# Patient Record
Sex: MALE | Race: WHITE | NOT HISPANIC OR LATINO
[De-identification: names, ages, dates, MRNs, and addresses within clinical notes are randomized per-mention and may not be internally consistent; named-entity substitution may affect disease eponyms.]

---

## 2017-01-18 ENCOUNTER — RX RENEWAL (OUTPATIENT)
Age: 72
End: 2017-01-18

## 2017-03-01 ENCOUNTER — FORM ENCOUNTER (OUTPATIENT)
Age: 72
End: 2017-03-01

## 2017-03-02 ENCOUNTER — NON-APPOINTMENT (OUTPATIENT)
Age: 72
End: 2017-03-02

## 2017-03-02 ENCOUNTER — OUTPATIENT (OUTPATIENT)
Dept: OUTPATIENT SERVICES | Facility: HOSPITAL | Age: 72
LOS: 1 days | End: 2017-03-02
Payer: MEDICARE

## 2017-03-02 ENCOUNTER — APPOINTMENT (OUTPATIENT)
Dept: CARDIOLOGY | Facility: CLINIC | Age: 72
End: 2017-03-02

## 2017-03-02 ENCOUNTER — APPOINTMENT (OUTPATIENT)
Dept: RADIOLOGY | Facility: HOSPITAL | Age: 72
End: 2017-03-02

## 2017-03-02 VITALS
WEIGHT: 147 LBS | HEIGHT: 64 IN | BODY MASS INDEX: 25.1 KG/M2 | OXYGEN SATURATION: 97 % | DIASTOLIC BLOOD PRESSURE: 73 MMHG | SYSTOLIC BLOOD PRESSURE: 157 MMHG | RESPIRATION RATE: 17 BRPM | HEART RATE: 63 BPM

## 2017-03-02 DIAGNOSIS — R94.31 ABNORMAL ELECTROCARDIOGRAM [ECG] [EKG]: ICD-10-CM

## 2017-03-02 DIAGNOSIS — R06.89 OTHER ABNORMALITIES OF BREATHING: ICD-10-CM

## 2017-03-02 DIAGNOSIS — R05 COUGH: ICD-10-CM

## 2017-03-02 DIAGNOSIS — Z95.1 PRESENCE OF AORTOCORONARY BYPASS GRAFT: ICD-10-CM

## 2017-03-02 PROCEDURE — 71046 X-RAY EXAM CHEST 2 VIEWS: CPT

## 2017-06-01 ENCOUNTER — RX RENEWAL (OUTPATIENT)
Age: 72
End: 2017-06-01

## 2017-07-10 ENCOUNTER — RX RENEWAL (OUTPATIENT)
Age: 72
End: 2017-07-10

## 2017-10-04 ENCOUNTER — RX RENEWAL (OUTPATIENT)
Age: 72
End: 2017-10-04

## 2017-10-18 ENCOUNTER — RX RENEWAL (OUTPATIENT)
Age: 72
End: 2017-10-18

## 2018-01-17 ENCOUNTER — RX RENEWAL (OUTPATIENT)
Age: 73
End: 2018-01-17

## 2018-03-05 ENCOUNTER — RX RENEWAL (OUTPATIENT)
Age: 73
End: 2018-03-05

## 2018-04-03 ENCOUNTER — RX RENEWAL (OUTPATIENT)
Age: 73
End: 2018-04-03

## 2018-04-15 ENCOUNTER — RX RENEWAL (OUTPATIENT)
Age: 73
End: 2018-04-15

## 2018-04-30 ENCOUNTER — RX RENEWAL (OUTPATIENT)
Age: 73
End: 2018-04-30

## 2018-05-30 ENCOUNTER — RX RENEWAL (OUTPATIENT)
Age: 73
End: 2018-05-30

## 2018-06-02 ENCOUNTER — RESULT REVIEW (OUTPATIENT)
Age: 73
End: 2018-06-02

## 2018-06-08 ENCOUNTER — OUTPATIENT (OUTPATIENT)
Dept: OUTPATIENT SERVICES | Facility: HOSPITAL | Age: 73
LOS: 1 days | End: 2018-06-08
Payer: MEDICARE

## 2018-06-08 ENCOUNTER — APPOINTMENT (OUTPATIENT)
Dept: ULTRASOUND IMAGING | Facility: HOSPITAL | Age: 73
End: 2018-06-08
Payer: MEDICARE

## 2018-06-08 DIAGNOSIS — R31.9 HEMATURIA, UNSPECIFIED: ICD-10-CM

## 2018-06-08 PROCEDURE — 76775 US EXAM ABDO BACK WALL LIM: CPT

## 2018-06-08 PROCEDURE — 76775 US EXAM ABDO BACK WALL LIM: CPT | Mod: 26

## 2018-06-09 ENCOUNTER — RX RENEWAL (OUTPATIENT)
Age: 73
End: 2018-06-09

## 2018-06-18 DIAGNOSIS — N28.1 CYST OF KIDNEY, ACQUIRED: ICD-10-CM

## 2018-06-18 DIAGNOSIS — R31.9 HEMATURIA, UNSPECIFIED: ICD-10-CM

## 2018-07-16 ENCOUNTER — RX RENEWAL (OUTPATIENT)
Age: 73
End: 2018-07-16

## 2018-07-19 ENCOUNTER — RX RENEWAL (OUTPATIENT)
Age: 73
End: 2018-07-19

## 2018-08-08 ENCOUNTER — RX RENEWAL (OUTPATIENT)
Age: 73
End: 2018-08-08

## 2018-08-09 ENCOUNTER — RX RENEWAL (OUTPATIENT)
Age: 73
End: 2018-08-09

## 2018-08-29 ENCOUNTER — NON-APPOINTMENT (OUTPATIENT)
Age: 73
End: 2018-08-29

## 2018-08-29 ENCOUNTER — APPOINTMENT (OUTPATIENT)
Dept: CARDIOLOGY | Facility: CLINIC | Age: 73
End: 2018-08-29
Payer: MEDICARE

## 2018-08-29 VITALS
HEART RATE: 68 BPM | RESPIRATION RATE: 17 BRPM | SYSTOLIC BLOOD PRESSURE: 154 MMHG | BODY MASS INDEX: 24.92 KG/M2 | HEIGHT: 64 IN | WEIGHT: 146 LBS | DIASTOLIC BLOOD PRESSURE: 71 MMHG | OXYGEN SATURATION: 98 %

## 2018-08-29 VITALS — SYSTOLIC BLOOD PRESSURE: 140 MMHG | DIASTOLIC BLOOD PRESSURE: 70 MMHG

## 2018-08-29 DIAGNOSIS — I49.3 VENTRICULAR PREMATURE DEPOLARIZATION: ICD-10-CM

## 2018-08-29 PROCEDURE — 93000 ELECTROCARDIOGRAM COMPLETE: CPT

## 2018-08-29 PROCEDURE — 99215 OFFICE O/P EST HI 40 MIN: CPT

## 2018-09-04 ENCOUNTER — NON-APPOINTMENT (OUTPATIENT)
Age: 73
End: 2018-09-04

## 2018-09-04 PROCEDURE — 93224 XTRNL ECG REC UP TO 48 HRS: CPT

## 2018-09-07 ENCOUNTER — RX RENEWAL (OUTPATIENT)
Age: 73
End: 2018-09-07

## 2018-10-04 ENCOUNTER — RX RENEWAL (OUTPATIENT)
Age: 73
End: 2018-10-04

## 2018-11-08 ENCOUNTER — RX RENEWAL (OUTPATIENT)
Age: 73
End: 2018-11-08

## 2018-12-29 ENCOUNTER — RX RENEWAL (OUTPATIENT)
Age: 73
End: 2018-12-29

## 2019-01-23 ENCOUNTER — NON-APPOINTMENT (OUTPATIENT)
Age: 74
End: 2019-01-23

## 2019-01-23 ENCOUNTER — APPOINTMENT (OUTPATIENT)
Dept: CARDIOLOGY | Facility: CLINIC | Age: 74
End: 2019-01-23
Payer: MEDICARE

## 2019-01-23 VITALS
OXYGEN SATURATION: 96 % | HEIGHT: 64 IN | SYSTOLIC BLOOD PRESSURE: 147 MMHG | BODY MASS INDEX: 25.27 KG/M2 | WEIGHT: 148 LBS | HEART RATE: 65 BPM | DIASTOLIC BLOOD PRESSURE: 71 MMHG | RESPIRATION RATE: 17 BRPM

## 2019-01-23 PROCEDURE — 93000 ELECTROCARDIOGRAM COMPLETE: CPT

## 2019-01-23 PROCEDURE — 99214 OFFICE O/P EST MOD 30 MIN: CPT

## 2019-01-23 PROCEDURE — 93306 TTE W/DOPPLER COMPLETE: CPT

## 2019-01-23 NOTE — REASON FOR VISIT
[Follow-Up - Clinic] : a clinic follow-up of [Coronary Artery Disease] : coronary artery disease [Hypertension] : hypertension [FreeTextEntry2] : feels well s/p h/o anemia, gi bleed [FreeTextEntry1] : no cp or sob

## 2019-01-23 NOTE — DISCUSSION/SUMMARY
[PVCs] : ectopic ventricular beats [Coronary Artery Disease] : coronary artery disease [Echocardiogram] : echocardiogram [Patient] : the patient [Family] : the patient's family [___ Minutes, >1/2 Counseling] : The visit was [unfilled] minute(s) long, greater than half of the time was spent on counseling [Hypertension] : hypertension [Responding to Treatment] : responding to treatment [None] : none [Stable] : stable

## 2019-01-23 NOTE — REVIEW OF SYSTEMS
[Negative] : Heme/Lymph [Feeling Fatigued] : not feeling fatigued [Shortness Of Breath] : no shortness of breath [Chest Pain] : no chest pain [Palpitations] : no palpitations

## 2019-04-28 ENCOUNTER — RX RENEWAL (OUTPATIENT)
Age: 74
End: 2019-04-28

## 2019-06-24 ENCOUNTER — RX RENEWAL (OUTPATIENT)
Age: 74
End: 2019-06-24

## 2019-07-01 ENCOUNTER — RX RENEWAL (OUTPATIENT)
Age: 74
End: 2019-07-01

## 2019-07-28 ENCOUNTER — RX RENEWAL (OUTPATIENT)
Age: 74
End: 2019-07-28

## 2019-09-23 ENCOUNTER — RX RENEWAL (OUTPATIENT)
Age: 74
End: 2019-09-23

## 2019-10-03 ENCOUNTER — RX RENEWAL (OUTPATIENT)
Age: 74
End: 2019-10-03

## 2019-10-29 ENCOUNTER — RX RENEWAL (OUTPATIENT)
Age: 74
End: 2019-10-29

## 2019-11-04 ENCOUNTER — RX RENEWAL (OUTPATIENT)
Age: 74
End: 2019-11-04

## 2019-12-18 ENCOUNTER — RX RENEWAL (OUTPATIENT)
Age: 74
End: 2019-12-18

## 2020-01-06 ENCOUNTER — RX RENEWAL (OUTPATIENT)
Age: 75
End: 2020-01-06

## 2020-01-30 ENCOUNTER — RX RENEWAL (OUTPATIENT)
Age: 75
End: 2020-01-30

## 2020-02-14 ENCOUNTER — RX RENEWAL (OUTPATIENT)
Age: 75
End: 2020-02-14

## 2020-03-30 ENCOUNTER — RX RENEWAL (OUTPATIENT)
Age: 75
End: 2020-03-30

## 2020-04-22 ENCOUNTER — RX RENEWAL (OUTPATIENT)
Age: 75
End: 2020-04-22

## 2020-05-18 ENCOUNTER — RX RENEWAL (OUTPATIENT)
Age: 75
End: 2020-05-18

## 2020-06-22 ENCOUNTER — RX RENEWAL (OUTPATIENT)
Age: 75
End: 2020-06-22

## 2020-07-17 ENCOUNTER — RX RENEWAL (OUTPATIENT)
Age: 75
End: 2020-07-17

## 2020-07-23 ENCOUNTER — RX RENEWAL (OUTPATIENT)
Age: 75
End: 2020-07-23

## 2020-07-27 ENCOUNTER — RX RENEWAL (OUTPATIENT)
Age: 75
End: 2020-07-27

## 2020-08-12 ENCOUNTER — RX RENEWAL (OUTPATIENT)
Age: 75
End: 2020-08-12

## 2020-09-14 ENCOUNTER — RX RENEWAL (OUTPATIENT)
Age: 75
End: 2020-09-14

## 2020-10-18 ENCOUNTER — RX RENEWAL (OUTPATIENT)
Age: 75
End: 2020-10-18

## 2020-11-16 ENCOUNTER — RX RENEWAL (OUTPATIENT)
Age: 75
End: 2020-11-16

## 2020-12-17 ENCOUNTER — RX RENEWAL (OUTPATIENT)
Age: 75
End: 2020-12-17

## 2021-01-03 ENCOUNTER — RX RENEWAL (OUTPATIENT)
Age: 76
End: 2021-01-03

## 2021-02-04 ENCOUNTER — RX RENEWAL (OUTPATIENT)
Age: 76
End: 2021-02-04

## 2021-03-23 ENCOUNTER — RX RENEWAL (OUTPATIENT)
Age: 76
End: 2021-03-23

## 2021-03-24 ENCOUNTER — RX RENEWAL (OUTPATIENT)
Age: 76
End: 2021-03-24

## 2021-04-28 ENCOUNTER — RX RENEWAL (OUTPATIENT)
Age: 76
End: 2021-04-28

## 2021-06-07 ENCOUNTER — RX RENEWAL (OUTPATIENT)
Age: 76
End: 2021-06-07

## 2021-06-14 ENCOUNTER — RX RENEWAL (OUTPATIENT)
Age: 76
End: 2021-06-14

## 2021-06-16 ENCOUNTER — APPOINTMENT (OUTPATIENT)
Dept: CT IMAGING | Facility: HOSPITAL | Age: 76
End: 2021-06-16
Payer: MEDICARE

## 2021-06-16 ENCOUNTER — OUTPATIENT (OUTPATIENT)
Dept: OUTPATIENT SERVICES | Facility: HOSPITAL | Age: 76
LOS: 1 days | End: 2021-06-16
Payer: MEDICARE

## 2021-06-16 DIAGNOSIS — R63.4 ABNORMAL WEIGHT LOSS: ICD-10-CM

## 2021-06-16 PROCEDURE — 74177 CT ABD & PELVIS W/CONTRAST: CPT | Mod: 26

## 2021-06-16 PROCEDURE — 74177 CT ABD & PELVIS W/CONTRAST: CPT

## 2021-06-16 PROCEDURE — 71260 CT THORAX DX C+: CPT

## 2021-06-16 PROCEDURE — 82565 ASSAY OF CREATININE: CPT

## 2021-06-16 PROCEDURE — 71260 CT THORAX DX C+: CPT | Mod: 26

## 2021-07-27 ENCOUNTER — OUTPATIENT (OUTPATIENT)
Dept: OUTPATIENT SERVICES | Facility: HOSPITAL | Age: 76
LOS: 1 days | End: 2021-07-27
Payer: MEDICARE

## 2021-07-27 ENCOUNTER — APPOINTMENT (OUTPATIENT)
Dept: NUCLEAR MEDICINE | Facility: CLINIC | Age: 76
End: 2021-07-27
Payer: MEDICARE

## 2021-07-27 DIAGNOSIS — R63.4 ABNORMAL WEIGHT LOSS: ICD-10-CM

## 2021-07-27 PROCEDURE — 78815 PET IMAGE W/CT SKULL-THIGH: CPT | Mod: 26,PI

## 2021-07-27 PROCEDURE — A9552: CPT

## 2021-07-27 PROCEDURE — 78815 PET IMAGE W/CT SKULL-THIGH: CPT

## 2021-09-15 ENCOUNTER — RX RENEWAL (OUTPATIENT)
Age: 76
End: 2021-09-15

## 2021-10-04 ENCOUNTER — APPOINTMENT (OUTPATIENT)
Dept: NEUROLOGY | Facility: CLINIC | Age: 76
End: 2021-10-04
Payer: MEDICARE

## 2021-10-19 ENCOUNTER — RX RENEWAL (OUTPATIENT)
Age: 76
End: 2021-10-19

## 2021-11-05 ENCOUNTER — APPOINTMENT (OUTPATIENT)
Dept: NEUROLOGY | Facility: CLINIC | Age: 76
End: 2021-11-05
Payer: MEDICARE

## 2021-11-05 ENCOUNTER — NON-APPOINTMENT (OUTPATIENT)
Age: 76
End: 2021-11-05

## 2021-11-05 VITALS
BODY MASS INDEX: 27.23 KG/M2 | DIASTOLIC BLOOD PRESSURE: 86 MMHG | HEART RATE: 70 BPM | HEIGHT: 62 IN | WEIGHT: 148 LBS | SYSTOLIC BLOOD PRESSURE: 138 MMHG

## 2021-11-05 PROCEDURE — 99205 OFFICE O/P NEW HI 60 MIN: CPT

## 2021-11-05 NOTE — CONSULT LETTER
[Dear  ___] : Dear  [unfilled], [Consult Letter:] : I had the pleasure of evaluating your patient, [unfilled]. [Please see my note below.] : Please see my note below. [Consult Closing:] : Thank you very much for allowing me to participate in the care of this patient.  If you have any questions, please do not hesitate to contact me. [Sincerely,] : Sincerely, [FreeTextEntry3] : Evgeny Shaffer MD\par

## 2021-11-05 NOTE — PHYSICAL EXAM
[FreeTextEntry1] : Head:  Normocephalic Neck: Supple nontender no carotid bruits.  \par \par Mental Status:  Alert disoriented and confused.  This patient follows simple commands.  He is inattentive and easily distracted.  He does demonstrate problems with receptive language but there is a language barrier.  He speaks a mixture of English and Lithuanian.  He recalled 0 out of 3 words at 5 minutes.  He could not copy intersecting pentagons or draw a clock face as directed.  He appears anxious but not necessarily depressed.\par \par Cranial Nerves:  PERRL, Fundi normal Visual Fields full  EOMI no diplopia no ptosis no nystagmus, V through XII intact.\par \par Motor: Nonfocal no drift fairly good strength throughout paratonia no dysmetria or abnormal movement.\par \par DTRs: Symmetric .  Plantars flexor.  No Clonus.\par \par Sensory:  Normal testing with pin light touch and not cooperative for detailed sensory testing.\par \par Gait: Basically unremarkable.\par

## 2021-11-05 NOTE — HISTORY OF PRESENT ILLNESS
[FreeTextEntry1] : Giovani Avila is seen for an office consultation with his daughter.  This patient is a 76-year-old right-handed male who has been confused for approximately the last 1 year.  Symptoms seem to begin sometime after the separation of his son and daughter-in-law.  He now lives with his son.  He has become increasingly confused with problems with memory.  There are behavioral changes with anxiety and depression.  2-1/2 weeks ago his wife passed away from complications of Covid.  Approximately 2 years ago when his wife was hospitalized with a hip fracture he had some transient confusion.  He takes care of his own ADL but he needs close monitoring with respect to behavior.  He has had significant weight loss during the last several months with a negative GI work-up.  He was treated for colon cancer 6 to 7 years ago with surgery and chemotherapy including platinum.  There is no evidence of recurrent cancer.  He follows with a gastroenterologist on a regular basis.  There is no history of stroke head trauma seizure or focal neurological complaints.\par \par Past history significant for colon cancer as noted above diabetes hypertension remote coronary artery bypass surgery.\par \par Medications include glipizide aspirin amlodipine and Toprol Imdur rammer Sherlyn.\par \par He is a former smoker and does not abuse alcohol.\par \par Family history negative for dementia.\par \par MRI of the brain with and without contrast was performed elsewhere in August 2021.  The study reveals microvascular change to a moderate degree and moderate involutional change and a tiny chronic right cerebellar infarct.  The dens was not well visualized possibly due to prior fracture.  CAT scan of the cervical spine recommended for better assessment.\par \par

## 2021-11-05 NOTE — ASSESSMENT
[FreeTextEntry1] : Impression: This 76-year-old male who presents with his daughter with a history of CAD remote coronary bypass hypertension diabetes history of colon cancer presents with an approximate 1 year history of progressive cognitive decline with behavioral change and anxiety and depression.  There are psychosocial stressors including the passing of his wife within the last 2-1/2 weeks.  Suspect dementia probably Alzheimer's disease with behavioral symptomatology.\par \par Recommendations: Begin Lexapro 5 mg daily to increase to 10 mg daily depending on the patient's initial response.  Consider addition of donepezil 5 mg to 10 mg daily.  Consider memantine.  Obtain records from his prior neurologist and recent blood test reportedly to be unremarkable.  Consider brain PET scan.  Discussed with the patient's daughter.  Guarded prognosis.\par

## 2021-12-13 ENCOUNTER — APPOINTMENT (OUTPATIENT)
Dept: NEUROLOGY | Facility: CLINIC | Age: 76
End: 2021-12-13
Payer: MEDICARE

## 2021-12-13 VITALS
BODY MASS INDEX: 20.8 KG/M2 | WEIGHT: 113 LBS | HEART RATE: 68 BPM | DIASTOLIC BLOOD PRESSURE: 82 MMHG | SYSTOLIC BLOOD PRESSURE: 134 MMHG | HEIGHT: 62 IN

## 2021-12-13 DIAGNOSIS — F41.9 ANXIETY DISORDER, UNSPECIFIED: ICD-10-CM

## 2021-12-13 DIAGNOSIS — F32.A ANXIETY DISORDER, UNSPECIFIED: ICD-10-CM

## 2021-12-13 PROCEDURE — 99214 OFFICE O/P EST MOD 30 MIN: CPT

## 2021-12-13 NOTE — PHYSICAL EXAM
[FreeTextEntry1] : Head:  Normocephalic Neck: Supple nontender no carotid bruits. \par \par Mental Status:  Alert disoriented and confused.  Basically unchanged.\par \par Cranial Nerves:  PERRL, Visual Fields full  EOMI no diplopia no ptosis no nystagmus, V through XII intact.\par \par Motor: Nonfocal no drift fairly good strength throughout paratonia.\par \par DTRs: Symmetric. Plantars flexor.  No Clonus.\par \par Sensory:  Normal testing with pin light touch.\par \par Gait: Basically not remarkable for age.\par \par

## 2021-12-13 NOTE — HISTORY OF PRESENT ILLNESS
[FreeTextEntry1] : This patient is seen for an office visit with his daughter.  There has been some benefit with Escitalopram 5 mg daily regarding his confusion and depression and anxiety.  However at 10 mg daily he seems to have deteriorated.

## 2021-12-13 NOTE — ASSESSMENT
[FreeTextEntry1] : Impression: This 76-year-old patient presents with his daughter with a history of CAD remote coronary bypass hypertension diabetes history of colon cancer and a presentation consistent with dementia with behavioral symptomatology and also depression.\par \par Recommendations: Reduce escitalopram back to 5 mg once daily and observe.  Consider switching to sertraline 25 mg once daily depending on his status with respect to the lower dose of Escitalopram.  Xanax 0.25 mg 1/2 tablet twice daily as needed.  Consider donepezil 5 mg once daily.  Office follow-up.\par

## 2022-01-11 ENCOUNTER — RX RENEWAL (OUTPATIENT)
Age: 77
End: 2022-01-11

## 2022-02-13 ENCOUNTER — RX RENEWAL (OUTPATIENT)
Age: 77
End: 2022-02-13

## 2022-03-24 ENCOUNTER — RX RENEWAL (OUTPATIENT)
Age: 77
End: 2022-03-24

## 2022-04-01 ENCOUNTER — APPOINTMENT (OUTPATIENT)
Dept: NEUROLOGY | Facility: CLINIC | Age: 77
End: 2022-04-01

## 2022-05-12 ENCOUNTER — RX RENEWAL (OUTPATIENT)
Age: 77
End: 2022-05-12

## 2022-05-16 ENCOUNTER — RX RENEWAL (OUTPATIENT)
Age: 77
End: 2022-05-16

## 2022-05-17 ENCOUNTER — NON-APPOINTMENT (OUTPATIENT)
Age: 77
End: 2022-05-17

## 2022-06-06 RX ORDER — ESCITALOPRAM OXALATE 20 MG/1
20 TABLET ORAL DAILY
Qty: 30 | Refills: 5 | Status: ACTIVE | COMMUNITY
Start: 2021-11-05 | End: 1900-01-01

## 2022-07-06 ENCOUNTER — RX RENEWAL (OUTPATIENT)
Age: 77
End: 2022-07-06

## 2022-07-13 ENCOUNTER — INPATIENT (INPATIENT)
Facility: HOSPITAL | Age: 77
LOS: 3 days | Discharge: HOME | DRG: 415 | End: 2022-07-17
Attending: HOSPITALIST | Admitting: STUDENT IN AN ORGANIZED HEALTH CARE EDUCATION/TRAINING PROGRAM
Payer: MEDICARE

## 2022-07-13 ENCOUNTER — APPOINTMENT (OUTPATIENT)
Dept: NUCLEAR MEDICINE | Facility: IMAGING CENTER | Age: 77
End: 2022-07-13

## 2022-07-13 ENCOUNTER — TRANSCRIPTION ENCOUNTER (OUTPATIENT)
Age: 77
End: 2022-07-13

## 2022-07-13 VITALS
HEIGHT: 63 IN | HEART RATE: 97 BPM | WEIGHT: 115.96 LBS | DIASTOLIC BLOOD PRESSURE: 60 MMHG | SYSTOLIC BLOOD PRESSURE: 119 MMHG | RESPIRATION RATE: 16 BRPM | OXYGEN SATURATION: 96 % | TEMPERATURE: 98 F

## 2022-07-13 DIAGNOSIS — K81.9 CHOLECYSTITIS, UNSPECIFIED: ICD-10-CM

## 2022-07-13 LAB
ALBUMIN SERPL ELPH-MCNC: 2.8 G/DL — LOW (ref 3.3–5)
ALP SERPL-CCNC: 112 U/L — SIGNIFICANT CHANGE UP (ref 40–120)
ALT FLD-CCNC: 22 U/L — SIGNIFICANT CHANGE UP (ref 10–45)
ANION GAP SERPL CALC-SCNC: 10 MMOL/L — SIGNIFICANT CHANGE UP (ref 5–17)
APPEARANCE UR: CLEAR — SIGNIFICANT CHANGE UP
AST SERPL-CCNC: 14 U/L — SIGNIFICANT CHANGE UP (ref 10–40)
BACTERIA # UR AUTO: ABNORMAL /HPF
BASOPHILS # BLD AUTO: 0.04 K/UL — SIGNIFICANT CHANGE UP (ref 0–0.2)
BASOPHILS NFR BLD AUTO: 0.4 % — SIGNIFICANT CHANGE UP (ref 0–2)
BILIRUB SERPL-MCNC: 1 MG/DL — SIGNIFICANT CHANGE UP (ref 0.2–1.2)
BILIRUB UR-MCNC: ABNORMAL
BUN SERPL-MCNC: 76 MG/DL — HIGH (ref 7–23)
CALCIUM SERPL-MCNC: 7.9 MG/DL — LOW (ref 8.4–10.5)
CHLORIDE SERPL-SCNC: 95 MMOL/L — LOW (ref 96–108)
CO2 SERPL-SCNC: 28 MMOL/L — SIGNIFICANT CHANGE UP (ref 22–31)
COLOR SPEC: ABNORMAL
CREAT SERPL-MCNC: 3.4 MG/DL — HIGH (ref 0.5–1.3)
D DIMER BLD IA.RAPID-MCNC: 498 NG/ML DDU — HIGH
DIFF PNL FLD: NEGATIVE — SIGNIFICANT CHANGE UP
EGFR: 18 ML/MIN/1.73M2 — LOW
EOSINOPHIL # BLD AUTO: 0.06 K/UL — SIGNIFICANT CHANGE UP (ref 0–0.5)
EOSINOPHIL NFR BLD AUTO: 0.6 % — SIGNIFICANT CHANGE UP (ref 0–6)
EPI CELLS # UR: SIGNIFICANT CHANGE UP
GLUCOSE SERPL-MCNC: 203 MG/DL — HIGH (ref 70–99)
GLUCOSE UR QL: NEGATIVE — SIGNIFICANT CHANGE UP
HCT VFR BLD CALC: 44 % — SIGNIFICANT CHANGE UP (ref 39–50)
HGB BLD-MCNC: 15.1 G/DL — SIGNIFICANT CHANGE UP (ref 13–17)
HYALINE CASTS # UR AUTO: ABNORMAL
IMM GRANULOCYTES NFR BLD AUTO: 0.5 % — SIGNIFICANT CHANGE UP (ref 0–1.5)
KETONES UR-MCNC: NEGATIVE — SIGNIFICANT CHANGE UP
LEUKOCYTE ESTERASE UR-ACNC: NEGATIVE — SIGNIFICANT CHANGE UP
LIDOCAIN IGE QN: 151 U/L — SIGNIFICANT CHANGE UP (ref 73–393)
LYMPHOCYTES # BLD AUTO: 1.05 K/UL — SIGNIFICANT CHANGE UP (ref 1–3.3)
LYMPHOCYTES # BLD AUTO: 10.9 % — LOW (ref 13–44)
MCHC RBC-ENTMCNC: 31.2 PG — SIGNIFICANT CHANGE UP (ref 27–34)
MCHC RBC-ENTMCNC: 34.3 GM/DL — SIGNIFICANT CHANGE UP (ref 32–36)
MCV RBC AUTO: 90.9 FL — SIGNIFICANT CHANGE UP (ref 80–100)
MONOCYTES # BLD AUTO: 1.04 K/UL — HIGH (ref 0–0.9)
MONOCYTES NFR BLD AUTO: 10.8 % — SIGNIFICANT CHANGE UP (ref 2–14)
NEUTROPHILS # BLD AUTO: 7.41 K/UL — HIGH (ref 1.8–7.4)
NEUTROPHILS NFR BLD AUTO: 76.8 % — SIGNIFICANT CHANGE UP (ref 43–77)
NITRITE UR-MCNC: NEGATIVE — SIGNIFICANT CHANGE UP
NRBC # BLD: 0 /100 WBCS — SIGNIFICANT CHANGE UP (ref 0–0)
PH UR: 5 — SIGNIFICANT CHANGE UP (ref 5–8)
PLATELET # BLD AUTO: 311 K/UL — SIGNIFICANT CHANGE UP (ref 150–400)
POTASSIUM SERPL-MCNC: 3.2 MMOL/L — LOW (ref 3.5–5.3)
POTASSIUM SERPL-SCNC: 3.2 MMOL/L — LOW (ref 3.5–5.3)
PROT SERPL-MCNC: 7.7 G/DL — SIGNIFICANT CHANGE UP (ref 6–8.3)
PROT UR-MCNC: 30 MG/DL
RBC # BLD: 4.84 M/UL — SIGNIFICANT CHANGE UP (ref 4.2–5.8)
RBC # FLD: 12.6 % — SIGNIFICANT CHANGE UP (ref 10.3–14.5)
RBC CASTS # UR COMP ASSIST: SIGNIFICANT CHANGE UP /HPF (ref 0–4)
SARS-COV-2 RNA SPEC QL NAA+PROBE: SIGNIFICANT CHANGE UP
SODIUM SERPL-SCNC: 133 MMOL/L — LOW (ref 135–145)
SP GR SPEC: 1.02 — SIGNIFICANT CHANGE UP (ref 1.01–1.02)
UROBILINOGEN FLD QL: NEGATIVE — SIGNIFICANT CHANGE UP
WBC # BLD: 9.65 K/UL — SIGNIFICANT CHANGE UP (ref 3.8–10.5)
WBC # FLD AUTO: 9.65 K/UL — SIGNIFICANT CHANGE UP (ref 3.8–10.5)
WBC UR QL: SIGNIFICANT CHANGE UP /HPF (ref 0–5)

## 2022-07-13 PROCEDURE — 99285 EMERGENCY DEPT VISIT HI MDM: CPT

## 2022-07-13 PROCEDURE — 74176 CT ABD & PELVIS W/O CONTRAST: CPT | Mod: 26,MA

## 2022-07-13 PROCEDURE — 93010 ELECTROCARDIOGRAM REPORT: CPT

## 2022-07-13 PROCEDURE — 99223 1ST HOSP IP/OBS HIGH 75: CPT

## 2022-07-13 PROCEDURE — 76705 ECHO EXAM OF ABDOMEN: CPT | Mod: 26

## 2022-07-13 RX ORDER — PIPERACILLIN AND TAZOBACTAM 4; .5 G/20ML; G/20ML
3.38 INJECTION, POWDER, LYOPHILIZED, FOR SOLUTION INTRAVENOUS EVERY 12 HOURS
Refills: 0 | Status: DISCONTINUED | OUTPATIENT
Start: 2022-07-13 | End: 2022-07-14

## 2022-07-13 RX ORDER — PIPERACILLIN AND TAZOBACTAM 4; .5 G/20ML; G/20ML
3.38 INJECTION, POWDER, LYOPHILIZED, FOR SOLUTION INTRAVENOUS ONCE
Refills: 0 | Status: COMPLETED | OUTPATIENT
Start: 2022-07-13 | End: 2022-07-13

## 2022-07-13 RX ORDER — SODIUM CHLORIDE 9 MG/ML
1000 INJECTION INTRAMUSCULAR; INTRAVENOUS; SUBCUTANEOUS ONCE
Refills: 0 | Status: COMPLETED | OUTPATIENT
Start: 2022-07-13 | End: 2022-07-13

## 2022-07-13 RX ORDER — ONDANSETRON 8 MG/1
4 TABLET, FILM COATED ORAL EVERY 8 HOURS
Refills: 0 | Status: DISCONTINUED | OUTPATIENT
Start: 2022-07-13 | End: 2022-07-14

## 2022-07-13 RX ORDER — SODIUM CHLORIDE 9 MG/ML
500 INJECTION INTRAMUSCULAR; INTRAVENOUS; SUBCUTANEOUS ONCE
Refills: 0 | Status: COMPLETED | OUTPATIENT
Start: 2022-07-13 | End: 2022-07-13

## 2022-07-13 RX ORDER — METOPROLOL TARTRATE 50 MG
12.5 TABLET ORAL DAILY
Refills: 0 | Status: DISCONTINUED | OUTPATIENT
Start: 2022-07-13 | End: 2022-07-14

## 2022-07-13 RX ORDER — POTASSIUM CHLORIDE 20 MEQ
40 PACKET (EA) ORAL ONCE
Refills: 0 | Status: DISCONTINUED | OUTPATIENT
Start: 2022-07-13 | End: 2022-07-13

## 2022-07-13 RX ORDER — ASPIRIN/CALCIUM CARB/MAGNESIUM 324 MG
1 TABLET ORAL
Qty: 0 | Refills: 0 | DISCHARGE

## 2022-07-13 RX ORDER — SODIUM CHLORIDE 9 MG/ML
1000 INJECTION INTRAMUSCULAR; INTRAVENOUS; SUBCUTANEOUS
Refills: 0 | Status: DISCONTINUED | OUTPATIENT
Start: 2022-07-13 | End: 2022-07-14

## 2022-07-13 RX ORDER — ACETAMINOPHEN 500 MG
650 TABLET ORAL EVERY 6 HOURS
Refills: 0 | Status: DISCONTINUED | OUTPATIENT
Start: 2022-07-13 | End: 2022-07-14

## 2022-07-13 RX ORDER — LANOLIN ALCOHOL/MO/W.PET/CERES
3 CREAM (GRAM) TOPICAL AT BEDTIME
Refills: 0 | Status: DISCONTINUED | OUTPATIENT
Start: 2022-07-13 | End: 2022-07-14

## 2022-07-13 RX ORDER — AMLODIPINE BESYLATE 2.5 MG/1
2.5 TABLET ORAL DAILY
Refills: 0 | Status: DISCONTINUED | OUTPATIENT
Start: 2022-07-13 | End: 2022-07-14

## 2022-07-13 RX ORDER — ACETAMINOPHEN 500 MG
1000 TABLET ORAL ONCE
Refills: 0 | Status: COMPLETED | OUTPATIENT
Start: 2022-07-13 | End: 2022-07-13

## 2022-07-13 RX ORDER — ISOSORBIDE MONONITRATE 60 MG/1
60 TABLET, EXTENDED RELEASE ORAL DAILY
Refills: 0 | Status: DISCONTINUED | OUTPATIENT
Start: 2022-07-13 | End: 2022-07-14

## 2022-07-13 RX ORDER — ONDANSETRON 8 MG/1
4 TABLET, FILM COATED ORAL ONCE
Refills: 0 | Status: COMPLETED | OUTPATIENT
Start: 2022-07-13 | End: 2022-07-13

## 2022-07-13 RX ADMIN — Medication 400 MILLIGRAM(S): at 17:31

## 2022-07-13 RX ADMIN — PIPERACILLIN AND TAZOBACTAM 200 GRAM(S): 4; .5 INJECTION, POWDER, LYOPHILIZED, FOR SOLUTION INTRAVENOUS at 18:41

## 2022-07-13 RX ADMIN — SODIUM CHLORIDE 1000 MILLILITER(S): 9 INJECTION INTRAMUSCULAR; INTRAVENOUS; SUBCUTANEOUS at 21:50

## 2022-07-13 RX ADMIN — SODIUM CHLORIDE 1000 MILLILITER(S): 9 INJECTION INTRAMUSCULAR; INTRAVENOUS; SUBCUTANEOUS at 17:31

## 2022-07-13 RX ADMIN — SODIUM CHLORIDE 500 MILLILITER(S): 9 INJECTION INTRAMUSCULAR; INTRAVENOUS; SUBCUTANEOUS at 16:10

## 2022-07-13 RX ADMIN — ONDANSETRON 4 MILLIGRAM(S): 8 TABLET, FILM COATED ORAL at 16:07

## 2022-07-13 RX ADMIN — SODIUM CHLORIDE 60 MILLILITER(S): 9 INJECTION INTRAMUSCULAR; INTRAVENOUS; SUBCUTANEOUS at 21:53

## 2022-07-13 NOTE — CONSULT NOTE ADULT - ASSESSMENT
a/p    acute calculous cholecystitis/emphysematous-clinically does not look that sick and is afebrile(rectally)    needs fluid resuscitation, zosyn, npo    needs potasium replacement    I put on OR schedule for open cholecystectomy for tomorrow    If he gets sicker tonight I will come back and do the surgery.    I explained this to patient and his son Franko([phone]) and they understand.

## 2022-07-13 NOTE — ED PROVIDER NOTE - NS ED ATTENDING STATEMENT MOD
This was a shared visit with the CASH. I reviewed and verified the documentation and independently performed the documented:

## 2022-07-13 NOTE — H&P ADULT - HISTORY OF PRESENT ILLNESS
77 year old M PMH DM (not on insulin), colon CA s/p resection, HTN, HLD, dementia coming to the ED for abdominal pain associated with nausea and vomiting and decreased PO intake since Monday. History was obtained from daughter, Kludeep Ordonez at bedside as patient with dementia. Patient states he woke up Monday without appetite, had episode of R sided abdominal pain and bilious emesis, spoke to PCP Dr. Parra and had blood drawn as well as US. He has been able to tolerate small amounts of fluid and nausea relief with zofran however continues to have abdominal pain. Patient also had episode of diarrhea today and took imodium with resolution of diarrhea. Denies fevers, chills, chest pain, SOB. Patient had episode of diarrhea last night.    In the ED, T 98.1F, HR 97, , RR 16, SpO2 96% on RA. Labs showed normal CBC, dimer 498, Na 133, K 3.2, BUN/Cr 76/3.40, lipase negative. Patient received ofirmev x1, zofran x1, NS bolus x1.5L in the ED.    EKG: afib however possible artifact, will repeat  US abdomen: Cholelithiasis with gallbladder wall thickening is suspicious for acute cholecystitis in the appropriate clinical setting. There is a negative sonographic Palomino sign.

## 2022-07-13 NOTE — CONSULT NOTE ADULT - SUBJECTIVE AND OBJECTIVE BOX
Hospitalist note:    77 year old M PMH DM (not on insulin), colon CA s/p resection, HTN, HLD, dementia coming to the ED for abdominal pain associated with nausea and vomiting and decreased PO intake since Monday. History was obtained from daughter, Kuldeep Ordonez at bedside as patient with dementia. Patient states he woke up Monday without appetite, had episode of R sided abdominal pain and bilious emesis, spoke to PCP Dr. Parra and had blood drawn as well as US. He has been able to tolerate small amounts of fluid and nausea relief with zofran however continues to have abdominal pain. Patient also had episode of diarrhea today and took imodium with resolution of diarrhea. Denies fevers, chills, chest pain, SOB. Patient had episode of diarrhea last night.    In the ED, T 98.1F, HR 97, , RR 16, SpO2 96% on RA. Labs showed normal CBC, dimer 498, Na 133, K 3.2, BUN/Cr 76/3.40, lipase negative. Patient received ofirmev x1, zofran x1, NS bolus x1.5L in the ED.    EKG: afib however possible artifact, will repeat  US abdomen: Cholelithiasis with gallbladder wall thickening is suspicious for acute cholecystitis in the appropriate clinical setting. There is a negative sonographic Palomino sign      patient examined in ED [place] with son Franko present    says he feels better now    looks comfortable      temp 98(rectal)    Heent-sclera anicteric    abdomen-scaphoid, soft, non distended, non tender    extrem-ok    labs:    COVID-19 PCR: NotDetec: You can help in the fight against COVID-19. [facility] may contact     `wbc 9.6  h/h  15/45  k+ 3.2  bun 76  creat 3.40    imaging(I personally reviewed):  < from: CT Abdomen and Pelvis No Cont (07.13.22 @ 17:22) >  GALLBLADDER: Cholelithiasis. Wall thickening of the gallbladder and air   along the gallbladder wall, consistent with acute emphysematous   cholecystitis.    < end of copied text >  < from: US Abdomen Upper Quadrant Right (07.13.22 @ 16:58) >  IMPRESSION:  Cholelithiasis with gallbladder wall thickening is suspicious for acute   cholecystitis in the appropriate clinical setting. There is a negative   sonographic Palomino sign.    < end of copied text >            .

## 2022-07-13 NOTE — ED PROVIDER NOTE - CONSTITUTIONAL APPEARANCE HYGIENE, MLM
Clearance faxed  
Received request for Cardiac clearance for Dr. Rose with [facility]  Fax [phone]    Dr. Ornelas -please advise    
well appearing

## 2022-07-13 NOTE — H&P ADULT - NSHPREVIEWOFSYSTEMS_GEN_ALL_CORE
limited due to mental status    CONSTITUTIONAL: denies fever, chills, admits to fatigue, weakness  HEENT: denies blurred vision, sore throat  CARDIOVASCULAR: denies chest pain, chest pressure, palpitations  RESPIRATORY: denies shortness of breath, sputum production  GASTROINTESTINAL: admits to nausea, vomiting, diarrhea, abdominal pain  HEMATOLOGIC: denies gross bleeding, bruising  LYMPHATICS: denies enlarged lymph nodes, extremity swelling  PSYCHIATRIC: denies recent changes in anxiety, depression

## 2022-07-13 NOTE — ED ADULT NURSE NOTE - OBJECTIVE STATEMENT
Pt a/ox3, disoriented to situation, reports nausea.  No other signs of acute distress noted.  VSS on CM, family at bedside.

## 2022-07-13 NOTE — ED PROVIDER NOTE - OBJECTIVE STATEMENT
77 year old male, PMHx of DM, colon ca s/p resection, HTN, HLD, dementia; presents to the ED complaining of abdominal pain, nausea, vomiting and decreased PO since Monday. Patient states on Monday he woke up without an appetite and felt nauseous. He had an episode of bilious vomiting and was complaining of right sided abdominal pain. He was seen at Dr. Costello where they jose martin blood, had an ultrasound (WBC 17, US +Cholecystitis). He has since been taking Zofran with relief of nausea, been tolerating small amounts of fluid. He endorses ongoing mild abdominal pain, worse with palpation, in the RLQ, and nausea (last zofran this morning). Of note, pt also had one episode of diarrhea this afternoon and took Imodium with relief. He denies fevers, chills, cough, chest pain, shortness of breath, dysuria, or other complaints.

## 2022-07-13 NOTE — H&P ADULT - ASSESSMENT
77 year old M PMH DM (not on insulin), colon CA s/p resection, HTN, HLD, dementia coming to the ED for abdominal pain associated with nausea and vomiting and decreased PO intake since Monday admitted for acute cholecysitis    #acute cholecystitis  - admit to medicine  - patient does not meet sepsis criteria at this time  - US consistent with acute choley- discussed with Dr. Zarate and concerned with CT and US results, patient might go for emergent surgery tonight  - will order for additional NS bolus and maintenance IVF  - zosyn for acute choley  - surgery, Dr. Zarate, consulted    Patient medically optimized as best possible for emergent procedure. RCRI score 1    #ANISH  - likely prerenal azotemia in setting of decreased PO intake and dehydration  - continue with IVF  - Cr noted to be around 0.8-0.9 in 6/2022    #DM  - HbA1C was 7.9 on outpatient lab work from 7/11/2022  - home med glipizide  - moderate dose ISS    #HTN  #HLD  - continue home med amlodipine 2.5mg daily, isosorbide mononitrate 60mg daily  - hold home ramipril 10mg daily in setting of ANISH    #DVT ppx  - hold in setting of OR    Daughter Kuldeep José Luis [phone] and son Franko [phone] at bedside, all questions answered. Kuldeep would like daily updates, if not possible to reach then speak to Franko 77 year old M PMH DM (not on insulin), colon CA s/p resection, HTN, HLD, dementia coming to the ED for abdominal pain associated with nausea and vomiting and decreased PO intake since Monday admitted for acute cholecysitis    #acute cholecystitis  - admit to medicine  - patient does not meet sepsis criteria at this time  - US consistent with acute choley- discussed with Dr. Zarate and concerned with CT and US results, patient for OR tomorrow, will keep NPO for now  - will order for additional NS bolus and maintenance IVF  - zosyn for acute choley  - follow up repeat EKG as first with afib, likely inaccurate  - surgery, Dr. Zarate, consulted    #ANISH  - likely prerenal azotemia in setting of decreased PO intake and dehydration  - continue with IVF  - Cr noted to be around 0.8-0.9 in 6/2022    #DM  - HbA1C was 7.9 on outpatient lab work from 7/11/2022  - home med glipizide  - moderate dose ISS    #HTN  #HLD  - continue home med amlodipine 2.5mg daily, isosorbide mononitrate 60mg daily  - hold home ramipril 10mg daily in setting of ANISH    #DVT ppx  - hold in setting of OR    Daughter Kuldeep José Luis [phone] and son Franko [phone] at bedside, all questions answered. Kuldeep would like daily updates, if not possible to reach then speak to Franko 77 year old M PMH DM (not on insulin), colon CA s/p resection, HTN, HLD, dementia coming to the ED for abdominal pain associated with nausea and vomiting and decreased PO intake since Monday admitted for acute cholecysitis    #acute cholecystitis  - admit to medicine  - patient does not meet sepsis criteria at this time  - US consistent with acute choley- discussed with Dr. Zarate and concerned with CT and US results, patient for OR tomorrow, will keep NPO for now  - will order for additional NS bolus and maintenance IVF  - zosyn for acute choley  - follow up repeat EKG as first with afib, likely inaccurate  - surgery, Dr. Zarate, consulted    #ANISH  - likely prerenal azotemia in setting of decreased PO intake and dehydration  - continue with IVF  - Cr noted to be around 0.8-0.9 in 6/2022    #DM  - HbA1C was 7.9 on outpatient lab work from 7/11/2022  - home med glipizide  - moderate dose ISS    #HTN  #HLD  - continue home med amlodipine 2.5mg daily, isosorbide mononitrate 60mg daily  - hold home ramipril 10mg daily in setting of ANISH    #history of colon CA  - stable  - discussed with Dr. Parra, PCP- patient with elevated CEA level outpatient. Will obtain another level in the morning and PCP would like to have GI see patient after surgery    #DVT ppx  - hold in setting of OR    Daughter Kuldeep Ordonez [phone] and son Franko [phone] at bedside, all questions answered. Kuldeep would like daily updates, if not possible to reach then speak to Franko

## 2022-07-13 NOTE — ED PROVIDER NOTE - CLINICAL SUMMARY MEDICAL DECISION MAKING FREE TEXT BOX
78 yo m pw abd pain n/v/d x 3 days- WBC 17, +sangeetha on us, +dimer on labs. Will rpt labs, us, ctap 2/2 rlq abd pain

## 2022-07-13 NOTE — ED PROVIDER NOTE - ATTENDING APP SHARED VISIT CONTRIBUTION OF CARE
78 yo m pw abd pain n/v/d x 3 days- WBC 17, +sangeetha on us, +dimer on labs. Will rpt labs, us, ctap 2/2 rlq abd pain  Dr. Almaguer:  I have reviewed and discussed with the PA/ resident the case specifics, including the history, physical assessment, evaluation, conclusion, laboratory results, and medical plan. I agree with the contents, and conclusions. I have personally examined, and interviewed the patient.

## 2022-07-13 NOTE — H&P ADULT - NSHPSOCIALHISTORY_GEN_ALL_CORE
Lives at home with son, Franko  Former smoker, denies EtOH, illicit drug use  Performs most ADLs on his own  Ambulates independently

## 2022-07-13 NOTE — H&P ADULT - NSHPPHYSICALEXAM_GEN_ALL_CORE
T(C): 36.8 (07-13-22 @ 16:21), Max: 36.8 (07-13-22 @ 16:21)  HR: 91 (07-13-22 @ 16:21) (89 - 97)  BP: 120/66 (07-13-22 @ 16:21) (108/74 - 120/66)  RR: 16 (07-13-22 @ 16:21) (16 - 20)  SpO2: 97% (07-13-22 @ 16:21) (96% - 97%)    GENERAL: NAD, elderly  EYES: sclera clear, no exudates  ENMT: oropharynx clear without erythema, no exudates, dry mucous membranes  NECK: supple, soft, no thyromegaly noted  LUNGS: good air entry bilaterally, clear to auscultation, symmetric breath sounds, no wheezing or rhonchi appreciated  HEART: soft S1/S2, regular rate and rhythm, no murmurs noted, no lower extremity edema  GASTROINTESTINAL: abdomen is soft, nontender, nondistended, normoactive bowel sounds, no palpable masses, negative bagley sign  INTEGUMENT: warm and perfused  MUSCULOSKELETAL: no clubbing or cyanosis, no obvious deformity  NEUROLOGIC: awake, alert, oriented x2, good muscle tone in 4 extremities, no obvious sensory deficits  PSYCHIATRIC: mood is good, affect is congruent, linear and logical thought process

## 2022-07-13 NOTE — ED ADULT TRIAGE NOTE - LOCATION:
OUTPATIENT PROGRESS NOTE    REASON FOR VISIT:  Medication management   TOTAL TIME SPENT:  6:11-6:30      SUBJECTIVE: She comes in for follow up on:   1)  Generalized anxiety disorder-has been more stressed with situation.  Not irritable. Using clonazepam three times per week and helping.    No side effects.       2)  Persistent depressive disorder-feels more confident.  Depression is better. Feels physically drained and tired. Sleeping 9pm until 4:15am. Sometimes sleeping 10-11 hours.  Denies anhedonia. Denies suicidal ideations.     3) Eating disorder-has not been eating much when working.  Eats better on weekends. Knows that is causing her to be tired and need to eat more.       ROS:  endorses chest pain once last week from stress and denies shortness of breath  Med list reviewed.  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   It is stressful but knows that it will eventually get better. Needs to take better care of herself    OBJECTIVE: Appearance: thin, average height       Grooming: intact       Psychomotor: normal gait       Speech: normal rate, rhythm, quantity       Mood:  \"stressed\"       Affect:  euthymic       Thought process: goal-directed       Associations: Intact         Thought content: NO suicidal nor homicidal ideations       Perception:  NO Auditory nor visual hallucinations       Insight:  Fair       Judgment:  Fair       Attention: Fair       Concentration: Fair       Orientation: intact       Consciousness: alert  ASSESSMENT/DIAGNOSIS:   1. Generalized anxiety disorder-chronic disorder, improved, no med change   2.  Persistent depressive disorder-chronic, improved, no med changes   3.  Eating disorder-chronic disorder, improved, no med changes      PLAN:     1.RTC (return to clinic) in 3 months   2. Continue Clonazepam  Right arm; 0.5-1mg daily prn anxiety   3. Referred to DR. Zuniga if she decides to pursue counseling            Argentina Morales MD

## 2022-07-13 NOTE — ED ADULT NURSE NOTE - NSIMPLEMENTINTERV_GEN_ALL_ED
Implemented All Universal Safety Interventions:  [place] to call system. Call bell, personal items and telephone within reach. Instruct patient to call for assistance. Room bathroom lighting operational. Non-slip footwear when patient is off stretcher. Physically safe environment: no spills, clutter or unnecessary equipment. Stretcher in lowest position, wheels locked, appropriate side rails in place.

## 2022-07-13 NOTE — ED ADULT TRIAGE NOTE - ARRIVAL INFO ADDITIONAL COMMENTS
Patient reports to ED with daughter from OSH, suffered all day nausea and vomiting on Monday. Patient was seen in Aida's office Monday afternoon, with labs drawn, zofran administered and prescribed. Improved symptoms yesterday, with light PO intake. Patient had imaging done this AM showing gallstones and CXR. Labs resulting from PCP [facility] 3000, WBC 17.5. Patient initially reported to [place], though left after 2 hour wait in ED. No fevers

## 2022-07-14 ENCOUNTER — APPOINTMENT (OUTPATIENT)
Dept: ULTRASOUND IMAGING | Facility: CLINIC | Age: 77
End: 2022-07-14

## 2022-07-14 ENCOUNTER — APPOINTMENT (OUTPATIENT)
Dept: RADIOLOGY | Facility: CLINIC | Age: 77
End: 2022-07-14

## 2022-07-14 ENCOUNTER — RESULT REVIEW (OUTPATIENT)
Age: 77
End: 2022-07-14

## 2022-07-14 LAB
ALBUMIN SERPL ELPH-MCNC: 2.2 G/DL — LOW (ref 3.3–5)
ALP SERPL-CCNC: 95 U/L — SIGNIFICANT CHANGE UP (ref 40–120)
ALT FLD-CCNC: 11 U/L — SIGNIFICANT CHANGE UP (ref 10–45)
ANION GAP SERPL CALC-SCNC: 9 MMOL/L — SIGNIFICANT CHANGE UP (ref 5–17)
AST SERPL-CCNC: 13 U/L — SIGNIFICANT CHANGE UP (ref 10–40)
BASOPHILS # BLD AUTO: 0.04 K/UL — SIGNIFICANT CHANGE UP (ref 0–0.2)
BASOPHILS NFR BLD AUTO: 0.4 % — SIGNIFICANT CHANGE UP (ref 0–2)
BILIRUB SERPL-MCNC: 0.7 MG/DL — SIGNIFICANT CHANGE UP (ref 0.2–1.2)
BUN SERPL-MCNC: 58 MG/DL — HIGH (ref 7–23)
CALCIUM SERPL-MCNC: 7.7 MG/DL — LOW (ref 8.4–10.5)
CEA SERPL-MCNC: 2.5 NG/ML — SIGNIFICANT CHANGE UP (ref 0–3.8)
CHLORIDE SERPL-SCNC: 105 MMOL/L — SIGNIFICANT CHANGE UP (ref 96–108)
CO2 SERPL-SCNC: 29 MMOL/L — SIGNIFICANT CHANGE UP (ref 22–31)
CREAT SERPL-MCNC: 2.14 MG/DL — HIGH (ref 0.5–1.3)
CULTURE RESULTS: SIGNIFICANT CHANGE UP
EGFR: 31 ML/MIN/1.73M2 — LOW
EOSINOPHIL # BLD AUTO: 0.11 K/UL — SIGNIFICANT CHANGE UP (ref 0–0.5)
EOSINOPHIL NFR BLD AUTO: 1 % — SIGNIFICANT CHANGE UP (ref 0–6)
GLUCOSE BLDC GLUCOMTR-MCNC: 110 MG/DL — HIGH (ref 70–99)
GLUCOSE SERPL-MCNC: 120 MG/DL — HIGH (ref 70–99)
HCT VFR BLD CALC: 40.8 % — SIGNIFICANT CHANGE UP (ref 39–50)
HGB BLD-MCNC: 13.8 G/DL — SIGNIFICANT CHANGE UP (ref 13–17)
IMM GRANULOCYTES NFR BLD AUTO: 0.5 % — SIGNIFICANT CHANGE UP (ref 0–1.5)
LYMPHOCYTES # BLD AUTO: 0.79 K/UL — LOW (ref 1–3.3)
LYMPHOCYTES # BLD AUTO: 7.5 % — LOW (ref 13–44)
MCHC RBC-ENTMCNC: 30.7 PG — SIGNIFICANT CHANGE UP (ref 27–34)
MCHC RBC-ENTMCNC: 33.8 GM/DL — SIGNIFICANT CHANGE UP (ref 32–36)
MCV RBC AUTO: 90.9 FL — SIGNIFICANT CHANGE UP (ref 80–100)
MONOCYTES # BLD AUTO: 1 K/UL — HIGH (ref 0–0.9)
MONOCYTES NFR BLD AUTO: 9.4 % — SIGNIFICANT CHANGE UP (ref 2–14)
NEUTROPHILS # BLD AUTO: 8.61 K/UL — HIGH (ref 1.8–7.4)
NEUTROPHILS NFR BLD AUTO: 81.2 % — HIGH (ref 43–77)
NRBC # BLD: 0 /100 WBCS — SIGNIFICANT CHANGE UP (ref 0–0)
PLATELET # BLD AUTO: 232 K/UL — SIGNIFICANT CHANGE UP (ref 150–400)
POTASSIUM SERPL-MCNC: 3.3 MMOL/L — LOW (ref 3.5–5.3)
POTASSIUM SERPL-SCNC: 3.3 MMOL/L — LOW (ref 3.5–5.3)
PROT SERPL-MCNC: 6.3 G/DL — SIGNIFICANT CHANGE UP (ref 6–8.3)
RBC # BLD: 4.49 M/UL — SIGNIFICANT CHANGE UP (ref 4.2–5.8)
RBC # FLD: 12.7 % — SIGNIFICANT CHANGE UP (ref 10.3–14.5)
SODIUM SERPL-SCNC: 143 MMOL/L — SIGNIFICANT CHANGE UP (ref 135–145)
SPECIMEN SOURCE: SIGNIFICANT CHANGE UP
WBC # BLD: 10.6 K/UL — HIGH (ref 3.8–10.5)
WBC # FLD AUTO: 10.6 K/UL — HIGH (ref 3.8–10.5)

## 2022-07-14 PROCEDURE — 47600 CHOLECYSTECTOMY: CPT | Mod: AS

## 2022-07-14 PROCEDURE — 93306 TTE W/DOPPLER COMPLETE: CPT | Mod: 26

## 2022-07-14 PROCEDURE — 93010 ELECTROCARDIOGRAM REPORT: CPT

## 2022-07-14 PROCEDURE — 88304 TISSUE EXAM BY PATHOLOGIST: CPT | Mod: 26

## 2022-07-14 PROCEDURE — 47600 CHOLECYSTECTOMY: CPT | Mod: 22

## 2022-07-14 DEVICE — CLIP APPLIER COVIDIEN ENDOCLIP 5MM: Type: IMPLANTABLE DEVICE | Status: FUNCTIONAL

## 2022-07-14 DEVICE — TUBE T SILICONE 12FR: Type: IMPLANTABLE DEVICE | Status: FUNCTIONAL

## 2022-07-14 DEVICE — CLIP APPLIER ETHICON LIGACLIP 11.5" MEDIUM: Type: IMPLANTABLE DEVICE | Status: FUNCTIONAL

## 2022-07-14 DEVICE — CLIP APPLIER COVIDIEN ENDOCLIP II 10MM MED/LG: Type: IMPLANTABLE DEVICE | Status: FUNCTIONAL

## 2022-07-14 DEVICE — CLIP APPLIER ETHICON LIGACLIP 9 3/8" SMALL: Type: IMPLANTABLE DEVICE | Status: FUNCTIONAL

## 2022-07-14 DEVICE — IMPLANTABLE DEVICE: Type: IMPLANTABLE DEVICE | Status: FUNCTIONAL

## 2022-07-14 DEVICE — SPONGE HSTAT SURGICEL 2X14": Type: IMPLANTABLE DEVICE | Status: FUNCTIONAL

## 2022-07-14 RX ORDER — DEXTROSE 50 % IN WATER 50 %
12.5 SYRINGE (ML) INTRAVENOUS ONCE
Refills: 0 | Status: DISCONTINUED | OUTPATIENT
Start: 2022-07-14 | End: 2022-07-17

## 2022-07-14 RX ORDER — DEXTROSE 50 % IN WATER 50 %
25 SYRINGE (ML) INTRAVENOUS ONCE
Refills: 0 | Status: DISCONTINUED | OUTPATIENT
Start: 2022-07-14 | End: 2022-07-17

## 2022-07-14 RX ORDER — METOPROLOL TARTRATE 50 MG
12.5 TABLET ORAL DAILY
Refills: 0 | Status: DISCONTINUED | OUTPATIENT
Start: 2022-07-15 | End: 2022-07-17

## 2022-07-14 RX ORDER — SODIUM CHLORIDE 9 MG/ML
1000 INJECTION, SOLUTION INTRAVENOUS
Refills: 0 | Status: DISCONTINUED | OUTPATIENT
Start: 2022-07-14 | End: 2022-07-14

## 2022-07-14 RX ORDER — DEXTROSE 50 % IN WATER 50 %
15 SYRINGE (ML) INTRAVENOUS ONCE
Refills: 0 | Status: DISCONTINUED | OUTPATIENT
Start: 2022-07-14 | End: 2022-07-17

## 2022-07-14 RX ORDER — ISOSORBIDE MONONITRATE 60 MG/1
60 TABLET, EXTENDED RELEASE ORAL DAILY
Refills: 0 | Status: DISCONTINUED | OUTPATIENT
Start: 2022-07-14 | End: 2022-07-17

## 2022-07-14 RX ORDER — INSULIN LISPRO 100/ML
VIAL (ML) SUBCUTANEOUS
Refills: 0 | Status: DISCONTINUED | OUTPATIENT
Start: 2022-07-14 | End: 2022-07-17

## 2022-07-14 RX ORDER — ACETAMINOPHEN 500 MG
1000 TABLET ORAL ONCE
Refills: 0 | Status: COMPLETED | OUTPATIENT
Start: 2022-07-15 | End: 2022-07-15

## 2022-07-14 RX ORDER — HYDROMORPHONE HYDROCHLORIDE 2 MG/ML
1 INJECTION INTRAMUSCULAR; INTRAVENOUS; SUBCUTANEOUS EVERY 4 HOURS
Refills: 0 | Status: DISCONTINUED | OUTPATIENT
Start: 2022-07-14 | End: 2022-07-15

## 2022-07-14 RX ORDER — ONDANSETRON 8 MG/1
4 TABLET, FILM COATED ORAL EVERY 6 HOURS
Refills: 0 | Status: DISCONTINUED | OUTPATIENT
Start: 2022-07-14 | End: 2022-07-17

## 2022-07-14 RX ORDER — ONDANSETRON 8 MG/1
4 TABLET, FILM COATED ORAL ONCE
Refills: 0 | Status: DISCONTINUED | OUTPATIENT
Start: 2022-07-14 | End: 2022-07-14

## 2022-07-14 RX ORDER — POTASSIUM CHLORIDE 20 MEQ
10 PACKET (EA) ORAL
Refills: 0 | Status: COMPLETED | OUTPATIENT
Start: 2022-07-14 | End: 2022-07-14

## 2022-07-14 RX ORDER — HYDROMORPHONE HYDROCHLORIDE 2 MG/ML
0.25 INJECTION INTRAMUSCULAR; INTRAVENOUS; SUBCUTANEOUS
Refills: 0 | Status: DISCONTINUED | OUTPATIENT
Start: 2022-07-14 | End: 2022-07-14

## 2022-07-14 RX ORDER — LANOLIN ALCOHOL/MO/W.PET/CERES
3 CREAM (GRAM) TOPICAL AT BEDTIME
Refills: 0 | Status: DISCONTINUED | OUTPATIENT
Start: 2022-07-14 | End: 2022-07-17

## 2022-07-14 RX ORDER — GLUCAGON INJECTION, SOLUTION 0.5 MG/.1ML
1 INJECTION, SOLUTION SUBCUTANEOUS ONCE
Refills: 0 | Status: DISCONTINUED | OUTPATIENT
Start: 2022-07-14 | End: 2022-07-17

## 2022-07-14 RX ORDER — SODIUM CHLORIDE 9 MG/ML
1000 INJECTION, SOLUTION INTRAVENOUS
Refills: 0 | Status: DISCONTINUED | OUTPATIENT
Start: 2022-07-14 | End: 2022-07-17

## 2022-07-14 RX ORDER — HEPARIN SODIUM 5000 [USP'U]/ML
5000 INJECTION INTRAVENOUS; SUBCUTANEOUS EVERY 8 HOURS
Refills: 0 | Status: DISCONTINUED | OUTPATIENT
Start: 2022-07-15 | End: 2022-07-17

## 2022-07-14 RX ORDER — SODIUM CHLORIDE 9 MG/ML
1000 INJECTION INTRAMUSCULAR; INTRAVENOUS; SUBCUTANEOUS
Refills: 0 | Status: DISCONTINUED | OUTPATIENT
Start: 2022-07-14 | End: 2022-07-16

## 2022-07-14 RX ORDER — ACETAMINOPHEN 500 MG
1000 TABLET ORAL ONCE
Refills: 0 | Status: COMPLETED | OUTPATIENT
Start: 2022-07-14 | End: 2022-07-14

## 2022-07-14 RX ORDER — HEPARIN SODIUM 5000 [USP'U]/ML
5000 INJECTION INTRAVENOUS; SUBCUTANEOUS EVERY 8 HOURS
Refills: 0 | Status: DISCONTINUED | OUTPATIENT
Start: 2022-07-14 | End: 2022-07-14

## 2022-07-14 RX ORDER — PIPERACILLIN AND TAZOBACTAM 4; .5 G/20ML; G/20ML
3.38 INJECTION, POWDER, LYOPHILIZED, FOR SOLUTION INTRAVENOUS ONCE
Refills: 0 | Status: COMPLETED | OUTPATIENT
Start: 2022-07-14 | End: 2022-07-14

## 2022-07-14 RX ORDER — PIPERACILLIN AND TAZOBACTAM 4; .5 G/20ML; G/20ML
3.38 INJECTION, POWDER, LYOPHILIZED, FOR SOLUTION INTRAVENOUS EVERY 8 HOURS
Refills: 0 | Status: DISCONTINUED | OUTPATIENT
Start: 2022-07-14 | End: 2022-07-17

## 2022-07-14 RX ORDER — AMLODIPINE BESYLATE 2.5 MG/1
2.5 TABLET ORAL DAILY
Refills: 0 | Status: DISCONTINUED | OUTPATIENT
Start: 2022-07-15 | End: 2022-07-17

## 2022-07-14 RX ORDER — HYDROMORPHONE HYDROCHLORIDE 2 MG/ML
0.5 INJECTION INTRAMUSCULAR; INTRAVENOUS; SUBCUTANEOUS
Refills: 0 | Status: DISCONTINUED | OUTPATIENT
Start: 2022-07-14 | End: 2022-07-14

## 2022-07-14 RX ORDER — PIPERACILLIN AND TAZOBACTAM 4; .5 G/20ML; G/20ML
3.38 INJECTION, POWDER, LYOPHILIZED, FOR SOLUTION INTRAVENOUS ONCE
Refills: 0 | Status: DISCONTINUED | OUTPATIENT
Start: 2022-07-14 | End: 2022-07-14

## 2022-07-14 RX ORDER — OXYCODONE HYDROCHLORIDE 5 MG/1
5 TABLET ORAL EVERY 6 HOURS
Refills: 0 | Status: DISCONTINUED | OUTPATIENT
Start: 2022-07-14 | End: 2022-07-17

## 2022-07-14 RX ADMIN — PIPERACILLIN AND TAZOBACTAM 200 GRAM(S): 4; .5 INJECTION, POWDER, LYOPHILIZED, FOR SOLUTION INTRAVENOUS at 18:07

## 2022-07-14 RX ADMIN — Medication 400 MILLIGRAM(S): at 15:59

## 2022-07-14 RX ADMIN — Medication 1000 MILLIGRAM(S): at 16:40

## 2022-07-14 RX ADMIN — Medication 100 MILLIEQUIVALENT(S): at 11:41

## 2022-07-14 RX ADMIN — PIPERACILLIN AND TAZOBACTAM 25 GRAM(S): 4; .5 INJECTION, POWDER, LYOPHILIZED, FOR SOLUTION INTRAVENOUS at 05:11

## 2022-07-14 RX ADMIN — HYDROMORPHONE HYDROCHLORIDE 0.5 MILLIGRAM(S): 2 INJECTION INTRAMUSCULAR; INTRAVENOUS; SUBCUTANEOUS at 16:03

## 2022-07-14 RX ADMIN — Medication 100 MILLIEQUIVALENT(S): at 09:52

## 2022-07-14 RX ADMIN — Medication 100 MILLIEQUIVALENT(S): at 08:52

## 2022-07-14 RX ADMIN — SODIUM CHLORIDE 75 MILLILITER(S): 9 INJECTION INTRAMUSCULAR; INTRAVENOUS; SUBCUTANEOUS at 18:07

## 2022-07-14 RX ADMIN — AMLODIPINE BESYLATE 2.5 MILLIGRAM(S): 2.5 TABLET ORAL at 05:10

## 2022-07-14 RX ADMIN — HYDROMORPHONE HYDROCHLORIDE 0.5 MILLIGRAM(S): 2 INJECTION INTRAMUSCULAR; INTRAVENOUS; SUBCUTANEOUS at 15:48

## 2022-07-14 RX ADMIN — ISOSORBIDE MONONITRATE 60 MILLIGRAM(S): 60 TABLET, EXTENDED RELEASE ORAL at 22:36

## 2022-07-14 RX ADMIN — Medication 12.5 MILLIGRAM(S): at 05:10

## 2022-07-14 NOTE — BRIEF OPERATIVE NOTE - OPERATION/FINDINGS
gangrenous calculous cholecystitis  multiple gallstones including one inpacted in cystic duct-all removed  significant rind  walled off bile collection

## 2022-07-14 NOTE — PROGRESS NOTE ADULT - ASSESSMENT
77 year old M PMH DM (not on insulin), colon CA s/p resection, HTN, HLD, dementia coming to the ED for abdominal pain associated with nausea and vomiting and decreased PO intake since Monday admitted for acute cholecysitis    #acute cholecystitis  - admit to medicine  - patient does not meet sepsis criteria at this time  - US consistent with acute choley- discussed with Dr. Zarate and concerned with CT and US results, patient for OR today, NPO since midnight   - received additional NS bolus and maintenance IVF  - zosyn for acute choley  - TTE- LVEF 40-45%  - surgery, Dr. Zarate, consulted    #ANISH  - likely prerenal azotemia in setting of decreased PO intake and dehydration  -Downtrending this am  - continue with IVF  - Cr noted to be around 0.8-0.9 in 6/2022    #Leukocytosis  -10.6 this am, likely due to cholecystitis, pt afebrile  -Monitor    #Hypokalemia  -3.3 this am  -Repleted   -Monitor BMP    #DM  - HbA1C was 7.9 on outpatient lab work from 7/11/2022  - home med glipizide  - moderate dose ISS    #HTN  #HLD  - continue home med amlodipine 2.5mg daily, isosorbide mononitrate 60mg daily  - hold home ramipril 10mg daily in setting of ANISH    #history of colon CA  - stable  - discussed with Dr. Parra, PCP- patient with elevated CEA level outpatient. Will obtain another level in the morning and PCP would like to have GI see patient after surgery (NP Margarita aware)    #DVT ppx  - hold in setting of OR    Nathan Light updated at bedside (7/14)  Daughter Kuldeep Ordonez [phone] and son Franko [phone]. Kuldeep would like daily updates, if not possible to reach then speak to Franko 77 year old M PMH DM (not on insulin), colon CA s/p resection, HTN, HLD, dementia coming to the ED for abdominal pain associated with nausea and vomiting and decreased PO intake since Monday admitted for acute cholecysitis    #acute cholecystitis  - patient does not meet sepsis criteria at this time  - US consistent with acute choley- discussed with Dr. Zarate and concerned with CT and US results, patient for OR today, NPO since midnight   - received additional NS bolus and maintenance IVF  - zosyn for acute choley  - TTE- LVEF 40-45%  - surgery, Dr. Zarate, consulted    #ANISH  - likely prerenal azotemia in setting of decreased PO intake and dehydration  -Cr downtrending this am  - continue with IVF  - Cr noted to be around 0.8-0.9 in 6/2022    #Leukocytosis  -10.6 this am, likely due to cholecystitis, pt afebrile  -Monitor    #Hypokalemia  -3.3 this am  -Repleted   -Monitor BMP    #DM  - HbA1C was 7.9 on outpatient lab work from 7/11/2022  - home med glipizide  - moderate dose ISS    #HTN  #HLD  - continue home med amlodipine 2.5mg daily, isosorbide mononitrate 60mg daily  - hold home ramipril 10mg daily in setting of ANISH    #history of colon CA  - stable  - discussed with Dr. Parra, PCP- patient with elevated CEA level outpatient. Will obtain another level in the morning and PCP would like to have GI see patient after surgery (NP Margarita aware)    #DVT ppx  - hold in setting of OR    Nathan Light updated at bedside (7/14)  Daughter Kuldeep Ordonez [phone] and son Franko [phone]. Kuldeep would like daily updates, if not possible to reach then speak to Franko

## 2022-07-15 LAB
A1C WITH ESTIMATED AVERAGE GLUCOSE RESULT: 7.9 % — HIGH (ref 4–5.6)
ALBUMIN SERPL ELPH-MCNC: 2 G/DL — LOW (ref 3.3–5)
ALP SERPL-CCNC: 83 U/L — SIGNIFICANT CHANGE UP (ref 40–120)
ALT FLD-CCNC: 11 U/L — SIGNIFICANT CHANGE UP (ref 10–45)
ANION GAP SERPL CALC-SCNC: 11 MMOL/L — SIGNIFICANT CHANGE UP (ref 5–17)
AST SERPL-CCNC: 24 U/L — SIGNIFICANT CHANGE UP (ref 10–40)
BASOPHILS # BLD AUTO: 0.02 K/UL — SIGNIFICANT CHANGE UP (ref 0–0.2)
BASOPHILS NFR BLD AUTO: 0.1 % — SIGNIFICANT CHANGE UP (ref 0–2)
BILIRUB DIRECT SERPL-MCNC: 0.2 MG/DL — SIGNIFICANT CHANGE UP (ref 0–0.3)
BILIRUB INDIRECT FLD-MCNC: 0.6 MG/DL — SIGNIFICANT CHANGE UP (ref 0.2–1)
BILIRUB SERPL-MCNC: 0.8 MG/DL — SIGNIFICANT CHANGE UP (ref 0.2–1.2)
BUN SERPL-MCNC: 44 MG/DL — HIGH (ref 7–23)
CALCIUM SERPL-MCNC: 7.8 MG/DL — LOW (ref 8.4–10.5)
CHLORIDE SERPL-SCNC: 109 MMOL/L — HIGH (ref 96–108)
CO2 SERPL-SCNC: 22 MMOL/L — SIGNIFICANT CHANGE UP (ref 22–31)
CREAT SERPL-MCNC: 1.13 MG/DL — SIGNIFICANT CHANGE UP (ref 0.5–1.3)
EGFR: 66 ML/MIN/1.73M2 — SIGNIFICANT CHANGE UP
EOSINOPHIL # BLD AUTO: 0 K/UL — SIGNIFICANT CHANGE UP (ref 0–0.5)
EOSINOPHIL NFR BLD AUTO: 0 % — SIGNIFICANT CHANGE UP (ref 0–6)
ESTIMATED AVERAGE GLUCOSE: 180 MG/DL — HIGH (ref 68–114)
GLUCOSE BLDC GLUCOMTR-MCNC: 185 MG/DL — HIGH (ref 70–99)
GLUCOSE BLDC GLUCOMTR-MCNC: 234 MG/DL — HIGH (ref 70–99)
GLUCOSE BLDC GLUCOMTR-MCNC: 235 MG/DL — HIGH (ref 70–99)
GLUCOSE SERPL-MCNC: 208 MG/DL — HIGH (ref 70–99)
HCT VFR BLD CALC: 39 % — SIGNIFICANT CHANGE UP (ref 39–50)
HGB BLD-MCNC: 13 G/DL — SIGNIFICANT CHANGE UP (ref 13–17)
IMM GRANULOCYTES NFR BLD AUTO: 0.5 % — SIGNIFICANT CHANGE UP (ref 0–1.5)
LYMPHOCYTES # BLD AUTO: 0.79 K/UL — LOW (ref 1–3.3)
LYMPHOCYTES # BLD AUTO: 5.4 % — LOW (ref 13–44)
MCHC RBC-ENTMCNC: 30.8 PG — SIGNIFICANT CHANGE UP (ref 27–34)
MCHC RBC-ENTMCNC: 33.3 GM/DL — SIGNIFICANT CHANGE UP (ref 32–36)
MCV RBC AUTO: 92.4 FL — SIGNIFICANT CHANGE UP (ref 80–100)
MONOCYTES # BLD AUTO: 1.37 K/UL — HIGH (ref 0–0.9)
MONOCYTES NFR BLD AUTO: 9.4 % — SIGNIFICANT CHANGE UP (ref 2–14)
NEUTROPHILS # BLD AUTO: 12.27 K/UL — HIGH (ref 1.8–7.4)
NEUTROPHILS NFR BLD AUTO: 84.6 % — HIGH (ref 43–77)
NRBC # BLD: 0 /100 WBCS — SIGNIFICANT CHANGE UP (ref 0–0)
PLATELET # BLD AUTO: 273 K/UL — SIGNIFICANT CHANGE UP (ref 150–400)
POTASSIUM SERPL-MCNC: 4.3 MMOL/L — SIGNIFICANT CHANGE UP (ref 3.5–5.3)
POTASSIUM SERPL-SCNC: 4.3 MMOL/L — SIGNIFICANT CHANGE UP (ref 3.5–5.3)
PROT SERPL-MCNC: 6 G/DL — SIGNIFICANT CHANGE UP (ref 6–8.3)
RBC # BLD: 4.22 M/UL — SIGNIFICANT CHANGE UP (ref 4.2–5.8)
RBC # FLD: 12.4 % — SIGNIFICANT CHANGE UP (ref 10.3–14.5)
SODIUM SERPL-SCNC: 142 MMOL/L — SIGNIFICANT CHANGE UP (ref 135–145)
WBC # BLD: 14.52 K/UL — HIGH (ref 3.8–10.5)
WBC # FLD AUTO: 14.52 K/UL — HIGH (ref 3.8–10.5)

## 2022-07-15 PROCEDURE — 99233 SBSQ HOSP IP/OBS HIGH 50: CPT

## 2022-07-15 RX ORDER — HALOPERIDOL DECANOATE 100 MG/ML
2 INJECTION INTRAMUSCULAR ONCE
Refills: 0 | Status: COMPLETED | OUTPATIENT
Start: 2022-07-15 | End: 2022-07-15

## 2022-07-15 RX ORDER — ACETAMINOPHEN 500 MG
650 TABLET ORAL EVERY 6 HOURS
Refills: 0 | Status: DISCONTINUED | OUTPATIENT
Start: 2022-07-15 | End: 2022-07-15

## 2022-07-15 RX ORDER — HALOPERIDOL DECANOATE 100 MG/ML
0.5 INJECTION INTRAMUSCULAR ONCE
Refills: 0 | Status: COMPLETED | OUTPATIENT
Start: 2022-07-15 | End: 2022-07-15

## 2022-07-15 RX ADMIN — PIPERACILLIN AND TAZOBACTAM 25 GRAM(S): 4; .5 INJECTION, POWDER, LYOPHILIZED, FOR SOLUTION INTRAVENOUS at 13:59

## 2022-07-15 RX ADMIN — Medication 2: at 12:14

## 2022-07-15 RX ADMIN — HEPARIN SODIUM 5000 UNIT(S): 5000 INJECTION INTRAVENOUS; SUBCUTANEOUS at 05:37

## 2022-07-15 RX ADMIN — Medication 1000 MILLIGRAM(S): at 21:50

## 2022-07-15 RX ADMIN — PIPERACILLIN AND TAZOBACTAM 25 GRAM(S): 4; .5 INJECTION, POWDER, LYOPHILIZED, FOR SOLUTION INTRAVENOUS at 21:22

## 2022-07-15 RX ADMIN — PIPERACILLIN AND TAZOBACTAM 25 GRAM(S): 4; .5 INJECTION, POWDER, LYOPHILIZED, FOR SOLUTION INTRAVENOUS at 02:25

## 2022-07-15 RX ADMIN — Medication 1: at 08:20

## 2022-07-15 RX ADMIN — HALOPERIDOL DECANOATE 0.5 MILLIGRAM(S): 100 INJECTION INTRAMUSCULAR at 22:05

## 2022-07-15 RX ADMIN — SODIUM CHLORIDE 75 MILLILITER(S): 9 INJECTION INTRAMUSCULAR; INTRAVENOUS; SUBCUTANEOUS at 21:22

## 2022-07-15 RX ADMIN — HYDROMORPHONE HYDROCHLORIDE 1 MILLIGRAM(S): 2 INJECTION INTRAMUSCULAR; INTRAVENOUS; SUBCUTANEOUS at 15:53

## 2022-07-15 RX ADMIN — Medication 400 MILLIGRAM(S): at 21:20

## 2022-07-15 RX ADMIN — Medication 12.5 MILLIGRAM(S): at 05:37

## 2022-07-15 RX ADMIN — OXYCODONE HYDROCHLORIDE 5 MILLIGRAM(S): 5 TABLET ORAL at 13:04

## 2022-07-15 RX ADMIN — PIPERACILLIN AND TAZOBACTAM 25 GRAM(S): 4; .5 INJECTION, POWDER, LYOPHILIZED, FOR SOLUTION INTRAVENOUS at 05:37

## 2022-07-15 RX ADMIN — HEPARIN SODIUM 5000 UNIT(S): 5000 INJECTION INTRAVENOUS; SUBCUTANEOUS at 13:59

## 2022-07-15 RX ADMIN — HALOPERIDOL DECANOATE 2 MILLIGRAM(S): 100 INJECTION INTRAMUSCULAR at 23:05

## 2022-07-15 RX ADMIN — AMLODIPINE BESYLATE 2.5 MILLIGRAM(S): 2.5 TABLET ORAL at 05:37

## 2022-07-15 RX ADMIN — HYDROMORPHONE HYDROCHLORIDE 1 MILLIGRAM(S): 2 INJECTION INTRAMUSCULAR; INTRAVENOUS; SUBCUTANEOUS at 16:05

## 2022-07-15 RX ADMIN — ISOSORBIDE MONONITRATE 60 MILLIGRAM(S): 60 TABLET, EXTENDED RELEASE ORAL at 12:12

## 2022-07-15 RX ADMIN — OXYCODONE HYDROCHLORIDE 5 MILLIGRAM(S): 5 TABLET ORAL at 06:00

## 2022-07-15 RX ADMIN — Medication 2: at 17:06

## 2022-07-15 RX ADMIN — OXYCODONE HYDROCHLORIDE 5 MILLIGRAM(S): 5 TABLET ORAL at 12:12

## 2022-07-15 NOTE — PROGRESS NOTE ADULT - NS ATTEND AMEND GEN_ALL_CORE FT
"Please fax patient's short-term disability paperwork for her.  It is in my \"outbox.\"  "
Short Term Disability form faxed.   
Pt seen and examined at bedside.  No acute events overnight  Pt denies cp, palpitations, sob, abd pain, N/V, fever, chills  Pt s/p open cholecystectomy with drains in place  Discussed case with Surgery, Dr. Zarate; ADAT   Discontinue Guillen and perform TOV  Continue to monitor
Acute cholecystitis   - for OR today with Dr. Zarate  - patient with TTE and EF 40-45% - medically optimized for surgery and discussed with Surgical LADAN Gaxiola.

## 2022-07-15 NOTE — PROGRESS NOTE ADULT - ASSESSMENT
A/P  76 y/o male POD # 1 s/p Open Cholecystectomy   Reassuring patient status       OOB today, ambulate   continue pain management   continue dvt prophylaxis   continue zosyn  d/c indwelling santos catheter   advance diet as tolerated  fulls, regular later   plan and patient status d/w Dr Zarate as well as Medicine

## 2022-07-15 NOTE — PROGRESS NOTE ADULT - ASSESSMENT
77 year old M PMH DM (not on insulin), colon CA s/p resection, HTN, HLD, dementia coming to the ED for abdominal pain associated with nausea and vomiting and decreased PO intake since Monday admitted for acute cholecysitis    #acute cholecystitis  - patient does not meet sepsis criteria at this time  - US consistent with acute choley- POD #1 of open sangeetha, advanced diet to full liquids  -continue fluids and pain management  - zosyn for acute choley  - TTE- LVEF 40-45%  - surgery, Dr. Zarate, following    #ANISH  - likely prerenal azotemia in setting of decreased PO intake and dehydration  -Cr now improved 1.13  - continue with IVF  - Cr noted to be around 0.8-0.9 in 6/2022    #Leukocytosis  -14 this am, likely reactive, pt afebrile  -Monitor    #Hypokalemia (resolved)  -Normal this am  -Monitor BMP    #DM  - HbA1C was 7.9 on outpatient lab work from 7/11/2022  - home med glipizide  - moderate dose ISS    #HTN  #HLD  - continue home med amlodipine 2.5mg daily, isosorbide mononitrate 60mg daily  - hold home ramipril 10mg daily in setting of ANISH    #history of colon CA  - stable  - discussed with Dr. Parra, PCP- patient with elevated CEA level outpatient. Will obtain another level in the morning and PCP would like to have GI see patient after surgery (NP Margarita brantley)    #DVT ppx  - heparin    Nathan Light updated at bedside (7/15)  Daughter Kuldeep Ordonez [phone] and son Franko [phone]. Kuldeep would like daily updates, if not possible to reach then speak to Franko 77 year old M PMH DM (not on insulin), colon CA s/p resection, HTN, HLD, dementia coming to the ED for abdominal pain associated with nausea and vomiting and decreased PO intake since Monday admitted for acute cholecysitis    #acute cholecystitis  - patient does not meet sepsis criteria at this time  - US consistent with acute choley- POD #1 of open sangeetha, advanced diet to full liquids  -continue fluids and pain management  - zosyn for acute choley  - TTE- LVEF 40-45%  - surgery, Dr. Zarate, following  -PT eval    #ANISH  - likely prerenal azotemia in setting of decreased PO intake and dehydration  -Cr now improved 1.13  - continue with IVF  - Cr noted to be around 0.8-0.9 in 6/2022    #Leukocytosis  -14 this am, likely reactive, pt afebrile  -Monitor    #Hypokalemia (resolved)  -Normal this am  -Monitor BMP    #DM  - HbA1C was 7.9 on outpatient lab work from 7/11/2022  - home med glipizide  - moderate dose ISS    #HTN  #HLD  - continue home med amlodipine 2.5mg daily, isosorbide mononitrate 60mg daily  - hold home ramipril 10mg daily in setting of ANISH    #history of colon CA  - stable  - discussed with Dr. Parra, PCP- patient with elevated CEA level outpatient. Will obtain another level in the morning and PCP would like to have GI see patient after surgery (NP Margarita brantley)    #DVT ppx  - heparin    Daughter Kuldeep updated at bedside (7/15)  Daughter Kuldeep Ordonez [phone] and son Franko [phone]. Kuldeep would like daily updates, if not possible to reach then speak to Franko 77 year old M PMH DM (not on insulin), colon CA s/p resection, HTN, HLD, dementia coming to the ED for abdominal pain associated with nausea and vomiting and decreased PO intake since Monday admitted for acute cholecysitis    #acute cholecystitis  - patient does not meet sepsis criteria at this time  - US consistent with acute choley- POD #1 of open sangeetha, advanced diet to full liquids  -DC santos, trial of void  -continue fluids and pain management  - zosyn for acute choley  - TTE- LVEF 40-45%  - surgery, Dr. Zarate, following  -PT eval    #ANISH  - likely prerenal azotemia in setting of decreased PO intake and dehydration  -Cr now improved 1.13  - continue with IVF  - Cr noted to be around 0.8-0.9 in 6/2022    #Leukocytosis  -14 this am, likely reactive, pt afebrile  -Monitor    #Hypokalemia (resolved)  -Normal this am  -Monitor BMP    #DM  - HbA1C was 7.9 on outpatient lab work from 7/11/2022  - home med glipizide  - moderate dose ISS    #HTN  #HLD  - continue home med amlodipine 2.5mg daily, isosorbide mononitrate 60mg daily  - hold home ramipril 10mg daily in setting of ANISH    #history of colon CA  - stable  - discussed with Dr. Parra, PCP- patient with elevated CEA level outpatient. Will obtain another level in the morning and PCP would like to have GI see patient after surgery (NP Margarita brantley)    #DVT ppx  - heparin    Daughter Kuldeep updated at bedside (7/15)  Daughter Kuldeep Ordonez [phone] and son Franko [phone]. Kuldeep would like daily updates, if not possible to reach then speak to Franko 77 year old M PMH DM (not on insulin), colon CA s/p resection, HTN, HLD, dementia coming to the ED for abdominal pain associated with nausea and vomiting and decreased PO intake since Monday admitted for acute cholecysitis    #acute cholecystitis  - patient does not meet sepsis criteria at this time  - US consistent with acute choley- POD #1 of open sangeetha, advanced diet to full liquids  -DC santos, trial of void  -continue fluids and pain management- patient may not indicate he is in pain but may act agitated, active order for IV tylenol for tonight, may consider standing tylenol for next 24hrs pending pts reaction/behavior  - zosyn for acute choley  - TTE- LVEF 40-45%  - surgery, Dr. Zarate, following  -PT eval    #ANISH  - likely prerenal azotemia in setting of decreased PO intake and dehydration  -Cr now improved 1.13  - continue with IVF  - Cr noted to be around 0.8-0.9 in 6/2022    #Leukocytosis  -14 this am, likely reactive, pt afebrile  -Monitor    #Hypokalemia (resolved)  -Normal this am  -Monitor BMP    #DM  - HbA1C was 7.9 on outpatient lab work from 7/11/2022  - home med glipizide  - moderate dose ISS    #HTN  #HLD  - continue home med amlodipine 2.5mg daily, isosorbide mononitrate 60mg daily  - hold home ramipril 10mg daily in setting of ANISH    #history of colon CA  - stable  - discussed with Dr. Parra, PCP- patient with elevated CEA level outpatient. Will obtain another level in the morning and PCP would like to have GI see patient after surgery (NP Margarita brantley)    #DVT ppx  - heparin    Daughter Kuldeep updated at bedside (7/15)  Daughter Kuldeep Ordonez [phone] and son Franko [phone]. Kuldeep would like daily updates, if not possible to reach then speak to Franko 77 year old M PMH DM (not on insulin), colon CA s/p resection, HTN, HLD, dementia coming to the ED for abdominal pain associated with nausea and vomiting and decreased PO intake since Monday admitted for acute cholecysitis    #acute cholecystitis  - patient does not meet sepsis criteria at this time  - US consistent with acute choley- POD #1 of open sangeetha, advanced diet to full liquids  -DC santos, trial of void  -continue fluids and pain management- patient may not indicate he is in pain but may act agitated, active order for IV tylenol for tonight, may consider standing tylenol for next 24hrs pending pts reaction/behavior  - zosyn for acute choley  - TTE- LVEF 40-45%  - surgery, Dr. Zarate, following  -PT eval    #ANISH  - likely prerenal azotemia in setting of decreased PO intake and dehydration  -Cr now improved 1.13  - continue with IVF  - Cr noted to be around 0.8-0.9 in 6/2022    #Leukocytosis  -14 this am, likely reactive, pt afebrile  -Monitor    #Hypokalemia (resolved)  -Normal this am  -Monitor BMP    #DM  - HbA1C was 7.9 on outpatient lab work from 7/11/2022  - home med glipizide  - moderate dose ISS    #HTN  #HLD  - continue home med amlodipine 2.5mg daily, isosorbide mononitrate 60mg daily  - hold home ramipril 10mg daily in setting of ANISH    #history of colon CA  - stable  - discussed with Dr. Parra, PCP- patient with elevated CEA level outpatient. Will obtain another level in the morning and PCP would like to have GI see patient after surgery (NP Margarita aware)    #DVT ppx  - heparin    Daughter Kuldeep updated at bedside (7/15), if patient becomes agitated family does not want patient to get zyprexa, but believes he is acting out of pain and says the Dilaudid does help him  Daughter Kuldeep Ordonez [phone] and son Franko [phone]. Kuldeep would like daily updates, if not possible to reach then speak to Franko

## 2022-07-16 LAB
ANION GAP SERPL CALC-SCNC: 9 MMOL/L — SIGNIFICANT CHANGE UP (ref 5–17)
BUN SERPL-MCNC: 25 MG/DL — HIGH (ref 7–23)
CALCIUM SERPL-MCNC: 8.2 MG/DL — LOW (ref 8.4–10.5)
CHLORIDE SERPL-SCNC: 107 MMOL/L — SIGNIFICANT CHANGE UP (ref 96–108)
CO2 SERPL-SCNC: 26 MMOL/L — SIGNIFICANT CHANGE UP (ref 22–31)
CREAT SERPL-MCNC: 1.05 MG/DL — SIGNIFICANT CHANGE UP (ref 0.5–1.3)
EGFR: 73 ML/MIN/1.73M2 — SIGNIFICANT CHANGE UP
GLUCOSE BLDC GLUCOMTR-MCNC: 152 MG/DL — HIGH (ref 70–99)
GLUCOSE BLDC GLUCOMTR-MCNC: 172 MG/DL — HIGH (ref 70–99)
GLUCOSE BLDC GLUCOMTR-MCNC: 231 MG/DL — HIGH (ref 70–99)
GLUCOSE SERPL-MCNC: 185 MG/DL — HIGH (ref 70–99)
HCT VFR BLD CALC: 39.2 % — SIGNIFICANT CHANGE UP (ref 39–50)
HGB BLD-MCNC: 12.9 G/DL — LOW (ref 13–17)
MCHC RBC-ENTMCNC: 30.5 PG — SIGNIFICANT CHANGE UP (ref 27–34)
MCHC RBC-ENTMCNC: 32.9 GM/DL — SIGNIFICANT CHANGE UP (ref 32–36)
MCV RBC AUTO: 92.7 FL — SIGNIFICANT CHANGE UP (ref 80–100)
NRBC # BLD: 0 /100 WBCS — SIGNIFICANT CHANGE UP (ref 0–0)
PLATELET # BLD AUTO: 228 K/UL — SIGNIFICANT CHANGE UP (ref 150–400)
POTASSIUM SERPL-MCNC: 3.4 MMOL/L — LOW (ref 3.5–5.3)
POTASSIUM SERPL-SCNC: 3.4 MMOL/L — LOW (ref 3.5–5.3)
RBC # BLD: 4.23 M/UL — SIGNIFICANT CHANGE UP (ref 4.2–5.8)
RBC # FLD: 12.5 % — SIGNIFICANT CHANGE UP (ref 10.3–14.5)
SODIUM SERPL-SCNC: 142 MMOL/L — SIGNIFICANT CHANGE UP (ref 135–145)
WBC # BLD: 11.32 K/UL — HIGH (ref 3.8–10.5)
WBC # FLD AUTO: 11.32 K/UL — HIGH (ref 3.8–10.5)

## 2022-07-16 PROCEDURE — 99024 POSTOP FOLLOW-UP VISIT: CPT

## 2022-07-16 PROCEDURE — 99233 SBSQ HOSP IP/OBS HIGH 50: CPT

## 2022-07-16 RX ORDER — HALOPERIDOL DECANOATE 100 MG/ML
2.5 INJECTION INTRAMUSCULAR EVERY 6 HOURS
Refills: 0 | Status: DISCONTINUED | OUTPATIENT
Start: 2022-07-16 | End: 2022-07-17

## 2022-07-16 RX ORDER — POTASSIUM CHLORIDE 20 MEQ
40 PACKET (EA) ORAL ONCE
Refills: 0 | Status: COMPLETED | OUTPATIENT
Start: 2022-07-16 | End: 2022-07-16

## 2022-07-16 RX ORDER — HALOPERIDOL DECANOATE 100 MG/ML
0.5 INJECTION INTRAMUSCULAR ONCE
Refills: 0 | Status: COMPLETED | OUTPATIENT
Start: 2022-07-16 | End: 2022-07-16

## 2022-07-16 RX ADMIN — OXYCODONE HYDROCHLORIDE 5 MILLIGRAM(S): 5 TABLET ORAL at 12:40

## 2022-07-16 RX ADMIN — OXYCODONE HYDROCHLORIDE 5 MILLIGRAM(S): 5 TABLET ORAL at 11:49

## 2022-07-16 RX ADMIN — PIPERACILLIN AND TAZOBACTAM 25 GRAM(S): 4; .5 INJECTION, POWDER, LYOPHILIZED, FOR SOLUTION INTRAVENOUS at 05:44

## 2022-07-16 RX ADMIN — HEPARIN SODIUM 5000 UNIT(S): 5000 INJECTION INTRAVENOUS; SUBCUTANEOUS at 21:54

## 2022-07-16 RX ADMIN — HALOPERIDOL DECANOATE 0.5 MILLIGRAM(S): 100 INJECTION INTRAMUSCULAR at 12:48

## 2022-07-16 RX ADMIN — HEPARIN SODIUM 5000 UNIT(S): 5000 INJECTION INTRAVENOUS; SUBCUTANEOUS at 05:43

## 2022-07-16 RX ADMIN — AMLODIPINE BESYLATE 2.5 MILLIGRAM(S): 2.5 TABLET ORAL at 05:44

## 2022-07-16 RX ADMIN — Medication 1: at 08:30

## 2022-07-16 RX ADMIN — Medication 40 MILLIEQUIVALENT(S): at 19:56

## 2022-07-16 RX ADMIN — PIPERACILLIN AND TAZOBACTAM 25 GRAM(S): 4; .5 INJECTION, POWDER, LYOPHILIZED, FOR SOLUTION INTRAVENOUS at 14:50

## 2022-07-16 RX ADMIN — PIPERACILLIN AND TAZOBACTAM 25 GRAM(S): 4; .5 INJECTION, POWDER, LYOPHILIZED, FOR SOLUTION INTRAVENOUS at 21:53

## 2022-07-16 RX ADMIN — Medication 2: at 18:23

## 2022-07-16 RX ADMIN — ISOSORBIDE MONONITRATE 60 MILLIGRAM(S): 60 TABLET, EXTENDED RELEASE ORAL at 11:49

## 2022-07-16 RX ADMIN — HEPARIN SODIUM 5000 UNIT(S): 5000 INJECTION INTRAVENOUS; SUBCUTANEOUS at 14:50

## 2022-07-16 RX ADMIN — Medication 12.5 MILLIGRAM(S): at 05:44

## 2022-07-16 NOTE — PHYSICAL THERAPY INITIAL EVALUATION ADULT - PERTINENT HX OF CURRENT PROBLEM, REHAB EVAL
77 year old M PMH DM (not on insulin), colon CA s/p resection, HTN, HLD, dementia coming to the ED for abdominal pain associated with nausea and vomiting and decreased PO intake since Monday admitted for acute cholecysitis

## 2022-07-16 NOTE — PROGRESS NOTE ADULT - ASSESSMENT
Impression/Plan:  MARIO RAMOS is a 78yo man POD 2 open cholecystectomy for gangrenous cholecystitis.    #POSTOP  7/14/22: Open cholecystectomy  - Advance to solid diet. DC IV fluids.   - Maintain drains.   - Out of bed.   - Continue antibiotics for now.      #DM  - Goal glucose < 180. Appreciate management per hospitalist team.     --------------------------------------------------------    Subjective:  Required Haldol overnight for agitation. Otherwise, tolerating liquid diet without nausea or vomiting.     Objective:  T(C): 36.6, Max: 36.9 (07-15-22 @ 12:09)  HR: 88 (76 - 88)  BP: 150/82 (128/72 - 150/82)  RR: 17 (17 - 20)  SpO2: 95% (95% - 100%)    Physical Exam  No acute distress.   Sitting comfortably in chair.   Able to redirect and answer questions appropriately.   RUQ stapled incision CDI.   Drains in place. Serosanguinous output from both drains.   No EXT swelling.     Laboratory Data            12.9                142   | 107  | 25     11.32 )------( 228       --------------------<185                39.2                3.4   | 26   | 1.05     TP    x    | ALB x         CA 8.2           PT x        ----------------------  AST x    | ALT x         MAG x          PTT x                ----------------------  TBIL x    | DBIL x         PHOS x         INR x       ----------------------  ALP x        Imaging Data  NA

## 2022-07-16 NOTE — PROGRESS NOTE ADULT - ASSESSMENT
77 year old M PMH DM (not on insulin), colon CA s/p resection, HTN, HLD, dementia coming to the ED for abdominal pain associated with nausea and vomiting and decreased PO intake since Monday admitted for acute cholecysitis    #acute cholecystitis  - S/p Open cholecystectomy with drain placement POD 2  - Tolerating regular diet  - Surgery on board. Follow up with recommendations   - Continue Zosyn   - PT consult for dispo   - Pain control    #Dementia with behavioral issues  -Intermittent episodes of agitations as overnight  -Family prefers not to have pt on antipsychotics. Bad/opposite reaction to Seroquel and Risperidone in the past  -Hold off on standing or prn meds. Encourage redirection. If not successful, then can give one time Haldol     #ANISH  - Resolved    #Hypokalemia (resolved)  -Monitor BMP    #DM II  - HbA1C was 7.9 on outpatient lab work from 7/11/2022  - home med glipizide  - moderate dose ISS    #HTN  #HLD  - continue home med amlodipine 2.5mg daily, isosorbide mononitrate 60mg daily  - hold home ramipril 10mg daily in setting of ANISH    #history of colon CA  - stable  -CEA levels noted  -Outpatient follow up    #DVT ppx  - heparin    Updated Daughter Kuldeep Ordonez [phone]    Dispo: Family prefers home with home services as opposed to [facility]

## 2022-07-17 ENCOUNTER — TRANSCRIPTION ENCOUNTER (OUTPATIENT)
Age: 77
End: 2022-07-17

## 2022-07-17 VITALS
TEMPERATURE: 98 F | DIASTOLIC BLOOD PRESSURE: 80 MMHG | OXYGEN SATURATION: 98 % | SYSTOLIC BLOOD PRESSURE: 140 MMHG | HEART RATE: 90 BPM | RESPIRATION RATE: 17 BRPM

## 2022-07-17 LAB
ANION GAP SERPL CALC-SCNC: 6 MMOL/L — SIGNIFICANT CHANGE UP (ref 5–17)
BASOPHILS # BLD AUTO: 0.04 K/UL — SIGNIFICANT CHANGE UP (ref 0–0.2)
BASOPHILS NFR BLD AUTO: 0.4 % — SIGNIFICANT CHANGE UP (ref 0–2)
BUN SERPL-MCNC: 14 MG/DL — SIGNIFICANT CHANGE UP (ref 7–23)
CALCIUM SERPL-MCNC: 7.6 MG/DL — LOW (ref 8.4–10.5)
CHLORIDE SERPL-SCNC: 111 MMOL/L — HIGH (ref 96–108)
CO2 SERPL-SCNC: 28 MMOL/L — SIGNIFICANT CHANGE UP (ref 22–31)
CREAT SERPL-MCNC: 0.87 MG/DL — SIGNIFICANT CHANGE UP (ref 0.5–1.3)
EGFR: 88 ML/MIN/1.73M2 — SIGNIFICANT CHANGE UP
EOSINOPHIL # BLD AUTO: 0.05 K/UL — SIGNIFICANT CHANGE UP (ref 0–0.5)
EOSINOPHIL NFR BLD AUTO: 0.5 % — SIGNIFICANT CHANGE UP (ref 0–6)
GLUCOSE BLDC GLUCOMTR-MCNC: 132 MG/DL — HIGH (ref 70–99)
GLUCOSE BLDC GLUCOMTR-MCNC: 169 MG/DL — HIGH (ref 70–99)
GLUCOSE SERPL-MCNC: 148 MG/DL — HIGH (ref 70–99)
HCT VFR BLD CALC: 34.3 % — LOW (ref 39–50)
HGB BLD-MCNC: 11.4 G/DL — LOW (ref 13–17)
IMM GRANULOCYTES NFR BLD AUTO: 0.5 % — SIGNIFICANT CHANGE UP (ref 0–1.5)
LYMPHOCYTES # BLD AUTO: 1.02 K/UL — SIGNIFICANT CHANGE UP (ref 1–3.3)
LYMPHOCYTES # BLD AUTO: 10.6 % — LOW (ref 13–44)
MCHC RBC-ENTMCNC: 30.5 PG — SIGNIFICANT CHANGE UP (ref 27–34)
MCHC RBC-ENTMCNC: 33.2 GM/DL — SIGNIFICANT CHANGE UP (ref 32–36)
MCV RBC AUTO: 91.7 FL — SIGNIFICANT CHANGE UP (ref 80–100)
MONOCYTES # BLD AUTO: 0.75 K/UL — SIGNIFICANT CHANGE UP (ref 0–0.9)
MONOCYTES NFR BLD AUTO: 7.8 % — SIGNIFICANT CHANGE UP (ref 2–14)
NEUTROPHILS # BLD AUTO: 7.73 K/UL — HIGH (ref 1.8–7.4)
NEUTROPHILS NFR BLD AUTO: 80.2 % — HIGH (ref 43–77)
NRBC # BLD: 0 /100 WBCS — SIGNIFICANT CHANGE UP (ref 0–0)
PLATELET # BLD AUTO: 171 K/UL — SIGNIFICANT CHANGE UP (ref 150–400)
POTASSIUM SERPL-MCNC: 3.7 MMOL/L — SIGNIFICANT CHANGE UP (ref 3.5–5.3)
POTASSIUM SERPL-SCNC: 3.7 MMOL/L — SIGNIFICANT CHANGE UP (ref 3.5–5.3)
RBC # BLD: 3.74 M/UL — LOW (ref 4.2–5.8)
RBC # FLD: 12.2 % — SIGNIFICANT CHANGE UP (ref 10.3–14.5)
SODIUM SERPL-SCNC: 145 MMOL/L — SIGNIFICANT CHANGE UP (ref 135–145)
WBC # BLD: 9.64 K/UL — SIGNIFICANT CHANGE UP (ref 3.8–10.5)
WBC # FLD AUTO: 9.64 K/UL — SIGNIFICANT CHANGE UP (ref 3.8–10.5)

## 2022-07-17 PROCEDURE — 88304 TISSUE EXAM BY PATHOLOGIST: CPT

## 2022-07-17 PROCEDURE — 97162 PT EVAL MOD COMPLEX 30 MIN: CPT

## 2022-07-17 PROCEDURE — 80076 HEPATIC FUNCTION PANEL: CPT

## 2022-07-17 PROCEDURE — C1729: CPT

## 2022-07-17 PROCEDURE — 87086 URINE CULTURE/COLONY COUNT: CPT

## 2022-07-17 PROCEDURE — 74176 CT ABD & PELVIS W/O CONTRAST: CPT | Mod: MA

## 2022-07-17 PROCEDURE — 85025 COMPLETE CBC W/AUTO DIFF WBC: CPT

## 2022-07-17 PROCEDURE — 99239 HOSP IP/OBS DSCHRG MGMT >30: CPT

## 2022-07-17 PROCEDURE — 85027 COMPLETE CBC AUTOMATED: CPT

## 2022-07-17 PROCEDURE — C9399: CPT

## 2022-07-17 PROCEDURE — 36415 COLL VENOUS BLD VENIPUNCTURE: CPT

## 2022-07-17 PROCEDURE — 82962 GLUCOSE BLOOD TEST: CPT

## 2022-07-17 PROCEDURE — 93005 ELECTROCARDIOGRAM TRACING: CPT

## 2022-07-17 PROCEDURE — 76705 ECHO EXAM OF ABDOMEN: CPT

## 2022-07-17 PROCEDURE — 99285 EMERGENCY DEPT VISIT HI MDM: CPT

## 2022-07-17 PROCEDURE — 93306 TTE W/DOPPLER COMPLETE: CPT

## 2022-07-17 PROCEDURE — 83690 ASSAY OF LIPASE: CPT

## 2022-07-17 PROCEDURE — 81001 URINALYSIS AUTO W/SCOPE: CPT

## 2022-07-17 PROCEDURE — 83036 HEMOGLOBIN GLYCOSYLATED A1C: CPT

## 2022-07-17 PROCEDURE — 80053 COMPREHEN METABOLIC PANEL: CPT

## 2022-07-17 PROCEDURE — C1889: CPT

## 2022-07-17 PROCEDURE — 82378 CARCINOEMBRYONIC ANTIGEN: CPT

## 2022-07-17 PROCEDURE — 96375 TX/PRO/DX INJ NEW DRUG ADDON: CPT

## 2022-07-17 PROCEDURE — 80048 BASIC METABOLIC PNL TOTAL CA: CPT

## 2022-07-17 PROCEDURE — 87635 SARS-COV-2 COVID-19 AMP PRB: CPT

## 2022-07-17 PROCEDURE — 96374 THER/PROPH/DIAG INJ IV PUSH: CPT

## 2022-07-17 PROCEDURE — 85379 FIBRIN DEGRADATION QUANT: CPT

## 2022-07-17 RX ORDER — HALOPERIDOL DECANOATE 100 MG/ML
1 INJECTION INTRAMUSCULAR
Qty: 20 | Refills: 0
Start: 2022-07-17 | End: 2022-07-26

## 2022-07-17 RX ORDER — ASPIRIN/CALCIUM CARB/MAGNESIUM 324 MG
1 TABLET ORAL
Qty: 30 | Refills: 0
Start: 2022-07-17 | End: 2022-08-15

## 2022-07-17 RX ORDER — RAMIPRIL 5 MG
1 CAPSULE ORAL
Qty: 30 | Refills: 0
Start: 2022-07-17 | End: 2022-08-15

## 2022-07-17 RX ADMIN — AMLODIPINE BESYLATE 2.5 MILLIGRAM(S): 2.5 TABLET ORAL at 05:50

## 2022-07-17 RX ADMIN — Medication 12.5 MILLIGRAM(S): at 05:49

## 2022-07-17 RX ADMIN — OXYCODONE HYDROCHLORIDE 5 MILLIGRAM(S): 5 TABLET ORAL at 04:30

## 2022-07-17 RX ADMIN — PIPERACILLIN AND TAZOBACTAM 25 GRAM(S): 4; .5 INJECTION, POWDER, LYOPHILIZED, FOR SOLUTION INTRAVENOUS at 05:49

## 2022-07-17 RX ADMIN — OXYCODONE HYDROCHLORIDE 5 MILLIGRAM(S): 5 TABLET ORAL at 03:35

## 2022-07-17 RX ADMIN — Medication 1: at 12:20

## 2022-07-17 RX ADMIN — ISOSORBIDE MONONITRATE 60 MILLIGRAM(S): 60 TABLET, EXTENDED RELEASE ORAL at 12:21

## 2022-07-17 RX ADMIN — HEPARIN SODIUM 5000 UNIT(S): 5000 INJECTION INTRAVENOUS; SUBCUTANEOUS at 05:50

## 2022-07-17 NOTE — DISCHARGE NOTE PROVIDER - NSDCFUSCHEDAPPT_GEN_ALL_CORE_FT
Evgeny Shaffer  [facility]  NEUROLOGY [address]  Scheduled Appointment: 07/22/2022

## 2022-07-17 NOTE — DISCHARGE NOTE PROVIDER - PROVIDER TOKENS
PROVIDER:[TOKEN:[[id number]],FOLLOWUP:[1-3 days],ESTABLISHEDPATIENT:[T]],PROVIDER:[TOKEN:[[id number]],FOLLOWUP:[1-3 days],ESTABLISHEDPATIENT:[T]]

## 2022-07-17 NOTE — PROGRESS NOTE ADULT - REASON FOR ADMISSION
acute cholecystitis
Cholecystitis
acute cholecystitis
acute cholecystitis

## 2022-07-17 NOTE — DISCHARGE NOTE PROVIDER - NSDCMRMEDTOKEN_GEN_ALL_CORE_FT
amLODIPine 2.5 mg oral tablet: 1 tab(s) orally once a day  aspirin 325 mg oral delayed release tablet: 1 tab(s) orally once a day  glipiZIDE 5 mg oral tablet: 1 tab(s) orally once a day  Imdur 60 mg oral tablet, extended release: 1 tab(s) orally once a day (in the morning)  ramipril 10 mg oral capsule: 1 cap(s) orally once a day  Toprol-XL 25 mg oral tablet, extended release: 0.5 tab(s) orally once a day   amLODIPine 2.5 mg oral tablet: 1 tab(s) orally once a day  amoxicillin-clavulanate 875 mg-125 mg oral tablet: 1 tab(s) orally every 12 hours   Aspirin Low Strength 81 mg oral delayed release tablet: 1 tab(s) orally once a day   glipiZIDE 5 mg oral tablet: 1 tab(s) orally once a day  haloperidol 0.5 mg oral tablet: 1 tab(s) orally 2 times a day, As Needed -for agitation   Imdur 60 mg oral tablet, extended release: 1 tab(s) orally once a day (in the morning)  ramipril 5 mg oral capsule: 1 cap(s) orally once a day   Toprol-XL 25 mg oral tablet, extended release: 0.5 tab(s) orally once a day

## 2022-07-17 NOTE — PROGRESS NOTE ADULT - ASSESSMENT
doing well      can be discharged home today with drains in place    does not need antibioitcs or pain meds      patient to make appt to see me Thursday 7/21 in my [address] [place] [address] Office([phone]).      I will remove drains in my office.        thank you  Dr. Marc Zarate  cell#[phone]

## 2022-07-17 NOTE — PROGRESS NOTE ADULT - SUBJECTIVE AND OBJECTIVE BOX
Patient is a 77y old  Male who presents with a chief complaint of acute cholecystitis (2022 19:32)      Patient seen and examined at bedside. No overnight events reported.     ALLERGIES:  sulfa drugs (Unknown)    MEDICATIONS  (STANDING):    MEDICATIONS  (PRN):    Vital Signs Last 24 Hrs  T(F): 97.9 (2022 06:27), Max: 98.6 (2022 18:37)  HR: 67 (2022 06:27) (67 - 97)  BP: 133/80 (2022 06:27) (113/70 - 133/80)  RR: 14 (2022 06:27) (14 - 17)  SpO2: 99% (2022 06:27) (96% - 99%)  I&O's Summary    2022 07:01  -  2022 07:00  --------------------------------------------------------  IN: 700 mL / OUT: 1 mL / NET: 699 mL      PHYSICAL EXAM:  General: NAD, Alert but forgetful  ENT: No gross hearing impairment, Moist mucous membranes, no thrush  Neck: Supple, No JVD  Lungs: Clear to auscultation bilaterally, good air entry, non-labored breathing  Cardio: RRR, S1/S2, No murmur  Abdomen: Soft, nontender, Nondistended, negative palomino's sign; Bowel sounds present  Extremities: No calf tenderness, No cyanosis, No pitting edema  Psych: Appropriate mood and affect    LABS:                        13.8   10.60 )-----------( 232      ( 2022 06:30 )             40.8     07-14    143  |  105  |  58  ----------------------------<  120  3.3   |  29  |  2.14    Ca    7.7      2022 06:30    TPro  6.3  /  Alb  2.2  /  TBili  0.7  /  DBili  x   /  AST  13  /  ALT  11  /  AlkPhos  95        Lipase, Serum: 151 U/L (22 @ 16:10)                                  Urinalysis Basic - ( 2022 18:33 )    Color: Tiara / Appearance: Clear / S.025 / pH: x  Gluc: x / Ketone: Negative  / Bili: Moderate / Urobili: Negative   Blood: x / Protein: 30 mg/dL / Nitrite: Negative   Leuk Esterase: Negative / RBC: 0-4 /HPF / WBC 0-2 /HPF   Sq Epi: x / Non Sq Epi: Neg.-Few / Bacteria: Few /HPF        COVID-19 PCR: NotDetec (22 @ 18:33)    RADIOLOGY & ADDITIONAL TESTS:  < from: CT Abdomen and Pelvis No Cont (22 @ 17:22) >  IMPRESSION:    Findings consistent with acute emphysematous cholecystitis.    < end of copied text >    < from: US Abdomen Upper Quadrant Right (22 @ 16:58) >    IMPRESSION:  Cholelithiasis with gallbladder wall thickening is suspicious for acute   cholecystitis in the appropriate clinical setting. There is a negative   sonographic Palomino sign.    < end of copied text >  < from: TTE Echo Complete w/o Contrast w/ Doppler (22 @ 11:15) >  Summary:   1. Left ventricular ejection fraction, by visual estimation, is 40 to   45%.   2. Mildly to moderately decreased segmental left ventricular systolic   function.   3. Increased LV wall thickness.   4. Normal left ventricular internal cavity size.   5. There is mild septal left ventricular hypertrophy.   6. Moderately reduced RV systolic function.   7. Moderately enlarged left atrium.   8. Right atrial enlargement.   9. Moderate mitral valve regurgitation.  10. Thickening and calcification of the anterior and posterior mitral   valve leaflets.  11. Mild tricuspid regurgitation.  12. Sclerotic aortic valve with normal opening.  13. Estimated pulmonary artery systolic pressure is 36.9 mmHg assuming a   right atrial pressure of 8 mmHg, which is consistent with borderline   pulmonary hypertension.    < end of copied text >    Care Discussed with Consultants/Other Providers:   
Patient is a 77y old  Male who presents with a chief complaint of acute cholecystitis (15 Jul 2022 09:11)      Patient seen and examined at bedside. No overnight events reported.     ALLERGIES:  sulfa drugs (Unknown)    MEDICATIONS  (STANDING):  acetaminophen   IVPB .. 1000 milliGRAM(s) IV Intermittent once  amLODIPine   Tablet 2.5 milliGRAM(s) Oral daily  dextrose 5%. 1000 milliLiter(s) (100 mL/Hr) IV Continuous <Continuous>  dextrose 5%. 1000 milliLiter(s) (50 mL/Hr) IV Continuous <Continuous>  dextrose 50% Injectable 25 Gram(s) IV Push once  dextrose 50% Injectable 12.5 Gram(s) IV Push once  dextrose 50% Injectable 25 Gram(s) IV Push once  glucagon  Injectable 1 milliGRAM(s) IntraMuscular once  heparin   Injectable 5000 Unit(s) SubCutaneous every 8 hours  insulin lispro (ADMELOG) corrective regimen sliding scale   SubCutaneous three times a day before meals  isosorbide   mononitrate ER Tablet (IMDUR) 60 milliGRAM(s) Oral daily  metoprolol succinate ER 12.5 milliGRAM(s) Oral daily  piperacillin/tazobactam IVPB.. 3.375 Gram(s) IV Intermittent every 8 hours  sodium chloride 0.9%. 1000 milliLiter(s) (75 mL/Hr) IV Continuous <Continuous>    MEDICATIONS  (PRN):  dextrose Oral Gel 15 Gram(s) Oral once PRN Blood Glucose LESS THAN 70 milliGRAM(s)/deciliter  HYDROmorphone  Injectable 1 milliGRAM(s) IV Push every 4 hours PRN Severe Pain (7 - 10)  melatonin 3 milliGRAM(s) Oral at bedtime PRN Insomnia  ondansetron Injectable 4 milliGRAM(s) IV Push every 6 hours PRN Nausea  oxyCODONE    IR 5 milliGRAM(s) Oral every 6 hours PRN Moderate Pain (4 - 6)    Vital Signs Last 24 Hrs  T(F): 98.1 (15 Jul 2022 05:28), Max: 98.4 (2022 16:47)  HR: 85 (15 Jul 2022 05:28) (70 - 90)  BP: 123/75 (15 Jul 2022 05:28) (123/75 - 159/68)  RR: 22 (15 Jul 2022 05:28) (14 - 22)  SpO2: 100% (15 Jul 2022 05:28) (96% - 100%)  I&O's Summary    2022 07:01  -  15 Jul 2022 07:00  --------------------------------------------------------  IN: 600 mL / OUT: 760 mL / NET: -160 mL    15 Jul 2022 07:01  -  15 Jul 2022 10:47  --------------------------------------------------------  IN: 100 mL / OUT: 0 mL / NET: 100 mL      PHYSICAL EXAM:  General: NAD, A/O x 2/3 forgetful and confused at times  ENT: No gross hearing impairment, Moist mucous membranes, no thrush  Neck: Supple, No JVD  Lungs: Clear to auscultation bilaterally, good air entry, non-labored breathing  Cardio: RRR, S1/S2, No murmur  Abdomen: Dressing in place C/D/I, Soft, minimally tender, Nondistended; Bowel sounds present, drain in place with minimal serosanguinous fluid  Extremities: No calf tenderness, No cyanosis, No pitting edema  Psych: Appropriate mood and affect    LABS:                        13.0   14.52 )-----------( 273      ( 15 Jul 2022 06:44 )             39.0     07-15    142  |  109  |  44  ----------------------------<  208  4.3   |  22  |  1.13    Ca    7.8      15 Jul 2022 06:15    TPro  6.0  /  Alb  2.0  /  TBili  0.8  /  DBili  0.2  /  AST  24  /  ALT  11  /  AlkPhos  83  07-15      Lipase, Serum: 151 U/L (22 @ 16:10)                              POCT Blood Glucose.: 185 mg/dL (15 Jul 2022 08:07)  POCT Blood Glucose.: 110 mg/dL (2022 15:27)      Urinalysis Basic - ( 2022 18:33 )    Color: Tiara / Appearance: Clear / S.025 / pH: x  Gluc: x / Ketone: Negative  / Bili: Moderate / Urobili: Negative   Blood: x / Protein: 30 mg/dL / Nitrite: Negative   Leuk Esterase: Negative / RBC: 0-4 /HPF / WBC 0-2 /HPF   Sq Epi: x / Non Sq Epi: Neg.-Few / Bacteria: Few /HPF        Culture - Urine (collected 2022 18:33)  Source: Clean Catch Clean Catch (Midstream)  Final Report (2022 20:14):    <10,000 CFU/mL Normal Urogenital Chelsea      COVID-19 PCR: NotDetec (22 @ 18:33)    RADIOLOGY & ADDITIONAL TESTS:    Care Discussed with Consultants/Other Providers:   
Patient is a 77y old  Male who presents with a chief complaint of acute cholecystitis (15 Jul 2022 10:46)      SUBJECTIVE / OVERNIGHT EVENTS:  Pt seen and examined at bedside. Overnight with episode of agitation, received Haldol IM as he wasn't able to be redirected   Pt denies cp, palpitations, sob, abd pain, N/V, fever, chills.    ROS:  All other review of systems negative    Allergies    sulfa drugs (Unknown)    Intolerances        MEDICATIONS  (STANDING):  amLODIPine   Tablet 2.5 milliGRAM(s) Oral daily  dextrose 5%. 1000 milliLiter(s) (100 mL/Hr) IV Continuous <Continuous>  dextrose 5%. 1000 milliLiter(s) (50 mL/Hr) IV Continuous <Continuous>  dextrose 50% Injectable 25 Gram(s) IV Push once  dextrose 50% Injectable 12.5 Gram(s) IV Push once  dextrose 50% Injectable 25 Gram(s) IV Push once  glucagon  Injectable 1 milliGRAM(s) IntraMuscular once  heparin   Injectable 5000 Unit(s) SubCutaneous every 8 hours  insulin lispro (ADMELOG) corrective regimen sliding scale   SubCutaneous three times a day before meals  isosorbide   mononitrate ER Tablet (IMDUR) 60 milliGRAM(s) Oral daily  metoprolol succinate ER 12.5 milliGRAM(s) Oral daily  piperacillin/tazobactam IVPB.. 3.375 Gram(s) IV Intermittent every 8 hours  sodium chloride 0.9%. 1000 milliLiter(s) (75 mL/Hr) IV Continuous <Continuous>    MEDICATIONS  (PRN):  dextrose Oral Gel 15 Gram(s) Oral once PRN Blood Glucose LESS THAN 70 milliGRAM(s)/deciliter  melatonin 3 milliGRAM(s) Oral at bedtime PRN Insomnia  ondansetron Injectable 4 milliGRAM(s) IV Push every 6 hours PRN Nausea  oxyCODONE    IR 5 milliGRAM(s) Oral every 6 hours PRN Moderate Pain (4 - 6)      Vital Signs Last 24 Hrs  T(C): 36.6 (16 Jul 2022 05:51), Max: 36.9 (15 Jul 2022 12:09)  T(F): 97.8 (16 Jul 2022 05:51), Max: 98.5 (15 Jul 2022 12:09)  HR: 88 (16 Jul 2022 05:51) (76 - 88)  BP: 150/82 (16 Jul 2022 05:51) (128/72 - 150/82)  BP(mean): --  RR: 17 (16 Jul 2022 05:51) (17 - 20)  SpO2: 95% (16 Jul 2022 05:51) (95% - 100%)    Parameters below as of 16 Jul 2022 05:51  Patient On (Oxygen Delivery Method): room air      CAPILLARY BLOOD GLUCOSE      POCT Blood Glucose.: 172 mg/dL (16 Jul 2022 08:01)  POCT Blood Glucose.: 234 mg/dL (15 Jul 2022 16:50)  POCT Blood Glucose.: 235 mg/dL (15 Jul 2022 11:39)    I&O's Summary    15 Jul 2022 07:01  -  16 Jul 2022 07:00  --------------------------------------------------------  IN: 1375 mL / OUT: 100 mL / NET: 1275 mL        PHYSICAL EXAM:  GENERAL: NAD, thin elderly male  HEAD:  Atraumatic, Normocephalic  EYES: EOMI, PERRLA, conjunctiva and sclera clear  NECK: Supple, No JVD  CHEST/LUNG: Clear to auscultation bilaterally; No wheeze, nonlabored breathing  HEART: Regular rate and rhythm; No murmurs, rubs, or gallops  ABDOMEN: Soft, minimally tender, Nondistended; Bowel sounds present, Right sided drain covered with dressing  EXTREMITIES:  2+ Peripheral Pulses, No clubbing, cyanosis, or edema  NEUROLOGY: Alert and awake, oriented to person and place, non-focal  PSYCH: calm, appropriate mood    LABS:                        12.9   11.32 )-----------( 228      ( 16 Jul 2022 06:25 )             39.2     07-16    142  |  107  |  25<H>  ----------------------------<  185<H>  3.4<L>   |  26  |  1.05    Ca    8.2<L>      16 Jul 2022 06:25    TPro  6.0  /  Alb  2.0<L>  /  TBili  0.8  /  DBili  0.2  /  AST  24  /  ALT  11  /  AlkPhos  83  07-15              RADIOLOGY & ADDITIONAL TESTS:  Results Reviewed:   Imaging Personally Reviewed:  Electrocardiogram Personally Reviewed:    COORDINATION OF CARE:  Care Discussed with Consultants/Other Providers [Y/N]:  Prior or Outpatient Records Reviewed [Y/N]:
Patient is a 77y old  Male who presents with a chief complaint of acute cholecystitis (2022 13:59)      Patient seen and examined at bedside.  Patient is found sitting up in bed and states that he is feeling a bit better today.  He states he will not pull at his drains today , so the restraints have been removed .  No other complaints .    ALLERGIES:  sulfa drugs (Unknown)    MEDICATIONS  (STANDING):  acetaminophen   IVPB .. 1000 milliGRAM(s) IV Intermittent once  amLODIPine   Tablet 2.5 milliGRAM(s) Oral daily  dextrose 5%. 1000 milliLiter(s) (100 mL/Hr) IV Continuous <Continuous>  dextrose 5%. 1000 milliLiter(s) (50 mL/Hr) IV Continuous <Continuous>  dextrose 50% Injectable 25 Gram(s) IV Push once  dextrose 50% Injectable 12.5 Gram(s) IV Push once  dextrose 50% Injectable 25 Gram(s) IV Push once  glucagon  Injectable 1 milliGRAM(s) IntraMuscular once  heparin   Injectable 5000 Unit(s) SubCutaneous every 8 hours  insulin lispro (ADMELOG) corrective regimen sliding scale   SubCutaneous three times a day before meals  isosorbide   mononitrate ER Tablet (IMDUR) 60 milliGRAM(s) Oral daily  metoprolol succinate ER 12.5 milliGRAM(s) Oral daily  piperacillin/tazobactam IVPB.. 3.375 Gram(s) IV Intermittent every 8 hours  sodium chloride 0.9%. 1000 milliLiter(s) (75 mL/Hr) IV Continuous <Continuous>    MEDICATIONS  (PRN):  dextrose Oral Gel 15 Gram(s) Oral once PRN Blood Glucose LESS THAN 70 milliGRAM(s)/deciliter  HYDROmorphone  Injectable 1 milliGRAM(s) IV Push every 4 hours PRN Severe Pain (7 - 10)  melatonin 3 milliGRAM(s) Oral at bedtime PRN Insomnia  ondansetron Injectable 4 milliGRAM(s) IV Push every 6 hours PRN Nausea  oxyCODONE    IR 5 milliGRAM(s) Oral every 6 hours PRN Moderate Pain (4 - 6)    Vital Signs Last 24 Hrs  T(F): 98.1 (15 Jul 2022 05:28), Max: 98.4 (2022 16:47)  HR: 85 (15 Jul 2022 05:28) (70 - 90)  BP: 123/75 (15 Jul 2022 05:28) (123/75 - 159/68)  RR: 22 (15 Jul 2022 05:28) (14 - 22)  SpO2: 100% (15 Jul 2022 05:28) (96% - 100%)  I&O's Summary    2022 07:01  -  15 Jul 2022 07:00  --------------------------------------------------------  IN: 600 mL / OUT: 760 mL / NET: -160 mL      PHYSICAL EXAM:  General: NAD, A/O x 2 a bit confused a times  ENT: MMM  Neck: Supple, No JVD  Lungs: Clear to auscultation bilaterally  Cardio: RRR, S1/S2, No murmurs  Abdomen: dressing in place, CDI, + bs, soft nontender throughout, no R/R/G    YUE with minimal drainage as well as Malicot drain to santos bag   Extremities: No calf tenderness, No pitting edema bilaterally , venodynes on and functioning     LABS:                        13.0   14.52 )-----------( 273      ( 15 Jul 2022 06:44 )             39.0     07-15    142  |  109  |  44  ----------------------------<  208  4.3   |  22  |  1.13    Ca    7.8      15 Jul 2022 06:15    TPro  6.0  /  Alb  2.0  /  TBili  0.8  /  DBili  0.2  /  AST  24  /  ALT  11  /  AlkPhos  83  07-15                            POCT Blood Glucose.: 185 mg/dL (15 Jul 2022 08:07)  POCT Blood Glucose.: 110 mg/dL (2022 15:27)      Urinalysis Basic - ( 2022 18:33 )    Color: Tiara / Appearance: Clear / S.025 / pH: x  Gluc: x / Ketone: Negative  / Bili: Moderate / Urobili: Negative   Blood: x / Protein: 30 mg/dL / Nitrite: Negative   Leuk Esterase: Negative / RBC: 0-4 /HPF / WBC 0-2 /HPF   Sq Epi: x / Non Sq Epi: Neg.-Few / Bacteria: Few /HPF        Culture - Urine (collected 2022 18:33)  Source: Clean Catch Clean Catch (Midstream)  Final Report (2022 20:14):    <10,000 CFU/mL Normal Urogenital Chelsea      COVID-19 PCR: NotDetec (22 @ 18:33)      RADIOLOGY & ADDITIONAL TESTS:    Care Discussed with Consultants/Other Providers:   
pod#4      feels great      starting to eat breakfast      afebrile    Heent-sclera anicteric      abdomne-benign  drains intact without drainage    extrem-ok      labs:    k+3.7    creat 0.87    wbc 9.64    h/h  11/34

## 2022-07-17 NOTE — DISCHARGE NOTE NURSING/CASE MANAGEMENT/SOCIAL WORK - PATIENT PORTAL LINK FT
You can access the FollowMyHealth Patient Portal offered by [facility] by registering at the following website: [email or web address]. By joining [facility]’s FollowMyHealth portal, you will also be able to view your health information using other applications (apps) compatible with our system.

## 2022-07-17 NOTE — DISCHARGE NOTE PROVIDER - HOSPITAL COURSE
· Assessment    77 year old M PMH DM (not on insulin), colon CA s/p resection, HTN, HLD, dementia coming to the ED for abdominal pain associated with nausea and vomiting and decreased PO intake since Monday admitted for acute cholecysitis and is now POD#3 s/p open cholecystectomy.  Physical therapy recommended home with home PT.  He is tolerating diet, felling well and eager for discharge home.        · Assessment    77 year old M PMH DM (not on insulin), colon CA s/p resection, HTN, HLD, dementia coming to the ED for abdominal pain associated with nausea and vomiting and decreased PO intake, admitted for acute cholecystitis and is now POD#3 s/p open cholecystectomy w/ drain placement. Pt also with acute kidney injury in the setting of acute infection, held home Ramipril, and hydrated s/p resolved. Hospital course complicated by acute intermittent episodes of agitation, requiring medication. Discussed with family who states he has had a bad reaction to Seroquel and Risperidone in the past. Haldol 0.5mg given which improved his symptoms during the day as well as hospital course. Completed course of Zosyn, discharge with Augmentin x 2 more days. Evaluated by surgery with recommendations to maintain drains and follow up as outpatient to remove drains

## 2022-07-17 NOTE — DISCHARGE NOTE PROVIDER - NSDCCPCAREPLAN_GEN_ALL_CORE_FT
PRINCIPAL DISCHARGE DIAGNOSIS  Diagnosis: Cholecystitis  Assessment and Plan of Treatment: Treted with open cholecystectomy (gallbladder removal).  Follow up with Dr. Zarate on Thursday, call his office for appointment      SECONDARY DISCHARGE DIAGNOSES  Diagnosis: ANISH (acute kidney injury)  Assessment and Plan of Treatment: drink plenty of water to stay hydrated.     PRINCIPAL DISCHARGE DIAGNOSIS  Diagnosis: Cholecystitis  Assessment and Plan of Treatment: Treted with open cholecystectomy (gallbladder removal).  Follow up with Dr. Zarate on Thursday, call his office for appointment. Complete course of antbiotic with 2 more days of Augmentin. Maintain drains until follow up with surgery      SECONDARY DISCHARGE DIAGNOSES  Diagnosis: Alzheimer's dementia with behavioral disturbance  Assessment and Plan of Treatment: Episodes of agitation, controlled with as needed Haldol. Please follow up with your primary    Diagnosis: HTN (hypertension)  Assessment and Plan of Treatment: Blood pressure medication was held initially but will recommend restarting Ramipril at lower dose

## 2022-07-17 NOTE — PROGRESS NOTE ADULT - NSPROGADDITIONALINFOA_GEN_ALL_CORE
patient doing well  santos 160 cc-clear/yellow    benign abdomen    creat down to 1.1    wbc 15 h/h 13/39    k+4.3              I removed santos catheter    will get oob and ambulate      I will be out of town Saturday July 15 and Sunday July 16.    Dr. Walter Linares(cell#[phone]) will cover.
I discussed above with my PA Selena Douglas who will facilitate discharge.

## 2022-07-17 NOTE — DISCHARGE NOTE PROVIDER - CARE PROVIDER_API CALL
Jacky Parra ()  Internal Medicine  [address] [place] [address]  Phone: [phone]  Fax: [phone]  Established Patient  Follow Up Time: 1-3 days    Marc Zarate)  Surgery  [address] [facility], [address]  [place] [address]  Phone: [phone]  Fax: [phone]  Established Patient  Follow Up Time: 1-3 days

## 2022-07-17 NOTE — DISCHARGE NOTE PROVIDER - NSDCPNSUBOBJ_GEN_ALL_CORE
Patient is a 77y old  Male who presents with a chief complaint of acute cholecystitis (17 Jul 2022 10:57)      SUBJECTIVE / OVERNIGHT EVENTS:  Pt seen and examined at bedside. No acute events overnight.  Pt denies cp, palpitations, sob, abd pain, N/V, fever, chills.    ROS:  All other review of systems negative    Allergies    sulfa drugs (Unknown)    Intolerances        MEDICATIONS  (STANDING):  amLODIPine   Tablet 2.5 milliGRAM(s) Oral daily  dextrose 5%. 1000 milliLiter(s) (100 mL/Hr) IV Continuous <Continuous>  dextrose 5%. 1000 milliLiter(s) (50 mL/Hr) IV Continuous <Continuous>  dextrose 50% Injectable 25 Gram(s) IV Push once  dextrose 50% Injectable 12.5 Gram(s) IV Push once  dextrose 50% Injectable 25 Gram(s) IV Push once  glucagon  Injectable 1 milliGRAM(s) IntraMuscular once  heparin   Injectable 5000 Unit(s) SubCutaneous every 8 hours  insulin lispro (ADMELOG) corrective regimen sliding scale   SubCutaneous three times a day before meals  isosorbide   mononitrate ER Tablet (IMDUR) 60 milliGRAM(s) Oral daily  metoprolol succinate ER 12.5 milliGRAM(s) Oral daily  piperacillin/tazobactam IVPB.. 3.375 Gram(s) IV Intermittent every 8 hours    MEDICATIONS  (PRN):  dextrose Oral Gel 15 Gram(s) Oral once PRN Blood Glucose LESS THAN 70 milliGRAM(s)/deciliter  melatonin 3 milliGRAM(s) Oral at bedtime PRN Insomnia  ondansetron Injectable 4 milliGRAM(s) IV Push every 6 hours PRN Nausea  oxyCODONE    IR 5 milliGRAM(s) Oral every 6 hours PRN Moderate Pain (4 - 6)      Vital Signs Last 24 Hrs  T(C): 36.5 (17 Jul 2022 05:38), Max: 36.5 (17 Jul 2022 05:38)  T(F): 97.7 (17 Jul 2022 05:38), Max: 97.7 (17 Jul 2022 05:38)  HR: 89 (17 Jul 2022 05:38) (89 - 89)  BP: 149/85 (17 Jul 2022 05:38) (149/85 - 162/79)  BP(mean): --  RR: 16 (17 Jul 2022 05:38) (16 - 16)  SpO2: 97% (17 Jul 2022 05:38) (97% - 97%)    Parameters below as of 17 Jul 2022 05:38  Patient On (Oxygen Delivery Method): room air      CAPILLARY BLOOD GLUCOSE      POCT Blood Glucose.: 132 mg/dL (17 Jul 2022 07:54)  POCT Blood Glucose.: 231 mg/dL (16 Jul 2022 18:01)  POCT Blood Glucose.: 152 mg/dL (16 Jul 2022 11:46)    I&O's Summary    16 Jul 2022 07:01  -  17 Jul 2022 07:00  --------------------------------------------------------  IN: 100 mL / OUT: 30 mL / NET: 70 mL        PHYSICAL EXAM:  GENERAL: NAD, thin elderly male  HEAD:  Atraumatic, Normocephalic  EYES: EOMI, PERRLA, conjunctiva and sclera clear  NECK: Supple, No JVD  CHEST/LUNG: Clear to auscultation bilaterally; No wheeze, nonlabored breathing  HEART: Regular rate and rhythm; No murmurs, rubs, or gallops  ABDOMEN: Soft, minimally tender, Nondistended; Bowel sounds present, Right sided drain covered with dressing  EXTREMITIES:  2+ Peripheral Pulses, No clubbing, cyanosis, or edema  NEUROLOGY: Alert and awake, oriented to person and place, non-focal  PSYCH: calm, appropriate mood    LABS:                        11.4   9.64  )-----------( 171      ( 17 Jul 2022 05:40 )             34.3     07-17    145  |  111<H>  |  14  ----------------------------<  148<H>  3.7   |  28  |  0.87    Ca    7.6<L>      17 Jul 2022 05:40                RADIOLOGY & ADDITIONAL TESTS:  Results Reviewed:   Imaging Personally Reviewed:  Electrocardiogram Personally Reviewed:    COORDINATION OF CARE:  Care Discussed with Consultants/Other Providers [Y/N]:  Prior or Outpatient Records Reviewed [Y/N]:    77 year old M PMH DM (not on insulin), colon CA s/p resection, HTN, HLD, dementia coming to the ED for abdominal pain associated with nausea and vomiting and decreased PO intake since Monday admitted for acute cholecysitis    #acute cholecystitis  - S/p Open cholecystectomy with drain placement POD 3  - Tolerating regular diet  - Discussed case with Surgery; discharge home today and outpatient follow up   - Continue Zosyn. Discharge home with Augmentin x 2 days  - PT recommendations for home w/ PT  - Pain control    #Dementia with behavioral issues  -Intermittent episodes of agitations as overnight  -Family prefers not to have pt on antipsychotics. Bad/opposite reaction to Seroquel and Risperidone in the past  -Discharge home w/ Haldol 0.5mg prn     #ANISH  - Resolved    #Hypokalemia (resolved)  -Monitor BMP    #DM II  - HbA1C was 7.9 on outpatient lab work from 7/11/2022  - home med glipizide, restart upon discharge    #HTN  #HLD  - continue home med amlodipine 2.5mg daily, isosorbide mononitrate 60mg daily  - Restart home Ramipril but at lower dose, 5mg daily    #history of colon CA  - stable  -CEA levels noted  -Outpatient follow up    #DVT ppx  - heparin    38mins spent discussing discharge instructions with the pt and Daughter Kuldeep Ordonez [phone]

## 2022-07-21 ENCOUNTER — APPOINTMENT (OUTPATIENT)
Dept: SURGERY | Facility: CLINIC | Age: 77
End: 2022-07-21

## 2022-07-21 VITALS
WEIGHT: 110 LBS | SYSTOLIC BLOOD PRESSURE: 130 MMHG | TEMPERATURE: 98 F | HEART RATE: 83 BPM | HEIGHT: 62 IN | BODY MASS INDEX: 20.24 KG/M2 | OXYGEN SATURATION: 98 % | DIASTOLIC BLOOD PRESSURE: 81 MMHG

## 2022-07-21 LAB — SURGICAL PATHOLOGY STUDY: SIGNIFICANT CHANGE UP

## 2022-07-21 PROCEDURE — 99024 POSTOP FOLLOW-UP VISIT: CPT

## 2022-07-28 ENCOUNTER — APPOINTMENT (OUTPATIENT)
Dept: SURGERY | Facility: CLINIC | Age: 77
End: 2022-07-28

## 2022-07-28 VITALS
WEIGHT: 115 LBS | HEIGHT: 64 IN | DIASTOLIC BLOOD PRESSURE: 87 MMHG | OXYGEN SATURATION: 98 % | HEART RATE: 90 BPM | SYSTOLIC BLOOD PRESSURE: 149 MMHG | TEMPERATURE: 98.5 F | BODY MASS INDEX: 19.63 KG/M2 | RESPIRATION RATE: 17 BRPM

## 2022-07-28 PROCEDURE — 99024 POSTOP FOLLOW-UP VISIT: CPT

## 2022-08-14 ENCOUNTER — RX RENEWAL (OUTPATIENT)
Age: 77
End: 2022-08-14

## 2022-08-18 ENCOUNTER — APPOINTMENT (OUTPATIENT)
Dept: SURGERY | Facility: CLINIC | Age: 77
End: 2022-08-18

## 2022-08-18 VITALS
HEIGHT: 64 IN | RESPIRATION RATE: 17 BRPM | WEIGHT: 115 LBS | SYSTOLIC BLOOD PRESSURE: 160 MMHG | TEMPERATURE: 98 F | HEART RATE: 72 BPM | OXYGEN SATURATION: 98 % | BODY MASS INDEX: 19.63 KG/M2 | DIASTOLIC BLOOD PRESSURE: 90 MMHG

## 2022-08-18 DIAGNOSIS — K81.0 ACUTE CHOLECYSTITIS: ICD-10-CM

## 2022-08-18 PROCEDURE — 99024 POSTOP FOLLOW-UP VISIT: CPT

## 2022-09-09 ENCOUNTER — APPOINTMENT (OUTPATIENT)
Dept: NEUROLOGY | Facility: CLINIC | Age: 77
End: 2022-09-09

## 2022-09-09 PROCEDURE — 99213 OFFICE O/P EST LOW 20 MIN: CPT | Mod: 95

## 2022-09-09 NOTE — HISTORY OF PRESENT ILLNESS
[Home] : at home, [unfilled] , at the time of the visit. [Medical Office: ([facility])___] : at the medical office located in  [Family Member] : family member [FreeTextEntry3] : daughter [FreeTextEntry1] : This patient is seen for an office telehealth visit with his daughter present.  She provides the history.  He has a history of confusion with depression and anxiety and dementia.  He developed agitation as a side effect from increasing doses of escitalopram and this was stopped.  Seroquel was of no benefit and stopped.  He gets some benefit from clonazepam 0.5 mg 1/2 tablet in the morning regarding his behavior.  When given a whole tablet of clonazepam on 1 occasion there was some unsteadiness of gait.  There may be some underlying depression.  His daughter is asking about a trial of another antidepressant such as sertraline.\par \par He is status post a cholecystectomy for cholecystitis and sepsis.

## 2022-09-09 NOTE — PHYSICAL EXAM
[FreeTextEntry1] : This was a telehealth visit and physical exam is therefore limited.  Patient was seen with his daughter present.  He is alert disoriented and does appear confused.  He does attend to the examiner and follows simple commands.  He is basically unchanged in this regard.  His pupils appear equal there is no gaze paresis and his face is symmetric.  He moves all 4 extremities.  His gait was not tested.

## 2022-09-09 NOTE — ASSESSMENT
[FreeTextEntry1] : Impression: This 76-year-old male patient with history of CAD coronary bypass hypertension diabetes colon cancer status post recent cholecystectomy presents with dementia and behavioral symptomatology and also he may have underlying depression.  He is presently taking Klonopin 0.5 mg 1/2 tablet daily with some improvement in his behavior.  Taking both escitalopram  Seroquel were of no benefit or had  side effect.\par \par Recommendations: Continue Klonopin 0.5 mg 1/2 tablet once daily and a second dose daily as needed.  Begin sertraline 25 mg once daily in the morning and consider increase to 50 mg once daily in the.  Patient's daughter will call me in 2 weeks regarding her father's condition.  Consider trial of donepezil.\par

## 2022-09-25 NOTE — PHYSICAL EXAM
[FreeTextEntry1] : This was a telehealth visit and physical exam is therefore limited.  The patient is alert confused follows some simple commands disoriented basically mental status is unchanged.  Pupils appear equal there is no gaze paresis or facial weakness.  There is no motor focality moving all 4 extremities equally.  Gait was not tested.  There were no abnormal movements.

## 2022-09-25 NOTE — ASSESSMENT
[FreeTextEntry1] : Impression: This 76-year-old patient presents with his daughter with history of CAD remote cardiac bypass hypertension diabetes colon cancer status post recent cholecystectomy with a dementia and behavior disorder but also he may have depression.\par \par Recommendations: Continue Klonopin 0.5 mg 1/2 tablet daily and an extra half tablet daily if needed.  Begin sertraline 25 mg once daily and consider increase to 50 mg once daily.  Consider trial of donepezil.  Patient's daughter will call me in 1 to 2 weeks regarding her father's condition.  Office follow-up as directed.\par

## 2022-09-25 NOTE — REASON FOR VISIT
[Home] : at home, [unfilled] , at the time of the visit. [Medical Office: ([facility])___] : at the medical office located in  [Family Member] : family member [FreeTextEntry3] : daughter [FreeTextEntry1] : Dementia and behavior disorder

## 2022-09-25 NOTE — HISTORY OF PRESENT ILLNESS
[FreeTextEntry1] : This patient is seen for a telehealth visit with her daughter.  He is followed for dementia and behavioral disorder.  He is now taking Klonopin 0.5 mg 1/2 tablet daily with which provides some benefit.  He was either intolerant or had no benefit with trials of escitalopram and Seroquel.  His daughter is asking about a trial of sertraline or possibly donepezil has been discussed.  He has ongoing confusion with good and bad days.  There is no other clinical change.\par \par  He did have cholecystectomy performed with some enhanced confusion at that time though he is now back to his baseline.

## 2022-10-06 ENCOUNTER — RX RENEWAL (OUTPATIENT)
Age: 77
End: 2022-10-06

## 2022-10-17 ENCOUNTER — NON-APPOINTMENT (OUTPATIENT)
Age: 77
End: 2022-10-17

## 2022-10-20 ENCOUNTER — APPOINTMENT (OUTPATIENT)
Dept: SURGERY | Facility: CLINIC | Age: 77
End: 2022-10-20

## 2022-10-20 VITALS
HEIGHT: 64 IN | BODY MASS INDEX: 19.63 KG/M2 | RESPIRATION RATE: 17 BRPM | OXYGEN SATURATION: 97 % | SYSTOLIC BLOOD PRESSURE: 136 MMHG | WEIGHT: 115 LBS | HEART RATE: 79 BPM | TEMPERATURE: 98.4 F | DIASTOLIC BLOOD PRESSURE: 72 MMHG

## 2022-10-20 DIAGNOSIS — T81.89XA OTHER COMPLICATIONS OF PROCEDURES, NOT ELSEWHERE CLASSIFIED, INITIAL ENCOUNTER: ICD-10-CM

## 2022-10-20 PROCEDURE — 99212 OFFICE O/P EST SF 10 MIN: CPT

## 2022-11-04 RX ORDER — SERTRALINE 25 MG/1
25 TABLET, FILM COATED ORAL
Qty: 60 | Refills: 5 | Status: ACTIVE | COMMUNITY
Start: 2022-09-09 | End: 1900-01-01

## 2023-01-25 ENCOUNTER — NON-APPOINTMENT (OUTPATIENT)
Age: 78
End: 2023-01-25

## 2023-01-25 ENCOUNTER — APPOINTMENT (OUTPATIENT)
Dept: CARDIOLOGY | Facility: CLINIC | Age: 78
End: 2023-01-25
Payer: MEDICARE

## 2023-01-25 VITALS
WEIGHT: 126 LBS | SYSTOLIC BLOOD PRESSURE: 168 MMHG | BODY MASS INDEX: 21.51 KG/M2 | HEART RATE: 76 BPM | OXYGEN SATURATION: 98 % | TEMPERATURE: 97.6 F | HEIGHT: 64 IN | DIASTOLIC BLOOD PRESSURE: 91 MMHG | RESPIRATION RATE: 17 BRPM

## 2023-01-25 DIAGNOSIS — I50.42 CHRONIC COMBINED SYSTOLIC (CONGESTIVE) AND DIASTOLIC (CONGESTIVE) HEART FAILURE: ICD-10-CM

## 2023-01-25 DIAGNOSIS — Z00.00 ENCOUNTER FOR GENERAL ADULT MEDICAL EXAMINATION W/OUT ABNORMAL FINDINGS: ICD-10-CM

## 2023-01-25 DIAGNOSIS — I25.10 ATHEROSCLEROTIC HEART DISEASE OF NATIVE CORONARY ARTERY W/OUT ANGINA PECTORIS: ICD-10-CM

## 2023-01-25 DIAGNOSIS — I34.0 NONRHEUMATIC MITRAL (VALVE) INSUFFICIENCY: ICD-10-CM

## 2023-01-25 DIAGNOSIS — I77.9 DISORDER OF ARTERIES AND ARTERIOLES, UNSPECIFIED: ICD-10-CM

## 2023-01-25 PROCEDURE — 93000 ELECTROCARDIOGRAM COMPLETE: CPT

## 2023-01-25 PROCEDURE — 99214 OFFICE O/P EST MOD 30 MIN: CPT

## 2023-01-25 PROCEDURE — 93306 TTE W/DOPPLER COMPLETE: CPT

## 2023-01-30 ENCOUNTER — NON-APPOINTMENT (OUTPATIENT)
Age: 78
End: 2023-01-30

## 2023-01-30 PROBLEM — I34.0 MITRAL REGURGITATION: Status: ACTIVE | Noted: 2023-01-30

## 2023-01-30 NOTE — REASON FOR VISIT
[CV Risk Factors and Non-Cardiac Disease] : CV risk factors and non-cardiac disease [Structural Heart and Valve Disease] : structural heart and valve disease [FreeTextEntry3] : Dr. Parra [FreeTextEntry1] :  MARIO RAMOS comes today for evalution. He  was advised to undergo a complete cardiac evaluation. He is a 77 year old M PMH DM (not on insulin), colon CA s/p resection, HTN, HLD, \par dementia coming to the office  ED for evaluation of heart failure along with edema and weight loss. A nondedicated CTA demonstrates cardiomegaly and atherosclerotic changes of the coronaries\par He is status post open cholecystectomy 7/14/22. He denies chest pains shortness of breath or loss of consciousnes. Recent blood work 1/20/23 remarkable for hemoglobin of 11.7 BUN 15 creatinine .8 D dimer 263 which is borderline elevated BNP 20228 which is severely elevated and consistent with heart failure A1c 6.1 average sugar 128. He is suffering from depression and drinking carbonated beverages and currently off his diet. He denies current chest pain shortness of breath or loss of consciousness

## 2023-01-30 NOTE — PHYSICAL EXAM
[No Acute Distress] : no acute distress [Frail] : frail [Normal Conjunctiva] : normal conjunctiva [Normal Venous Pressure] : normal venous pressure [No Carotid Bruit] : no carotid bruit [Normal S1, S2] : normal S1, S2 [No Murmur] : no murmur [No Rub] : no rub [No Gallop] : no gallop [Clear Lung Fields] : clear lung fields [Good Air Entry] : good air entry [No Respiratory Distress] : no respiratory distress  [Soft] : abdomen soft [Non Tender] : non-tender [No Masses/organomegaly] : no masses/organomegaly [Normal Bowel Sounds] : normal bowel sounds [Normal Gait] : normal gait [No Edema] : no edema [No Cyanosis] : no cyanosis [No Clubbing] : no clubbing [No Varicosities] : no varicosities [No Rash] : no rash [No Skin Lesions] : no skin lesions [Moves all extremities] : moves all extremities [No Focal Deficits] : no focal deficits [Normal Speech] : normal speech [Alert and Oriented] : alert and oriented [Normal memory] : normal memory [de-identified] : thin

## 2023-01-30 NOTE — CARDIOLOGY SUMMARY
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

## 2023-01-30 NOTE — ASSESSMENT
[FreeTextEntry1] : Most likely mitral regurgitation is driving heart failure in the situation on a diastolic basis would maximize diuretic therapy in the situation with furosemide and supplement potassium as indicated first figure is also an option in order to decrease risk of hospitalization and to increase quality of life a repeat and update of his ischemic risk is requested as well as evaluation for advanced peripheral arterial disease discuss with daughter at the bedside was supportive. We would hope to avoid invasive options in this frail elderly gentleman with probable dementia although the mitral clip remains an option. A pharmacologic nuclear stress test echo and carotid dopplers are requested\par \par medical necessity\par this is a high-risk encounter based upon drug evaluation and management and review of multiple outpatient labs and reports\par \par EKG indicated for coronary artery disease

## 2023-02-07 ENCOUNTER — APPOINTMENT (OUTPATIENT)
Dept: CARDIOLOGY | Facility: CLINIC | Age: 78
End: 2023-02-07
Payer: MEDICARE

## 2023-02-07 PROCEDURE — 78452 HT MUSCLE IMAGE SPECT MULT: CPT

## 2023-02-07 PROCEDURE — A9500: CPT

## 2023-02-07 PROCEDURE — 93015 CV STRESS TEST SUPVJ I&R: CPT

## 2023-03-06 ENCOUNTER — APPOINTMENT (OUTPATIENT)
Dept: NEUROLOGY | Facility: CLINIC | Age: 78
End: 2023-03-06

## 2023-03-07 RX ORDER — CLONAZEPAM 0.5 MG/1
0.5 TABLET ORAL
Qty: 30 | Refills: 0 | Status: ACTIVE | COMMUNITY
Start: 2022-05-17 | End: 1900-01-01

## 2023-03-08 ENCOUNTER — APPOINTMENT (OUTPATIENT)
Dept: NEUROLOGY | Facility: CLINIC | Age: 78
End: 2023-03-08
Payer: MEDICARE

## 2023-03-08 DIAGNOSIS — R46.89 OTHER SYMPTOMS AND SIGNS INVOLVING APPEARANCE AND BEHAVIOR: ICD-10-CM

## 2023-03-08 DIAGNOSIS — F03.90 UNSPECIFIED DEMENTIA W/OUT BEHAVIORAL DISTURBANCE: ICD-10-CM

## 2023-03-08 PROCEDURE — 99214 OFFICE O/P EST MOD 30 MIN: CPT | Mod: 95

## 2023-03-08 NOTE — ASSESSMENT
[FreeTextEntry1] : Impression: This 78-year-old male patient has a history of CAD remote coronary bypass hypertension diabetes history of colon cancer and a presentation consistent with dementia and behavioral symptomatology.  His condition and specifically behavior is improved with a combination of sertraline 25 mg at bedtime and Klonopin 0.5 mg 1/2 tablet in the a.m.\par \par Recommendations: Continue sertraline 25 mg at bedtime and Klonopin 0.5 mg 1/2 tablet in the a.m.  Defer addition of donepezil as discussed with the patient's daughter.  Defer brain imaging at this juncture.  In any case he might not cooperate.  Office follow-up as directed.\par

## 2023-03-08 NOTE — HISTORY OF PRESENT ILLNESS
[FreeTextEntry1] : This patient is seen for telehealth visit with her daughter.  He is now taking clonazepam 0.5 mg 1/2 tablet in the morning at 11 AM and sertraline 25 mg at bedtime.  With this combination of medication his behavioral problems have significantly moderated.  He remains confused but he is not agitated.  He needs assistance with ADL though he can get dressed and feed himself and he does sleep through the night.  He has only rare episodes of urinary incontinence.  His cognitive status has not significantly improved in this setting and his short-term memory is markedly impaired.\par \par He had received escitalopram at increasing doses and Xanax and Seroquel which were all either ineffective or produce side effects.

## 2023-03-08 NOTE — PHYSICAL EXAM
[FreeTextEntry1] : This was a telehealth visit and physical exam is therefore limited.  He is seen with his daughter.  He appears alert but he is disoriented and confused.  He did not know the day of the week though he did not know the month he did not know the year or the date.  He follows simple commands.  His pupils appear equal there is no facial weakness.  He moves all 4 extremities and does not appear to be any focal neurological deficit.

## 2023-03-08 NOTE — REASON FOR VISIT
[Home] : at home, [unfilled] , at the time of the visit. [Family Member] : family member [Follow-Up: _____] : a [unfilled] follow-up visit [FreeTextEntry2] : daughter [FreeTextEntry1] : Dementia with behavioral change

## 2023-04-03 ENCOUNTER — APPOINTMENT (OUTPATIENT)
Dept: CARDIOLOGY | Facility: CLINIC | Age: 78
End: 2023-04-03
Payer: MEDICARE

## 2023-04-03 ENCOUNTER — NON-APPOINTMENT (OUTPATIENT)
Age: 78
End: 2023-04-03

## 2023-04-03 VITALS
TEMPERATURE: 64.2 F | BODY MASS INDEX: 19.81 KG/M2 | RESPIRATION RATE: 19 BRPM | HEART RATE: 68 BPM | HEIGHT: 64 IN | WEIGHT: 116 LBS | SYSTOLIC BLOOD PRESSURE: 193 MMHG | DIASTOLIC BLOOD PRESSURE: 87 MMHG | OXYGEN SATURATION: 100 %

## 2023-04-03 VITALS — DIASTOLIC BLOOD PRESSURE: 80 MMHG | SYSTOLIC BLOOD PRESSURE: 160 MMHG

## 2023-04-03 PROCEDURE — 93000 ELECTROCARDIOGRAM COMPLETE: CPT

## 2023-04-03 PROCEDURE — 99215 OFFICE O/P EST HI 40 MIN: CPT

## 2023-04-03 RX ORDER — SERTRALINE HYDROCHLORIDE 50 MG/1
50 TABLET, FILM COATED ORAL
Qty: 30 | Refills: 5 | Status: DISCONTINUED | COMMUNITY
Start: 2021-12-13 | End: 2023-04-03

## 2023-04-03 RX ORDER — DAPAGLIFLOZIN 10 MG/1
10 TABLET, FILM COATED ORAL
Qty: 30 | Refills: 2 | Status: DISCONTINUED | COMMUNITY
Start: 2023-01-25 | End: 2023-04-03

## 2023-04-03 NOTE — OBJECTIVE
REASON FOR VISIT    Blunt injury to the right long finger    REFERRING PROVIDER    Urgent care    HISTORY OF PRESENT ILLNESS    The patient is a 45 year old right handed male  reporting an injury to the right long finger that occurred at work on November 10, 2018.  The patient was using a tight and a wrench and inadvertently slammed the right long finger.  He sustained a bruise and swelling along the dorsal aspect the right long finger.  He sustained a small skin tear along the distal palmar aspect of the right long finger.  The patient was evaluated in urgent care shortly after the injury.  He demonstrated good flexion.  He demonstrated pain with extension at the proximal interphalangeal joint.  He has been in a splint holding all of the joints of the right long finger and neutral.  Swelling and bruising has improved but not completely subsided.  He denies numbness or tingling.    The small wound along the palmar aspect of the right long finger was repaired in urgent care with sutures.  He was given a one-week supply of Keflex which she started taking yesterday.    One prior hand injury which consisted of a right ring finger medical fracture many years ago.    MEDICAL HISTORY    I have reviewed the patient's medical history in the electronic medical record.  The history pertinent to the reported problem is as follows.     No past medical history on file.    SURGICAL HISTORY    I have reviewed the patient's chart.  The patient confirms the surgical history listed below.  The surgical history pertinent to the reported problem is as follows.    No past surgical history on file.    MEDICATIONS    Current Outpatient Medications   Medication   • cephALEXin (KEFLEX) 500 MG capsule     No current facility-administered medications for this visit.        MEDICATION ALLERGIES    ALLERGIES:   Allergen Reactions   • Nsaids SWELLING       LIFESTYLE and HABITS    Tobacco:  No  Exercise:  Yes  Occupation:       PHYSICAL EXAM    General:  The patient is pleasant, cooperative, and comfortable in no apparent distress.  Neurologic:  Alert, oriented to person, place, and time.  Mood is pleasant and appropriate.  Musculoskeletal:  Ambulates without the use of an assistive device.  No obvious weakness or discoordination of the upper or lower extremities    Upper Extremity Exam    The area or concern demonstrates:  The patient has moderate swelling of the right long finger.  He has a short 1 cm long oblique skin tear/laceration of the distal aspect of the palmar right long finger.  He has a small bruise along the dorsal mid aspect of the right long finger.  The palmar wound is nicely proximal middle with nylon sutures.  He has good light touch sensation at the distal volar tip of the long finger when compared to the adjacent fingers.  He prefers to hold the proximal interphalangeal joint joint slightly flexed although passive and active motion at the proximal interphalangeal joint and the distal interphalangeal joint only produces mild amount of discomfort.  His no clicking or popping.  There is no crepitus.  He has very strong extension against resistance at both the PIP and the distal interphalangeal joint.  He has very strong flexion against resistance at the distal interphalangeal joint.  He also has strong flexion at the proximal interphalangeal joint although causes a little bit of pain along the dorsal aspect of the digit.  FDP and FDS function seems to be independent.      No injuries to the adjacent digits.    IMAGING and ANCILLARY STUDIES    I reviewed the x-ray does obtained in urgent care.  It demonstrates a small minimally displaced chip fracture at the dorsal base of the middle phalanx of the right long finger.    ASSESSMENT  · Contusion of right long finger  · Laceration of volar right long finger without evidence of tendon or neurovascular injury  · Closed, minimally displaced avulsion type  fracture at the dorsal base middle phalanx of the right long finger    PATIENT COUNSELING    I counseled the patient about fracture healing, conservative and surgical treatments to promote healing, short and long term outcomes, and potential adverse events associated with treatment of fractures.  The differences between closed and open reduction, casting, percutaneous fixation and internal fixation were discussed.  Given the clinical and radiographic findings, I believe the patient is best served by early gentle range of motion..      The patient will have some swelling, pain, stiffness, and loss of range of motion associated with the fracture and this type of treatment.  The majority of that disability is expected to improve over time with the assistance of hand therapy.  However adverse events can occur which include, but are not confined to non-union, mal-union, complex regional pain syndrome, numbness, hypersensitivity, cold intolerance, loss of mobility or strength, deformity, scarring, infection, dysfunction and need for additional treatments including future surgery.        The patient verbalized understanding and desires to proceed with the proposed treatment.        PLAN    · Activity: Digital splint that immobilizes the proximal interphalangeal joint at neutral extension.   · Return to work: With the splint in place, no lifting, pushing, pulling or grasping more than 5 pounds with the affected hand.  · Wound care will include: Patient should remove the splint to wash the injured areas of soap and water  · Hand therapy:  Not required at this time but might be required in the future  · Medications for today's visit include: Patient should continue his prescription of Keflex  · X-rays:  No new xrays should be necessary at the next visit.  · Return to clinic: in 2 weeks      [History reviewed] : History reviewed. [Medications and Allergies reviewed] : Medications and allergies reviewed.

## 2023-04-03 NOTE — REASON FOR VISIT
[Coronary Artery Disease] : coronary artery disease [Hypertension] : hypertension [Follow-Up - From Hospitalization] : follow-up of a recent hospitalization for [FreeTextEntry2] : feels well s/p h/o anemia, gi bleed, s/p hosp for cholecystectomy 7/2022, elevated bnp c/w chf, saw Dr Quiles as out pt, on lasix, no sob or sscp [FreeTextEntry1] : no cp or sob

## 2023-04-03 NOTE — PHYSICAL EXAM
[General Appearance - Well Developed] : well developed [Normal Appearance] : normal appearance [Well Groomed] : well groomed [General Appearance - Well Nourished] : well nourished [No Deformities] : no deformities [General Appearance - In No Acute Distress] : no acute distress [Normal Conjunctiva] : the conjunctiva exhibited no abnormalities [Eyelids - No Xanthelasma] : the eyelids demonstrated no xanthelasmas [Normal Oral Mucosa] : normal oral mucosa [No Oral Pallor] : no oral pallor [No Oral Cyanosis] : no oral cyanosis [Normal Jugular Venous A Waves Present] : normal jugular venous A waves present [Normal Jugular Venous V Waves Present] : normal jugular venous V waves present [No Jugular Venous Andrea A Waves] : no jugular venous andrea A waves [Respiration, Rhythm And Depth] : normal respiratory rhythm and effort [Exaggerated Use Of Accessory Muscles For Inspiration] : no accessory muscle use [Abdomen Soft] : soft [Abdomen Tenderness] : non-tender [Abdomen Mass (___ Cm)] : no abdominal mass palpated [Abnormal Walk] : normal gait [Gait - Sufficient For Exercise Testing] : the gait was sufficient for exercise testing [Nail Clubbing] : no clubbing of the fingernails [Cyanosis, Localized] : no localized cyanosis [Petechial Hemorrhages (___cm)] : no petechial hemorrhages [Skin Color & Pigmentation] : normal skin color and pigmentation [] : no rash [No Venous Stasis] : no venous stasis [Skin Lesions] : no skin lesions [No Skin Ulcers] : no skin ulcer [No Xanthoma] : no  xanthoma was observed [Oriented To Time, Place, And Person] : oriented to person, place, and time [Affect] : the affect was normal [Mood] : the mood was normal [No Anxiety] : not feeling anxious [Normal Rate] : normal [Normal S1] : normal S1 [Normal S2] : normal S2 [No Murmur] : no murmurs heard [2+] : left 2+ [No Abnormalities] : the abdominal aorta was not enlarged and no bruit was heard [No Pitting Edema] : no pitting edema present [FreeTextEntry1] : bilat dec BS at bases [S3] : no S3 [S4] : no S4 [Right Carotid Bruit] : no bruit heard over the right carotid [Left Carotid Bruit] : no bruit heard over the left carotid [Right Femoral Bruit] : no bruit heard over the right femoral artery [Left Femoral Bruit] : no bruit heard over the left femoral artery

## 2023-04-03 NOTE — DISCUSSION/SUMMARY
[PVCs] : ectopic ventricular beats [Coronary Artery Disease] : coronary artery disease [Stable] : stable [Hypertension] : hypertension [None] : There are no changes in medication management [Congestive Heart Failure] : congestive heart failure [Responding to Treatment] : responding to treatment [Basic Metabolic Panel] : basic metabolic panel [Not Responding to Treatment] : not responding to treatment [Outpatient Evaluation] : outpatient evaluation [Ambulatory BP Monitoring] : ambulatory blood pressure monitoring [Medication Changes Per Orders] : Medication changes are as documented in orders [Low Sodium Diet] : low sodium diet [Minutes Spent: ___] : for [unfilled] ~Uminutes [de-identified] : recent NST with residual inferior ischemia c/w known occluded RCA [de-identified] : take ramipril 5 mg in am [FreeTextEntry2] : daughter present, pharmacisit, reviewed all meds, hosp tests, course, discused cad and chg rx options

## 2023-04-22 ENCOUNTER — RX RENEWAL (OUTPATIENT)
Age: 78
End: 2023-04-22

## 2023-04-22 RX ORDER — FUROSEMIDE 20 MG/1
20 TABLET ORAL DAILY
Qty: 90 | Refills: 1 | Status: ACTIVE | COMMUNITY
Start: 2023-01-25 | End: 1900-01-01

## 2023-04-22 RX ORDER — POTASSIUM CHLORIDE 750 MG/1
10 CAPSULE, EXTENDED RELEASE ORAL
Qty: 90 | Refills: 1 | Status: ACTIVE | COMMUNITY
Start: 2023-01-25 | End: 1900-01-01

## 2023-05-04 ENCOUNTER — INPATIENT (INPATIENT)
Facility: HOSPITAL | Age: 78
LOS: 63 days | Discharge: NURSING, REHAB OR LONG-TERM CARE | DRG: 264 | End: 2023-07-07
Attending: STUDENT IN AN ORGANIZED HEALTH CARE EDUCATION/TRAINING PROGRAM | Admitting: INTERNAL MEDICINE
Payer: MEDICARE

## 2023-05-04 VITALS
HEART RATE: 91 BPM | HEIGHT: 61 IN | RESPIRATION RATE: 18 BRPM | WEIGHT: 134.92 LBS | DIASTOLIC BLOOD PRESSURE: 117 MMHG | OXYGEN SATURATION: 93 % | SYSTOLIC BLOOD PRESSURE: 184 MMHG

## 2023-05-04 DIAGNOSIS — I48.91 UNSPECIFIED ATRIAL FIBRILLATION: ICD-10-CM

## 2023-05-04 DIAGNOSIS — Z90.49 ACQUIRED ABSENCE OF OTHER SPECIFIED PARTS OF DIGESTIVE TRACT: Chronic | ICD-10-CM

## 2023-05-04 LAB
ALBUMIN SERPL ELPH-MCNC: 2.5 G/DL — LOW (ref 3.3–5)
ALP SERPL-CCNC: 123 U/L — HIGH (ref 40–120)
ALT FLD-CCNC: 21 U/L — SIGNIFICANT CHANGE UP (ref 10–45)
ANION GAP SERPL CALC-SCNC: 9 MMOL/L — SIGNIFICANT CHANGE UP (ref 5–17)
APTT BLD: 32 SEC — SIGNIFICANT CHANGE UP (ref 27.5–35.5)
AST SERPL-CCNC: 22 U/L — SIGNIFICANT CHANGE UP (ref 10–40)
BASOPHILS # BLD AUTO: 0.08 K/UL — SIGNIFICANT CHANGE UP (ref 0–0.2)
BASOPHILS NFR BLD AUTO: 0.8 % — SIGNIFICANT CHANGE UP (ref 0–2)
BILIRUB SERPL-MCNC: 0.9 MG/DL — SIGNIFICANT CHANGE UP (ref 0.2–1.2)
BUN SERPL-MCNC: 42 MG/DL — HIGH (ref 7–23)
CALCIUM SERPL-MCNC: 8.9 MG/DL — SIGNIFICANT CHANGE UP (ref 8.4–10.5)
CHLORIDE SERPL-SCNC: 104 MMOL/L — SIGNIFICANT CHANGE UP (ref 96–108)
CO2 SERPL-SCNC: 27 MMOL/L — SIGNIFICANT CHANGE UP (ref 22–31)
CREAT SERPL-MCNC: 1.78 MG/DL — HIGH (ref 0.5–1.3)
D DIMER BLD IA.RAPID-MCNC: 351 NG/ML DDU — HIGH
EGFR: 38 ML/MIN/1.73M2 — LOW
EOSINOPHIL # BLD AUTO: 0.15 K/UL — SIGNIFICANT CHANGE UP (ref 0–0.5)
EOSINOPHIL NFR BLD AUTO: 1.4 % — SIGNIFICANT CHANGE UP (ref 0–6)
GLUCOSE SERPL-MCNC: 186 MG/DL — HIGH (ref 70–99)
HCT VFR BLD CALC: 42.7 % — SIGNIFICANT CHANGE UP (ref 39–50)
HGB BLD-MCNC: 13.9 G/DL — SIGNIFICANT CHANGE UP (ref 13–17)
IMM GRANULOCYTES NFR BLD AUTO: 0.5 % — SIGNIFICANT CHANGE UP (ref 0–0.9)
INR BLD: 1.32 RATIO — HIGH (ref 0.88–1.16)
LYMPHOCYTES # BLD AUTO: 1.47 K/UL — SIGNIFICANT CHANGE UP (ref 1–3.3)
LYMPHOCYTES # BLD AUTO: 14 % — SIGNIFICANT CHANGE UP (ref 13–44)
MAGNESIUM SERPL-MCNC: 1.9 MG/DL — SIGNIFICANT CHANGE UP (ref 1.6–2.6)
MCHC RBC-ENTMCNC: 30.9 PG — SIGNIFICANT CHANGE UP (ref 27–34)
MCHC RBC-ENTMCNC: 32.6 GM/DL — SIGNIFICANT CHANGE UP (ref 32–36)
MCV RBC AUTO: 94.9 FL — SIGNIFICANT CHANGE UP (ref 80–100)
MONOCYTES # BLD AUTO: 1.05 K/UL — HIGH (ref 0–0.9)
MONOCYTES NFR BLD AUTO: 10 % — SIGNIFICANT CHANGE UP (ref 2–14)
NEUTROPHILS # BLD AUTO: 7.67 K/UL — HIGH (ref 1.8–7.4)
NEUTROPHILS NFR BLD AUTO: 73.3 % — SIGNIFICANT CHANGE UP (ref 43–77)
NRBC # BLD: 0 /100 WBCS — SIGNIFICANT CHANGE UP (ref 0–0)
NT-PROBNP SERPL-SCNC: HIGH PG/ML (ref 0–300)
PLATELET # BLD AUTO: 160 K/UL — SIGNIFICANT CHANGE UP (ref 150–400)
POTASSIUM SERPL-MCNC: 3.8 MMOL/L — SIGNIFICANT CHANGE UP (ref 3.5–5.3)
POTASSIUM SERPL-SCNC: 3.8 MMOL/L — SIGNIFICANT CHANGE UP (ref 3.5–5.3)
PROT SERPL-MCNC: 6.7 G/DL — SIGNIFICANT CHANGE UP (ref 6–8.3)
PROTHROM AB SERPL-ACNC: 15.3 SEC — HIGH (ref 10.5–13.4)
RAPID RVP RESULT: SIGNIFICANT CHANGE UP
RBC # BLD: 4.5 M/UL — SIGNIFICANT CHANGE UP (ref 4.2–5.8)
RBC # FLD: 14.1 % — SIGNIFICANT CHANGE UP (ref 10.3–14.5)
SARS-COV-2 RNA SPEC QL NAA+PROBE: SIGNIFICANT CHANGE UP
SODIUM SERPL-SCNC: 140 MMOL/L — SIGNIFICANT CHANGE UP (ref 135–145)
TROPONIN I, HIGH SENSITIVITY RESULT: 131.9 NG/L — HIGH
WBC # BLD: 10.47 K/UL — SIGNIFICANT CHANGE UP (ref 3.8–10.5)
WBC # FLD AUTO: 10.47 K/UL — SIGNIFICANT CHANGE UP (ref 3.8–10.5)

## 2023-05-04 PROCEDURE — 99285 EMERGENCY DEPT VISIT HI MDM: CPT

## 2023-05-04 PROCEDURE — 71045 X-RAY EXAM CHEST 1 VIEW: CPT | Mod: 26

## 2023-05-04 PROCEDURE — 93010 ELECTROCARDIOGRAM REPORT: CPT

## 2023-05-04 PROCEDURE — 99223 1ST HOSP IP/OBS HIGH 75: CPT

## 2023-05-04 RX ORDER — METOPROLOL TARTRATE 50 MG
25 TABLET ORAL DAILY
Refills: 0 | Status: DISCONTINUED | OUTPATIENT
Start: 2023-05-04 | End: 2023-05-07

## 2023-05-04 RX ORDER — DEXTROSE 50 % IN WATER 50 %
12.5 SYRINGE (ML) INTRAVENOUS ONCE
Refills: 0 | Status: DISCONTINUED | OUTPATIENT
Start: 2023-05-04 | End: 2023-05-17

## 2023-05-04 RX ORDER — INSULIN LISPRO 100/ML
VIAL (ML) SUBCUTANEOUS AT BEDTIME
Refills: 0 | Status: DISCONTINUED | OUTPATIENT
Start: 2023-05-05 | End: 2023-05-09

## 2023-05-04 RX ORDER — SODIUM CHLORIDE 9 MG/ML
1000 INJECTION, SOLUTION INTRAVENOUS
Refills: 0 | Status: DISCONTINUED | OUTPATIENT
Start: 2023-05-04 | End: 2023-05-17

## 2023-05-04 RX ORDER — CLONAZEPAM 1 MG
0.5 TABLET ORAL AT BEDTIME
Refills: 0 | Status: DISCONTINUED | OUTPATIENT
Start: 2023-05-05 | End: 2023-05-07

## 2023-05-04 RX ORDER — SERTRALINE 25 MG/1
25 TABLET, FILM COATED ORAL DAILY
Refills: 0 | Status: DISCONTINUED | OUTPATIENT
Start: 2023-05-04 | End: 2023-05-09

## 2023-05-04 RX ORDER — POTASSIUM CHLORIDE 20 MEQ
10 PACKET (EA) ORAL DAILY
Refills: 0 | Status: DISCONTINUED | OUTPATIENT
Start: 2023-05-04 | End: 2023-05-10

## 2023-05-04 RX ORDER — GLUCAGON INJECTION, SOLUTION 0.5 MG/.1ML
1 INJECTION, SOLUTION SUBCUTANEOUS ONCE
Refills: 0 | Status: DISCONTINUED | OUTPATIENT
Start: 2023-05-04 | End: 2023-05-11

## 2023-05-04 RX ORDER — NITROGLYCERIN 6.5 MG
0.4 CAPSULE, EXTENDED RELEASE ORAL ONCE
Refills: 0 | Status: DISCONTINUED | OUTPATIENT
Start: 2023-05-04 | End: 2023-05-04

## 2023-05-04 RX ORDER — DEXTROSE 50 % IN WATER 50 %
15 SYRINGE (ML) INTRAVENOUS ONCE
Refills: 0 | Status: DISCONTINUED | OUTPATIENT
Start: 2023-05-04 | End: 2023-05-11

## 2023-05-04 RX ORDER — DEXTROSE 50 % IN WATER 50 %
25 SYRINGE (ML) INTRAVENOUS ONCE
Refills: 0 | Status: DISCONTINUED | OUTPATIENT
Start: 2023-05-04 | End: 2023-05-17

## 2023-05-04 RX ORDER — FUROSEMIDE 40 MG
40 TABLET ORAL ONCE
Refills: 0 | Status: COMPLETED | OUTPATIENT
Start: 2023-05-05 | End: 2023-05-05

## 2023-05-04 RX ORDER — ACETAMINOPHEN 500 MG
650 TABLET ORAL EVERY 6 HOURS
Refills: 0 | Status: DISCONTINUED | OUTPATIENT
Start: 2023-05-04 | End: 2023-05-09

## 2023-05-04 RX ORDER — FUROSEMIDE 40 MG
40 TABLET ORAL ONCE
Refills: 0 | Status: COMPLETED | OUTPATIENT
Start: 2023-05-04 | End: 2023-05-04

## 2023-05-04 RX ORDER — INSULIN LISPRO 100/ML
VIAL (ML) SUBCUTANEOUS
Refills: 0 | Status: DISCONTINUED | OUTPATIENT
Start: 2023-05-04 | End: 2023-05-09

## 2023-05-04 RX ORDER — CLONAZEPAM 1 MG
0.25 TABLET ORAL
Refills: 0 | Status: DISCONTINUED | OUTPATIENT
Start: 2023-05-04 | End: 2023-05-09

## 2023-05-04 RX ORDER — ISOSORBIDE MONONITRATE 60 MG/1
60 TABLET, EXTENDED RELEASE ORAL DAILY
Refills: 0 | Status: DISCONTINUED | OUTPATIENT
Start: 2023-05-04 | End: 2023-05-09

## 2023-05-04 RX ORDER — DAPAGLIFLOZIN 10 MG/1
10 TABLET, FILM COATED ORAL EVERY 24 HOURS
Refills: 0 | Status: DISCONTINUED | OUTPATIENT
Start: 2023-05-04 | End: 2023-05-06

## 2023-05-04 RX ORDER — CLONAZEPAM 1 MG
0.5 TABLET ORAL ONCE
Refills: 0 | Status: DISCONTINUED | OUTPATIENT
Start: 2023-05-04 | End: 2023-05-04

## 2023-05-04 RX ORDER — ASPIRIN/CALCIUM CARB/MAGNESIUM 324 MG
81 TABLET ORAL DAILY
Refills: 0 | Status: DISCONTINUED | OUTPATIENT
Start: 2023-05-04 | End: 2023-05-11

## 2023-05-04 RX ADMIN — Medication 0.5 MILLIGRAM(S): at 22:49

## 2023-05-04 RX ADMIN — Medication 40 MILLIGRAM(S): at 21:49

## 2023-05-04 RX ADMIN — Medication 0.5 MILLIGRAM(S): at 21:48

## 2023-05-04 NOTE — ED PROVIDER NOTE - PHYSICAL EXAMINATION
VITAL SIGNS: I have reviewed nursing notes and confirm.   GEN: Well-developed; well-nourished; in no acute distress. Speaking full sentences.  SKIN: Warm, pink, no rash, no diaphoresis, no cyanosis, well perfused.   HEAD: Normocephalic; atraumatic. No scalp lacerations, no abrasions.  NECK: Supple; non tender.   EYES: Pupils 3mm equal, round, reactive to light and accomodation, conjunctiva and sclera clear. Extra-ocular movements intact bilaterally.  ENT: No nasal discharge; airway clear. Trachea is midline. ORAL: No oropharyngeal exudates or erythema. Normal dentition.  CV: Regular rate and rhythm. S1, S2 normal; no murmurs, gallops, or rubs. No lower extremity pitting edema bilaterally. Capillary refill < 2 seconds throughout. Distal pulses intact 2+ throughout.  RESP: (+) bibasilar crackles, mild.  ABD: Normal bowel sounds, soft, non-distended, non-tender, no rebound, no guarding, no rigidity, no hepatosplenomegaly. No CVA tenderness bilaterally.  MSK: Normal range of motion and movement of all 4 extremities. No joint or muscular pain throughout. No clubbing.   BACK: No thoracolumbar midline or paravertebral tenderness. No step-offs or obvious deformities.  NEURO: Alert & oriented x 3, Grossly unremarkable. Sensory and motor intact throughout. No focal deficits. Normal speech and coordination.   PSYCH: Cooperative, appropriate.

## 2023-05-04 NOTE — ED ADULT NURSE REASSESSMENT NOTE - NS ED NURSE REASSESS COMMENT FT1
Tutu placed in hospital bed. Patient attempting to remove oxygen and cardiac leads. MD made aware. Order for Ativan 0.5mg. Family at bedside. Patient placed in hospital bed. Patient attempting to remove oxygen and cardiac leads. MD made aware. Order for Ativan 0.5mg. Family at bedside.

## 2023-05-04 NOTE — H&P ADULT - ASSESSMENT
78 year old M w/HTN, Cardiomyopathy, chronic systolic CHF, DM2, Dementia, sent to the ED by PMD because of increased dyspnea, leg edema x past 2 days.  Per ED, daughter states that PMD noted pt to have new afib on EKG.  Pt is confused, demented (baseline) and unable to provide a reliable history.  There was no report of  chest pain, cough, fever chills, vomiting, diarrhea.   1st trop is 131.  EKG here showed HR of 86, SR w/ APCs (similar to previous 2022). Cxray c/w CHF (venous congestion, bilat pl effusions)  Pt's last echo was from 07/2022 w/c reported LVEF of  40 to 45%.. Increased LV wall thickness.. Moderately reduced RV systolic function. Moderately enlarged left atrium.. Moderate mitral valve regurgitation.Mild tricuspid regurgitation.    Acute on chronic systolic CHF, valvular heart dse (MR)  Elev trop -  ?NSTEMI vs demand ischemia  ?PAF -  chadsvasc score of 5, EKG here shows SR w/ APCs  Pt w/ HTN, cardiomyopathy, DM2, CKD    Admit- telemetry  Cont diuresis - Lasix 40 mg IV now and tomorrow AM  then would cont w/ Lasix 40 mg/day  Cont other meds: ASA, Farxiga, Toprol, Imdur, KCl  Cont Clonazepma, Sertraline  Monitor FS, SSinsulin  FFup labs,, trend trops  Echo ordered  Cardio consult- Dr Dennis 78 year old M w/HTN, Cardiomyopathy, chronic systolic CHF, DM2, Dementia, sent to the ED by PMD because of increased dyspnea, leg edema x past 2 days.  Per ED, daughter states that PMD noted pt to have new afib on EKG.  Pt is confused, demented (baseline) and unable to provide a reliable history.  There was no report of  chest pain, cough, fever chills, vomiting, diarrhea.   1st trop is 131.  EKG here showed HR of 86, SR w/ APCs (similar to previous 2022). Cxray c/w CHF (venous congestion, bilat pl effusions)  Pt's last echo was from 07/2022 w/c reported LVEF of  40 to 45%.. Increased LV wall thickness.. Moderately reduced RV systolic function. Moderately enlarged left atrium.. Moderate mitral valve regurgitation.Mild tricuspid regurgitation.    Acute on chronic systolic CHF, valvular heart dse (MR)  Elev trop -  ?NSTEMI vs demand ischemia  ?PAF -  chadsvasc score of 5, EKG here shows SR w/ APCs  ANISH   Pt w/ HTN, cardiomyopathy, DM2    Admit- telemetry  Cont diuresis - Lasix 40 mg IV now and tomorrow AM  then would cont w/ Lasix 40 mg/day  Cont other meds: ASA, Farxiga, Toprol, Imdur, KCl  Cont Clonazepma, Sertraline  Monitor FS, SSinsulin  FFup labs,, trend trops  Echo, leg doplers ordered  Renal sono  Cardio consult- Dr Dennis -notified

## 2023-05-04 NOTE — ED PROVIDER NOTE - CLINICAL SUMMARY MEDICAL DECISION MAKING FREE TEXT BOX
Patient presents with signs and symptoms consistent with an acute exacerbation of chronic CHF, likely due to new afib. Differential diagnosis: alternate cardiopulmonary causes (i.e. ischemia, PE, pneumothorax, pneumonia), other causes of dyspnea (i.e. asthma/reactive airway disease, COPD, flash pulmonary edema, cardiac dysrhythmia); but these are less likely given the history, presentation, and physical exam.  PLAN:  - CBC, CMP, Mg/Phos/Ca, Lactate, Troponin, Pro-BNP, COVID-19 rule out, EKG, CXR  -  IV diuresis with furosemide, Electrolyte repletion PRN, Supplemental oxygen PRN, BIPAP if worsening respiratory status.  - paged dr kidd cardiology consult, Requires admission for IV diuretics and medical optimization.  - consulted dr pate start on renal dose lovenox and AM consult.

## 2023-05-04 NOTE — ED PROVIDER NOTE - OBJECTIVE STATEMENT
78M PMHx of DM, colon Ca s/p resection, dementia, CHF, CAD CABG, htn, hld, presenting with shortness of breath, and new onset atrial fibrillation from PMD today. Otherwise patient is poor historian. Daughter at bedside. Reporting shortness of breath, x 2 days. Today visited PMD and found to be in new onset AFIB. Denies any chest pain, abdominal pain, shortness of breath, nausea/vomiting, headaches, fevers, chills, diarrhea ,constipation, weakness, syncope, hematuria, dysuria, urinary symptoms, subjective neurological deficits.

## 2023-05-04 NOTE — ED PROVIDER NOTE - CARE PLAN
1 Principal Discharge DX:	New onset atrial fibrillation  Secondary Diagnosis:	Acute on chronic systolic congestive heart failure

## 2023-05-04 NOTE — ED ADULT NURSE REASSESSMENT NOTE - NS ED NURSE REASSESS COMMENT FT1
Patient noted to be agitated, removing IV and removing oxygen. Red sock placed. Posey placed on patient. Family at bedside.

## 2023-05-04 NOTE — ED ADULT NURSE NOTE - OBJECTIVE STATEMENT
Patient came from [facility] with complaint irregular heartbeat. Patient is Croatian speaking. Patient has SOB for the past two days. Noted on RA 88%, Placed O2 2L via nasal cannula. Patient denies any nausea, vomit, fever, chills or headache. PMH of DM and HTN.

## 2023-05-04 NOTE — H&P ADULT - NSHPPHYSICALEXAM_GEN_ALL_CORE
Vital Signs (24 Hrs):  HR: 91 (05-04-23 @ 19:21) (91 - 91)  BP: 184/117 (05-04-23 @ 19:21) (184/117 - 184/117)  RR: 18 (05-04-23 @ 19:21) (18 - 18)  SpO2: 93% (05-04-23 @ 19:21) (93% - 93%)  Daily Height in cm: 154.94 (04 May 2023 19:21)

## 2023-05-04 NOTE — H&P ADULT - HISTORY OF PRESENT ILLNESS
Pt's daughter states Pt was sent by Dr. Parra for new onset afib and dementia.  irregular heartbeat   Pt's daughter states Pt was sent by Dr. Parra for new onset afib and dementia.  irregular heartbeat  Echo 07/2022. Left ventricular ejection fraction, by visual estimation, is 40 to 45%.   2. Mildly to moderately decreased segmental left ventricular systolic function.   3. Increased LV wall thickness.   4. Normal left ventricular internal cavity size.   5. There is mild septal left ventricular hypertrophy.   6. Moderately reduced RV systolic function.   7. Moderately enlarged left atrium.   8. Right atrial enlargement.   9. Moderate mitral valve regurgitation.  10. Thickening and calcification of the anterior and posterior mitral valve leaflets.  11. Mild tricuspid regurgitation.  12. Sclerotic aortic valve with normal opening.  13. Estimated pulmonary artery systolic pressure is 36.9 mmHg assuming a right atrial pressure of 8 mmHg, which is consistent with borderline pulmonary hypertension.   Pt's daughter states Pt was sent by Dr. Parra for new onset afib and dementia.  irregular heartbeat  78 year old M PMH DM (not on insulin), colon CA s/p resection, HTN, HLD,   dementia coming to the office ED for evaluation of heart failure along with edema and weight loss. A nondedicated CTA demonstrates cardiomegaly and atherosclerotic changes of the coronaries  He is status post open cholecystectomy 7/14/22.  Echo 07/2022. Left ventricular ejection fraction, by visual estimation, is 40 to 45%.   2. Mildly to moderately decreased segmental left ventricular systolic function.   3. Increased LV wall thickness.   4. Normal left ventricular internal cavity size.   5. There is mild septal left ventricular hypertrophy.   6. Moderately reduced RV systolic function.   7. Moderately enlarged left atrium.   8. Right atrial enlargement.   9. Moderate mitral valve regurgitation.  10. Thickening and calcification of the anterior and posterior mitral valve leaflets.  11. Mild tricuspid regurgitation.  12. Sclerotic aortic valve with normal opening.  13. Estimated pulmonary artery systolic pressure is 36.9 mmHg assuming a right atrial pressure of 8 mmHg, which is consistent with borderline pulmonary hypertension.   Pt's daughter states Pt was sent by Dr. Parra for new onset afib and dementia.  irregular heartbeat  78 year old M PMH DM (not on insulin), colon CA s/p resection, HTN, HLD,   dementia coming to the office ED for evaluation of heart failure along with edema and weight loss. A nondedicated CTA demonstrates cardiomegaly and atherosclerotic changes of the coronaries  He is status post open cholecystectomy 7/14/22.  Echo 07/2022. Left ventricular ejection fraction, by visual estimation, is 40 to 45%.   2. Mildly to moderately decreased segmental left ventricular systolic function.   3. Increased LV wall thickness.   4. Normal left ventricular internal cavity size.   5. There is mild septal left ventricular hypertrophy.   6. Moderately reduced RV systolic function.   7. Moderately enlarged left atrium.   8. Right atrial enlargement.   9. Moderate mitral valve regurgitation.  10. Thickening and calcification of the anterior and posterior mitral valve leaflets.  11. Mild tricuspid regurgitation.  12. Sclerotic aortic valve with normal opening.  13. Estimated pulmonary artery systolic pressure is 36.9 mmHg assuming a right atrial pressure of 8 mmHg, which is consistent with borderline pulmonary hypertension.    amLODIPine Besylate 5 MG Oral Tablet; TAKE 1 TABLET BY MOUTH EVERY DAY   · Aspirin Low Dose 81 MG TABS; TAKE 1 TABLET DAILY   · clonazePAM 0.5 MG Oral Tablet; 1 TABLET  DAILY MDD:1.0 MG   · Escitalopram Oxalate 20 MG Oral Tablet; TAKE 1 TABLET DAILY   · Farxiga 10 MG Oral Tablet; a tab daily forchf   · Furosemide 20 MG Oral Tablet; TAKE 1 TABLET DAILY   · glipiZIDE 5 MG Oral Tablet   · Isosorbide Mononitrate ER 60 MG Oral Tablet Extended Release 24 Hour; TAKE 1  TABLET BY MOUTH EVERY DAY   · Metoprolol Succinate ER 25 MG Oral Tablet Extended Release 24 Hour; TAKE 1 TABLET  BY MOUTH EVERY 12 HOURS   · Potassium Chloride ER 10 MEQ Oral Capsule Extended Release; TAKE 1 CAPSULE BY  MOUTH DAILY   · Ramipril 10 MG Oral Capsule; TAKE 1 CAPSULE BY MOUTH EVERY DAY   · Ramipril 10 MG Oral Capsule; TAKE 1 CAPSULE ONCE DAILY   · Sertraline HCl - 25 MG Oral Tablet; TAKE 1 to 2 TABLETS DAILY as directed   · Sertraline HCl - 50 MG Oral Tablet; 1/2 to 1 tablet daily as directed   78 year old M w/HTN, Cardiomyopathy, chronic systolic CHF, DM2, Dementia, sent to the ED by PMD because of increased dyspnea, leg edema x past 2 days.  Per ED, daughter states that PMD noted pt to have new afib on EKG.  Pt is confused, demented (baseline) and unable to provide a reliable history.  There was no report of  chest pain, cough, fever chills, vomiting, diarrhea.   1st trop is 131.  EKG showed HR of 86, SR w/ APCs. Cxray c/w CHF (venous congestion, bilat pl effusions)  Pt's last echo was from 07/2022 w/c reported LVEF of  40 to 45%.. Increased LV wall thickness.. Moderately reduced RV systolic function. Moderately enlarged left atrium.. Moderate mitral valve regurgitation.Mild tricuspid regurgitation.

## 2023-05-04 NOTE — ED ADULT NURSE NOTE - NSINTERVENTIONOPT_GEN_ALL_ED
Patient requesting refill: HCTZ and metformin   Last OV: 9/13/21  Next OV: 5/3/22  Last refill: 1/18/22 and  10/28/21   Last labs: 6/1/21    Can be refilled per protocol.     
Pharmacy faxed request for medication refill.    Preferred pharmacy set up and verified  
Hourly Rounding

## 2023-05-04 NOTE — ED ADULT TRIAGE NOTE - HEART RATE (BEATS/MIN)
Ached out to pt lily at [facility] with questions in regard to scheduling MRI's with sedation. I left a voice with the pt's information and our number.     Her ext is [phone]  
[phone]

## 2023-05-05 LAB
A1C WITH ESTIMATED AVERAGE GLUCOSE RESULT: 7.7 % — HIGH (ref 4–5.6)
ANION GAP SERPL CALC-SCNC: 6 MMOL/L — SIGNIFICANT CHANGE UP (ref 5–17)
BUN SERPL-MCNC: 39 MG/DL — HIGH (ref 7–23)
CALCIUM SERPL-MCNC: 9 MG/DL — SIGNIFICANT CHANGE UP (ref 8.4–10.5)
CHLORIDE SERPL-SCNC: 105 MMOL/L — SIGNIFICANT CHANGE UP (ref 96–108)
CO2 BLDA-SCNC: 30 MMOL/L — HIGH (ref 19–24)
CO2 SERPL-SCNC: 31 MMOL/L — SIGNIFICANT CHANGE UP (ref 22–31)
CREAT SERPL-MCNC: 1.7 MG/DL — HIGH (ref 0.5–1.3)
EGFR: 40 ML/MIN/1.73M2 — LOW
ESTIMATED AVERAGE GLUCOSE: 174 MG/DL — HIGH (ref 68–114)
GAS PNL BLDA: SIGNIFICANT CHANGE UP
GLUCOSE BLDC GLUCOMTR-MCNC: 106 MG/DL — HIGH (ref 70–99)
GLUCOSE BLDC GLUCOMTR-MCNC: 134 MG/DL — HIGH (ref 70–99)
GLUCOSE BLDC GLUCOMTR-MCNC: 164 MG/DL — HIGH (ref 70–99)
GLUCOSE BLDC GLUCOMTR-MCNC: 206 MG/DL — HIGH (ref 70–99)
GLUCOSE SERPL-MCNC: 144 MG/DL — HIGH (ref 70–99)
HCT VFR BLD CALC: 45.4 % — SIGNIFICANT CHANGE UP (ref 39–50)
HGB BLD-MCNC: 14.5 G/DL — SIGNIFICANT CHANGE UP (ref 13–17)
HOROWITZ INDEX BLDA+IHG-RTO: SIGNIFICANT CHANGE UP
MCHC RBC-ENTMCNC: 30.6 PG — SIGNIFICANT CHANGE UP (ref 27–34)
MCHC RBC-ENTMCNC: 31.9 GM/DL — LOW (ref 32–36)
MCV RBC AUTO: 95.8 FL — SIGNIFICANT CHANGE UP (ref 80–100)
NRBC # BLD: 0 /100 WBCS — SIGNIFICANT CHANGE UP (ref 0–0)
PCO2 BLDA: 48 MMHG — SIGNIFICANT CHANGE UP (ref 35–48)
PH BLDA: 7.38 — SIGNIFICANT CHANGE UP (ref 7.35–7.45)
PLATELET # BLD AUTO: 157 K/UL — SIGNIFICANT CHANGE UP (ref 150–400)
PO2 BLDA: 96 MMHG — SIGNIFICANT CHANGE UP (ref 83–108)
POTASSIUM SERPL-MCNC: 3.5 MMOL/L — SIGNIFICANT CHANGE UP (ref 3.5–5.3)
POTASSIUM SERPL-SCNC: 3.5 MMOL/L — SIGNIFICANT CHANGE UP (ref 3.5–5.3)
RBC # BLD: 4.74 M/UL — SIGNIFICANT CHANGE UP (ref 4.2–5.8)
RBC # FLD: 14.2 % — SIGNIFICANT CHANGE UP (ref 10.3–14.5)
SAO2 % BLDA: 98.3 % — HIGH (ref 94–98)
SODIUM SERPL-SCNC: 142 MMOL/L — SIGNIFICANT CHANGE UP (ref 135–145)
TROPONIN I, HIGH SENSITIVITY RESULT: 122.8 NG/L — HIGH
TROPONIN I, HIGH SENSITIVITY RESULT: 132.5 NG/L — HIGH
WBC # BLD: 8.07 K/UL — SIGNIFICANT CHANGE UP (ref 3.8–10.5)
WBC # FLD AUTO: 8.07 K/UL — SIGNIFICANT CHANGE UP (ref 3.8–10.5)

## 2023-05-05 PROCEDURE — 93970 EXTREMITY STUDY: CPT | Mod: 26

## 2023-05-05 PROCEDURE — 93306 TTE W/DOPPLER COMPLETE: CPT | Mod: 26

## 2023-05-05 PROCEDURE — 99223 1ST HOSP IP/OBS HIGH 75: CPT

## 2023-05-05 PROCEDURE — 99233 SBSQ HOSP IP/OBS HIGH 50: CPT

## 2023-05-05 PROCEDURE — 76775 US EXAM ABDO BACK WALL LIM: CPT | Mod: 26

## 2023-05-05 RX ORDER — OLANZAPINE 15 MG/1
2.5 TABLET, FILM COATED ORAL ONCE
Refills: 0 | Status: COMPLETED | OUTPATIENT
Start: 2023-05-05 | End: 2023-05-05

## 2023-05-05 RX ORDER — AMLODIPINE BESYLATE 2.5 MG/1
1 TABLET ORAL
Qty: 0 | Refills: 0 | DISCHARGE

## 2023-05-05 RX ORDER — ENOXAPARIN SODIUM 100 MG/ML
40 INJECTION SUBCUTANEOUS EVERY 24 HOURS
Refills: 0 | Status: DISCONTINUED | OUTPATIENT
Start: 2023-05-05 | End: 2023-05-09

## 2023-05-05 RX ORDER — DAPAGLIFLOZIN 10 MG/1
1 TABLET, FILM COATED ORAL
Refills: 0 | DISCHARGE

## 2023-05-05 RX ORDER — FUROSEMIDE 40 MG
20 TABLET ORAL
Refills: 0 | Status: DISCONTINUED | OUTPATIENT
Start: 2023-05-05 | End: 2023-05-07

## 2023-05-05 RX ORDER — POTASSIUM CHLORIDE 20 MEQ
20 PACKET (EA) ORAL ONCE
Refills: 0 | Status: DISCONTINUED | OUTPATIENT
Start: 2023-05-05 | End: 2023-05-06

## 2023-05-05 RX ADMIN — SERTRALINE 25 MILLIGRAM(S): 25 TABLET, FILM COATED ORAL at 12:30

## 2023-05-05 RX ADMIN — Medication 20 MILLIGRAM(S): at 17:09

## 2023-05-05 RX ADMIN — ENOXAPARIN SODIUM 40 MILLIGRAM(S): 100 INJECTION SUBCUTANEOUS at 12:30

## 2023-05-05 RX ADMIN — Medication 81 MILLIGRAM(S): at 12:31

## 2023-05-05 RX ADMIN — ISOSORBIDE MONONITRATE 60 MILLIGRAM(S): 60 TABLET, EXTENDED RELEASE ORAL at 12:30

## 2023-05-05 RX ADMIN — Medication 0.5 MILLIGRAM(S): at 20:54

## 2023-05-05 RX ADMIN — Medication 0.25 MILLIGRAM(S): at 17:12

## 2023-05-05 RX ADMIN — Medication 4: at 17:05

## 2023-05-05 RX ADMIN — Medication 25 MILLIGRAM(S): at 05:37

## 2023-05-05 RX ADMIN — DAPAGLIFLOZIN 10 MILLIGRAM(S): 10 TABLET, FILM COATED ORAL at 17:08

## 2023-05-05 RX ADMIN — Medication 10 MILLIEQUIVALENT(S): at 13:11

## 2023-05-05 RX ADMIN — Medication 40 MILLIGRAM(S): at 06:29

## 2023-05-05 NOTE — ED ADULT NURSE REASSESSMENT NOTE - NSFALLRSKASSESSDT_ED_ALL_ED
04-May-2023 23:01
04-May-2023 23:02
04-May-2023 23:03
05-May-2023 15:07
05-May-2023 09:19
05-May-2023 15:37

## 2023-05-05 NOTE — ED ADULT NURSE REASSESSMENT NOTE - NS ED NURSE REASSESS COMMENT FT1
VS reviewed with Dr. Henriquez prior to transport, pt calm in upright position, ABG WNL. Update RN Rojelio upon drop off to unit

## 2023-05-05 NOTE — CONSULT NOTE ADULT - ASSESSMENT
80 year old man referred to ED by primary MD office due to concern for increasing dyspnea, LE edema for few days prior to admission.   Elevated blood sugar at home.   Also concern for possible atrial fibrillation, though since being in hospital .. EKG and telemetry demonstrating sinus rhythm with APCs  Cardiac history includes CAD , s/p remote CABG, cardiomyopathy.   Medical issues of diabetes, moderate memory impairment  Echo with mild moderate LV dysfunction and moderate MR .. similar to echo done July 2022  BNP is elevated consistent with CHF exacerbation.  Elevated troponin likely due to demand ischemia   Recently abnormal pharmacologic nuclear stress   Elevated BUN and Cr   Suspect acute on chronic combined systolic and diastolic dysfunction.  No current evidence of atrial fibrillation     Plan  - continue telemetry and daily EKGs to watch for AF -  if AF found will need to consider anticoagulation   - if possible, please obtain EKG done at primary care office  - careful diuresis   - continue metoprolol succinate   - continue Farxiga   - monitor labs .. if renal function stabilizes, consider ARNI, ACE-I or ARB   - will sign out to Cardiology team to follow tomorrow     discussed with patient's daughter and Medicine team

## 2023-05-05 NOTE — CONSULT NOTE ADULT - SUBJECTIVE AND OBJECTIVE BOX
Time of visit:    CHIEF COMPLAINT: Patient is a 78y old  Male who presents with a chief complaint of acute systolic CHF (05 May 2023 10:20)      HPI:  78 year old M w/HTN, Cardiomyopathy, chronic systolic CHF, DM2, Dementia, sent to the ED by PMD because of increased dyspnea, leg edema x past 2 days.  Per ED, daughter states that PMD noted pt to have new afib on EKG.  Pt is confused, demented (baseline) and unable to provide a reliable history.  There was no report of  chest pain, cough, fever chills, vomiting, diarrhea.   1st trop is 131.  EKG showed HR of 86, SR w/ APCs. Cxray c/w CHF (venous congestion, bilat pl effusions)  Pt's last echo was from 07/2022 w/c reported LVEF of  40 to 45%.. Increased LV wall thickness.. Moderately reduced RV systolic function. Moderately enlarged left atrium.. Moderate mitral valve regurgitation.Mild tricuspid regurgitation.     (04 May 2023 21:17)   Patient seen and examined.     PAST MEDICAL & SURGICAL HISTORY:  HTN (hypertension)      HLD (hyperlipidemia)      Diabetes      CAD (coronary artery disease)      History of cholecystectomy          Allergies    sulfa drugs (Unknown)    Intolerances        MEDICATIONS  (STANDING):  aspirin enteric coated 81 milliGRAM(s) Oral daily  clonazePAM  Tablet 0.5 milliGRAM(s) Oral at bedtime  dapagliflozin 10 milliGRAM(s) Oral every 24 hours  dextrose 5%. 1000 milliLiter(s) (100 mL/Hr) IV Continuous <Continuous>  dextrose 5%. 1000 milliLiter(s) (50 mL/Hr) IV Continuous <Continuous>  dextrose 50% Injectable 25 Gram(s) IV Push once  dextrose 50% Injectable 12.5 Gram(s) IV Push once  dextrose 50% Injectable 25 Gram(s) IV Push once  enoxaparin Injectable 40 milliGRAM(s) SubCutaneous every 24 hours  glucagon  Injectable 1 milliGRAM(s) IntraMuscular once  insulin lispro (ADMELOG) corrective regimen sliding scale   SubCutaneous at bedtime  insulin lispro (ADMELOG) corrective regimen sliding scale   SubCutaneous three times a day before meals  isosorbide   mononitrate ER Tablet (IMDUR) 60 milliGRAM(s) Oral daily  metoprolol succinate ER 25 milliGRAM(s) Oral daily  potassium chloride    Tablet ER 10 milliEquivalent(s) Oral daily  sertraline 25 milliGRAM(s) Oral daily      MEDICATIONS  (PRN):  acetaminophen     Tablet .. 650 milliGRAM(s) Oral every 6 hours PRN Temp greater or equal to 38C (100.4F), Mild Pain (1 - 3)  clonazePAM  Tablet 0.25 milliGRAM(s) Oral two times a day PRN agitation  dextrose Oral Gel 15 Gram(s) Oral once PRN Blood Glucose LESS THAN 70 milliGRAM(s)/deciliter   Medications up to date at time of exam.    Medications up to date at time of exam.    FAMILY HISTORY:  No pertinent family history in first degree relatives        SOCIAL HISTORY pat is confused    Smoking History: [   ] smoking/smoke exposure, [   ] former smoker  Living Condition: [   ] apartment, [   ] private house  Work History:   Travel History: denies recent travel  Illicit Substance Use: denies  Alcohol Use: denies    REVIEW OF SYSTEMS: pat is confused . supp info as per EMR     CONSTITUTIONAL:  denies fevers, chills, sweats, weight loss    HEENT:  denies diplopia or blurred vision, sore throat or runny nose.    CARDIOVASCULAR:  denies pressure, squeezing, tightness, or heaviness about the chest; no palpitations.    RESPIRATORY:  denies SOB, cough, TAY, wheezing.    GASTROINTESTINAL:  denies abdominal pain, nausea, vomiting or diarrhea.    GENITOURINARY: denies dysuria, frequency or urgency.    NEUROLOGIC:  denies numbness, tingling, seizures or weakness.    PSYCHIATRIC:  denies disorder of thought or mood.    MSK: denies swelling, redness      PHYSICAL EXAMINATION:    GENERAL: The patient is a thin elderly man lying in bed comfortable      Vital Signs Last 24 Hrs  T(C): 36.4 (05 May 2023 15:47), Max: 36.6 (05 May 2023 13:51)  T(F): 97.5 (05 May 2023 15:47), Max: 97.9 (05 May 2023 13:51)  HR: 88 (05 May 2023 15:47) (85 - 95)  BP: 147/73 (05 May 2023 15:47) (141/86 - 184/117)  BP(mean): --  RR: 18 (05 May 2023 15:47) (17 - 34)  SpO2: 98% (05 May 2023 15:47) (88% - 99%)    Parameters below as of 05 May 2023 15:47  Patient On (Oxygen Delivery Method): nasal cannula  O2 Flow (L/min): 3     (if applicable)    Chest Tube (if applicable)    HEENT: Head is normocephalic and atraumatic. Extraocular muscles are intact. Mucous membranes are moist.     NECK: Supple, no palpable adenopathy.    LUNGS: Clear to auscultation, no wheezing, rales, or rhonchi.    HEART: Regular rate and rhythm without murmur.    ABDOMEN: Soft, nontender, and nondistended.  No hepatosplenomegaly is noted.    RENAL: No difficulty voiding, no pelvic pain    EXTREMITIES: Without any cyanosis, clubbing, rash, lesions or edema.    NEUROLOGIC: Awake, AAOx 1 to self     SKIN: Warm, dry, good turgor.      LABS:                        14.5   8.07  )-----------( 157      ( 05 May 2023 08:00 )             45.4     05-05    142  |  105  |  39<H>  ----------------------------<  144<H>  3.5   |  31  |  1.70<H>    Ca    9.0      05 May 2023 08:00  Mg     1.9     05-04    TPro  6.7  /  Alb  2.5<L>  /  TBili  0.9  /  DBili  x   /  AST  22  /  ALT  21  /  AlkPhos  123<H>  05-04    PT/INR - ( 04 May 2023 19:50 )   PT: 15.3 sec;   INR: 1.32 ratio         PTT - ( 04 May 2023 19:50 )  PTT:32.0 sec    ABG - ( 05 May 2023 15:00 )  pH, Arterial: 7.38  pH, Blood: x     /  pCO2: 48    /  pO2: 96    / HCO3: x     / Base Excess: x     /  SaO2: 98.3                  D-Dimer Assay, Quantitative: 351 ng/mL DDU (05-04-23 @ 19:50)            MICROBIOLOGY: (if applicable)    RADIOLOGY & ADDITIONAL STUDIES:  EKG:   CXR:  < from: Xray Chest 1 View- PORTABLE-Urgent (Xray Chest 1 View- PORTABLE-Urgent .) (05.04.23 @ 19:50) >    ACC: [id number] EXAM:  XR CHEST PORTABLE URGENT 1V   ORDERED BY: ALEX HILLIARD DATE:  05/04/2023          INTERPRETATION:  Clinical history: 78-year-old male, rule out pneumonia.    Portable/expiratory view of the chest is compared to 3/2/2017.    FINDINGS: Mild cardiomegaly and mild pulmonary venous congestion with no   pneumothorax or acute osseous finding.    Moderate bilateral perihilar interstitial infiltrates.    Small right pleural effusion.    IMPRESSION:    Moderate bilateral perihilar interstitial infiltrates, new.    Small right pleural effusion, new    --- End of Report ---            GALLO OCOPER DO; Attending Radiologist  This document has been electronically signed. May  5 2023 11:17AM    < end of copied text >  ECHO:    IMPRESSION: 78y Male PAST MEDICAL & SURGICAL HISTORY:  HTN (hypertension)      HLD (hyperlipidemia)      Diabetes      CAD (coronary artery disease)      History of cholecystectomy       p/w         IMP: This is an 78 year old man with HTN, Cardiomyopathy, chronic systolic CHF, DM2, Dementia, sent to the ED by PMD because of increased dyspnea, leg edema x past 2 days.  Per ED, daughter states that PMD noted pt to have new afib on EKG.  Pt is confused, demented (baseline) and unable to provide a reliable history.   Pat admitted for acute hypoxic resp failure due to pulmonary edema from acute on chronic heart failure .  I doubt PE at this time .     ASSESSMENT     - Acute hypoxic resp failure   - Acute on chronic heart failure   - Pulmonary edema   - Cardiomyopathy   - DM-2 uncontrol   - CAD / Afib uncontrol   - Dementia       Plan   - O2 supp as needed to maintain sat >90%  - Diuresis   - Optimize cardiac meds   - Monitor blood glucose with coverage   - Check TSH / PSA  - DVT GI prophy   - Echo   - Serial cardiac enzymes   - Serial CXR   - Hold CTPA , doubt PE plus pat has ANISH

## 2023-05-05 NOTE — ED ADULT NURSE REASSESSMENT NOTE - NS ED NURSE REASSESS COMMENT FT1
Pt very agitated, repeatedly getting OOB, very dyspneic, color cyanotic(no O2 intact, pt removed) Contacted STEPHANE Ramos drawn.

## 2023-05-05 NOTE — PATIENT PROFILE ADULT - FALL HARM RISK - HARM RISK INTERVENTIONS

## 2023-05-05 NOTE — PROGRESS NOTE ADULT - SUBJECTIVE AND OBJECTIVE BOX
Patient is a 78y old  Male who presents with a chief complaint of acute systolic CHF (05 May 2023 10:20)    Patient seen and examined at bedside. No acute complaints    ALLERGIES:  sulfa drugs (Unknown)    MEDICATIONS  (STANDING):  aspirin enteric coated 81 milliGRAM(s) Oral daily  clonazePAM  Tablet 0.5 milliGRAM(s) Oral at bedtime  dapagliflozin 10 milliGRAM(s) Oral every 24 hours  dextrose 5%. 1000 milliLiter(s) (50 mL/Hr) IV Continuous <Continuous>  dextrose 5%. 1000 milliLiter(s) (100 mL/Hr) IV Continuous <Continuous>  dextrose 50% Injectable 25 Gram(s) IV Push once  dextrose 50% Injectable 12.5 Gram(s) IV Push once  dextrose 50% Injectable 25 Gram(s) IV Push once  enoxaparin Injectable 40 milliGRAM(s) SubCutaneous every 24 hours  glucagon  Injectable 1 milliGRAM(s) IntraMuscular once  insulin lispro (ADMELOG) corrective regimen sliding scale   SubCutaneous three times a day before meals  insulin lispro (ADMELOG) corrective regimen sliding scale   SubCutaneous at bedtime  isosorbide   mononitrate ER Tablet (IMDUR) 60 milliGRAM(s) Oral daily  metoprolol succinate ER 25 milliGRAM(s) Oral daily  potassium chloride    Tablet ER 10 milliEquivalent(s) Oral daily  sertraline 25 milliGRAM(s) Oral daily    MEDICATIONS  (PRN):  acetaminophen     Tablet .. 650 milliGRAM(s) Oral every 6 hours PRN Temp greater or equal to 38C (100.4F), Mild Pain (1 - 3)  clonazePAM  Tablet 0.25 milliGRAM(s) Oral two times a day PRN agitation  dextrose Oral Gel 15 Gram(s) Oral once PRN Blood Glucose LESS THAN 70 milliGRAM(s)/deciliter    Vital Signs Last 24 Hrs  T(F): 97.5 (05 May 2023 15:47), Max: 97.9 (05 May 2023 13:51)  HR: 88 (05 May 2023 15:47) (85 - 95)  BP: 147/73 (05 May 2023 15:47) (141/86 - 184/117)  RR: 18 (05 May 2023 15:47) (17 - 34)  SpO2: 98% (05 May 2023 15:47) (88% - 99%)  I&O's Summary    PHYSICAL EXAM:  General: NAD, follows simple commands  ENT: No gross hearing impairment, dry mucous membranes, no thrush, poor dentition  Neck: Supple, + JVD  Lungs: Decreased breath sounds at bases, good air entry, non-labored breathing  Cardio: RRR, S1/S2  Abdomen: Soft, Nontender, Nondistended; Bowel sounds present  Extremities: No calf tenderness, No cyanosis, + pitting edema, xerosis  Psych: Appropriate mood and affect    LABS:                        14.5   8.07  )-----------( 157      ( 05 May 2023 08:00 )             45.4     05-05    142  |  105  |  39  ----------------------------<  144  3.5   |  31  |  1.70    Ca    9.0      05 May 2023 08:00  Mg     1.9     05-04    TPro  6.7  /  Alb  2.5  /  TBili  0.9  /  DBili  x   /  AST  22  /  ALT  21  /  AlkPhos  123  05-04          PT/INR - ( 04 May 2023 19:50 )   PT: 15.3 sec;   INR: 1.32 ratio         PTT - ( 04 May 2023 19:50 )  PTT:32.0 sec      CARDIAC MARKERS ( 05 May 2023 08:00 )  x     / 132.5 ng/L / x     / x     / x      CARDIAC MARKERS ( 04 May 2023 23:40 )  x     / 122.8 ng/L / x     / x     / x      CARDIAC MARKERS ( 04 May 2023 19:50 )  x     / 131.9 ng/L / x     / x     / x                ABG - ( 05 May 2023 15:00 )  pH, Arterial: 7.38  pH, Blood: x     /  pCO2: 48    /  pO2: 96    / HCO3: x     / Base Excess: x     /  SaO2: 98.3        POCT Blood Glucose.: 106 mg/dL (05 May 2023 11:20)  POCT Blood Glucose.: 134 mg/dL (05 May 2023 07:24)    < from: TTE Echo Complete w/o Contrast w/ Doppler (05.05.23 @ 08:06) >   1. Left ventricular ejection fraction, by visual estimation, is 40 to   45%.   2. Mildly to moderately decreased global left ventricular systolic   function.   3. Increased LV wall thickness.   4. Spectral Doppler shows restrictive pattern of left ventricular   myocardial filling (Grade III diastolic dysfunction).   5. There is mild concentric left ventricular hypertrophy.   6. Moderately enlarged left atrium.   7. Right atrial enlargement.   8. Mild thickening and calcification of the anterior and posterior   mitral valve leaflets.   9. Moderate mitral valve regurgitation.  10. Mild-moderate tricuspid regurgitation.  11. Estimated pulmonary artery systolic pressure is 45.1 mmHg assuming a   right atrial pressureof 10 mmHg, which is consistent with mild pulmonary   hypertension.    < end of copied text >    Case d/w Dr Dennis re: cardiac plans, and Dr Collins re: eval for thoracentesis

## 2023-05-05 NOTE — CONSULT NOTE ADULT - SUBJECTIVE AND OBJECTIVE BOX
[facility] Consultation    CHIEF COMPLAINT: Patient is a 78y old  Male who presents with a chief complaint of acute systolic CHF (04 May 2023 21:17)  asked to see patient regarding CHF, possible atrial fibrillation and CAD s/p CABG   patient is seen and examined  chart is reviewed, including outpatient chart   history from patient daughter at bedside ... patient sedated and drowsy and not able to give history     HPI:  78 year old M w/HTN, Cardiomyopathy, chronic systolic CHF, DM2, Dementia, sent to the ED by PMD because of increased dyspnea, leg edema x past 2 days.  Per ED, daughter states that PMD noted pt to have new afib on EKG.  Pt is confused, demented (baseline) and unable to provide a reliable history.  There was no report of  chest pain, cough, fever chills, vomiting, diarrhea.   1st trop is 131.  EKG showed HR of 86, SR w/ APCs. Cxray c/w CHF (venous congestion, bilat pl effusions)  Pt's last echo was from 07/2022 w/c reported LVEF of  40 to 45%.. Increased LV wall thickness.. Moderately reduced RV systolic function. Moderately enlarged left atrium.. Moderate mitral valve regurgitation.Mild tricuspid regurgitation.    As above.  Patient's daughter reports he has mostly sedentary lifestyle.  She notes he did not look well for past 2 days and she checked blood sugar and it was >400.  He has chronic complaints of dyspnea with exertion.  No resting dyspnea or complaints of chest pain   She took him to PMD office and was told that EKG showed atrial fibrillation and that he should go to ED  Currently in bed, no distress, sedated   He has been having worsening memory over past few months    PAST MEDICAL & SURGICAL HISTORY:  HTN (hypertension)      HLD (hyperlipidemia)      Diabetes      CAD (coronary artery disease)      History of cholecystectomy          SOCIAL HISTORY: non smoker,      FAMILY HISTORY  No pertinent family history in first degree relatives    Home Medications:  clonazePAM 0.25 mg oral tablet: 1 orally once a day (04 May 2023 23:35)  clonazePAM 0.5 mg oral tablet: 1 orally once a day (at bedtime) (04 May 2023 23:35)  furosemide 20 mg oral tablet: 1 orally once a day (04 May 2023 23:38)  Imdur 60 mg oral tablet, extended release: 1 tab(s) orally once a day (in the morning) (04 May 2023 22:29)  potassium chloride 10 mEq oral tablet, extended release: 1 orally once a day (04 May 2023 23:37)  ramipril 5 mg oral tablet: orally (05 May 2023 08:45)  sertraline 25 mg oral tablet: 1 orally once a day (at bedtime) (04 May 2023 23:36)  Toprol-XL 25 mg oral tablet, extended release: 0.5 tab(s) orally once a day (04 May 2023 22:30)      MEDICATIONS  (STANDING):  aspirin enteric coated 81 milliGRAM(s) Oral daily  clonazePAM  Tablet 0.5 milliGRAM(s) Oral at bedtime  dapagliflozin 10 milliGRAM(s) Oral every 24 hours  dextrose 5%. 1000 milliLiter(s) (50 mL/Hr) IV Continuous <Continuous>  dextrose 5%. 1000 milliLiter(s) (100 mL/Hr) IV Continuous <Continuous>  dextrose 50% Injectable 12.5 Gram(s) IV Push once  dextrose 50% Injectable 25 Gram(s) IV Push once  dextrose 50% Injectable 25 Gram(s) IV Push once  enoxaparin Injectable 40 milliGRAM(s) SubCutaneous every 24 hours  glucagon  Injectable 1 milliGRAM(s) IntraMuscular once  insulin lispro (ADMELOG) corrective regimen sliding scale   SubCutaneous three times a day before meals  insulin lispro (ADMELOG) corrective regimen sliding scale   SubCutaneous at bedtime  isosorbide   mononitrate ER Tablet (IMDUR) 60 milliGRAM(s) Oral daily  metoprolol succinate ER 25 milliGRAM(s) Oral daily  potassium chloride    Tablet ER 10 milliEquivalent(s) Oral daily  sertraline 25 milliGRAM(s) Oral daily    MEDICATIONS  (PRN):  acetaminophen     Tablet .. 650 milliGRAM(s) Oral every 6 hours PRN Temp greater or equal to 38C (100.4F), Mild Pain (1 - 3)  clonazePAM  Tablet 0.25 milliGRAM(s) Oral two times a day PRN agitation  dextrose Oral Gel 15 Gram(s) Oral once PRN Blood Glucose LESS THAN 70 milliGRAM(s)/deciliter      Allergies    sulfa drugs (Unknown)    Intolerances        REVIEW OF SYSTEMS:    CONSTITUTIONAL: fatigue   EYES: No visual changes, No diplopia  ENMT: No throat pain , No exudate  NECK: No pain or stiffness  RESPIRATORY: No cough, wheezing, hemoptysis; No shortness of breath  CARDIOVASCULAR: No chest pain or chest pressure.  No shortness of breath  No palpitations, dizziness, light headedness, syncope or near syncope.  No edema, no orthopnea.   GASTROINTESTINAL: No abdominal pain. No nausea, vomiting, or hematemesis; No diarrhea or constipation. No melena or hematochezia.  GENITOURINARY: No dysuria, frequency or hematuria  NEUROLOGICAL: No numbness or weakness  SKIN: No itching or rash  All other review of systems is negative unless indicated above    VITAL SIGNS:   Vital Signs Last 24 Hrs  T(C): 36.2 (05 May 2023 10:12), Max: 36.4 (05 May 2023 05:47)  T(F): 97.2 (05 May 2023 10:12), Max: 97.6 (05 May 2023 05:47)  HR: 91 (05 May 2023 10:12) (85 - 92)  BP: 141/86 (05 May 2023 10:12) (141/86 - 184/117)  BP(mean): --  RR: 18 (05 May 2023 10:12) (17 - 18)  SpO2: 97% (05 May 2023 10:12) (93% - 97%)    Parameters below as of 05 May 2023 10:12  Patient On (Oxygen Delivery Method): nasal cannula  O2 Flow (L/min): 3      I&O's Summary      PHYSICAL EXAM:    Constitutional: NAD, awake and alert, well-developed  Eyes:  EOMI,  Pupils round, no lesions  ENMT: no exudate or erythema  Pulmonary:  decreased breath sounds at bases   Cardiovascular: PMI not palpable non-displaced Regular S1 and S2 ll/Vl systolic murmur   Gastrointestinal: Bowel Sounds present, soft, nontender.   Lymph: No peripheral edema. No cervical lymphadenopathy.  Skin: No rashes. Changes of chronic venous stasis. No cyanosis.  Psych:  Mood & affect appropriate    LABS: All Labs Reviewed:                        14.5   8.07  )-----------( 157      ( 05 May 2023 08:00 )             45.4                         13.9   10.47 )-----------( 160      ( 04 May 2023 19:50 )             42.7     05 May 2023 08:00    142    |  105    |  39     ----------------------------<  144    3.5     |  31     |  1.70   04 May 2023 19:50    140    |  104    |  42     ----------------------------<  186    3.8     |  27     |  1.78     Ca    9.0        05 May 2023 08:00  Ca    8.9        04 May 2023 19:50  Mg     1.9       04 May 2023 19:50    TPro  6.7    /  Alb  2.5    /  TBili  0.9    /  DBili  x      /  AST  22     /  ALT  21     /  AlkPhos  123    04 May 2023 19:50    PT/INR - ( 04 May 2023 19:50 )   PT: 15.3 sec;   INR: 1.32 ratio         PTT - ( 04 May 2023 19:50 )  PTT:32.0 sec    < from: 12 Lead ECG (05.04.23 @ 19:30) >  Diagnosis Line Sinus rhythm with APCs   Cannot rule out Inferior infarct (cited on or before 13-JUL-2022)  Prolonged QT  Abnormal ECG  When compared with ECG of 14-JUL-2022 11:00,  Nonspecific T wave abnormality now evident in Lateral leads    < end of copied text >    < from: TTE Echo Complete w/o Contrast w/ Doppler (05.05.23 @ 08:06) >   1. Left ventricular ejection fraction, by visual estimation, is 40 to   45%.   2. Mildly to moderately decreased global left ventricular systolic   function.   3. Increased LV wall thickness.   4. Spectral Doppler shows restrictive pattern of left ventricular   myocardial filling (Grade III diastolic dysfunction).   5. There is mild concentric left ventricular hypertrophy.   6. Moderately enlarged left atrium.   7. Right atrial enlargement.   8. Mild thickening and calcification of the anterior and posterior   mitral valve leaflets.   9. Moderate mitral valve regurgitation.  10. Mild-moderate tricuspid regurgitation.  11. Estimated pulmonary artery systolic pressure is 45.1 mmHg assuming a   right atrial pressureof 10 mmHg, which is consistent with mild pulmonary   hypertension.    < end of copied text >    Pharmacologic nuclear stress test as outpatient Feb 2023... inferior wall MI with minimal laura infarct ischemia

## 2023-05-05 NOTE — PROGRESS NOTE ADULT - ASSESSMENT
78M HTN, CM, systolic CHF, DM2, dementia, a/w SOB and leg swelling, dx with CHF exacerbation    #Acute on chronic systolic dysfunction  -Change Lasix to 20 IV bid  -Daily weights  -Is/Os  -Monitor K and Mg  -Give extra 20 meq K today  -Echo: EF 40-45%  -Patient is NOT on Farxiga - defer to cardio if we should start  -c/w Toprol  -consider Entresto when renal fxn back to normal    #ANISH  -likely from fluid overload state  -c/w Lasix  -BMP in AM    #Demand ischemia  -c/w ASA, beta blocker   -check lipids, ? should be on statin    #HTN  -c/w Toprol and Norvasc    #Dementia with behavioral disturbance  -takes benzos for this, will need to discuss with family risks/benefits of benzo use in this population    #DM2  Stable sugars at this time, ISS, A1C 7.7    DVT ppx: Lovenox    Case d/w patient daughter Kuldeep on phone, all questions answered

## 2023-05-06 ENCOUNTER — TRANSCRIPTION ENCOUNTER (OUTPATIENT)
Age: 78
End: 2023-05-06

## 2023-05-06 LAB
ANION GAP SERPL CALC-SCNC: 9 MMOL/L — SIGNIFICANT CHANGE UP (ref 5–17)
BUN SERPL-MCNC: 40 MG/DL — HIGH (ref 7–23)
CALCIUM SERPL-MCNC: 8.8 MG/DL — SIGNIFICANT CHANGE UP (ref 8.4–10.5)
CHLORIDE SERPL-SCNC: 107 MMOL/L — SIGNIFICANT CHANGE UP (ref 96–108)
CO2 SERPL-SCNC: 28 MMOL/L — SIGNIFICANT CHANGE UP (ref 22–31)
CREAT SERPL-MCNC: 1.68 MG/DL — HIGH (ref 0.5–1.3)
EGFR: 41 ML/MIN/1.73M2 — LOW
GLUCOSE BLDC GLUCOMTR-MCNC: 113 MG/DL — HIGH (ref 70–99)
GLUCOSE BLDC GLUCOMTR-MCNC: 149 MG/DL — HIGH (ref 70–99)
GLUCOSE BLDC GLUCOMTR-MCNC: 157 MG/DL — HIGH (ref 70–99)
GLUCOSE BLDC GLUCOMTR-MCNC: 169 MG/DL — HIGH (ref 70–99)
GLUCOSE SERPL-MCNC: 187 MG/DL — HIGH (ref 70–99)
HCT VFR BLD CALC: 43.2 % — SIGNIFICANT CHANGE UP (ref 39–50)
HGB BLD-MCNC: 14 G/DL — SIGNIFICANT CHANGE UP (ref 13–17)
MAGNESIUM SERPL-MCNC: 1.6 MG/DL — SIGNIFICANT CHANGE UP (ref 1.6–2.6)
MCHC RBC-ENTMCNC: 31 PG — SIGNIFICANT CHANGE UP (ref 27–34)
MCHC RBC-ENTMCNC: 32.4 GM/DL — SIGNIFICANT CHANGE UP (ref 32–36)
MCV RBC AUTO: 95.6 FL — SIGNIFICANT CHANGE UP (ref 80–100)
NRBC # BLD: 0 /100 WBCS — SIGNIFICANT CHANGE UP (ref 0–0)
PHOSPHATE SERPL-MCNC: 4.2 MG/DL — SIGNIFICANT CHANGE UP (ref 2.5–4.5)
PLATELET # BLD AUTO: 138 K/UL — LOW (ref 150–400)
POTASSIUM SERPL-MCNC: 3.5 MMOL/L — SIGNIFICANT CHANGE UP (ref 3.5–5.3)
POTASSIUM SERPL-SCNC: 3.5 MMOL/L — SIGNIFICANT CHANGE UP (ref 3.5–5.3)
RBC # BLD: 4.52 M/UL — SIGNIFICANT CHANGE UP (ref 4.2–5.8)
RBC # FLD: 14.3 % — SIGNIFICANT CHANGE UP (ref 10.3–14.5)
SODIUM SERPL-SCNC: 144 MMOL/L — SIGNIFICANT CHANGE UP (ref 135–145)
WBC # BLD: 8.15 K/UL — SIGNIFICANT CHANGE UP (ref 3.8–10.5)
WBC # FLD AUTO: 8.15 K/UL — SIGNIFICANT CHANGE UP (ref 3.8–10.5)

## 2023-05-06 PROCEDURE — 99232 SBSQ HOSP IP/OBS MODERATE 35: CPT

## 2023-05-06 PROCEDURE — 99233 SBSQ HOSP IP/OBS HIGH 50: CPT

## 2023-05-06 RX ORDER — POTASSIUM CHLORIDE 20 MEQ
20 PACKET (EA) ORAL ONCE
Refills: 0 | Status: COMPLETED | OUTPATIENT
Start: 2023-05-06 | End: 2023-05-06

## 2023-05-06 RX ORDER — QUETIAPINE FUMARATE 200 MG/1
12.5 TABLET, FILM COATED ORAL AT BEDTIME
Refills: 0 | Status: DISCONTINUED | OUTPATIENT
Start: 2023-05-06 | End: 2023-05-07

## 2023-05-06 RX ADMIN — SERTRALINE 25 MILLIGRAM(S): 25 TABLET, FILM COATED ORAL at 12:10

## 2023-05-06 RX ADMIN — Medication 2: at 16:11

## 2023-05-06 RX ADMIN — Medication 20 MILLIGRAM(S): at 06:44

## 2023-05-06 RX ADMIN — ENOXAPARIN SODIUM 40 MILLIGRAM(S): 100 INJECTION SUBCUTANEOUS at 12:35

## 2023-05-06 RX ADMIN — Medication 0.5 MILLIGRAM(S): at 21:13

## 2023-05-06 RX ADMIN — Medication 81 MILLIGRAM(S): at 12:35

## 2023-05-06 RX ADMIN — Medication 2: at 08:05

## 2023-05-06 RX ADMIN — QUETIAPINE FUMARATE 12.5 MILLIGRAM(S): 200 TABLET, FILM COATED ORAL at 22:38

## 2023-05-06 RX ADMIN — Medication 20 MILLIGRAM(S): at 16:04

## 2023-05-06 RX ADMIN — Medication 25 MILLIGRAM(S): at 06:44

## 2023-05-06 RX ADMIN — ISOSORBIDE MONONITRATE 60 MILLIGRAM(S): 60 TABLET, EXTENDED RELEASE ORAL at 12:11

## 2023-05-06 RX ADMIN — Medication 2 MILLIGRAM(S): at 00:52

## 2023-05-06 RX ADMIN — Medication 20 MILLIEQUIVALENT(S): at 17:36

## 2023-05-06 RX ADMIN — Medication 10 MILLIEQUIVALENT(S): at 12:12

## 2023-05-06 NOTE — PROGRESS NOTE ADULT - SUBJECTIVE AND OBJECTIVE BOX
Patient is a 78y old  Male who presents with a chief complaint of acute systolic CHF (07 May 2023 15:44)    Patient seen and examined at bedside on 5/6/23. Overnight patient had confusion/delirium with agitation and was given Ativan 2 mg - lethargic this AM.    ALLERGIES:  sulfa drugs (Unknown)    MEDICATIONS  (STANDING):  aspirin enteric coated 81 milliGRAM(s) Oral daily  dextrose 5%. 1000 milliLiter(s) (100 mL/Hr) IV Continuous <Continuous>  dextrose 5%. 1000 milliLiter(s) (50 mL/Hr) IV Continuous <Continuous>  dextrose 50% Injectable 25 Gram(s) IV Push once  dextrose 50% Injectable 25 Gram(s) IV Push once  dextrose 50% Injectable 12.5 Gram(s) IV Push once  enoxaparin Injectable 40 milliGRAM(s) SubCutaneous every 24 hours  glucagon  Injectable 1 milliGRAM(s) IntraMuscular once  insulin lispro (ADMELOG) corrective regimen sliding scale   SubCutaneous at bedtime  insulin lispro (ADMELOG) corrective regimen sliding scale   SubCutaneous three times a day before meals  isosorbide   mononitrate ER Tablet (IMDUR) 60 milliGRAM(s) Oral daily  metoprolol succinate ER 50 milliGRAM(s) Oral daily  potassium chloride    Tablet ER 10 milliEquivalent(s) Oral daily  sertraline 25 milliGRAM(s) Oral daily    MEDICATIONS  (PRN):  acetaminophen     Tablet .. 650 milliGRAM(s) Oral every 6 hours PRN Temp greater or equal to 38C (100.4F), Mild Pain (1 - 3)  clonazePAM  Tablet 0.25 milliGRAM(s) Oral two times a day PRN agitation  dextrose Oral Gel 15 Gram(s) Oral once PRN Blood Glucose LESS THAN 70 milliGRAM(s)/deciliter    Vital Signs Last 24 Hrs  T(F): 98.1 (07 May 2023 12:32), Max: 98.1 (07 May 2023 12:32)  HR: 85 (07 May 2023 12:32) (68 - 85)  BP: 168/104 (07 May 2023 12:32) (154/95 - 168/104)  RR: 17 (07 May 2023 12:32) (16 - 17)  SpO2: 92% (07 May 2023 12:32) (82% - 95%)    I&O's Summary    BMI (kg/m2): 25.5 (05-04-23 @ 19:21)    PHYSICAL EXAM:  General: NAD  ENT: Slightly dry mucous membranes  Neck: Supple, No JVD  Lungs: Decreased BS bilaterally at bases, normal respiratory effort  Cardio: RRR, S1/S2  Abdomen: Soft, Nontender, Nondistended; Bowel sounds present  Extremities: No calf tenderness, + pitting edema  Psych: Lethargic, easily arousable     LABS:                        15.5   7.11  )-----------( 131      ( 07 May 2023 06:47 )             49.9       05-07    145  |  107  |  37  ----------------------------<  168  4.1   |  30  |  1.57    Ca    9.3      07 May 2023 06:47  Phos  4.2     05-06  Mg     1.6     05-06              CARDIAC MARKERS ( 05 May 2023 08:00 )  x     / 132.5 ng/L / x     / x     / x      CARDIAC MARKERS ( 04 May 2023 23:40 )  x     / 122.8 ng/L / x     / x     / x          05-07 Chol 172 mg/dL LDL -- HDL 39 mg/dL Trig 87 mg/dL  TSH 0.920   TSH with FT4 reflex --  Total T3 --      ABG - ( 05 May 2023 15:00 )  pH, Arterial: 7.38  pH, Blood: x     /  pCO2: 48    /  pO2: 96    / HCO3: x     / Base Excess: x     /  SaO2: 98.3                    POCT Blood Glucose.: 215 mg/dL (07 May 2023 18:16)  POCT Blood Glucose.: 144 mg/dL (07 May 2023 12:27)  POCT Blood Glucose.: 140 mg/dL (07 May 2023 08:38)  POCT Blood Glucose.: 149 mg/dL (06 May 2023 21:11)              RADIOLOGY & ADDITIONAL TESTS:   Personally reviewed.     Consultant(s) Notes Reviewed:  [x] YES  [ ] NO    Care Discussed with Consultants/Other Providers:    Patient is a 78y old  Male who presents with a chief complaint of acute systolic CHF (07 May 2023 15:44)    Patient seen and examined at bedside on 5/6/23. Overnight patient had confusion/delirium with agitation and was given Ativan 2 mg - lethargic this AM.    ALLERGIES:  sulfa drugs (Unknown)    MEDICATIONS  (STANDING):  aspirin enteric coated 81 milliGRAM(s) Oral daily  clonazePAM  Tablet 0.5 milliGRAM(s) Oral at bedtime  dapagliflozin 10 milliGRAM(s) Oral every 24 hours  dextrose 5%. 1000 milliLiter(s) (100 mL/Hr) IV Continuous <Continuous>  dextrose 5%. 1000 milliLiter(s) (50 mL/Hr) IV Continuous <Continuous>  dextrose 50% Injectable 25 Gram(s) IV Push once  dextrose 50% Injectable 12.5 Gram(s) IV Push once  dextrose 50% Injectable 25 Gram(s) IV Push once  enoxaparin Injectable 40 milliGRAM(s) SubCutaneous every 24 hours  furosemide   Injectable 20 milliGRAM(s) IV Push two times a day  glucagon  Injectable 1 milliGRAM(s) IntraMuscular once  insulin lispro (ADMELOG) corrective regimen sliding scale   SubCutaneous three times a day before meals  insulin lispro (ADMELOG) corrective regimen sliding scale   SubCutaneous at bedtime  isosorbide   mononitrate ER Tablet (IMDUR) 60 milliGRAM(s) Oral daily  metoprolol succinate ER 25 milliGRAM(s) Oral daily  potassium chloride    Tablet ER 20 milliEquivalent(s) Oral once  potassium chloride    Tablet ER 10 milliEquivalent(s) Oral daily  sertraline 25 milliGRAM(s) Oral daily    MEDICATIONS  (PRN):  acetaminophen     Tablet .. 650 milliGRAM(s) Oral every 6 hours PRN Temp greater or equal to 38C (100.4F), Mild Pain (1 - 3)  clonazePAM  Tablet 0.25 milliGRAM(s) Oral two times a day PRN agitation  dextrose Oral Gel 15 Gram(s) Oral once PRN Blood Glucose LESS THAN 70 milliGRAM(s)/deciliter      Vital Signs Last 24 Hrs  T(C): 36.7 (06 May 2023 07:02), Max: 36.7 (06 May 2023 07:02)  T(F): 98.1 (06 May 2023 07:02), Max: 98.1 (06 May 2023 07:02)  HR: 91 (06 May 2023 07:02) (88 - 95)  BP: 156/89 (06 May 2023 07:02) (142/99 - 166/126)  BP(mean): --  RR: 16 (06 May 2023 07:02) (16 - 34)  SpO2: 95% (06 May 2023 07:02) (88% - 99%)    Parameters below as of 06 May 2023 07:02  Patient On (Oxygen Delivery Method): nasal cannula  O2 Flow (L/min): 3      PHYSICAL EXAM:  General: NAD  ENT: Slightly dry mucous membranes  Neck: Supple, No JVD  Lungs: Decreased BS bilaterally at bases, normal respiratory effort  Cardio: RRR, S1/S2  Abdomen: Soft, Nontender, Nondistended; Bowel sounds present  Extremities: No calf tenderness, + pitting edema  Psych: Lethargic, easily arousable     LABS:                        14.0   8.15  )-----------( 138      ( 06 May 2023 08:00 )             43.2     05-06    144  |  107  |  40<H>  ----------------------------<  187<H>  3.5   |  28  |  1.68<H>    Ca    8.8      06 May 2023 08:00  Phos  4.2     05-06  Mg     1.6     05-06    TPro  6.7  /  Alb  2.5<L>  /  TBili  0.9  /  DBili  x   /  AST  22  /  ALT  21  /  AlkPhos  123<H>  05-04        RADIOLOGY & ADDITIONAL TESTS:   Personally reviewed.     Consultant(s) Notes Reviewed:  [x] YES  [ ] NO    Care Discussed with Consultants/Other Providers:

## 2023-05-06 NOTE — PROGRESS NOTE ADULT - SUBJECTIVE AND OBJECTIVE BOX
Follow up for :    chf arrhythmia    Interval history:   no AF on monitoring sedated      PMH  HTN (hypertension)    HLD (hyperlipidemia)    Diabetes    CAD (coronary artery disease)        MEDICATIONS  (STANDING):  aspirin enteric coated 81 milliGRAM(s) Oral daily  clonazePAM  Tablet 0.5 milliGRAM(s) Oral at bedtime  dapagliflozin 10 milliGRAM(s) Oral every 24 hours  dextrose 5%. 1000 milliLiter(s) (100 mL/Hr) IV Continuous <Continuous>  dextrose 5%. 1000 milliLiter(s) (50 mL/Hr) IV Continuous <Continuous>  dextrose 50% Injectable 25 Gram(s) IV Push once  dextrose 50% Injectable 12.5 Gram(s) IV Push once  dextrose 50% Injectable 25 Gram(s) IV Push once  enoxaparin Injectable 40 milliGRAM(s) SubCutaneous every 24 hours  furosemide   Injectable 20 milliGRAM(s) IV Push two times a day  glucagon  Injectable 1 milliGRAM(s) IntraMuscular once  insulin lispro (ADMELOG) corrective regimen sliding scale   SubCutaneous three times a day before meals  insulin lispro (ADMELOG) corrective regimen sliding scale   SubCutaneous at bedtime  isosorbide   mononitrate ER Tablet (IMDUR) 60 milliGRAM(s) Oral daily  metoprolol succinate ER 25 milliGRAM(s) Oral daily  potassium chloride    Tablet ER 20 milliEquivalent(s) Oral once  potassium chloride    Tablet ER 10 milliEquivalent(s) Oral daily  sertraline 25 milliGRAM(s) Oral daily    MEDICATIONS  (PRN):  acetaminophen     Tablet .. 650 milliGRAM(s) Oral every 6 hours PRN Temp greater or equal to 38C (100.4F), Mild Pain (1 - 3)  clonazePAM  Tablet 0.25 milliGRAM(s) Oral two times a day PRN agitation  dextrose Oral Gel 15 Gram(s) Oral once PRN Blood Glucose LESS THAN 70 milliGRAM(s)/deciliter        PHYSICAL EXAM:  Vital Signs Last 24 Hrs  T(C): 36.7 (06 May 2023 07:02), Max: 36.7 (06 May 2023 07:02)  T(F): 98.1 (06 May 2023 07:02), Max: 98.1 (06 May 2023 07:02)  HR: 91 (06 May 2023 07:02) (88 - 95)  BP: 156/89 (06 May 2023 07:02) (142/99 - 166/126)  BP(mean): --  RR: 16 (06 May 2023 07:02) (16 - 34)  SpO2: 95% (06 May 2023 07:02) (88% - 99%)    Parameters below as of 06 May 2023 07:02  Patient On (Oxygen Delivery Method): nasal cannula  O2 Flow (L/min): 3      GENERAL: NAD, well-groomed, well-developed  HEAD:  Atraumatic, Normocephalic  ENT: Moist mucous membranes,  NECK: +JVD , no bruits  CHEST/LUNG: Clear to auscultation bilaterally; No rales, rhonchi, wheezing, or rubs  HEART: Regular rate and rhythm; No murmurs, rubs, or gallops PMI non displaced.  ABDOMEN: Soft, Nontender, Nondistended; Bowel sounds present  EXTREMITIES: , No clubbing, cyanosis, or edema  SKIN: No rashes or lesions  NERVOUS SYSTEM:  Alert       TELEMETRY:   sinus apc, vpc    ECG:  < from: 12 Lead ECG (23 @ 19:30) >    Ventricular Rate 85 BPM    QRS Duration 86 ms    Q-T Interval 428 ms    QTC Calculation(Bazett) 509 ms    R Axis 10 degrees    T Axis 7 degrees    Diagnosis Line Sinus rhythm with APCs   Cannot rule out Inferior infarct (cited on or before 2022)  Prolonged QT  Abnormal ECG  When compared with ECG of 2022 11:00,  Nonspecific T wave abnormality now evident in Lateral leads  Confirmed by PILO SÁNCHEZ, TARI HOU () on 2023 9:37:42 AM    < end of copied text >      LABS:                        14.0   8.15  )-----------( 138      ( 06 May 2023 08:00 )             43.2     05-06    144  |  107  |  40<H>  ----------------------------<  187<H>  3.5   |  28  |  1.68<H>    Ca    8.8      06 May 2023 08:00  Phos  4.2     05-06  Mg     1.6     05-06    TPro  6.7  /  Alb  2.5<L>  /  TBili  0.9  /  DBili  x   /  AST  22  /  ALT  21  /  AlkPhos  123<H>          Troponin I, High Sensitivity Result: 131.9 ng/L (23 @ 19:50)  Troponin I, High Sensitivity Result: 122.8 ng/L (23 @ 23:40)  Troponin I, High Sensitivity Result: 132.5 ng/L (23 @ 08:00)    PT/INR - ( 04 May 2023 19:50 )   PT: 15.3 sec;   INR: 1.32 ratio         PTT - ( 04 May 2023 19:50 )  PTT:32.0 sec    I&O's Summary        RADIOLOGY & ADDITIONAL STUDIES:  < from: US Renal (23 @ 11:22) >    ACC: [id number] EXAM:  US KIDNEY(S)   ORDERED BY: [email or web address]     PROCEDURE DATE:  2023          INTERPRETATION:  CLINICAL INFORMATION: Renal insufficiency.    COMPARISON: CT abdomen/pelvis 2022.    TECHNIQUE: Sonography of the kidneys and bladder.    FINDINGS:  Right kidney: 11.2 cm. No hydronephrosis. No calculi. 0.9 cm lower pole   cyst.    Left kidney: 9.7 cm. No hydronephrosis. No calculi. 0.6 cm cyst mid pole   region.    Urinary bladder: Bladder volume at the time of examination approximates   210 cc. No bladder wall thickening. No intraluminal mass or bladder   calculi noted.    Visualized segments of the IVC and abdominal aorta unremarkable.    Trace perihepatic fluid.    IMPRESSION:  No evidence for bilateral hydronephrosis.        --- End of Report ---            GISSELL CHAMPION MD; Attending Radiologist  This document has been electronically signed. May  5 2023 11:33AM    < end of copied text >  < from: US Duplex Venous Lower Ext Complete, Bilateral (23 @ 11:20) >    ACC: [id number] EXAM:  US DPLX LWR EXT VEINS COMPL BI   ORDERED BY: MILAGROS   VADIM     PROCEDURE DATE:  2023          INTERPRETATION:  CLINICAL INFORMATION: Lower extremity swelling. Assess   for DVT.    COMPARISON: None available.    TECHNIQUE: Duplex sonography of the BILATERAL LOWER extremity veins with   color and spectral Doppler, with and without compression.    FINDINGS:    RIGHT:  Normal compressibility of the RIGHT common femoral, femoral and popliteal   veins.  Doppler examination shows normal spontaneous and phasic flow.  No RIGHT calf vein thrombosis is detected.    LEFT:  Normal compressibility of the LEFT common femoral, femoral and popliteal   veins.  Doppler examination shows normal spontaneous and phasic flow.  No LEFT calf vein thrombosis is detected.    IMPRESSION:  No evidence of deep venous thrombosis in either lower extremity.    < end of copied text >  < from: Xray Chest 1 View- PORTABLE-Urgent (Xray Chest 1 View- PORTABLE-Urgent .) (23 @ 19:50) >    ACC: [id number] EXAM:  XR CHEST PORTABLE URGENT 1V   ORDERED BY: ALEX SANTANA     PROCEDURE DATE:  2023          INTERPRETATION:  Clinical history: 78-year-old male, rule out pneumonia.    Portable/expiratory view of the chest is compared to 3/2/2017.    FINDINGS: Mild cardiomegaly and mild pulmonary venous congestion with no   pneumothorax or acute osseous finding.    Moderate bilateral perihilar interstitial infiltrates.    Small right pleural effusion.    IMPRESSION:    Moderate bilateral perihilar interstitial infiltrates, new.    Small right pleural effusion, new    --- End of Report ---            GALLO COOPER DO; Attending Radiologist  This document has been electronically signed. May  5 2023 11:17AM    < end of copied text >  < from: TTE Echo Complete w/o Contrast w/ Doppler (23 @ 08:06) >    ACC: [id number] EXAM:  ECHO TTE WO CON COMP W DOPP                          PROCEDURE DATE:  2023          INTERPRETATION:  TRANSTHORACIC ECHOCARDIOGRAM REPORT        Patient Name:   MARIO RAMOS Patient Location: [address] Rec #:  [id number]         Accession #:      [id number]  Account #:      [id number]           Height:           60.6 in 154.0 cm  YOB: 1945        Weight:           134.5 lb 61.00 kg  Patient Age:    78 years         BSA:              1.59 m²  PatientGender: M                BP:               149/99 mmHg      Date of Exam:        2023 8:06:48 AM  Sonographer:         ESPERANZA  Referring Physician: VADIM    Procedure:     2D Echo/Doppler/Color Doppler Complete.  Indications:   I42.9 - Cardiomyopathy, unspecified  Diagnosis:     I42.9 - Cardiomyopathy, unspecified  Study Details: Technically fair study.        2D AND M-MODE MEASUREMENTS (normal ranges within parentheses):  Left Ventricle:                  Normal   Aorta/Left Atrium:  Normal  IVSd (2D):              1.41 cm (0.7-1.1) Aortic Root (2D):    3.18 cm   (2.4-3.7)  LVPWd (2D):             1.30 cm (0.7-1.1) Aortic Root (Mmode): 3.26 cm   (2.4-3.7)  LVIDd (2D):             4.49 cm (3.4-5.7) AoV Cusp Separation: 1.53 cm   (1.5-2.6)  LVIDs (2D):             3.72 cm           Left Atrium (2D):    4.46 cm   (1.9-4.0)  LV FS (2D):             17.0 %   (>25%)   Left Atrium (Mmode): 5.00 cm   (1.9-4.0)  Relative Wall Thickness  0.58    (<0.42)  Right Ventricle:                                RVd (2D):        3.50 cm    LV DIASTOLIC FUNCTION:  MV Peak E: 0.99 m/s Decel Time: 133 msec  MV Peak A: 0.28 m/s  E/A Ratio: 3.54    SPECTRAL DOPPLER ANALYSIS (where applicable):  Mitral Valve:  MV P1/2 Time: 38.69 msec  MV Area, PHT: 5.69 cm²    Aortic Valve: AoV Max Av: 0.71 m/s AoV Peak P.0 mmHg AoV Mean P.2 mmHg    LVOT Vmax: 0.61 m/s LVOT VTI: 0.089 m LVOT Diameter: 1.99 cm    AoV Area, Vmax: 2.66 cm² AoV Area, VTI: 2.12 cm² AoV Area, Vmn: 2.15 cm²  Ao VTI: 0.131  Tricuspid Valve and PA/RV Systolic Pressure: TR Max Velocity: 2.96 m/s RA   Pressure: 10 mmHg RVSP/PASP: 45.1 mmHg      PHYSICIAN INTERPRETATION:  Left Ventricle: The left ventricular internal cavity size is normal. Left   ventricular wall thickness is increased.  Global LV systolic function was mildly to moderately decreased. Left   ventricular ejection fraction, by visual estimation, is 40 to 45%.   Spectral Doppler shows restrictive pattern of left ventricular myocardial   filling (Grade III diastolic dysfunction).  Right Ventricle: Normal right ventricular size and function. The right   ventricle was not well visualized.  Left Atrium: Moderately enlarged left atrium.  Right Atrium: Right atrial enlargement.  Pericardium: There is no evidence of pericardial effusion.  Mitral Valve: Mild thickening and calcification of the anterior and   posterior mitral valve leaflets. Moderate mitral valve regurgitation is   seen.  Tricuspid Valve: Structurally normal tricuspid valve, with normal leaflet   excursion. Mild-moderate tricuspid regurgitation is visualized. Estimated   pulmonary artery systolic pressure is 45.1 mmHg assuming a right atrial   pressure of 10 mmHg, which is consistent with mild pulmonary hypertension.  Aortic Valve: The aortic valve is trileaflet.  Pulmonic Valve: Structurally normal pulmonic valve, with normal leaflet   excursion. The pulmonic valve was not well visualized. Trace pulmonic   valve regurgitation.  Aorta: The aortic root is normal in size and structure.      Summary:   1. Left ventricular ejection fraction, by visual estimation, is 40 to   45%.   2. Mildly to moderately decreased global left ventricular systolic   function.   3. Increased LV wall thickness.   4. Spectral Doppler shows restrictive pattern of left ventricular   myocardial filling (Grade III diastolic dysfunction).   5. There is mild concentric left ventricular hypertrophy.   6. Moderately enlarged left atrium.   7. Right atrial enlargement.   8. Mild thickening and calcification of the anterior and posterior   mitral valve leaflets.   9. Moderate mitral valve regurgitation.  10. Mild-moderate tricuspid regurgitation.  11. Estimated pulmonary artery systolic pressure is 45.1 mmHg assuming a   right atrial pressureof 10 mmHg, which is consistent with mild pulmonary   hypertension.    [id number] Tari Dennis MD, FACC , MD, FACC Electronically   signed on 2023 at 10:39:15 AM    < end of copied text >      ECHO:

## 2023-05-06 NOTE — PROGRESS NOTE ADULT - ASSESSMENT
77 yo M with PMH of HTN, CM, systolic CHF, DM2, dementia, a/w SOB and leg swelling, dx with CHF exacerbation.    #Acute on chronic systolic dysfunction  -Lasix changed to 20 mg IV BID 5/5  -Desaturated to 87% on RA, continue O2 support at 2L NC for now and wean as able  -Daily weights, I&Os  -Monitor K and Mg  -Echo: EF 40-45%  -c/w Toprol  -Patient is NOT on Farxiga - defer to cardio if we should start  -consider Entresto when renal fxn back to normal  -discussed with cardio Dr. Escudero, continue diuresis as above    #ANISH  -Cr was 1 on 4/12/23  -likely from fluid overload state  -No evidence for bilateral hydronephrosis on renal sono 5/5  -No retention on bedside bladder sono  -c/w Lasix cautiously while monitoring renal function closely  -BMP in AM    #Demand ischemia  -c/w ASA, Toprol  -check lipids, ? should be on statin    #HTN  -c/w Toprol and Norvasc    #Dementia with behavioral disturbance  -takes benzos for this, discuss with daughter risks/benefits of benzo use in this population - patient has been stable on low dose of Clonazepem only once a day in the AM. Normally does not have severe agitation/delirium at home. Had tried Seroquel in the past  -discussed plan to trial Seroquel 12.5 mg x1 if needed tonight with daughter, d/w RN and provider to RN order placed with details on plan for tonight    #DM2  -A1C 7.7  -Stable sugars at this time, ISS    #DVT ppx  -Lovenox    5/6: Updated patient’s daughter Kuldeep at bedside.

## 2023-05-06 NOTE — PHYSICAL THERAPY INITIAL EVALUATION ADULT - PERTINENT HX OF CURRENT PROBLEM, REHAB EVAL
78 year old M w/HTN, Cardiomyopathy, chronic systolic CHF, DM2, Dementia, sent to the ED by PMD because of increased dyspnea, leg edema x past 2 days.  Per ED, daughter states that PMD noted pt to have new afib on EKG.  Pt is confused, demented (baseline) and unable to provide a reliable history.  There was no report of  chest pain, cough, fever chills, vomiting, diarrhea.   1st trop is 131.  EKG showed HR of 86, SR w/ APCs. Cxray c/w CHF (venous congestion, bilat pl effusions)  Pt's last echo was from 07/2022 w/c reported LVEF of  40 to 45%.. Increased LV wall thickness.. Moderately reduced RV systolic function. Moderately enlarged left atrium.. Moderate mitral valve regurgitation.Mild tricuspid regurgitation.

## 2023-05-06 NOTE — PROGRESS NOTE ADULT - SUBJECTIVE AND OBJECTIVE BOX
Follow-up Pulmonary Progress Note  Chief Complaint : Atrial fibrillation          No new events overnight.  Denies SOB/CP.       Allergies :sulfa drugs (Unknown)      PAST MEDICAL & SURGICAL HISTORY:  HTN (hypertension)    HLD (hyperlipidemia)    Diabetes    CAD (coronary artery disease)    History of cholecystectomy        Medications:  MEDICATIONS  (STANDING):  aspirin enteric coated 81 milliGRAM(s) Oral daily  clonazePAM  Tablet 0.5 milliGRAM(s) Oral at bedtime  dapagliflozin 10 milliGRAM(s) Oral every 24 hours  dextrose 5%. 1000 milliLiter(s) (100 mL/Hr) IV Continuous <Continuous>  dextrose 5%. 1000 milliLiter(s) (50 mL/Hr) IV Continuous <Continuous>  dextrose 50% Injectable 25 Gram(s) IV Push once  dextrose 50% Injectable 12.5 Gram(s) IV Push once  dextrose 50% Injectable 25 Gram(s) IV Push once  enoxaparin Injectable 40 milliGRAM(s) SubCutaneous every 24 hours  furosemide   Injectable 20 milliGRAM(s) IV Push two times a day  glucagon  Injectable 1 milliGRAM(s) IntraMuscular once  insulin lispro (ADMELOG) corrective regimen sliding scale   SubCutaneous three times a day before meals  insulin lispro (ADMELOG) corrective regimen sliding scale   SubCutaneous at bedtime  isosorbide   mononitrate ER Tablet (IMDUR) 60 milliGRAM(s) Oral daily  metoprolol succinate ER 25 milliGRAM(s) Oral daily  potassium chloride    Tablet ER 20 milliEquivalent(s) Oral once  potassium chloride    Tablet ER 10 milliEquivalent(s) Oral daily  sertraline 25 milliGRAM(s) Oral daily    MEDICATIONS  (PRN):  acetaminophen     Tablet .. 650 milliGRAM(s) Oral every 6 hours PRN Temp greater or equal to 38C (100.4F), Mild Pain (1 - 3)  clonazePAM  Tablet 0.25 milliGRAM(s) Oral two times a day PRN agitation  dextrose Oral Gel 15 Gram(s) Oral once PRN Blood Glucose LESS THAN 70 milliGRAM(s)/deciliter      Antibiotics History      Heme Medications   aspirin enteric coated 81 milliGRAM(s) Oral daily, 05-04-23 @ 23:39  enoxaparin Injectable 40 milliGRAM(s) SubCutaneous every 24 hours, 05-05-23 @ 00:12      GI Medications        LABS:                        14.0   8.15  )-----------( 138      ( 06 May 2023 08:00 )             43.2     05-06    144  |  107  |  40<H>  ----------------------------<  187<H>  3.5   |  28  |  1.68<H>    Ca    8.8      06 May 2023 08:00  Phos  4.2     05-06  Mg     1.6     05-06    TPro  6.7  /  Alb  2.5<L>  /  TBili  0.9  /  DBili  x   /  AST  22  /  ALT  21  /  AlkPhos  123<H>  05-04    HIT ab -- 05-04 @ 19:50  HIT ab EIA --  D Dimer -351          PT/INR - ( 04 May 2023 19:50 )   PT: 15.3 sec;   INR: 1.32 ratio         PTT - ( 04 May 2023 19:50 )  PTT:32.0 sec            CULTURES: (if applicable)    Rapid RVP Result: NotDetec (05-04-23 @ 19:50)      ABG - ( 05 May 2023 15:00 )  pH, Arterial: 7.38  pH, Blood: x     /  pCO2: 48    /  pO2: 96    / HCO3: x     / Base Excess: x     /  SaO2: 98.3              CAPILLARY BLOOD GLUCOSE      POCT Blood Glucose.: 113 mg/dL (06 May 2023 11:46)      RADIOLOGY  CXR:  < from: Xray Chest 1 View- PORTABLE-Urgent (Xray Chest 1 View- PORTABLE-Urgent .) (05.04.23 @ 19:50) >    IMPRESSION:    Moderate bilateral perihilar interstitial infiltrates, new.    Small right pleural effusion, new    --- End of Report ---    < end of copied text >     ECHO:  < from: TTE Echo Complete w/o Contrast w/ Doppler (05.05.23 @ 08:06) >  Summary:   1. Left ventricular ejection fraction, by visual estimation, is 40 to 45%.   2. Mildly to moderately decreased global left ventricular systolic function.   3. Increased LV wall thickness.   4. Spectral Doppler shows restrictive pattern of left ventricular myocardial filling (Grade III diastolic dysfunction).   5. There is mild concentric left ventricular hypertrophy.   6. Moderately enlarged left atrium.   7. Right atrial enlargement.   8. Mild thickening and calcification of the anterior and posterior mitral valve leaflets.   9. Moderate mitral valve regurgitation.  10. Mild-moderate tricuspid regurgitation.  11. Estimated pulmonary artery systolic pressure is 45.1 mmHg assuming a right atrial pressureof 10 mmHg, which is consistent with mild pulmonary hypertension.    < end of copied text >      VITALS:  T(C): 36.2 (05-06-23 @ 12:15), Max: 36.7 (05-06-23 @ 07:02)  T(F): 97.2 (05-06-23 @ 12:15), Max: 98.1 (05-06-23 @ 07:02)  HR: 81 (05-06-23 @ 12:15) (81 - 91)  BP: 170/107 (05-06-23 @ 12:15) (142/99 - 170/107)  BP(mean): --  ABP: --  ABP(mean): --  RR: 15 (05-06-23 @ 12:15) (15 - 34)  SpO2: 95% (05-06-23 @ 12:15) (88% - 98%)  CVP(mm Hg): --  CVP(cm H2O): --    Ins and Outs       Height (cm): 154.9 (05-04-23 @ 19:21)  Weight (kg): 61.2 (05-04-23 @ 19:21)  BMI (kg/m2): 25.5 (05-04-23 @ 19:21)        I&O's Detail      Physical Examination:  GENERAL:               Alert, Confused  , No acute distress.    HEENT:                    No JVD, Moist MM  PULM:                     Bilateral air entry, Clear to auscultation bilaterally, no significant sputum production, +Rales, No Rhonchi, No Wheezing  CVS:                         S1, S2,  +Murmur   NEURO:                  Alert, confused  PSYC:                      Calm, no  Insight.

## 2023-05-06 NOTE — PROGRESS NOTE ADULT - ASSESSMENT
Assessment    78 year old man with HTN, Cardiomyopathy, chronic systolic CHF, DM2, Dementia, sent to the ED by PMD because of increased dyspnea, leg edema x past 2 days.  Per ED, daughter states that PMD noted pt to have new afib on EKG.  Pt is confused, demented (baseline) and unable to provide a reliable history.   Pat admitted for acute hypoxic resp failure due to pulmonary edema from acute on chronic heart failure .  I doubt PE at this time .     problem list  - Acute on chronic Systolic heart failure   - Acute Pulmonary edema   - DM-2 uncontrol   - CAD / Afib uncontrol   - Dementia       Plan   - n/c o2 to maintain sat > 90%  - Diuresis   - Optimize cardiac meds   - Monitor blood glucose with coverage   - DVT GI prophy   - Cardio eval  - repeat CXR in few days, hold off CT chest  - no large pl effusion noted on cxr for thoracentesis at this time

## 2023-05-06 NOTE — DISCHARGE NOTE NURSING/CASE MANAGEMENT/SOCIAL WORK - PATIENT PORTAL LINK FT
You can access the FollowMyHealth Patient Portal offered by [facility] by registering at the following website: [email or web address]. By joining [employer]’s FollowMyHealth portal, you will also be able to view your health information using other applications (apps) compatible with our system.

## 2023-05-06 NOTE — PROGRESS NOTE ADULT - ASSESSMENT
acute and chronic chf, hfref  ashd,   ANISH  apcs  elevated tn due to chf, not ACS    suggest  diuresis, monitor renal parameters  long acting b blocker  for now, no ACE, ARB/ARNi due to acute ANISH  also no farxiga /jardiance for now  could dc telemetry  d/w hospitalist team

## 2023-05-06 NOTE — DISCHARGE NOTE NURSING/CASE MANAGEMENT/SOCIAL WORK - NSDCPEFALRISK_GEN_ALL_CORE
Pt was given the Duo neb with 15 L of 02. Per VO by Dr. Owens.   Nebulizer Treatment:  Pre treatment vitals: BP: 174/74 (03/15/19 1708)  Temp: 97.8 °F (36.6 °C) (03/15/19 1708)  Pulse: 85 (03/15/19 1708)  SpO2: 91 % (03/15/19 1708)   Administered Hand held nebulizer treatment with 15 L of 02  Medication Supply: Stock Medication used      Treatment vitals and times recorded in vitals section  Patient tolerated the procedure well.    For information on Fall & Injury Prevention, visit: [email or web address] OR  [email or web address] OR  https://www.cdc.gov/steadi/patient.html

## 2023-05-07 LAB
ANION GAP SERPL CALC-SCNC: 8 MMOL/L — SIGNIFICANT CHANGE UP (ref 5–17)
BUN SERPL-MCNC: 37 MG/DL — HIGH (ref 7–23)
CALCIUM SERPL-MCNC: 9.3 MG/DL — SIGNIFICANT CHANGE UP (ref 8.4–10.5)
CHLORIDE SERPL-SCNC: 107 MMOL/L — SIGNIFICANT CHANGE UP (ref 96–108)
CHOLEST SERPL-MCNC: 172 MG/DL — SIGNIFICANT CHANGE UP
CO2 SERPL-SCNC: 30 MMOL/L — SIGNIFICANT CHANGE UP (ref 22–31)
CREAT SERPL-MCNC: 1.57 MG/DL — HIGH (ref 0.5–1.3)
EGFR: 44 ML/MIN/1.73M2 — LOW
GLUCOSE BLDC GLUCOMTR-MCNC: 126 MG/DL — HIGH (ref 70–99)
GLUCOSE BLDC GLUCOMTR-MCNC: 140 MG/DL — HIGH (ref 70–99)
GLUCOSE BLDC GLUCOMTR-MCNC: 144 MG/DL — HIGH (ref 70–99)
GLUCOSE BLDC GLUCOMTR-MCNC: 215 MG/DL — HIGH (ref 70–99)
GLUCOSE SERPL-MCNC: 168 MG/DL — HIGH (ref 70–99)
HCT VFR BLD CALC: 49.9 % — SIGNIFICANT CHANGE UP (ref 39–50)
HDLC SERPL-MCNC: 39 MG/DL — LOW
HGB BLD-MCNC: 15.5 G/DL — SIGNIFICANT CHANGE UP (ref 13–17)
LIPID PNL WITH DIRECT LDL SERPL: 116 MG/DL — HIGH
MCHC RBC-ENTMCNC: 30.6 PG — SIGNIFICANT CHANGE UP (ref 27–34)
MCHC RBC-ENTMCNC: 31.1 GM/DL — LOW (ref 32–36)
MCV RBC AUTO: 98.4 FL — SIGNIFICANT CHANGE UP (ref 80–100)
NON HDL CHOLESTEROL: 133 MG/DL — HIGH
NRBC # BLD: 0 /100 WBCS — SIGNIFICANT CHANGE UP (ref 0–0)
PLATELET # BLD AUTO: 131 K/UL — LOW (ref 150–400)
POTASSIUM SERPL-MCNC: 4.1 MMOL/L — SIGNIFICANT CHANGE UP (ref 3.5–5.3)
POTASSIUM SERPL-SCNC: 4.1 MMOL/L — SIGNIFICANT CHANGE UP (ref 3.5–5.3)
PSA FLD-MCNC: 1.02 NG/ML — SIGNIFICANT CHANGE UP (ref 0–4)
RBC # BLD: 5.07 M/UL — SIGNIFICANT CHANGE UP (ref 4.2–5.8)
RBC # FLD: 14.6 % — HIGH (ref 10.3–14.5)
SODIUM SERPL-SCNC: 145 MMOL/L — SIGNIFICANT CHANGE UP (ref 135–145)
TRIGL SERPL-MCNC: 87 MG/DL — SIGNIFICANT CHANGE UP
TSH SERPL-MCNC: 0.92 UIU/ML — SIGNIFICANT CHANGE UP (ref 0.36–3.74)
WBC # BLD: 7.11 K/UL — SIGNIFICANT CHANGE UP (ref 3.8–10.5)
WBC # FLD AUTO: 7.11 K/UL — SIGNIFICANT CHANGE UP (ref 3.8–10.5)

## 2023-05-07 PROCEDURE — 99232 SBSQ HOSP IP/OBS MODERATE 35: CPT

## 2023-05-07 PROCEDURE — 99233 SBSQ HOSP IP/OBS HIGH 50: CPT

## 2023-05-07 RX ORDER — METOPROLOL TARTRATE 50 MG
50 TABLET ORAL DAILY
Refills: 0 | Status: DISCONTINUED | OUTPATIENT
Start: 2023-05-08 | End: 2023-05-09

## 2023-05-07 RX ORDER — METOPROLOL TARTRATE 50 MG
25 TABLET ORAL ONCE
Refills: 0 | Status: COMPLETED | OUTPATIENT
Start: 2023-05-07 | End: 2023-05-07

## 2023-05-07 RX ORDER — QUETIAPINE FUMARATE 200 MG/1
12.5 TABLET, FILM COATED ORAL ONCE
Refills: 0 | Status: COMPLETED | OUTPATIENT
Start: 2023-05-07 | End: 2023-05-07

## 2023-05-07 RX ORDER — MAGNESIUM SULFATE 500 MG/ML
2 VIAL (ML) INJECTION ONCE
Refills: 0 | Status: COMPLETED | OUTPATIENT
Start: 2023-05-07 | End: 2023-05-07

## 2023-05-07 RX ADMIN — Medication 10 MILLIEQUIVALENT(S): at 12:32

## 2023-05-07 RX ADMIN — Medication 0.25 MILLIGRAM(S): at 18:37

## 2023-05-07 RX ADMIN — ISOSORBIDE MONONITRATE 60 MILLIGRAM(S): 60 TABLET, EXTENDED RELEASE ORAL at 12:32

## 2023-05-07 RX ADMIN — Medication 25 MILLIGRAM(S): at 06:30

## 2023-05-07 RX ADMIN — Medication 25 MILLIGRAM(S): at 13:09

## 2023-05-07 RX ADMIN — Medication 81 MILLIGRAM(S): at 12:31

## 2023-05-07 RX ADMIN — SERTRALINE 25 MILLIGRAM(S): 25 TABLET, FILM COATED ORAL at 13:10

## 2023-05-07 RX ADMIN — Medication 4: at 18:19

## 2023-05-07 RX ADMIN — ENOXAPARIN SODIUM 40 MILLIGRAM(S): 100 INJECTION SUBCUTANEOUS at 12:32

## 2023-05-07 RX ADMIN — Medication 20 MILLIGRAM(S): at 10:19

## 2023-05-07 RX ADMIN — QUETIAPINE FUMARATE 12.5 MILLIGRAM(S): 200 TABLET, FILM COATED ORAL at 18:11

## 2023-05-07 RX ADMIN — Medication 25 GRAM(S): at 22:55

## 2023-05-07 NOTE — PROGRESS NOTE ADULT - SUBJECTIVE AND OBJECTIVE BOX
Patient is a 78y old  Male who presents with a chief complaint of acute systolic CHF (07 May 2023 15:44)    Patient seen and examined at bedside. Last night received Seroquel for agitation which helped. Less sleepy this AM compared to prior.    ALLERGIES:  sulfa drugs (Unknown)    MEDICATIONS  (STANDING):  aspirin enteric coated 81 milliGRAM(s) Oral daily  dextrose 5%. 1000 milliLiter(s) (50 mL/Hr) IV Continuous <Continuous>  dextrose 5%. 1000 milliLiter(s) (100 mL/Hr) IV Continuous <Continuous>  dextrose 50% Injectable 25 Gram(s) IV Push once  dextrose 50% Injectable 25 Gram(s) IV Push once  dextrose 50% Injectable 12.5 Gram(s) IV Push once  enoxaparin Injectable 40 milliGRAM(s) SubCutaneous every 24 hours  glucagon  Injectable 1 milliGRAM(s) IntraMuscular once  insulin lispro (ADMELOG) corrective regimen sliding scale   SubCutaneous at bedtime  insulin lispro (ADMELOG) corrective regimen sliding scale   SubCutaneous three times a day before meals  isosorbide   mononitrate ER Tablet (IMDUR) 60 milliGRAM(s) Oral daily  magnesium sulfate  IVPB 2 Gram(s) IV Intermittent once  metoprolol succinate ER 50 milliGRAM(s) Oral daily  potassium chloride    Tablet ER 10 milliEquivalent(s) Oral daily  sertraline 25 milliGRAM(s) Oral daily    MEDICATIONS  (PRN):  acetaminophen     Tablet .. 650 milliGRAM(s) Oral every 6 hours PRN Temp greater or equal to 38C (100.4F), Mild Pain (1 - 3)  clonazePAM  Tablet 0.25 milliGRAM(s) Oral two times a day PRN agitation  dextrose Oral Gel 15 Gram(s) Oral once PRN Blood Glucose LESS THAN 70 milliGRAM(s)/deciliter    Vital Signs Last 24 Hrs  T(F): 98.1 (07 May 2023 12:32), Max: 98.1 (07 May 2023 12:32)  HR: 85 (07 May 2023 12:32) (68 - 85)  BP: 168/104 (07 May 2023 12:32) (154/95 - 168/104)  RR: 17 (07 May 2023 12:32) (16 - 17)  SpO2: 92% (07 May 2023 12:32) (82% - 95%)    I&O's Summary    BMI (kg/m2): 25.5 (05-04-23 @ 19:21)    PHYSICAL EXAM:  General: NAD  ENT: Slightly dry mucous membranes, NC in place  Neck: Supple, No JVD  Lungs: Clear to auscultation bilaterally, normal respiratory effort  Cardio: RRR, S1/S2, No murmurs  Abdomen: Soft, Nontender, Nondistended; Bowel sounds present  Extremities: No calf tenderness, + pitting edema (improved)  Psych: Initially asleep but easily arousable, said hello and accepted food from RN    LABS:                        15.5   7.11  )-----------( 131      ( 07 May 2023 06:47 )             49.9       05-07    145  |  107  |  37  ----------------------------<  168  4.1   |  30  |  1.57    Ca    9.3      07 May 2023 06:47  Phos  4.2     05-06  Mg     1.6     05-06              CARDIAC MARKERS ( 05 May 2023 08:00 )  x     / 132.5 ng/L / x     / x     / x      CARDIAC MARKERS ( 04 May 2023 23:40 )  x     / 122.8 ng/L / x     / x     / x          05-07 Chol 172 mg/dL LDL -- HDL 39 mg/dL Trig 87 mg/dL  TSH 0.920   TSH with FT4 reflex --  Total T3 --      ABG - ( 05 May 2023 15:00 )  pH, Arterial: 7.38  pH, Blood: x     /  pCO2: 48    /  pO2: 96    / HCO3: x     / Base Excess: x     /  SaO2: 98.3                    POCT Blood Glucose.: 215 mg/dL (07 May 2023 18:16)  POCT Blood Glucose.: 144 mg/dL (07 May 2023 12:27)  POCT Blood Glucose.: 140 mg/dL (07 May 2023 08:38)  POCT Blood Glucose.: 149 mg/dL (06 May 2023 21:11)              RADIOLOGY & ADDITIONAL TESTS:   Personally reviewed.     Consultant(s) Notes Reviewed:  [x] YES  [ ] NO    Care Discussed with Consultants/Other Providers:

## 2023-05-07 NOTE — PROGRESS NOTE ADULT - ASSESSMENT
apc's  altered mental status  ashd, remote cabg  ANISH  hbp  chf, systolic      d/w daughter at bedside  suggest  follow up cxr  increase b blocker   pro bnp  repeat renal parameters  if renal dysfunction dosen't improve, consider adding hydralazine to current nitrate  if improves towards baseline  then entresto reconsider farxiga  no MRA at this time ANISH  hold lasix pending cxr and bnp

## 2023-05-07 NOTE — PROGRESS NOTE ADULT - SUBJECTIVE AND OBJECTIVE BOX
Follow up for :   systolic chf, ashd arrhythmia niki    seen with daughter at bedside    SUBJ:  sedated, no complaint    PMH  HTN (hypertension)    HLD (hyperlipidemia)    Diabetes    CAD (coronary artery disease)        MEDICATIONS  (STANDING):  aspirin enteric coated 81 milliGRAM(s) Oral daily  clonazePAM  Tablet 0.5 milliGRAM(s) Oral at bedtime  dextrose 5%. 1000 milliLiter(s) (50 mL/Hr) IV Continuous <Continuous>  dextrose 5%. 1000 milliLiter(s) (100 mL/Hr) IV Continuous <Continuous>  dextrose 50% Injectable 25 Gram(s) IV Push once  dextrose 50% Injectable 12.5 Gram(s) IV Push once  dextrose 50% Injectable 25 Gram(s) IV Push once  enoxaparin Injectable 40 milliGRAM(s) SubCutaneous every 24 hours  furosemide   Injectable 20 milliGRAM(s) IV Push two times a day  glucagon  Injectable 1 milliGRAM(s) IntraMuscular once  insulin lispro (ADMELOG) corrective regimen sliding scale   SubCutaneous three times a day before meals  insulin lispro (ADMELOG) corrective regimen sliding scale   SubCutaneous at bedtime  isosorbide   mononitrate ER Tablet (IMDUR) 60 milliGRAM(s) Oral daily  metoprolol succinate ER 25 milliGRAM(s) Oral daily  potassium chloride    Tablet ER 10 milliEquivalent(s) Oral daily  sertraline 25 milliGRAM(s) Oral daily    MEDICATIONS  (PRN):  acetaminophen     Tablet .. 650 milliGRAM(s) Oral every 6 hours PRN Temp greater or equal to 38C (100.4F), Mild Pain (1 - 3)  clonazePAM  Tablet 0.25 milliGRAM(s) Oral two times a day PRN agitation  dextrose Oral Gel 15 Gram(s) Oral once PRN Blood Glucose LESS THAN 70 milliGRAM(s)/deciliter  QUEtiapine 12.5 milliGRAM(s) Oral at bedtime PRN Agitation, restlessness        PHYSICAL EXAM:  Vital Signs Last 24 Hrs  T(C): 36.6 (07 May 2023 05:42), Max: 36.6 (07 May 2023 05:42)  T(F): 97.8 (07 May 2023 05:42), Max: 97.8 (07 May 2023 05:42)  HR: 75 (07 May 2023 05:42) (68 - 82)  BP: 167/98 (07 May 2023 05:42) (154/95 - 171/101)  BP(mean): --  RR: 16 (07 May 2023 08:50) (15 - 16)  SpO2: 82% (07 May 2023 08:50) (82% - 95%)    Parameters below as of 07 May 2023 08:50  Patient On (Oxygen Delivery Method): room air        GENERAL: NAD, well-groomed, well-developed  HEAD:  Atraumatic, Normocephalic  EYES:  conjunctiva and sclera clear  NECK: Supple, No JVD, no bruits  CHEST/LUNG: poor cooperation   HEART: Regular rate and rhythm; No murmurs, rubs, or gallops PMI non displaced.  ABDOMEN: Soft, Nontender, Nondistended; Bowel sounds present  EXTREMITIES:  No clubbing, cyanosis, or edema  SKIN: No rashes or lesions  NERVOUS SYSTEM:  sedated, arrousable        LABS:                        15.5   7.11  )-----------( 131      ( 07 May 2023 06:47 )             49.9     05-07    145  |  107  |  37<H>  ----------------------------<  168<H>  4.1   |  30  |  1.57<H>    Ca    9.3      07 May 2023 06:47  Phos  4.2     05-06  Mg     1.6     05-06            RADIOLOGY & ADDITIONAL STUDIES:  < from: TTE Echo Complete w/o Contrast w/ Doppler (23 @ 08:06) >    ACC: [id number] EXAM:  ECHO TTE WO CON COMP W DOPP                          PROCEDURE DATE:  2023          INTERPRETATION:  TRANSTHORACIC ECHOCARDIOGRAM REPORT        Patient Name:   MARIO RAMOS Patient Location: [address] Rec #:  [id number]         Accession #:      [id number]  Account #:      [id number]           Height:           60.6 in 154.0 cm  YOB: 1945        Weight:           134.5 lb 61.00 kg  Patient Age:    78 years         BSA:              1.59 m²  PatientGender: M                BP:               149/99 mmHg      Date of Exam:        2023 8:06:48 AM  Sonographer:         ESPERANZA  Referring Physician: VADIM    Procedure:     2D Echo/Doppler/Color Doppler Complete.  Indications:   I42.9 - Cardiomyopathy, unspecified  Diagnosis:     I42.9 - Cardiomyopathy, unspecified  Study Details: Technically fair study.        2D AND M-MODE MEASUREMENTS (normal ranges within parentheses):  Left Ventricle:                  Normal   Aorta/Left Atrium:  Normal  IVSd (2D):              1.41 cm (0.7-1.1) Aortic Root (2D):    3.18 cm   (2.4-3.7)  LVPWd (2D):             1.30 cm (0.7-1.1) Aortic Root (Mmode): 3.26 cm   (2.4-3.7)  LVIDd (2D):             4.49 cm (3.4-5.7) AoV Cusp Separation: 1.53 cm   (1.5-2.6)  LVIDs (2D):             3.72 cm           Left Atrium (2D):    4.46 cm   (1.9-4.0)  LV FS (2D):             17.0 %   (>25%)   Left Atrium (Mmode): 5.00 cm   (1.9-4.0)  Relative Wall Thickness  0.58    (<0.42)  Right Ventricle:                                RVd (2D):        3.50 cm    LV DIASTOLIC FUNCTION:  MV Peak E: 0.99 m/s Decel Time: 133 msec  MV Peak A: 0.28 m/s  E/A Ratio: 3.54    SPECTRAL DOPPLER ANALYSIS (where applicable):  Mitral Valve:  MV P1/2 Time: 38.69 msec  MV Area, PHT: 5.69 cm²    Aortic Valve: AoV Max Av: 0.71 m/s AoV Peak P.0 mmHg AoV Mean P.2 mmHg    LVOT Vmax: 0.61 m/s LVOT VTI: 0.089 m LVOT Diameter: 1.99 cm    AoV Area, Vmax: 2.66 cm² AoV Area, VTI: 2.12 cm² AoV Area, Vmn: 2.15 cm²  Ao VTI: 0.131  Tricuspid Valve and PA/RV Systolic Pressure: TR Max Velocity: 2.96 m/s RA   Pressure: 10 mmHg RVSP/PASP: 45.1 mmHg      PHYSICIAN INTERPRETATION:  Left Ventricle: The left ventricular internal cavity size is normal. Left   ventricular wall thickness is increased.  Global LV systolic function was mildly to moderately decreased. Left   ventricular ejection fraction, by visual estimation, is 40 to 45%.   Spectral Doppler shows restrictive pattern of left ventricular myocardial   filling (Grade III diastolic dysfunction).  Right Ventricle: Normal right ventricular size and function. The right   ventricle was not well visualized.  Left Atrium: Moderately enlarged left atrium.  Right Atrium: Right atrial enlargement.  Pericardium: There is no evidence of pericardial effusion.  Mitral Valve: Mild thickening and calcification of the anterior and   posterior mitral valve leaflets. Moderate mitral valve regurgitation is   seen.  Tricuspid Valve: Structurally normal tricuspid valve, with normal leaflet   excursion. Mild-moderate tricuspid regurgitation is visualized. Estimated   pulmonary artery systolic pressure is 45.1 mmHg assuming a right atrial   pressure of 10 mmHg, which is consistent with mild pulmonary hypertension.  Aortic Valve: The aortic valve is trileaflet.  Pulmonic Valve: Structurally normal pulmonic valve, with normal leaflet   excursion. The pulmonic valve was not well visualized. Trace pulmonic   valve regurgitation.  Aorta: The aortic root is normal in size and structure.      Summary:   1. Left ventricular ejection fraction, by visual estimation, is 40 to   45%.   2. Mildly to moderately decreased global left ventricular systolic   function.   3. Increased LV wall thickness.   4. Spectral Doppler shows restrictive pattern of left ventricular   myocardial filling (Grade III diastolic dysfunction).   5. There is mild concentric left ventricular hypertrophy.   6. Moderately enlarged left atrium.   7. Right atrial enlargement.   8. Mild thickening and calcification of the anterior and posterior   mitral valve leaflets.   9. Moderate mitral valve regurgitation.  10. Mild-moderate tricuspid regurgitation.  11. Estimated pulmonary artery systolic pressure is 45.1 mmHg assuming a   right atrial pressureof 10 mmHg, which is consistent with mild pulmonary   hypertension.    [id number] Michi Dennis MD, FACC , MD, FACC Electronically   signed on 2023 at 10:39:15 AM    < end of copied text >

## 2023-05-07 NOTE — PROGRESS NOTE ADULT - ASSESSMENT
77 yo M with PMH of HTN, CM, systolic CHF, DM2, dementia, a/w SOB and leg swelling, dx with CHF exacerbation.    #Acute on chronic systolic dysfunction  -Lasix changed to 20 mg IV BID 5/5, daily x1 on 5/7. will hold further diuresis as patient now appears to be dry. volume status to be reassessed in the AM  -Desaturated to 82% on RA this morning, continue O2 support at 2L NC for now and wean as able  -Daily weights, I&Os  -Monitor K and Mg  -Echo: EF 40-45%  -Increased Toprol from 25 to 50 mg QD  -consider Entresto and Farxiga when renal fxn back to normal  -repeat CXR, proBNP in AM  -discussed with cardio Dr. Escudero, plan as above    #ANISH - slowly improving  -Cr was 1 on 4/12/23  -likely from fluid overload state  -No evidence for bilateral hydronephrosis on renal sono 5/5  -No retention on bedside bladder sono 5/6  -Monitor renal function closely on diuretic  -BMP in AM    #Demand ischemia  -c/w ASA, Toprol  -check lipids, ? should be on statin    #HTN  -c/w Toprol and Norvasc    #Dementia with behavioral disturbance  #Daytime somnolence, likely 2/2 higher doses of benzos  -discussed with daughter (pharmacist, very involved in her father's care) risks/benefits of benzo use in this population - patient has been stable on low dose of Clonazepem 0.25 mg x1 only once a day in the AM. Normally does not have severe agitation/delirium at home  -Continue Seroquel 12.5 mg QHS (give earlier tonight), started on 5/6, with PRN Clonazepam at home dose of 0.25 mg if needed. Dc'd standing Clonazepam 0.5 mg QHS. Avoid higher doses, Ativan and Zyprexa as discussed with patient's daughter    #DM2  -A1C 7.7  -Stable sugars at this time, ISS    #DVT ppx  -Lovenox    5/6: Updated patient’s daughter Kuldeep. 79 yo M with PMH of HTN, CM, systolic CHF, DM2, dementia, a/w SOB and leg swelling, dx with CHF exacerbation.    #Acute on chronic systolic dysfunction  -Lasix changed to 20 mg IV BID 5/5, daily x1 on 5/7. will hold further diuresis as patient now appears to be dry. volume status to be reassessed in the AM  -Desaturated to 82% on RA this morning but while sleepy, added cont pulse ox. continue O2 support at 2L NC for now and wean as able  -Echo: EF 40-45%  -Increased Toprol from 25 to 50 mg QD  -consider Entresto and Farxiga when renal fxn back to normal  -repeat CXR, proBNP in AM  -Daily weights, I&Os  -Monitor K and Mg  -discussed with cardio Dr. Escudero, plan as above    #ANISH - slowly improving  -Cr was 1 on 4/12/23  -likely from fluid overload state  -No evidence for bilateral hydronephrosis on renal sono 5/5  -No retention on bedside bladder sono 5/6  -Monitor renal function closely on diuretic  -BMP in AM    #Demand ischemia  -c/w ASA, Toprol  -check lipids, ? should be on statin    #HTN  -c/w Toprol (increased) and Norvasc  -monitor BP    #Thrombocytopenia  -mild, asymptomatic  -monitor    #Dementia with behavioral disturbance  #Daytime somnolence, likely 2/2 higher doses of benzos  -discussed with daughter (pharmacist, very involved in her father's care) risks/benefits of benzo use in this population - patient has been stable on low dose of Clonazepem 0.25 mg x1 only once a day in the AM. Normally does not have severe agitation/delirium at home  -Continue Seroquel 12.5 mg QHS (give earlier tonight), started on 5/6, with PRN Clonazepam at home dose of 0.25 mg if needed. Dc'd standing Clonazepam 0.5 mg QHS. Avoid higher doses, Ativan and Zyprexa as discussed with patient's daughter    #DM2  -A1C 7.7  -Stable sugars at this time, ISS    #DVT ppx  -Lovenox    5/6: Updated patient’s daughter Kuldeep. 77 yo M with PMH of HTN, CM, systolic CHF, DM2, dementia, a/w SOB and leg swelling, dx with CHF exacerbation.    #Acute hypoxic respiratory failure  #Acute on chronic systolic dysfunction  -Lasix changed to 20 mg IV BID 5/5, daily x1 on 5/7. will hold further diuresis as patient now appears to be dry. volume status to be reassessed in the AM  -Desaturated to 82% on RA this morning but while sleepy, added cont pulse ox. continue O2 support at 2L NC for now and wean as able  -Echo: EF 40-45%  -Increased Toprol from 25 to 50 mg QD  -consider Entresto and Farxiga when renal fxn back to normal  -repeat CXR, proBNP in AM  -Daily weights, I&Os  -Monitor K and Mg  -discussed with cardio Dr. Escudero, plan as above    #ANISH - slowly improving  -Cr was 1 on 4/12/23  -likely from fluid overload state  -No evidence for bilateral hydronephrosis on renal sono 5/5  -No retention on bedside bladder sono 5/6  -Monitor renal function closely on diuretic  -BMP in AM    #Demand ischemia  -c/w ASA, Toprol  -check lipids, ? should be on statin    #HTN  -c/w Toprol (increased) and Norvasc  -monitor BP    #Thrombocytopenia  -mild, asymptomatic  -monitor    #Dementia with behavioral disturbance  #Daytime somnolence, likely 2/2 higher doses of benzos  -discussed with daughter (pharmacist, very involved in her father's care) risks/benefits of benzo use in this population - patient has been stable on low dose of Clonazepem 0.25 mg x1 only once a day in the AM. Normally does not have severe agitation/delirium at home  -Continue Seroquel 12.5 mg QHS (give earlier tonight), started on 5/6, with PRN Clonazepam at home dose of 0.25 mg if needed. Dc'd standing Clonazepam 0.5 mg QHS. Avoid higher doses, Ativan and Zyprexa as discussed with patient's daughter    #DM2  -A1C 7.7  -Stable sugars at this time, ISS    #DVT ppx  -Lovenox    5/6: Updated patient’s daughter Kuldeep.

## 2023-05-07 NOTE — PROGRESS NOTE ADULT - SUBJECTIVE AND OBJECTIVE BOX
Follow-up Pulmonary Progress Note  Chief Complaint : Atrial fibrillation    patient seen and examined  oob to chair  confused unable to provide history or ros  sat 87% on RA      Allergies :sulfa drugs (Unknown)      PAST MEDICAL & SURGICAL HISTORY:  HTN (hypertension)    HLD (hyperlipidemia)    Diabetes    CAD (coronary artery disease)    History of cholecystectomy        Medications:  MEDICATIONS  (STANDING):  aspirin enteric coated 81 milliGRAM(s) Oral daily  dextrose 5%. 1000 milliLiter(s) (100 mL/Hr) IV Continuous <Continuous>  dextrose 5%. 1000 milliLiter(s) (50 mL/Hr) IV Continuous <Continuous>  dextrose 50% Injectable 25 Gram(s) IV Push once  dextrose 50% Injectable 12.5 Gram(s) IV Push once  dextrose 50% Injectable 25 Gram(s) IV Push once  enoxaparin Injectable 40 milliGRAM(s) SubCutaneous every 24 hours  glucagon  Injectable 1 milliGRAM(s) IntraMuscular once  insulin lispro (ADMELOG) corrective regimen sliding scale   SubCutaneous three times a day before meals  insulin lispro (ADMELOG) corrective regimen sliding scale   SubCutaneous at bedtime  isosorbide   mononitrate ER Tablet (IMDUR) 60 milliGRAM(s) Oral daily  metoprolol succinate ER 50 milliGRAM(s) Oral daily  potassium chloride    Tablet ER 10 milliEquivalent(s) Oral daily  QUEtiapine 12.5 milliGRAM(s) Oral once  sertraline 25 milliGRAM(s) Oral daily    MEDICATIONS  (PRN):  acetaminophen     Tablet .. 650 milliGRAM(s) Oral every 6 hours PRN Temp greater or equal to 38C (100.4F), Mild Pain (1 - 3)  clonazePAM  Tablet 0.25 milliGRAM(s) Oral two times a day PRN agitation  dextrose Oral Gel 15 Gram(s) Oral once PRN Blood Glucose LESS THAN 70 milliGRAM(s)/deciliter      Antibiotics History      Heme Medications   aspirin enteric coated 81 milliGRAM(s) Oral daily, 05-04-23 @ 23:39  enoxaparin Injectable 40 milliGRAM(s) SubCutaneous every 24 hours, 05-05-23 @ 00:12      GI Medications        LABS:                        15.5   7.11  )-----------( 131      ( 07 May 2023 06:47 )             49.9     05-07    145  |  107  |  37<H>  ----------------------------<  168<H>  4.1   |  30  |  1.57<H>    Ca    9.3      07 May 2023 06:47  Phos  4.2     05-06  Mg     1.6     05-06      HIT ab -- 05-04 @ 19:50  HIT ab EIA --  D Dimer -351         VITALS:  T(C): 36.7 (05-07-23 @ 12:32), Max: 36.7 (05-07-23 @ 12:32)  T(F): 98.1 (05-07-23 @ 12:32), Max: 98.1 (05-07-23 @ 12:32)  HR: 85 (05-07-23 @ 12:32) (68 - 85)  BP: 168/104 (05-07-23 @ 12:32) (154/95 - 171/101)  BP(mean): --  ABP: --  ABP(mean): --  RR: 17 (05-07-23 @ 12:32) (16 - 17)  SpO2: 92% (05-07-23 @ 12:32) (82% - 95%)  CVP(mm Hg): --  CVP(cm H2O): --    Ins and Outs       Height (cm): 154.9 (05-04-23 @ 19:21)  Weight (kg): 61.2 (05-04-23 @ 19:21)  BMI (kg/m2): 25.5 (05-04-23 @ 19:21)        I&O's Detail

## 2023-05-07 NOTE — PROGRESS NOTE ADULT - TIME BILLING
Reviewing chart notes and data, reviewing O2 trends and starting continuous pulse ox monitoring, face to face time seeing the patient, communicating with patient's daughter and cardio to review plan, coordinating care with SW/CM at [facility]
Reviewing chart notes and data, reviewing telemetry monitor records, face to face time counseling the patient, communicating with cardiologist and daughter on plan of care, coordinating care with SW/CM at [facility]

## 2023-05-07 NOTE — PROGRESS NOTE ADULT - ASSESSMENT
Physical Examination:  GENERAL:               Alert, Confused  , No acute distress.    HEENT:                    No JVD, Moist MM  PULM:                     Bilateral air entry, Clear to auscultation bilaterally, no significant sputum production, +Rales, No Rhonchi, No Wheezing  CVS:                         S1, S2,  +Murmur   NEURO:                  Alert, confused  PSYC:                      Calm, no  Insight.     78 year old man with HTN, Cardiomyopathy, chronic systolic CHF, DM2, Dementia, sent to the ED by PMD because of increased dyspnea, leg edema x past 2 days.  Per ED, daughter states that PMD noted pt to have new afib on EKG.  Pt is confused, demented (baseline) and unable to provide a reliable history.   Pat admitted for acute hypoxic resp failure due to pulmonary edema from acute on chronic heart failure .  I doubt PE at this time .     problem list  - Acute on chronic Systolic heart failure   - Acute Pulmonary edema   - DM-2 uncontrol   - CAD / Afib uncontrol   - Dementia       Plan   - n/c o2 to maintain sat > 90%  - Diuresis as bp/renal function allows    - Optimize cardiac meds   - Monitor blood glucose with coverage   - DVT GI prophy   - Cardio f/u   - repeat CXR in few days, hold off CT chest  - no large pl effusion noted on cxr for thoracentesis at this time

## 2023-05-08 LAB
ANION GAP SERPL CALC-SCNC: 10 MMOL/L — SIGNIFICANT CHANGE UP (ref 5–17)
BUN SERPL-MCNC: 35 MG/DL — HIGH (ref 7–23)
CALCIUM SERPL-MCNC: 9.3 MG/DL — SIGNIFICANT CHANGE UP (ref 8.4–10.5)
CHLORIDE SERPL-SCNC: 107 MMOL/L — SIGNIFICANT CHANGE UP (ref 96–108)
CO2 SERPL-SCNC: 30 MMOL/L — SIGNIFICANT CHANGE UP (ref 22–31)
CREAT SERPL-MCNC: 1.58 MG/DL — HIGH (ref 0.5–1.3)
EGFR: 44 ML/MIN/1.73M2 — LOW
GLUCOSE BLDC GLUCOMTR-MCNC: 117 MG/DL — HIGH (ref 70–99)
GLUCOSE BLDC GLUCOMTR-MCNC: 154 MG/DL — HIGH (ref 70–99)
GLUCOSE BLDC GLUCOMTR-MCNC: 164 MG/DL — HIGH (ref 70–99)
GLUCOSE BLDC GLUCOMTR-MCNC: 273 MG/DL — HIGH (ref 70–99)
GLUCOSE SERPL-MCNC: 194 MG/DL — HIGH (ref 70–99)
HCT VFR BLD CALC: 53 % — HIGH (ref 39–50)
HGB BLD-MCNC: 16.6 G/DL — SIGNIFICANT CHANGE UP (ref 13–17)
MAGNESIUM SERPL-MCNC: 2.3 MG/DL — SIGNIFICANT CHANGE UP (ref 1.6–2.6)
MCHC RBC-ENTMCNC: 31.1 PG — SIGNIFICANT CHANGE UP (ref 27–34)
MCHC RBC-ENTMCNC: 31.3 GM/DL — LOW (ref 32–36)
MCV RBC AUTO: 99.4 FL — SIGNIFICANT CHANGE UP (ref 80–100)
NRBC # BLD: 0 /100 WBCS — SIGNIFICANT CHANGE UP (ref 0–0)
NT-PROBNP SERPL-SCNC: HIGH PG/ML (ref 0–300)
PLATELET # BLD AUTO: 168 K/UL — SIGNIFICANT CHANGE UP (ref 150–400)
POTASSIUM SERPL-MCNC: 4.2 MMOL/L — SIGNIFICANT CHANGE UP (ref 3.5–5.3)
POTASSIUM SERPL-SCNC: 4.2 MMOL/L — SIGNIFICANT CHANGE UP (ref 3.5–5.3)
RBC # BLD: 5.33 M/UL — SIGNIFICANT CHANGE UP (ref 4.2–5.8)
RBC # FLD: 14.7 % — HIGH (ref 10.3–14.5)
SODIUM SERPL-SCNC: 147 MMOL/L — HIGH (ref 135–145)
WBC # BLD: 8.98 K/UL — SIGNIFICANT CHANGE UP (ref 3.8–10.5)
WBC # FLD AUTO: 8.98 K/UL — SIGNIFICANT CHANGE UP (ref 3.8–10.5)

## 2023-05-08 PROCEDURE — 99233 SBSQ HOSP IP/OBS HIGH 50: CPT

## 2023-05-08 PROCEDURE — 71045 X-RAY EXAM CHEST 1 VIEW: CPT | Mod: 26

## 2023-05-08 PROCEDURE — 99232 SBSQ HOSP IP/OBS MODERATE 35: CPT

## 2023-05-08 RX ORDER — CLONAZEPAM 1 MG
1 TABLET ORAL
Refills: 0 | DISCHARGE

## 2023-05-08 RX ORDER — HYDRALAZINE HCL 50 MG
10 TABLET ORAL THREE TIMES A DAY
Refills: 0 | Status: DISCONTINUED | OUTPATIENT
Start: 2023-05-08 | End: 2023-05-09

## 2023-05-08 RX ORDER — SERTRALINE 25 MG/1
1 TABLET, FILM COATED ORAL
Refills: 0 | DISCHARGE

## 2023-05-08 RX ADMIN — Medication 2: at 12:49

## 2023-05-08 RX ADMIN — Medication 0.25 MILLIGRAM(S): at 22:45

## 2023-05-08 RX ADMIN — Medication 81 MILLIGRAM(S): at 11:42

## 2023-05-08 RX ADMIN — Medication 50 MILLIGRAM(S): at 08:43

## 2023-05-08 RX ADMIN — ENOXAPARIN SODIUM 40 MILLIGRAM(S): 100 INJECTION SUBCUTANEOUS at 11:42

## 2023-05-08 RX ADMIN — Medication 1: at 22:39

## 2023-05-08 RX ADMIN — Medication 10 MILLIGRAM(S): at 22:49

## 2023-05-08 RX ADMIN — Medication 0.25 MILLIGRAM(S): at 08:43

## 2023-05-08 RX ADMIN — Medication 2: at 08:49

## 2023-05-08 RX ADMIN — SERTRALINE 25 MILLIGRAM(S): 25 TABLET, FILM COATED ORAL at 11:43

## 2023-05-08 RX ADMIN — Medication 10 MILLIEQUIVALENT(S): at 11:42

## 2023-05-08 RX ADMIN — Medication 10 MILLIGRAM(S): at 17:07

## 2023-05-08 RX ADMIN — ISOSORBIDE MONONITRATE 60 MILLIGRAM(S): 60 TABLET, EXTENDED RELEASE ORAL at 11:42

## 2023-05-08 NOTE — DIETITIAN INITIAL EVALUATION ADULT - ORAL INTAKE PTA/DIET HISTORY
As per Niece , patient has had a decrease in po intake since spouse  , however as per PCA patient consumed 100% of breakfast meal, Unable to obtain diet history from patient as patient noted confused .

## 2023-05-08 NOTE — PROGRESS NOTE ADULT - NS ATTEND AMEND GEN_ALL_CORE FT
79 yo M with PMH of HTN, CM, systolic CHF, DM2, dementia, a/w SOB and leg swelling, dx with CHF exacerbation.    #Acute hypoxic respiratory failure   #Acute on chronic systolic dysfunction  - continue oxygen, wean off as tolerated  - noted to desaturate to 82% on RA at rest this morning  - lasix held for now as patient appears volume depleted  - probnp improving  - dimer reviewed, age appropriate and therefore low risk for PE  - follow up CXR  - cardio recs appreciated     #Demand ischemia  - c/w ASA, Toprol  - lipid profile reviewed   - as per cardio, would not perform coronary angio given poor renal function and advanced dementia    #HTN  - c/w Toprol and imdur  - hydralazine added to improve BP control    dispo pending euvolemic volume status, improvement in respiratory status and will likely need to have home o2 77 yo M with PMH of HTN, CM, systolic CHF, DM2, dementia, a/w SOB and leg swelling, dx with CHF exacerbation.    #Acute hypoxic respiratory failure   #Acute on chronic systolic dysfunction  - continue oxygen, wean off as tolerated  - noted to desaturate to 82% on RA at rest this morning  - lasix held for now as patient appears volume depleted  - probnp improving  - dimer reviewed, age appropriate and therefore low suspicion for PE  - follow up CXR  - cardio recs appreciated     #Demand ischemia  - c/w ASA, Toprol  - lipid profile reviewed   - as per cardio, would not perform coronary angio given poor renal function and advanced dementia    #HTN  - c/w Toprol and imdur  - hydralazine added to improve BP control    dispo pending euvolemic volume status, improvement in respiratory status and will likely need to have home o2

## 2023-05-08 NOTE — PROGRESS NOTE ADULT - SUBJECTIVE AND OBJECTIVE BOX
MARIO RICHARD  [id number]      Chief Complaint: CHF/Atrial tachycardia/CAD s/p CABG    Interval History: The patient has dementia and not able to provide history, appears comfortable.     Tele: not on telemetry     Current meds:   acetaminophen     Tablet .. 650 milliGRAM(s) Oral every 6 hours PRN  aspirin enteric coated 81 milliGRAM(s) Oral daily  clonazePAM  Tablet 0.25 milliGRAM(s) Oral two times a day PRN  dextrose 5%. 1000 milliLiter(s) IV Continuous <Continuous>  dextrose 5%. 1000 milliLiter(s) IV Continuous <Continuous>  dextrose 50% Injectable 25 Gram(s) IV Push once  dextrose 50% Injectable 25 Gram(s) IV Push once  dextrose 50% Injectable 12.5 Gram(s) IV Push once  dextrose Oral Gel 15 Gram(s) Oral once PRN  enoxaparin Injectable 40 milliGRAM(s) SubCutaneous every 24 hours  glucagon  Injectable 1 milliGRAM(s) IntraMuscular once  insulin lispro (ADMELOG) corrective regimen sliding scale   SubCutaneous three times a day before meals  insulin lispro (ADMELOG) corrective regimen sliding scale   SubCutaneous at bedtime  isosorbide   mononitrate ER Tablet (IMDUR) 60 milliGRAM(s) Oral daily  metoprolol succinate ER 50 milliGRAM(s) Oral daily  potassium chloride    Tablet ER 10 milliEquivalent(s) Oral daily  sertraline 25 milliGRAM(s) Oral daily    Objective:     Vital Signs:   T(C): 37 (05-08-23 @ 09:47), Max: 37 (05-08-23 @ 05:44)  HR: 72 (05-08-23 @ 09:47) (70 - 85)  BP: 168/91 (05-08-23 @ 09:47) (165/109 - 179/106)  RR: 18 (05-08-23 @ 09:47) (17 - 18)  SpO2: 96% (05-08-23 @ 09:48) (82% - 99%)  Wt(kg): --    Physical Exam:   General: elderly man with dementia, no distress  Neck: supple   CVS: JVP ~ 9 cm H20, RRR, s1, s2  Pulm: unlabored respirations, mostly clear to ausculation   Ext: no lower extremity edema b/l   Neuro: awake, not oriented       Labs:   08 May 2023 09:05    147    |  107    |  35     ----------------------------<  194    4.2     |  30     |  1.58     Ca    9.3        08 May 2023 09:05  Mg     2.3       08 May 2023 09:05                            16.6   8.98  )-----------( 168      ( 08 May 2023 09:05 )             53.0         Coronary angiogram (2014):  DIAGNOSTIC IMPRESSIONS: Patent PETER to LAD. Occluded RCA - collateralized. Moderate OM2 disease. Normal LVEDP.  DIAGNOSTIC RECOMMENDATIONS: Medical therapy and consider restarting ASA as soon as possible post surgery.    TTE (7/2022):  LVEF 40-45%    TTE (1/2023):  LVEF 55-60%, regional wall motion abnormalities  Moderate-severe MR, Moderate TR  Moderate pulmonary hypertension     Outpatient nuclear stress test (2/2023):  Medium sized, moderate defect in the mid inferior and basal inferior walls that are predominantly fixed suggestive of an infarction with mild laura-infarct ischemia, stress LVEF 41%      ECG (5/4/23): possible atrial tachycardia, nonspecific ST abnormalities, prolonged QTc     TTE (5/5/23):  LVEF 40-45%, mild LVH  Biatrial enlargement   Moderate MR, Mild-moderate TR  Mild pulmonary hypertension     CXR (5/4/23):  Moderate bilateral perihilar interstitial infiltrates, new.  Small right pleural effusion, new

## 2023-05-08 NOTE — PROGRESS NOTE ADULT - ASSESSMENT
Assessment:  Giovani Avila is an 78 year old man with past medical history of HFrecEF (LVEF 40-45% in 7/2022, then LVEF 55-60% in 1/2023), Coronary artery disease (s/p CABG with known occluded RCA, medically managed) was sent in to ER by PMD due to dyspnea, and concern for atrial arrhythmia, found to have congestive heart failure and acute renal injury.      ECG on admission suggestive of atrial tachycardia. Troponins elevated and likely secondary to known occluded RCA, CHF and renal disease. Pro BNP significantly elevated. Echo report with LVEF 40-45% which is a decrement from recent outpatient echo report with LVEF 55-60% in January. Recent outpatient nuclear stress test from January was abnormal and consistent with inferior wall infarct with mild laura-infarct ischemia appears was medically managed.     Recommendations:  [] Congestive heart failure: LVEF 40-45%. Appears near-euvolemic, s/p IV diuresis. Would follow up with Renal when safe to resume oral diuretic due to abnormal renal function (takes Lasix 20 mg daily at home). Had recent non-invasive ischemic evaluation that was abnormal and medically managed. Not candidate for ARNI, SGLT2-I or MR antagonist due to abnormal renal function, can further follow up as outpatient. Consider addition of Hydralazine for better BP control.  [] CAD s/p CABG, elevated troponins: Continue medical management as per above, aspirin, metoprolol. Would not perform coronary angiogram at this time due to advanced dementia and poor renal function.    We will continue to follow along.     Trang Faulkner MD  Cardiology      Assessment:  Giovani Avila is an 78 year old man with past medical history of HFrecEF (LVEF 40-45% in 7/2022, then LVEF 55-60% in 1/2023), Coronary artery disease (s/p CABG with known occluded RCA, medically managed) was sent in to ER by PMD due to dyspnea, and concern for atrial arrhythmia, found to have congestive heart failure and acute renal injury.      ECG on admission suggestive of atrial tachycardia. Troponins elevated and likely secondary to known occluded RCA, CHF and renal disease. Pro BNP significantly elevated. Echo report with LVEF 40-45% which is a decrement from recent outpatient echo report with LVEF 55-60% in January. Recent outpatient nuclear stress test from February was abnormal and consistent with inferior wall infarct with mild laura-infarct ischemia appears was medically managed.     Recommendations:  [] Congestive heart failure: LVEF 40-45%. Appears near-euvolemic, s/p IV diuresis. Would follow up with Renal when safe to resume oral diuretic due to abnormal renal function (takes Lasix 20 mg daily at home). Had recent non-invasive ischemic evaluation that was abnormal and medically managed. Not candidate for ARNI, SGLT2-I or MR antagonist due to abnormal renal function, can further follow up as outpatient. Consider addition of Hydralazine for better BP control.  [] CAD s/p CABG, elevated troponins: Continue medical management as per above, aspirin, metoprolol. Would not perform coronary angiogram at this time due to advanced dementia and poor renal function.    We will continue to follow along.     Trang Faulkner MD  Cardiology

## 2023-05-08 NOTE — PROGRESS NOTE ADULT - SUBJECTIVE AND OBJECTIVE BOX
Patient is a 78y old  Male who presents with a chief complaint of Atrial fibrillation (08 May 2023 10:50)    Patient seen and examined at bedside. No overnight events reported. This morning, SpO2 on room air was 82% on room air, improved to 96% on 2L. Patient is very confused today.     ALLERGIES:  sulfa drugs (Unknown)    MEDICATIONS  (STANDING):  aspirin enteric coated 81 milliGRAM(s) Oral daily  enoxaparin Injectable 40 milliGRAM(s) SubCutaneous every 24 hours  insulin lispro (ADMELOG) corrective regimen sliding scale   SubCutaneous three times a day before meals  insulin lispro (ADMELOG) corrective regimen sliding scale   SubCutaneous at bedtime  isosorbide   mononitrate ER Tablet (IMDUR) 60 milliGRAM(s) Oral daily  metoprolol succinate ER 50 milliGRAM(s) Oral daily  potassium chloride    Tablet ER 10 milliEquivalent(s) Oral daily  sertraline 25 milliGRAM(s) Oral daily    MEDICATIONS  (PRN):  acetaminophen     Tablet .. 650 milliGRAM(s) Oral every 6 hours PRN Temp greater or equal to 38C (100.4F), Mild Pain (1 - 3)  clonazePAM  Tablet 0.25 milliGRAM(s) Oral two times a day PRN agitation  dextrose Oral Gel 15 Gram(s) Oral once PRN Blood Glucose LESS THAN 70 milliGRAM(s)/deciliter    Vital Signs Last 24 Hrs  T(F): 98.6 (08 May 2023 09:47), Max: 98.6 (08 May 2023 05:44)  HR: 68 (08 May 2023 11:40) (68 - 82)  BP: 159/84 (08 May 2023 11:40) (159/84 - 179/106)  RR: 18 (08 May 2023 09:47) (17 - 18)  SpO2: 96% (08 May 2023 09:48) (82% - 99%)    PHYSICAL EXAM:  General: NAD, alert, disoriented, Occitan speaking   ENT: No gross hearing impairment, dry mucous membranes, no thrush  Neck: Supple, No JVD  Lungs: Clear to auscultation bilaterally, good air entry, non-labored breathing  Cardio: RRR, S1/S2, No murmur  Abdomen: Soft, Nontender, Nondistended; Bowel sounds present  Extremities: No calf tenderness, No cyanosis, No pitting edema  Psych: Restless     LABS:                        16.6   8.98  )-----------( 168      ( 08 May 2023 09:05 )             53.0     05-08    147  |  107  |  35  ----------------------------<  194  4.2   |  30  |  1.58    Ca    9.3      08 May 2023 09:05  Phos  4.2     05-06  Mg     2.3     05-08    05-07 Chol 172 mg/dL LDL -- HDL 39 mg/dL Trig 87 mg/dL  TSH 0.920   TSH with FT4 reflex --  Total T3 --    ABG - ( 05 May 2023 15:00 )  pH, Arterial: 7.38  pH, Blood: x     /  pCO2: 48    /  pO2: 96    / HCO3: x     / Base Excess: x     /  SaO2: 98.3      POCT Blood Glucose.: 164 mg/dL (08 May 2023 08:20)  POCT Blood Glucose.: 126 mg/dL (07 May 2023 22:21)  POCT Blood Glucose.: 215 mg/dL (07 May 2023 18:16)    RADIOLOGY & ADDITIONAL TESTS:    Care Discussed with Consultants/Other Providers:

## 2023-05-08 NOTE — DIETITIAN INITIAL EVALUATION ADULT - PERTINENT LABORATORY DATA
05-08    147<H>  |  107  |  35<H>  ----------------------------<  194<H>  4.2   |  30  |  1.58<H>    Ca    9.3      08 May 2023 09:05  Mg     2.3     05-08    POCT Blood Glucose.: 164 mg/dL (05-08-23 @ 08:20)  A1C with Estimated Average Glucose Result: 7.7 % (05-05-23 @ 08:00)  A1C with Estimated Average Glucose Result: 7.9 % (07-15-22 @ 06:44)

## 2023-05-08 NOTE — DIETITIAN INITIAL EVALUATION ADULT - OTHER INFO
78 year old M w/HTN, Cardiomyopathy, chronic systolic CHF, DM2, Dementia, sent to the ED by PMD because of increased dyspnea, leg edema x past 2 days. Patient admitted with CHK, Lasix therapy noted , Po intake noted good this morning 100% , total assistance with feeding. Last BM (5/5) , No edema, Skin noted ecchymotic . NFPE conducted , severe protein calorie malnutrition dx due to same,  78 year old M w/HTN, Cardiomyopathy, chronic systolic CHF, DM2, Dementia, sent to the ED by PMD because of increased dyspnea, leg edema x past 2 days. Patient admitted with CHK, Lasix therapy noted , Po intake noted good this morning 100% , total assistance with feeding. Last BM (5/5) , No edema, Skin noted ecchymotic . NFPE conducted , severe protein calorie malnutrition dx due to same, patient may benefit from Glucerna supplement BID (440kcals/20gms protein)

## 2023-05-08 NOTE — PROGRESS NOTE ADULT - ASSESSMENT
79 yo M with PMH of HTN, CM, systolic CHF, DM2, dementia, a/w SOB and leg swelling, dx with CHF exacerbation.    #Acute hypoxic respiratory failure 2/2 acute on chronic systolic dysfunction  - Continue supplemental O2, currently on 3L nasal cannula, wean as tolerated   - Echo: EF 40-45%  - Currently holding lasix (last dose was yesterday), will give lasix based on volume status  - Continue toprol 50 mg daily  - Continue imdur 60 mg daily  - Consider Entresto and Farxiga when renal function back to baseline   - f/u AM CXR  - f/u BNP  - Daily weights, I&Os  - Monitor K and Mg  - Cardiology following     #ANISH - slowly improving  - Baseline Cr ~ 1 (4/12/23)  - Likely from fluid overload state  - No evidence for bilateral hydronephrosis on renal sono 5/5  - No retention on bedside bladder sono 5/6  - Monitor renal function closely on diuretic  - BMP in AM    #Demand ischemia  - c/w ASA, Toprol  -check lipids, ? should be on statin    #HTN  - c/w Toprol (increased dose) and imdur  - Monitor BP    #Thrombocytopenia  - Mild, asymptomatic  - Monitor    #Dementia with behavioral disturbance  #Daytime somnolence, likely 2/2 higher doses of benzos  - Discussed with daughter (pharmacist, very involved in her father's care) risks/benefits of benzo use in this population - patient has been stable on low dose of Clonazepem 0.25 mg x1 only once a day in the AM. Normally does not have severe agitation/delirium at home  -Continue Seroquel 12.5 mg QHS (give earlier tonight), started on 5/6, with PRN Clonazepam at home dose of 0.25 mg if needed. Dc'd standing Clonazepam 0.5 mg QHS. Avoid higher doses, Ativan and Zyprexa as discussed with patient's daughter    #DM2  - A1C 7.7  - Stable sugars at this time, ISS    #DVT ppx  - Lovenox    Dispo:     5/6: Updated patient’s daughter Vaibhav 79 yo M with PMH of HTN, CM, systolic CHF, DM2, dementia, a/w SOB and leg swelling, dx with CHF exacerbation.    #Acute hypoxic respiratory failure   #Acute on chronic systolic dysfunction  - Continue supplemental O2 - this morning SpO2 on room air was 82% - improved with 2L nasal cannula   - Echo: EF 40-45%  - Currently holding lasix (last dose was yesterday), will give lasix based on volume status  - Pro-BNP improved but still elevated - ~28K-->19K  - Continue toprol 50 mg daily  - Continue imdur 60 mg daily  - Consider Entresto and Farxiga when renal function back to baseline   - f/u AM CXR  - Daily weights, I&Os  - Monitor K and Mg  - Cardiology following     #ANISH - slowly improving  - Baseline Cr ~ 1 (4/12/23)  - Likely from fluid overload state  - No evidence for bilateral hydronephrosis on renal sono 5/5  - No retention on bedside bladder sono 5/6  - Monitor renal function closely on diuretic  - BMP in AM    #Demand ischemia  - c/w ASA, Toprol  -check lipids, ? should be on statin    #HTN  - c/w Toprol (increased dose) and imdur  - Monitor BP    #Thrombocytopenia  - Mild, asymptomatic  - Monitor    #Dementia with behavioral disturbance  #Daytime somnolence, likely 2/2 higher doses of benzos  - Discussed with daughter (pharmacist, very involved in her father's care) risks/benefits of benzo use in this population - patient has been stable on low dose of Clonazepem 0.25 mg x1 only once a day in the AM. Normally does not have severe agitation/delirium at home  - Continue Seroquel 12.5 mg QHS (give earlier tonight), started on 5/6, with PRN Clonazepam at home dose of 0.25 mg if needed  - Dc'd standing Clonazepam 0.5 mg QHS.   - Avoid higher doses, Ativan and Zyprexa as discussed with patient's daughter    #DM2  - A1C 7.7  - Stable sugars at this time, ISS    #DVT ppx  - Lovenox    Dispo: Pending above. May need home O2    5/6: Updated patient’s daughter Kuldeep. 77 yo M with PMH of HTN, CM, systolic CHF, DM2, dementia, a/w SOB and leg swelling, dx with CHF exacerbation.    #Acute hypoxic respiratory failure   #Acute on chronic systolic dysfunction  - Continue supplemental O2 - this morning SpO2 on room air was 82% - improved with 2L nasal cannula   - Echo: EF 40-45%  - Currently holding lasix (last dose was yesterday), will give lasix based on volume status  - Pro-BNP improved but still elevated - ~28K-->19K  - Continue toprol 50 mg daily  - Continue imdur 60 mg daily  - Consider Entresto and Farxiga when renal function back to baseline   - f/u AM CXR  - Daily weights, I&Os  - Monitor K and Mg  - Cardiology following     #ANISH - slowly improving  - Baseline Cr ~ 1 (4/12/23)  - Likely from fluid overload state  - No evidence for bilateral hydronephrosis on renal sono 5/5  - No retention on bedside bladder sono 5/6  - Monitor renal function closely on diuretic  - BMP in AM    #Demand ischemia  - c/w ASA, Toprol  - check lipids, ? should be on statin    #HTN  - c/w Toprol (increased dose) and imdur  - Monitor BP    #Thrombocytopenia  - Mild, asymptomatic  - Monitor    #Dementia with behavioral disturbance  #Daytime somnolence, likely 2/2 higher doses of benzos  - Discussed with daughter (pharmacist, very involved in her father's care) risks/benefits of benzo use in this population - patient has been stable on low dose of Clonazepem 0.25 mg x1 only once a day in the AM. Normally does not have severe agitation/delirium at home  - Continue Seroquel 12.5 mg QHS (give earlier tonight), started on 5/6, with PRN Clonazepam at home dose of 0.25 mg if needed  - Dc'd standing Clonazepam 0.5 mg QHS.   - Avoid higher doses, Ativan and Zyprexa as discussed with patient's daughter    #DM2  - A1C 7.7  - Stable sugars at this time, ISS    #DVT ppx  - Lovenox    Dispo: Pending above. May need home O2.     5/8: Updated patient’s daughter Kuldeep at [phone]

## 2023-05-08 NOTE — DIETITIAN INITIAL EVALUATION ADULT - PERTINENT MEDS FT
MEDICATIONS  (STANDING):  aspirin enteric coated 81 milliGRAM(s) Oral daily  dextrose 5%. 1000 milliLiter(s) (100 mL/Hr) IV Continuous <Continuous>  dextrose 5%. 1000 milliLiter(s) (50 mL/Hr) IV Continuous <Continuous>  dextrose 50% Injectable 25 Gram(s) IV Push once  dextrose 50% Injectable 25 Gram(s) IV Push once  dextrose 50% Injectable 12.5 Gram(s) IV Push once  enoxaparin Injectable 40 milliGRAM(s) SubCutaneous every 24 hours  glucagon  Injectable 1 milliGRAM(s) IntraMuscular once  insulin lispro (ADMELOG) corrective regimen sliding scale   SubCutaneous three times a day before meals  insulin lispro (ADMELOG) corrective regimen sliding scale   SubCutaneous at bedtime  isosorbide   mononitrate ER Tablet (IMDUR) 60 milliGRAM(s) Oral daily  metoprolol succinate ER 50 milliGRAM(s) Oral daily  potassium chloride    Tablet ER 10 milliEquivalent(s) Oral daily  sertraline 25 milliGRAM(s) Oral daily    MEDICATIONS  (PRN):  acetaminophen     Tablet .. 650 milliGRAM(s) Oral every 6 hours PRN Temp greater or equal to 38C (100.4F), Mild Pain (1 - 3)  clonazePAM  Tablet 0.25 milliGRAM(s) Oral two times a day PRN agitation  dextrose Oral Gel 15 Gram(s) Oral once PRN Blood Glucose LESS THAN 70 milliGRAM(s)/deciliter

## 2023-05-08 NOTE — DIETITIAN NUTRITION RISK NOTIFICATION - TREATMENT: THE FOLLOWING DIET HAS BEEN RECOMMENDED
Diet, Minced and Moist:   Consistent Carbohydrate {No Snacks}  DASH/TLC {Sodium & Cholesterol Restricted} (05-04-23 @ 22:24) [Active]

## 2023-05-08 NOTE — PROGRESS NOTE ADULT - SUBJECTIVE AND OBJECTIVE BOX
Follow-up Pulmonary Progress Note  Chief Complaint : Atrial fibrillation      patient seen and examined  alert confused  cant provide history or ros       Allergies :sulfa drugs (Unknown)      PAST MEDICAL & SURGICAL HISTORY:  HTN (hypertension)    HLD (hyperlipidemia)    Diabetes    CAD (coronary artery disease)    History of cholecystectomy        Medications:  MEDICATIONS  (STANDING):  aspirin enteric coated 81 milliGRAM(s) Oral daily  dextrose 5%. 1000 milliLiter(s) (100 mL/Hr) IV Continuous <Continuous>  dextrose 5%. 1000 milliLiter(s) (50 mL/Hr) IV Continuous <Continuous>  dextrose 50% Injectable 25 Gram(s) IV Push once  dextrose 50% Injectable 12.5 Gram(s) IV Push once  dextrose 50% Injectable 25 Gram(s) IV Push once  enoxaparin Injectable 40 milliGRAM(s) SubCutaneous every 24 hours  glucagon  Injectable 1 milliGRAM(s) IntraMuscular once  hydrALAZINE 10 milliGRAM(s) Oral three times a day  insulin lispro (ADMELOG) corrective regimen sliding scale   SubCutaneous three times a day before meals  insulin lispro (ADMELOG) corrective regimen sliding scale   SubCutaneous at bedtime  isosorbide   mononitrate ER Tablet (IMDUR) 60 milliGRAM(s) Oral daily  metoprolol succinate ER 50 milliGRAM(s) Oral daily  potassium chloride    Tablet ER 10 milliEquivalent(s) Oral daily  sertraline 25 milliGRAM(s) Oral daily    MEDICATIONS  (PRN):  acetaminophen     Tablet .. 650 milliGRAM(s) Oral every 6 hours PRN Temp greater or equal to 38C (100.4F), Mild Pain (1 - 3)  clonazePAM  Tablet 0.25 milliGRAM(s) Oral two times a day PRN agitation  dextrose Oral Gel 15 Gram(s) Oral once PRN Blood Glucose LESS THAN 70 milliGRAM(s)/deciliter      Antibiotics History      Heme Medications   aspirin enteric coated 81 milliGRAM(s) Oral daily, 05-04-23 @ 23:39  enoxaparin Injectable 40 milliGRAM(s) SubCutaneous every 24 hours, 05-05-23 @ 00:12      GI Medications        LABS:                        16.6   8.98  )-----------( 168      ( 08 May 2023 09:05 )             53.0     05-08    147<H>  |  107  |  35<H>  ----------------------------<  194<H>  4.2   |  30  |  1.58<H>    Ca    9.3      08 May 2023 09:05  Mg     2.3     05-08           VITALS:  T(C): 36.7 (05-08-23 @ 12:10), Max: 37 (05-08-23 @ 05:44)  T(F): 98 (05-08-23 @ 12:10), Max: 98.6 (05-08-23 @ 05:44)  HR: 75 (05-08-23 @ 12:10) (68 - 82)  BP: 159/98 (05-08-23 @ 12:10) (159/84 - 179/106)  BP(mean): --  ABP: --  ABP(mean): --  RR: 18 (05-08-23 @ 12:10) (17 - 18)  SpO2: 96% (05-08-23 @ 12:10) (82% - 99%)  CVP(mm Hg): --  CVP(cm H2O): --

## 2023-05-09 LAB
ALBUMIN SERPL ELPH-MCNC: 2.2 G/DL — LOW (ref 3.3–5)
ALBUMIN SERPL ELPH-MCNC: 2.4 G/DL — LOW (ref 3.3–5)
ALP SERPL-CCNC: 115 U/L — SIGNIFICANT CHANGE UP (ref 40–120)
ALP SERPL-CCNC: 143 U/L — HIGH (ref 40–120)
ALT FLD-CCNC: 24 U/L — SIGNIFICANT CHANGE UP (ref 10–45)
ALT FLD-CCNC: 28 U/L — SIGNIFICANT CHANGE UP (ref 10–45)
ANION GAP SERPL CALC-SCNC: 16 MMOL/L — SIGNIFICANT CHANGE UP (ref 5–17)
ANION GAP SERPL CALC-SCNC: 4 MMOL/L — LOW (ref 5–17)
ANION GAP SERPL CALC-SCNC: 5 MMOL/L — SIGNIFICANT CHANGE UP (ref 5–17)
ANION GAP SERPL CALC-SCNC: 7 MMOL/L — SIGNIFICANT CHANGE UP (ref 5–17)
ANION GAP SERPL CALC-SCNC: 8 MMOL/L — SIGNIFICANT CHANGE UP (ref 5–17)
APTT BLD: 37.4 SEC — HIGH (ref 27.5–35.5)
APTT BLD: 43.6 SEC — HIGH (ref 27.5–35.5)
AST SERPL-CCNC: 39 U/L — SIGNIFICANT CHANGE UP (ref 10–40)
AST SERPL-CCNC: 40 U/L — SIGNIFICANT CHANGE UP (ref 10–40)
BASE EXCESS BLDA CALC-SCNC: -3.9 MMOL/L — LOW (ref -2–3)
BASE EXCESS BLDA CALC-SCNC: 3 MMOL/L — SIGNIFICANT CHANGE UP (ref -2–3)
BASE EXCESS BLDA CALC-SCNC: 7.7 MMOL/L — HIGH (ref -2–3)
BILIRUB SERPL-MCNC: 0.7 MG/DL — SIGNIFICANT CHANGE UP (ref 0.2–1.2)
BILIRUB SERPL-MCNC: 1 MG/DL — SIGNIFICANT CHANGE UP (ref 0.2–1.2)
BLOOD GAS COMMENTS ARTERIAL: SIGNIFICANT CHANGE UP
BUN SERPL-MCNC: 40 MG/DL — HIGH (ref 7–23)
BUN SERPL-MCNC: 41 MG/DL — HIGH (ref 7–23)
BUN SERPL-MCNC: 42 MG/DL — HIGH (ref 7–23)
BUN SERPL-MCNC: 45 MG/DL — HIGH (ref 7–23)
BUN SERPL-MCNC: 50 MG/DL — HIGH (ref 7–23)
CALCIUM SERPL-MCNC: 8.4 MG/DL — SIGNIFICANT CHANGE UP (ref 8.4–10.5)
CALCIUM SERPL-MCNC: 8.5 MG/DL — SIGNIFICANT CHANGE UP (ref 8.4–10.5)
CALCIUM SERPL-MCNC: 8.8 MG/DL — SIGNIFICANT CHANGE UP (ref 8.4–10.5)
CALCIUM SERPL-MCNC: 9.2 MG/DL — SIGNIFICANT CHANGE UP (ref 8.4–10.5)
CALCIUM SERPL-MCNC: 9.2 MG/DL — SIGNIFICANT CHANGE UP (ref 8.4–10.5)
CHLORIDE SERPL-SCNC: 109 MMOL/L — HIGH (ref 96–108)
CHLORIDE SERPL-SCNC: 110 MMOL/L — HIGH (ref 96–108)
CHLORIDE SERPL-SCNC: 110 MMOL/L — HIGH (ref 96–108)
CHLORIDE SERPL-SCNC: 111 MMOL/L — HIGH (ref 96–108)
CHLORIDE SERPL-SCNC: 113 MMOL/L — HIGH (ref 96–108)
CK MB BLD-MCNC: 2.9 % — SIGNIFICANT CHANGE UP (ref 0–3.5)
CK MB BLD-MCNC: 4.9 % — HIGH (ref 0–3.5)
CK MB BLD-MCNC: 5.7 % — HIGH (ref 0–3.5)
CK MB BLD-MCNC: 7.1 % — HIGH (ref 0–3.5)
CK MB CFR SERPL CALC: 10.1 NG/ML — HIGH (ref 0.5–10)
CK MB CFR SERPL CALC: 15 NG/ML — HIGH (ref 0.5–10)
CK MB CFR SERPL CALC: 5.1 NG/ML — SIGNIFICANT CHANGE UP (ref 0.5–10)
CK MB CFR SERPL CALC: 9.9 NG/ML — SIGNIFICANT CHANGE UP (ref 0.5–10)
CK SERPL-CCNC: 175 U/L — SIGNIFICANT CHANGE UP (ref 30–200)
CK SERPL-CCNC: 178 U/L — SIGNIFICANT CHANGE UP (ref 30–200)
CK SERPL-CCNC: 205 U/L — HIGH (ref 30–200)
CK SERPL-CCNC: 211 U/L — HIGH (ref 30–200)
CO2 BLDA-SCNC: 28 MMOL/L — HIGH (ref 19–24)
CO2 BLDA-SCNC: 30 MMOL/L — HIGH (ref 19–24)
CO2 BLDA-SCNC: 34 MMOL/L — HIGH (ref 19–24)
CO2 SERPL-SCNC: 29 MMOL/L — SIGNIFICANT CHANGE UP (ref 22–31)
CO2 SERPL-SCNC: 33 MMOL/L — HIGH (ref 22–31)
CO2 SERPL-SCNC: 33 MMOL/L — HIGH (ref 22–31)
CO2 SERPL-SCNC: 34 MMOL/L — HIGH (ref 22–31)
CO2 SERPL-SCNC: 35 MMOL/L — HIGH (ref 22–31)
CREAT SERPL-MCNC: 1.6 MG/DL — HIGH (ref 0.5–1.3)
CREAT SERPL-MCNC: 1.63 MG/DL — HIGH (ref 0.5–1.3)
CREAT SERPL-MCNC: 1.74 MG/DL — HIGH (ref 0.5–1.3)
CREAT SERPL-MCNC: 1.76 MG/DL — HIGH (ref 0.5–1.3)
CREAT SERPL-MCNC: 1.83 MG/DL — HIGH (ref 0.5–1.3)
EGFR: 37 ML/MIN/1.73M2 — LOW
EGFR: 39 ML/MIN/1.73M2 — LOW
EGFR: 39 ML/MIN/1.73M2 — LOW
EGFR: 42 ML/MIN/1.73M2 — LOW
EGFR: 43 ML/MIN/1.73M2 — LOW
GAS PNL BLDA: SIGNIFICANT CHANGE UP
GLUCOSE BLDC GLUCOMTR-MCNC: 122 MG/DL — HIGH (ref 70–99)
GLUCOSE BLDC GLUCOMTR-MCNC: 152 MG/DL — HIGH (ref 70–99)
GLUCOSE BLDC GLUCOMTR-MCNC: 169 MG/DL — HIGH (ref 70–99)
GLUCOSE BLDC GLUCOMTR-MCNC: 226 MG/DL — HIGH (ref 70–99)
GLUCOSE SERPL-MCNC: 165 MG/DL — HIGH (ref 70–99)
GLUCOSE SERPL-MCNC: 200 MG/DL — HIGH (ref 70–99)
GLUCOSE SERPL-MCNC: 203 MG/DL — HIGH (ref 70–99)
GLUCOSE SERPL-MCNC: 212 MG/DL — HIGH (ref 70–99)
GLUCOSE SERPL-MCNC: 252 MG/DL — HIGH (ref 70–99)
HCO3 BLDA-SCNC: 26 MMOL/L — SIGNIFICANT CHANGE UP (ref 21–28)
HCO3 BLDA-SCNC: 28 MMOL/L — SIGNIFICANT CHANGE UP (ref 21–28)
HCO3 BLDA-SCNC: 33 MMOL/L — HIGH (ref 21–28)
HCT VFR BLD CALC: 46.2 % — SIGNIFICANT CHANGE UP (ref 39–50)
HCT VFR BLD CALC: 47.8 % — SIGNIFICANT CHANGE UP (ref 39–50)
HCT VFR BLD CALC: 50.1 % — HIGH (ref 39–50)
HCT VFR BLD CALC: 51.5 % — HIGH (ref 39–50)
HGB BLD-MCNC: 14.5 G/DL — SIGNIFICANT CHANGE UP (ref 13–17)
HGB BLD-MCNC: 14.8 G/DL — SIGNIFICANT CHANGE UP (ref 13–17)
HGB BLD-MCNC: 15.6 G/DL — SIGNIFICANT CHANGE UP (ref 13–17)
HGB BLD-MCNC: 15.6 G/DL — SIGNIFICANT CHANGE UP (ref 13–17)
HOROWITZ INDEX BLDA+IHG-RTO: 100 — SIGNIFICANT CHANGE UP
INR BLD: 1.31 RATIO — HIGH (ref 0.88–1.16)
INR BLD: 1.31 RATIO — HIGH (ref 0.88–1.16)
LACTATE SERPL-SCNC: 1.8 MMOL/L — SIGNIFICANT CHANGE UP (ref 0.7–2)
LACTATE SERPL-SCNC: 12.6 MMOL/L — CRITICAL HIGH (ref 0.7–2)
LACTATE SERPL-SCNC: 2 MMOL/L — SIGNIFICANT CHANGE UP (ref 0.7–2)
MAGNESIUM SERPL-MCNC: 2 MG/DL — SIGNIFICANT CHANGE UP (ref 1.6–2.6)
MAGNESIUM SERPL-MCNC: 2.1 MG/DL — SIGNIFICANT CHANGE UP (ref 1.6–2.6)
MAGNESIUM SERPL-MCNC: 2.2 MG/DL — SIGNIFICANT CHANGE UP (ref 1.6–2.6)
MAGNESIUM SERPL-MCNC: 2.9 MG/DL — HIGH (ref 1.6–2.6)
MCHC RBC-ENTMCNC: 30.3 GM/DL — LOW (ref 32–36)
MCHC RBC-ENTMCNC: 30.9 PG — SIGNIFICANT CHANGE UP (ref 27–34)
MCHC RBC-ENTMCNC: 31 GM/DL — LOW (ref 32–36)
MCHC RBC-ENTMCNC: 31 PG — SIGNIFICANT CHANGE UP (ref 27–34)
MCHC RBC-ENTMCNC: 31 PG — SIGNIFICANT CHANGE UP (ref 27–34)
MCHC RBC-ENTMCNC: 31.1 GM/DL — LOW (ref 32–36)
MCHC RBC-ENTMCNC: 31.3 PG — SIGNIFICANT CHANGE UP (ref 27–34)
MCHC RBC-ENTMCNC: 31.4 GM/DL — LOW (ref 32–36)
MCV RBC AUTO: 102.4 FL — HIGH (ref 80–100)
MCV RBC AUTO: 99.4 FL — SIGNIFICANT CHANGE UP (ref 80–100)
MCV RBC AUTO: 99.8 FL — SIGNIFICANT CHANGE UP (ref 80–100)
MCV RBC AUTO: 99.8 FL — SIGNIFICANT CHANGE UP (ref 80–100)
NRBC # BLD: 0 /100 WBCS — SIGNIFICANT CHANGE UP (ref 0–0)
PCO2 BLDA: 49 MMHG — HIGH (ref 35–48)
PCO2 BLDA: 54 MMHG — HIGH (ref 35–48)
PCO2 BLDA: 83 MMHG — CRITICAL HIGH (ref 35–48)
PH BLDA: 7.1 — CRITICAL LOW (ref 7.35–7.45)
PH BLDA: 7.37 — SIGNIFICANT CHANGE UP (ref 7.35–7.45)
PH BLDA: 7.39 — SIGNIFICANT CHANGE UP (ref 7.35–7.45)
PHOSPHATE SERPL-MCNC: 4 MG/DL — SIGNIFICANT CHANGE UP (ref 2.5–4.5)
PHOSPHATE SERPL-MCNC: 4.4 MG/DL — SIGNIFICANT CHANGE UP (ref 2.5–4.5)
PHOSPHATE SERPL-MCNC: 5 MG/DL — HIGH (ref 2.5–4.5)
PHOSPHATE SERPL-MCNC: 5.8 MG/DL — HIGH (ref 2.5–4.5)
PLATELET # BLD AUTO: 125 K/UL — LOW (ref 150–400)
PLATELET # BLD AUTO: 151 K/UL — SIGNIFICANT CHANGE UP (ref 150–400)
PLATELET # BLD AUTO: 151 K/UL — SIGNIFICANT CHANGE UP (ref 150–400)
PLATELET # BLD AUTO: 172 K/UL — SIGNIFICANT CHANGE UP (ref 150–400)
PO2 BLDA: 67 MMHG — LOW (ref 83–108)
PO2 BLDA: 75 MMHG — LOW (ref 83–108)
PO2 BLDA: 75 MMHG — LOW (ref 83–108)
POTASSIUM SERPL-MCNC: 3.7 MMOL/L — SIGNIFICANT CHANGE UP (ref 3.5–5.3)
POTASSIUM SERPL-MCNC: 3.9 MMOL/L — SIGNIFICANT CHANGE UP (ref 3.5–5.3)
POTASSIUM SERPL-MCNC: 4.5 MMOL/L — SIGNIFICANT CHANGE UP (ref 3.5–5.3)
POTASSIUM SERPL-MCNC: 4.5 MMOL/L — SIGNIFICANT CHANGE UP (ref 3.5–5.3)
POTASSIUM SERPL-MCNC: 5.3 MMOL/L — SIGNIFICANT CHANGE UP (ref 3.5–5.3)
POTASSIUM SERPL-SCNC: 3.7 MMOL/L — SIGNIFICANT CHANGE UP (ref 3.5–5.3)
POTASSIUM SERPL-SCNC: 3.9 MMOL/L — SIGNIFICANT CHANGE UP (ref 3.5–5.3)
POTASSIUM SERPL-SCNC: 4.5 MMOL/L — SIGNIFICANT CHANGE UP (ref 3.5–5.3)
POTASSIUM SERPL-SCNC: 4.5 MMOL/L — SIGNIFICANT CHANGE UP (ref 3.5–5.3)
POTASSIUM SERPL-SCNC: 5.3 MMOL/L — SIGNIFICANT CHANGE UP (ref 3.5–5.3)
PROCALCITONIN SERPL-MCNC: 0.11 NG/ML — HIGH
PROT SERPL-MCNC: 6.1 G/DL — SIGNIFICANT CHANGE UP (ref 6–8.3)
PROT SERPL-MCNC: 6.9 G/DL — SIGNIFICANT CHANGE UP (ref 6–8.3)
PROTHROM AB SERPL-ACNC: 15.2 SEC — HIGH (ref 10.5–13.4)
PROTHROM AB SERPL-ACNC: 15.2 SEC — HIGH (ref 10.5–13.4)
RBC # BLD: 4.63 M/UL — SIGNIFICANT CHANGE UP (ref 4.2–5.8)
RBC # BLD: 4.79 M/UL — SIGNIFICANT CHANGE UP (ref 4.2–5.8)
RBC # BLD: 5.03 M/UL — SIGNIFICANT CHANGE UP (ref 4.2–5.8)
RBC # BLD: 5.04 M/UL — SIGNIFICANT CHANGE UP (ref 4.2–5.8)
RBC # FLD: 14.8 % — HIGH (ref 10.3–14.5)
RBC # FLD: 14.9 % — HIGH (ref 10.3–14.5)
RBC # FLD: 15 % — HIGH (ref 10.3–14.5)
RBC # FLD: 15.3 % — HIGH (ref 10.3–14.5)
SAO2 % BLDA: 91.2 % — LOW (ref 94–98)
SAO2 % BLDA: 94.8 % — SIGNIFICANT CHANGE UP (ref 94–98)
SAO2 % BLDA: 96.9 % — SIGNIFICANT CHANGE UP (ref 94–98)
SODIUM SERPL-SCNC: 147 MMOL/L — HIGH (ref 135–145)
SODIUM SERPL-SCNC: 151 MMOL/L — HIGH (ref 135–145)
SODIUM SERPL-SCNC: 151 MMOL/L — HIGH (ref 135–145)
SODIUM SERPL-SCNC: 153 MMOL/L — HIGH (ref 135–145)
SODIUM SERPL-SCNC: 155 MMOL/L — HIGH (ref 135–145)
TROPONIN I, HIGH SENSITIVITY RESULT: 1296.9 NG/L — HIGH
TROPONIN I, HIGH SENSITIVITY RESULT: 1633.9 NG/L — HIGH
TROPONIN I, HIGH SENSITIVITY RESULT: 2243.1 NG/L — HIGH
TROPONIN I, HIGH SENSITIVITY RESULT: 504.7 NG/L — HIGH
WBC # BLD: 11.14 K/UL — HIGH (ref 3.8–10.5)
WBC # BLD: 12.93 K/UL — HIGH (ref 3.8–10.5)
WBC # BLD: 13.77 K/UL — HIGH (ref 3.8–10.5)
WBC # BLD: 7.95 K/UL — SIGNIFICANT CHANGE UP (ref 3.8–10.5)
WBC # FLD AUTO: 11.14 K/UL — HIGH (ref 3.8–10.5)
WBC # FLD AUTO: 12.93 K/UL — HIGH (ref 3.8–10.5)
WBC # FLD AUTO: 13.77 K/UL — HIGH (ref 3.8–10.5)
WBC # FLD AUTO: 7.95 K/UL — SIGNIFICANT CHANGE UP (ref 3.8–10.5)

## 2023-05-09 PROCEDURE — 93010 ELECTROCARDIOGRAM REPORT: CPT

## 2023-05-09 PROCEDURE — 71045 X-RAY EXAM CHEST 1 VIEW: CPT | Mod: 26

## 2023-05-09 PROCEDURE — 71250 CT THORAX DX C-: CPT | Mod: 26

## 2023-05-09 PROCEDURE — 99232 SBSQ HOSP IP/OBS MODERATE 35: CPT

## 2023-05-09 PROCEDURE — 99233 SBSQ HOSP IP/OBS HIGH 50: CPT

## 2023-05-09 PROCEDURE — 70450 CT HEAD/BRAIN W/O DYE: CPT | Mod: 26

## 2023-05-09 PROCEDURE — 71045 X-RAY EXAM CHEST 1 VIEW: CPT | Mod: 26,77

## 2023-05-09 RX ORDER — FENTANYL CITRATE 50 UG/ML
0.5 INJECTION INTRAVENOUS
Qty: 2500 | Refills: 0 | Status: DISCONTINUED | OUTPATIENT
Start: 2023-05-09 | End: 2023-05-11

## 2023-05-09 RX ORDER — PIPERACILLIN AND TAZOBACTAM 4; .5 G/20ML; G/20ML
3.38 INJECTION, POWDER, LYOPHILIZED, FOR SOLUTION INTRAVENOUS ONCE
Refills: 0 | Status: COMPLETED | OUTPATIENT
Start: 2023-05-09 | End: 2023-05-09

## 2023-05-09 RX ORDER — LEVETIRACETAM 250 MG/1
1000 TABLET, FILM COATED ORAL ONCE
Refills: 0 | Status: COMPLETED | OUTPATIENT
Start: 2023-05-09 | End: 2023-05-09

## 2023-05-09 RX ORDER — FENTANYL CITRATE 50 UG/ML
0.5 INJECTION INTRAVENOUS
Qty: 2500 | Refills: 0 | Status: DISCONTINUED | OUTPATIENT
Start: 2023-05-09 | End: 2023-05-09

## 2023-05-09 RX ORDER — LEVETIRACETAM 250 MG/1
500 TABLET, FILM COATED ORAL EVERY 12 HOURS
Refills: 0 | Status: DISCONTINUED | OUTPATIENT
Start: 2023-05-10 | End: 2023-05-10

## 2023-05-09 RX ORDER — HEPARIN SODIUM 5000 [USP'U]/ML
700 INJECTION INTRAVENOUS; SUBCUTANEOUS
Qty: 25000 | Refills: 0 | Status: DISCONTINUED | OUTPATIENT
Start: 2023-05-09 | End: 2023-05-11

## 2023-05-09 RX ORDER — SODIUM CHLORIDE 9 MG/ML
500 INJECTION, SOLUTION INTRAVENOUS ONCE
Refills: 0 | Status: COMPLETED | OUTPATIENT
Start: 2023-05-09 | End: 2023-05-09

## 2023-05-09 RX ORDER — CHLORHEXIDINE GLUCONATE 213 G/1000ML
1 SOLUTION TOPICAL
Refills: 0 | Status: DISCONTINUED | OUTPATIENT
Start: 2023-05-09 | End: 2023-05-17

## 2023-05-09 RX ORDER — SODIUM CHLORIDE 9 MG/ML
250 INJECTION, SOLUTION INTRAVENOUS ONCE
Refills: 0 | Status: COMPLETED | OUTPATIENT
Start: 2023-05-09 | End: 2023-05-09

## 2023-05-09 RX ORDER — ALBUMIN HUMAN 25 %
100 VIAL (ML) INTRAVENOUS ONCE
Refills: 0 | Status: COMPLETED | OUTPATIENT
Start: 2023-05-09 | End: 2023-05-09

## 2023-05-09 RX ORDER — PANTOPRAZOLE SODIUM 20 MG/1
40 TABLET, DELAYED RELEASE ORAL DAILY
Refills: 0 | Status: DISCONTINUED | OUTPATIENT
Start: 2023-05-09 | End: 2023-05-13

## 2023-05-09 RX ORDER — HEPARIN SODIUM 5000 [USP'U]/ML
3800 INJECTION INTRAVENOUS; SUBCUTANEOUS EVERY 6 HOURS
Refills: 0 | Status: DISCONTINUED | OUTPATIENT
Start: 2023-05-09 | End: 2023-05-11

## 2023-05-09 RX ORDER — PIPERACILLIN AND TAZOBACTAM 4; .5 G/20ML; G/20ML
3.38 INJECTION, POWDER, LYOPHILIZED, FOR SOLUTION INTRAVENOUS EVERY 8 HOURS
Refills: 0 | Status: DISCONTINUED | OUTPATIENT
Start: 2023-05-09 | End: 2023-05-11

## 2023-05-09 RX ORDER — PROPOFOL 10 MG/ML
5 INJECTION, EMULSION INTRAVENOUS
Qty: 1000 | Refills: 0 | Status: DISCONTINUED | OUTPATIENT
Start: 2023-05-09 | End: 2023-05-10

## 2023-05-09 RX ORDER — CHLORHEXIDINE GLUCONATE 213 G/1000ML
15 SOLUTION TOPICAL EVERY 12 HOURS
Refills: 0 | Status: DISCONTINUED | OUTPATIENT
Start: 2023-05-09 | End: 2023-05-17

## 2023-05-09 RX ORDER — FENTANYL CITRATE 50 UG/ML
100 INJECTION INTRAVENOUS ONCE
Refills: 0 | Status: DISCONTINUED | OUTPATIENT
Start: 2023-05-09 | End: 2023-05-09

## 2023-05-09 RX ORDER — INSULIN LISPRO 100/ML
VIAL (ML) SUBCUTANEOUS EVERY 6 HOURS
Refills: 0 | Status: DISCONTINUED | OUTPATIENT
Start: 2023-05-09 | End: 2023-05-17

## 2023-05-09 RX ORDER — METOPROLOL TARTRATE 50 MG
2.5 TABLET ORAL EVERY 6 HOURS
Refills: 0 | Status: DISCONTINUED | OUTPATIENT
Start: 2023-05-09 | End: 2023-05-11

## 2023-05-09 RX ORDER — NOREPINEPHRINE BITARTRATE/D5W 8 MG/250ML
0.05 PLASTIC BAG, INJECTION (ML) INTRAVENOUS
Qty: 8 | Refills: 0 | Status: DISCONTINUED | OUTPATIENT
Start: 2023-05-09 | End: 2023-05-17

## 2023-05-09 RX ORDER — SODIUM CHLORIDE 9 MG/ML
1000 INJECTION, SOLUTION INTRAVENOUS
Refills: 0 | Status: DISCONTINUED | OUTPATIENT
Start: 2023-05-09 | End: 2023-05-10

## 2023-05-09 RX ADMIN — PANTOPRAZOLE SODIUM 40 MILLIGRAM(S): 20 TABLET, DELAYED RELEASE ORAL at 17:55

## 2023-05-09 RX ADMIN — Medication 2: at 12:45

## 2023-05-09 RX ADMIN — Medication 5.74 MICROGRAM(S)/KG/MIN: at 21:02

## 2023-05-09 RX ADMIN — PIPERACILLIN AND TAZOBACTAM 200 GRAM(S): 4; .5 INJECTION, POWDER, LYOPHILIZED, FOR SOLUTION INTRAVENOUS at 11:59

## 2023-05-09 RX ADMIN — Medication 100 MILLILITER(S): at 21:02

## 2023-05-09 RX ADMIN — Medication 2.5 MILLIGRAM(S): at 17:55

## 2023-05-09 RX ADMIN — HEPARIN SODIUM 800 UNIT(S)/HR: 5000 INJECTION INTRAVENOUS; SUBCUTANEOUS at 23:26

## 2023-05-09 RX ADMIN — CHLORHEXIDINE GLUCONATE 15 MILLILITER(S): 213 SOLUTION TOPICAL at 17:56

## 2023-05-09 RX ADMIN — SODIUM CHLORIDE 333.33 MILLILITER(S): 9 INJECTION, SOLUTION INTRAVENOUS at 11:59

## 2023-05-09 RX ADMIN — Medication 81 MILLIGRAM(S): at 12:45

## 2023-05-09 RX ADMIN — FENTANYL CITRATE 100 MICROGRAM(S): 50 INJECTION INTRAVENOUS at 13:04

## 2023-05-09 RX ADMIN — SODIUM CHLORIDE 500 MILLILITER(S): 9 INJECTION, SOLUTION INTRAVENOUS at 20:43

## 2023-05-09 RX ADMIN — ENOXAPARIN SODIUM 40 MILLIGRAM(S): 100 INJECTION SUBCUTANEOUS at 12:45

## 2023-05-09 RX ADMIN — FENTANYL CITRATE 3.06 MICROGRAM(S)/KG/HR: 50 INJECTION INTRAVENOUS at 12:12

## 2023-05-09 RX ADMIN — Medication 10 MILLIGRAM(S): at 06:07

## 2023-05-09 RX ADMIN — LEVETIRACETAM 400 MILLIGRAM(S): 250 TABLET, FILM COATED ORAL at 23:01

## 2023-05-09 RX ADMIN — Medication 4: at 08:15

## 2023-05-09 RX ADMIN — FENTANYL CITRATE 100 MICROGRAM(S): 50 INJECTION INTRAVENOUS at 13:20

## 2023-05-09 RX ADMIN — PIPERACILLIN AND TAZOBACTAM 25 GRAM(S): 4; .5 INJECTION, POWDER, LYOPHILIZED, FOR SOLUTION INTRAVENOUS at 20:12

## 2023-05-09 RX ADMIN — Medication 4 MILLIGRAM(S): at 20:41

## 2023-05-09 RX ADMIN — HEPARIN SODIUM 700 UNIT(S)/HR: 5000 INJECTION INTRAVENOUS; SUBCUTANEOUS at 16:16

## 2023-05-09 RX ADMIN — SODIUM CHLORIDE 75 MILLILITER(S): 9 INJECTION, SOLUTION INTRAVENOUS at 23:30

## 2023-05-09 RX ADMIN — Medication 50 MILLIGRAM(S): at 06:05

## 2023-05-09 RX ADMIN — FENTANYL CITRATE 3.06 MICROGRAM(S)/KG/HR: 50 INJECTION INTRAVENOUS at 13:03

## 2023-05-09 NOTE — PROGRESS NOTE ADULT - SUBJECTIVE AND OBJECTIVE BOX
Patient is a 78y old  Male who presents with a chief complaint of acute systolic CHF (09 May 2023 09:11)    Patient seen and examined at bedside. No overnight events reported. This morning, patient went to CT scan, upon assessment after CT scan patient was awake, alert, denied pain. During breakfast, patient choked on piece of food and subsequently went into cardiac arrest. RRT at bedside, airway was cleared and secured, ROSC achieved. Patient transferred to ICU    ALLERGIES:  sulfa drugs (Unknown)    MEDICATIONS  (STANDING):  aspirin enteric coated 81 milliGRAM(s) Oral daily  dextrose 5%. 1000 milliLiter(s) (100 mL/Hr) IV Continuous <Continuous>  dextrose 5%. 1000 milliLiter(s) (50 mL/Hr) IV Continuous <Continuous>  dextrose 50% Injectable 25 Gram(s) IV Push once  dextrose 50% Injectable 12.5 Gram(s) IV Push once  dextrose 50% Injectable 25 Gram(s) IV Push once  enoxaparin Injectable 40 milliGRAM(s) SubCutaneous every 24 hours  fentaNYL   Infusion 0.5 MICROgram(s)/kG/Hr (3.06 mL/Hr) IV Continuous <Continuous>  glucagon  Injectable 1 milliGRAM(s) IntraMuscular once  hydrALAZINE 10 milliGRAM(s) Oral three times a day  insulin lispro (ADMELOG) corrective regimen sliding scale   SubCutaneous three times a day before meals  insulin lispro (ADMELOG) corrective regimen sliding scale   SubCutaneous at bedtime  isosorbide   mononitrate ER Tablet (IMDUR) 60 milliGRAM(s) Oral daily  metoprolol succinate ER 50 milliGRAM(s) Oral daily  norepinephrine Infusion 0.05 MICROgram(s)/kG/Min (5.74 mL/Hr) IV Continuous <Continuous>  potassium chloride    Tablet ER 10 milliEquivalent(s) Oral daily  propofol Infusion 5 MICROgram(s)/kG/Min (1.84 mL/Hr) IV Continuous <Continuous>  sertraline 25 milliGRAM(s) Oral daily    MEDICATIONS  (PRN):  acetaminophen     Tablet .. 650 milliGRAM(s) Oral every 6 hours PRN Temp greater or equal to 38C (100.4F), Mild Pain (1 - 3)  clonazePAM  Tablet 0.25 milliGRAM(s) Oral two times a day PRN agitation  dextrose Oral Gel 15 Gram(s) Oral once PRN Blood Glucose LESS THAN 70 milliGRAM(s)/deciliter    Vital Signs Last 24 Hrs  T(F): 97.2 (09 May 2023 05:45), Max: 98 (08 May 2023 12:10)  HR: 73 (09 May 2023 05:45) (68 - 79)  BP: 164/105 (09 May 2023 05:45) (159/84 - 177/83)  RR: 19 (09 May 2023 05:45) (18 - 19)  SpO2: 98% (09 May 2023 05:45) (96% - 99%)    PHYSICAL EXAM:  General: NAD, alert, disoriented   ENT: No gross hearing impairment, dry mucous membranes, no thrush  Neck: Supple, No JVD  Lungs: Clear to auscultation bilaterally, good air entry, non-labored breathing  Cardio: RRR, S1/S2, No murmur  Abdomen: Soft, Nontender, Nondistended; Bowel sounds present  Extremities: No calf tenderness, No cyanosis, No pitting edema  Psych: Calm, cooperative     LABS:                        14.8   7.95  )-----------( 151      ( 09 May 2023 08:15 )             47.8     05-09    147  |  109  |  40  ----------------------------<  212  4.5   |  34  |  1.63    Ca    9.2      09 May 2023 08:15  Mg     2.3     05-08    05-07 Chol 172 mg/dL LDL -- HDL 39 mg/dL Trig 87 mg/dL  TSH 0.920   TSH with FT4 reflex --  Total T3 --    POCT Blood Glucose.: 169 mg/dL (09 May 2023 09:33)  POCT Blood Glucose.: 226 mg/dL (09 May 2023 08:12)  POCT Blood Glucose.: 273 mg/dL (08 May 2023 22:36)  POCT Blood Glucose.: 117 mg/dL (08 May 2023 17:06)  POCT Blood Glucose.: 154 mg/dL (08 May 2023 12:47)    RADIOLOGY & ADDITIONAL TESTS:    Care Discussed with Consultants/Other Providers:

## 2023-05-09 NOTE — CONSULT NOTE ADULT - ASSESSMENT
78 year old male with a PMH of Dementia, HTN, HLD, Cardiomyopathy, chronic systolic CHF, CAD s/p CABG, "known occluded RCA" and DM type 2 who was admitted to [facility] on 5/4 for tx of hypoxic respiratory failure, acute on chronic HF exacerbation (EF 40-45% this stay, prior 50-55%) and ?ANISH. Pt hospital tx consisted of supplemental O2, diuresis, bb and aspirin. ?new afib noted from ED, however, cardiology feels its likely atrial tachycardia.      Today pt intubated s/p cardiac arrest 2/2 choking event with breakfast. Pt admitted to ICU for tx of:      Cardiac arrest now on modified hypothermia  Hypoxemic / Hypercarbic respiratory failure  Lactic Acidosis  Elevated troponin  Aspiration Event  Heart failure  Bilateral pleural effusions  CAD s/p CABG with known RCA occlusion  ANISH on ?CKD    Underlying PMH of Dementia, Heart failure, cardiomyopathy, HTN, HLD, CAD s/p CABG, type 2 DM      Plan:  --admit pt to ICU, monitor vitals per policy  --maintain ETT, abg stat, adjust settings prn; pending post arrest CXR ett confirmation  --assess need for sedation, need to perform neuro exam; will give fentanyl ivp to start prn; goal RASS 0/-1  --pt currently HDS without pressor support; right femoral a line placed for monitoring  --stat post arrest labs  --trend cardiac enzymes, EKG, cardiologist made aware of arrest; will trend troponin pt already on aspirin, will cont bb if bp tolerating; full medication evaluation otherwise   --modified hypothermia; goal temp 36 degrees C; pt currently minimally responsive ?withdraws to pain  --stat zosyn with maintenance given aspiration event and 5 day hospitalization  --giving gently IVF hydration;  mL bolus x1 over 90 minutes, assess response  --NGT placement, will confirm with CXR  --indwelling urinary catheter placed, monitor u/o, i/o's, bun/creatinine; avoid nephrotoxic agents  --monitor blood glucose levels; ISS prn  --cont lovenox for dvt prophylaxis  --ppi for gi prophylaxis  --family education / emotional support  --full code

## 2023-05-09 NOTE — AIRWAY PLACEMENT NOTE ADULT - POST AIRWAY PLACEMENT ASSESSMENT:
breath sounds bilateral/breath sounds equal/CXR pending breath sounds bilateral/breath sounds equal/positive end tidal CO2 noted/CXR pending/chest excursion noted/skin color improved

## 2023-05-09 NOTE — PROGRESS NOTE ADULT - NS ATTEND AMEND GEN_ALL_CORE FT
77 yo M with PMH of HTN, CM, systolic CHF, DM2, dementia, a/w SOB and leg swelling, dx with CHF exacerbation.     Patient was seen by myself this morning after CT scan and was awake, alert and answering questions. He appeared in good health and getting set up to have breakfast. During morning breakfast, patient appeared to have choked on food and Heimlich maneuver was performed by myself without success. Patient then noted to be pulseless and CPR was initiated. CODE BLUE was activated. Attempted to intubate however was difficult to secure airway due to food impaction which was cleared by ICU team with suction, and intubated with help of glidescope, noted to have food impaction around vocal cords. ROSC was achieved after 3 rounds of epi and 1 round of sodium bicarb. Patient transferred to ICU for further care. DaughterKuldeep was updated on the phone about the events as well as in person, all questions answered.    #Cardiac arrest 2/2 respiratory arrest  - ROSC achieved, patient transferred to ICU, further management as per ICU team   - 2/2 aspiration, choking    #Acute hypoxic respiratory failure   #Acute on chronic systolic dysfunction  - continue oxygen, wean off as tolerated  - lasix held for now as patient appears volume depleted  - probnp improving  - dimer reviewed, age appropriate and therefore low suspicion for PE  - follow up CT chest ordered this morning  - cardio recs appreciated     #Demand ischemia  - c/w ASA, Toprol  - lipid profile reviewed   - as per cardio, would not perform coronary angio given poor renal function and advanced dementia    transferred to ICU s/p code blue, management as per ICU team. Discussed with Dr. Collins.

## 2023-05-09 NOTE — CHART NOTE - NSCHARTNOTEFT_GEN_A_CORE
-called to bedside by RN as patient having seizures  -patient is admitted post cardiac arrest (per day shift sign out code was roughly 8 minutes, and occurred after patient chocked while being fed by 24 hour bedside aid)    -noted tonic-clonic motion of upper ext. arms, shoulders, and head    -pupils are small ()but this is uncahged from prior per RN)    -patient has been on heparin ACS nomogram for uptrending trops post cardiac arrest    -at this time:    1) will STOP heparin  2) have given 4 Mg IVP ativan and seizure has broke  3) will give 1000mg KEPRPA  4) send for STAT head CT  5) once exam done will call night hawk to have STAT read  6) have exaplianed sitruation to family at bedside given concern for bleed Vs anoxic injury from cardiac arrest    -will follow up head CT and call approrpaite consults as indicated (ie neuro/neuro surg iof bleed) and will have to give protamine    -have discussed case with Dr. velazquez in eICU -called to bedside by RN as patient having seizures  -patient is admitted post cardiac arrest (per day shift sign out code was roughly 8 minutes, and occurred after patient chocked while being fed by 24 hour bedside aid)    -noted tonic-clonic motion of upper ext. arms, shoulders, and head    -pupils are small ()but this is uncahged from prior per RN)    -patient has been on heparin ACS nomogram for uptrending trops post cardiac arrest    -at this time:    1) will STOP heparin  2) have given 4 Mg IVP ativan and seizure has broke  3) will give 1000mg KEPRPA  4) send for STAT head CT  5) once exam done will call night hawk to have STAT read  6) have exaplianed sitruation to family at bedside given concern for bleed Vs anoxic injury from cardiac arrest    -will follow up head CT and call approrpaite consults as indicated (ie neuro/neuro surg iof bleed) and will have to give protamine    -have discussed case with Dr. velazquez in eICU        ****UPDATE****    -CT head done, showed:    < from: CT Head No Cont (05.09.23 @ 21:30) >    ACC: [id number] EXAM:  CT BRAIN   ORDERED BY: ROSALIE VALERA     PROCEDURE DATE:  05/09/2023          INTERPRETATION:  EXAM: CT HEAD WITHOUT INTRAVENOUS CONTRAST    CLINICAL INFORMATION: Seizure. Postarrest. Atrial fibrillation, dementia,   CHF, CABG, colon cancer.    TECHNIQUE: Noncontrast axial CT images were acquired through the head.   Two-dimensional sagittal and coronal reformats were generated.    COMPARISON STUDY: None    FINDINGS:    BRAIN: No apparent acute infarct, hemmorhage or mass.No hydrocephalus.   Chronic appearing white matter hypodensities and volume loss.    CALVARIUM: No skull fracture or agreesive osseous legions.    EXTRACRANIAL SOFT TISSUES: No acute findings.    VISUALIZED PORTIONS OF THE PARANASAL SINUSES AND MASTOID AIR CELLS: No   air fluid levels.    IMPRESSION:  No acute intracranial findings.    --- End of Report ---             ROSALIE MORRISON MD; Attending Radiologist  This document has been electronically signed. May  9 2023  9:40PM    < end of copied text >        -I have explained the above results to the family at bedise, they understood, all questions answered    -will give keppra  -will maitnain propofol for seizure supression  -EEG  -will start levophed as anticipate BP dropping with above medication regimen, goal MAP 65-70, if MAP higher will stop levo  -Patient currently on Full vent support  -titrate settings to maintain SaO2 >90%, or pH >7.25  -consider low tidal volume ventilation strategy w/ goal Tv 4-6 cc/kg of ideal body weight  -plateu pressure goal <30  -Peridex oral care  -aggressive pulmonary toilet  -daily sedation vacation with spontaneous breathing trial if clinical condition warrants, discuss with respiratory therapy   -neuro consult  -f/u repeat ABG in 1 hour  -will -restart heparin with ACS nomogram given no bleeding  -TLC in place    -case dsicussed with eICU      CRITICAL CARE TIME SPENT:  35 minutes of critical care time spent providing medical care for patient's acute illness/conditions that impairs at least one vital organ system and/or poses a high risk of imminent or life threatening deterioration in the patient's condition. It includes time spent evaluating and treating the patient's acute illness as well as time spent reviewing labs, radiology, discussing goals of care with patient and/or patient's family, and discussing the case with a multidisciplinary team, including the eICU, in an effort to prevent further life threatening deterioration or end organ damage. This time is independent of any procedures performed.

## 2023-05-09 NOTE — PROGRESS NOTE ADULT - SUBJECTIVE AND OBJECTIVE BOX
MARIO RICHARD  [id number]        Chief Complaint: CHF/Atrial tachycardia/CAD s/p CABG    Interval History: The patient has dementia and not able to provide history. One to one sitter at bedside due to agitation.     Tele: not on telemetry       Current meds:   acetaminophen     Tablet .. 650 milliGRAM(s) Oral every 6 hours PRN  aspirin enteric coated 81 milliGRAM(s) Oral daily  clonazePAM  Tablet 0.25 milliGRAM(s) Oral two times a day PRN  dextrose 5%. 1000 milliLiter(s) IV Continuous <Continuous>  dextrose 5%. 1000 milliLiter(s) IV Continuous <Continuous>  dextrose 50% Injectable 25 Gram(s) IV Push once  dextrose 50% Injectable 25 Gram(s) IV Push once  dextrose 50% Injectable 12.5 Gram(s) IV Push once  dextrose Oral Gel 15 Gram(s) Oral once PRN  enoxaparin Injectable 40 milliGRAM(s) SubCutaneous every 24 hours  glucagon  Injectable 1 milliGRAM(s) IntraMuscular once  hydrALAZINE 10 milliGRAM(s) Oral three times a day  insulin lispro (ADMELOG) corrective regimen sliding scale   SubCutaneous three times a day before meals  insulin lispro (ADMELOG) corrective regimen sliding scale   SubCutaneous at bedtime  isosorbide   mononitrate ER Tablet (IMDUR) 60 milliGRAM(s) Oral daily  metoprolol succinate ER 50 milliGRAM(s) Oral daily  potassium chloride    Tablet ER 10 milliEquivalent(s) Oral daily  sertraline 25 milliGRAM(s) Oral daily      Objective:     Vital Signs:   T(C): 36.2 (05-09-23 @ 05:45), Max: 37 (05-08-23 @ 09:47)  HR: 73 (05-09-23 @ 05:45) (68 - 79)  BP: 164/105 (05-09-23 @ 05:45) (159/84 - 177/83)  RR: 19 (05-09-23 @ 05:45) (18 - 19)  SpO2: 98% (05-09-23 @ 05:45) (82% - 99%)  Wt(kg): --      Physical Exam:   General: elderly man with dementia, no distress  Neck: supple   CVS: JVP ~ 9 cm H20, RRR, s1, s2  Pulm: unlabored respirations, mostly clear to ausculation   Ext: no lower extremity edema b/l   Neuro: awake, not oriented         Labs:   09 May 2023 08:15    147    |  109    |  40     ----------------------------<  212    4.5     |  34     |  1.63     Ca    9.2        09 May 2023 08:15  Mg     2.3       08 May 2023 09:05                            14.8   7.95  )-----------( 151      ( 09 May 2023 08:15 )             47.8               Coronary angiogram (2014):  DIAGNOSTIC IMPRESSIONS: Patent PETER to LAD. Occluded RCA - collateralized. Moderate OM2 disease. Normal LVEDP.  DIAGNOSTIC RECOMMENDATIONS: Medical therapy and consider restarting ASA as soon as possible post surgery.    TTE (7/2022):  LVEF 40-45%    TTE (1/2023):  LVEF 55-60%, regional wall motion abnormalities  Moderate-severe MR, Moderate TR  Moderate pulmonary hypertension     Outpatient nuclear stress test (2/2023):  Medium sized, moderate defect in the mid inferior and basal inferior walls that are predominantly fixed suggestive of an infarction with mild laura-infarct ischemia, stress LVEF 41%      ECG (5/4/23): possible atrial tachycardia, nonspecific ST abnormalities, prolonged QTc     TTE (5/5/23):  LVEF 40-45%, mild LVH  Biatrial enlargement   Moderate MR, Mild-moderate TR  Mild pulmonary hypertension     CXR (5/4/23):  Moderate bilateral perihilar interstitial infiltrates, new.  Small right pleural effusion, new

## 2023-05-09 NOTE — CHART NOTE - NSCHARTNOTEFT_GEN_A_CORE
I called son Franko ( listed as emergency contact) [phone] multiple times . Phone rang . No answering machine or voice mail options .    I also called daughter Kuldeep  [phone]. No options to leave messages .

## 2023-05-09 NOTE — PROGRESS NOTE ADULT - ASSESSMENT
77 yo M with PMH of HTN, CM, systolic CHF, DM2, dementia, a/w SOB and leg swelling, dx with CHF exacerbation.    #Acute hypoxic respiratory failure   #Acute on chronic systolic dysfunction  - Continue supplemental O2 - this morning SpO2 on room air was 82% - improved with 2L nasal cannula   - Echo: EF 40-45%  - Currently holding lasix (last dose was 5/7), will give lasix based on volume status  - Pro-BNP improved but still elevated - ~28K-->19K  - Continue toprol 50 mg daily  - Continue imdur 60 mg daily  - Consider Entresto and Farxiga when renal function back to baseline   - f/u CXR final read  - Will check CT chest noncontrast as patient still requiring supplemental O2.   - Cardiology and pulm following     #ANISH - slowly improving  - Baseline Cr ~ 1 (4/12/23)  - Likely from fluid overload state  - No evidence for bilateral hydronephrosis on renal sono 5/5  - No retention on bedside bladder sono 5/6  - Monitor renal function closely on diuretic  - BMP in AM    #Demand ischemia  - c/w ASA, Toprol  - Not on a statin, discussed with daughter. Will advise OP follow up    #HTN  - c/w Toprol (increased dose) and imdur  - Added hydralazine 10 mg TID on 5/8  - Monitor BP    #Thrombocytopenia  - Mild, asymptomatic  - Monitor    #Dementia with behavioral disturbance  #Daytime somnolence, likely 2/2 higher doses of benzos  - Discussed with daughter (pharmacist, very involved in her father's care) risks/benefits of benzo use in this population - patient has been stable on low dose of Clonazepam 0.25 mg x1 only once a day in the AM. Normally does not have severe agitation/delirium at home  - Continue PRN Clonazepam at home dose of 0.25 mg if needed  - Avoid higher doses, Ativan and Zyprexa as discussed with patient's daughter    #DM2  - A1C 7.7  - Stable sugars at this time, ISS    #DVT ppx  - Lovenox    Dispo: Pending above. May need home O2.     5/8: Updated patient’s daughter Kuldeep at [phone] 79 yo M with PMH of HTN, CM, systolic CHF, DM2, dementia, a/w SOB and leg swelling, dx with CHF exacerbation.     This morning (5/9), patient went to CT scan, upon assessment after CT scan patient was awake, alert, denied pain. During breakfast, patient choked on piece of food and subsequently went into cardiac arrest. RRT at bedside, airway was cleared and secured, ROSC achieved. Patient transferred to ICU    #Cardiac arrest  - ROSC achieved, patient transferred to ICU, further management as per ICU team   - Daughter updated     #Acute hypoxic respiratory failure   #Acute on chronic systolic dysfunction  - Continue supplemental O2 as needed  - Echo: EF 40-45%  - Currently holding lasix (last dose was 5/7), will give lasix based on volume status  - Pro-BNP improved but still elevated - ~28K-->19K  - Continue toprol 50 mg daily  - Continue imdur 60 mg daily  - Consider Entresto and Farxiga when renal function back to baseline   - f/u CXR final read  - Will check CT chest noncontrast as patient still requiring supplemental O2. f/u CT chest  - Cardiology and pulm following     #ANISH - slowly improving  - Baseline Cr ~ 1 (4/12/23)  - Likely from fluid overload state  - No evidence for bilateral hydronephrosis on renal sono 5/5  - No retention on bedside bladder sono 5/6  - Monitor renal function closely on diuretic  - BMP in AM    #Demand ischemia  - c/w ASA, Toprol  - Not on a statin, discussed with daughter. Will advise OP follow up    #HTN  - c/w Toprol (increased dose) and imdur  - Added hydralazine 10 mg TID on 5/8  - Monitor BP    #Thrombocytopenia  - Mild, asymptomatic  - Monitor    #Dementia with behavioral disturbance  #Daytime somnolence, likely 2/2 higher doses of benzos  - Discussed with daughter (pharmacist, very involved in her father's care) risks/benefits of benzo use in this population - patient has been stable on low dose of Clonazepam 0.25 mg x1 only once a day in the AM. Normally does not have severe agitation/delirium at home  - Continue PRN Clonazepam at home dose of 0.25 mg if needed  - Avoid higher doses, Ativan and Zyprexa as discussed with patient's daughter    #DM2  - A1C 7.7  - Stable sugars at this time, ISS    #DVT ppx  - Lovenox    Dispo: Transferred to critical care     5/9: Updated patient’s daughter Kuldeep at [phone] 77 yo M with PMH of HTN, CM, systolic CHF, DM2, dementia, a/w SOB and leg swelling, dx with CHF exacerbation.     This morning (5/9), patient went to CT scan, upon assessment after CT scan patient was awake, alert, denied pain. During breakfast, patient choked on piece of food and subsequently went into cardiac arrest. RRT at bedside, airway was cleared and secured, ROSC achieved. Patient transferred to ICU    #Cardiac arrest 2/2 respiratory arrest  - ROSC achieved, patient transferred to ICU, further management as per ICU team   - Daughter updated     #Acute hypoxic respiratory failure   #Acute on chronic systolic dysfunction  - Continue supplemental O2 as needed  - Echo: EF 40-45%  - Currently holding lasix (last dose was 5/7), will give lasix based on volume status  - Pro-BNP improved but still elevated - ~28K-->19K  - Continue toprol 50 mg daily  - Continue imdur 60 mg daily  - Consider Entresto and Farxiga when renal function back to baseline   - f/u CXR final read  - Will check CT chest noncontrast as patient still requiring supplemental O2. f/u CT chest  - Cardiology and pulm following     #ANISH - slowly improving  - Baseline Cr ~ 1 (4/12/23)  - Likely from fluid overload state  - No evidence for bilateral hydronephrosis on renal sono 5/5  - No retention on bedside bladder sono 5/6  - Monitor renal function closely on diuretic  - BMP in AM    #Demand ischemia  - c/w ASA, Toprol  - Not on a statin, discussed with daughter. Will advise OP follow up    #HTN  - c/w Toprol (increased dose) and imdur  - Added hydralazine 10 mg TID on 5/8  - Monitor BP    #Thrombocytopenia  - Mild, asymptomatic  - Monitor    #Dementia with behavioral disturbance  #Daytime somnolence, likely 2/2 higher doses of benzos  - Discussed with daughter (pharmacist, very involved in her father's care) risks/benefits of benzo use in this population - patient has been stable on low dose of Clonazepam 0.25 mg x1 only once a day in the AM. Normally does not have severe agitation/delirium at home  - Continue PRN Clonazepam at home dose of 0.25 mg if needed  - Avoid higher doses, Ativan and Zyprexa as discussed with patient's daughter    #DM2  - A1C 7.7  - Stable sugars at this time, ISS    #DVT ppx  - Lovenox    Dispo: Transferred to critical care     5/9: Updated patient’s daughter Kuldeep at [phone]

## 2023-05-09 NOTE — CONSULT NOTE ADULT - SUBJECTIVE AND OBJECTIVE BOX
Mr. Avila is an 78 year old male with a PMH of Dementia, HTN, Cardiomyopathy, chronic systolic CHF, CAD s/p CABG, "known occluded RCA" and DM type 2 who was admitted to [facility] on 5/4 for tx of hypoxic respiratory failure, acute on chronic HF exacerbation (EF 40-45% this stay, prior 50-55%) and ?ANISH. Pt hospital tx consisted of supplemental O2, diuresis, bb and aspirin. ?new afib noted from ED, however, cardiology feels its likely atrial tachycardia.      Today pt noted to have choking episode with apparent hypoxia and cardiac arrest. Pt coded with ACLS protocol initiated, pt airway with multiple small pieces of food obstructing his vocal cords, within airway and pt mouth which was suctioned out and difficult airway noted at that time. Once pt was intubated, ROSC was achieved shortly thereafter. Pt received total of 3 epinephrine and 1 bicarb. Pt transferred to ICU with critical care team for further management / care.       Allergies  sulfa drugs (Unknown)      MEDICATIONS  (STANDING):  aspirin enteric coated 81 milliGRAM(s) Oral daily  chlorhexidine 2% Cloths 1 Application(s) Topical <User Schedule>  dextrose 5%. 1000 milliLiter(s) (50 mL/Hr) IV Continuous <Continuous>  dextrose 5%. 1000 milliLiter(s) (100 mL/Hr) IV Continuous <Continuous>  dextrose 50% Injectable 25 Gram(s) IV Push once  dextrose 50% Injectable 12.5 Gram(s) IV Push once  dextrose 50% Injectable 25 Gram(s) IV Push once  enoxaparin Injectable 40 milliGRAM(s) SubCutaneous every 24 hours  fentaNYL    Injectable 100 MICROGram(s) IV Push once  fentaNYL   Infusion. 0.5 MICROgram(s)/kG/Hr (3.06 mL/Hr) IV Continuous <Continuous>  glucagon  Injectable 1 milliGRAM(s) IntraMuscular once  hydrALAZINE 10 milliGRAM(s) Oral three times a day  insulin lispro (ADMELOG) corrective regimen sliding scale   SubCutaneous at bedtime  insulin lispro (ADMELOG) corrective regimen sliding scale   SubCutaneous three times a day before meals  isosorbide   mononitrate ER Tablet (IMDUR) 60 milliGRAM(s) Oral daily  lactated ringers Bolus 500 milliLiter(s) IV Bolus once  metoprolol succinate ER 50 milliGRAM(s) Oral daily  norepinephrine Infusion 0.05 MICROgram(s)/kG/Min (5.74 mL/Hr) IV Continuous <Continuous>  piperacillin/tazobactam IVPB. 3.375 Gram(s) IV Intermittent once  piperacillin/tazobactam IVPB.- 3.375 Gram(s) IV Intermittent once  piperacillin/tazobactam IVPB.. 3.375 Gram(s) IV Intermittent every 8 hours  potassium chloride    Tablet ER 10 milliEquivalent(s) Oral daily  propofol Infusion 5 MICROgram(s)/kG/Min (1.84 mL/Hr) IV Continuous <Continuous>  sertraline 25 milliGRAM(s) Oral daily      MEDICATIONS  (PRN):  acetaminophen     Tablet .. 650 milliGRAM(s) Oral every 6 hours PRN Temp greater or equal to 38C (100.4F), Mild Pain (1 - 3)  clonazePAM  Tablet 0.25 milliGRAM(s) Oral two times a day PRN agitation  dextrose Oral Gel 15 Gram(s) Oral once PRN Blood Glucose LESS THAN 70 milliGRAM(s)/deciliter        Drug Dosing Weight  Height (cm): 154.9 (04 May 2023 19:21)  Weight (kg): 61.2 (04 May 2023 19:21)  BMI (kg/m2): 25.5 (04 May 2023 19:21)  BSA (m2): 1.6 (04 May 2023 19:21)        PAST MEDICAL & SURGICAL HISTORY:  HTN (hypertension)  HLD (hyperlipidemia)  Diabetes  CAD (coronary artery disease)  History of cholecystectomy        FAMILY HISTORY:  No pertinent family history in first degree relatives      ADVANCE DIRECTIVES: Full code      REVIEW OF SYSTEMS: Unable to assess, pt intubated / unresponsive.         Mode: AC/ CMV (Assist Control/ Continuous Mandatory Ventilation)  RR (machine): 20  TV (machine): 450  FiO2: 100  PEEP: 5  MAP: 9  PIP: 25      ICU Vital Signs Last 24 Hrs  T(C): 36.2 (09 May 2023 05:45), Max: 36.7 (08 May 2023 12:10)  T(F): 97.2 (09 May 2023 05:45), Max: 98 (08 May 2023 12:10)  HR: 78 (09 May 2023 10:30) (68 - 79)  BP: 164/105 (09 May 2023 05:45) (159/84 - 177/83)  RR: 19 (09 May 2023 05:45) (18 - 19)  SpO2: 92% (09 May 2023 10:30) (92% - 99%)      O2 Parameters below as of 09 May 2023 05:45  Patient On (Oxygen Delivery Method): nasal cannula  O2 Flow (L/min): 5        ABG - ( 09 May 2023 10:16 )  pH, Arterial: 7.10  pH, Blood: x     /  pCO2: 83    /  pO2: 75    / HCO3: 26    / Base Excess: -3.9  /  SaO2: 91.2          PHYSICAL EXAM:  GENERAL:  Elderly, cachectic, blue (upon arrival); now intubated  HEAD:  Atraumatic, Normocephalic  EYES: EOMI, PERRL  ENMT: poor dentition, dry mucus membrane, ett in place  NECK: Supple, No JVD, trachea midline  NERVOUS SYSTEM: obtunded / withdraws to pain in upper extremities b/l  CHEST/LUNG: Mechanical breath sounds noted b/l, no wheezing noted  HEART: s1/s2 noted, 2+ pulses noted i  ABDOMEN: Soft, Nontender, Nondistended; Bowel sounds present  EXTREMITIES:  2+ Peripheral Pulses, skin tenting   LYMPH: No lymphadenopathy noted  SKIN: No rashes or lesions      LABS:  CBC Full  -  ( 09 May 2023 10:25 )  WBC Count : 11.14 K/uL  RBC Count : 5.03 M/uL  Hemoglobin : 15.6 g/dL  Hematocrit : 51.5 %  Platelet Count - Automated : 125 K/uL  Mean Cell Volume : 102.4 fl  Mean Cell Hemoglobin : 31.0 pg  Mean Cell Hemoglobin Concentration : 30.3 gm/dL      05-09    155<H>  |  110<H>  |  41<H>  ----------------------------<  252<H>  5.3   |  29  |  1.76<H>    Ca    9.2      09 May 2023 10:25  Phos  5.8     05-09  Mg     2.9     05-09    TPro  6.9  /  Alb  2.4<L>  /  TBili  0.7  /  DBili  x   /  AST  39  /  ALT  28  /  AlkPhos  143<H>  05-09    CAPILLARY BLOOD GLUCOSE  POCT Blood Glucose.: 169 mg/dL (09 May 2023 09:33)      CARDIAC MARKERS ( 09 May 2023 10:25 )  x     / x     / 178 U/L / x     / 5.1 ng/mL      Lactate, Blood: 12.6 mmol/L (05.09.23 @ 10:25)      Blood Gas Profile - Arterial (05.09.23 @ 10:16)    pH, Arterial: 7.10   pCO2, Arterial: 83 mmHg   pO2, Arterial: 75 mmHg   HCO3, Arterial: 26 mmol/L   Base Excess, Arterial: -3.9 mmol/L   Oxygen Saturation, Arterial: 91.2 %   Total CO2, Arterial: 28 mmol/L   FIO2, Arterial: 100.0   Blood Gas Source Arterial: Arterial        EKG: pending, currently NSR on monitor with frequent pac's      RADIOLOGY: Pending post intubation CXR        CRITICAL CARE TIME SPENT: 90 minutes

## 2023-05-09 NOTE — PROGRESS NOTE ADULT - ASSESSMENT
Assessment:  Giovani Avila is an 78 year old man with past medical history of HFrecEF (LVEF 40-45% in 7/2022, then LVEF 55-60% in 1/2023), Coronary artery disease (s/p CABG with known occluded RCA, medically managed) was sent in to ER by PMD due to dyspnea, and concern for atrial arrhythmia, found to have congestive heart failure and acute renal injury.      ECG on admission suggestive of atrial tachycardia. Troponins elevated and likely secondary to known occluded RCA, CHF and renal disease. Pro BNP significantly elevated. Echo report with LVEF 40-45% which is a decrement from recent outpatient echo report with LVEF 55-60% in January. Recent outpatient nuclear stress test from February was abnormal and consistent with inferior wall infarct with mild laura-infarct ischemia appears was medically managed.     Recommendations:  [] Congestive heart failure: LVEF 40-45%. Appears near-euvolemic, s/p IV diuresis. Would follow up with Renal when safe to resume oral diuretic due to abnormal renal function (takes Lasix 20 mg daily at home). Had recent non-invasive ischemic evaluation that was abnormal and medically managed. Not candidate for ARNI, SGLT2-I or MR antagonist due to abnormal renal function, can further follow up as outpatient. Consider increasing Hydralazine for better BP control   [] CAD s/p CABG, elevated troponins: Continue medical management as per above, aspirin, metoprolol. Would not perform coronary angiogram at this time due to advanced dementia with poor mental status, agitation and poor renal function.    We will continue to follow along.     Trang Faulkner MD  Cardiology

## 2023-05-10 LAB
ALBUMIN SERPL ELPH-MCNC: 2.3 G/DL — LOW (ref 3.3–5)
ALP SERPL-CCNC: 86 U/L — SIGNIFICANT CHANGE UP (ref 40–120)
ALT FLD-CCNC: 17 U/L — SIGNIFICANT CHANGE UP (ref 10–45)
ANION GAP SERPL CALC-SCNC: 10 MMOL/L — SIGNIFICANT CHANGE UP (ref 5–17)
APTT BLD: 68.9 SEC — HIGH (ref 27.5–35.5)
APTT BLD: 69.8 SEC — HIGH (ref 27.5–35.5)
AST SERPL-CCNC: 33 U/L — SIGNIFICANT CHANGE UP (ref 10–40)
BASE EXCESS BLDA CALC-SCNC: 3.5 MMOL/L — HIGH (ref -2–3)
BILIRUB SERPL-MCNC: 2.3 MG/DL — HIGH (ref 0.2–1.2)
BUN SERPL-MCNC: 54 MG/DL — HIGH (ref 7–23)
CALCIUM SERPL-MCNC: 8.2 MG/DL — LOW (ref 8.4–10.5)
CHLORIDE SERPL-SCNC: 109 MMOL/L — HIGH (ref 96–108)
CO2 BLDA-SCNC: 30 MMOL/L — HIGH (ref 19–24)
CO2 SERPL-SCNC: 28 MMOL/L — SIGNIFICANT CHANGE UP (ref 22–31)
CREAT SERPL-MCNC: 2.11 MG/DL — HIGH (ref 0.5–1.3)
EGFR: 31 ML/MIN/1.73M2 — LOW
GAS PNL BLDA: SIGNIFICANT CHANGE UP
GLUCOSE BLDC GLUCOMTR-MCNC: 149 MG/DL — HIGH (ref 70–99)
GLUCOSE BLDC GLUCOMTR-MCNC: 160 MG/DL — HIGH (ref 70–99)
GLUCOSE BLDC GLUCOMTR-MCNC: 164 MG/DL — HIGH (ref 70–99)
GLUCOSE BLDC GLUCOMTR-MCNC: 200 MG/DL — HIGH (ref 70–99)
GLUCOSE BLDC GLUCOMTR-MCNC: 203 MG/DL — HIGH (ref 70–99)
GLUCOSE SERPL-MCNC: 193 MG/DL — HIGH (ref 70–99)
HCO3 BLDA-SCNC: 28 MMOL/L — SIGNIFICANT CHANGE UP (ref 21–28)
HCT VFR BLD CALC: 42.6 % — SIGNIFICANT CHANGE UP (ref 39–50)
HGB BLD-MCNC: 13.5 G/DL — SIGNIFICANT CHANGE UP (ref 13–17)
HOROWITZ INDEX BLDA+IHG-RTO: 100 — SIGNIFICANT CHANGE UP
MAGNESIUM SERPL-MCNC: 1.9 MG/DL — SIGNIFICANT CHANGE UP (ref 1.6–2.6)
MCHC RBC-ENTMCNC: 31.3 PG — SIGNIFICANT CHANGE UP (ref 27–34)
MCHC RBC-ENTMCNC: 31.7 GM/DL — LOW (ref 32–36)
MCV RBC AUTO: 98.8 FL — SIGNIFICANT CHANGE UP (ref 80–100)
NRBC # BLD: 0 /100 WBCS — SIGNIFICANT CHANGE UP (ref 0–0)
NT-PROBNP SERPL-SCNC: HIGH PG/ML (ref 0–300)
PCO2 BLDA: 47 MMHG — SIGNIFICANT CHANGE UP (ref 35–48)
PH BLDA: 7.39 — SIGNIFICANT CHANGE UP (ref 7.35–7.45)
PHOSPHATE SERPL-MCNC: 4.1 MG/DL — SIGNIFICANT CHANGE UP (ref 2.5–4.5)
PLATELET # BLD AUTO: 124 K/UL — LOW (ref 150–400)
PO2 BLDA: 129 MMHG — HIGH (ref 83–108)
POTASSIUM SERPL-MCNC: 4.3 MMOL/L — SIGNIFICANT CHANGE UP (ref 3.5–5.3)
POTASSIUM SERPL-SCNC: 4.3 MMOL/L — SIGNIFICANT CHANGE UP (ref 3.5–5.3)
PROT SERPL-MCNC: 5.6 G/DL — LOW (ref 6–8.3)
RBC # BLD: 4.31 M/UL — SIGNIFICANT CHANGE UP (ref 4.2–5.8)
RBC # FLD: 15.2 % — HIGH (ref 10.3–14.5)
SAO2 % BLDA: 99.5 % — HIGH (ref 94–98)
SODIUM SERPL-SCNC: 147 MMOL/L — HIGH (ref 135–145)
TROPONIN I, HIGH SENSITIVITY RESULT: 2269.1 NG/L — HIGH
WBC # BLD: 15.57 K/UL — HIGH (ref 3.8–10.5)
WBC # FLD AUTO: 15.57 K/UL — HIGH (ref 3.8–10.5)

## 2023-05-10 PROCEDURE — 71045 X-RAY EXAM CHEST 1 VIEW: CPT | Mod: 26

## 2023-05-10 PROCEDURE — 99232 SBSQ HOSP IP/OBS MODERATE 35: CPT

## 2023-05-10 PROCEDURE — 93925 LOWER EXTREMITY STUDY: CPT | Mod: 26

## 2023-05-10 RX ORDER — SODIUM CHLORIDE 9 MG/ML
1000 INJECTION, SOLUTION INTRAVENOUS
Refills: 0 | Status: ACTIVE | OUTPATIENT
Start: 2023-05-10 | End: 2024-04-07

## 2023-05-10 RX ORDER — PROPOFOL 10 MG/ML
5.01 INJECTION, EMULSION INTRAVENOUS
Qty: 1000 | Refills: 0 | Status: DISCONTINUED | OUTPATIENT
Start: 2023-05-10 | End: 2023-05-17

## 2023-05-10 RX ORDER — VALPROIC ACID (AS SODIUM SALT) 250 MG/5ML
500 SOLUTION, ORAL ORAL
Refills: 0 | Status: DISCONTINUED | OUTPATIENT
Start: 2023-05-11 | End: 2023-05-11

## 2023-05-10 RX ORDER — VALPROIC ACID (AS SODIUM SALT) 250 MG/5ML
1000 SOLUTION, ORAL ORAL ONCE
Refills: 0 | Status: COMPLETED | OUTPATIENT
Start: 2023-05-10 | End: 2023-05-10

## 2023-05-10 RX ORDER — SODIUM CHLORIDE 9 MG/ML
500 INJECTION, SOLUTION INTRAVENOUS ONCE
Refills: 0 | Status: COMPLETED | OUTPATIENT
Start: 2023-05-10 | End: 2023-05-10

## 2023-05-10 RX ORDER — MIDAZOLAM HYDROCHLORIDE 1 MG/ML
0.02 INJECTION, SOLUTION INTRAMUSCULAR; INTRAVENOUS
Qty: 100 | Refills: 0 | Status: DISCONTINUED | OUTPATIENT
Start: 2023-05-10 | End: 2023-05-11

## 2023-05-10 RX ORDER — LACOSAMIDE 50 MG/1
50 TABLET ORAL
Refills: 0 | Status: DISCONTINUED | OUTPATIENT
Start: 2023-05-10 | End: 2023-05-10

## 2023-05-10 RX ORDER — VASOPRESSIN 20 [USP'U]/ML
0.04 INJECTION INTRAVENOUS
Qty: 40 | Refills: 0 | Status: DISCONTINUED | OUTPATIENT
Start: 2023-05-10 | End: 2023-05-11

## 2023-05-10 RX ADMIN — Medication 5.74 MICROGRAM(S)/KG/MIN: at 08:26

## 2023-05-10 RX ADMIN — Medication 81 MILLIGRAM(S): at 12:23

## 2023-05-10 RX ADMIN — Medication 2.5 MILLIGRAM(S): at 06:10

## 2023-05-10 RX ADMIN — LEVETIRACETAM 400 MILLIGRAM(S): 250 TABLET, FILM COATED ORAL at 12:23

## 2023-05-10 RX ADMIN — MIDAZOLAM HYDROCHLORIDE 1.22 MG/KG/HR: 1 INJECTION, SOLUTION INTRAMUSCULAR; INTRAVENOUS at 02:30

## 2023-05-10 RX ADMIN — CHLORHEXIDINE GLUCONATE 15 MILLILITER(S): 213 SOLUTION TOPICAL at 06:10

## 2023-05-10 RX ADMIN — CHLORHEXIDINE GLUCONATE 15 MILLILITER(S): 213 SOLUTION TOPICAL at 18:24

## 2023-05-10 RX ADMIN — Medication 4: at 23:18

## 2023-05-10 RX ADMIN — CHLORHEXIDINE GLUCONATE 1 APPLICATION(S): 213 SOLUTION TOPICAL at 06:00

## 2023-05-10 RX ADMIN — HEPARIN SODIUM 800 UNIT(S)/HR: 5000 INJECTION INTRAVENOUS; SUBCUTANEOUS at 05:31

## 2023-05-10 RX ADMIN — PROPOFOL 1.84 MICROGRAM(S)/KG/MIN: 10 INJECTION, EMULSION INTRAVENOUS at 21:28

## 2023-05-10 RX ADMIN — PIPERACILLIN AND TAZOBACTAM 25 GRAM(S): 4; .5 INJECTION, POWDER, LYOPHILIZED, FOR SOLUTION INTRAVENOUS at 12:23

## 2023-05-10 RX ADMIN — Medication 4 MILLIGRAM(S): at 01:27

## 2023-05-10 RX ADMIN — HEPARIN SODIUM 800 UNIT(S)/HR: 5000 INJECTION INTRAVENOUS; SUBCUTANEOUS at 12:32

## 2023-05-10 RX ADMIN — Medication 2: at 18:24

## 2023-05-10 RX ADMIN — PIPERACILLIN AND TAZOBACTAM 25 GRAM(S): 4; .5 INJECTION, POWDER, LYOPHILIZED, FOR SOLUTION INTRAVENOUS at 20:07

## 2023-05-10 RX ADMIN — Medication 2: at 00:10

## 2023-05-10 RX ADMIN — PROPOFOL 1.84 MICROGRAM(S)/KG/MIN: 10 INJECTION, EMULSION INTRAVENOUS at 13:59

## 2023-05-10 RX ADMIN — Medication 60 MILLIGRAM(S): at 21:28

## 2023-05-10 RX ADMIN — Medication 2: at 12:24

## 2023-05-10 RX ADMIN — PANTOPRAZOLE SODIUM 40 MILLIGRAM(S): 20 TABLET, DELAYED RELEASE ORAL at 12:24

## 2023-05-10 RX ADMIN — Medication 2 MILLIGRAM(S): at 06:36

## 2023-05-10 RX ADMIN — PIPERACILLIN AND TAZOBACTAM 25 GRAM(S): 4; .5 INJECTION, POWDER, LYOPHILIZED, FOR SOLUTION INTRAVENOUS at 04:56

## 2023-05-10 RX ADMIN — SODIUM CHLORIDE 1000 MILLILITER(S): 9 INJECTION, SOLUTION INTRAVENOUS at 02:57

## 2023-05-10 NOTE — PROGRESS NOTE ADULT - SUBJECTIVE AND OBJECTIVE BOX
MARIO RICHARD  [id number]        Chief Complaint: CHF/Atrial tachycardia/CAD s/p CABG/Aspiration/Cardiac arrest    Interval History: The patient had an aspiration and choking event yesterday, had subsequent cardiac arrest and now intubated and sedated in ICU.     Tele: atrial fibrillation 70s BPM, NSVT      Current meds:   aspirin enteric coated 81 milliGRAM(s) Oral daily  chlorhexidine 0.12% Liquid 15 milliLiter(s) Oral Mucosa every 12 hours  chlorhexidine 2% Cloths 1 Application(s) Topical <User Schedule>  dextrose 5%. 1000 milliLiter(s) IV Continuous <Continuous>  dextrose 5%. 1000 milliLiter(s) IV Continuous <Continuous>  dextrose 50% Injectable 25 Gram(s) IV Push once  dextrose 50% Injectable 25 Gram(s) IV Push once  dextrose 50% Injectable 12.5 Gram(s) IV Push once  dextrose Oral Gel 15 Gram(s) Oral once PRN  fentaNYL   Infusion. 0.5 MICROgram(s)/kG/Hr IV Continuous <Continuous>  glucagon  Injectable 1 milliGRAM(s) IntraMuscular once  heparin   Injectable 3800 Unit(s) IV Push every 6 hours PRN  heparin  Infusion. 700 Unit(s)/Hr IV Continuous <Continuous>  insulin lispro (ADMELOG) corrective regimen sliding scale   SubCutaneous every 6 hours  levETIRAcetam  IVPB 500 milliGRAM(s) IV Intermittent every 12 hours  metoprolol tartrate Injectable 2.5 milliGRAM(s) IV Push every 6 hours  midazolam Infusion 0.02 mG/kG/Hr IV Continuous <Continuous>  norepinephrine Infusion 0.05 MICROgram(s)/kG/Min IV Continuous <Continuous>  pantoprazole  Injectable 40 milliGRAM(s) IV Push daily  piperacillin/tazobactam IVPB.. 3.375 Gram(s) IV Intermittent every 8 hours  potassium chloride    Tablet ER 10 milliEquivalent(s) Oral daily  propofol Infusion 5 MICROgram(s)/kG/Min IV Continuous <Continuous>  sodium chloride 0.45%. 1000 milliLiter(s) IV Continuous <Continuous>  vasopressin Infusion 0.04 Unit(s)/Min IV Continuous <Continuous>      Objective:     Vital Signs:   T(C): 37.7 (05-10-23 @ 08:00), Max: 37.7 (05-10-23 @ 08:00)  HR: 75 (05-10-23 @ 09:00) (61 - 94)  BP: 111/61 (05-10-23 @ 09:00) (89/39 - 142/94)  RR: 20 (05-10-23 @ 09:00) (15 - 26)  SpO2: 99% (05-10-23 @ 09:00) (87% - 100%)      Physical Exam:   General: intubated, sedated  Neck: supple   CVS: irregularly irregular, s1, s2  Pulm: coarse breath sounds  Ext: no lower extremity edema b/l   Neuro: intubated, sedated       Labs:   10 May 2023 05:10    147    |  109    |  54     ----------------------------<  193    4.3     |  28     |  2.11     Ca    8.2        10 May 2023 05:10  Phos  4.1       10 May 2023 05:10  Mg     1.9       10 May 2023 05:10    TPro  5.6    /  Alb  2.3    /  TBili  2.3    /  DBili  x      /  AST  33     /  ALT  17     /  AlkPhos  86     10 May 2023 05:10                          13.5   15.57 )-----------( 124      ( 10 May 2023 05:10 )             42.6     PT/INR - ( 09 May 2023 15:20 )   PT: 15.2 sec;   INR: 1.31 ratio         PTT - ( 10 May 2023 05:10 )  PTT:69.8 sec  CARDIAC MARKERS ( 09 May 2023 22:45 )  x     / x     / 175 U/L / x     / 9.9 ng/mL  CARDIAC MARKERS ( 09 May 2023 15:20 )  x     / x     / 205 U/L / x     / 10.1 ng/mL  CARDIAC MARKERS ( 09 May 2023 13:42 )  x     / x     / 211 U/L / x     / 15.0 ng/mL  CARDIAC MARKERS ( 09 May 2023 10:25 )  x     / x     / 178 U/L / x     / 5.1 ng/mL            Coronary angiogram (2014):  DIAGNOSTIC IMPRESSIONS: Patent PETER to LAD. Occluded RCA - collateralized. Moderate OM2 disease. Normal LVEDP.  DIAGNOSTIC RECOMMENDATIONS: Medical therapy and consider restarting ASA as soon as possible post surgery.    TTE (7/2022):  LVEF 40-45%    TTE (1/2023):  LVEF 55-60%, regional wall motion abnormalities  Moderate-severe MR, Moderate TR  Moderate pulmonary hypertension     Outpatient nuclear stress test (2/2023):  Medium sized, moderate defect in the mid inferior and basal inferior walls that are predominantly fixed suggestive of an infarction with mild laura-infarct ischemia, stress LVEF 41%      ECG (5/4/23): possible atrial tachycardia, nonspecific ST abnormalities, prolonged QTc     TTE (5/5/23):  LVEF 40-45%, mild LVH  Biatrial enlargement   Moderate MR, Mild-moderate TR  Mild pulmonary hypertension     CXR (5/4/23):  Moderate bilateral perihilar interstitial infiltrates, new.  Small right pleural effusion, new

## 2023-05-10 NOTE — PROGRESS NOTE ADULT - SUBJECTIVE AND OBJECTIVE BOX
Patient continues to be unresponsive with worsening creatinine. Brain CT scan performed yesterday evening showed no acute intracranial findings. Neurology and nephrology consults were called this morning. Nephrology and Neurology confirmed they will see patient for consultation.

## 2023-05-10 NOTE — CHART NOTE - NSCHARTNOTEFT_GEN_A_CORE
- on arrival to shift [facility], I called the transfer center to evalaute patient for potential transfer to Neuro ICU for continuous EEG monitoring given EEG today showing myoclonic seizures  -at this time no transfer would be considered until treatment was maximized here first    -I then called On call neurologist Dr. mccormack to discuss treatment plan going forward    -our plan going forward is as follows:    1) stop keppra given worsening renal function  2) start valproic acid tonight with 1000mg load followed by 500 mg BID dosing (this was also in discussion with epileptologist and neurologist)  -janette versed infusion maintian propofol infusion up titrate as needed  3) repeat spot EEG tomorrow to evalaute for further seizure activity  4) if EEG continues to show seizure activity neurologist team would need further evalaution for potential transfer for continuous EEG and will likely need to start 4th anti-epileptic medication (? Vimpat)      - I updated the patient's daughter at bedside, I also was able to speak with a family member who is a neurologist (? Natasha)  -all questions were answered, and treatment plan outlined     -patient will remain full code - on arrival to shift [facility], I called the transfer center to evalaute patient for potential transfer to Neuro ICU for continuous EEG monitoring given EEG today showing myoclonic seizures  -at this time no transfer would be considered until treatment was maximized here first    -I then called On call neurologist Dr. mccormack to discuss treatment plan going forward    -our plan going forward is as follows:    1) stop keppra given worsening renal function  2) start valproic acid tonight with 1000mg load followed by 500 mg BID dosing (this was also in discussion with epileptologist and neurologist)  -maitnain versed infusion maintian propofol infusion up titrate as needed  3) repeat spot EEG tomorrow to evalaute for further seizure activity  4) if EEG continues to show seizure activity neurologist team would need further evalaution for potential transfer for continuous EEG and will likely need to start 4th anti-epileptic medication (? Vimpat)      - I updated the patient's daughter at bedside, I also was able to speak with a family member who is a neurologist (? Natasha)  -all questions were answered, and treatment plan outlined     -patient will remain full code    -will continue to actively titrate levophed for goal MAP 65-70, liklely combination of sedation and septic shock/SIRS from aspiration event. patient also on fixed dose vasopressin to help use lower dose levoiphed given cardiax history/tachycardia    -patient remains on broad spectrum anbx for aspiraiton event, had feves during the day, possible from aspriation also possible neurogenic origin. Is currently on cooling blanket with temp of 99F, will give tylenol if >100F    -patient also remains on full vent support.  -Patient currently on Full vent support  -titrate settings to maintain SaO2 >90%, or pH >7.25  -consider low tidal volume ventilation strategy w/ goal Tv 4-6 cc/kg of ideal body weight  -plateu pressure goal <30  -Peridex oral care  -aggressive pulmonary toilet  -daily sedation vacation with spontaneous breathing trial if/when sub-clinical seizures controlled    -renal function shows rising Scr this Am, patient is making urine, but low level. Is on 0.45% NS given hypernatremia (was 151 now 147) would switch to LR if/when Na+ 145  -trend UOP  goal 0.5 cc/kr patient does have CKD at baseline with Scr Around 1.5   -renal dose meds  -if becoems anuric or lytes (aka hyperkalemia) develop will discuss Hd with family/renal team    -also remains on ACS nomogram titrated heparin drip for elevated trops and NSTEMI will continue to trend trops, seems to be plateuing, involve cardiology  -is also on ASA  -continue glycemic control with ISS      -will update family if any changes          CRITICAL CARE TIME SPENT:  45 minutes of critical care time spent providing medical care for patient's acute illness/conditions that impairs at least one vital organ system and/or poses a high risk of imminent or life threatening deterioration in the patient's condition. It includes time spent evaluating and treating the patient's acute illness as well as time spent reviewing labs, radiology, discussing goals of care with patient and/or patient's family, and discussing the case with a multidisciplinary team, including the eICU, in an effort to prevent further life threatening deterioration or end organ damage. This time is independent of any procedures performed.

## 2023-05-10 NOTE — PROGRESS NOTE ADULT - SUBJECTIVE AND OBJECTIVE BOX
INTERVAL HPI/OVERNIGHT EVENTS: pat is sedated and oral intubated on vent support . Over night tonic clonic limbs motion       Antimicrobial:  piperacillin/tazobactam IVPB.. 3.375 Gram(s) IV Intermittent every 8 hours    Cardiovascular:  metoprolol tartrate Injectable 2.5 milliGRAM(s) IV Push every 6 hours  norepinephrine Infusion 0.05 MICROgram(s)/kG/Min IV Continuous <Continuous>    Pulmonary:    Hematalogic:  aspirin enteric coated 81 milliGRAM(s) Oral daily  heparin   Injectable 3800 Unit(s) IV Push every 6 hours PRN  heparin  Infusion. 700 Unit(s)/Hr IV Continuous <Continuous>    Other:  chlorhexidine 0.12% Liquid 15 milliLiter(s) Oral Mucosa every 12 hours  chlorhexidine 2% Cloths 1 Application(s) Topical <User Schedule>  dextrose 5%. 1000 milliLiter(s) IV Continuous <Continuous>  dextrose 5%. 1000 milliLiter(s) IV Continuous <Continuous>  dextrose 5%. 1000 milliLiter(s) IV Continuous <Continuous>  dextrose 50% Injectable 25 Gram(s) IV Push once  dextrose 50% Injectable 25 Gram(s) IV Push once  dextrose 50% Injectable 12.5 Gram(s) IV Push once  dextrose Oral Gel 15 Gram(s) Oral once PRN  fentaNYL   Infusion. 0.5 MICROgram(s)/kG/Hr IV Continuous <Continuous>  glucagon  Injectable 1 milliGRAM(s) IntraMuscular once  insulin lispro (ADMELOG) corrective regimen sliding scale   SubCutaneous every 6 hours  levETIRAcetam  IVPB 500 milliGRAM(s) IV Intermittent every 12 hours  midazolam Infusion 0.02 mG/kG/Hr IV Continuous <Continuous>  pantoprazole  Injectable 40 milliGRAM(s) IV Push daily  propofol Infusion 5.011 MICROgram(s)/kG/Min IV Continuous <Continuous>  vasopressin Infusion 0.04 Unit(s)/Min IV Continuous <Continuous>      Drug Dosing Weight  Height (cm): 154.9 (04 May 2023 19:21)  Weight (kg): 61.2 (04 May 2023 19:21)  BMI (kg/m2): 25.5 (04 May 2023 19:21)  BSA (m2): 1.6 (04 May 2023 19:21)    CENTRAL LINE: [ ] YES [ ] NO  LOCATION:   DATE INSERTED:    [facility]OOK: [ ] YES [ ] NO    DATE INSERTED:    A-LINE:  [ ] YES [ ] NO  LOCATION:   DATE INSERTED:    PMH/Social Hx/Fam Hx -reviewed admission note, no change since admission  PAST MEDICAL & SURGICAL HISTORY:  HTN (hypertension)      HLD (hyperlipidemia)      Diabetes      CAD (coronary artery disease)      History of cholecystectomy          T(C): 37.7 (05-10-23 @ 08:00), Max: 37.7 (05-10-23 @ 08:00)  HR: 81 (05-10-23 @ 11:30)  BP: 99/59 (05-10-23 @ 11:00)  BP(mean): 71 (05-10-23 @ 11:00)  ABP: 108/49 (05-10-23 @ 11:30)  ABP(mean): 68 (05-10-23 @ 11:30)  RR: 20 (05-10-23 @ 11:30)  SpO2: 100% (05-10-23 @ 11:30)  Wt(kg): --    ABG - ( 10 May 2023 04:13 )  pH, Arterial: 7.39  pH, Blood: x     /  pCO2: 47    /  pO2: 129   / HCO3: 28    / Base Excess: 3.5   /  SaO2: 99.5                  05- @ 07:01  -  05-10 @ 07:00  --------------------------------------------------------  IN: 2791.5 mL / OUT: 500 mL / NET: 2291.5 mL        Mode: AC/ CMV (Assist Control/ Continuous Mandatory Ventilation)  RR (machine): 20  TV (machine): 450  FiO2: 65  PEEP: 8  MAP: 13  PIP: 30      PHYSICAL EXAM:    GENERAL: No signs of distress, comfortable + ETT  HEAD: Atraumatic, Normocephalic  EYES: PERRLA  ENMT: No erythema, exudates, or enlargement, Moist mucous membranes + NGT   NECK: Supple, normal appearance, No JVD; [  ] central line (if applicable)  CHEST/LUNG: No chest deformity, fair bilateral air entry; No rales, rhonchi, wheezing; crackles  HEART: Regular rate and rhythm; No murmurs, rubs, or gallops;   ABDOMEN: Soft, Nontender, Nondistended; Bowel sounds present  EXTREMITIES:  + Peripheral Pulses, No clubbing, cyanosis, or edema  NERVOUS SYSTEM: sedated .   LYMPH: No lymphadenopathy noted  SKIN: No rashes or lesions; good turgor, warm, dry      LABS:  CBC Full  -  ( 10 May 2023 05:10 )  WBC Count : 15.57 K/uL  RBC Count : 4.31 M/uL  Hemoglobin : 13.5 g/dL  Hematocrit : 42.6 %  Platelet Count - Automated : 124 K/uL  Mean Cell Volume : 98.8 fl  Mean Cell Hemoglobin : 31.3 pg  Mean Cell Hemoglobin Concentration : 31.7 gm/dL  Auto Neutrophil # : x  Auto Lymphocyte # : x  Auto Monocyte # : x  Auto Eosinophil # : x  Auto Basophil # : x  Auto Neutrophil % : x  Auto Lymphocyte % : x  Auto Monocyte % : x  Auto Eosinophil % : x  Auto Basophil % : x    05-10    147<H>  |  109<H>  |  54<H>  ----------------------------<  193<H>  4.3   |  28  |  2.11<H>    Ca    8.2<L>      10 May 2023 05:10  Phos  4.1     05-10  Mg     1.9     05-10    TPro  5.6<L>  /  Alb  2.3<L>  /  TBili  2.3<H>  /  DBili  x   /  AST  33  /  ALT  17  /  AlkPhos  86  05-10    PT/INR - ( 09 May 2023 15:20 )   PT: 15.2 sec;   INR: 1.31 ratio         PTT - ( 10 May 2023 11:20 )  PTT:68.9 sec        RADIOLOGY & ADDITIONAL STUDIES REVIEWED     CT < from: CT Head No Cont (23 @ 21:30) >    ACC: [id number] EXAM:  CT BRAIN   ORDERED BY: ROSALIE VALERA     PROCEDURE DATE:  2023          INTERPRETATION:  EXAM: CT HEAD WITHOUT INTRAVENOUS CONTRAST    CLINICAL INFORMATION: Seizure. Postarrest. Atrial fibrillation, dementia,   CHF, CABG, colon cancer.    TECHNIQUE: Noncontrast axial CT images were acquired through the head.   Two-dimensional sagittal and coronal reformats were generated.    COMPARISON STUDY: None    FINDINGS:    BRAIN: No apparent acute infarct, hemmorhage or mass.No hydrocephalus.   Chronic appearing white matter hypodensities and volume loss.    CALVARIUM: No skull fracture or agreesive osseous legions.    EXTRACRANIAL SOFT TISSUES: No acute findings.    VISUALIZED PORTIONS OF THE PARANASAL SINUSES AND MASTOID AIR CELLS: No   air fluid levels.    IMPRESSION:  No acute intracranial findings.    --- End of Report ---             ROSALIE MORRISON MD; Attending Radiologist  This document has been electronically signed. May  9 2023  9:40PM    < end of copied text >      < from: TTE Echo Complete w/o Contrast w/ Doppler (23 @ 08:06) >    ACC: [id number] EXAM:  ECHO TTE WO CON COMP W DOPP                          PROCEDURE DATE:  2023          INTERPRETATION:  TRANSTHORACIC ECHOCARDIOGRAM REPORT        Patient Name:   MARIO RAMOS Patient Location: [facility] 19  [facility] Rec #:  [id number]         Accession #:      [id number]  Account #:      [id number]           Height:           60.6 in 154.0 cm  YOB: 1945        Weight:           134.5 lb 61.00 kg  Patient Age:    78 years         BSA:              1.59 m²  PatientGender: M                BP:               149/99 mmHg      Date of Exam:        2023 8:06:48 AM  Sonographer:         ESPERANZA  Referring Physician: VADIM    Procedure:     2D Echo/Doppler/Color Doppler Complete.  Indications:   I42.9 - Cardiomyopathy, unspecified  Diagnosis:     I42.9 - Cardiomyopathy, unspecified  Study Details: Technically fair study.        2D AND M-MODE MEASUREMENTS (normal ranges within parentheses):  Left Ventricle:                  Normal   Aorta/Left Atrium:  Normal  IVSd (2D):              1.41 cm (0.7-1.1) Aortic Root (2D):    3.18 cm   (2.4-3.7)  LVPWd (2D):             1.30 cm (0.7-1.1) Aortic Root (Mmode): 3.26 cm   (2.4-3.7)  LVIDd (2D):             4.49 cm (3.4-5.7) AoV Cusp Separation: 1.53 cm   (1.5-2.6)  LVIDs (2D):             3.72 cm           Left Atrium (2D):    4.46 cm   (1.9-4.0)  LV FS (2D):             17.0 %   (>25%)   Left Atrium (Mmode): 5.00 cm   (1.9-4.0)  Relative Wall Thickness  0.58    (<0.42)  Right Ventricle:                                RVd (2D):        3.50 cm    LV DIASTOLIC FUNCTION:  MV Peak E: 0.99 m/s Decel Time: 133 msec  MV Peak A: 0.28 m/s  E/A Ratio: 3.54    SPECTRAL DOPPLER ANALYSIS (where applicable):  Mitral Valve:  MV P1/2 Time: 38.69 msec  MV Area, PHT: 5.69 cm²    Aortic Valve: AoV Max Av: 0.71 m/s AoV Peak P.0 mmHg AoV Mean P.2 mmHg    LVOT Vmax: 0.61 m/s LVOT VTI: 0.089 m LVOT Diameter: 1.99 cm    AoV Area, Vmax: 2.66 cm² AoV Area, VTI: 2.12 cm² AoV Area, Vmn: 2.15 cm²  Ao VTI: 0.131  Tricuspid Valve and PA/RV Systolic Pressure: TR Max Velocity: 2.96 m/s RA   Pressure: 10 mmHg RVSP/PASP: 45.1 mmHg      PHYSICIAN INTERPRETATION:  Left Ventricle: The left ventricular internal cavity size is normal. Left   ventricular wall thickness is increased.  Global LV systolic function was mildly to moderately decreased. Left   ventricular ejection fraction, by visual estimation, is 40 to 45%.   Spectral Doppler shows restrictive pattern of left ventricular myocardial   filling (Grade III diastolic dysfunction).  Right Ventricle: Normal right ventricular size and function. The right   ventricle was not well visualized.  Left Atrium: Moderately enlarged left atrium.  Right Atrium: Right atrial enlargement.  Pericardium: There is no evidence of pericardial effusion.  Mitral Valve: Mild thickening and calcification of the anterior and   posterior mitral valve leaflets. Moderate mitral valve regurgitation is   seen.  Tricuspid Valve: Structurally normal tricuspid valve, with normal leaflet   excursion. Mild-moderate tricuspid regurgitation is visualized. Estimated   pulmonary artery systolic pressure is 45.1 mmHg assuming a right atrial   pressure of 10 mmHg, which is consistent with mild pulmonary hypertension.  Aortic Valve: The aortic valve is trileaflet.  Pulmonic Valve: Structurally normal pulmonic valve, with normal leaflet   excursion. The pulmonic valve was not well visualized. Trace pulmonic   valve regurgitation.  Aorta: The aortic root is normal in size and structure.      Summary:   1. Left ventricular ejection fraction, by visual estimation, is 40 to   45%.   2. Mildly to moderately decreased global left ventricular systolic   function.   3. Increased LV wall thickness.   4. Spectral Doppler shows restrictive pattern of left ventricular   myocardial filling (Grade III diastolic dysfunction).   5. There is mild concentric left ventricular hypertrophy.   6. Moderately enlarged left atrium.   7. Right atrial enlargement.   8. Mild thickening and calcification of the anterior and posterior   mitral valve leaflets.   9. Moderate mitral valve regurgitation.  10. Mild-moderate tricuspid regurgitation.  11. Estimated pulmonary artery systolic pressure is 45.1 mmHg assuming a   right atrial pressureof 10 mmHg, which is consistent with mild pulmonary   hypertension.    [id number] Michi Dennis MD, FACC , MD, FACC Electronically   signed on 2023 at 10:39:15 AM            *** Final ***    < end of copied text >  IMPRESSION:  PAST MEDICAL & SURGICAL HISTORY:  HTN (hypertension)      HLD (hyperlipidemia)      Diabetes      CAD (coronary artery disease)      History of cholecystectomy       p/w                   This is an 78 year old man  with  HTN, HLD, Cardiomyopathy, chronic systolic CHF, CAD s/p CABG, "known occluded RCA" and DM type 2 uncontrol  who was admitted to [facility] on  for tx of hypoxic respiratory failure, acute on chronic HF exacerbation (EF 40-45% this stay, prior 50-55%) and ?ANISH. Pt hospital tx consisted of supplemental O2, diuresis, bb and aspirin. ?new afib noted from ED, however, cardiology feels its likely atrial tachycardia.      Today   pt intubated s/p cardiac arrest 2/2 choking event with breakfast  CPR x 8 min . Pt admitted to ICU for tx of:       ASSESSMENT   - Cardiac arrest   - Acute Hypoxemic / Hypercarbic respiratory failure  - Lactic Acidosis  - Elevated troponin  - Aspiration Event  - Heart failure  - Bilateral pleural effusions  - CAD s/p CABG with known RCA occlusion  - ANISH on ?CKD    Underlying PMH of Dementia, Heart failure, cardiomyopathy, HTN, HLD, CAD s/p CABG, type 2 DM- uncontrol       Plan:    - Fentanyl and propofol for sedation pain and vent synchrony  - Hypoxic brain injury  clinically   - EEG and Neuro eval   - Over night noted with tonic clonic limb motion , CT head neg , started on versed gtt  - Modified hypothermia therapy terminated due to pat waking up   - Adjust vent as per ABG  - New afib   - Continue hep gtt   - Hemodynamic support   - Vasopressors titrated to maintain MAP>65   - 2 DECHO noted   - Cards f/u noted .. no acute intervention   - Trend cardiac enzymes   - NGT placement for meds and feed  - Anuric  with holbrook cath in position   - Neph eval , Bladder scan not significant   - IV antibx to cover asp pna   - Holbrook's cath placement  - DVT GI prophy   - Full code   - Discussed with team on rounds     I spoke with daughter Eliana at bedside .. discussed advance care etc. Eliana stated that family had experience with DNR for her mother .            INTERVAL HPI/OVERNIGHT EVENTS: pat is sedated and oral intubated on vent support . Over night tonic clonic limbs motion       Antimicrobial:  piperacillin/tazobactam IVPB.. 3.375 Gram(s) IV Intermittent every 8 hours    Cardiovascular:  metoprolol tartrate Injectable 2.5 milliGRAM(s) IV Push every 6 hours  norepinephrine Infusion 0.05 MICROgram(s)/kG/Min IV Continuous <Continuous>    Pulmonary:    Hematalogic:  aspirin enteric coated 81 milliGRAM(s) Oral daily  heparin   Injectable 3800 Unit(s) IV Push every 6 hours PRN  heparin  Infusion. 700 Unit(s)/Hr IV Continuous <Continuous>    Other:  chlorhexidine 0.12% Liquid 15 milliLiter(s) Oral Mucosa every 12 hours  chlorhexidine 2% Cloths 1 Application(s) Topical <User Schedule>  dextrose 5%. 1000 milliLiter(s) IV Continuous <Continuous>  dextrose 5%. 1000 milliLiter(s) IV Continuous <Continuous>  dextrose 5%. 1000 milliLiter(s) IV Continuous <Continuous>  dextrose 50% Injectable 25 Gram(s) IV Push once  dextrose 50% Injectable 25 Gram(s) IV Push once  dextrose 50% Injectable 12.5 Gram(s) IV Push once  dextrose Oral Gel 15 Gram(s) Oral once PRN  fentaNYL   Infusion. 0.5 MICROgram(s)/kG/Hr IV Continuous <Continuous>  glucagon  Injectable 1 milliGRAM(s) IntraMuscular once  insulin lispro (ADMELOG) corrective regimen sliding scale   SubCutaneous every 6 hours  levETIRAcetam  IVPB 500 milliGRAM(s) IV Intermittent every 12 hours  midazolam Infusion 0.02 mG/kG/Hr IV Continuous <Continuous>  pantoprazole  Injectable 40 milliGRAM(s) IV Push daily  propofol Infusion 5.011 MICROgram(s)/kG/Min IV Continuous <Continuous>  vasopressin Infusion 0.04 Unit(s)/Min IV Continuous <Continuous>      Drug Dosing Weight  Height (cm): 154.9 (04 May 2023 19:21)  Weight (kg): 61.2 (04 May 2023 19:21)  BMI (kg/m2): 25.5 (04 May 2023 19:21)  BSA (m2): 1.6 (04 May 2023 19:21)    CENTRAL LINE: [ ] YES [ ] NO  LOCATION:   DATE INSERTED:    [place]BROOK: [ ] YES [ ] NO    DATE INSERTED:    A-LINE:  [ ] YES [ ] NO  LOCATION:   DATE INSERTED:    PMH/Social Hx/Fam Hx -reviewed admission note, no change since admission  PAST MEDICAL & SURGICAL HISTORY:  HTN (hypertension)      HLD (hyperlipidemia)      Diabetes      CAD (coronary artery disease)      History of cholecystectomy          T(C): 37.7 (05-10-23 @ 08:00), Max: 37.7 (05-10-23 @ 08:00)  HR: 81 (05-10-23 @ 11:30)  BP: 99/59 (05-10-23 @ 11:00)  BP(mean): 71 (05-10-23 @ 11:00)  ABP: 108/49 (05-10-23 @ 11:30)  ABP(mean): 68 (05-10-23 @ 11:30)  RR: 20 (05-10-23 @ 11:30)  SpO2: 100% (05-10-23 @ 11:30)  Wt(kg): --    ABG - ( 10 May 2023 04:13 )  pH, Arterial: 7.39  pH, Blood: x     /  pCO2: 47    /  pO2: 129   / HCO3: 28    / Base Excess: 3.5   /  SaO2: 99.5                  05- @ 07:01  -  05-10 @ 07:00  --------------------------------------------------------  IN: 2791.5 mL / OUT: 500 mL / NET: 2291.5 mL        Mode: AC/ CMV (Assist Control/ Continuous Mandatory Ventilation)  RR (machine): 20  TV (machine): 450  FiO2: 65  PEEP: 8  MAP: 13  PIP: 30      PHYSICAL EXAM:    GENERAL: No signs of distress, comfortable + ETT  HEAD: Atraumatic, Normocephalic  EYES: PERRLA  ENMT: No erythema, exudates, or enlargement, Moist mucous membranes + NGT   NECK: Supple, normal appearance, No JVD; [  ] central line (if applicable)  CHEST/LUNG: No chest deformity, fair bilateral air entry; No rales, rhonchi, wheezing; crackles  HEART: Regular rate and rhythm; No murmurs, rubs, or gallops;   ABDOMEN: Soft, Nontender, Nondistended; Bowel sounds present  EXTREMITIES:  + Peripheral Pulses, No clubbing, cyanosis, or edema  NERVOUS SYSTEM: sedated .   LYMPH: No lymphadenopathy noted  SKIN: No rashes or lesions; good turgor, warm, dry      LABS:  CBC Full  -  ( 10 May 2023 05:10 )  WBC Count : 15.57 K/uL  RBC Count : 4.31 M/uL  Hemoglobin : 13.5 g/dL  Hematocrit : 42.6 %  Platelet Count - Automated : 124 K/uL  Mean Cell Volume : 98.8 fl  Mean Cell Hemoglobin : 31.3 pg  Mean Cell Hemoglobin Concentration : 31.7 gm/dL  Auto Neutrophil # : x  Auto Lymphocyte # : x  Auto Monocyte # : x  Auto Eosinophil # : x  Auto Basophil # : x  Auto Neutrophil % : x  Auto Lymphocyte % : x  Auto Monocyte % : x  Auto Eosinophil % : x  Auto Basophil % : x    05-10    147<H>  |  109<H>  |  54<H>  ----------------------------<  193<H>  4.3   |  28  |  2.11<H>    Ca    8.2<L>      10 May 2023 05:10  Phos  4.1     05-10  Mg     1.9     05-10    TPro  5.6<L>  /  Alb  2.3<L>  /  TBili  2.3<H>  /  DBili  x   /  AST  33  /  ALT  17  /  AlkPhos  86  05-10    PT/INR - ( 09 May 2023 15:20 )   PT: 15.2 sec;   INR: 1.31 ratio         PTT - ( 10 May 2023 11:20 )  PTT:68.9 sec        RADIOLOGY & ADDITIONAL STUDIES REVIEWED     CT < from: CT Head No Cont (23 @ 21:30) >    ACC: [id number] EXAM:  CT BRAIN   ORDERED BY: ROSALIE VALERA     PROCEDURE DATE:  2023          INTERPRETATION:  EXAM: CT HEAD WITHOUT INTRAVENOUS CONTRAST    CLINICAL INFORMATION: Seizure. Postarrest. Atrial fibrillation, dementia,   CHF, CABG, colon cancer.    TECHNIQUE: Noncontrast axial CT images were acquired through the head.   Two-dimensional sagittal and coronal reformats were generated.    COMPARISON STUDY: None    FINDINGS:    BRAIN: No apparent acute infarct, hemmorhage or mass.No hydrocephalus.   Chronic appearing white matter hypodensities and volume loss.    CALVARIUM: No skull fracture or agreesive osseous legions.    EXTRACRANIAL SOFT TISSUES: No acute findings.    VISUALIZED PORTIONS OF THE PARANASAL SINUSES AND MASTOID AIR CELLS: No   air fluid levels.    IMPRESSION:  No acute intracranial findings.    --- End of Report ---             ROSALIE MORRISON MD; Attending Radiologist  This document has been electronically signed. May  9 2023  9:40PM    < end of copied text >      < from: TTE Echo Complete w/o Contrast w/ Doppler (23 @ 08:06) >    ACC: [id number] EXAM:  ECHO TTE WO CON COMP W DOPP                          PROCEDURE DATE:  2023          INTERPRETATION:  TRANSTHORACIC ECHOCARDIOGRAM REPORT        Patient Name:   MARIO RAMOS Patient Location: [facility] 19  [facility] Rec #:  [id number]         Accession #:      [id number]  Account #:      [id number]           Height:           60.6 in 154.0 cm  YOB: 1945        Weight:           134.5 lb 61.00 kg  Patient Age:    78 years         BSA:              1.59 m²  PatientGender: M                BP:               149/99 mmHg      Date of Exam:        2023 8:06:48 AM  Sonographer:         ESPERANZA  Referring Physician: VADIM    Procedure:     2D Echo/Doppler/Color Doppler Complete.  Indications:   I42.9 - Cardiomyopathy, unspecified  Diagnosis:     I42.9 - Cardiomyopathy, unspecified  Study Details: Technically fair study.        2D AND M-MODE MEASUREMENTS (normal ranges within parentheses):  Left Ventricle:                  Normal   Aorta/Left Atrium:  Normal  IVSd (2D):              1.41 cm (0.7-1.1) Aortic Root (2D):    3.18 cm   (2.4-3.7)  LVPWd (2D):             1.30 cm (0.7-1.1) Aortic Root (Mmode): 3.26 cm   (2.4-3.7)  LVIDd (2D):             4.49 cm (3.4-5.7) AoV Cusp Separation: 1.53 cm   (1.5-2.6)  LVIDs (2D):             3.72 cm           Left Atrium (2D):    4.46 cm   (1.9-4.0)  LV FS (2D):             17.0 %   (>25%)   Left Atrium (Mmode): 5.00 cm   (1.9-4.0)  Relative Wall Thickness  0.58    (<0.42)  Right Ventricle:                                RVd (2D):        3.50 cm    LV DIASTOLIC FUNCTION:  MV Peak E: 0.99 m/s Decel Time: 133 msec  MV Peak A: 0.28 m/s  E/A Ratio: 3.54    SPECTRAL DOPPLER ANALYSIS (where applicable):  Mitral Valve:  MV P1/2 Time: 38.69 msec  MV Area, PHT: 5.69 cm²    Aortic Valve: AoV Max Av: 0.71 m/s AoV Peak P.0 mmHg AoV Mean P.2 mmHg    LVOT Vmax: 0.61 m/s LVOT VTI: 0.089 m LVOT Diameter: 1.99 cm    AoV Area, Vmax: 2.66 cm² AoV Area, VTI: 2.12 cm² AoV Area, Vmn: 2.15 cm²  Ao VTI: 0.131  Tricuspid Valve and PA/RV Systolic Pressure: TR Max Velocity: 2.96 m/s RA   Pressure: 10 mmHg RVSP/PASP: 45.1 mmHg      PHYSICIAN INTERPRETATION:  Left Ventricle: The left ventricular internal cavity size is normal. Left   ventricular wall thickness is increased.  Global LV systolic function was mildly to moderately decreased. Left   ventricular ejection fraction, by visual estimation, is 40 to 45%.   Spectral Doppler shows restrictive pattern of left ventricular myocardial   filling (Grade III diastolic dysfunction).  Right Ventricle: Normal right ventricular size and function. The right   ventricle was not well visualized.  Left Atrium: Moderately enlarged left atrium.  Right Atrium: Right atrial enlargement.  Pericardium: There is no evidence of pericardial effusion.  Mitral Valve: Mild thickening and calcification of the anterior and   posterior mitral valve leaflets. Moderate mitral valve regurgitation is   seen.  Tricuspid Valve: Structurally normal tricuspid valve, with normal leaflet   excursion. Mild-moderate tricuspid regurgitation is visualized. Estimated   pulmonary artery systolic pressure is 45.1 mmHg assuming a right atrial   pressure of 10 mmHg, which is consistent with mild pulmonary hypertension.  Aortic Valve: The aortic valve is trileaflet.  Pulmonic Valve: Structurally normal pulmonic valve, with normal leaflet   excursion. The pulmonic valve was not well visualized. Trace pulmonic   valve regurgitation.  Aorta: The aortic root is normal in size and structure.      Summary:   1. Left ventricular ejection fraction, by visual estimation, is 40 to   45%.   2. Mildly to moderately decreased global left ventricular systolic   function.   3. Increased LV wall thickness.   4. Spectral Doppler shows restrictive pattern of left ventricular   myocardial filling (Grade III diastolic dysfunction).   5. There is mild concentric left ventricular hypertrophy.   6. Moderately enlarged left atrium.   7. Right atrial enlargement.   8. Mild thickening and calcification of the anterior and posterior   mitral valve leaflets.   9. Moderate mitral valve regurgitation.  10. Mild-moderate tricuspid regurgitation.  11. Estimated pulmonary artery systolic pressure is 45.1 mmHg assuming a   right atrial pressureof 10 mmHg, which is consistent with mild pulmonary   hypertension.    [id number] Michi Dennis MD, FACC , MD, FACC Electronically   signed on 2023 at 10:39:15 AM            *** Final ***    < end of copied text >  IMPRESSION:  PAST MEDICAL & SURGICAL HISTORY:  HTN (hypertension)      HLD (hyperlipidemia)      Diabetes      CAD (coronary artery disease)      History of cholecystectomy       p/w                   This is an 78 year old man  with  HTN, HLD, Cardiomyopathy, chronic systolic CHF, CAD s/p CABG, "known occluded RCA" and DM type 2 uncontrol  who was admitted to [facility] on  for tx of hypoxic respiratory failure, acute on chronic HF exacerbation (EF 40-45% this stay, prior 50-55%) and ?ANISH. Pt hospital tx consisted of supplemental O2, diuresis, bb and aspirin. ?new afib noted from ED, however, cardiology feels its likely atrial tachycardia.      Today   pt intubated s/p cardiac arrest 2/2 choking event with breakfast  CPR x 8 min . Pt admitted to ICU for tx of:       ASSESSMENT   - Cardiac arrest   - Acute Hypoxemic / Hypercarbic respiratory failure  - Lactic Acidosis  - Elevated troponin  - Aspiration Event  - Heart failure  - Bilateral pleural effusions  - CAD s/p CABG with known RCA occlusion  - ANISH on ?CKD    Underlying PMH of Dementia, Heart failure, cardiomyopathy, HTN, HLD, CAD s/p CABG, type 2 DM- uncontrol       Plan:    - Fentanyl and propofol for sedation pain and vent synchrony  - Hypoxic brain injury  clinically   - EEG and Neuro eval   - Over night noted with tonic clonic limb motion , CT head neg , started on versed gtt  - Modified hypothermia therapy terminated due to pat waking up   - Adjust vent as per ABG  - New afib   - Continue hep gtt   - Hemodynamic support   - Vasopressors titrated to maintain MAP>65   - 2 DECHO noted   - Cards f/u noted .. no acute intervention   - Trend cardiac enzymes   - NGT placement for meds and feed  - Anuric  with holbrook cath in position   - Neph eval , Bladder scan not significant   - IV antibx to cover asp pna   - Holbrook's cath placement  - DVT GI prophy   - Full code   - Discussed with team on rounds     I spoke with daughter Eliana at bedside .. discussed advance care etc. Eliana stated that family had experience with DNR for her mother .  Spoke with Dr Baptiste

## 2023-05-10 NOTE — PROGRESS NOTE ADULT - ASSESSMENT
Assessment:  Giovani Avila is an 78 year old man with past medical history of HFrecEF (LVEF 40-45% in 7/2022, then LVEF 55-60% in 1/2023), Coronary artery disease (s/p CABG with known occluded RCA, medically managed) was sent in to ER by PMD due to dyspnea, and concern for atrial arrhythmia, found to have congestive heart failure and acute renal injury now developed aspiration and choking event with hypoxia and cardiac arrest s/p CPR and now intubated and sedated in ICU.    ECG on admission suggestive of atrial tachycardia. Troponins elevated and likely secondary to known occluded RCA, CHF and renal disease. Pro BNP significantly elevated. Echo report with LVEF 40-45% which is a decrement from recent outpatient echo report with LVEF 55-60% in January. Recent outpatient nuclear stress test from February was abnormal and consistent with inferior wall infarct with mild laura-infarct ischemia appears was medically managed.     Recommendations:  [] Aspiration, hypoxia, cardiac arrest: S/p CPR protocol, intubated and sedated in ICU. Cardiac arrest care per ICU, vasopressors and ventilator management. Broad spectrum antibiotics. Repeat echo to re-evaluate LVEF. CT head negative.  [] HFmrEF: LVEF 40-45%. Had recent non-invasive ischemic evaluation that was abnormal and medically managed. Not candidate for ARNI, SGLT2-I or MR antagonist due to shock state, continue pressor support.   [] CAD s/p CABG, elevated troponins: Troponins further elevated post cardiac arrest. Repeat echo. Currently receiving heparin drip, monitor for bleeding. Patient is unstable and not candidate for invasive coronary angiogram at this time, continue optimal medical management.   [] Atrial fibrillation: On heparin drip     Discussed with Dr. Collins. Prognosis appears guarded. Will sign out this case to cardiologist to follow along tomorrow.     Trang Faulkner MD  Cardiology

## 2023-05-10 NOTE — CONSULT NOTE ADULT - SUBJECTIVE AND OBJECTIVE BOX
NEPHROLOGY CONSULTATION    CHIEF COMPLAINT: SOB    HPI:  Pt is 79 yo M w/hx HTN, Cardiomyopathy, chronic systolic CHF, DM2, Dementia, sent to the ED by PMD 5/4/23 due to increased dyspnea, leg edema x few days. No reported chest pain, cough, fever chills, vomiting, diarrhea.   CXR c/w CHF (venous congestion, b/l pl effusions). Noted to have rising Na, Cr. Was on ACE, Lasix as op.    ROS:  as above    Allergies:  sulfa drugs (Unknown)    PAST MEDICAL & SURGICAL HISTORY:  as above  HTN (hypertension)  HLD (hyperlipidemia)  Diabetes  CAD (coronary artery disease)  History of cholecystectomy    SOCIAL HISTORY:  negative    FAMILY HISTORY:  No pertinent family history in first degree relatives    MEDICATIONS  (STANDING):  aspirin enteric coated 81 milliGRAM(s) Oral daily  chlorhexidine 0.12% Liquid 15 milliLiter(s) Oral Mucosa every 12 hours  chlorhexidine 2% Cloths 1 Application(s) Topical <User Schedule>  dextrose 5%. 1000 milliLiter(s) (50 mL/Hr) IV Continuous <Continuous>  dextrose 5%. 1000 milliLiter(s) (100 mL/Hr) IV Continuous <Continuous>  dextrose 50% Injectable 25 Gram(s) IV Push once  dextrose 50% Injectable 25 Gram(s) IV Push once  dextrose 50% Injectable 12.5 Gram(s) IV Push once  fentaNYL   Infusion. 0.5 MICROgram(s)/kG/Hr (3.06 mL/Hr) IV Continuous <Continuous>  glucagon  Injectable 1 milliGRAM(s) IntraMuscular once  heparin  Infusion. 700 Unit(s)/Hr (7 mL/Hr) IV Continuous <Continuous>  insulin lispro (ADMELOG) corrective regimen sliding scale   SubCutaneous every 6 hours  levETIRAcetam  IVPB 500 milliGRAM(s) IV Intermittent every 12 hours  metoprolol tartrate Injectable 2.5 milliGRAM(s) IV Push every 6 hours  midazolam Infusion 0.02 mG/kG/Hr (1.22 mL/Hr) IV Continuous <Continuous>  norepinephrine Infusion 0.05 MICROgram(s)/kG/Min (5.74 mL/Hr) IV Continuous <Continuous>  pantoprazole  Injectable 40 milliGRAM(s) IV Push daily  piperacillin/tazobactam IVPB.. 3.375 Gram(s) IV Intermittent every 8 hours  vasopressin Infusion 0.04 Unit(s)/Min (6 mL/Hr) IV Continuous <Continuous>    Home Medications:  clonazePAM 0.25 mg oral tablet: 1 orally once a day (04 May 2023 23:35)  ferrous sulfate (as elemental iron) 45 mg oral tablet, extended release: 1 tab(s) orally once a day (08 May 2023 09:26)  furosemide 20 mg oral tablet: 1 orally once a day (04 May 2023 23:38)  Imdur 60 mg oral tablet, extended release: 1 tab(s) orally once a day (in the morning) (04 May 2023 22:29)  potassium chloride 10 mEq oral tablet, extended release: 1 orally once a day (04 May 2023 23:37)  ramipril 5 mg oral tablet: orally (08 May 2023 09:27)  Toprol-XL 25 mg oral tablet, extended release: 0.5 tab(s) orally once a day (04 May 2023 22:30)    Vital Signs Last 24 Hrs  T(C): 37.7 (05-10-23 @ 08:00), Max: 37.7 (05-10-23 @ 08:00)  T(F): 99.8 (05-10-23 @ 08:00), Max: 99.8 (05-10-23 @ 08:00)  HR: 81 (05-10-23 @ 10:00) (61 - 94)  BP: 108/61 (05-10-23 @ 10:00) (89/39 - 142/94)  BP(mean): 74 (05-10-23 @ 10:00) (52 - 108)  RR: 20 (05-10-23 @ 10:00) (15 - 26)  SpO2: 100% (05-10-23 @ 10:00) (88% - 100%)    I&O's Detail    09 May 2023 07:01  -  10 May 2023 07:00  --------------------------------------------------------  IN:    FentaNYL: 121.5 mL    Heparin Infusion: 113 mL    IV PiggyBack: 100 mL    IV PiggyBack: 100 mL    IV PiggyBack: 275 mL    Lactated Ringers Bolus: 1250 mL    Midazolam: 6.6 mL    Norepinephrine: 182.8 mL    Propofol: 42.6 mL    sodium chloride 0.45%: 600 mL  Total IN: 2791.5 mL    OUT:    Indwelling Catheter - Urethral (mL): 500 mL  Total OUT: 500 mL    Total NET: 2291.5 mL    LABS:                        13.5   15.57 )-----------( 124      ( 10 May 2023 05:10 )             42.6     05-10    147<H>  |  109<H>  |  54<H>  ----------------------------<  193<H>  4.3   |  28  |  2.11<H>    Ca    8.2<L>      10 May 2023 05:10  Phos  4.1     05-10  Mg     1.9     05-10    TPro  5.6<L>  /  Alb  2.3<L>  /  TBili  2.3<H>  /  DBili  x   /  AST  33  /  ALT  17  /  AlkPhos  86  05-10    LIVER FUNCTIONS - ( 10 May 2023 05:10 )  Alb: 2.3 g/dL / Pro: 5.6 g/dL / ALK PHOS: 86 U/L / ALT: 17 U/L / AST: 33 U/L / GGT: x           PT/INR - ( 09 May 2023 15:20 )   PT: 15.2 sec;   INR: 1.31 ratio       PTT - ( 10 May 2023 05:10 )  PTT:69.8 sec    A/P:    full consult to follow    [phone]       NEPHROLOGY CONSULTATION    CHIEF COMPLAINT: SOB    HPI:  Pt is 79 yo M w/hx HTN, Cardiomyopathy, chronic systolic CHF, DM2, Dementia, sent to the ED by PMD 5/4/23 due to increased dyspnea, leg edema x few days. No reported chest pain, cough, fever chills, vomiting, diarrhea.   CXR c/w CHF (venous congestion, b/l pl effusions), possibly PNA. S/p arrest likely after aspiration, intubated 5/9/23. Concern for sz activity. Noted to have rising Na, Cr. Hx from chart as pt is presently intubated in ICU.     ROS:  as above    Allergies:  sulfa drugs (Unknown)    PAST MEDICAL & SURGICAL HISTORY:  as above  HTN (hypertension)  HLD (hyperlipidemia)  Diabetes  CAD (coronary artery disease)  History of cholecystectomy    SOCIAL HISTORY:  negative    FAMILY HISTORY:  No pertinent family history in first degree relatives    MEDICATIONS  (STANDING):  aspirin enteric coated 81 milliGRAM(s) Oral daily  chlorhexidine 0.12% Liquid 15 milliLiter(s) Oral Mucosa every 12 hours  chlorhexidine 2% Cloths 1 Application(s) Topical <User Schedule>  dextrose 5%. 1000 milliLiter(s) (50 mL/Hr) IV Continuous <Continuous>  dextrose 5%. 1000 milliLiter(s) (100 mL/Hr) IV Continuous <Continuous>  dextrose 50% Injectable 25 Gram(s) IV Push once  dextrose 50% Injectable 25 Gram(s) IV Push once  dextrose 50% Injectable 12.5 Gram(s) IV Push once  fentaNYL   Infusion. 0.5 MICROgram(s)/kG/Hr (3.06 mL/Hr) IV Continuous <Continuous>  glucagon  Injectable 1 milliGRAM(s) IntraMuscular once  heparin  Infusion. 700 Unit(s)/Hr (7 mL/Hr) IV Continuous <Continuous>  insulin lispro (ADMELOG) corrective regimen sliding scale   SubCutaneous every 6 hours  levETIRAcetam  IVPB 500 milliGRAM(s) IV Intermittent every 12 hours  metoprolol tartrate Injectable 2.5 milliGRAM(s) IV Push every 6 hours  midazolam Infusion 0.02 mG/kG/Hr (1.22 mL/Hr) IV Continuous <Continuous>  norepinephrine Infusion 0.05 MICROgram(s)/kG/Min (5.74 mL/Hr) IV Continuous <Continuous>  pantoprazole  Injectable 40 milliGRAM(s) IV Push daily  piperacillin/tazobactam IVPB.. 3.375 Gram(s) IV Intermittent every 8 hours  vasopressin Infusion 0.04 Unit(s)/Min (6 mL/Hr) IV Continuous <Continuous>    Home Medications:  clonazePAM 0.25 mg oral tablet: 1 orally once a day (04 May 2023 23:35)  ferrous sulfate (as elemental iron) 45 mg oral tablet, extended release: 1 tab(s) orally once a day (08 May 2023 09:26)  furosemide 20 mg oral tablet: 1 orally once a day (04 May 2023 23:38)  Imdur 60 mg oral tablet, extended release: 1 tab(s) orally once a day (in the morning) (04 May 2023 22:29)  potassium chloride 10 mEq oral tablet, extended release: 1 orally once a day (04 May 2023 23:37)  ramipril 5 mg oral tablet: orally (08 May 2023 09:27)  Toprol-XL 25 mg oral tablet, extended release: 0.5 tab(s) orally once a day (04 May 2023 22:30)    Vital Signs Last 24 Hrs  T(C): 37.7 (05-10-23 @ 08:00), Max: 37.7 (05-10-23 @ 08:00)  T(F): 99.8 (05-10-23 @ 08:00), Max: 99.8 (05-10-23 @ 08:00)  HR: 81 (05-10-23 @ 10:00) (61 - 94)  BP: 108/61 (05-10-23 @ 10:00) (89/39 - 142/94)  BP(mean): 74 (05-10-23 @ 10:00) (52 - 108)  RR: 20 (05-10-23 @ 10:00) (15 - 26)  SpO2: 100% (05-10-23 @ 10:00) (88% - 100%)    I&O's Detail    09 May 2023 07:01  -  10 May 2023 07:00  --------------------------------------------------------  IN:    FentaNYL: 121.5 mL    Heparin Infusion: 113 mL    IV PiggyBack: 100 mL    IV PiggyBack: 100 mL    IV PiggyBack: 275 mL    Lactated Ringers Bolus: 1250 mL    Midazolam: 6.6 mL    Norepinephrine: 182.8 mL    Propofol: 42.6 mL    sodium chloride 0.45%: 600 mL  Total IN: 2791.5 mL    OUT:    Indwelling Catheter - Urethral (mL): 500 mL  Total OUT: 500 mL    Total NET: 2291.5 mL    Mode: AC/ CMV (Assist Control/ Continuous Mandatory Ventilation)  RR (machine): 20  TV (machine): 450  FiO2: 65  PEEP: 8  MAP: 13  PIP: 30    s1s2  b/l air entry  soft, ND  no edema    LABS:                        13.5   15.57 )-----------( 124      ( 10 May 2023 05:10 )             42.6     05-10    147<H>  |  109<H>  |  54<H>  ----------------------------<  193<H>  4.3   |  28  |  2.11<H>    Ca    8.2<L>      10 May 2023 05:10  Phos  4.1     05-10  Mg     1.9     05-10    TPro  5.6<L>  /  Alb  2.3<L>  /  TBili  2.3<H>  /  DBili  x   /  AST  33  /  ALT  17  /  AlkPhos  86  05-10    LIVER FUNCTIONS - ( 10 May 2023 05:10 )  Alb: 2.3 g/dL / Pro: 5.6 g/dL / ALK PHOS: 86 U/L / ALT: 17 U/L / AST: 33 U/L / GGT: x           PT/INR - ( 09 May 2023 15:20 )   PT: 15.2 sec;   INR: 1.31 ratio       PTT - ( 10 May 2023 05:10 )  PTT:69.8 sec    A/P:    Hemodynamic ANISH iso resp failure, arrest, asp PNA (Cr 1.6 - 5/9/23, Cr 1.0 - 4/12/23)  Hx CM, CHF, dementia  BP, resp support, anti-sz tx per ICU team  NPO w/rising Na  D5W at 80cc/hr  Avoid nephrotoxins  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

## 2023-05-10 NOTE — EEG REPORT - NS EEG TEXT BOX
RICHARD MARIO KRUNAL-[id number]     Study Date: 05-10-23  Duration: 20 min  --------------------------------------------------------------------------------------------------  History:  CC/ HPI Patient is a 78y old  Male who presents with a chief complaint of acute systolic CHF (10 May 2023 11:51)    MEDICATIONS  (STANDING):  aspirin enteric coated 81 milliGRAM(s) Oral daily  chlorhexidine 0.12% Liquid 15 milliLiter(s) Oral Mucosa every 12 hours  chlorhexidine 2% Cloths 1 Application(s) Topical <User Schedule>  dextrose 5%. 1000 milliLiter(s) (80 mL/Hr) IV Continuous <Continuous>  dextrose 5%. 1000 milliLiter(s) (100 mL/Hr) IV Continuous <Continuous>  dextrose 5%. 1000 milliLiter(s) (50 mL/Hr) IV Continuous <Continuous>  dextrose 50% Injectable 25 Gram(s) IV Push once  dextrose 50% Injectable 25 Gram(s) IV Push once  dextrose 50% Injectable 12.5 Gram(s) IV Push once  fentaNYL   Infusion. 0.5 MICROgram(s)/kG/Hr (3.06 mL/Hr) IV Continuous <Continuous>  glucagon  Injectable 1 milliGRAM(s) IntraMuscular once  heparin  Infusion. 700 Unit(s)/Hr (7 mL/Hr) IV Continuous <Continuous>  insulin lispro (ADMELOG) corrective regimen sliding scale   SubCutaneous every 6 hours  levETIRAcetam  IVPB 500 milliGRAM(s) IV Intermittent every 12 hours  metoprolol tartrate Injectable 2.5 milliGRAM(s) IV Push every 6 hours  midazolam Infusion 0.02 mG/kG/Hr (1.22 mL/Hr) IV Continuous <Continuous>  norepinephrine Infusion 0.05 MICROgram(s)/kG/Min (5.74 mL/Hr) IV Continuous <Continuous>  pantoprazole  Injectable 40 milliGRAM(s) IV Push daily  piperacillin/tazobactam IVPB.. 3.375 Gram(s) IV Intermittent every 8 hours  propofol Infusion 5.011 MICROgram(s)/kG/Min (1.84 mL/Hr) IV Continuous <Continuous>  vasopressin Infusion 0.04 Unit(s)/Min (6 mL/Hr) IV Continuous <Continuous>    --------------------------------------------------------------------------------------------------  Study Interpretation:    [[[Abbreviation Key:  PDR=alpha rhythm/posterior dominant rhythm. A-P=anterior posterior.  Amplitude: ‘very low’:<20; ‘low’:20-49; ‘medium’:; ‘high’:>150uV.  Persistence for periodic/rhythmic patterns (% of epoch) ‘rare’:<1%; ‘occasional’:1-10%; ‘frequent’:10-50%; ‘abundant’:50-90%; ‘continuous’:>90%.  Persistence for sporadic discharges: ‘rare’:<1/hr; ‘occasional’:1/min-1/hr; ‘frequent’:>1/min; ‘abundant’:>1/10 sec.  RPP=rhythmic and periodic patterns; GRDA=generalized rhythmic delta activity; FIRDA=frontal intermittent GRDA; LRDA=lateralized rhythmic delta activity; TIRDA=temporal intermittent rhythmic delta activity;  LPD=PLED=lateralized periodic discharges; GPD=generalized periodic discharges; BIPDs =bilateral independent periodic discharges; Mf=multifocal; SIRPDs=stimulus induced rhythmic, periodic, or ictal appearing discharges; BIRDs=brief potentially ictal rhythmic discharges >4 Hz, lasting .5-10s; PFA (paroxysmal bursts >13 Hz or =8 Hz <10s).  Modifiers: +F=with fast component; +S=with spike component; +R=with rhythmic component.  S-B=burst suppression pattern.  Max=maximal. N1-drowsy; N2-stage II sleep; N3-slow wave sleep. SSS/BETS=small sharp spikes/benign epileptiform transients of sleep. HV=hyperventilation; PS=photic stimulation]]]    Daily EEG Visual Analysis    FINDINGS:      Background:  The background is burst-suppressed and symmetric, consisting of 1-10-second periods of diffuse suppression < 10 uV with 2-15-second bursts of central (Cz maximal) spikes with broad field at 1.3-6 Hz with intermixed theta and delta activity. There is head jerking associated with these bursts.    Background Slowing:  Generalized slowing: As above  Focal slowing: None    State Changes:   None    Other Clinical Events:  None    Activation Procedures:   Hyperventilation was not performed.    Photic stimulation was not performed.    Artifacts:  Intermittent myogenic and movement artifacts are present.    EKG:  Single-lead EKG shows regular rhythm with occasional PVCs.    EEG Classification / Summary:  Abnormal routine EEG in a comatose patient.  -Frequent myoclonus (head jerking) with ictal EEG correlate (bursts of central spikes)  -Burst-suppressed background    Clinical Impression:  -Frequent myoclonic seizures/cortical myoclonus can be associated with 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)  -Severe diffuse cerebral dysfunction is nonspecific in etiology, but in this case, sedating medications may be one contributing factor        -------------------------------------------------------------------------------------------------------  [facility] EEG Reading Room Ph#: [phone]  Epilepsy Answering Service after 5PM and before 8:30AM: Ph#: [phone]    Anh Morrison MD  Attending Physician, [facility]

## 2023-05-10 NOTE — PROGRESS NOTE ADULT - SUBJECTIVE AND OBJECTIVE BOX
Received call regarding abnormal EEG preliminary results. Patient's EEG reveals epileptic form. Vimpat added for refractory seizures. Patient remains on Keppra, Versed and Propofol on mechanical ventilation.  Patient is clinically and hemodynamically stable. Case d/w pharmacy.

## 2023-05-11 LAB
ALBUMIN SERPL ELPH-MCNC: 1.7 G/DL — LOW (ref 3.3–5)
ALP SERPL-CCNC: 82 U/L — SIGNIFICANT CHANGE UP (ref 40–120)
ALT FLD-CCNC: 12 U/L — SIGNIFICANT CHANGE UP (ref 10–45)
ANION GAP SERPL CALC-SCNC: 10 MMOL/L — SIGNIFICANT CHANGE UP (ref 5–17)
ANION GAP SERPL CALC-SCNC: 11 MMOL/L — SIGNIFICANT CHANGE UP (ref 5–17)
APTT BLD: 53.8 SEC — HIGH (ref 27.5–35.5)
AST SERPL-CCNC: 18 U/L — SIGNIFICANT CHANGE UP (ref 10–40)
BASE EXCESS BLDA CALC-SCNC: 2 MMOL/L — SIGNIFICANT CHANGE UP (ref -2–3)
BILIRUB DIRECT SERPL-MCNC: 1.1 MG/DL — HIGH (ref 0–0.3)
BILIRUB INDIRECT FLD-MCNC: 0.7 MG/DL — SIGNIFICANT CHANGE UP (ref 0.2–1)
BILIRUB SERPL-MCNC: 1.8 MG/DL — HIGH (ref 0.2–1.2)
BUN SERPL-MCNC: 64 MG/DL — HIGH (ref 7–23)
BUN SERPL-MCNC: 66 MG/DL — HIGH (ref 7–23)
CALCIUM SERPL-MCNC: 8 MG/DL — LOW (ref 8.4–10.5)
CALCIUM SERPL-MCNC: 8.1 MG/DL — LOW (ref 8.4–10.5)
CHLORIDE SERPL-SCNC: 102 MMOL/L — SIGNIFICANT CHANGE UP (ref 96–108)
CHLORIDE SERPL-SCNC: 102 MMOL/L — SIGNIFICANT CHANGE UP (ref 96–108)
CO2 BLDA-SCNC: 28 MMOL/L — HIGH (ref 19–24)
CO2 SERPL-SCNC: 27 MMOL/L — SIGNIFICANT CHANGE UP (ref 22–31)
CO2 SERPL-SCNC: 28 MMOL/L — SIGNIFICANT CHANGE UP (ref 22–31)
CREAT SERPL-MCNC: 3.08 MG/DL — HIGH (ref 0.5–1.3)
CREAT SERPL-MCNC: 3.13 MG/DL — HIGH (ref 0.5–1.3)
EGFR: 19 ML/MIN/1.73M2 — LOW
EGFR: 20 ML/MIN/1.73M2 — LOW
GLUCOSE BLDC GLUCOMTR-MCNC: 119 MG/DL — HIGH (ref 70–99)
GLUCOSE BLDC GLUCOMTR-MCNC: 169 MG/DL — HIGH (ref 70–99)
GLUCOSE BLDC GLUCOMTR-MCNC: 207 MG/DL — HIGH (ref 70–99)
GLUCOSE BLDC GLUCOMTR-MCNC: 236 MG/DL — HIGH (ref 70–99)
GLUCOSE SERPL-MCNC: 203 MG/DL — HIGH (ref 70–99)
GLUCOSE SERPL-MCNC: 239 MG/DL — HIGH (ref 70–99)
HCO3 BLDA-SCNC: 27 MMOL/L — SIGNIFICANT CHANGE UP (ref 21–28)
HCT VFR BLD CALC: 38.3 % — LOW (ref 39–50)
HGB BLD-MCNC: 12.4 G/DL — LOW (ref 13–17)
HOROWITZ INDEX BLDA+IHG-RTO: 60 — SIGNIFICANT CHANGE UP
INR BLD: 1.34 RATIO — HIGH (ref 0.88–1.16)
MAGNESIUM SERPL-MCNC: 1.8 MG/DL — SIGNIFICANT CHANGE UP (ref 1.6–2.6)
MAGNESIUM SERPL-MCNC: 2 MG/DL — SIGNIFICANT CHANGE UP (ref 1.6–2.6)
MCHC RBC-ENTMCNC: 31.2 PG — SIGNIFICANT CHANGE UP (ref 27–34)
MCHC RBC-ENTMCNC: 32.4 GM/DL — SIGNIFICANT CHANGE UP (ref 32–36)
MCV RBC AUTO: 96.2 FL — SIGNIFICANT CHANGE UP (ref 80–100)
NRBC # BLD: 0 /100 WBCS — SIGNIFICANT CHANGE UP (ref 0–0)
NT-PROBNP SERPL-SCNC: HIGH PG/ML (ref 0–300)
PCO2 BLDA: 42 MMHG — SIGNIFICANT CHANGE UP (ref 35–48)
PH BLDA: 7.41 — SIGNIFICANT CHANGE UP (ref 7.35–7.45)
PHOSPHATE SERPL-MCNC: 3.9 MG/DL — SIGNIFICANT CHANGE UP (ref 2.5–4.5)
PHOSPHATE SERPL-MCNC: 4.1 MG/DL — SIGNIFICANT CHANGE UP (ref 2.5–4.5)
PLATELET # BLD AUTO: 109 K/UL — LOW (ref 150–400)
PO2 BLDA: 152 MMHG — HIGH (ref 83–108)
POTASSIUM SERPL-MCNC: 3.9 MMOL/L — SIGNIFICANT CHANGE UP (ref 3.5–5.3)
POTASSIUM SERPL-MCNC: 4.2 MMOL/L — SIGNIFICANT CHANGE UP (ref 3.5–5.3)
POTASSIUM SERPL-SCNC: 3.9 MMOL/L — SIGNIFICANT CHANGE UP (ref 3.5–5.3)
POTASSIUM SERPL-SCNC: 4.2 MMOL/L — SIGNIFICANT CHANGE UP (ref 3.5–5.3)
PROT SERPL-MCNC: 5.1 G/DL — LOW (ref 6–8.3)
PROTHROM AB SERPL-ACNC: 15.6 SEC — HIGH (ref 10.5–13.4)
RBC # BLD: 3.98 M/UL — LOW (ref 4.2–5.8)
RBC # FLD: 15.2 % — HIGH (ref 10.3–14.5)
SAO2 % BLDA: 99.2 % — HIGH (ref 94–98)
SODIUM SERPL-SCNC: 140 MMOL/L — SIGNIFICANT CHANGE UP (ref 135–145)
SODIUM SERPL-SCNC: 140 MMOL/L — SIGNIFICANT CHANGE UP (ref 135–145)
TROPONIN I, HIGH SENSITIVITY RESULT: 1112.9 NG/L — HIGH
WBC # BLD: 14.35 K/UL — HIGH (ref 3.8–10.5)
WBC # FLD AUTO: 14.35 K/UL — HIGH (ref 3.8–10.5)

## 2023-05-11 PROCEDURE — 93308 TTE F-UP OR LMTD: CPT | Mod: 26

## 2023-05-11 PROCEDURE — 93010 ELECTROCARDIOGRAM REPORT: CPT

## 2023-05-11 PROCEDURE — 70450 CT HEAD/BRAIN W/O DYE: CPT | Mod: 26

## 2023-05-11 PROCEDURE — 99222 1ST HOSP IP/OBS MODERATE 55: CPT

## 2023-05-11 PROCEDURE — 71045 X-RAY EXAM CHEST 1 VIEW: CPT | Mod: 26

## 2023-05-11 RX ORDER — PIPERACILLIN AND TAZOBACTAM 4; .5 G/20ML; G/20ML
3.38 INJECTION, POWDER, LYOPHILIZED, FOR SOLUTION INTRAVENOUS EVERY 12 HOURS
Refills: 0 | Status: DISCONTINUED | OUTPATIENT
Start: 2023-05-11 | End: 2023-05-16

## 2023-05-11 RX ORDER — HEPARIN SODIUM 5000 [USP'U]/ML
3800 INJECTION INTRAVENOUS; SUBCUTANEOUS EVERY 6 HOURS
Refills: 0 | Status: DISCONTINUED | OUTPATIENT
Start: 2023-05-11 | End: 2023-05-14

## 2023-05-11 RX ORDER — HEPARIN SODIUM 5000 [USP'U]/ML
800 INJECTION INTRAVENOUS; SUBCUTANEOUS
Qty: 25000 | Refills: 0 | Status: DISCONTINUED | OUTPATIENT
Start: 2023-05-11 | End: 2023-05-14

## 2023-05-11 RX ORDER — FENTANYL CITRATE 50 UG/ML
25 INJECTION INTRAVENOUS EVERY 4 HOURS
Refills: 0 | Status: DISCONTINUED | OUTPATIENT
Start: 2023-05-11 | End: 2023-05-14

## 2023-05-11 RX ORDER — SODIUM CHLORIDE 9 MG/ML
10 INJECTION INTRAMUSCULAR; INTRAVENOUS; SUBCUTANEOUS
Refills: 0 | Status: DISCONTINUED | OUTPATIENT
Start: 2023-05-11 | End: 2023-05-16

## 2023-05-11 RX ORDER — VALPROIC ACID (AS SODIUM SALT) 250 MG/5ML
500 SOLUTION, ORAL ORAL EVERY 8 HOURS
Refills: 0 | Status: DISCONTINUED | OUTPATIENT
Start: 2023-05-11 | End: 2023-05-12

## 2023-05-11 RX ORDER — SIMVASTATIN 20 MG/1
40 TABLET, FILM COATED ORAL AT BEDTIME
Refills: 0 | Status: DISCONTINUED | OUTPATIENT
Start: 2023-05-11 | End: 2023-05-17

## 2023-05-11 RX ORDER — CLOPIDOGREL BISULFATE 75 MG/1
75 TABLET, FILM COATED ORAL DAILY
Refills: 0 | Status: DISCONTINUED | OUTPATIENT
Start: 2023-05-11 | End: 2023-05-17

## 2023-05-11 RX ADMIN — Medication 55 MILLIGRAM(S): at 18:05

## 2023-05-11 RX ADMIN — PIPERACILLIN AND TAZOBACTAM 25 GRAM(S): 4; .5 INJECTION, POWDER, LYOPHILIZED, FOR SOLUTION INTRAVENOUS at 04:05

## 2023-05-11 RX ADMIN — HEPARIN SODIUM 800 UNIT(S)/HR: 5000 INJECTION INTRAVENOUS; SUBCUTANEOUS at 06:53

## 2023-05-11 RX ADMIN — CHLORHEXIDINE GLUCONATE 15 MILLILITER(S): 213 SOLUTION TOPICAL at 17:55

## 2023-05-11 RX ADMIN — Medication 5.74 MICROGRAM(S)/KG/MIN: at 03:37

## 2023-05-11 RX ADMIN — PROPOFOL 1.84 MICROGRAM(S)/KG/MIN: 10 INJECTION, EMULSION INTRAVENOUS at 08:02

## 2023-05-11 RX ADMIN — MIDAZOLAM HYDROCHLORIDE 1.22 MG/KG/HR: 1 INJECTION, SOLUTION INTRAMUSCULAR; INTRAVENOUS at 08:01

## 2023-05-11 RX ADMIN — Medication 4: at 05:14

## 2023-05-11 RX ADMIN — SODIUM CHLORIDE 80 MILLILITER(S): 9 INJECTION, SOLUTION INTRAVENOUS at 08:02

## 2023-05-11 RX ADMIN — HEPARIN SODIUM 800 UNIT(S)/HR: 5000 INJECTION INTRAVENOUS; SUBCUTANEOUS at 07:33

## 2023-05-11 RX ADMIN — FENTANYL CITRATE 3.06 MICROGRAM(S)/KG/HR: 50 INJECTION INTRAVENOUS at 08:02

## 2023-05-11 RX ADMIN — SIMVASTATIN 40 MILLIGRAM(S): 20 TABLET, FILM COATED ORAL at 22:16

## 2023-05-11 RX ADMIN — FENTANYL CITRATE 3.06 MICROGRAM(S)/KG/HR: 50 INJECTION INTRAVENOUS at 04:05

## 2023-05-11 RX ADMIN — Medication 81 MILLIGRAM(S): at 11:16

## 2023-05-11 RX ADMIN — PIPERACILLIN AND TAZOBACTAM 25 GRAM(S): 4; .5 INJECTION, POWDER, LYOPHILIZED, FOR SOLUTION INTRAVENOUS at 20:04

## 2023-05-11 RX ADMIN — PROPOFOL 1.84 MICROGRAM(S)/KG/MIN: 10 INJECTION, EMULSION INTRAVENOUS at 15:10

## 2023-05-11 RX ADMIN — Medication 5.74 MICROGRAM(S)/KG/MIN: at 08:02

## 2023-05-11 RX ADMIN — CHLORHEXIDINE GLUCONATE 15 MILLILITER(S): 213 SOLUTION TOPICAL at 05:14

## 2023-05-11 RX ADMIN — PANTOPRAZOLE SODIUM 40 MILLIGRAM(S): 20 TABLET, DELAYED RELEASE ORAL at 11:16

## 2023-05-11 RX ADMIN — CHLORHEXIDINE GLUCONATE 1 APPLICATION(S): 213 SOLUTION TOPICAL at 05:14

## 2023-05-11 RX ADMIN — Medication 2: at 23:20

## 2023-05-11 RX ADMIN — Medication 4: at 11:26

## 2023-05-11 RX ADMIN — Medication 5.74 MICROGRAM(S)/KG/MIN: at 22:16

## 2023-05-11 RX ADMIN — CLOPIDOGREL BISULFATE 75 MILLIGRAM(S): 75 TABLET, FILM COATED ORAL at 18:06

## 2023-05-11 RX ADMIN — PIPERACILLIN AND TAZOBACTAM 25 GRAM(S): 4; .5 INJECTION, POWDER, LYOPHILIZED, FOR SOLUTION INTRAVENOUS at 11:15

## 2023-05-11 RX ADMIN — HEPARIN SODIUM 800 UNIT(S)/HR: 5000 INJECTION INTRAVENOUS; SUBCUTANEOUS at 00:10

## 2023-05-11 RX ADMIN — SODIUM CHLORIDE 80 MILLILITER(S): 9 INJECTION, SOLUTION INTRAVENOUS at 00:10

## 2023-05-11 NOTE — CONSULT NOTE ADULT - CRANIAL NERVE
Initially, weak corneal, doll's eye, gag, and pupillary reflexes. On repeat examination, about 1.5 hours later, no appreciable CN reflexes

## 2023-05-11 NOTE — CONSULT NOTE ADULT - ASSESSMENT
José Luis Matute is an 78 year old M with a PMH of Dementia, HTN, Cardiomyopathy, chronic systolic CHF, CAD s/p CABG, "known occluded RCA" and DM type 2 who was admitted to [facility] on 5/4 for tx of hypoxic respiratory failure, acute on chronic HF exacerbation (EF 40-45% this stay, prior 50-55%) and ?ANISH. Pt hospital tx consisted of supplemental O2, diuresis, bb and aspirin. ?new afib noted from ED, however, cardiology feels its likely atrial tachycardia. 5/9/2023, patient had a choking episode that lead to hypoxia and cardiac arrest. Patient was intubated, obtained ROSC, and transferred to ICU.     The patient's prognosis is currently poor. His examination has worsened this morning and off sedation, his CN are absent, no localization to pain and no response to verbal stimuli. Myoclonic activity occurs when he is stimulated.   CT head 5/9/23 showed cerebral atrophy and chronic white matter disease, no acute intracranial pathology and grey-white matter differentiation maintained.  Keppra discontinued 5/10/23 due to worsening kidney function. Depakote starting 5/10/23 instead.     Recommendations:  - Will obtain another EEG as initial EEG showed myoclonic seizures/cortical myoclonus  - Continue Depakote 500mg IV BID  - Will repeat CT head     Remainder of care per primary team.    Neurology will continue to follow this patient.

## 2023-05-11 NOTE — PROGRESS NOTE ADULT - ASSESSMENT
Physical Examination:  GENERAL:                Intubated, Sedated   HEENT:                    No JVD, Dry MM  PULM:                     Bilateral air entry, Clear to auscultation bilaterally, no significant sputum production, No Rales, No Rhonchi, No Wheezing  CVS:                         S1, S2,  No Murmur  ABD:                        Soft, nondistended, nontender, normoactive bowel sounds,   EXT:                         No edema, nontender, No Cyanosis or Clubbing   Vascular:                Warm Extremities, Normal Capillary refill, Normal Distal Pulses  SKIN:                       Warm and well perfused, no rashes noted.   NEURO:                  Unresponsive   not interactive, nonfocal, follows commands  PSYC:                      Calm, + Insight.        Assessment  S/p Cardiac Arrest due to chocking episode  Now with multiorgan dysfunction and likely anoxic brain injury  Acute hypoxic respiratory failure  Acute Renal Failure on CKD    Underlying HTN (hypertension), HLD (hyperlipidemia), DM2, CAD       Plan  Continue mechanical ventilation  sedation vacation  EEG repeat   continue valproic acid  neuro f/u  renal f/u  caryl trop, f/u ekg  neuro checks  empiric antibiotics    EEG abnormal - suggestive of extremely poor prognosis   advance diet          PMD:	Dr Parra			                   Notified(Date):  Family Updated:  adali [phone]		                                 Date:  5/11/23 Bedside      Sedation & Analgesia:	as above,   Diet/Nutrition:		 Tube feeding  GI PPx:		pantoprazole  Injectable 40 milliGRAM(s) IV Push daily  DVT Ppx:		  aspirin enteric coated 81 milliGRAM(s) Oral daily  heparin  Infusion. 800 Unit(s)/Hr IV Continuous <Continuous>     Activity:		    Head of Bed:               35-45 Deg  Glycemic Control:        Insulin ss    Lines:   CENTRAL LINE: 	[ xx] YES [ ] NO	                    LOCATION:   	                       DATE INSERTED:   	                    REMOVE:  [ ] YES [x ] NO    A-LINE:  	                [ x] YES [ ] NO                      LOCATION:   	                       DATE INSERTED: 		            REMOVE:  [ ] YES [ x] NO    HOLBROOK: 		        [x ] YES [ ] NO  		                                       DATE INSERTED:		            REMOVE:  [ ] YES [x ] NO      Restraints were deemed necessary to prevent removal of life-sustaining devices [  ] YES   [  x  ]  NO    Disposition: ICU Care    Goals of Care: Full code  Physical Examination:  GENERAL:                Intubated, Sedated   HEENT:                    No JVD, Dry MM  PULM:                     Bilateral air entry, Clear to auscultation bilaterally, no significant sputum production, No Rales, No Rhonchi, No Wheezing  CVS:                         S1, S2,  No Murmur  ABD:                        Soft, nondistended, nontender, normoactive bowel sounds,   EXT:                         No edema, nontender, LE Cyanosis No Clubbing   Vascular:                Cool Extremities, Normal Capillary refill, Normal Distal Pulses  SKIN:                       Warm and well perfused, no rashes noted.   NEURO:                  Unresponsive   not interactive, nonfocal, follows commands  PSYC:                      Calm, no Insight.        Assessment  S/p Cardiac Arrest due to chocking episode  Now with multiorgan dysfunction and likely anoxic brain injury  Acute hypoxic respiratory failure  Acute Renal Failure on CKD  B/l Pleural Effusions  Underlying HTN (hypertension), HLD (hyperlipidemia), DM2, CAD       Plan  Continue mechanical ventilation  sedation vacation  EEG repeat   continue valproic acid  neuro f/u  renal f/u  caryl trop, f/u ekg  neuro checks  empiric antibiotics    EEG abnormal - suggestive of extremely poor prognosis   advance diet with tube feeding            PMD:	Dr Parra			                   Notified(Date):  Family Updated:  adali [phone]		                                 Date:  5/11/23 Bedside  Extensive discussion had with her bedside and with neurology  discussed current status and poor prognostic signs patient has  discussed role in MRI, and 24 hr eeg, repeat ct head  she requests if 24 hr eeg not able to be done here, to obtain EEG BID       Sedation & Analgesia:	as above,   Diet/Nutrition:		 Tube feeding  GI PPx:		pantoprazole  Injectable 40 milliGRAM(s) IV Push daily  DVT Ppx:		  aspirin enteric coated 81 milliGRAM(s) Oral daily  heparin  Infusion. 800 Unit(s)/Hr IV Continuous <Continuous>     Activity:		    Head of Bed:               35-45 Deg  Glycemic Control:        Insulin ss    Lines:   CENTRAL LINE: 	[ xx] YES [ ] NO	                    LOCATION:   	                       DATE INSERTED:   	                    REMOVE:  [ ] YES [x ] NO    A-LINE:  	                [ x] YES [ ] NO                      LOCATION:   	                       DATE INSERTED: 		            REMOVE:  [ ] YES [ x] NO    HOLBROOK: 		        [x ] YES [ ] NO  		                                       DATE INSERTED:		            REMOVE:  [ ] YES [x ] NO      Restraints were deemed necessary to prevent removal of life-sustaining devices [  ] YES   [  x  ]  NO    Disposition: ICU Care    Goals of Care: Full code  Physical Examination:  GENERAL:                Intubated, Sedated   HEENT:                    No JVD, Dry MM  PULM:                     Bilateral air entry, Clear to auscultation bilaterally, no significant sputum production, No Rales, No Rhonchi, No Wheezing  CVS:                         S1, S2,  No Murmur  ABD:                        Soft, nondistended, nontender, normoactive bowel sounds,   EXT:                         No edema, nontender, LE Cyanosis No Clubbing   Vascular:                Cool Extremities, Normal Capillary refill, Normal Distal Pulses  SKIN:                       Warm and well perfused, no rashes noted.   NEURO:                  Unresponsive   not interactive,   PSYC:                      Calm, no Insight.        Assessment  S/p Cardiac Arrest due to chocking episode  Now with multiorgan dysfunction and likely anoxic brain injury  Acute hypoxic respiratory failure  Acute Renal Failure on CKD  B/l Pleural Effusions  Underlying HTN (hypertension), HLD (hyperlipidemia), DM2, CAD       Plan  Continue mechanical ventilation  sedation vacation  EEG repeat   continue valproic acid  neuro f/u  renal f/u  caryl trop, f/u ekg  neuro checks  empiric antibiotics    EEG abnormal - suggestive of extremely poor prognosis   advance diet with tube feeding            PMD:	Dr Parra			                   Notified(Date):  Family Updated:  adali [phone]		                                 Date:  5/11/23 Bedside  Extensive discussion had with her bedside and with neurology  discussed current status and poor prognostic signs patient has  discussed role in MRI, and 24 hr eeg, repeat ct head  she requests if 24 hr eeg not able to be done here, to obtain EEG BID       Sedation & Analgesia:	as above,   Diet/Nutrition:		 Tube feeding  GI PPx:		pantoprazole  Injectable 40 milliGRAM(s) IV Push daily  DVT Ppx:		  aspirin enteric coated 81 milliGRAM(s) Oral daily  heparin  Infusion. 800 Unit(s)/Hr IV Continuous <Continuous>     Activity:		    Head of Bed:               35-45 Deg  Glycemic Control:        Insulin ss    Lines:   CENTRAL LINE: 	[ xx] YES [ ] NO	                    LOCATION:   	                       DATE INSERTED:   	                    REMOVE:  [ ] YES [x ] NO    A-LINE:  	                [ x] YES [ ] NO                      LOCATION:   	                       DATE INSERTED: 		            REMOVE:  [ ] YES [ x] NO    H[place]OOK: 		        [x ] YES [ ] NO  		                                       DATE INSERTED:		            REMOVE:  [ ] YES [x ] NO      Restraints were deemed necessary to prevent removal of life-sustaining devices [  ] YES   [  x  ]  NO    Disposition: ICU Care    Goals of Care: Full code

## 2023-05-11 NOTE — CONSULT NOTE ADULT - SUBJECTIVE AND OBJECTIVE BOX
General Neurology  Consult Note    HPI:  Giovani Avila is an 78 year old male with a PMH of Dementia, HTN, Cardiomyopathy, chronic systolic CHF, CAD s/p CABG, "known occluded RCA" and DM type 2 who was admitted to [facility] on 5/4 for tx of hypoxic respiratory failure, acute on chronic HF exacerbation (EF 40-45% this stay, prior 50-55%) and ?ANISH. Pt hospital tx consisted of supplemental O2, diuresis, bb and aspirin. ?new afib noted from ED, however, cardiology feels its likely atrial tachycardia.      5/9/23 pt noted to have choking episode with apparent hypoxia and cardiac arrest. Pt coded with ACLS protocol initiated, pt airway with multiple small pieces of food obstructing his vocal cords, within airway and pt mouth which was suctioned out and difficult airway noted at that time. Once pt was intubated, ROSC was achieved shortly thereafter. Pt received total of 3 epinephrine and 1 bicarb. Pt transferred to ICU with critical care team for further management / care.     Neurology was consulted as patient's encephalopathy continued, and seizures were noted on routine EEG on 5/10/23. The patient has been on Propofol, Versed, Fentanyl, and was loaded with Keppra 1000mg IV and started on 500mg BID after EEG was resulted. However, given his poor kidney function, Keppra was discontinued overnight and the patient was loaded with Depakote 1000mg IV and started on 500mg BID. No clinical seizures noted.     Today, patient was taken off of Propofol drip for ~1 hour, and Versed was also discontinued at that time.    General Neurology  Consult Note    HPI:  Giovani Avila is an 78 year old male with a PMH of Dementia, HTN, Cardiomyopathy, chronic systolic CHF, CAD s/p CABG, "known occluded RCA" and DM type 2 who was admitted to [facility] on 5/4 for tx of hypoxic respiratory failure, acute on chronic HF exacerbation (EF 40-45% this stay, prior 50-55%) and ?ANISH. Pt hospital tx consisted of supplemental O2, diuresis, bb and aspirin. ?new afib noted from ED, however, cardiology feels its likely atrial tachycardia.      5/9/23 pt noted to have choking episode with apparent hypoxia and cardiac arrest. Pt coded with ACLS protocol initiated, pt airway with multiple small pieces of food obstructing his vocal cords, within airway and pt mouth which was suctioned out and difficult airway noted at that time. Once pt was intubated, ROSC was achieved shortly thereafter. Pt received total of 3 epinephrine and 1 bicarb. Pt transferred to ICU with critical care team for further management / care.     Neurology was consulted as patient's encephalopathy continued, and seizures were noted on routine EEG on 5/10/23. The patient has been on Propofol, Versed, Fentanyl, and was loaded with Keppra 1000mg IV and started on 500mg BID after EEG was resulted. However, given his poor kidney function, Keppra was discontinued overnight and the patient was loaded with Depakote 1000mg IV and started on 500mg BID. No clinical seizures noted.     Today, patient was taken off of Propofol drip for ~1 hour, and Versed was also discontinued at that time. Patient was examined at bedside at ~945am. At that time, the patient had weak CN reflexes. No localization to pain. Not responding to voice. When re-examined around ~11:10AM, propofol was again stopped prior to examination, and CN reflexes were absent, no localization to pain and not responding to voice.

## 2023-05-11 NOTE — EEG REPORT - NS EEG TEXT BOX
RICHARDMARIO GARCES [id number]   Location:  CCU1 0010 02  Provider: Robbin Polo      Study Start Date: 1129 5/11/23  Study End Date:  1530 5/11/23  Duration x Hours:  3 hr 50 min   --------------------------------------------------------------------------------------------------    History:  KIAH/ MARNIE Patient is a 78y old  Male who presents with a chief complaint of acute systolic CHF (11 May 2023 09:44)    MEDICATIONS  (STANDING):  aspirin enteric coated 81 milliGRAM(s) Oral daily  chlorhexidine 0.12% Liquid 15 milliLiter(s) Oral Mucosa every 12 hours  chlorhexidine 2% Cloths 1 Application(s) Topical <User Schedule>  dextrose 5%. 1000 milliLiter(s) (100 mL/Hr) IV Continuous <Continuous>  dextrose 5%. 1000 milliLiter(s) (50 mL/Hr) IV Continuous <Continuous>  dextrose 50% Injectable 25 Gram(s) IV Push once  dextrose 50% Injectable 25 Gram(s) IV Push once  dextrose 50% Injectable 12.5 Gram(s) IV Push once  fentaNYL   Infusion. 0.5 MICROgram(s)/kG/Hr (3.06 mL/Hr) IV Continuous <Continuous>  heparin  Infusion. 800 Unit(s)/Hr (8 mL/Hr) IV Continuous <Continuous>  insulin lispro (ADMELOG) corrective regimen sliding scale   SubCutaneous every 6 hours  midazolam Infusion 0.02 mG/kG/Hr (1.22 mL/Hr) IV Continuous <Continuous>  norepinephrine Infusion 0.05 MICROgram(s)/kG/Min (5.74 mL/Hr) IV Continuous <Continuous>  pantoprazole  Injectable 40 milliGRAM(s) IV Push daily  piperacillin/tazobactam IVPB.. 3.375 Gram(s) IV Intermittent every 8 hours  propofol Infusion 5.011 MICROgram(s)/kG/Min (1.84 mL/Hr) IV Continuous <Continuous>  valproate sodium  IVPB 500 milliGRAM(s) IV Intermittent two times a day    fentaNYL   Infusion. 0.5 MICROgram(s)/kG/Hr (3.06 mL/Hr) IV Continuous <Continuous>  midazolam Infusion 0.02 mG/kG/Hr (1.22 mL/Hr) IV Continuous <Continuous>  propofol Infusion 5.011 MICROgram(s)/kG/Min (1.84 mL/Hr) IV Continuous <Continuous>  valproate sodium  IVPB 500 milliGRAM(s) IV Intermittent two times a day    --------------------------------------------------------------------------------------------------  Study Interpretation:    [[[Abbreviation Key:  PDR=alpha rhythm/posterior dominant rhythm. A-P=anterior posterior.  Amplitude: ‘very low’:<20; ‘low’:20-49; ‘medium’:; ‘high’:>150uV.  Persistence for periodic/rhythmic patterns (% of epoch) ‘rare’:<1%; ‘occasional’:1-10%; ‘frequent’:10-50%; ‘abundant’:50-90%; ‘continuous’:>90%.  Persistence for sporadic discharges: ‘rare’:<1/hr; ‘occasional’:1/min-1/hr; ‘frequent’:>1/min; ‘abundant’:>1/10 sec.  RPP=rhythmic and periodic patterns; GRDA=generalized rhythmic delta activity; FIRDA=frontal intermittent GRDA; LRDA=lateralized rhythmic delta activity; TIRDA=temporal intermittent rhythmic delta activity;  LPD=PLED=lateralized periodic discharges; GPD=generalized periodic discharges; BIPDs =bilateral independent periodic discharges; Mf=multifocal; SIRPDs=stimulus induced rhythmic, periodic, or ictal appearing discharges; BIRDs=brief potentially ictal rhythmic discharges >4 Hz, lasting .5-10s; PFA (paroxysmal bursts >13 Hz or =8 Hz <10s).  Modifiers: +F=with fast component; +S=with spike component; +R=with rhythmic component.  S-B=burst suppression pattern.  Max=maximal. N1-drowsy; N2-stage II sleep; N3-slow wave sleep. SSS/BETS=small sharp spikes/benign epileptiform transients of sleep. HV=hyperventilation; PS=photic stimulation]]]    Daily EEG Visual Analysis    FINDINGS:      Background:  Burst-suppressed, consisted of 1-2 second periods of diffuse suppression < 10 uV alternating with bursts of central (Cz maximal) predominant generalized periodic discharges 1-2 Hz, rarely clustering to 4-5 Hz <4-5 sec with intermixed theta and delta activity, as the study progresses the periods of diffuse suppression prolongs up to 6 seconds, while the bursts are shortened to 3-6 seconds. There is head jerking associated with these bursts.    Symmetry: symmetric  PDR: no PDR   Reactivity: not tested   Voltage: normal, mostly 20-150uV  Anterior Posterior Gradient: present  Other background findings: none  Breach: absent    Background Slowing:  As above     State Changes:   Absent    Sporadic Epileptiform Discharges:    As above     Rhythmic and Periodic Patterns (RPPs):  None     Electrographic and Electroclinical seizures:  None    Other Clinical Events:  None    Activation Procedures:   Hyperventilation was not performed.    Photic stimulation was not performed.    Artifacts:  Intermittent myogenic and movement artifacts were noted.    ECG:  The heart rate on single channel ECG was predominantly between 60-70 BPM.    ---------------------------------------------------------------------------------------------------------------------------------------------------------      EEG Classification / Summary:    Abnormal  EEG in a comatose patient     - Abundant generalized periodic discharges at times time-locked with myoclonus (head jerking)   - Burst-suppressed background, worsening towards the end of the recording.     ---------------------------------------------------------------------------------------------------------------------------------------------------------      Clinical Impression:    -Abundant myoclonic seizures/cortical myoclonus can be associated with 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)  -Severe diffuse cerebral dysfunction is nonspecific in etiology, but in this case, sedating medications may be one contributing factor.     ---------------------------------------------------------------------------------------------------------------------------------------------------------    This is a preliminary report     Dewey العراقي M.D.   Epilepsy fellow    -------------------------------------------------------------------------------------------------------  [facility] EEG Reading Room Ph#: [phone]  Epilepsy Answering Service after 5PM and before 8:30AM: Ph#: [phone]     RICHARDMARIO GARCES [id number]   Location:  CCU1 0010 02  Provider: Robbin Polo      Study Start Date: 1129 5/11/23  Study End Date:  1530 5/11/23  Duration x Hours:  3 hr 50 min   --------------------------------------------------------------------------------------------------    History:  KIAH/ MARNIE Patient is a 78y old  Male who presents with a chief complaint of acute systolic CHF (11 May 2023 09:44)    MEDICATIONS  (STANDING):  aspirin enteric coated 81 milliGRAM(s) Oral daily  chlorhexidine 0.12% Liquid 15 milliLiter(s) Oral Mucosa every 12 hours  chlorhexidine 2% Cloths 1 Application(s) Topical <User Schedule>  dextrose 5%. 1000 milliLiter(s) (100 mL/Hr) IV Continuous <Continuous>  dextrose 5%. 1000 milliLiter(s) (50 mL/Hr) IV Continuous <Continuous>  dextrose 50% Injectable 25 Gram(s) IV Push once  dextrose 50% Injectable 25 Gram(s) IV Push once  dextrose 50% Injectable 12.5 Gram(s) IV Push once  fentaNYL   Infusion. 0.5 MICROgram(s)/kG/Hr (3.06 mL/Hr) IV Continuous <Continuous>  heparin  Infusion. 800 Unit(s)/Hr (8 mL/Hr) IV Continuous <Continuous>  insulin lispro (ADMELOG) corrective regimen sliding scale   SubCutaneous every 6 hours  midazolam Infusion 0.02 mG/kG/Hr (1.22 mL/Hr) IV Continuous <Continuous>  norepinephrine Infusion 0.05 MICROgram(s)/kG/Min (5.74 mL/Hr) IV Continuous <Continuous>  pantoprazole  Injectable 40 milliGRAM(s) IV Push daily  piperacillin/tazobactam IVPB.. 3.375 Gram(s) IV Intermittent every 8 hours  propofol Infusion 5.011 MICROgram(s)/kG/Min (1.84 mL/Hr) IV Continuous <Continuous>  valproate sodium  IVPB 500 milliGRAM(s) IV Intermittent two times a day    fentaNYL   Infusion. 0.5 MICROgram(s)/kG/Hr (3.06 mL/Hr) IV Continuous <Continuous>  midazolam Infusion 0.02 mG/kG/Hr (1.22 mL/Hr) IV Continuous <Continuous>  propofol Infusion 5.011 MICROgram(s)/kG/Min (1.84 mL/Hr) IV Continuous <Continuous>  valproate sodium  IVPB 500 milliGRAM(s) IV Intermittent two times a day    --------------------------------------------------------------------------------------------------  Study Interpretation:    [[[Abbreviation Key:  PDR=alpha rhythm/posterior dominant rhythm. A-P=anterior posterior.  Amplitude: ‘very low’:<20; ‘low’:20-49; ‘medium’:; ‘high’:>150uV.  Persistence for periodic/rhythmic patterns (% of epoch) ‘rare’:<1%; ‘occasional’:1-10%; ‘frequent’:10-50%; ‘abundant’:50-90%; ‘continuous’:>90%.  Persistence for sporadic discharges: ‘rare’:<1/hr; ‘occasional’:1/min-1/hr; ‘frequent’:>1/min; ‘abundant’:>1/10 sec.  RPP=rhythmic and periodic patterns; GRDA=generalized rhythmic delta activity; FIRDA=frontal intermittent GRDA; LRDA=lateralized rhythmic delta activity; TIRDA=temporal intermittent rhythmic delta activity;  LPD=PLED=lateralized periodic discharges; GPD=generalized periodic discharges; BIPDs =bilateral independent periodic discharges; Mf=multifocal; SIRPDs=stimulus induced rhythmic, periodic, or ictal appearing discharges; BIRDs=brief potentially ictal rhythmic discharges >4 Hz, lasting .5-10s; PFA (paroxysmal bursts >13 Hz or =8 Hz <10s).  Modifiers: +F=with fast component; +S=with spike component; +R=with rhythmic component.  S-B=burst suppression pattern.  Max=maximal. N1-drowsy; N2-stage II sleep; N3-slow wave sleep. SSS/BETS=small sharp spikes/benign epileptiform transients of sleep. HV=hyperventilation; PS=photic stimulation]]]    Daily EEG Visual Analysis    FINDINGS:      Background:  Burst-suppressed, consisting of 1-2 second periods of diffuse suppression < 10 uV alternating with bursts of central (Cz maximal) predominant generalized spike/polyspike-wave periodic discharges, often predominant on the left (Cz/C3/P3), at 1-2 Hz, rarely clustering to 4-5 Hz or 8 Hz <4-5 sec with intermixed theta and delta activity. At times, these occur in bursts evolving from lower amplitude and higher frequency to higher amplitude and lower frequency.    As the study progresses, the periods of diffuse suppression increase in duration up to 6 seconds, while the bursts shorten to 3-6 seconds. There is head and truncal jerking associated with these bursts.    Symmetry: symmetric  PDR: no PDR   Reactivity: not tested   Voltage: burst-suppressed  Anterior Posterior Gradient: absent  Other background findings: none  Breach: absent    Background Slowing:  As above     State Changes:   Absent    Ictal and Interictal Findings:  As above    Other Clinical Events:  None    Activation Procedures:   Hyperventilation was not performed.    Photic stimulation was not performed.    Artifacts:  Intermittent myogenic and movement artifacts were present.    ECG:  The heart rate on single channel ECG is predominantly between 60-70 BPM, at times irregular, and with occasional PVCs.    ---------------------------------------------------------------------------------------------------------------------------------------------------------      EEG Classification / Summary:    Abnormal  EEG in a comatose patient     - Abundant generalized periodic discharges at times time-locked with myoclonus (head and truncal jerking)   - Burst-suppressed background, worsening towards the end of the recording.     ---------------------------------------------------------------------------------------------------------------------------------------------------------      Clinical Impression:    -Abundant myoclonic seizures/cortical myoclonus can be associated with 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)  -Severe diffuse cerebral dysfunction is nonspecific in etiology, but in this case, sedating medications may be one contributing factor.   -Single-channel ECG incidentally shows irregular rhythm at times    ---------------------------------------------------------------------------------------------------------------------------------------------------------    Dewey العراقي M.D.   Epilepsy Fellow    Anh Morrison MD  Attending Physician, [facility]     -------------------------------------------------------------------------------------------------------  [facility] EEG Reading Room Ph#: [phone]  Epilepsy Answering Service after 5PM and before 8:30AM: Ph#: [phone]

## 2023-05-11 NOTE — PROGRESS NOTE ADULT - SUBJECTIVE AND OBJECTIVE BOX
Follow-up Critical Care Progress Note  Chief Complaint : Atrial fibrillation    patient currently intubated, sedated with propofol versed, fentanyl  had myoclonus / seizure activty yesterday  AED modified overnight  patient unable to provide history or ros.     Family bedside         Allergies :sulfa drugs (Unknown)      PAST MEDICAL & SURGICAL HISTORY:  HTN (hypertension)  HLD (hyperlipidemia)  Diabetes  CAD (coronary artery disease)  History of cholecystectomy    Medications:  MEDICATIONS  (STANDING):  aspirin enteric coated 81 milliGRAM(s) Oral daily  chlorhexidine 0.12% Liquid 15 milliLiter(s) Oral Mucosa every 12 hours  chlorhexidine 2% Cloths 1 Application(s) Topical <User Schedule>  dextrose 5%. 1000 milliLiter(s) (100 mL/Hr) IV Continuous <Continuous>  dextrose 5%. 1000 milliLiter(s) (50 mL/Hr) IV Continuous <Continuous>  dextrose 5%. 1000 milliLiter(s) (80 mL/Hr) IV Continuous <Continuous>  dextrose 50% Injectable 25 Gram(s) IV Push once  dextrose 50% Injectable 25 Gram(s) IV Push once  dextrose 50% Injectable 12.5 Gram(s) IV Push once  fentaNYL   Infusion. 0.5 MICROgram(s)/kG/Hr (3.06 mL/Hr) IV Continuous <Continuous>  heparin  Infusion. 800 Unit(s)/Hr (8 mL/Hr) IV Continuous <Continuous>  insulin lispro (ADMELOG) corrective regimen sliding scale   SubCutaneous every 6 hours  midazolam Infusion 0.02 mG/kG/Hr (1.22 mL/Hr) IV Continuous <Continuous>  norepinephrine Infusion 0.05 MICROgram(s)/kG/Min (5.74 mL/Hr) IV Continuous <Continuous>  pantoprazole  Injectable 40 milliGRAM(s) IV Push daily  piperacillin/tazobactam IVPB.. 3.375 Gram(s) IV Intermittent every 8 hours  propofol Infusion 5.011 MICROgram(s)/kG/Min (1.84 mL/Hr) IV Continuous <Continuous>  valproate sodium  IVPB 500 milliGRAM(s) IV Intermittent two times a day  vasopressin Infusion 0.04 Unit(s)/Min (6 mL/Hr) IV Continuous <Continuous>    MEDICATIONS  (PRN):  heparin   Injectable 3800 Unit(s) IV Push every 6 hours PRN For aPTT less than 40  sodium chloride 0.9% lock flush 10 milliLiter(s) IV Push every 1 hour PRN Pre/post blood products, medications, blood draw, and to maintain line patency      Antibiotics History  piperacillin/tazobactam IVPB. 3.375 Gram(s) IV Intermittent once, 05-09-23 @ 10:58, Stop order after: 1 Doses  piperacillin/tazobactam IVPB.- 3.375 Gram(s) IV Intermittent once, 05-09-23 @ 15:00  piperacillin/tazobactam IVPB.. 3.375 Gram(s) IV Intermittent every 8 hours, 05-09-23 @ 22:00, Stop order after: 7 Days      Heme Medications   aspirin enteric coated 81 milliGRAM(s) Oral daily, 05-04-23 @ 23:39  heparin   Injectable 3800 Unit(s) IV Push every 6 hours, 05-11-23 @ 06:47 PRN  heparin  Infusion. 800 Unit(s)/Hr IV Continuous <Continuous>, 05-11-23 @ 06:47      GI Medications  pantoprazole  Injectable 40 milliGRAM(s) IV Push daily, 05-09-23 @ 12:15, Routine      COVID  05-04-23 @ 19:50  COVID -   NotDetec  07-13-22 @ 18:33  COVID -   NotDete      COVID Biomarkers    05-04-23 @ 19:50 ESR --  ---  CRP --  ---  DDimer  351<H>   ---   LDH --   ---   Ferritin --            Trend Cardiac Enzymes  05-10-23 @ 05:10  PVE-MWSBD-GNEKV-CPKMM/Trop I - -- - --  - --  -  --  /  2269.1<H>  05-09-23 @ 22:45  TXW-UDZZP-WPQFT-CPKMM/Trop I - 175 - --  - --  -  --  /  2243.1<H>  05-09-23 @ 15:20  PTP-GUMTX-IDVLN-CPKMM/Trop I - 205<H> - --  - --  -  --  /  1633.9<H>  05-09-23 @ 13:42  QAO-AKSNZ-DYEBG-CPKMM/Trop I - 211<H> - --  - --  -  --  /  1296.9<H>  05-09-23 @ 10:25  SQO-XZIYM-FRDTY-CPKMM/Trop I - 178 - --  - --  -  --  /  504.7<H>    Trend BNP    Procalcitonin Trend  05-09-23 @ 13:42   -   0.11<H>    WBC Trend  05-11-23 @ 06:05   -  14.35<H>  05-10-23 @ 05:10   -  15.57<H>  05-09-23 @ 23:40   -  12.93<H>  05-09-23 @ 15:20   -  13.77<H>  05-09-23 @ 10:25   -  11.14<H>  05-09-23 @ 08:15   -  7.95    H/H Trend  05-11-23 @ 06:05   -   12.4<L>/ 38.3<L>  05-10-23 @ 05:10   -   13.5/ 42.6  05-09-23 @ 23:40   -   14.5/ 46.2  05-09-23 @ 15:20   -   15.6/ 50.1<H>  05-09-23 @ 10:25   -   15.6/ 51.5<H>  05-09-23 @ 08:15   -   14.8/ 47.8    Stool Occult Blood    Platelet Trend  05-11-23 @ 06:05   -  109<L>  05-10-23 @ 05:10   -  124<L>  05-09-23 @ 23:40   -  151  05-09-23 @ 15:20   -  172  05-09-23 @ 10:25   -  125<L>  05-09-23 @ 08:15   -  151    Trend Sodium  05-11-23 @ 06:05   -  140  05-10-23 @ 05:10   -  147<H>  05-09-23 @ 22:45   -  151<H>  05-09-23 @ 15:20   -  153<H>  05-09-23 @ 13:42   -  151<H>  05-09-23 @ 10:25   -  155<H>    Trend Potassium  05-11-23 @ 06:05   -  3.9  05-10-23 @ 05:10   -  4.3  05-09-23 @ 22:45   -  4.5  05-09-23 @ 15:20   -  3.9  05-09-23 @ 13:42   -  3.7  05-09-23 @ 10:25   -  5.3    Trend Bun/Cr  05-11-23 @ 06:05  BUN/CR -  64<H> / 3.13<H>  05-10-23 @ 05:10  BUN/CR -  54<H> / 2.11<H>  05-09-23 @ 22:45  BUN/CR -  50<H> / 1.83<H>  05-09-23 @ 15:20  BUN/CR -  45<H> / 1.60<H>  05-09-23 @ 13:42  BUN/CR -  42<H> / 1.74<H>  05-09-23 @ 10:25  BUN/CR -  41<H> / 1.76<H>    Lactic Acid Trend  05-09-23 @ 15:20   -   1.8  05-09-23 @ 13:42   -   2.0  05-09-23 @ 10:25   -   12.6<HH>    ABG Trend  05-10-23 @ 04:13   - 7.39/47/129<H>/99.5<H>  05-09-23 @ 23:40   - 7.37/49<H>/75<L>/96.9  05-09-23 @ 10:16   - 7.10<LL>/83<HH>/75<L>/91.2<L>  05-09-23 @ 02:00   - 7.39/54<H>/67<L>/94.8  05-05-23 @ 15:00   - 7.38/48/96/98.3<H>    Trend AST/ALT/ALK Phos/Bili  05-10-23 @ 05:10   33/17/86/2.3<H>  05-09-23 @ 13:42   40/24/115/1.0  05-09-23 @ 10:25   39/28/143<H>/0.7  05-04-23 @ 19:50   22/21/123<H>/0.9  07-15-22 @ 06:15   24/11/83/0.8  07-14-22 @ 06:30   13/11/95/0.7       Albumin Trend  05-10-23 @ 05:10   -   2.3<L>  05-09-23 @ 13:42   -   2.2<L>  05-09-23 @ 10:25   -   2.4<L>  05-04-23 @ 19:50   -   2.5<L>  07-15-22 @ 06:15   -   2.0<L>  07-14-22 @ 06:30   -   2.2<L>      PTT - PT - INR Trend  05-11-23 @ 06:05   -   53.8<H> - 15.6<H> - 1.34<H>  05-10-23 @ 11:20   -   68.9<H> - -- - --  05-10-23 @ 05:10   -   69.8<H> - -- - --  05-09-23 @ 22:45   -   43.6<H> - -- - --  05-09-23 @ 15:20   -   37.4<H> - 15.2<H> - 1.31<H>  05-09-23 @ 11:10   -   -- - 15.2<H> - 1.31<H>    Glucose Trend  05-11-23 @ 06:05   -  -- 239<H> --  05-11-23 @ 05:07   -  -- -- 207<H>  05-10-23 @ 23:15   -  -- -- 203<H>  05-10-23 @ 18:17   -  -- -- 200<H>  05-10-23 @ 11:26   -  -- -- 164<H>  05-10-23 @ 06:22   -  -- -- 149<H>  05-10-23 @ 05:10   -  -- 193<H> --  05-10-23 @ 00:00   -  -- -- 160<H>  05-09-23 @ 22:45   -  -- 200<H> --  05-09-23 @ 17:43   -  -- -- 122<H>    A1C with Estimated Average Glucose Result: 7.7 % *H* [4.0 - 5.6] (05-05-23 @ 08:00)      LABS:                        12.4   14.35 )-----------( 109      ( 11 May 2023 06:05 )             38.3     05-11    140  |  102  |  64<H>  ----------------------------<  239<H>  3.9   |  27  |  3.13<H>    Ca    8.1<L>      11 May 2023 06:05  Phos  3.9     05-11  Mg     2.0     05-11    TPro  5.6<L>  /  Alb  2.3<L>  /  TBili  2.3<H>  /  DBili  x   /  AST  33  /  ALT  17  /  AlkPhos  86  05-10       RADIOLOGY  CXR:  5/10/23  improved b/l infiltrates/congestive changes, et tube, lines in place     EEG 5/10/23    Clinical Impression:  -Frequent myoclonic seizures/cortical myoclonus can be associated with 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)  -Severe diffuse cerebral dysfunction is nonspecific in etiology, but in this case, sedating medications may be one contributing factor      VITALS:  T(C): 36.9 (05-11-23 @ 07:00), Max: 38.3 (05-10-23 @ 16:00)  T(F): 98.5 (05-11-23 @ 07:00), Max: 100.9 (05-10-23 @ 16:00)  HR: 71 (05-11-23 @ 08:45) (70 - 84)  BP: 111/63 (05-11-23 @ 07:00) (96/57 - 150/68)  BP(mean): 76 (05-11-23 @ 07:00) (69 - 95)  ABP: 123/60 (05-11-23 @ 08:45) (95/43 - 148/60)  ABP(mean): 80 (05-11-23 @ 08:45) (59 - 90)  RR: 20 (05-11-23 @ 08:45) (10 - 20)  SpO2: 100% (05-11-23 @ 08:45) (98% - 100%)  CVP(mm Hg): --  CVP(cm H2O): --    Ins and Outs     05-10-23 @ 07:01  -  05-11-23 @ 07:00  --------------------------------------------------------  IN: 3235.7 mL / OUT: 170 mL / NET: 3065.7 mL    05-11-23 @ 07:01  -  05-11-23 @ 09:24  --------------------------------------------------------  IN: 110.2 mL / OUT: 0 mL / NET: 110.2 mL            Device: Avea, Mode: AC/ CMV (Assist Control/ Continuous Mandatory Ventilation), RR (machine): 20, RR (patient): 22, TV (machine): 450, TV (patient): 430, FiO2: 50, PEEP: 8, MAP: 13, PIP: 26    I&O's Detail    10 May 2023 07:01  -  11 May 2023 07:00  --------------------------------------------------------  IN:    dextrose 5%: 1600 mL    Enteral Tube Flush: 30 mL    FentaNYL: 146.4 mL    Heparin Infusion: 184 mL    IV PiggyBack: 100 mL    IV PiggyBack: 200 mL    Midazolam: 43.2 mL    Norepinephrine: 539.1 mL    Propofol: 22 mL    Propofol: 146 mL    sodium chloride 0.45%: 225 mL  Total IN: 3235.7 mL    OUT:    Indwelling Catheter - Urethral (mL): 170 mL    Vasopressin: 0 mL  Total OUT: 170 mL    Total NET: 3065.7 mL      11 May 2023 07:01  -  11 May 2023 09:24  --------------------------------------------------------  IN:    dextrose 5%: 80 mL    FentaNYL: 6.1 mL    Midazolam: 3 mL    Norepinephrine: 13.8 mL    Propofol: 7.3 mL  Total IN: 110.2 mL    OUT:    Vasopressin: 0 mL  Total OUT: 0 mL    Total NET: 110.2 mL

## 2023-05-11 NOTE — CHART NOTE - NSCHARTNOTEFT_GEN_A_CORE
Nutrition Follow Up Note  Hospital Course (Per Electronic Medical Record):   Source: Medical Record [X] MD/PA[X]  Nursing Staff [X]     Diet: NPO    Patient intubated (5/9) , Propofol currently infusing @ 7.2ml/hr which  is providing 190 non protein calories , NGT noted ,D5% @ 80ml/hr infusing POCT reviewed , insulin coverage noted  Labs reviewed , renal following , patient with ANISH , suggest initiate Nepro @ 25ml/hr x 18  hrs which will provide 810kcals, 36gms protein , [id number] , will follow up regarding increasing En feeds to meet assessed  needs      Current Weight: (5/11) 146.6/66.5kg - ? current weight to do weight trend                          (5/8) 117.9/53.5kg                          (5/7) 117.5/53.3kg     Pertinent Medications: MEDICATIONS  (STANDING):  aspirin enteric coated 81 milliGRAM(s) Oral daily  chlorhexidine 0.12% Liquid 15 milliLiter(s) Oral Mucosa every 12 hours  chlorhexidine 2% Cloths 1 Application(s) Topical <User Schedule>  dextrose 5%. 1000 milliLiter(s) (50 mL/Hr) IV Continuous <Continuous>  dextrose 5%. 1000 milliLiter(s) (100 mL/Hr) IV Continuous <Continuous>  dextrose 5%. 1000 milliLiter(s) (80 mL/Hr) IV Continuous <Continuous>  dextrose 50% Injectable 25 Gram(s) IV Push once  dextrose 50% Injectable 12.5 Gram(s) IV Push once  dextrose 50% Injectable 25 Gram(s) IV Push once  fentaNYL   Infusion. 0.5 MICROgram(s)/kG/Hr (3.06 mL/Hr) IV Continuous <Continuous>  heparin  Infusion. 800 Unit(s)/Hr (8 mL/Hr) IV Continuous <Continuous>  insulin lispro (ADMELOG) corrective regimen sliding scale   SubCutaneous every 6 hours  midazolam Infusion 0.02 mG/kG/Hr (1.22 mL/Hr) IV Continuous <Continuous>  norepinephrine Infusion 0.05 MICROgram(s)/kG/Min (5.74 mL/Hr) IV Continuous <Continuous>  pantoprazole  Injectable 40 milliGRAM(s) IV Push daily  piperacillin/tazobactam IVPB.. 3.375 Gram(s) IV Intermittent every 8 hours  propofol Infusion 5.011 MICROgram(s)/kG/Min (1.84 mL/Hr) IV Continuous <Continuous>  valproate sodium  IVPB 500 milliGRAM(s) IV Intermittent two times a day  vasopressin Infusion 0.04 Unit(s)/Min (6 mL/Hr) IV Continuous <Continuous>    MEDICATIONS  (PRN):  heparin   Injectable 3800 Unit(s) IV Push every 6 hours PRN For aPTT less than 40  sodium chloride 0.9% lock flush 10 milliLiter(s) IV Push every 1 hour PRN Pre/post blood products, medications, blood draw, and to maintain line patency      Pertinent Labs:  05-11 Na140 mmol/L Glu 239 mg/dL<H> K+ 3.9 mmol/L Cr  3.13 mg/dL<H> BUN 64 mg/dL<H> 05-11 Phos 3.9 mg/dL 05-10 Alb 2.3 g/dL<L> 05-07 Chol 172 mg/dL LDL --    HDL 39 mg/dL<L> Trig 87 mg/dL  [id number]      Skin: ecchymotic    Edema: none    Last BM: (5/5)     Estimated Needs:   [X] No Change since Previous Assessment    Previous Nutrition Diagnosis: Severe Protein Calorie Malnutrition     Nutrition Diagnosis is [X] Ongoing         New Nutrition Diagnosis:   [X] Inadequate EN feeds related to patient s/p cardiac arrest ,as evidence by EN feeds @ 25 ml/hr x 24 hrs  patient to be able to tolerate advancement in EN feeds to meet 75% of assessed nutritional needs       Interventions:   1. Provide Nepro @ 35ml/hr x 18hrs advance as medically indicated     Monitoring & Evaluation: will monitor:  [X] Weights   [X] Follow Up (Per Protocol)  [X] Tolerance to Diet Prescription       RD to follow as per Nutrition protocol  Preethi Carnes RDN

## 2023-05-11 NOTE — CONSULT NOTE ADULT - MOTOR
No antigravity strength  With stimulation, myoclonic activity starts diffusely, including shoulder, face, bilateral lower and upper extremities

## 2023-05-11 NOTE — CONSULT NOTE ADULT - GAIT/BALANCE
2+ reflexes in the upper extremities  0 reflexes in the lower extremities  Plantar responses are absent

## 2023-05-12 LAB
ALBUMIN SERPL ELPH-MCNC: 1.5 G/DL — LOW (ref 3.3–5)
ALP SERPL-CCNC: 75 U/L — SIGNIFICANT CHANGE UP (ref 40–120)
ALT FLD-CCNC: 15 U/L — SIGNIFICANT CHANGE UP (ref 10–45)
ANION GAP SERPL CALC-SCNC: 9 MMOL/L — SIGNIFICANT CHANGE UP (ref 5–17)
APTT BLD: 44.5 SEC — HIGH (ref 27.5–35.5)
APTT BLD: 51.7 SEC — HIGH (ref 27.5–35.5)
APTT BLD: 60.1 SEC — HIGH (ref 27.5–35.5)
AST SERPL-CCNC: 22 U/L — SIGNIFICANT CHANGE UP (ref 10–40)
BILIRUB SERPL-MCNC: 1.6 MG/DL — HIGH (ref 0.2–1.2)
BUN SERPL-MCNC: 70 MG/DL — HIGH (ref 7–23)
CALCIUM SERPL-MCNC: 8.1 MG/DL — LOW (ref 8.4–10.5)
CHLORIDE SERPL-SCNC: 101 MMOL/L — SIGNIFICANT CHANGE UP (ref 96–108)
CO2 SERPL-SCNC: 28 MMOL/L — SIGNIFICANT CHANGE UP (ref 22–31)
CREAT SERPL-MCNC: 3.65 MG/DL — HIGH (ref 0.5–1.3)
EGFR: 16 ML/MIN/1.73M2 — LOW
GLUCOSE BLDC GLUCOMTR-MCNC: 160 MG/DL — HIGH (ref 70–99)
GLUCOSE BLDC GLUCOMTR-MCNC: 183 MG/DL — HIGH (ref 70–99)
GLUCOSE BLDC GLUCOMTR-MCNC: 245 MG/DL — HIGH (ref 70–99)
GLUCOSE BLDC GLUCOMTR-MCNC: 288 MG/DL — HIGH (ref 70–99)
GLUCOSE BLDC GLUCOMTR-MCNC: 302 MG/DL — HIGH (ref 70–99)
GLUCOSE SERPL-MCNC: 199 MG/DL — HIGH (ref 70–99)
HCT VFR BLD CALC: 35.6 % — LOW (ref 39–50)
HGB BLD-MCNC: 11.6 G/DL — LOW (ref 13–17)
INR BLD: 1.14 RATIO — SIGNIFICANT CHANGE UP (ref 0.88–1.16)
MAGNESIUM SERPL-MCNC: 1.9 MG/DL — SIGNIFICANT CHANGE UP (ref 1.6–2.6)
MCHC RBC-ENTMCNC: 31.3 PG — SIGNIFICANT CHANGE UP (ref 27–34)
MCHC RBC-ENTMCNC: 32.6 GM/DL — SIGNIFICANT CHANGE UP (ref 32–36)
MCV RBC AUTO: 96 FL — SIGNIFICANT CHANGE UP (ref 80–100)
NRBC # BLD: 0 /100 WBCS — SIGNIFICANT CHANGE UP (ref 0–0)
PHOSPHATE SERPL-MCNC: 4.3 MG/DL — SIGNIFICANT CHANGE UP (ref 2.5–4.5)
PLATELET # BLD AUTO: 82 K/UL — LOW (ref 150–400)
POTASSIUM SERPL-MCNC: 4.2 MMOL/L — SIGNIFICANT CHANGE UP (ref 3.5–5.3)
POTASSIUM SERPL-SCNC: 4.2 MMOL/L — SIGNIFICANT CHANGE UP (ref 3.5–5.3)
PROT SERPL-MCNC: 5.1 G/DL — LOW (ref 6–8.3)
PROTHROM AB SERPL-ACNC: 13.2 SEC — SIGNIFICANT CHANGE UP (ref 10.5–13.4)
RBC # BLD: 3.71 M/UL — LOW (ref 4.2–5.8)
RBC # FLD: 15.2 % — HIGH (ref 10.3–14.5)
SODIUM SERPL-SCNC: 138 MMOL/L — SIGNIFICANT CHANGE UP (ref 135–145)
TROPONIN I, HIGH SENSITIVITY RESULT: 548.4 NG/L — HIGH
TROPONIN I, HIGH SENSITIVITY RESULT: 553 NG/L — HIGH
VALPROATE SERPL-MCNC: 24 UG/ML — LOW (ref 50–100)
WBC # BLD: 13.78 K/UL — HIGH (ref 3.8–10.5)
WBC # FLD AUTO: 13.78 K/UL — HIGH (ref 3.8–10.5)

## 2023-05-12 PROCEDURE — 71045 X-RAY EXAM CHEST 1 VIEW: CPT | Mod: 26

## 2023-05-12 PROCEDURE — 99232 SBSQ HOSP IP/OBS MODERATE 35: CPT

## 2023-05-12 PROCEDURE — 93010 ELECTROCARDIOGRAM REPORT: CPT

## 2023-05-12 RX ORDER — VALPROIC ACID (AS SODIUM SALT) 250 MG/5ML
750 SOLUTION, ORAL ORAL EVERY 8 HOURS
Refills: 0 | Status: DISCONTINUED | OUTPATIENT
Start: 2023-05-12 | End: 2023-05-17

## 2023-05-12 RX ADMIN — Medication 8: at 17:33

## 2023-05-12 RX ADMIN — Medication 5.74 MICROGRAM(S)/KG/MIN: at 22:17

## 2023-05-12 RX ADMIN — HEPARIN SODIUM 800 UNIT(S)/HR: 5000 INJECTION INTRAVENOUS; SUBCUTANEOUS at 04:23

## 2023-05-12 RX ADMIN — HEPARIN SODIUM 900 UNIT(S)/HR: 5000 INJECTION INTRAVENOUS; SUBCUTANEOUS at 06:44

## 2023-05-12 RX ADMIN — Medication 55 MILLIGRAM(S): at 05:03

## 2023-05-12 RX ADMIN — HEPARIN SODIUM 1000 UNIT(S)/HR: 5000 INJECTION INTRAVENOUS; SUBCUTANEOUS at 20:41

## 2023-05-12 RX ADMIN — Medication 4: at 23:32

## 2023-05-12 RX ADMIN — PROPOFOL 1.84 MICROGRAM(S)/KG/MIN: 10 INJECTION, EMULSION INTRAVENOUS at 17:42

## 2023-05-12 RX ADMIN — Medication 57.5 MILLIGRAM(S): at 16:19

## 2023-05-12 RX ADMIN — PIPERACILLIN AND TAZOBACTAM 25 GRAM(S): 4; .5 INJECTION, POWDER, LYOPHILIZED, FOR SOLUTION INTRAVENOUS at 17:32

## 2023-05-12 RX ADMIN — HEPARIN SODIUM 1000 UNIT(S)/HR: 5000 INJECTION INTRAVENOUS; SUBCUTANEOUS at 13:12

## 2023-05-12 RX ADMIN — SIMVASTATIN 40 MILLIGRAM(S): 20 TABLET, FILM COATED ORAL at 22:37

## 2023-05-12 RX ADMIN — PIPERACILLIN AND TAZOBACTAM 25 GRAM(S): 4; .5 INJECTION, POWDER, LYOPHILIZED, FOR SOLUTION INTRAVENOUS at 05:03

## 2023-05-12 RX ADMIN — CHLORHEXIDINE GLUCONATE 15 MILLILITER(S): 213 SOLUTION TOPICAL at 17:34

## 2023-05-12 RX ADMIN — PROPOFOL 1.84 MICROGRAM(S)/KG/MIN: 10 INJECTION, EMULSION INTRAVENOUS at 04:22

## 2023-05-12 RX ADMIN — PANTOPRAZOLE SODIUM 40 MILLIGRAM(S): 20 TABLET, DELAYED RELEASE ORAL at 11:48

## 2023-05-12 RX ADMIN — Medication 2: at 11:47

## 2023-05-12 RX ADMIN — CLOPIDOGREL BISULFATE 75 MILLIGRAM(S): 75 TABLET, FILM COATED ORAL at 11:48

## 2023-05-12 RX ADMIN — CHLORHEXIDINE GLUCONATE 15 MILLILITER(S): 213 SOLUTION TOPICAL at 05:03

## 2023-05-12 RX ADMIN — CHLORHEXIDINE GLUCONATE 1 APPLICATION(S): 213 SOLUTION TOPICAL at 05:02

## 2023-05-12 RX ADMIN — Medication 2: at 05:03

## 2023-05-12 NOTE — PROGRESS NOTE ADULT - ASSESSMENT
Physical Examination:  GENERAL:                Intubated,   HEENT:                    No JVD, Dry MM, eyes constricted, not reactive to light  PULM:                     Bilateral air entry, Clear to auscultation bilaterally, no significant sputum production, No Rales, No Rhonchi, No Wheezing  CVS:                         S1, S2,  No Murmur  ABD:                        Soft, nondistended, nontender, normoactive bowel sounds,   EXT:                         + edema, nontender, LE Cyanosis No Clubbing   Vascular:                warm Extremities, Normal Capillary refill, Normal Distal Pulses  NEURO:                  Unresponsive   not interactive, does not follows commands  PSYC:                      Calm, no Insight.        Assessment  S/p Cardiac Arrest due to chocking episode  Now with multiorgan dysfunction and likely anoxic brain injury  Acute hypoxic respiratory failure  Acute Renal Failure on CKD  B/l Pleural Effusions  Underlying HTN (hypertension), HLD (hyperlipidemia), DM2, CAD       Plan  Continue mechanical ventilation  sedation vacation  EEG repeat   continue valproic acid  neuro f/u  renal f/u  caryl trop, f/u ekg  neuro checks  empiric antibiotics    EEG abnormal - suggestive of extremely poor prognosis   advance diet with tube feeding            PMD:	Dr Parra			                   Notified(Date):  Family Updated:  adali [phone]		                                 Date:  5/11/23 Bedside  Extensive discussion had with her bedside and with neurology  discussed current status and poor prognostic signs patient has  discussed role in MRI, and 24 hr eeg, repeat ct head  she requests if 24 hr eeg not able to be done here, to obtain EEG BID       Sedation & Analgesia:	as above,   Diet/Nutrition:		 Tube feeding  GI PPx:		pantoprazole  Injectable 40 milliGRAM(s) IV Push daily  DVT Ppx:		  aspirin enteric coated 81 milliGRAM(s) Oral daily  heparin  Infusion. 800 Unit(s)/Hr IV Continuous <Continuous>     Activity:		    Head of Bed:               35-45 Deg  Glycemic Control:        Insulin ss    Lines:   CENTRAL LINE: 	[ xx] YES [ ] NO	                    LOCATION:   	                       DATE INSERTED:   	                    REMOVE:  [ ] YES [x ] NO    A-LINE:  	                [ x] YES [ ] NO                      LOCATION:   	                       DATE INSERTED: 		            REMOVE:  [ ] YES [ x] NO    HOL[place]OOK: 		        [x ] YES [ ] NO  		                                       DATE INSERTED:		            REMOVE:  [ ] YES [x ] NO      Restraints were deemed necessary to prevent removal of life-sustaining devices [  ] YES   [  x  ]  NO    Disposition: ICU Care    Goals of Care: Full code  Physical Examination:  GENERAL:                Intubated,   HEENT:                    No JVD, Dry MM, eyes constricted, not reactive to light  PULM:                     Bilateral air entry, Clear to auscultation bilaterally, no significant sputum production, No Rales, No Rhonchi, No Wheezing  CVS:                         S1, S2,  No Murmur  ABD:                        Soft, nondistended, nontender, normoactive bowel sounds,   EXT:                         + edema, nontender, LE Cyanosis No Clubbing   Vascular:                warm Extremities, Normal Capillary refill, Normal Distal Pulses  NEURO:                  Unresponsive   not interactive, does not follows commands  PSYC:                      Calm, no Insight.        Assessment  S/p Cardiac Arrest due to chocking episode  Now with multiorgan dysfunction and likely anoxic brain injury  Acute hypoxic respiratory failure  Acute Renal Failure on CKD  CT Head showing acute Stroke, not candidate tpa  B/l Pleural Effusions  Underlying HTN (hypertension), HLD (hyperlipidemia), DM2, CAD       Plan  Continue mechanical ventilation  sedation vacation check EEG off sedation  on Heparin drip  On plavix, off asa    EEG repeat today  noted low valproic acid level - neuro f/u will discuss to increase valproic acid  neuro f/u   continue valproic acid  neuro f/u  renal f/u  caryl trop, f/u ekg  neuro checks  empiric antibiotics    EEG abnormal - suggestive of extremely poor prognosis   diet via peg  case d/w Neuro, hold acute MRI at this time pending clinical course      will plan prolonged EEG today          PMD:	Dr Parra			                   Notified(Date):  Family Updated:  adali [phone]		                                 Date:  5/12/23 Bedside  Extensive discussion had with her bedside         Sedation & Analgesia:	as above,   Diet/Nutrition:		 Tube feeding  GI PPx:		pantoprazole  Injectable 40 milliGRAM(s) IV Push daily  DVT Ppx:	heparin  Infusion. 800 Unit(s)/Hr IV Continuous <Continuous>     Activity:		    Head of Bed:               35-45 Deg  Glycemic Control:        Insulin ss    Lines:    CENTRAL LINE: 	[x] YES [ ] NO	                    LOCATION:   	                       DATE INSERTED:   	                    REMOVE:  [ ] YES [x ] NO    A-LINE:  	                [ x] YES [ ] NO                      LOCATION:   	                       DATE INSERTED: 		            REMOVE:  [ ] YES [ x] NO    HOLBROOK: 		        [x ] YES [ ] NO  		                                       DATE INSERTED:		            REMOVE:  [ ] YES [x ] NO      Restraints were deemed necessary to prevent removal of life-sustaining devices [  ] YES   [  x  ]  NO    Disposition: ICU Care    Goals of Care: Full code

## 2023-05-12 NOTE — PROGRESS NOTE ADULT - SUBJECTIVE AND OBJECTIVE BOX
General Neurology  Consult Progress Note    Interval history:  No acute overnight events. Patient continues to be unresponsive.     HPI:  Giovani Avila is an 78 year old male with a PMH of Dementia, HTN, Cardiomyopathy, chronic systolic CHF, CAD s/p CABG, "known occluded RCA" and DM type 2 who was admitted to [facility] on 5/4 for tx of hypoxic respiratory failure, acute on chronic HF exacerbation (EF 40-45% this stay, prior 50-55%) and ?ANISH. Pt hospital tx consisted of supplemental O2, diuresis, bb and aspirin. ?new afib noted from ED, however, cardiology feels its likely atrial tachycardia.      5/9/23 pt noted to have choking episode with apparent hypoxia and cardiac arrest. Pt coded with ACLS protocol initiated, pt airway with multiple small pieces of food obstructing his vocal cords, within airway and pt mouth which was suctioned out and difficult airway noted at that time. Once pt was intubated, ROSC was achieved shortly thereafter. Pt received total of 3 epinephrine and 1 bicarb. Pt transferred to ICU with critical care team for further management / care.     Neurology was consulted as patient's encephalopathy continued, and seizures were noted on routine EEG on 5/10/23. The patient has been on Propofol, Versed, Fentanyl, and was loaded with Keppra 1000mg IV and started on 500mg BID after EEG was resulted. However, given his poor kidney function, Keppra was discontinued overnight and the patient was loaded with Depakote 1000mg IV and started on 500mg BID. No clinical seizures noted.     Today, patient was taken off of Propofol drip for ~1 hour, and Versed was also discontinued at that time. Patient was examined at bedside at ~945am. At that time, the patient had weak CN reflexes. No localization to pain. Not responding to voice. When re-examined around ~11:10AM, propofol was again stopped prior to examination, and CN reflexes were absent, no localization to pain and not responding to voice.

## 2023-05-12 NOTE — PROGRESS NOTE ADULT - SUBJECTIVE AND OBJECTIVE BOX
Intubated in ICU    Vital Signs Last 24 Hrs  T(C): 37.4 (05-12-23 @ 21:00), Max: 37.8 (05-12-23 @ 08:15)  T(F): 99.3 (05-12-23 @ 21:00), Max: 100 (05-12-23 @ 08:15)  HR: 90 (05-12-23 @ 21:00) (63 - 92)  BP: 125/59 (05-12-23 @ 21:00) (104/51 - 141/67)  BP(mean): 80 (05-12-23 @ 21:00) (66 - 102)  RR: 18 (05-12-23 @ 21:00) (16 - 26)  SpO2: 100% (05-12-23 @ 21:00) (95% - 100%)    I&O's Detail    11 May 2023 07:01  -  12 May 2023 07:00  --------------------------------------------------------  IN:    dextrose 5%: 400 mL    FentaNYL: 6.1 mL    Heparin Infusion: 185 mL    IV PiggyBack: 200 mL    IV PiggyBack: 55 mL    Midazolam: 3 mL    Nepro with Carb Steady: 600 mL    Norepinephrine: 273.2 mL    Propofol: 99.5 mL  Total IN: 1821.8 mL    OUT:    Indwelling Catheter - Urethral (mL): 463 mL    Vasopressin: 0 mL  Total OUT: 463 mL    Total NET: 1358.8 mL    12 May 2023 07:01  -  12 May 2023 21:27  --------------------------------------------------------  IN:    Heparin Infusion: 144 mL    IV PiggyBack: 57.5 mL    IV PiggyBack: 100 mL    Nepro with Carb Steady: 420 mL    Norepinephrine: 151.6 mL    Propofol: 29.4 mL  Total IN: 902.4 mL    OUT:    Indwelling Catheter - Urethral (mL): 330 mL  Total OUT: 330 mL    Total NET: 572.4 mL    Mode: AC/ CMV (Assist Control/ Continuous Mandatory Ventilation)  RR (machine): 18  TV (machine): 450  FiO2: 40  PEEP: 6  ITime: 0.8  PIP: 27    s1s2  b/l air entry  soft, ND  no edema                                11.6   13.78 )-----------( 82       ( 12 May 2023 05:35 )             35.6     12 May 2023 05:35    138    |  101    |  70     ----------------------------<  199    4.2     |  28     |  3.65     Ca    8.1        12 May 2023 05:35  Phos  4.3       12 May 2023 05:35  Mg     1.9       12 May 2023 05:35    TPro  5.1    /  Alb  1.5    /  TBili  1.6    /  DBili  x      /  AST  22     /  ALT  15     /  AlkPhos  75     12 May 2023 05:35    LIVER FUNCTIONS - ( 12 May 2023 05:35 )  Alb: 1.5 g/dL / Pro: 5.1 g/dL / ALK PHOS: 75 U/L / ALT: 15 U/L / AST: 22 U/L / GGT: x           PT/INR - ( 12 May 2023 06:20 )   PT: 13.2 sec;   INR: 1.14 ratio      chlorhexidine 0.12% Liquid 15 milliLiter(s) Oral Mucosa every 12 hours  chlorhexidine 2% Cloths 1 Application(s) Topical <User Schedule>  clopidogrel Tablet 75 milliGRAM(s) Oral daily  dextrose 5%. 1000 milliLiter(s) IV Continuous <Continuous>  dextrose 5%. 1000 milliLiter(s) IV Continuous <Continuous>  dextrose 50% Injectable 25 Gram(s) IV Push once  dextrose 50% Injectable 12.5 Gram(s) IV Push once  dextrose 50% Injectable 25 Gram(s) IV Push once  fentaNYL    Injectable 25 MICROGram(s) IV Push every 4 hours PRN  heparin   Injectable 3800 Unit(s) IV Push every 6 hours PRN  heparin  Infusion. 800 Unit(s)/Hr IV Continuous <Continuous>  insulin lispro (ADMELOG) corrective regimen sliding scale   SubCutaneous every 6 hours  norepinephrine Infusion 0.05 MICROgram(s)/kG/Min IV Continuous <Continuous>  pantoprazole  Injectable 40 milliGRAM(s) IV Push daily  piperacillin/tazobactam IVPB.. 3.375 Gram(s) IV Intermittent every 12 hours  propofol Infusion 5.011 MICROgram(s)/kG/Min IV Continuous <Continuous>  simvastatin 40 milliGRAM(s) Oral at bedtime  sodium chloride 0.9% lock flush 10 milliLiter(s) IV Push every 1 hour PRN  valproate sodium  IVPB 750 milliGRAM(s) IV Intermittent every 8 hours    A/P:    Hemodynamic ANISH iso resp failure, s/p arrest, asp PNA (Cr 1.6 - 5/9/23, Cr 1.0 - 4/12/23)  Hx CM, CHF, EF 35-40%, dementia  BP, resp support, anti-sz tx per ICU team  Avoid nephrotoxins  Concern for anoxic encephalopathy  Overall prognosis is poor  Will f/u BMP, UO   Lytes are stable  D/w ICU team    [phone]

## 2023-05-12 NOTE — PROGRESS NOTE ADULT - SUBJECTIVE AND OBJECTIVE BOX
Follow-up Critical Care Progress Note  Chief Complaint : Atrial fibrillation      patient seen and examined  Sedation vacation done off propfol,     neurologically minimally responsive today  able to overbreath the vent.             Allergies :sulfa drugs (Unknown)      PAST MEDICAL & SURGICAL HISTORY:  HTN (hypertension)    HLD (hyperlipidemia)    Diabetes    CAD (coronary artery disease)    History of cholecystectomy        Medications:  MEDICATIONS  (STANDING):  chlorhexidine 0.12% Liquid 15 milliLiter(s) Oral Mucosa every 12 hours  chlorhexidine 2% Cloths 1 Application(s) Topical <User Schedule>  clopidogrel Tablet 75 milliGRAM(s) Oral daily  dextrose 5%. 1000 milliLiter(s) (100 mL/Hr) IV Continuous <Continuous>  dextrose 5%. 1000 milliLiter(s) (50 mL/Hr) IV Continuous <Continuous>  dextrose 50% Injectable 25 Gram(s) IV Push once  dextrose 50% Injectable 25 Gram(s) IV Push once  dextrose 50% Injectable 12.5 Gram(s) IV Push once  heparin  Infusion. 800 Unit(s)/Hr (8 mL/Hr) IV Continuous <Continuous>  insulin lispro (ADMELOG) corrective regimen sliding scale   SubCutaneous every 6 hours  norepinephrine Infusion 0.05 MICROgram(s)/kG/Min (5.74 mL/Hr) IV Continuous <Continuous>  pantoprazole  Injectable 40 milliGRAM(s) IV Push daily  piperacillin/tazobactam IVPB.. 3.375 Gram(s) IV Intermittent every 12 hours  propofol Infusion 5.011 MICROgram(s)/kG/Min (1.84 mL/Hr) IV Continuous <Continuous>  simvastatin 40 milliGRAM(s) Oral at bedtime  valproate sodium  IVPB 500 milliGRAM(s) IV Intermittent every 8 hours    MEDICATIONS  (PRN):  fentaNYL    Injectable 25 MICROGram(s) IV Push every 4 hours PRN Mild Pain (1 - 3)  heparin   Injectable 3800 Unit(s) IV Push every 6 hours PRN For aPTT less than 40  sodium chloride 0.9% lock flush 10 milliLiter(s) IV Push every 1 hour PRN Pre/post blood products, medications, blood draw, and to maintain line patency      Antibiotics History  piperacillin/tazobactam IVPB. 3.375 Gram(s) IV Intermittent once, 05-09-23 @ 10:58, Stop order after: 1 Doses  piperacillin/tazobactam IVPB.- 3.375 Gram(s) IV Intermittent once, 05-09-23 @ 15:00  piperacillin/tazobactam IVPB.. 3.375 Gram(s) IV Intermittent every 8 hours, 05-09-23 @ 22:00, Stop order after: 7 Days  piperacillin/tazobactam IVPB.. 3.375 Gram(s) IV Intermittent every 12 hours, 05-11-23 @ 22:27, Stop order after: 7 Days      Heme Medications   clopidogrel Tablet 75 milliGRAM(s) Oral daily, 05-11-23 @ 17:42  heparin   Injectable 3800 Unit(s) IV Push every 6 hours, 05-11-23 @ 06:47 PRN  heparin  Infusion. 800 Unit(s)/Hr IV Continuous <Continuous>, 05-11-23 @ 06:47      GI Medications  pantoprazole  Injectable 40 milliGRAM(s) IV Push daily, 05-09-23 @ 12:15, Routine      COVID  05-04-23 @ 19:50  COVID -   NotDetec  07-13-22 @ 18:33  COVID -   NotDete      COVID Biomarkers    05-04-23 @ 19:50 ESR --  ---  CRP --  ---  DDimer  351<H>   ---   LDH --   ---   Ferritin --            Trend Cardiac Enzymes  05-12-23 @ 05:35  COX-VICCU-NRJDL-CPKMM/Trop I - -- - --  - --  -  --  /  548.4<H>  05-11-23 @ 06:05  YLK-NFGVN-YGEGC-CPKMM/Trop I - -- - --  - --  -  --  /  1112.9<H>  05-10-23 @ 05:10  IBK-CEYIQ-ULXEM-CPKMM/Trop I - -- - --  - --  -  --  /  2269.1<H>  05-09-23 @ 22:45  VQG-ZGWBU-PRIXP-CPKMM/Trop I - 175 - --  - --  -  --  /  2243.1<H>  05-09-23 @ 15:20  KLP-FTMFW-VZTTS-CPKMM/Trop I - 205<H> - --  - --  -  --  /  1633.9<H>  05-09-23 @ 13:42  SAU-CUYXT-BTRCU-CPKMM/Trop I - 211<H> - --  - --  -  --  /  1296.9<H>    Trend BNP    Procalcitonin Trend  05-09-23 @ 13:42   -   0.11<H>    WBC Trend  05-12-23 @ 05:35   -  13.78<H>  05-11-23 @ 06:05   -  14.35<H>  05-10-23 @ 05:10   -  15.57<H>  05-09-23 @ 23:40   -  12.93<H>  05-09-23 @ 15:20   -  13.77<H>    H/H Trend  05-12-23 @ 05:35   -   11.6<L>/ 35.6<L>  05-11-23 @ 06:05   -   12.4<L>/ 38.3<L>  05-10-23 @ 05:10   -   13.5/ 42.6  05-09-23 @ 23:40   -   14.5/ 46.2  05-09-23 @ 15:20   -   15.6/ 50.1<H>  05-09-23 @ 10:25   -   15.6/ 51.5<H>    Stool Occult Blood    Platelet Trend  05-12-23 @ 05:35   -  82<L>  05-11-23 @ 06:05   -  109<L>  05-10-23 @ 05:10   -  124<L>  05-09-23 @ 23:40   -  151  05-09-23 @ 15:20   -  172    Trend Sodium  05-12-23 @ 05:35   -  138  05-11-23 @ 11:45   -  140  05-11-23 @ 06:05   -  140  05-10-23 @ 05:10   -  147<H>  05-09-23 @ 22:45   -  151<H>  05-09-23 @ 15:20   -  153<H>    Trend Potassium  05-12-23 @ 05:35   -  4.2  05-11-23 @ 11:45   -  4.2  05-11-23 @ 06:05   -  3.9  05-10-23 @ 05:10   -  4.3  05-09-23 @ 22:45   -  4.5  05-09-23 @ 15:20   -  3.9    Trend Bun/Cr  05-12-23 @ 05:35  BUN/CR -  70<H> / 3.65<H>  05-11-23 @ 11:45  BUN/CR -  66<H> / 3.08<H>  05-11-23 @ 06:05  BUN/CR -  64<H> / 3.13<H>  05-10-23 @ 05:10  BUN/CR -  54<H> / 2.11<H>  05-09-23 @ 22:45  BUN/CR -  50<H> / 1.83<H>  05-09-23 @ 15:20  BUN/CR -  45<H> / 1.60<H>    Lactic Acid Trend  05-09-23 @ 15:20   -   1.8  05-09-23 @ 13:42   -   2.0  05-09-23 @ 10:25   -   12.6<HH>    ABG Trend  05-11-23 @ 07:55   - 7.41/42/152<H>/99.2<H>  05-10-23 @ 04:13   - 7.39/47/129<H>/99.5<H>  05-09-23 @ 23:40   - 7.37/49<H>/75<L>/96.9  05-09-23 @ 10:16   - 7.10<LL>/83<HH>/75<L>/91.2<L>  05-09-23 @ 02:00   - 7.39/54<H>/67<L>/94.8  05-05-23 @ 15:00   - 7.38/48/96/98.3<H>    Trend AST/ALT/ALK Phos/Bili  05-12-23 @ 05:35   22/15/75/1.6<H>  05-11-23 @ 06:05   18/12/82/1.8<H>  05-10-23 @ 05:10   33/17/86/2.3<H>  05-09-23 @ 13:42   40/24/115/1.0  05-09-23 @ 10:25   39/28/143<H>/0.7  05-04-23 @ 19:50   22/21/123<H>/0.9  07-15-22 @ 06:15   24/11/83/0.8     Albumin Trend  05-12-23 @ 05:35   -   1.5<L>  05-11-23 @ 06:05   -   1.7<L>  05-10-23 @ 05:10   -   2.3<L>  05-09-23 @ 13:42   -   2.2<L>  05-09-23 @ 10:25   -   2.4<L>  05-04-23 @ 19:50   -   2.5<L>      PTT - PT - INR Trend  05-12-23 @ 12:25   -   51.7<H> - -- - --  05-12-23 @ 06:20   -   44.5<H> - 13.2 - 1.14  05-11-23 @ 06:05   -   53.8<H> - 15.6<H> - 1.34<H>  05-10-23 @ 11:20   -   68.9<H> - -- - --  05-10-23 @ 05:10   -   69.8<H> - -- - --  05-09-23 @ 22:45   -   43.6<H> - -- - --    Glucose Trend  05-12-23 @ 11:41   -  -- -- 160<H>  05-12-23 @ 05:35   -  -- 199<H> --  05-12-23 @ 04:59   -  -- -- 183<H>  05-11-23 @ 23:18   -  -- -- 169<H>  05-11-23 @ 17:39   -  -- -- 119<H>  05-11-23 @ 11:45   -  -- 203<H> --  05-11-23 @ 11:20   -  -- -- 236<H>  05-11-23 @ 06:05   -  -- 239<H> --  05-11-23 @ 05:07   -  -- -- 207<H>  05-10-23 @ 23:15   -  -- -- 203<H>    A1C with Estimated Average Glucose Result: 7.7 % *H* [4.0 - 5.6] (05-05-23 @ 08:00)      LABS:                        11.6   13.78 )-----------( 82       ( 12 May 2023 05:35 )             35.6     05-12    138  |  101  |  70<H>  ----------------------------<  199<H>  4.2   |  28  |  3.65<H>    Ca    8.1<L>      12 May 2023 05:35  Phos  4.3     05-12  Mg     1.9     05-12    TPro  5.1<L>  /  Alb  1.5<L>  /  TBili  1.6<H>  /  DBili  x   /  AST  22  /  ALT  15  /  AlkPhos  75  05-12     PT/INR - ( 12 May 2023 06:20 )   PT: 13.2 sec;   INR: 1.14 ratio         PTT - ( 12 May 2023 12:25 )  PTT:51.7 sec    Procalcitonin, Serum: 0.11 ng/mL (05-09-23 @ 13:42)          CULTURES: (if applicable)    Rapid RVP Result: NotDetec (05-04-23 @ 19:50)      ABG - ( 11 May 2023 07:55 )  pH, Arterial: 7.41  pH, Blood: x     /  pCO2: 42    /  pO2: 152   / HCO3: 27    / Base Excess: 2.0   /  SaO2: 99.2         CXR b/l vascular congestion - lines/tubes in place      VITALS:  T(C): 37.4 (05-12-23 @ 12:00), Max: 37.8 (05-12-23 @ 08:15)  T(F): 99.3 (05-12-23 @ 12:00), Max: 100 (05-12-23 @ 08:15)  HR: 70 (05-12-23 @ 12:00) (63 - 85)  BP: 105/51 (05-12-23 @ 12:00) (98/55 - 141/67)  BP(mean): 66 (05-12-23 @ 12:00) (66 - 102)  ABP: 110/49 (05-12-23 @ 12:00) (90/40 - 143/60)  ABP(mean): 67 (05-12-23 @ 12:00) (55 - 86)  RR: 16 (05-12-23 @ 12:00) (16 - 25)  SpO2: 97% (05-12-23 @ 12:00) (95% - 100%)  CVP(mm Hg): --  CVP(cm H2O): --    Ins and Outs     05-11-23 @ 07:01  -  05-12-23 @ 07:00  --------------------------------------------------------  IN: 1821.8 mL / OUT: 463 mL / NET: 1358.8 mL    05-12-23 @ 07:01  -  05-12-23 @ 13:11  --------------------------------------------------------  IN: 50.5 mL / OUT: 0 mL / NET: 50.5 mL            Device: Avea, Mode: AC/ CMV (Assist Control/ Continuous Mandatory Ventilation), RR (machine): 18, RR (patient): 22, TV (machine): 450, TV (patient): 430, FiO2: 40, PEEP: 6, ITime: 0.8, MAP: 13, PIP: 24    I&O's Detail    11 May 2023 07:01  -  12 May 2023 07:00  --------------------------------------------------------  IN:    dextrose 5%: 400 mL    FentaNYL: 6.1 mL    Heparin Infusion: 185 mL    IV PiggyBack: 200 mL    IV PiggyBack: 55 mL    Midazolam: 3 mL    Nepro with Carb Steady: 600 mL    Norepinephrine: 273.2 mL    Propofol: 99.5 mL  Total IN: 1821.8 mL    OUT:    Indwelling Catheter - Urethral (mL): 463 mL    Vasopressin: 0 mL  Total OUT: 463 mL    Total NET: 1358.8 mL      12 May 2023 07:01  -  12 May 2023 13:11  --------------------------------------------------------  IN:    Heparin Infusion: 9 mL    Nepro with Carb Steady: 30 mL    Norepinephrine: 11.5 mL  Total IN: 50.5 mL    OUT:    Propofol: 0 mL  Total OUT: 0 mL    Total NET: 50.5 mL

## 2023-05-12 NOTE — PROGRESS NOTE ADULT - ASSESSMENT
José Luis Matute is an 78 year old M with a PMH of Dementia, HTN, Cardiomyopathy, chronic systolic CHF, CAD s/p CABG, "known occluded RCA" and DM type 2 who was admitted to [facility] on 5/4 for tx of hypoxic respiratory failure, acute on chronic HF exacerbation (EF 40-45% this stay, prior 50-55%) and ?ANISH. Pt hospital tx consisted of supplemental O2, diuresis, bb and aspirin. ?new afib noted from ED, however, cardiology feels its likely atrial tachycardia. 5/9/2023, patient had a choking episode that lead to hypoxia and cardiac arrest. Patient was intubated, obtained ROSC, and transferred to ICU.     The patient's prognosis is currently guarded/poor. His examination does not show any meaningful improvement.  CT head 5/9/23 showed cerebral atrophy and chronic white matter disease, no acute intracranial pathology and grey-white matter differentiation maintained.  Keppra discontinued 5/10/23 due to worsening kidney function. Depakote starting 5/10/23 instead.   Repeat EEG done 5/11/2023 showed GPDs and burst suppression - depakote increased from 500mg BID to TID  Repeat EEG done 5/12/2023 showed similar findings - depakote increased to 750mg TID, depakote level low  Repeat CT head 5/11/2023 showed a small left parietal infarct, likely not contributing to current encephaloapthy    Recommendations:  - Will repeat EEG in the AM  - Continue Depakote 750mg IV TID  - Decreased sedation as tolerated to assess for mental status    Remainder of care per primary team.    Neurology will continue to follow this patient. Signed out to Dr. Florez.

## 2023-05-12 NOTE — EEG REPORT - NS EEG TEXT BOX
MARIO RAMOS [id number]   Location:  [facility] 0010 02  Provider: Robbin Polo      Study Start Date:  5/12/23 1009  Study End Date:  5/12/23 9517  Duration x Hours: 4 hr 18 min   --------------------------------------------------------------------------------------------------    History:  KIAH/ MARNIE Patient is a 78y old  Male who presents with a chief complaint of acute systolic CHF (11 May 2023 09:44)    MEDICATIONS  (STANDING):  propofol Infusion 5.011 MICROgram(s)/kG/Min (1.84 mL/Hr) IV Continuous <Continuous>, 05-10 @ 11:23  valproate sodium  IVPB 750 milliGRAM(s) IV Intermittent every 8 hours, 05-12 @ 14:37      Medication Changes 24hours   fentaNYL    Injectable 25 MICROGram(s) every 4 hours, Routine PRN  valproate sodium  IVPB 750 milliGRAM(s) every 8 hours, STAT    --------------------------------------------------------------------------------------------------  Study Interpretation:    [[[Abbreviation Key:  PDR=alpha rhythm/posterior dominant rhythm. A-P=anterior posterior.  Amplitude: ‘very low’:<20; ‘low’:20-49; ‘medium’:; ‘high’:>150uV.  Persistence for periodic/rhythmic patterns (% of epoch) ‘rare’:<1%; ‘occasional’:1-10%; ‘frequent’:10-50%; ‘abundant’:50-90%; ‘continuous’:>90%.  Persistence for sporadic discharges: ‘rare’:<1/hr; ‘occasional’:1/min-1/hr; ‘frequent’:>1/min; ‘abundant’:>1/10 sec.  RPP=rhythmic and periodic patterns; GRDA=generalized rhythmic delta activity; FIRDA=frontal intermittent GRDA; LRDA=lateralized rhythmic delta activity; TIRDA=temporal intermittent rhythmic delta activity;  LPD=PLED=lateralized periodic discharges; GPD=generalized periodic discharges; BIPDs =bilateral independent periodic discharges; Mf=multifocal; SIRPDs=stimulus induced rhythmic, periodic, or ictal appearing discharges; BIRDs=brief potentially ictal rhythmic discharges >4 Hz, lasting .5-10s; PFA (paroxysmal bursts >13 Hz or =8 Hz <10s).  Modifiers: +F=with fast component; +S=with spike component; +R=with rhythmic component.  S-B=burst suppression pattern.  Max=maximal. N1-drowsy; N2-stage II sleep; N3-slow wave sleep. SSS/BETS=small sharp spikes/benign epileptiform transients of sleep. HV=hyperventilation; PS=photic stimulation]]]    Daily EEG Visual Analysis    FINDINGS:      Background:  Burst-suppressed, consisted of 1-2 second periods of diffuse suppression < 10 uV alternating with bursts of central (Cz maximal) predominant generalized periodic discharges 1-2 Hz, frequent clustering to 4-7 Hz <4-5 sec with intermixed theta and delta activity, as the study progresses the periods of diffuse suppression prolongs up to 6 seconds, while the bursts are shortened to 3-6 seconds.   Symmetry: symmetric  PDR: no PDR   Reactivity: not tested   Voltage: low-normal, mostly <10-150uV  Anterior Posterior Gradient: absent  Other background findings: none  Breach: absent    Background Slowing:  As above     State Changes:   Absent    Sporadic Epileptiform Discharges:    As above     Rhythmic and Periodic Patterns (RPPs):  None     Electrographic and Electroclinical seizures:  None    Other Clinical Events:  None    Activation Procedures:   Hyperventilation was not performed.    Photic stimulation was not performed.    Artifacts:  Intermittent myogenic and movement artifacts were noted.    ECG:  The heart rate on single channel ECG was predominantly between 60-70 BPM, irregular.    ---------------------------------------------------------------------------------------------------------------------------------------------------------      EEG Classification / Summary:    Abnormal  EEG in a comatose patient     - Abundant generalized periodic discharges at times time-locked with myoclonus (head jerking)   - Burst-suppressed background, worsening towards the end of the recording.     ---------------------------------------------------------------------------------------------------------------------------------------------------------      Clinical Impression:    -Abundant myoclonic seizures/cortical myoclonus can be associated with 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)  -Severe diffuse cerebral dysfunction is nonspecific in etiology, but in this case, sedating medications may be one contributing factor.   - Grossly unchanged EEG compared to prior days.     ---------------------------------------------------------------------------------------------------------------------------------------------------------    This is a preliminary report     Dewey العراقي M.D.   Epilepsy fellow    -------------------------------------------------------------------------------------------------------  [facility] EEG Reading Room Ph#: [phone]  Epilepsy Answering Service after 5PM and before 8:30AM: Ph#: [phone]     MARIO RAMOS [id number]   Location:  [facility] 0010 02  Provider: Robbin Polo      Study Start Date:  5/12/23 1009  Study End Date:  5/12/23 3327  Duration x Hours: 4 hr 18 min   --------------------------------------------------------------------------------------------------    History:  KIAH/ MARNIE Patient is a 78y old  Male who presents with a chief complaint of acute systolic CHF (11 May 2023 09:44)    MEDICATIONS  (STANDING):  propofol Infusion 5.011 MICROgram(s)/kG/Min (1.84 mL/Hr) IV Continuous <Continuous>, 05-10 @ 11:23  valproate sodium  IVPB 750 milliGRAM(s) IV Intermittent every 8 hours, 05-12 @ 14:37      Medication Changes 24hours   fentaNYL    Injectable 25 MICROGram(s) every 4 hours, Routine PRN  valproate sodium  IVPB 750 milliGRAM(s) every 8 hours, STAT    --------------------------------------------------------------------------------------------------  Study Interpretation:    [[[Abbreviation Key:  PDR=alpha rhythm/posterior dominant rhythm. A-P=anterior posterior.  Amplitude: ‘very low’:<20; ‘low’:20-49; ‘medium’:; ‘high’:>150uV.  Persistence for periodic/rhythmic patterns (% of epoch) ‘rare’:<1%; ‘occasional’:1-10%; ‘frequent’:10-50%; ‘abundant’:50-90%; ‘continuous’:>90%.  Persistence for sporadic discharges: ‘rare’:<1/hr; ‘occasional’:1/min-1/hr; ‘frequent’:>1/min; ‘abundant’:>1/10 sec.  RPP=rhythmic and periodic patterns; GRDA=generalized rhythmic delta activity; FIRDA=frontal intermittent GRDA; LRDA=lateralized rhythmic delta activity; TIRDA=temporal intermittent rhythmic delta activity;  LPD=PLED=lateralized periodic discharges; GPD=generalized periodic discharges; BIPDs =bilateral independent periodic discharges; Mf=multifocal; SIRPDs=stimulus induced rhythmic, periodic, or ictal appearing discharges; BIRDs=brief potentially ictal rhythmic discharges >4 Hz, lasting .5-10s; PFA (paroxysmal bursts >13 Hz or =8 Hz <10s).  Modifiers: +F=with fast component; +S=with spike component; +R=with rhythmic component.  S-B=burst suppression pattern.  Max=maximal. N1-drowsy; N2-stage II sleep; N3-slow wave sleep. SSS/BETS=small sharp spikes/benign epileptiform transients of sleep. HV=hyperventilation; PS=photic stimulation]]]    Daily EEG Visual Analysis    FINDINGS:      Background:  Burst-suppressed, consisted of 1-2 second periods of diffuse suppression < 10 uV alternating with bursts of central (Cz maximal) predominant generalized periodic discharges 1-2 Hz, frequent clustering to 4-7 Hz <4-5 sec with intermixed theta and delta activity, as the study progresses the periods of diffuse suppression prolongs up to 6 seconds, while the bursts are shortened to 3-6 seconds.   Symmetry: symmetric  PDR: no PDR   Reactivity: not tested   Voltage: low-normal, mostly <10-150uV  Anterior Posterior Gradient: absent  Other background findings: none  Breach: absent    Background Slowing:  As above     State Changes:   Absent    Sporadic Epileptiform Discharges:    As above     Rhythmic and Periodic Patterns (RPPs):  None     Electrographic and Electroclinical seizures:  None    Other Clinical Events:  None    Activation Procedures:   Hyperventilation was not performed.    Photic stimulation was not performed.    Artifacts:  Intermittent myogenic and movement artifacts were noted.    ECG:  The heart rate on single channel ECG was predominantly between 60-70 BPM, irregular.    ---------------------------------------------------------------------------------------------------------------------------------------------------------      EEG Classification / Summary:    Abnormal  EEG in a comatose patient     - Abundant generalized periodic discharges at times time-locked with myoclonus (head jerking)   - Burst-suppressed background, worsening towards the end of the recording.     ---------------------------------------------------------------------------------------------------------------------------------------------------------      Clinical Impression:    -Abundant myoclonic seizures/cortical myoclonus can be associated with 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)  -Severe diffuse cerebral dysfunction is nonspecific in etiology, but in this case, sedating medications may be one contributing factor.   - Grossly unchanged EEG compared to prior days.     ---------------------------------------------------------------------------------------------------------------------------------------------------------    Dewey العراقي M.D.   Epilepsy fellow    Jarret Alberto MD  EEG/Epilepsy Attending   -------------------------------------------------------------------------------------------------------  [facility] EEG Reading Room Ph#: [phone]  Epilepsy Answering Service after 5PM and before 8:30AM: Ph#: [phone]

## 2023-05-12 NOTE — CHART NOTE - NSCHARTNOTEFT_GEN_A_CORE
Nutrition Follow up Note    Patient remains intubated , Propofol to be discontinued , Currently patient is receiving Nepro @ 30ml/hr which is providing 1296kcals, 58gms protein [id number] , Patient noted with no BM since (5/5) , suggest adding Miralax await BM then advance PEG feeds to 35ml/hr x 24 hrs which will provide will provide 1512kcalks, 68gms protein ,[id number] , will defer free fluids to medical team due to ANISH/ hx of CHF . POCT reviewed corrective insulin regimen noted. Labs reviewed , Renal following , will follow clinical course 17-Apr-2019 12:23

## 2023-05-12 NOTE — PROVIDER CONTACT NOTE (EICU) - ACTION/TREATMENT ORDERED:
E-alerted by bedside RN requesting stat aptt order for heparin gtt, order placed as checked, results to be followed up by bedside provider.

## 2023-05-13 LAB
ALBUMIN SERPL ELPH-MCNC: 1.5 G/DL — LOW (ref 3.3–5)
ALP SERPL-CCNC: 93 U/L — SIGNIFICANT CHANGE UP (ref 40–120)
ALT FLD-CCNC: 10 U/L — SIGNIFICANT CHANGE UP (ref 10–45)
ANION GAP SERPL CALC-SCNC: 11 MMOL/L — SIGNIFICANT CHANGE UP (ref 5–17)
APTT BLD: 58.5 SEC — HIGH (ref 27.5–35.5)
APTT BLD: 59.8 SEC — HIGH (ref 27.5–35.5)
AST SERPL-CCNC: 17 U/L — SIGNIFICANT CHANGE UP (ref 10–40)
BASE EXCESS BLDA CALC-SCNC: -1 MMOL/L — SIGNIFICANT CHANGE UP (ref -2–3)
BASE EXCESS BLDA CALC-SCNC: -1.6 MMOL/L — SIGNIFICANT CHANGE UP (ref -2–3)
BASE EXCESS BLDA CALC-SCNC: -3.1 MMOL/L — LOW (ref -2–3)
BILIRUB SERPL-MCNC: 1.2 MG/DL — SIGNIFICANT CHANGE UP (ref 0.2–1.2)
BLOOD GAS COMMENTS ARTERIAL: SIGNIFICANT CHANGE UP
BUN SERPL-MCNC: 87 MG/DL — HIGH (ref 7–23)
CALCIUM SERPL-MCNC: 8.2 MG/DL — LOW (ref 8.4–10.5)
CHLORIDE SERPL-SCNC: 101 MMOL/L — SIGNIFICANT CHANGE UP (ref 96–108)
CO2 BLDA-SCNC: 25 MMOL/L — HIGH (ref 19–24)
CO2 BLDA-SCNC: 26 MMOL/L — HIGH (ref 19–24)
CO2 BLDA-SCNC: 26 MMOL/L — HIGH (ref 19–24)
CO2 SERPL-SCNC: 28 MMOL/L — SIGNIFICANT CHANGE UP (ref 22–31)
CREAT SERPL-MCNC: 4.1 MG/DL — HIGH (ref 0.5–1.3)
EGFR: 14 ML/MIN/1.73M2 — LOW
GAS PNL BLDA: SIGNIFICANT CHANGE UP
GLUCOSE BLDC GLUCOMTR-MCNC: 153 MG/DL — HIGH (ref 70–99)
GLUCOSE BLDC GLUCOMTR-MCNC: 179 MG/DL — HIGH (ref 70–99)
GLUCOSE BLDC GLUCOMTR-MCNC: 206 MG/DL — HIGH (ref 70–99)
GLUCOSE BLDC GLUCOMTR-MCNC: 220 MG/DL — HIGH (ref 70–99)
GLUCOSE BLDC GLUCOMTR-MCNC: 260 MG/DL — HIGH (ref 70–99)
GLUCOSE SERPL-MCNC: 262 MG/DL — HIGH (ref 70–99)
HCO3 BLDA-SCNC: 23 MMOL/L — SIGNIFICANT CHANGE UP (ref 21–28)
HCO3 BLDA-SCNC: 25 MMOL/L — SIGNIFICANT CHANGE UP (ref 21–28)
HCO3 BLDA-SCNC: 25 MMOL/L — SIGNIFICANT CHANGE UP (ref 21–28)
HCT VFR BLD CALC: 38.3 % — LOW (ref 39–50)
HGB BLD-MCNC: 12.1 G/DL — LOW (ref 13–17)
HOROWITZ INDEX BLDA+IHG-RTO: 40 — SIGNIFICANT CHANGE UP
HOROWITZ INDEX BLDA+IHG-RTO: 40 — SIGNIFICANT CHANGE UP
HOROWITZ INDEX BLDA+IHG-RTO: 60 — SIGNIFICANT CHANGE UP
LACTATE SERPL-SCNC: 0.9 MMOL/L — SIGNIFICANT CHANGE UP (ref 0.7–2)
MAGNESIUM SERPL-MCNC: 2 MG/DL — SIGNIFICANT CHANGE UP (ref 1.6–2.6)
MCHC RBC-ENTMCNC: 31 PG — SIGNIFICANT CHANGE UP (ref 27–34)
MCHC RBC-ENTMCNC: 31.6 GM/DL — LOW (ref 32–36)
MCV RBC AUTO: 98.2 FL — SIGNIFICANT CHANGE UP (ref 80–100)
NRBC # BLD: 0 /100 WBCS — SIGNIFICANT CHANGE UP (ref 0–0)
PCO2 BLDA: 46 MMHG — SIGNIFICANT CHANGE UP (ref 35–48)
PCO2 BLDA: 46 MMHG — SIGNIFICANT CHANGE UP (ref 35–48)
PCO2 BLDA: 49 MMHG — HIGH (ref 35–48)
PH BLDA: 7.31 — LOW (ref 7.35–7.45)
PH BLDA: 7.31 — LOW (ref 7.35–7.45)
PH BLDA: 7.34 — LOW (ref 7.35–7.45)
PHOSPHATE SERPL-MCNC: 5 MG/DL — HIGH (ref 2.5–4.5)
PLATELET # BLD AUTO: 92 K/UL — LOW (ref 150–400)
PO2 BLDA: 108 MMHG — SIGNIFICANT CHANGE UP (ref 83–108)
PO2 BLDA: 79 MMHG — LOW (ref 83–108)
PO2 BLDA: 93 MMHG — SIGNIFICANT CHANGE UP (ref 83–108)
POTASSIUM SERPL-MCNC: 4.1 MMOL/L — SIGNIFICANT CHANGE UP (ref 3.5–5.3)
POTASSIUM SERPL-SCNC: 4.1 MMOL/L — SIGNIFICANT CHANGE UP (ref 3.5–5.3)
PROT SERPL-MCNC: 5.6 G/DL — LOW (ref 6–8.3)
RBC # BLD: 3.9 M/UL — LOW (ref 4.2–5.8)
RBC # FLD: 15.5 % — HIGH (ref 10.3–14.5)
SAO2 % BLDA: 97.1 % — SIGNIFICANT CHANGE UP (ref 94–98)
SAO2 % BLDA: 98.7 % — HIGH (ref 94–98)
SAO2 % BLDA: 99.6 % — HIGH (ref 94–98)
SODIUM SERPL-SCNC: 140 MMOL/L — SIGNIFICANT CHANGE UP (ref 135–145)
TROPONIN I, HIGH SENSITIVITY RESULT: 371.6 NG/L — HIGH
VALPROATE SERPL-MCNC: 41 UG/ML — LOW (ref 50–100)
WBC # BLD: 16.89 K/UL — HIGH (ref 3.8–10.5)
WBC # FLD AUTO: 16.89 K/UL — HIGH (ref 3.8–10.5)

## 2023-05-13 PROCEDURE — 71045 X-RAY EXAM CHEST 1 VIEW: CPT | Mod: 26,77

## 2023-05-13 PROCEDURE — 71045 X-RAY EXAM CHEST 1 VIEW: CPT | Mod: 26

## 2023-05-13 PROCEDURE — 99232 SBSQ HOSP IP/OBS MODERATE 35: CPT

## 2023-05-13 RX ORDER — POLYETHYLENE GLYCOL 3350 17 G/17G
17 POWDER, FOR SOLUTION ORAL DAILY
Refills: 0 | Status: DISCONTINUED | OUTPATIENT
Start: 2023-05-13 | End: 2023-05-17

## 2023-05-13 RX ORDER — SENNA PLUS 8.6 MG/1
2 TABLET ORAL AT BEDTIME
Refills: 0 | Status: DISCONTINUED | OUTPATIENT
Start: 2023-05-13 | End: 2023-05-17

## 2023-05-13 RX ORDER — PANTOPRAZOLE SODIUM 20 MG/1
40 TABLET, DELAYED RELEASE ORAL DAILY
Refills: 0 | Status: DISCONTINUED | OUTPATIENT
Start: 2023-05-13 | End: 2023-05-17

## 2023-05-13 RX ORDER — MIDODRINE HYDROCHLORIDE 2.5 MG/1
5 TABLET ORAL EVERY 8 HOURS
Refills: 0 | Status: DISCONTINUED | OUTPATIENT
Start: 2023-05-13 | End: 2023-05-17

## 2023-05-13 RX ADMIN — Medication 4: at 18:21

## 2023-05-13 RX ADMIN — Medication 4: at 06:04

## 2023-05-13 RX ADMIN — PIPERACILLIN AND TAZOBACTAM 25 GRAM(S): 4; .5 INJECTION, POWDER, LYOPHILIZED, FOR SOLUTION INTRAVENOUS at 18:21

## 2023-05-13 RX ADMIN — CHLORHEXIDINE GLUCONATE 1 APPLICATION(S): 213 SOLUTION TOPICAL at 06:03

## 2023-05-13 RX ADMIN — CLOPIDOGREL BISULFATE 75 MILLIGRAM(S): 75 TABLET, FILM COATED ORAL at 12:24

## 2023-05-13 RX ADMIN — SIMVASTATIN 40 MILLIGRAM(S): 20 TABLET, FILM COATED ORAL at 21:14

## 2023-05-13 RX ADMIN — POLYETHYLENE GLYCOL 3350 17 GRAM(S): 17 POWDER, FOR SOLUTION ORAL at 14:15

## 2023-05-13 RX ADMIN — SENNA PLUS 2 TABLET(S): 8.6 TABLET ORAL at 21:14

## 2023-05-13 RX ADMIN — MIDODRINE HYDROCHLORIDE 5 MILLIGRAM(S): 2.5 TABLET ORAL at 21:14

## 2023-05-13 RX ADMIN — Medication 2: at 23:24

## 2023-05-13 RX ADMIN — Medication 10 MILLIGRAM(S): at 14:15

## 2023-05-13 RX ADMIN — PIPERACILLIN AND TAZOBACTAM 25 GRAM(S): 4; .5 INJECTION, POWDER, LYOPHILIZED, FOR SOLUTION INTRAVENOUS at 06:05

## 2023-05-13 RX ADMIN — Medication 57.5 MILLIGRAM(S): at 00:10

## 2023-05-13 RX ADMIN — Medication 57.5 MILLIGRAM(S): at 17:24

## 2023-05-13 RX ADMIN — Medication 57.5 MILLIGRAM(S): at 23:30

## 2023-05-13 RX ADMIN — PANTOPRAZOLE SODIUM 40 MILLIGRAM(S): 20 TABLET, DELAYED RELEASE ORAL at 14:17

## 2023-05-13 RX ADMIN — Medication 57.5 MILLIGRAM(S): at 08:45

## 2023-05-13 RX ADMIN — Medication 2: at 12:24

## 2023-05-13 RX ADMIN — HEPARIN SODIUM 1000 UNIT(S)/HR: 5000 INJECTION INTRAVENOUS; SUBCUTANEOUS at 05:37

## 2023-05-13 RX ADMIN — CHLORHEXIDINE GLUCONATE 15 MILLILITER(S): 213 SOLUTION TOPICAL at 06:04

## 2023-05-13 RX ADMIN — CHLORHEXIDINE GLUCONATE 15 MILLILITER(S): 213 SOLUTION TOPICAL at 18:21

## 2023-05-13 RX ADMIN — HEPARIN SODIUM 1000 UNIT(S)/HR: 5000 INJECTION INTRAVENOUS; SUBCUTANEOUS at 03:15

## 2023-05-13 RX ADMIN — MIDODRINE HYDROCHLORIDE 5 MILLIGRAM(S): 2.5 TABLET ORAL at 14:15

## 2023-05-13 NOTE — PROGRESS NOTE ADULT - SUBJECTIVE AND OBJECTIVE BOX
Intubated in ICU    Vital Signs Last 24 Hrs  T(C): 37.4 (05-13-23 @ 10:00), Max: 37.7 (05-12-23 @ 23:30)  T(F): 99.3 (05-13-23 @ 10:00), Max: 99.9 (05-12-23 @ 23:30)  HR: 82 (05-13-23 @ 10:00) (70 - 94)  BP: 116/63 (05-13-23 @ 10:00) (104/52 - 145/69)  BP(mean): 77 (05-13-23 @ 10:00) (66 - 96)  RR: 18 (05-13-23 @ 10:00) (16 - 26)  SpO2: 99% (05-13-23 @ 10:00) (94% - 100%)    I&O's Detail    12 May 2023 07:01  -  13 May 2023 07:00  --------------------------------------------------------  IN:    Enteral Tube Flush: 60 mL    Heparin Infusion: 234 mL    IV PiggyBack: 107.5 mL    IV PiggyBack: 150 mL    Nepro with Carb Steady: 720 mL    Norepinephrine: 319.1 mL    Propofol: 66.1 mL  Total IN: 1656.7 mL    OUT:    Indwelling Catheter - Urethral (mL): 600 mL  Total OUT: 600 mL    Total NET: 1056.7 mL    13 May 2023 07:01  -  13 May 2023 10:56  --------------------------------------------------------  IN:    Heparin Infusion: 20 mL    IV PiggyBack: 50 mL    Nepro with Carb Steady: 90 mL    Norepinephrine: 36.8 mL  Total IN: 196.8 mL    OUT:    Indwelling Catheter - Urethral (mL): 145 mL  Total OUT: 145 mL    Total NET: 51.8 mL    Mode: AC/ CMV (Assist Control/ Continuous Mandatory Ventilation)  RR (machine): 18  TV (machine): 450  FiO2: 40  PEEP: 5  ITime: 0.8  MAP: 11  PIP: 23    s1s2  b/l air entry  soft, ND  no edema                                        12.1   16.89 )-----------( 92       ( 13 May 2023 05:00 )             38.3     13 May 2023 05:00    140    |  101    |  87     ----------------------------<  262    4.1     |  28     |  4.10     Ca    8.2        13 May 2023 05:00  Phos  5.0       13 May 2023 05:00  Mg     2.0       13 May 2023 05:00    TPro  5.6    /  Alb  1.5    /  TBili  1.2    /  DBili  x      /  AST  17     /  ALT  10     /  AlkPhos  93     13 May 2023 05:00    LIVER FUNCTIONS - ( 13 May 2023 05:00 )  Alb: 1.5 g/dL / Pro: 5.6 g/dL / ALK PHOS: 93 U/L / ALT: 10 U/L / AST: 17 U/L / GGT: x           PT/INR - ( 12 May 2023 06:20 )   PT: 13.2 sec;   INR: 1.14 ratio      bisacodyl Suppository 10 milliGRAM(s) Rectal daily  chlorhexidine 0.12% Liquid 15 milliLiter(s) Oral Mucosa every 12 hours  chlorhexidine 2% Cloths 1 Application(s) Topical <User Schedule>  clopidogrel Tablet 75 milliGRAM(s) Oral daily  dextrose 5%. 1000 milliLiter(s) IV Continuous <Continuous>  dextrose 5%. 1000 milliLiter(s) IV Continuous <Continuous>  dextrose 50% Injectable 25 Gram(s) IV Push once  dextrose 50% Injectable 25 Gram(s) IV Push once  dextrose 50% Injectable 12.5 Gram(s) IV Push once  fentaNYL    Injectable 25 MICROGram(s) IV Push every 4 hours PRN  heparin   Injectable 3800 Unit(s) IV Push every 6 hours PRN  heparin  Infusion. 800 Unit(s)/Hr IV Continuous <Continuous>  insulin lispro (ADMELOG) corrective regimen sliding scale   SubCutaneous every 6 hours  midodrine 5 milliGRAM(s) Oral every 8 hours  norepinephrine Infusion 0.05 MICROgram(s)/kG/Min IV Continuous <Continuous>  pantoprazole   Suspension 40 milliGRAM(s) Oral daily  piperacillin/tazobactam IVPB.. 3.375 Gram(s) IV Intermittent every 12 hours  polyethylene glycol 3350 17 Gram(s) Oral daily  propofol Infusion 5.011 MICROgram(s)/kG/Min IV Continuous <Continuous>  senna 2 Tablet(s) Oral at bedtime  simvastatin 40 milliGRAM(s) Oral at bedtime  sodium chloride 0.9% lock flush 10 milliLiter(s) IV Push every 1 hour PRN  valproate sodium  IVPB 750 milliGRAM(s) IV Intermittent every 8 hours    A/P:    Hemodynamic ANISH iso resp failure, s/p arrest, asp PNA (Cr 1.6 - 5/9/23, Cr 1.0 - 4/12/23)  Hx CM, CHF, EF 35-40%, dementia  BP, resp support, anti-sz tx per ICU team  Avoid nephrotoxins  Concern for anoxic encephalopathy  Overall prognosis is poor  Will f/u BMP, UO   Lytes are stable  UO appears to be improving  Hopefully will avoid the need for RRT  D/w family at bedside    [phone]

## 2023-05-13 NOTE — PROVIDER CONTACT NOTE (EICU) - ACTION/TREATMENT ORDERED:
E-alerted by bedside RN requesting aptt order for 215am for heparin gtt, order placed as requested, results to be followed up by bedside provider.

## 2023-05-13 NOTE — PROGRESS NOTE ADULT - SUBJECTIVE AND OBJECTIVE BOX
HPI:    No significant improvement overnight    EEG (5/12/23): attenuated background, GPDs and periods of generalized burst-suppression, no seizures recorded    Head CT (5/11/23): diffuse atrophy, small subacute left parietal distal infarct, no hemorrhage    VPA level (5/12/23) = 24 (with low albumin 1.5)  Creatinine = 4.1 (up from 1.6)    Vital Signs Last 24 Hrs  T(C): 37.5 (13 May 2023 09:15), Max: 37.7 (12 May 2023 23:30)  T(F): 99.5 (13 May 2023 09:15), Max: 99.9 (12 May 2023 23:30)  HR: 84 (13 May 2023 09:15) (66 - 94)  BP: 138/74 (13 May 2023 09:00) (104/52 - 145/69)  BP(mean): 91 (13 May 2023 09:00) (66 - 96)  RR: 19 (13 May 2023 09:15) (16 - 26)  SpO2: 99% (13 May 2023 09:15) (94% - 100%)    Parameters below as of 13 May 2023 08:00  Patient On (Oxygen Delivery Method): ventilator    O2 Concentration (%): 40    MEDICATIONS  (STANDING):  chlorhexidine 0.12% Liquid 15 milliLiter(s) Oral Mucosa every 12 hours  chlorhexidine 2% Cloths 1 Application(s) Topical <User Schedule>  clopidogrel Tablet 75 milliGRAM(s) Oral daily  dextrose 5%. 1000 milliLiter(s) (100 mL/Hr) IV Continuous <Continuous>  dextrose 5%. 1000 milliLiter(s) (50 mL/Hr) IV Continuous <Continuous>  dextrose 50% Injectable 25 Gram(s) IV Push once  dextrose 50% Injectable 25 Gram(s) IV Push once  dextrose 50% Injectable 12.5 Gram(s) IV Push once  heparin  Infusion. 800 Unit(s)/Hr (8 mL/Hr) IV Continuous <Continuous>  insulin lispro (ADMELOG) corrective regimen sliding scale   SubCutaneous every 6 hours  norepinephrine Infusion 0.05 MICROgram(s)/kG/Min (5.74 mL/Hr) IV Continuous <Continuous>  pantoprazole  Injectable 40 milliGRAM(s) IV Push daily  piperacillin/tazobactam IVPB.. 3.375 Gram(s) IV Intermittent every 12 hours  propofol Infusion 5.011 MICROgram(s)/kG/Min (1.84 mL/Hr) IV Continuous <Continuous>  simvastatin 40 milliGRAM(s) Oral at bedtime  valproate sodium  IVPB 750 milliGRAM(s) IV Intermittent every 8 hours    MEDICATIONS  (PRN):  fentaNYL    Injectable 25 MICROGram(s) IV Push every 4 hours PRN Mild Pain (1 - 3)  heparin   Injectable 3800 Unit(s) IV Push every 6 hours PRN For aPTT less than 40  sodium chloride 0.9% lock flush 10 milliLiter(s) IV Push every 1 hour PRN Pre/post blood products, medications, blood draw, and to maintain line patency    ROS: pertinent positives in HPI, all other ROS were reviewed and are negative     Neurological exam (off propofol):  MS: Comatose, no response purposeful response to noxious stimuli  CN: Not breathing over the ventilator, gaze midline, no nystagmus, left pupil 2-3 mm and sluggishly reactive, right pupil 2-3mm and nonreactive, corneal reflex negative on left and positive on right, no oculocephalic response, tongue midline no facial asymmetry  Motor: stimulus-induced myoclonus  Sens: no withdrawal or grimace to noxious stimuli bilaterally  Reflexes: diffusely hyporeflexic, mute planter response bilaterally      LABS:                         12.1   16.89 )-----------( 92       ( 13 May 2023 05:00 )             38.3     05-13    140  |  101  |  87<H>  ----------------------------<  262<H>  4.1   |  28  |  4.10<H>    Ca    8.2<L>      13 May 2023 05:00  Phos  5.0     05-13  Mg     2.0     05-13    TPro  5.6<L>  /  Alb  1.5<L>  /  TBili  1.2  /  DBili  x   /  AST  17  /  ALT  10  /  AlkPhos  93  05-13    LIVER FUNCTIONS - ( 13 May 2023 05:00 )  Alb: 1.5 g/dL / Pro: 5.6 g/dL / ALK PHOS: 93 U/L / ALT: 10 U/L / AST: 17 U/L / GGT: x           05-07 Chol 172 LDL -- HDL 39<L> Trig 87      A: 77 yo man with history suggestive of a severe hypoxic-ischemic encephalopathy / anoxic brain injury.  There is some evidence of brainstem function on exam, however, there is no evidence of higher cortical function.    Recommend:  1. Repeat routine EEG today pending (previous EEGs do not demonstrate any electrographic seizure activity, and patient does not appear to be in NCSE, therefore no clear indication for continuous EEG monitoring at this time)  2. Agree with valproic acid 750mg IV q8h, repeat VPA level pending (although previous level less than 50, patient is hypoalbuminemic, therefore free fraction is higher)  3. Minimize sedation, unless required for clinical stability  4. Prognosis guarded.    Joel Florez MD  Neurology Attending Physician

## 2023-05-13 NOTE — EEG REPORT - NS EEG TEXT BOX
MARIO RAMOS [id number]   Location:  [facility] [id number] [id number]  Provider: Robbin Polo      Study Start Date:  5/13/23     --------------------------------------------------------------------------------------------------    History:  CC/ HPI Patient is a 78y old  Male who presents with a chief complaint of acute systolic CHF (11 May 2023 09:44)    --------------------------------------------------------------------------------------------------  Study Interpretation:    [[[Abbreviation Key:  PDR=alpha rhythm/posterior dominant rhythm. A-P=anterior posterior.  Amplitude: ‘very low’:<20; ‘low’:20-49; ‘medium’:; ‘high’:>150uV.  Persistence for periodic/rhythmic patterns (% of epoch) ‘rare’:<1%; ‘occasional’:1-10%; ‘frequent’:10-50%; ‘abundant’:50-90%; ‘continuous’:>90%.  Persistence for sporadic discharges: ‘rare’:<1/hr; ‘occasional’:1/min-1/hr; ‘frequent’:>1/min; ‘abundant’:>1/10 sec.  RPP=rhythmic and periodic patterns; GRDA=generalized rhythmic delta activity; FIRDA=frontal intermittent GRDA; LRDA=lateralized rhythmic delta activity; TIRDA=temporal intermittent rhythmic delta activity;  LPD=PLED=lateralized periodic discharges; GPD=generalized periodic discharges; BIPDs =bilateral independent periodic discharges; Mf=multifocal; SIRPDs=stimulus induced rhythmic, periodic, or ictal appearing discharges; BIRDs=brief potentially ictal rhythmic discharges >4 Hz, lasting .5-10s; PFA (paroxysmal bursts >13 Hz or =8 Hz <10s).  Modifiers: +F=with fast component; +S=with spike component; +R=with rhythmic component.  S-B=burst suppression pattern.  Max=maximal. N1-drowsy; N2-stage II sleep; N3-slow wave sleep. SSS/BETS=small sharp spikes/benign epileptiform transients of sleep. HV=hyperventilation; PS=photic stimulation]]]    Daily EEG Visual Analysis    FINDINGS:      Background:  Burst-suppressed, consisted of 1-2 second periods of diffuse suppression < 10 uV alternating with bursts of central (Cz maximal) predominant generalized periodic discharges 1-2 Hz, up to 3-4 Hz with intermixed theta and delta activity,   Symmetry: symmetric  PDR: no PDR   Reactivity: not tested   Voltage: low-normal, mostly <10-150uV  Anterior Posterior Gradient: absent  Other background findings: none  Breach: absent    Background Slowing:  As above     State Changes:   Absent    Sporadic Epileptiform Discharges:    As above     Rhythmic and Periodic Patterns (RPPs):  None     Electrographic and Electroclinical seizures:  None    Other Clinical Events:  None    Activation Procedures:   Hyperventilation was not performed.    Photic stimulation was not performed.    Artifacts:  Intermittent movement artifacts were noted.    ECG:  The heart rate on single channel ECG was predominantly between 60-70 BPM, irregular.    ---------------------------------------------------------------------------------------------------------------------------------------------------------      EEG Classification / Summary:    Abnormal  EEG in a comatose patient     - Abundant generalized periodic discharges   - Severe diffuse slowing  ---------------------------------------------------------------------------------------------------------------------------------------------------------      Clinical Impression:    -GPDs can be secondary to underlying severe metabolic dysfunction and may indicate increased risk for seizures in certain clinical scenarios.  -Severe diffuse cerebral dysfunction is nonspecific in etiology, but in this case, sedating medications may be one contributing factor.     ---------------------------------------------------------------------------------------------------------------------------------------------------------    Jarret Alberto MD  EEG/Epilepsy Attending

## 2023-05-13 NOTE — PROGRESS NOTE ADULT - ASSESSMENT
Physical Examination:  GENERAL:                Intubated,   HEENT:                    No JVD, Dry MM, eyes constricted, not reactive to light  PULM:                     Bilateral air entry, Clear to auscultation bilaterally, no significant sputum production, No Rales, No Rhonchi, No Wheezing  CVS:                         S1, S2,  No Murmur  ABD:                        Soft, nondistended, nontender, normoactive bowel sounds,   EXT:                         + edema, nontender, LE Cyanosis No Clubbing   Vascular:                warm Extremities, Normal Capillary refill, Normal Distal Pulses  NEURO:                  Unresponsive   not interactive, does not follows commands  PSYC:                      Calm, no Insight.        Assessment  S/p Cardiac Arrest due to chocking episode  Now with multiorgan dysfunction and anoxic brain injury  Acute hypoxic respiratory failure  Acute Renal Failure on CKD  CT Head showing acute Stroke, not candidate tpa  B/l Pleural Effusions  Underlying HTN (hypertension), HLD (hyperlipidemia), DM2, CAD       Plan  Continue mechanical ventilation  sedation vacation check EEG off sedation  on Heparin drip for ACS will stop in am.   On plavix, off asa  hypotension on propofol requiring levo, will start low dose midodrine     EEG repeat today  noted low valproic acid level - neuro f/u will discuss to increase valproic acid  neuro f/u   continue valproic acid  neuro f/u  renal f/u  caryl trop, f/u ekg  neuro checks  empiric antibiotics, increasing WBC    EEG abnormal - suggestive of extremely poor prognosis   diet via peg advance as tolerated  case d/w Neuro, hold acute MRI at this time pending clinical course and no need for 24 hr eeg.            PMD:	Dr Parra			                   Notified(Date):  Family Updated:  adali [phone]		                                 Date:  5/13/23   d/w her over phone and will f/u when come bedside          Sedation & Analgesia:	as above,   Diet/Nutrition:		 Tube feeding  GI PPx:		pantoprazole  Injectable 40 milliGRAM(s) IV Push daily  DVT Ppx:	heparin  Infusion. 800 Unit(s)/Hr IV Continuous <Continuous>     Activity:		    Head of Bed:               35-45 Deg  Glycemic Control:        Insulin ss    Lines:    CENTRAL LINE: 	[x] YES [ ] NO	                    LOCATION:   	                       DATE INSERTED:   	                    REMOVE:  [ ] YES [x ] NO    A-LINE:  	                [ x] YES [ ] NO                      LOCATION:   	                       DATE INSERTED: 		            REMOVE:  [ ] YES [ x] NO    [place]: 		        [x ] YES [ ] NO  		                                       DATE INSERTED:		            REMOVE:  [ ] YES [x ] NO      Restraints were deemed necessary to prevent removal of life-sustaining devices [  ] YES   [  x  ]  NO    Disposition: ICU Care    Goals of Care: Full code

## 2023-05-13 NOTE — PROGRESS NOTE ADULT - SUBJECTIVE AND OBJECTIVE BOX
Follow-up Critical Care Progress Note  Chief Complaint : Atrial fibrillation      patient seen and examined  sedation vacation done  less myoclonus noted  off propfol today  d/w neuro f/u repeat EEG today does not need continious EEG and hold off on MRI at this time    Allergies :sulfa drugs (Unknown)      PAST MEDICAL & SURGICAL HISTORY:  HTN (hypertension)  HLD (hyperlipidemia)  Diabetes  CAD (coronary artery disease)  History of cholecystectomy        Medications:  MEDICATIONS  (STANDING):  chlorhexidine 0.12% Liquid 15 milliLiter(s) Oral Mucosa every 12 hours  chlorhexidine 2% Cloths 1 Application(s) Topical <User Schedule>  clopidogrel Tablet 75 milliGRAM(s) Oral daily  dextrose 5%. 1000 milliLiter(s) (100 mL/Hr) IV Continuous <Continuous>  dextrose 5%. 1000 milliLiter(s) (50 mL/Hr) IV Continuous <Continuous>  dextrose 50% Injectable 25 Gram(s) IV Push once  dextrose 50% Injectable 25 Gram(s) IV Push once  dextrose 50% Injectable 12.5 Gram(s) IV Push once  heparin  Infusion. 800 Unit(s)/Hr (8 mL/Hr) IV Continuous <Continuous>  insulin lispro (ADMELOG) corrective regimen sliding scale   SubCutaneous every 6 hours  norepinephrine Infusion 0.05 MICROgram(s)/kG/Min (5.74 mL/Hr) IV Continuous <Continuous>  pantoprazole  Injectable 40 milliGRAM(s) IV Push daily  piperacillin/tazobactam IVPB.. 3.375 Gram(s) IV Intermittent every 12 hours  propofol Infusion 5.011 MICROgram(s)/kG/Min (1.84 mL/Hr) IV Continuous <Continuous>  simvastatin 40 milliGRAM(s) Oral at bedtime  valproate sodium  IVPB 750 milliGRAM(s) IV Intermittent every 8 hours    MEDICATIONS  (PRN):  fentaNYL    Injectable 25 MICROGram(s) IV Push every 4 hours PRN Mild Pain (1 - 3)  heparin   Injectable 3800 Unit(s) IV Push every 6 hours PRN For aPTT less than 40  sodium chloride 0.9% lock flush 10 milliLiter(s) IV Push every 1 hour PRN Pre/post blood products, medications, blood draw, and to maintain line patency      Antibiotics History  piperacillin/tazobactam IVPB. 3.375 Gram(s) IV Intermittent once, 05-09-23 @ 10:58, Stop order after: 1 Doses  piperacillin/tazobactam IVPB.- 3.375 Gram(s) IV Intermittent once, 05-09-23 @ 15:00  piperacillin/tazobactam IVPB.. 3.375 Gram(s) IV Intermittent every 8 hours, 05-09-23 @ 22:00, Stop order after: 7 Days  piperacillin/tazobactam IVPB.. 3.375 Gram(s) IV Intermittent every 12 hours, 05-11-23 @ 22:27, Stop order after: 7 Days      Heme Medications   clopidogrel Tablet 75 milliGRAM(s) Oral daily, 05-11-23 @ 17:42  heparin   Injectable 3800 Unit(s) IV Push every 6 hours, 05-11-23 @ 06:47 PRN  heparin  Infusion. 800 Unit(s)/Hr IV Continuous <Continuous>, 05-11-23 @ 06:47      GI Medications  pantoprazole  Injectable 40 milliGRAM(s) IV Push daily, 05-09-23 @ 12:15, Routine      COVID  05-04-23 @ 19:50  COVID -   NotDetec  07-13-22 @ 18:33  COVID -   [facility]      COVID Biomarkers    05-04-23 @ 19:50 ESR --  ---  CRP --  ---  DDimer  351<H>   ---   LDH --   ---   Ferritin --       Trend Cardiac Enzymes  05-13-23 @ 05:00  EDH-YVRSB-GLBBN-CPKMM/Trop I - -- - --  - --  -  --  /  371.6<H>  05-12-23 @ 05:35  TNS-VEGGY-NIEHJ-CPKMM/Trop I - -- - --  - --  -  --  /  548.4<H>  05-11-23 @ 06:05  RFR-ZMBJQ-PZJRM-CPKMM/Trop I - -- - --  - --  -  --  /  1112.9<H>    Trend BNP    Procalcitonin Trend  05-09-23 @ 13:42   -   0.11<H>    WBC Trend  05-13-23 @ 05:00   -  16.89<H>  05-12-23 @ 05:35   -  13.78<H>  05-11-23 @ 06:05   -  14.35<H>    H/H Trend  05-13-23 @ 05:00   -   12.1<L>/ 38.3<L>  05-12-23 @ 05:35   -   11.6<L>/ 35.6<L>  05-11-23 @ 06:05   -   12.4<L>/ 38.3<L>  05-10-23 @ 05:10   -   13.5/ 42.6  05-09-23 @ 23:40   -   14.5/ 46.2  05-09-23 @ 15:20   -   15.6/ 50.1<H>    Stool Occult Blood    Platelet Trend  05-13-23 @ 05:00   -  92<L>  05-12-23 @ 05:35   -  82<L>  05-11-23 @ 06:05   -  109<L>    Trend Sodium  05-13-23 @ 05:00   -  140  05-12-23 @ 05:35   -  138  05-11-23 @ 11:45   -  140  05-11-23 @ 06:05   -  140    Trend Potassium  05-13-23 @ 05:00   -  4.1  05-12-23 @ 05:35   -  4.2  05-11-23 @ 11:45   -  4.2  05-11-23 @ 06:05   -  3.9    Trend Bun/Cr  05-13-23 @ 05:00  BUN/CR -  87<H> / 4.10<H>  05-12-23 @ 05:35  BUN/CR -  70<H> / 3.65<H>  05-11-23 @ 11:45  BUN/CR -  66<H> / 3.08<H>  05-11-23 @ 06:05  BUN/CR -  64<H> / 3.13<H>    Lactic Acid Trend  05-13-23 @ 05:00   -   0.9  05-09-23 @ 15:20   -   1.8  05-09-23 @ 13:42   -   2.0  05-09-23 @ 10:25   -   12.6<HH>    ABG Trend  05-13-23 @ 06:00   - 7.34<L>/46/93/98.7<H>  05-13-23 @ 03:35   - 7.31<L>/46/108/99.6<H>  05-12-23 @ 23:15   - 7.31<L>/49<H>/79<L>/97.1  05-11-23 @ 07:55   - 7.41/42/152<H>/99.2<H>  05-10-23 @ 04:13   - 7.39/47/129<H>/99.5<H>  05-09-23 @ 23:40   - 7.37/49<H>/75<L>/96.9  05-09-23 @ 10:16   - 7.10<LL>/83<HH>/75<L>/91.2<L>  05-09-23 @ 02:00   - 7.39/54<H>/67<L>/94.8  05-05-23 @ 15:00   - 7.38/48/96/98.3<H>    Trend AST/ALT/ALK Phos/Bili  05-13-23 @ 05:00   17/10/93/1.2  05-12-23 @ 05:35   22/15/75/1.6<H>  05-11-23 @ 06:05   18/12/82/1.8<H>  05-10-23 @ 05:10   33/17/86/2.3<H>  05-09-23 @ 13:42   40/24/115/1.0  05-09-23 @ 10:25   39/28/143<H>/0.7  05-04-23 @ 19:50   22/21/123<H>/0.9  07-15-22 @ 06:15   24/11/83/0.8  07-14-22 @ 06:30   13/11/95/0.7     Albumin Trend  05-13-23 @ 05:00   -   1.5<L>  05-12-23 @ 05:35   -   1.5<L>  05-11-23 @ 06:05   -   1.7<L>  05-10-23 @ 05:10   -   2.3<L>  05-09-23 @ 13:42   -   2.2<L>  05-09-23 @ 10:25   -   2.4<L>      PTT - PT - INR Trend  05-13-23 @ 05:00   -   59.8<H> - -- - --  05-13-23 @ 02:50   -   58.5<H> - -- - --  05-12-23 @ 20:15   -   60.1<H> - -- - --  05-12-23 @ 12:25   -   51.7<H> - -- - --  05-12-23 @ 06:20   -   44.5<H> - 13.2 - 1.14  05-11-23 @ 06:05   -   53.8<H> - 15.6<H> - 1.34<H>    Glucose Trend  05-13-23 @ 06:02   -  -- -- 220<H>  05-13-23 @ 06:00   -  -- -- 260<H>  05-13-23 @ 05:00   -  -- 262<H> --  05-12-23 @ 23:31   -  -- -- 245<H>  05-12-23 @ 17:25   -  -- -- 302<H>  05-12-23 @ 17:23   -  -- -- 288<H>  05-12-23 @ 11:41   -  -- -- 160<H>  05-12-23 @ 05:35   -  -- 199<H> --  05-12-23 @ 04:59   -  -- -- 183<H>  05-11-23 @ 23:18   -  -- -- 169<H>    A1C with Estimated Average Glucose Result: 7.7 % *H* [4.0 - 5.6] (05-05-23 @ 08:00)      LABS:                        12.1   16.89 )-----------( 92       ( 13 May 2023 05:00 )             38.3     05-13    140  |  101  |  87<H>  ----------------------------<  262<H>  4.1   |  28  |  4.10<H>    Ca    8.2<L>      13 May 2023 05:00  Phos  5.0     05-13  Mg     2.0     05-13    TPro  5.6<L>  /  Alb  1.5<L>  /  TBili  1.2  /  DBili  x   /  AST  17  /  ALT  10  /  AlkPhos  93  05-13       RADIOLOGY  CXR:   developing right pleural effusion ? loculated        VITALS:  T(C): 37.5 (05-13-23 @ 09:15), Max: 37.7 (05-12-23 @ 23:30)  T(F): 99.5 (05-13-23 @ 09:15), Max: 99.9 (05-12-23 @ 23:30)  HR: 84 (05-13-23 @ 09:15) (68 - 94)  BP: 138/74 (05-13-23 @ 09:00) (104/52 - 145/69)  BP(mean): 91 (05-13-23 @ 09:00) (66 - 96)  ABP: 145/58 (05-13-23 @ 09:15) (103/43 - 150/59)  ABP(mean): 84 (05-13-23 @ 09:15) (59 - 88)  RR: 19 (05-13-23 @ 09:15) (16 - 26)  SpO2: 99% (05-13-23 @ 09:15) (94% - 100%)  CVP(mm Hg): --  CVP(cm H2O): --    Ins and Outs     05-12-23 @ 07:01  -  05-13-23 @ 07:00  --------------------------------------------------------  IN: 1656.7 mL / OUT: 600 mL / NET: 1056.7 mL    05-13-23 @ 07:01  -  05-13-23 @ 09:50  --------------------------------------------------------  IN: 157.6 mL / OUT: 70 mL / NET: 87.6 mL            Device: Avea, Mode: AC/ CMV (Assist Control/ Continuous Mandatory Ventilation), RR (machine): 18, RR (patient): 19, TV (machine): 450, TV (patient): 440, FiO2: 40, PEEP: 5, ITime: 0.8, MAP: 11, PIP: 23    I&O's Detail    12 May 2023 07:01  -  13 May 2023 07:00  --------------------------------------------------------  IN:    Enteral Tube Flush: 60 mL    Heparin Infusion: 234 mL    IV PiggyBack: 107.5 mL    IV PiggyBack: 150 mL    Nepro with Carb Steady: 720 mL    Norepinephrine: 319.1 mL    Propofol: 66.1 mL  Total IN: 1656.7 mL    OUT:    Indwelling Catheter - Urethral (mL): 600 mL  Total OUT: 600 mL    Total NET: 1056.7 mL      13 May 2023 07:01  -  13 May 2023 09:50  --------------------------------------------------------  IN:    Heparin Infusion: 20 mL    IV PiggyBack: 50 mL    Nepro with Carb Steady: 60 mL    Norepinephrine: 27.6 mL  Total IN: 157.6 mL    OUT:    Indwelling Catheter - Urethral (mL): 70 mL    Propofol: 0 mL  Total OUT: 70 mL    Total NET: 87.6 mL

## 2023-05-14 LAB
ALBUMIN SERPL ELPH-MCNC: 1.3 G/DL — LOW (ref 3.3–5)
ALP SERPL-CCNC: 71 U/L — SIGNIFICANT CHANGE UP (ref 40–120)
ALT FLD-CCNC: 10 U/L — SIGNIFICANT CHANGE UP (ref 10–45)
AMMONIA BLD-MCNC: 16 UMOL/L — SIGNIFICANT CHANGE UP (ref 11–55)
AMMONIA BLD-MCNC: 53 UMOL/L — SIGNIFICANT CHANGE UP (ref 11–55)
ANION GAP SERPL CALC-SCNC: 13 MMOL/L — SIGNIFICANT CHANGE UP (ref 5–17)
APTT BLD: 33.6 SEC — SIGNIFICANT CHANGE UP (ref 27.5–35.5)
APTT BLD: 34.1 SEC — SIGNIFICANT CHANGE UP (ref 27.5–35.5)
APTT BLD: 50.3 SEC — HIGH (ref 27.5–35.5)
AST SERPL-CCNC: 14 U/L — SIGNIFICANT CHANGE UP (ref 10–40)
BASE EXCESS BLDA CALC-SCNC: 3.7 MMOL/L — HIGH (ref -2–3)
BILIRUB SERPL-MCNC: 1 MG/DL — SIGNIFICANT CHANGE UP (ref 0.2–1.2)
BLOOD GAS COMMENTS ARTERIAL: SIGNIFICANT CHANGE UP
BUN SERPL-MCNC: 88 MG/DL — HIGH (ref 7–23)
CALCIUM SERPL-MCNC: 8.4 MG/DL — SIGNIFICANT CHANGE UP (ref 8.4–10.5)
CHLORIDE SERPL-SCNC: 102 MMOL/L — SIGNIFICANT CHANGE UP (ref 96–108)
CK SERPL-CCNC: 37 U/L — SIGNIFICANT CHANGE UP (ref 30–200)
CO2 BLDA-SCNC: 30 MMOL/L — HIGH (ref 19–24)
CO2 SERPL-SCNC: 30 MMOL/L — SIGNIFICANT CHANGE UP (ref 22–31)
CREAT SERPL-MCNC: 4.51 MG/DL — HIGH (ref 0.5–1.3)
EGFR: 12 ML/MIN/1.73M2 — LOW
GAS PNL BLDA: SIGNIFICANT CHANGE UP
GLUCOSE BLDC GLUCOMTR-MCNC: 140 MG/DL — HIGH (ref 70–99)
GLUCOSE BLDC GLUCOMTR-MCNC: 180 MG/DL — HIGH (ref 70–99)
GLUCOSE BLDC GLUCOMTR-MCNC: 217 MG/DL — HIGH (ref 70–99)
GLUCOSE BLDC GLUCOMTR-MCNC: 226 MG/DL — HIGH (ref 70–99)
GLUCOSE SERPL-MCNC: 159 MG/DL — HIGH (ref 70–99)
HCO3 BLDA-SCNC: 28 MMOL/L — SIGNIFICANT CHANGE UP (ref 21–28)
HCT VFR BLD CALC: 35 % — LOW (ref 39–50)
HEPARIN-PF4 AB RESULT: <0.6 U/ML — SIGNIFICANT CHANGE UP (ref 0–0.9)
HGB BLD-MCNC: 11.3 G/DL — LOW (ref 13–17)
HOROWITZ INDEX BLDA+IHG-RTO: 40 — SIGNIFICANT CHANGE UP
INR BLD: 1.28 RATIO — HIGH (ref 0.88–1.16)
MAGNESIUM SERPL-MCNC: 2.2 MG/DL — SIGNIFICANT CHANGE UP (ref 1.6–2.6)
MCHC RBC-ENTMCNC: 31 PG — SIGNIFICANT CHANGE UP (ref 27–34)
MCHC RBC-ENTMCNC: 32.3 GM/DL — SIGNIFICANT CHANGE UP (ref 32–36)
MCV RBC AUTO: 96.2 FL — SIGNIFICANT CHANGE UP (ref 80–100)
NRBC # BLD: 0 /100 WBCS — SIGNIFICANT CHANGE UP (ref 0–0)
NT-PROBNP SERPL-SCNC: HIGH PG/ML (ref 0–300)
PCO2 BLDA: 46 MMHG — SIGNIFICANT CHANGE UP (ref 35–48)
PF4 HEPARIN CMPLX AB SER-ACNC: NEGATIVE — SIGNIFICANT CHANGE UP
PH BLDA: 7.4 — SIGNIFICANT CHANGE UP (ref 7.35–7.45)
PHOSPHATE SERPL-MCNC: 5.2 MG/DL — HIGH (ref 2.5–4.5)
PLATELET # BLD AUTO: 76 K/UL — LOW (ref 150–400)
PO2 BLDA: 72 MMHG — LOW (ref 83–108)
POTASSIUM SERPL-MCNC: 3.8 MMOL/L — SIGNIFICANT CHANGE UP (ref 3.5–5.3)
POTASSIUM SERPL-SCNC: 3.8 MMOL/L — SIGNIFICANT CHANGE UP (ref 3.5–5.3)
PROT SERPL-MCNC: 5.5 G/DL — LOW (ref 6–8.3)
PROTHROM AB SERPL-ACNC: 14.9 SEC — HIGH (ref 10.5–13.4)
RBC # BLD: 3.64 M/UL — LOW (ref 4.2–5.8)
RBC # FLD: 15.8 % — HIGH (ref 10.3–14.5)
SAO2 % BLDA: 96.5 % — SIGNIFICANT CHANGE UP (ref 94–98)
SODIUM SERPL-SCNC: 145 MMOL/L — SIGNIFICANT CHANGE UP (ref 135–145)
TROPONIN I, HIGH SENSITIVITY RESULT: 333.8 NG/L — HIGH
VALPROATE SERPL-MCNC: 54 UG/ML — SIGNIFICANT CHANGE UP (ref 50–100)
WBC # BLD: 14.06 K/UL — HIGH (ref 3.8–10.5)
WBC # FLD AUTO: 14.06 K/UL — HIGH (ref 3.8–10.5)

## 2023-05-14 PROCEDURE — 99232 SBSQ HOSP IP/OBS MODERATE 35: CPT

## 2023-05-14 PROCEDURE — 71045 X-RAY EXAM CHEST 1 VIEW: CPT | Mod: 26

## 2023-05-14 RX ORDER — IPRATROPIUM/ALBUTEROL SULFATE 18-103MCG
3 AEROSOL WITH ADAPTER (GRAM) INHALATION EVERY 6 HOURS
Refills: 0 | Status: DISCONTINUED | OUTPATIENT
Start: 2023-05-14 | End: 2023-05-17

## 2023-05-14 RX ADMIN — HEPARIN SODIUM 1100 UNIT(S)/HR: 5000 INJECTION INTRAVENOUS; SUBCUTANEOUS at 07:02

## 2023-05-14 RX ADMIN — CHLORHEXIDINE GLUCONATE 1 APPLICATION(S): 213 SOLUTION TOPICAL at 05:13

## 2023-05-14 RX ADMIN — Medication 10 MILLIGRAM(S): at 14:10

## 2023-05-14 RX ADMIN — PIPERACILLIN AND TAZOBACTAM 25 GRAM(S): 4; .5 INJECTION, POWDER, LYOPHILIZED, FOR SOLUTION INTRAVENOUS at 18:16

## 2023-05-14 RX ADMIN — Medication 2: at 23:23

## 2023-05-14 RX ADMIN — CHLORHEXIDINE GLUCONATE 15 MILLILITER(S): 213 SOLUTION TOPICAL at 05:13

## 2023-05-14 RX ADMIN — MIDODRINE HYDROCHLORIDE 5 MILLIGRAM(S): 2.5 TABLET ORAL at 05:14

## 2023-05-14 RX ADMIN — SIMVASTATIN 40 MILLIGRAM(S): 20 TABLET, FILM COATED ORAL at 22:02

## 2023-05-14 RX ADMIN — POLYETHYLENE GLYCOL 3350 17 GRAM(S): 17 POWDER, FOR SOLUTION ORAL at 14:10

## 2023-05-14 RX ADMIN — Medication 57.5 MILLIGRAM(S): at 10:33

## 2023-05-14 RX ADMIN — Medication 57.5 MILLIGRAM(S): at 18:17

## 2023-05-14 RX ADMIN — PANTOPRAZOLE SODIUM 40 MILLIGRAM(S): 20 TABLET, DELAYED RELEASE ORAL at 14:10

## 2023-05-14 RX ADMIN — Medication 4: at 14:11

## 2023-05-14 RX ADMIN — CHLORHEXIDINE GLUCONATE 15 MILLILITER(S): 213 SOLUTION TOPICAL at 18:16

## 2023-05-14 RX ADMIN — MIDODRINE HYDROCHLORIDE 5 MILLIGRAM(S): 2.5 TABLET ORAL at 22:02

## 2023-05-14 RX ADMIN — MIDODRINE HYDROCHLORIDE 5 MILLIGRAM(S): 2.5 TABLET ORAL at 14:10

## 2023-05-14 RX ADMIN — PIPERACILLIN AND TAZOBACTAM 25 GRAM(S): 4; .5 INJECTION, POWDER, LYOPHILIZED, FOR SOLUTION INTRAVENOUS at 05:20

## 2023-05-14 RX ADMIN — CLOPIDOGREL BISULFATE 75 MILLIGRAM(S): 75 TABLET, FILM COATED ORAL at 14:10

## 2023-05-14 RX ADMIN — Medication 4: at 18:16

## 2023-05-14 NOTE — EEG REPORT - NS EEG TEXT BOX
MARIO RAMOS [id number]     Study Date: 05-14-23  Duration: 4 hr 27 min  --------------------------------------------------------------------------------------------------  History:  CC/ HPI Patient is a 78y old  Male who presents with a chief complaint of acute systolic CHF (14 May 2023 09:19)    MEDICATIONS  (STANDING):  bisacodyl Suppository 10 milliGRAM(s) Rectal daily  chlorhexidine 0.12% Liquid 15 milliLiter(s) Oral Mucosa every 12 hours  chlorhexidine 2% Cloths 1 Application(s) Topical <User Schedule>  clopidogrel Tablet 75 milliGRAM(s) Oral daily  dextrose 5%. 1000 milliLiter(s) (50 mL/Hr) IV Continuous <Continuous>  dextrose 5%. 1000 milliLiter(s) (100 mL/Hr) IV Continuous <Continuous>  dextrose 50% Injectable 25 Gram(s) IV Push once  dextrose 50% Injectable 25 Gram(s) IV Push once  dextrose 50% Injectable 12.5 Gram(s) IV Push once  insulin lispro (ADMELOG) corrective regimen sliding scale   SubCutaneous every 6 hours  midodrine 5 milliGRAM(s) Oral every 8 hours  norepinephrine Infusion 0.05 MICROgram(s)/kG/Min (5.74 mL/Hr) IV Continuous <Continuous>  pantoprazole   Suspension 40 milliGRAM(s) Oral daily  piperacillin/tazobactam IVPB.. 3.375 Gram(s) IV Intermittent every 12 hours  polyethylene glycol 3350 17 Gram(s) Oral daily  propofol Infusion 5.011 MICROgram(s)/kG/Min (1.84 mL/Hr) IV Continuous <Continuous>  senna 2 Tablet(s) Oral at bedtime  simvastatin 40 milliGRAM(s) Oral at bedtime  valproate sodium  IVPB 750 milliGRAM(s) IV Intermittent every 8 hours    --------------------------------------------------------------------------------------------------  Study Interpretation:    [[[Abbreviation Key:  PDR=alpha rhythm/posterior dominant rhythm. A-P=anterior posterior.  Amplitude: ‘very low’:<20; ‘low’:20-49; ‘medium’:; ‘high’:>150uV.  Persistence for periodic/rhythmic patterns (% of epoch) ‘rare’:<1%; ‘occasional’:1-10%; ‘frequent’:10-50%; ‘abundant’:50-90%; ‘continuous’:>90%.  Persistence for sporadic discharges: ‘rare’:<1/hr; ‘occasional’:1/min-1/hr; ‘frequent’:>1/min; ‘abundant’:>1/10 sec.  RPP=rhythmic and periodic patterns; GRDA=generalized rhythmic delta activity; FIRDA=frontal intermittent GRDA; LRDA=lateralized rhythmic delta activity; TIRDA=temporal intermittent rhythmic delta activity;  LPD=PLED=lateralized periodic discharges; GPD=generalized periodic discharges; BIPDs =bilateral independent periodic discharges; Mf=multifocal; SIRPDs=stimulus induced rhythmic, periodic, or ictal appearing discharges; BIRDs=brief potentially ictal rhythmic discharges >4 Hz, lasting .5-10s; PFA (paroxysmal bursts >13 Hz or =8 Hz <10s).  Modifiers: +F=with fast component; +S=with spike component; +R=with rhythmic component.  S-B=burst suppression pattern.  Max=maximal. N1-drowsy; N2-stage II sleep; N3-slow wave sleep. SSS/BETS=small sharp spikes/benign epileptiform transients of sleep. HV=hyperventilation; PS=photic stimulation]]]    Daily EEG Visual Analysis    FINDINGS:      Background:  Discontinuous to burst-suppressed, consisting of 1-2 second periods of diffuse suppression or attenuation < 20 uV alternating with ~1-30-second bursts of centrally (Cz maximal) spike/polyspike-wave discharges, at times predominant on the left (Cz/C3/P3), appearing as continuous generalized periodic discharges at 1-2 Hz, or occasionally at 2.5-4.5 Hz for 2 cycles. There is intermittent diffuse theta and polymorphic delta activity both intermixed with GPDs and in between GPDs.  There is occasional head and/or body jerking time-locked with GPDs.    Symmetry: symmetric  PDR: no PDR   Reactivity: not tested   Voltage: intermittently suppressed or attenuated  Anterior Posterior Gradient: absent  Other background findings: none  Breach: absent    Background Slowing:  As above     State Changes:   Absent    Ictal and Interictal Findings:  As above    Other Clinical Events:  None    Activation Procedures:   Hyperventilation was not performed.    Photic stimulation was not performed.    Artifacts:  Intermittent movement artifacts are present.    EKG:  Single-lead EKG shows irregular rhythm and occasional PVCs.    ---------------------------------------------------------------------------------------------------------------------------------------------------------      EEG Classification / Summary:    Abnormal EEG in a comatose patient.    - Continuous generalized periodic discharges (GPDs), centrally predominant, at times left centrally predominant  - Occasional head and/or body jerking time-locked to GPDs  - Severe diffuse slowing  - Intermittent diffuse theta and polymorphic delta activity both intermixed with GPDs and in between GPDs.    ---------------------------------------------------------------------------------------------------------------------------------------------------------      Clinical Impression:    -Cortical myoclonus and GPDs can be secondary to underlying severe metabolic or post-hypoxic diffuse cerebral dysfunction. GPDs may indicate increased risk for seizures in certain clinical scenarios.  -Severe diffuse cerebral dysfunction is nonspecific in etiology, but in this case, sedating medications may be one contributing factor.   -There is intermittent cerebral activity in the background  -Single-lead EKG incidentally shows irregular rhythm.    No significant change compared to prior days of recording.      -------------------------------------------------------------------------------------------------------  [facility] EEG Reading Room Ph#: [phone]  Epilepsy Answering Service after 5PM and before 8:30AM: Ph#: [phone]    Anh Morrison MD  Attending Physician, [facility]

## 2023-05-14 NOTE — PROGRESS NOTE ADULT - SUBJECTIVE AND OBJECTIVE BOX
Patient is a 78y old  Male who presents with a chief complaint of acute systolic CHF (14 May 2023 15:49)      BRIEF HOSPITAL COURSE:   79 yo m pmhx dementia, HTN, cardiomyopathy, HFrEF, CAD s/p CABG, Dm2 admitted 5/4 with hypoxic respiratory failure in setting of CHF exacerbation and ANISH with course complicated by acute hypoxic respiratory failure in setting of choking episode causing cardiac arrest, shock, respiratory failure, anoxia and NSTEMI.     Events last 24 hours:   Remains on levophed, propofol being held    PAST MEDICAL & SURGICAL HISTORY:  HTN (hypertension)      HLD (hyperlipidemia)      Diabetes      CAD (coronary artery disease)      History of cholecystectomy        Allergies    sulfa drugs (Unknown)    Intolerances      FAMILY HISTORY:  No pertinent family history in first degree relatives        Social History:     Review of Systems:  CONSTITUTIONAL: No fever, chills, or fatigue  EYES: No eye pain, visual disturbances, or discharge  ENMT:  No difficulty hearing, tinnitus, vertigo; No sinus or throat pain  NECK: No pain or stiffness  RESPIRATORY: No cough, wheezing, chills or hemoptysis; No shortness of breath  CARDIOVASCULAR: No chest pain, palpitations, dizziness, or leg swelling  GASTROINTESTINAL: No abdominal or epigastric pain. No nausea, vomiting, or hematemesis; No diarrhea or constipation. No melena or hematochezia.  GENITOURINARY: No dysuria, frequency, hematuria, or incontinence  NEUROLOGICAL: No headaches, memory loss, loss of strength, numbness, or tremors  SKIN: No itching, burning, rashes, or lesions   MUSCULOSKELETAL: No joint pain or swelling; No muscle, back, or extremity pain  PSYCHIATRIC: No depression, anxiety, mood swings, or difficulty sleeping      Vitals During Exam:   HR:   BP:   RR:  sPO2:     Physical Examination:    General: No acute distress.      HEENT: Pupils equal, reactive to light.  Symmetric.    PULM: Clear to auscultation bilaterally, no significant sputum production    CVS: Regular rate and rhythm, no murmurs, rubs, or gallops    ABD: Soft, nondistended, nontender, normoactive bowel sounds, no masses    EXT: No edema, nontender    SKIN: Warm and well perfused, no rashes noted.    NEURO: Alert, oriented, interactive, nonfocal      Medications:  piperacillin/tazobactam IVPB.. 3.375 Gram(s) IV Intermittent every 12 hours    midodrine 5 milliGRAM(s) Oral every 8 hours  norepinephrine Infusion 0.05 MICROgram(s)/kG/Min IV Continuous <Continuous>    albuterol/ipratropium for Nebulization 3 milliLiter(s) Nebulizer every 6 hours PRN    propofol Infusion 5.011 MICROgram(s)/kG/Min IV Continuous <Continuous>  valproate sodium  IVPB 750 milliGRAM(s) IV Intermittent every 8 hours      clopidogrel Tablet 75 milliGRAM(s) Oral daily    bisacodyl Suppository 10 milliGRAM(s) Rectal daily  pantoprazole   Suspension 40 milliGRAM(s) Oral daily  polyethylene glycol 3350 17 Gram(s) Oral daily  senna 2 Tablet(s) Oral at bedtime      dextrose 50% Injectable 25 Gram(s) IV Push once  dextrose 50% Injectable 25 Gram(s) IV Push once  dextrose 50% Injectable 12.5 Gram(s) IV Push once  insulin lispro (ADMELOG) corrective regimen sliding scale   SubCutaneous every 6 hours  simvastatin 40 milliGRAM(s) Oral at bedtime    dextrose 5%. 1000 milliLiter(s) IV Continuous <Continuous>  dextrose 5%. 1000 milliLiter(s) IV Continuous <Continuous>  sodium chloride 0.9% lock flush 10 milliLiter(s) IV Push every 1 hour PRN      chlorhexidine 0.12% Liquid 15 milliLiter(s) Oral Mucosa every 12 hours  chlorhexidine 2% Cloths 1 Application(s) Topical <User Schedule>        Mode: AC/ CMV (Assist Control/ Continuous Mandatory Ventilation)  RR (machine): 14  TV (machine): 450  FiO2: 35  PEEP: 5  ITime: 0.8  MAP: 10  PIP: 23      ICU Vital Signs Last 24 Hrs  T(C): 37.1 (14 May 2023 19:00), Max: 37.3 (13 May 2023 23:45)  T(F): 98.8 (14 May 2023 19:00), Max: 99.1 (13 May 2023 23:45)  HR: 89 (14 May 2023 21:10) (72 - 94)  BP: 128/62 (14 May 2023 21:00) (101/55 - 160/76)  BP(mean): 81 (14 May 2023 21:00) (65 - 124)  ABP: 130/53 (14 May 2023 21:00) (96/42 - 181/69)  ABP(mean): 77 (14 May 2023 21:00) (54 - 105)  RR: 19 (14 May 2023 21:00) (14 - 28)  SpO2: 100% (14 May 2023 21:10) (96% - 100%)    O2 Parameters below as of 14 May 2023 21:10  Patient On (Oxygen Delivery Method): ventilator          Vital Signs Last 24 Hrs  T(C): 37.1 (14 May 2023 19:00), Max: 37.3 (13 May 2023 23:45)  T(F): 98.8 (14 May 2023 19:00), Max: 99.1 (13 May 2023 23:45)  HR: 89 (14 May 2023 21:10) (72 - 94)  BP: 128/62 (14 May 2023 21:00) (101/55 - 160/76)  BP(mean): 81 (14 May 2023 21:00) (65 - 124)  RR: 19 (14 May 2023 21:00) (14 - 28)  SpO2: 100% (14 May 2023 21:10) (96% - 100%)    Parameters below as of 14 May 2023 21:10  Patient On (Oxygen Delivery Method): ventilator        ABG - ( 14 May 2023 05:20 )  pH, Arterial: 7.40  pH, Blood: x     /  pCO2: 46    /  pO2: 72    / HCO3: 28    / Base Excess: 3.7   /  SaO2: 96.5                I&O's Detail    13 May 2023 07:01  -  14 May 2023 07:00  --------------------------------------------------------  IN:    Enteral Tube Flush: 170 mL    Heparin Infusion: 241 mL    IV PiggyBack: 150 mL    IV PiggyBack: 175 mL    Nepro with Carb Steady: 720 mL    Norepinephrine: 190.4 mL    Propofol: 42.2 mL  Total IN: 1688.6 mL    OUT:    Indwelling Catheter - Urethral (mL): 975 mL  Total OUT: 975 mL    Total NET: 713.6 mL      14 May 2023 07:01  -  14 May 2023 22:01  --------------------------------------------------------  IN:    Free Water: 500 mL    IV PiggyBack: 125 mL    IV PiggyBack: 50 mL    Nepro with Carb Steady: 470 mL    Norepinephrine: 30.8 mL  Total IN: 1175.8 mL    OUT:    Indwelling Catheter - Urethral (mL): 330 mL    Propofol: 0 mL  Total OUT: 330 mL    Total NET: 845.8 mL            LABS:                        11.3   14.06 )-----------( 76       ( 14 May 2023 05:00 )             35.0     05-14    145  |  102  |  88<H>  ----------------------------<  159<H>  3.8   |  30  |  4.51<H>    Ca    8.4      14 May 2023 05:00  Phos  5.2     05-14  Mg     2.2     05-14    TPro  5.5<L>  /  Alb  1.3<L>  /  TBili  1.0  /  DBili  x   /  AST  14  /  ALT  10  /  AlkPhos  71  05-14      CARDIAC MARKERS ( 14 May 2023 11:30 )  x     / x     / 37 U/L / x     / x          CAPILLARY BLOOD GLUCOSE      POCT Blood Glucose.: 217 mg/dL (14 May 2023 17:55)    PT/INR - ( 14 May 2023 11:30 )   PT: 14.9 sec;   INR: 1.28 ratio         PTT - ( 14 May 2023 13:45 )  PTT:33.6 sec    CULTURES:        RADIOLOGY: ***      SUPPLEMENTAL O2:   LINES:  IVF:  HOLBROOK:   PPx:   CONTACT: Patient is a 78y old  Male who presents with a chief complaint of acute systolic CHF (14 May 2023 15:49)      BRIEF HOSPITAL COURSE:   79 yo m pmhx dementia, HTN, cardiomyopathy, HFrEF, CAD s/p CABG, Dm2 admitted 5/4 with hypoxic respiratory failure in setting of CHF exacerbation and ANISH with course complicated by acute hypoxic respiratory failure in setting of choking episode causing cardiac arrest, shock, respiratory failure, anoxia and NSTEMI.     Events last 24 hours:   Remains on levophed, propofol being held    PAST MEDICAL & SURGICAL HISTORY:  HTN (hypertension)      HLD (hyperlipidemia)      Diabetes      CAD (coronary artery disease)      History of cholecystectomy        Allergies    sulfa drugs (Unknown)    Intolerances      FAMILY HISTORY:  No pertinent family history in first degree relatives        Social History:       Review of Systems:  unable to obtain 2/2 clinical status       Physical Examination:    General: elderly male, lying in bed, nad    HEENT: nc/at, ett/ogt in place    PULM: grossly cta b/l    CVS: Regular rate and rhythm    ABD: Soft, nondistended, nontender, + bowel sounds    EXT: + edema, nontender    SKIN: Warm     NEURO poor ms      Medications:  piperacillin/tazobactam IVPB.. 3.375 Gram(s) IV Intermittent every 12 hours    midodrine 5 milliGRAM(s) Oral every 8 hours  norepinephrine Infusion 0.05 MICROgram(s)/kG/Min IV Continuous <Continuous>    albuterol/ipratropium for Nebulization 3 milliLiter(s) Nebulizer every 6 hours PRN    propofol Infusion 5.011 MICROgram(s)/kG/Min IV Continuous <Continuous>  valproate sodium  IVPB 750 milliGRAM(s) IV Intermittent every 8 hours      clopidogrel Tablet 75 milliGRAM(s) Oral daily    bisacodyl Suppository 10 milliGRAM(s) Rectal daily  pantoprazole   Suspension 40 milliGRAM(s) Oral daily  polyethylene glycol 3350 17 Gram(s) Oral daily  senna 2 Tablet(s) Oral at bedtime      dextrose 50% Injectable 25 Gram(s) IV Push once  dextrose 50% Injectable 25 Gram(s) IV Push once  dextrose 50% Injectable 12.5 Gram(s) IV Push once  insulin lispro (ADMELOG) corrective regimen sliding scale   SubCutaneous every 6 hours  simvastatin 40 milliGRAM(s) Oral at bedtime    dextrose 5%. 1000 milliLiter(s) IV Continuous <Continuous>  dextrose 5%. 1000 milliLiter(s) IV Continuous <Continuous>  sodium chloride 0.9% lock flush 10 milliLiter(s) IV Push every 1 hour PRN      chlorhexidine 0.12% Liquid 15 milliLiter(s) Oral Mucosa every 12 hours  chlorhexidine 2% Cloths 1 Application(s) Topical <User Schedule>        Mode: AC/ CMV (Assist Control/ Continuous Mandatory Ventilation)  RR (machine): 14  TV (machine): 450  FiO2: 35  PEEP: 5  ITime: 0.8  MAP: 10  PIP: 23      ICU Vital Signs Last 24 Hrs  T(C): 37.1 (14 May 2023 19:00), Max: 37.3 (13 May 2023 23:45)  T(F): 98.8 (14 May 2023 19:00), Max: 99.1 (13 May 2023 23:45)  HR: 89 (14 May 2023 21:10) (72 - 94)  BP: 128/62 (14 May 2023 21:00) (101/55 - 160/76)  BP(mean): 81 (14 May 2023 21:00) (65 - 124)  ABP: 130/53 (14 May 2023 21:00) (96/42 - 181/69)  ABP(mean): 77 (14 May 2023 21:00) (54 - 105)  RR: 19 (14 May 2023 21:00) (14 - 28)  SpO2: 100% (14 May 2023 21:10) (96% - 100%)    O2 Parameters below as of 14 May 2023 21:10  Patient On (Oxygen Delivery Method): ventilator          Vital Signs Last 24 Hrs  T(C): 37.1 (14 May 2023 19:00), Max: 37.3 (13 May 2023 23:45)  T(F): 98.8 (14 May 2023 19:00), Max: 99.1 (13 May 2023 23:45)  HR: 89 (14 May 2023 21:10) (72 - 94)  BP: 128/62 (14 May 2023 21:00) (101/55 - 160/76)  BP(mean): 81 (14 May 2023 21:00) (65 - 124)  RR: 19 (14 May 2023 21:00) (14 - 28)  SpO2: 100% (14 May 2023 21:10) (96% - 100%)    Parameters below as of 14 May 2023 21:10  Patient On (Oxygen Delivery Method): ventilator        ABG - ( 14 May 2023 05:20 )  pH, Arterial: 7.40  pH, Blood: x     /  pCO2: 46    /  pO2: 72    / HCO3: 28    / Base Excess: 3.7   /  SaO2: 96.5                I&O's Detail    13 May 2023 07:01  -  14 May 2023 07:00  --------------------------------------------------------  IN:    Enteral Tube Flush: 170 mL    Heparin Infusion: 241 mL    IV PiggyBack: 150 mL    IV PiggyBack: 175 mL    Nepro with Carb Steady: 720 mL    Norepinephrine: 190.4 mL    Propofol: 42.2 mL  Total IN: 1688.6 mL    OUT:    Indwelling Catheter - Urethral (mL): 975 mL  Total OUT: 975 mL    Total NET: 713.6 mL      14 May 2023 07:01  -  14 May 2023 22:01  --------------------------------------------------------  IN:    Free Water: 500 mL    IV PiggyBack: 125 mL    IV PiggyBack: 50 mL    Nepro with Carb Steady: 470 mL    Norepinephrine: 30.8 mL  Total IN: 1175.8 mL    OUT:    Indwelling Catheter - Urethral (mL): 330 mL    Propofol: 0 mL  Total OUT: 330 mL    Total NET: 845.8 mL            LABS:                        11.3   14.06 )-----------( 76       ( 14 May 2023 05:00 )             35.0     05-14    145  |  102  |  88<H>  ----------------------------<  159<H>  3.8   |  30  |  4.51<H>    Ca    8.4      14 May 2023 05:00  Phos  5.2     05-14  Mg     2.2     05-14    TPro  5.5<L>  /  Alb  1.3<L>  /  TBili  1.0  /  DBili  x   /  AST  14  /  ALT  10  /  AlkPhos  71  05-14      CARDIAC MARKERS ( 14 May 2023 11:30 )  x     / x     / 37 U/L / x     / x          CAPILLARY BLOOD GLUCOSE      POCT Blood Glucose.: 217 mg/dL (14 May 2023 17:55)    PT/INR - ( 14 May 2023 11:30 )   PT: 14.9 sec;   INR: 1.28 ratio         PTT - ( 14 May 2023 13:45 )  PTT:33.6 sec    CULTURES:        RADIOLOGY: ***      SUPPLEMENTAL O2:   LINES:  IVF:  HOLBROOK:   PPx:   CONTACT:

## 2023-05-14 NOTE — PROGRESS NOTE ADULT - ASSESSMENT
77 yo m pmhx dementia, HTN, cardiomyopathy, HFrEF, CAD s/p CABG, Dm2 admitted 5/4 with hypoxic respiratory failure in setting of CHF exacerbation and ANISH with course complicated by acute hypoxic respiratory failure in setting of choking episode causing cardiac arrest, shock, respiratory failure, anoxia and NSTEMI.     NEURO:  CV:  RESP:  RENAL: ANISH, avoid nephrotoxic meds, renally dose meds, trend urine ouput, bun/cr and electrolytes.  replace electrolytes.    GI: NPO on tf  ENDO: ISS for glycemic control   ID: Zosyn for pna  HEME: Off heparin gtt  DISPO: full code    Critical Care time: 40 mins assessing presenting problems of acute illness that poses high probability of life threatening deterioration or end organ damage/dysfunction.  Medical decision making including Initiating plan of care, reviewing data, reviewing radiology, discussing with multidisciplinary team, non inclusive of procedures, discussing goals of care with patient/family  77 yo m pmhx dementia, HTN, cardiomyopathy, HFrEF, CAD s/p CABG, Dm2 admitted 5/4 with hypoxic respiratory failure in setting of CHF exacerbation and ANISH with course complicated by acute hypoxic respiratory failure in setting of choking episode causing cardiac arrest, shock, respiratory failure, anoxia and NSTEMI.     NEURO: Off sedated, EEG without seizure activity, continue valproic acid, neurology following  CV: Distributive shock requiring vasopressor therapy, actively titrating levophed for MAP >65, midodrine as adjunctive therapy   RESP: Hypoxic respiratory failure, ac/vc 4-6 cc/kg tv lung protective strategies, actively titrating fio2/peep for spo2 >92%  RENAL: ANISH, avoid nephrotoxic meds, renally dose meds, trend urine ouput, bun/cr and electrolytes.  replace electrolytes.  strict I&Os  GI: NPO on tf  ENDO: ISS for glycemic control   ID: Zosyn for pna  HEME: Off heparin gtt  DISPO: full code    Critical Care time: 40 mins assessing presenting problems of acute illness that poses high probability of life threatening deterioration or end organ damage/dysfunction.  Medical decision making including Initiating plan of care, reviewing data, reviewing radiology, discussing with multidisciplinary team, non inclusive of procedures, discussing goals of care with patient/family

## 2023-05-14 NOTE — PROGRESS NOTE ADULT - ASSESSMENT
Physical Examination:  GENERAL:                Intubated,   HEENT:                    No JVD, Dry MM, eyes constricted, reactive to light  PULM:                     Bilateral air entry, Clear to auscultation bilaterally, no significant sputum production, No Rales, No Rhonchi, No Wheezing  CVS:                         S1, S2,  No Murmur  ABD:                        Soft, nondistended, nontender, normoactive bowel sounds,   EXT:                         + edema, nontender, LE Cyanosis No Clubbing   Vascular:                warm Extremities, Normal Capillary refill, Normal Distal Pulses  NEURO:                  Unresponsive   not interactive, does not follows commands, flacid, myoclonus to deep stimuli  PSYC:                      Calm, no Insight.        Assessment  S/p Cardiac Arrest due to chocking episode  Now with multiorgan dysfunction and anoxic brain injury  Acute hypoxic respiratory failure  Acute Renal Failure on CKD  CT Head showing acute Stroke, not candidate tpa  B/l Pleural Effusions  Underlying HTN (hypertension), HLD (hyperlipidemia), DM2, CAD       Plan  Continue mechanical ventilation  Minimize propfol  noted new thrombocytopenia hold heparin drip  check HIT ab  On plavix, off asa  hypotension on propofol requiring levo, will start low dose midodrine   EEG repeat today  monitor valproic acid level  on empiric abx    EEG abnormal - suggestive of extremely poor prognosis   diet via peg advance as tolerated  case d/w Neuro, hold acute MRI at this time pending clinical course and no need for 24 hr eeg.      PMD:	Dr Parra			                   Notified(Date): paged 5/12-13/23 , d/w with him on 5/14/23    Family Updated:  Kuldeep [phone]		                                 Date:  5/14/23   discussed how physically patient slowly stabilizing however neurologically not improving.      Sedation & Analgesia:	as above,   Diet/Nutrition:		 Tube feeding  GI PPx:		pantoprazole     DVT Ppx:	venodynes   Activity:		bedrest   Head of Bed:               35-45 Deg  Glycemic Control:        Insulin ss    Lines:  PIV  CENTRAL LINE: 	[x] YES [ ] NO	                    LOCATION:   	                       DATE INSERTED:   	                    REMOVE:  [ ] YES [x ] NO    A-LINE:  	                [ x] YES [ ] NO                      LOCATION:   	                       DATE INSERTED: 		            REMOVE:  [ ] YES [ x] NO    HOLBROOK: 		        [x ] YES [ ] NO  		                                       DATE INSERTED:		            REMOVE:  [ ] YES [x ] NO      Restraints were deemed necessary to prevent removal of life-sustaining devices [  ] YES   [  x  ]  NO  Disposition: ICU Care  Goals of Care: Full code

## 2023-05-14 NOTE — PROGRESS NOTE ADULT - SUBJECTIVE AND OBJECTIVE BOX
Intubated in ICU    Vital Signs Last 24 Hrs  T(C): 36.9 (05-14-23 @ 14:00), Max: 37.7 (05-13-23 @ 16:00)  T(F): 98.4 (05-14-23 @ 14:00), Max: 99.9 (05-13-23 @ 16:00)  HR: 76 (05-14-23 @ 15:38) (72 - 86)  BP: 135/86 (05-14-23 @ 14:00) (101/55 - 136/70)  BP(mean): 102 (05-14-23 @ 14:00) (65 - 102)  RR: 19 (05-14-23 @ 14:00) (15 - 26)  SpO2: 100% (05-14-23 @ 15:38) (95% - 100%)    I&O's Detail    13 May 2023 07:01  -  14 May 2023 07:00  --------------------------------------------------------  IN:    Enteral Tube Flush: 170 mL    Heparin Infusion: 241 mL    IV PiggyBack: 150 mL    IV PiggyBack: 175 mL    Nepro with Carb Steady: 720 mL    Norepinephrine: 190.4 mL    Propofol: 42.2 mL  Total IN: 1688.6 mL    OUT:    Indwelling Catheter - Urethral (mL): 975 mL  Total OUT: 975 mL    Total NET: 713.6 mL    14 May 2023 07:01  -  14 May 2023 15:49  --------------------------------------------------------  IN:    Free Water: 250 mL    IV PiggyBack: 25 mL    Nepro with Carb Steady: 225 mL    Norepinephrine: 5.7 mL  Total IN: 505.7 mL    OUT:    Indwelling Catheter - Urethral (mL): 190 mL  Total OUT: 190 mL    Total NET: 315.7 mL    Mode: AC/ CMV (Assist Control/ Continuous Mandatory Ventilation)  RR (machine): 14  TV (machine): 450  FiO2: 35  PEEP: 5  ITime: 0.8  MAP: 10  PIP: 22    s1s2  b/l air entry  soft, ND  tr edema                                                11.3   14.06 )-----------( 76       ( 14 May 2023 05:00 )             35.0     14 May 2023 05:00    145    |  102    |  88     ----------------------------<  159    3.8     |  30     |  4.51     Ca    8.4        14 May 2023 05:00  Phos  5.2       14 May 2023 05:00  Mg     2.2       14 May 2023 05:00    TPro  5.5    /  Alb  1.3    /  TBili  1.0    /  DBili  x      /  AST  14     /  ALT  10     /  AlkPhos  71     14 May 2023 05:00    LIVER FUNCTIONS - ( 14 May 2023 05:00 )  Alb: 1.3 g/dL / Pro: 5.5 g/dL / ALK PHOS: 71 U/L / ALT: 10 U/L / AST: 14 U/L / GGT: x           PT/INR - ( 14 May 2023 11:30 )   PT: 14.9 sec;   INR: 1.28 ratio      albuterol/ipratropium for Nebulization 3 milliLiter(s) Nebulizer every 6 hours PRN  bisacodyl Suppository 10 milliGRAM(s) Rectal daily  chlorhexidine 0.12% Liquid 15 milliLiter(s) Oral Mucosa every 12 hours  chlorhexidine 2% Cloths 1 Application(s) Topical <User Schedule>  clopidogrel Tablet 75 milliGRAM(s) Oral daily  dextrose 5%. 1000 milliLiter(s) IV Continuous <Continuous>  dextrose 5%. 1000 milliLiter(s) IV Continuous <Continuous>  dextrose 50% Injectable 25 Gram(s) IV Push once  dextrose 50% Injectable 12.5 Gram(s) IV Push once  dextrose 50% Injectable 25 Gram(s) IV Push once  insulin lispro (ADMELOG) corrective regimen sliding scale   SubCutaneous every 6 hours  midodrine 5 milliGRAM(s) Oral every 8 hours  norepinephrine Infusion 0.05 MICROgram(s)/kG/Min IV Continuous <Continuous>  pantoprazole   Suspension 40 milliGRAM(s) Oral daily  piperacillin/tazobactam IVPB.. 3.375 Gram(s) IV Intermittent every 12 hours  polyethylene glycol 3350 17 Gram(s) Oral daily  propofol Infusion 5.011 MICROgram(s)/kG/Min IV Continuous <Continuous>  senna 2 Tablet(s) Oral at bedtime  simvastatin 40 milliGRAM(s) Oral at bedtime  sodium chloride 0.9% lock flush 10 milliLiter(s) IV Push every 1 hour PRN  valproate sodium  IVPB 750 milliGRAM(s) IV Intermittent every 8 hours    A/P:    Hemodynamic ANISH iso resp failure, s/p arrest, asp PNA (Cr 1.6 - 5/9/23, Cr 1.0 - 4/12/23)  Hx CM, CHF, EF 35-40%, dementia  BP, resp support, anti-sz tx per ICU team  Avoid nephrotoxins  Overall prognosis is poor  Will f/u BMP, UO   Lytes are stable  UO appears to be improving  Hopefully will avoid the need for RRT  D/w ICU team    [phone]

## 2023-05-14 NOTE — PROGRESS NOTE ADULT - SUBJECTIVE AND OBJECTIVE BOX
Follow-up Critical Care Progress Note  Chief Complaint : Atrial fibrillation      Patient seen and examined  remains intubated,   off prop with mild myoclonus  EEG being done    levo being tapered   able to have spontanous breaths on cpap 15  Allergies :sulfa drugs (Unknown)      PAST MEDICAL & SURGICAL HISTORY:  HTN (hypertension)  HLD (hyperlipidemia)  Diabetes  CAD (coronary artery disease)  History of cholecystectomy        Medications:  MEDICATIONS  (STANDING):  bisacodyl Suppository 10 milliGRAM(s) Rectal daily  chlorhexidine 0.12% Liquid 15 milliLiter(s) Oral Mucosa every 12 hours  chlorhexidine 2% Cloths 1 Application(s) Topical <User Schedule>  clopidogrel Tablet 75 milliGRAM(s) Oral daily  dextrose 5%. 1000 milliLiter(s) (100 mL/Hr) IV Continuous <Continuous>  dextrose 5%. 1000 milliLiter(s) (50 mL/Hr) IV Continuous <Continuous>  dextrose 50% Injectable 25 Gram(s) IV Push once  dextrose 50% Injectable 12.5 Gram(s) IV Push once  dextrose 50% Injectable 25 Gram(s) IV Push once  insulin lispro (ADMELOG) corrective regimen sliding scale   SubCutaneous every 6 hours  midodrine 5 milliGRAM(s) Oral every 8 hours  norepinephrine Infusion 0.05 MICROgram(s)/kG/Min (5.74 mL/Hr) IV Continuous <Continuous>  pantoprazole   Suspension 40 milliGRAM(s) Oral daily  piperacillin/tazobactam IVPB.. 3.375 Gram(s) IV Intermittent every 12 hours  polyethylene glycol 3350 17 Gram(s) Oral daily  propofol Infusion 5.011 MICROgram(s)/kG/Min (1.84 mL/Hr) IV Continuous <Continuous>  senna 2 Tablet(s) Oral at bedtime  simvastatin 40 milliGRAM(s) Oral at bedtime  valproate sodium  IVPB 750 milliGRAM(s) IV Intermittent every 8 hours    MEDICATIONS  (PRN):  albuterol/ipratropium for Nebulization 3 milliLiter(s) Nebulizer every 6 hours PRN Shortness of Breath and/or Wheezing  sodium chloride 0.9% lock flush 10 milliLiter(s) IV Push every 1 hour PRN Pre/post blood products, medications, blood draw, and to maintain line patency      Antibiotics History  piperacillin/tazobactam IVPB. 3.375 Gram(s) IV Intermittent once, 05-09-23 @ 10:58, Stop order after: 1 Doses  piperacillin/tazobactam IVPB.- 3.375 Gram(s) IV Intermittent once, 05-09-23 @ 15:00  piperacillin/tazobactam IVPB.. 3.375 Gram(s) IV Intermittent every 8 hours, 05-09-23 @ 22:00, Stop order after: 7 Days  piperacillin/tazobactam IVPB.. 3.375 Gram(s) IV Intermittent every 12 hours, 05-11-23 @ 22:27, Stop order after: 7 Days      Heme Medications   clopidogrel Tablet 75 milliGRAM(s) Oral daily, 05-11-23 @ 17:42      GI Medications  bisacodyl Suppository 10 milliGRAM(s) Rectal daily, 05-13-23 @ 10:06, Now  pantoprazole   Suspension 40 milliGRAM(s) Oral daily, 05-13-23 @ 09:57, Routine  polyethylene glycol 3350 17 Gram(s) Oral daily, 05-13-23 @ 10:06, Now  senna 2 Tablet(s) Oral at bedtime, 05-13-23 @ 09:58, Routine      COVID  05-04-23 @ 19:50  COVID -   NotDetec  07-13-22 @ 18:33  COVID -   NotDetec      COVID Biomarkers    05-04-23 @ 19:50 ESR --  ---  CRP --  ---  DDimer  351<H>   ---   LDH --   ---   Ferritin --            Trend Cardiac Enzymes  05-14-23 @ 05:00  QZW-MRUYF-AMATR-CPKMM/Trop I - -- - --  - --  -  --  /  333.8<H>  05-13-23 @ 05:00  YOT-KYEHW-QOUDZ-CPKMM/Trop I - -- - --  - --  -  --  /  371.6<H>  05-12-23 @ 05:35  BVP-SDODK-RGOKQ-CPKMM/Trop I - -- - --  - --  -  --  /  548.4<H>     Procalcitonin Trend  05-09-23 @ 13:42   -   0.11<H>    WBC Trend  05-14-23 @ 05:00   -  14.06<H>  05-13-23 @ 05:00   -  16.89<H>  05-12-23 @ 05:35   -  13.78<H>    H/H Trend  05-14-23 @ 05:00   -   11.3<L>/ 35.0<L>  05-13-23 @ 05:00   -   12.1<L>/ 38.3<L>  05-12-23 @ 05:35   -   11.6<L>/ 35.6<L>  05-11-23 @ 06:05   -   12.4<L>/ 38.3<L>  05-10-23 @ 05:10   -   13.5/ 42.6  05-09-23 @ 23:40   -   14.5/ 46.2    Stool Occult Blood    Platelet Trend  05-14-23 @ 05:00   -  76<L>  05-13-23 @ 05:00   -  92<L>  05-12-23 @ 05:35   -  82<L>    Trend Sodium  05-14-23 @ 05:00   -  145  05-13-23 @ 05:00   -  140  05-12-23 @ 05:35   -  138  05-11-23 @ 11:45   -  140    Trend Potassium  05-14-23 @ 05:00   -  3.8  05-13-23 @ 05:00   -  4.1  05-12-23 @ 05:35   -  4.2  05-11-23 @ 11:45   -  4.2    Trend Bun/Cr  05-14-23 @ 05:00  BUN/CR -  88<H> / 4.51<H>  05-13-23 @ 05:00  BUN/CR -  87<H> / 4.10<H>  05-12-23 @ 05:35  BUN/CR -  70<H> / 3.65<H>  05-11-23 @ 11:45  BUN/CR -  66<H> / 3.08<H>    Lactic Acid Trend  05-13-23 @ 05:00   -   0.9  05-09-23 @ 15:20   -   1.8  05-09-23 @ 13:42   -   2.0  05-09-23 @ 10:25   -   12.6<HH>    ABG Trend  05-14-23 @ 05:20   - 7.40/46/72<L>/96.5  05-13-23 @ 06:00   - 7.34<L>/46/93/98.7<H>  05-13-23 @ 03:35   - 7.31<L>/46/108/99.6<H>  05-12-23 @ 23:15   - 7.31<L>/49<H>/79<L>/97.1  05-11-23 @ 07:55   - 7.41/42/152<H>/99.2<H>  05-10-23 @ 04:13   - 7.39/47/129<H>/99.5<H>  05-09-23 @ 23:40   - 7.37/49<H>/75<L>/96.9  05-09-23 @ 10:16   - 7.10<LL>/83<HH>/75<L>/91.2<L>  05-09-23 @ 02:00   - 7.39/54<H>/67<L>/94.8  05-05-23 @ 15:00   - 7.38/48/96/98.3<H>    Trend AST/ALT/ALK Phos/Bili  05-14-23 @ 05:00   14/10/71/1.0  05-13-23 @ 05:00   17/10/93/1.2  05-12-23 @ 05:35   22/15/75/1.6<H>  05-11-23 @ 06:05   18/12/82/1.8<H>  05-10-23 @ 05:10   33/17/86/2.3<H>  05-09-23 @ 13:42   40/24/115/1.0  05-09-23 @ 10:25   39/28/143<H>/0.7     Albumin Trend  05-14-23 @ 05:00   -   1.3<L>  05-13-23 @ 05:00   -   1.5<L>  05-12-23 @ 05:35   -   1.5<L>  05-11-23 @ 06:05   -   1.7<L>  05-10-23 @ 05:10   -   2.3<L>  05-09-23 @ 13:42   -   2.2<L>      PTT - PT - INR Trend  05-14-23 @ 05:00   -   50.3<H> - -- - --  05-13-23 @ 05:00   -   59.8<H> - -- - --  05-13-23 @ 02:50   -   58.5<H> - -- - --  05-12-23 @ 20:15   -   60.1<H> - -- - --  05-12-23 @ 12:25   -   51.7<H> - -- - --  05-12-23 @ 06:20   -   44.5<H> - 13.2 - 1.14    Glucose Trend  05-14-23 @ 05:13   -  -- -- 140<H>  05-14-23 @ 05:00   -  -- 159<H> --  05-13-23 @ 23:23   -  -- -- 153<H>  05-13-23 @ 17:51   -  -- -- 206<H>  05-13-23 @ 12:22   -  -- -- 179<H>  05-13-23 @ 06:02   -  -- -- 220<H>  05-13-23 @ 06:00   -  -- -- 260<H>  05-13-23 @ 05:00   -  -- 262<H> --  05-12-23 @ 23:31   -  -- -- 245<H>  05-12-23 @ 17:25   -  -- -- 302<H>    A1C with Estimated Average Glucose Result: 7.7 % *H* [4.0 - 5.6] (05-05-23 @ 08:00)      LABS:                        11.3   14.06 )-----------( 76       ( 14 May 2023 05:00 )             35.0     05-14    145  |  102  |  88<H>  ----------------------------<  159<H>  3.8   |  30  |  4.51<H>    Ca    8.4      14 May 2023 05:00  Phos  5.2     05-14  Mg     2.2     05-14    TPro  5.5<L>  /  Alb  1.3<L>  /  TBili  1.0  /  DBili  x   /  AST  14  /  ALT  10  /  AlkPhos  71  05-14         RADIOLOGY  CXR:    right lower lobe Attelectasis, lines and tubes in place    < from: Xray Chest 1 View- PORTABLE-Routine (05.12.23 @ 06:56) >    IMPRESSION:    Endotracheal tube, nasogastric tube right internal jugular line in position. No change heart mediastinum. No significant change of bilateral airspace disease small effusions. No pneumothorax or other new abnormality.    --- End of Report ---      < end of copied text >    VITALS:  T(C): 37.3 (05-14-23 @ 09:00), Max: 37.7 (05-13-23 @ 14:00)  T(F): 99.1 (05-14-23 @ 09:00), Max: 99.9 (05-13-23 @ 14:00)  HR: 77 (05-14-23 @ 09:00) (72 - 86)  BP: 116/64 (05-14-23 @ 09:00) (101/55 - 142/76)  BP(mean): 76 (05-14-23 @ 09:00) (71 - 95)  ABP: 115/46 (05-14-23 @ 09:00) (99/41 - 152/57)  ABP(mean): 64 (05-14-23 @ 09:00) (58 - 86)  RR: 17 (05-14-23 @ 09:00) (17 - 27)  SpO2: 99% (05-14-23 @ 09:00) (95% - 100%)  CVP(mm Hg): --  CVP(cm H2O): --    Ins and Outs     05-13-23 @ 07:01  -  05-14-23 @ 07:00  --------------------------------------------------------  IN: 1688.6 mL / OUT: 975 mL / NET: 713.6 mL    05-14-23 @ 07:01  -  05-14-23 @ 09:19  --------------------------------------------------------  IN: 90.7 mL / OUT: 80 mL / NET: 10.7 mL            Device: Avea, Mode: AC/ CMV (Assist Control/ Continuous Mandatory Ventilation), RR (machine): 14, RR (patient): 16, TV (machine): 450, TV (patient): 400, FiO2: 35, PEEP: 5, ITime: 0.8, MAP: 11, PIP: 23    I&O's Detail    13 May 2023 07:01  -  14 May 2023 07:00  --------------------------------------------------------  IN:    Enteral Tube Flush: 170 mL    Heparin Infusion: 241 mL    IV PiggyBack: 150 mL    IV PiggyBack: 175 mL    Nepro with Carb Steady: 720 mL    Norepinephrine: 190.4 mL    Propofol: 42.2 mL  Total IN: 1688.6 mL    OUT:    Indwelling Catheter - Urethral (mL): 975 mL  Total OUT: 975 mL    Total NET: 713.6 mL      14 May 2023 07:01  -  14 May 2023 09:19  --------------------------------------------------------  IN:    IV PiggyBack: 25 mL    Nepro with Carb Steady: 60 mL    Norepinephrine: 5.7 mL  Total IN: 90.7 mL    OUT:    Indwelling Catheter - Urethral (mL): 80 mL  Total OUT: 80 mL    Total NET: 10.7 mL

## 2023-05-14 NOTE — PROGRESS NOTE ADULT - SUBJECTIVE AND OBJECTIVE BOX
HPI:    Patient reportedly has an increase in myoclonus when propofol is held for prolonged period of time, now back on propofol    EEG (5/13/23): no seizures recorded, periods of generalized burst-suppression alternating with periods of background attenuation with GPDs    VPA level (5/13/23) = 41    Vital Signs Last 24 Hrs  T(C): 37.2 (14 May 2023 07:00), Max: 37.7 (13 May 2023 14:00)  T(F): 99 (14 May 2023 07:00), Max: 99.9 (13 May 2023 14:00)  HR: 81 (14 May 2023 07:00) (72 - 86)  BP: 135/60 (14 May 2023 07:00) (109/64 - 142/76)  BP(mean): 82 (14 May 2023 07:00) (71 - 95)  RR: 18 (14 May 2023 07:00) (17 - 27)  SpO2: 100% (14 May 2023 07:00) (95% - 100%)    Parameters below as of 14 May 2023 07:00  Patient On (Oxygen Delivery Method): ventilator    O2 Concentration (%): 40    MEDICATIONS  (STANDING):  bisacodyl Suppository 10 milliGRAM(s) Rectal daily  chlorhexidine 0.12% Liquid 15 milliLiter(s) Oral Mucosa every 12 hours  chlorhexidine 2% Cloths 1 Application(s) Topical <User Schedule>  clopidogrel Tablet 75 milliGRAM(s) Oral daily  dextrose 5%. 1000 milliLiter(s) (50 mL/Hr) IV Continuous <Continuous>  dextrose 5%. 1000 milliLiter(s) (100 mL/Hr) IV Continuous <Continuous>  dextrose 50% Injectable 25 Gram(s) IV Push once  dextrose 50% Injectable 25 Gram(s) IV Push once  dextrose 50% Injectable 12.5 Gram(s) IV Push once  insulin lispro (ADMELOG) corrective regimen sliding scale   SubCutaneous every 6 hours  midodrine 5 milliGRAM(s) Oral every 8 hours  norepinephrine Infusion 0.05 MICROgram(s)/kG/Min (5.74 mL/Hr) IV Continuous <Continuous>  pantoprazole   Suspension 40 milliGRAM(s) Oral daily  piperacillin/tazobactam IVPB.. 3.375 Gram(s) IV Intermittent every 12 hours  polyethylene glycol 3350 17 Gram(s) Oral daily  propofol Infusion 5.011 MICROgram(s)/kG/Min (1.84 mL/Hr) IV Continuous <Continuous>  senna 2 Tablet(s) Oral at bedtime  simvastatin 40 milliGRAM(s) Oral at bedtime  valproate sodium  IVPB 750 milliGRAM(s) IV Intermittent every 8 hours    MEDICATIONS  (PRN):  albuterol/ipratropium for Nebulization 3 milliLiter(s) Nebulizer every 6 hours PRN Shortness of Breath and/or Wheezing  sodium chloride 0.9% lock flush 10 milliLiter(s) IV Push every 1 hour PRN Pre/post blood products, medications, blood draw, and to maintain line patency      ROS: pertinent positives in HPI, all other ROS were reviewed and are negative     Neurological exam (off propofol):  MS: Comatose, no response purposeful response to noxious stimuli  CN: Not breathing over the ventilator, gaze midline, no nystagmus, left pupil 2-3 mm and sluggishly reactive, right pupil 2-3mm and nonreactive, corneal reflex negative on left and positive on right, no oculocephalic response, tongue midline no facial asymmetry  Motor: stimulus-induced myoclonus  Sens: no withdrawal or grimace to noxious stimuli bilaterally  Reflexes: diffusely hyporeflexic, mute planter response bilaterally        LABS:                         11.3   14.06 )-----------( 76       ( 14 May 2023 05:00 )             35.0     05-14    145  |  102  |  88<H>  ----------------------------<  159<H>  3.8   |  30  |  4.51<H>    Ca    8.4      14 May 2023 05:00  Phos  5.2     05-14  Mg     2.2     05-14    TPro  5.5<L>  /  Alb  1.3<L>  /  TBili  1.0  /  DBili  x   /  AST  14  /  ALT  10  /  AlkPhos  71  05-14    LIVER FUNCTIONS - ( 14 May 2023 05:00 )  Alb: 1.3 g/dL / Pro: 5.5 g/dL / ALK PHOS: 71 U/L / ALT: 10 U/L / AST: 14 U/L / GGT: x           05-07 Chol 172 LDL -- HDL 39<L> Trig 87      A: 79 yo man with history suggestive of a severe hypoxic-ischemic encephalopathy / anoxic brain injury.  There is some evidence of brainstem function on exam, however, there is no evidence of higher cortical function.    Recommend:  1. Agree with valproic acid 750mg IV q8h, repeat VPA level pending (although previous level less than 50, patient is hypoalbuminemic, therefore free fraction is higher).  Post-anoxic myoclonus often portends a poor prognosis for neurological recovery.  Suppression of post-anoxic myoclonus does not typically affect overall prognosis.  3. Minimize sedation, unless required for clinical stability  4. Prognosis guarded.    Joel Florez MD  Neurology Attending Physician       HPI:    Patient reportedly has an increase in myoclonus when propofol is held for prolonged period of time, now back on propofol    EEG (5/13/23): no seizures recorded, periods of generalized burst-suppression alternating with periods of background attenuation with GPDs    VPA level (5/13/23) = 41    Vital Signs Last 24 Hrs  T(C): 37.2 (14 May 2023 07:00), Max: 37.7 (13 May 2023 14:00)  T(F): 99 (14 May 2023 07:00), Max: 99.9 (13 May 2023 14:00)  HR: 81 (14 May 2023 07:00) (72 - 86)  BP: 135/60 (14 May 2023 07:00) (109/64 - 142/76)  BP(mean): 82 (14 May 2023 07:00) (71 - 95)  RR: 18 (14 May 2023 07:00) (17 - 27)  SpO2: 100% (14 May 2023 07:00) (95% - 100%)    Parameters below as of 14 May 2023 07:00  Patient On (Oxygen Delivery Method): ventilator    O2 Concentration (%): 40    MEDICATIONS  (STANDING):  bisacodyl Suppository 10 milliGRAM(s) Rectal daily  chlorhexidine 0.12% Liquid 15 milliLiter(s) Oral Mucosa every 12 hours  chlorhexidine 2% Cloths 1 Application(s) Topical <User Schedule>  clopidogrel Tablet 75 milliGRAM(s) Oral daily  dextrose 5%. 1000 milliLiter(s) (50 mL/Hr) IV Continuous <Continuous>  dextrose 5%. 1000 milliLiter(s) (100 mL/Hr) IV Continuous <Continuous>  dextrose 50% Injectable 25 Gram(s) IV Push once  dextrose 50% Injectable 25 Gram(s) IV Push once  dextrose 50% Injectable 12.5 Gram(s) IV Push once  insulin lispro (ADMELOG) corrective regimen sliding scale   SubCutaneous every 6 hours  midodrine 5 milliGRAM(s) Oral every 8 hours  norepinephrine Infusion 0.05 MICROgram(s)/kG/Min (5.74 mL/Hr) IV Continuous <Continuous>  pantoprazole   Suspension 40 milliGRAM(s) Oral daily  piperacillin/tazobactam IVPB.. 3.375 Gram(s) IV Intermittent every 12 hours  polyethylene glycol 3350 17 Gram(s) Oral daily  propofol Infusion 5.011 MICROgram(s)/kG/Min (1.84 mL/Hr) IV Continuous <Continuous>  senna 2 Tablet(s) Oral at bedtime  simvastatin 40 milliGRAM(s) Oral at bedtime  valproate sodium  IVPB 750 milliGRAM(s) IV Intermittent every 8 hours    MEDICATIONS  (PRN):  albuterol/ipratropium for Nebulization 3 milliLiter(s) Nebulizer every 6 hours PRN Shortness of Breath and/or Wheezing  sodium chloride 0.9% lock flush 10 milliLiter(s) IV Push every 1 hour PRN Pre/post blood products, medications, blood draw, and to maintain line patency      ROS: pertinent positives in HPI, all other ROS were reviewed and are negative     Neurological exam (off propofol):  MS: Comatose, no response purposeful response to noxious stimuli  CN: Not breathing over the ventilator, gaze midline, no nystagmus, left pupil 2-3 mm and sluggishly reactive, right pupil 2-3mm and nonreactive, corneal reflex negative on left and positive on right, no oculocephalic response, tongue midline no facial asymmetry  Motor: stimulus-induced myoclonus  Sens: no withdrawal or grimace to noxious stimuli bilaterally  Reflexes: diffusely hyporeflexic, mute planter response bilaterally        LABS:                         11.3   14.06 )-----------( 76       ( 14 May 2023 05:00 )             35.0     05-14    145  |  102  |  88<H>  ----------------------------<  159<H>  3.8   |  30  |  4.51<H>    Ca    8.4      14 May 2023 05:00  Phos  5.2     05-14  Mg     2.2     05-14    TPro  5.5<L>  /  Alb  1.3<L>  /  TBili  1.0  /  DBili  x   /  AST  14  /  ALT  10  /  AlkPhos  71  05-14    LIVER FUNCTIONS - ( 14 May 2023 05:00 )  Alb: 1.3 g/dL / Pro: 5.5 g/dL / ALK PHOS: 71 U/L / ALT: 10 U/L / AST: 14 U/L / GGT: x           05-07 Chol 172 LDL -- HDL 39<L> Trig 87      A: 77 yo man with history suggestive of a severe hypoxic-ischemic encephalopathy / anoxic brain injury.  There is some evidence of brainstem function on exam, however, there is no evidence of higher cortical function.    Recommend:  1. Agree with valproic acid 750mg IV q8h, repeat VPA level pending (although previous level less than 50, patient is hypoalbuminemic, therefore free fraction is higher).  EEG suggestive of a severe diffuse encephalopathy.  Suppression of post-anoxic myoclonus does not typically affect overall prognosis.  3. Minimize sedation, unless required for clinical stability  4. Prognosis guarded.    Joel Florez MD  Neurology Attending Physician

## 2023-05-15 ENCOUNTER — TRANSCRIPTION ENCOUNTER (OUTPATIENT)
Age: 78
End: 2023-05-15

## 2023-05-15 ENCOUNTER — OUTPATIENT (OUTPATIENT)
Dept: OUTPATIENT SERVICES | Facility: HOSPITAL | Age: 78
LOS: 1 days | End: 2023-05-15
Payer: COMMERCIAL

## 2023-05-15 DIAGNOSIS — Z90.49 ACQUIRED ABSENCE OF OTHER SPECIFIED PARTS OF DIGESTIVE TRACT: Chronic | ICD-10-CM

## 2023-05-15 DIAGNOSIS — I46.9 CARDIAC ARREST, CAUSE UNSPECIFIED: ICD-10-CM

## 2023-05-15 LAB
ALBUMIN SERPL ELPH-MCNC: 1.3 G/DL — LOW (ref 3.3–5)
ALP SERPL-CCNC: 74 U/L — SIGNIFICANT CHANGE UP (ref 40–120)
ALT FLD-CCNC: 10 U/L — SIGNIFICANT CHANGE UP (ref 10–45)
ANION GAP SERPL CALC-SCNC: 14 MMOL/L — SIGNIFICANT CHANGE UP (ref 5–17)
AST SERPL-CCNC: 18 U/L — SIGNIFICANT CHANGE UP (ref 10–40)
BASE EXCESS BLDA CALC-SCNC: -0.6 MMOL/L — SIGNIFICANT CHANGE UP (ref -2–3)
BASOPHILS # BLD AUTO: 0.05 K/UL — SIGNIFICANT CHANGE UP (ref 0–0.2)
BASOPHILS NFR BLD AUTO: 0.4 % — SIGNIFICANT CHANGE UP (ref 0–2)
BILIRUB SERPL-MCNC: 0.7 MG/DL — SIGNIFICANT CHANGE UP (ref 0.2–1.2)
BUN SERPL-MCNC: 107 MG/DL — HIGH (ref 7–23)
CALCIUM SERPL-MCNC: 8.6 MG/DL — SIGNIFICANT CHANGE UP (ref 8.4–10.5)
CHLORIDE SERPL-SCNC: 99 MMOL/L — SIGNIFICANT CHANGE UP (ref 96–108)
CO2 BLDA-SCNC: 27 MMOL/L — HIGH (ref 19–24)
CO2 SERPL-SCNC: 27 MMOL/L — SIGNIFICANT CHANGE UP (ref 22–31)
CREAT SERPL-MCNC: 4.95 MG/DL — HIGH (ref 0.5–1.3)
EGFR: 11 ML/MIN/1.73M2 — LOW
EOSINOPHIL # BLD AUTO: 0.17 K/UL — SIGNIFICANT CHANGE UP (ref 0–0.5)
EOSINOPHIL NFR BLD AUTO: 1.5 % — SIGNIFICANT CHANGE UP (ref 0–6)
GAS PNL BLDA: SIGNIFICANT CHANGE UP
GLUCOSE BLDC GLUCOMTR-MCNC: 135 MG/DL — HIGH (ref 70–99)
GLUCOSE BLDC GLUCOMTR-MCNC: 172 MG/DL — HIGH (ref 70–99)
GLUCOSE BLDC GLUCOMTR-MCNC: 188 MG/DL — HIGH (ref 70–99)
GLUCOSE BLDC GLUCOMTR-MCNC: 193 MG/DL — HIGH (ref 70–99)
GLUCOSE SERPL-MCNC: 214 MG/DL — HIGH (ref 70–99)
HCO3 BLDA-SCNC: 25 MMOL/L — SIGNIFICANT CHANGE UP (ref 21–28)
HCT VFR BLD CALC: 36 % — LOW (ref 39–50)
HGB BLD-MCNC: 11.2 G/DL — LOW (ref 13–17)
HOROWITZ INDEX BLDA+IHG-RTO: 35 — SIGNIFICANT CHANGE UP
IMM GRANULOCYTES NFR BLD AUTO: 1.7 % — HIGH (ref 0–0.9)
LYMPHOCYTES # BLD AUTO: 0.47 K/UL — LOW (ref 1–3.3)
LYMPHOCYTES # BLD AUTO: 4.1 % — LOW (ref 13–44)
MAGNESIUM SERPL-MCNC: 2.3 MG/DL — SIGNIFICANT CHANGE UP (ref 1.6–2.6)
MCHC RBC-ENTMCNC: 30.4 PG — SIGNIFICANT CHANGE UP (ref 27–34)
MCHC RBC-ENTMCNC: 31.1 GM/DL — LOW (ref 32–36)
MCV RBC AUTO: 97.6 FL — SIGNIFICANT CHANGE UP (ref 80–100)
MONOCYTES # BLD AUTO: 1.13 K/UL — HIGH (ref 0–0.9)
MONOCYTES NFR BLD AUTO: 10 % — SIGNIFICANT CHANGE UP (ref 2–14)
NEUTROPHILS # BLD AUTO: 9.33 K/UL — HIGH (ref 1.8–7.4)
NEUTROPHILS NFR BLD AUTO: 82.3 % — HIGH (ref 43–77)
NRBC # BLD: 0 /100 WBCS — SIGNIFICANT CHANGE UP (ref 0–0)
PCO2 BLDA: 48 MMHG — SIGNIFICANT CHANGE UP (ref 35–48)
PH BLDA: 7.33 — LOW (ref 7.35–7.45)
PHOSPHATE SERPL-MCNC: 6.1 MG/DL — HIGH (ref 2.5–4.5)
PLATELET # BLD AUTO: 85 K/UL — LOW (ref 150–400)
PO2 BLDA: 70 MMHG — LOW (ref 83–108)
POTASSIUM SERPL-MCNC: 3.8 MMOL/L — SIGNIFICANT CHANGE UP (ref 3.5–5.3)
POTASSIUM SERPL-SCNC: 3.8 MMOL/L — SIGNIFICANT CHANGE UP (ref 3.5–5.3)
PROCALCITONIN SERPL-MCNC: 1.12 NG/ML — HIGH
PROT SERPL-MCNC: 5.7 G/DL — LOW (ref 6–8.3)
RBC # BLD: 3.69 M/UL — LOW (ref 4.2–5.8)
RBC # FLD: 15.2 % — HIGH (ref 10.3–14.5)
SAO2 % BLDA: 95.7 % — SIGNIFICANT CHANGE UP (ref 94–98)
SODIUM SERPL-SCNC: 140 MMOL/L — SIGNIFICANT CHANGE UP (ref 135–145)
TROPONIN I, HIGH SENSITIVITY RESULT: 212.9 NG/L — HIGH
WBC # BLD: 11.34 K/UL — HIGH (ref 3.8–10.5)
WBC # FLD AUTO: 11.34 K/UL — HIGH (ref 3.8–10.5)

## 2023-05-15 PROCEDURE — 71045 X-RAY EXAM CHEST 1 VIEW: CPT | Mod: 26

## 2023-05-15 PROCEDURE — 36415 COLL VENOUS BLD VENIPUNCTURE: CPT

## 2023-05-15 PROCEDURE — 85025 COMPLETE CBC W/AUTO DIFF WBC: CPT

## 2023-05-15 PROCEDURE — 94799 UNLISTED PULMONARY SVC/PX: CPT

## 2023-05-15 PROCEDURE — 85730 THROMBOPLASTIN TIME PARTIAL: CPT

## 2023-05-15 PROCEDURE — 80053 COMPREHEN METABOLIC PANEL: CPT

## 2023-05-15 PROCEDURE — 84100 ASSAY OF PHOSPHORUS: CPT

## 2023-05-15 PROCEDURE — 83735 ASSAY OF MAGNESIUM: CPT

## 2023-05-15 PROCEDURE — 85610 PROTHROMBIN TIME: CPT

## 2023-05-15 PROCEDURE — 36600 WITHDRAWAL OF ARTERIAL BLOOD: CPT

## 2023-05-15 PROCEDURE — 99233 SBSQ HOSP IP/OBS HIGH 50: CPT

## 2023-05-15 PROCEDURE — 82803 BLOOD GASES ANY COMBINATION: CPT

## 2023-05-15 PROCEDURE — 70551 MRI BRAIN STEM W/O DYE: CPT

## 2023-05-15 PROCEDURE — 94002 VENT MGMT INPAT INIT DAY: CPT

## 2023-05-15 RX ORDER — FERROUS SULFATE 325(65) MG
1 TABLET ORAL
Refills: 0 | DISCHARGE

## 2023-05-15 RX ORDER — FUROSEMIDE 40 MG
1 TABLET ORAL
Refills: 0 | DISCHARGE

## 2023-05-15 RX ORDER — ALBUMIN HUMAN 25 %
100 VIAL (ML) INTRAVENOUS ONCE
Refills: 0 | Status: COMPLETED | OUTPATIENT
Start: 2023-05-15 | End: 2023-05-15

## 2023-05-15 RX ORDER — METOPROLOL TARTRATE 50 MG
0.5 TABLET ORAL
Qty: 0 | Refills: 0 | DISCHARGE

## 2023-05-15 RX ORDER — RAMIPRIL 5 MG
0 CAPSULE ORAL
Refills: 0 | DISCHARGE

## 2023-05-15 RX ORDER — POTASSIUM CHLORIDE 20 MEQ
1 PACKET (EA) ORAL
Refills: 0 | DISCHARGE

## 2023-05-15 RX ORDER — CLONAZEPAM 1 MG
1 TABLET ORAL
Refills: 0 | DISCHARGE

## 2023-05-15 RX ADMIN — Medication 57.5 MILLIGRAM(S): at 18:31

## 2023-05-15 RX ADMIN — Medication 2: at 05:45

## 2023-05-15 RX ADMIN — PROPOFOL 1.84 MICROGRAM(S)/KG/MIN: 10 INJECTION, EMULSION INTRAVENOUS at 01:45

## 2023-05-15 RX ADMIN — SIMVASTATIN 40 MILLIGRAM(S): 20 TABLET, FILM COATED ORAL at 21:30

## 2023-05-15 RX ADMIN — Medication 57.5 MILLIGRAM(S): at 02:00

## 2023-05-15 RX ADMIN — CHLORHEXIDINE GLUCONATE 15 MILLILITER(S): 213 SOLUTION TOPICAL at 18:31

## 2023-05-15 RX ADMIN — CHLORHEXIDINE GLUCONATE 15 MILLILITER(S): 213 SOLUTION TOPICAL at 05:56

## 2023-05-15 RX ADMIN — Medication 5.74 MICROGRAM(S)/KG/MIN: at 19:25

## 2023-05-15 RX ADMIN — PROPOFOL 1.84 MICROGRAM(S)/KG/MIN: 10 INJECTION, EMULSION INTRAVENOUS at 21:28

## 2023-05-15 RX ADMIN — CHLORHEXIDINE GLUCONATE 1 APPLICATION(S): 213 SOLUTION TOPICAL at 05:56

## 2023-05-15 RX ADMIN — MIDODRINE HYDROCHLORIDE 5 MILLIGRAM(S): 2.5 TABLET ORAL at 21:30

## 2023-05-15 RX ADMIN — PANTOPRAZOLE SODIUM 40 MILLIGRAM(S): 20 TABLET, DELAYED RELEASE ORAL at 11:23

## 2023-05-15 RX ADMIN — Medication 2: at 11:22

## 2023-05-15 RX ADMIN — PIPERACILLIN AND TAZOBACTAM 25 GRAM(S): 4; .5 INJECTION, POWDER, LYOPHILIZED, FOR SOLUTION INTRAVENOUS at 18:31

## 2023-05-15 RX ADMIN — Medication 57.5 MILLIGRAM(S): at 11:22

## 2023-05-15 RX ADMIN — MIDODRINE HYDROCHLORIDE 5 MILLIGRAM(S): 2.5 TABLET ORAL at 05:56

## 2023-05-15 RX ADMIN — Medication 50 MILLILITER(S): at 09:38

## 2023-05-15 RX ADMIN — PIPERACILLIN AND TAZOBACTAM 25 GRAM(S): 4; .5 INJECTION, POWDER, LYOPHILIZED, FOR SOLUTION INTRAVENOUS at 06:00

## 2023-05-15 RX ADMIN — CLOPIDOGREL BISULFATE 75 MILLIGRAM(S): 75 TABLET, FILM COATED ORAL at 11:22

## 2023-05-15 RX ADMIN — Medication 2: at 23:40

## 2023-05-15 NOTE — PROGRESS NOTE ADULT - SUBJECTIVE AND OBJECTIVE BOX
Follow-up Critical Care Progress Note  Chief Complaint : Atrial fibrillation      Patient seen and examined  continues to have poor neurological status  episodes of myoclonus on exam       Allergies :sulfa drugs (Unknown)      PAST MEDICAL & SURGICAL HISTORY:  HTN (hypertension)  HLD (hyperlipidemia)  Diabetes  CAD (coronary artery disease)  History of cholecystectomy        Medications:  MEDICATIONS  (STANDING):  bisacodyl Suppository 10 milliGRAM(s) Rectal daily  chlorhexidine 0.12% Liquid 15 milliLiter(s) Oral Mucosa every 12 hours  chlorhexidine 2% Cloths 1 Application(s) Topical <User Schedule>  clopidogrel Tablet 75 milliGRAM(s) Oral daily  dextrose 5%. 1000 milliLiter(s) (100 mL/Hr) IV Continuous <Continuous>  dextrose 5%. 1000 milliLiter(s) (50 mL/Hr) IV Continuous <Continuous>  dextrose 50% Injectable 25 Gram(s) IV Push once  dextrose 50% Injectable 25 Gram(s) IV Push once  dextrose 50% Injectable 12.5 Gram(s) IV Push once  insulin lispro (ADMELOG) corrective regimen sliding scale   SubCutaneous every 6 hours  midodrine 5 milliGRAM(s) Oral every 8 hours  norepinephrine Infusion 0.05 MICROgram(s)/kG/Min (5.74 mL/Hr) IV Continuous <Continuous>  pantoprazole   Suspension 40 milliGRAM(s) Oral daily  piperacillin/tazobactam IVPB.. 3.375 Gram(s) IV Intermittent every 12 hours  polyethylene glycol 3350 17 Gram(s) Oral daily  propofol Infusion 5.011 MICROgram(s)/kG/Min (1.84 mL/Hr) IV Continuous <Continuous>  senna 2 Tablet(s) Oral at bedtime  simvastatin 40 milliGRAM(s) Oral at bedtime  valproate sodium  IVPB 750 milliGRAM(s) IV Intermittent every 8 hours    MEDICATIONS  (PRN):  albuterol/ipratropium for Nebulization 3 milliLiter(s) Nebulizer every 6 hours PRN Shortness of Breath and/or Wheezing  sodium chloride 0.9% lock flush 10 milliLiter(s) IV Push every 1 hour PRN Pre/post blood products, medications, blood draw, and to maintain line patency      Antibiotics History  piperacillin/tazobactam IVPB. 3.375 Gram(s) IV Intermittent once, 05-09-23 @ 10:58, Stop order after: 1 Doses  piperacillin/tazobactam IVPB.- 3.375 Gram(s) IV Intermittent once, 05-09-23 @ 15:00  piperacillin/tazobactam IVPB.. 3.375 Gram(s) IV Intermittent every 8 hours, 05-09-23 @ 22:00, Stop order after: 7 Days  piperacillin/tazobactam IVPB.. 3.375 Gram(s) IV Intermittent every 12 hours, 05-11-23 @ 22:27, Stop order after: 7 Days      Heme Medications   clopidogrel Tablet 75 milliGRAM(s) Oral daily, 05-11-23 @ 17:42      GI Medications  bisacodyl Suppository 10 milliGRAM(s) Rectal daily, 05-13-23 @ 10:06, Now  pantoprazole   Suspension 40 milliGRAM(s) Oral daily, 05-13-23 @ 09:57, Routine  polyethylene glycol 3350 17 Gram(s) Oral daily, 05-13-23 @ 10:06, Now  senna 2 Tablet(s) Oral at bedtime, 05-13-23 @ 09:58, Routine      COVID  05-04-23 @ 19:50  COVID -   NotDetec  07-13-22 @ 18:33  COVID -   [facility]      COVID Biomarkers    05-04-23 @ 19:50 ESR --  ---  CRP --  ---  DDimer  351<H>   ---   LDH --   ---   Ferritin --       Trend Cardiac Enzymes  05-15-23 @ 05:45  XEI-HWBYN-UJAAZ-CPKMM/Trop I - -- - --  - --  -  --  /  212.9<H>  05-14-23 @ 11:30  BKG-PATCO-TRCYA-CPKMM/Trop I - 37 - --  - --  -  --  /  --  05-14-23 @ 05:00  RXQ-KAKNQ-RGPRC-CPKMM/Trop I - -- - --  - --  -  --  /  333.8<H>  05-13-23 @ 05:00  HUF-SJNXR-PGNHW-CPKMM/Trop I - -- - --  - --  -  --  /  371.6<H>    Trend BNP    Procalcitonin Trend  05-15-23 @ 05:45   -   1.12<H>  05-09-23 @ 13:42   -   0.11<H>    WBC Trend  05-15-23 @ 05:45   -  11.34<H>  05-14-23 @ 05:00   -  14.06<H>  05-13-23 @ 05:00   -  16.89<H>    H/H Trend  05-15-23 @ 05:45   -   11.2<L>/ 36.0<L>  05-14-23 @ 05:00   -   11.3<L>/ 35.0<L>  05-13-23 @ 05:00   -   12.1<L>/ 38.3<L>  05-12-23 @ 05:35   -   11.6<L>/ 35.6<L>  05-11-23 @ 06:05   -   12.4<L>/ 38.3<L>  05-10-23 @ 05:10   -   13.5/ 42.6    Stool Occult Blood    Platelet Trend  05-15-23 @ 05:45   -  85<L>  05-14-23 @ 05:00   -  76<L>  05-13-23 @ 05:00   -  92<L>    Trend Sodium  05-15-23 @ 05:45   -  140  05-14-23 @ 05:00   -  145  05-13-23 @ 05:00   -  140    Trend Potassium  05-15-23 @ 05:45   -  3.8  05-14-23 @ 05:00   -  3.8  05-13-23 @ 05:00   -  4.1    Trend Bun/Cr  05-15-23 @ 05:45  BUN/CR -  107<H> / 4.95<H>  05-14-23 @ 05:00  BUN/CR -  88<H> / 4.51<H>  05-13-23 @ 05:00  BUN/CR -  87<H> / 4.10<H>    Lactic Acid Trend  05-13-23 @ 05:00   -   0.9  05-09-23 @ 15:20   -   1.8  05-09-23 @ 13:42   -   2.0  05-09-23 @ 10:25   -   12.6<HH>    ABG Trend  05-15-23 @ 04:45   - 7.33<L>/48/70<L>/95.7  05-14-23 @ 05:20   - 7.40/46/72<L>/96.5  05-13-23 @ 06:00   - 7.34<L>/46/93/98.7<H>  05-13-23 @ 03:35   - 7.31<L>/46/108/99.6<H>  05-12-23 @ 23:15   - 7.31<L>/49<H>/79<L>/97.1  05-11-23 @ 07:55   - 7.41/42/152<H>/99.2<H>  05-10-23 @ 04:13   - 7.39/47/129<H>/99.5<H>  05-09-23 @ 23:40   - 7.37/49<H>/75<L>/96.9  05-09-23 @ 10:16   - 7.10<LL>/83<HH>/75<L>/91.2<L>  05-09-23 @ 02:00   - 7.39/54<H>/67<L>/94.8  05-05-23 @ 15:00   - 7.38/48/96/98.3<H>    Trend AST/ALT/ALK Phos/Bili  05-15-23 @ 05:45   18/10/74/0.7  05-14-23 @ 05:00   14/10/71/1.0  05-13-23 @ 05:00   17/10/93/1.2  05-12-23 @ 05:35   22/15/75/1.6<H>  05-11-23 @ 06:05   18/12/82/1.8<H>  05-10-23 @ 05:10   33/17/86/2.3<H>  05-09-23 @ 13:42   40/24/115/1.0  05-09-23 @ 10:25   39/28/143<H>/0.7  05-04-23 @ 19:50   22/21/123<H>/0.9  07-15-22 @ 06:15   24/11/83/0.8  07-14-22 @ 06:30   13/11/95/0.7  07-13-22 @ 16:10   14/22/112/1.0      Ammonia Trend  05-14-23 @ 11:30   -   53  05-14-23 @ 05:00   -   16       Albumin Trend  05-15-23 @ 05:45   -   1.3<L>  05-14-23 @ 05:00   -   1.3<L>  05-13-23 @ 05:00   -   1.5<L>  05-12-23 @ 05:35   -   1.5<L>  05-11-23 @ 06:05   -   1.7<L>  05-10-23 @ 05:10   -   2.3<L>      PTT - PT - INR Trend  05-14-23 @ 13:45   -   33.6 - -- - --  05-14-23 @ 11:30   -   34.1 - 14.9<H> - 1.28<H>  05-14-23 @ 05:00   -   50.3<H> - -- - --  05-13-23 @ 05:00   -   59.8<H> - -- - --  05-13-23 @ 02:50   -   58.5<H> - -- - --  05-12-23 @ 20:15   -   60.1<H> - -- - --    Glucose Trend  05-15-23 @ 05:45   -  -- 214<H> --  05-15-23 @ 05:43   -  -- -- 188<H>  05-14-23 @ 23:22   -  -- -- 180<H>  05-14-23 @ 17:55   -  -- -- 217<H>  05-14-23 @ 14:05   -  -- -- 226<H>  05-14-23 @ 05:13   -  -- -- 140<H>  05-14-23 @ 05:00   -  -- 159<H> --  05-13-23 @ 23:23   -  -- -- 153<H>  05-13-23 @ 17:51   -  -- -- 206<H>  05-13-23 @ 12:22   -  -- -- 179<H>    A1C with Estimated Average Glucose Result: 7.7 % *H* [4.0 - 5.6] (05-05-23 @ 08:00)      LABS:                        11.2   11.34 )-----------( 85       ( 15 May 2023 05:45 )             36.0     05-15    140  |  99  |  107<H>  ----------------------------<  214<H>  3.8   |  27  |  4.95<H>    Ca    8.6      15 May 2023 05:45  Phos  6.1     05-15  Mg     2.3     05-15    TPro  5.7<L>  /  Alb  1.3<L>  /  TBili  0.7  /  DBili  x   /  AST  18  /  ALT  10  /  AlkPhos  74  05-15       RADIOLOGY   < from: Xray Chest 1 View- PORTABLE-Urgent (Xray Chest 1 View- PORTABLE-Urgent .) (05.13.23 @ 17:38) >    ACC: [id number] EXAM:  XR CHEST PORTABLE URGENT 1V   ORDERED BY: KOLBY BOND     PROCEDURE DATE:  05/13/2023          INTERPRETATION:  Portable chest radiograph    CLINICAL INFORMATION: NG tube placement    TECHNIQUE: Portable AP LEFT lower chest/upper abdomen radiograph for NG   tube placement.    COMPARISON: 5/13/2023 chest x-ray at 8:35 AM .    FINDINGS:  CATHETERS AND TUBES: Tip of NG tube in gastric fundus.  ET tube tip above tracheal bifurcation.  RIGHT IJ catheter tip in SVC.      PULMONARY: Suspect bilateral mild pleural effusions and/or basilar airspace disease obscuring diaphragm contours.No pneumothorax.    HEART/VASCULAR: The heart is mildly enlarged in transverse diameter. Status post median sternotomy.    BONES: Visualized osseous thorax intact.    IMPRESSION:   NG tube tip in gastric fundus..    --- End of Report ---    < end of copied text >  CXR 5/15/23 - b.l pl effusions     VITALS:  T(C): 37.3 (05-15-23 @ 03:00), Max: 37.3 (05-15-23 @ 03:00)  T(F): 99.2 (05-15-23 @ 03:00), Max: 99.2 (05-15-23 @ 03:00)  HR: 71 (05-15-23 @ 09:31) (71 - 94)  BP: 112/59 (05-15-23 @ 09:15) (99/66 - 160/76)  BP(mean): 74 (05-15-23 @ 09:15) (65 - 124)  ABP: 119/48 (05-15-23 @ 09:15) (96/42 - 181/69)  ABP(mean): 70 (05-15-23 @ 09:15) (58 - 105)  RR: 22 (05-15-23 @ 09:15) (14 - 28)  SpO2: 99% (05-15-23 @ 09:31) (96% - 100%)  CVP(mm Hg): --  CVP(cm H2O): --    Ins and Outs     05-14-23 @ 07:01  -  05-15-23 @ 07:00  --------------------------------------------------------  IN: 2217.2 mL / OUT: 660 mL / NET: 1557.2 mL    05-15-23 @ 07:01  -  05-15-23 @ 10:18  --------------------------------------------------------  IN: 0 mL / OUT: 35 mL / NET: -35 mL            Device: Avea, Mode: AC/ CMV (Assist Control/ Continuous Mandatory Ventilation), RR (machine): 14, RR (patient): 18, TV (machine): 450, TV (patient): 430, FiO2: 35, PEEP: 5, ITime: 0.8, MAP: 10, PIP: 23    I&O's Detail    14 May 2023 07:01  -  15 May 2023 07:00  --------------------------------------------------------  IN:    Enteral Tube Flush: 60 mL    Free Water: 1000 mL    IV PiggyBack: 100 mL    IV PiggyBack: 175 mL    Nepro with Carb Steady: 820 mL    Norepinephrine: 51.4 mL    Propofol: 10.8 mL  Total IN: 2217.2 mL    OUT:    Indwelling Catheter - Urethral (mL): 660 mL  Total OUT: 660 mL    Total NET: 1557.2 mL      15 May 2023 07:01  -  15 May 2023 10:18  --------------------------------------------------------  IN:  Total IN: 0 mL    OUT:    Indwelling Catheter - Urethral (mL): 35 mL  Total OUT: 35 mL    Total NET: -35 mL

## 2023-05-15 NOTE — PROGRESS NOTE ADULT - SUBJECTIVE AND OBJECTIVE BOX
s: pt's off proprolol since 7am     Vital Signs Last 24 Hrs  T(C): 37.3 (15 May 2023 03:00), Max: 37.3 (14 May 2023 09:15)  T(F): 99.2 (15 May 2023 03:00), Max: 99.2 (15 May 2023 03:00)  HR: 80 (15 May 2023 09:00) (72 - 94)  BP: 120/68 (15 May 2023 09:00) (99/66 - 160/76)  BP(mean): 84 (15 May 2023 09:00) (65 - 124)  RR: 21 (15 May 2023 09:00) (14 - 28)  SpO2: 100% (15 May 2023 09:00) (96% - 100%)    Parameters below as of 15 May 2023 06:00  Patient On (Oxygen Delivery Method): room air    no purposeful response to noxious stimuli  not overbreathing vent, midline gaze. corneal reflex positive bilaterally. As per nurse there was gag reflex.   motor: myoclonic jerks noted occasionally.  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 There is brainstem function. Continue valproic acid 750mg IV q8. Repeat VPA levels. Due to his low albumin levels, the free form of vpa may be higher. EEG suggests severe diffuse encephalopathy. Will await today's EEG. His myoclonus didn't have an EEG correlation previously as per EEG tech. Will see today's EEG and if there's any suggestion of seizure activity, may benefit from adding vimpat 50mg bid. With addition of new medication, there's always medication side effect that may interfere with our assessment of his neuro exam. [place]. Dr. Florez and Christ.

## 2023-05-15 NOTE — DISCHARGE NOTE PROVIDER - HOSPITAL COURSE
Mr. Avila is an 78 year old male with a PMH of mild dementia, heart failure, CAD s/p CABG with known RCA occlusion, HLD, and type 2 DM who presented to [facility] on 5/4 for heart failure exacerbation who had a choking episode on 5/9 that subsequently progressed to cardiac arrest lasting ~8 minutes with intubation. Pt now post arrest noted with ?anoxic brain injury and myoclonus requiring MRI for evaluation. EEG is suggestive of a severe diffuse encephalopathy. Pt has also required vasopressor support post arrest with sedation and a heparin gtt as tx for NSTEMI vs. demand ischemia both have been stopped at this point. Pt remans on propofol and full ventilator support.    Plan to transfer today to [facility] for MRI given their capabilities via the [facility] transfer center and EMS services. Mr. Avila is an 78 year old male with a PMH of mild dementia, heart failure, CAD s/p CABG with known RCA occlusion, HLD, and type 2 DM who presented to [facility] on 5/4 for heart failure exacerbation who had a choking episode on 5/9 that subsequently progressed to cardiac arrest lasting ~8 minutes with intubation.  Post arrest patient required vasopressor support and heparin gtt as tx for NSTEMI vs. demand ischemia both have stopped at this point.  Post arrest noted with possible anoxic brain injury and myoclonus, spot EEG suggestive of a severe diffuse encephalopathy.  MRI done yesterday with right frontal and parietal stroke likely due to hypoperfusion. Added biviact today to help with his mental status. Will benefit from continuous video EEG monitoring to evaluate for intermittent subclinical seizures law with the [employer] which put him more at risk for seizure    Plan to transfer today to [place] for 24 hour EEG given their capabilities via the [facility] transfer center and EMS services. This is only a summary of events during admission, please refer to full medical record for details of hospital stay. Mr. Aivla is an 78 year old male with a PMH of mild dementia, heart failure, CAD s/p CABG with known RCA occlusion, dyslipidemia, and type 2 DM who presented to [facility] on 5/4 for heart failure exacerbation who had a choking episode on 5/9 that subsequently progressed to cardiac arrest lasting ~8 minutes with intubation.  Post arrest patient required vasopressor support and heparin gtt as tx for NSTEMI vs. demand ischemia both have stopped at this point.  Post arrest noted with possible anoxic brain injury and myoclonus, spot EEG suggestive of a severe diffuse encephalopathy.  MRI done yesterday with right frontal and parietal stroke likely due to hypoperfusion. Added biviact today to help with his mental status. Will benefit from continuous video EEG monitoring to evaluate for intermittent subclinical seizures law with the [employer] which put him more at risk for seizure    Plan to transfer today to [place] for 24 hour EEG given their capabilities via the [facility] transfer center and EMS services. This is only a summary of events during admission, please refer to full medical record for details of hospital stay. Mr. Avila is an 78 year old male with a PMH of mild dementia, heart failure, CAD s/p CABG with known RCA occlusion, dyslipidemia, and type 2 DM who presented to [facility] on 5/4 for heart failure exacerbation who had a choking episode on 5/9 that subsequently progressed to cardiac arrest lasting ~8 minutes with intubation.  Post arrest patient required vasopressor support and heparin gtt as tx for NSTEMI vs. demand ischemia both have stopped at this point.  Post arrest noted with possible anoxic brain injury and myoclonus, spot EEG suggestive of a severe diffuse encephalopathy.  MRI done yesterday with right frontal and parietal stroke likely due to hypoperfusion. Added biviact today to help with his mental status. Will benefit from continuous video EEG monitoring to evaluate for intermittent subclinical seizures law with the [employer] which put him more at risk for seizure    Plan to transfer today to [place] for 24 hour EEG given their capabilities via the [facility] transfer center and EMS services. This is only a summary of events during admission, please refer to full medical record for details of hospital stay.  patient can return to [facility] when ready Mr. Avila is an 78 year old male with a PMH of mild dementia, heart failure, CAD s/p CABG with known RCA occlusion, dyslipidemia, and type 2 DM who presented to [facility] on 5/4 for heart failure exacerbation who had a choking episode on 5/9 that subsequently progressed to cardiac arrest lasting ~8 minutes with intubation.  Post arrest patient required vasopressor support and heparin gtt as tx for NSTEMI vs. demand ischemia both have stopped at this point.  Post arrest noted with possible anoxic brain injury and myoclonus, spot EEG suggestive of a severe diffuse encephalopathy.  MRI done yesterday with right frontal and parietal stroke likely due to hypoperfusion. Pt went to [place] for continuous video EEG monitoring to evaluate for intermittent subclinical seizures, status epilepticus and prognostication.   At [facility], Pt was noted to have hematochezia and blood thinners were held. No other episodes of hematochezia observed. Neurology and Epilepsy discussed with pt's daughter that the EEG shows decreased and worsening brain function. No seizures. Anti-epileptic medications were discontinued. Goals of care discussed with daughter.   Pt  transferred back to [facility] for continued management.  Patient had a permacath placed at [facility] for HD on 7/3.           Mr. Avila is an 78 year old male with a PMH of mild dementia, heart failure, CAD s/p CABG with known RCA occlusion, dyslipidemia, and type 2 DM who presented to [facility] on 5/4 for heart failure exacerbation who had a choking episode on 5/9 that subsequently progressed to cardiac arrest lasting ~8 minutes with intubation.  Post arrest patient required vasopressor support and heparin gtt as tx for NSTEMI vs. demand ischemia. Both have stopped at this point.  Post arrest noted with possible anoxic brain injury and myoclonus, spot EEG suggestive of a severe diffuse encephalopathy.  MRI showed right frontal and parietal stroke likely due to hypoperfusion. Pt went to [place] for continuous video EEG monitoring to evaluate for intermittent subclinical seizures, status epilepticus and prognostication. At [facility], Pt was noted to have hematochezia and blood thinners were held. No other episodes of hematochezia observed. Neurology and Epilepsy discussed with pt's daughter that the EEG showed decreased and worsening brain function. No seizures. Anti-epileptic medications were discontinued. Goals of care discussed with daughter. Pt  transferred back to [facility] for continued management.  Patient had a permacath placed at [facility] for HD on 7/3. Received HD on 7/6.   Eliquis resumed.   Accepted to [facility] for subacute rehab. Stable for transfer to [facility]            Mr. Avila is an 78 year old male with a PMH of mild dementia, heart failure, CAD s/p CABG with known RCA occlusion, dyslipidemia, and type 2 DM who presented to [facility] on 5/4 for heart failure exacerbation who had a choking episode on 5/9 that subsequently progressed to cardiac arrest lasting ~8 minutes with intubation.  Post arrest patient required vasopressor support and heparin gtt as tx for NSTEMI vs. demand ischemia. Both have stopped at this point.  Post arrest noted with possible anoxic brain injury and myoclonus, spot EEG suggestive of a severe diffuse encephalopathy.  MRI showed right frontal and parietal stroke likely due to hypoperfusion. Pt went to [place] for continuous video EEG monitoring to evaluate for intermittent subclinical seizures, status epilepticus and prognostication. At [facility], Pt was noted to have hematochezia and blood thinners were held. No other episodes of hematochezia observed. Neurology and Epilepsy discussed with pt's daughter that the EEG showed decreased and worsening brain function. No seizures. Anti-epileptic medications were discontinued. Goals of care discussed with daughter. Pt  transferred back to [facility] for continued management.  Patient had a permacath placed at [facility] for HD on 7/3. Received HD on 7/6.   Eliquis resumed.   Accepted to [facility] for subacute rehab. Stable for transfer to [facility].

## 2023-05-15 NOTE — DISCHARGE NOTE PROVIDER - NSDCFUADDINST_GEN_ALL_CORE_FT
Mode: AC/ CMV (Assist Control/ Continuous Mandatory Ventilation)  RR (machine): 14  TV (machine): 450  FiO2: 30  PEEP: 5  ITime: 0.8  MAP: 9  PIP: 20  Guillen to gravity

## 2023-05-15 NOTE — DISCHARGE NOTE PROVIDER - ATTENDING DISCHARGE PHYSICAL EXAMINATION:
General: NAD, appears cachetic and chronically ill   Lungs: Clear to auscultation bilaterally, good air entry, non-labored breathing  Cardio: RRR, S1/S2. Right chest wall permacath   Abdomen: Soft, Nontender, Nondistended; Bowel sounds present. PEG tube   Extremities: No calf tenderness, No cyanosis, No pitting edema  Psych: Calm, resting but arousable with verbal/physical stimuli

## 2023-05-15 NOTE — DISCHARGE NOTE PROVIDER - NSDCHC_MEDRECSTATUS_GEN_ALL_CORE
Admission Reconciliation is Not Complete  Discharge Reconciliation is Completed Admission Reconciliation is Not Complete  Discharge Reconciliation is Not Complete Admission Reconciliation is Completed  Discharge Reconciliation is Completed Admission Reconciliation is Completed  Discharge Reconciliation is Not Complete

## 2023-05-15 NOTE — DISCHARGE NOTE PROVIDER - DETAILS OF MALNUTRITION DIAGNOSIS/DIAGNOSES
This patient has been assessed with a concern for Malnutrition and was treated during this hospitalization for the following Nutrition diagnosis/diagnoses:     -  05/08/2023: Severe protein-calorie malnutrition

## 2023-05-15 NOTE — DISCHARGE NOTE PROVIDER - NSDCCPCAREPLAN_GEN_ALL_CORE_FT
PRINCIPAL DISCHARGE DIAGNOSIS  Diagnosis: New onset atrial fibrillation  Assessment and Plan of Treatment:       SECONDARY DISCHARGE DIAGNOSES  Diagnosis: Acute on chronic systolic congestive heart failure  Assessment and Plan of Treatment:      PRINCIPAL DISCHARGE DIAGNOSIS  Diagnosis: New onset atrial fibrillation  Assessment and Plan of Treatment:       SECONDARY DISCHARGE DIAGNOSES  Diagnosis: Acute on chronic systolic congestive heart failure  Assessment and Plan of Treatment:     Diagnosis: CAD (coronary artery disease)  Assessment and Plan of Treatment:     Diagnosis: Type 2 diabetes mellitus  Assessment and Plan of Treatment:     Diagnosis: Dyslipidemia  Assessment and Plan of Treatment:     Diagnosis: Dementia  Assessment and Plan of Treatment:

## 2023-05-15 NOTE — PROGRESS NOTE ADULT - SUBJECTIVE AND OBJECTIVE BOX
Intubated in ICU    Vital Signs Last 24 Hrs  T(C): 36.5 (05-15-23 @ 20:00), Max: 37.3 (05-15-23 @ 03:00)  T(F): 97.7 (05-15-23 @ 20:00), Max: 99.2 (05-15-23 @ 03:00)  HR: 82 (05-15-23 @ 22:00) (66 - 88)  BP: 114/58 (05-15-23 @ 22:00) (94/53 - 193/112)  BP(mean): 73 (05-15-23 @ 22:00) (65 - 145)  RR: 17 (05-15-23 @ 22:00) (14 - 25)  SpO2: 97% (05-15-23 @ 22:00) (94% - 100%)    I&O's Detail    14 May 2023 07:01  -  15 May 2023 07:00  --------------------------------------------------------  IN:    Enteral Tube Flush: 60 mL    Free Water: 1000 mL    IV PiggyBack: 100 mL    IV PiggyBack: 175 mL    Nepro with Carb Steady: 820 mL    Norepinephrine: 53.7 mL    Propofol: 12.6 mL  Total IN: 2221.3 mL    OUT:    Indwelling Catheter - Urethral (mL): 660 mL  Total OUT: 660 mL    Total NET: 1561.3 mL    15 May 2023 07:01  -  15 May 2023 22:31  --------------------------------------------------------  IN:    IV PiggyBack: 100 mL    IV PiggyBack: 100 mL    IV PiggyBack: 125 mL    Nepro with Carb Steady: 210 mL    Norepinephrine: 3.4 mL    Propofol: 7.2 mL  Total IN: 545.6 mL    OUT:    Indwelling Catheter - Urethral (mL): 340 mL  Total OUT: 340 mL    Total NET: 205.6 mL    Mode: AC/ CMV (Assist Control/ Continuous Mandatory Ventilation)  RR (machine): 14  TV (machine): 450  FiO2: 35  PEEP: 5  ITime: 0.8  MAP: 9  PIP: 23    s1s2  b/l air entry  soft, ND  tr edema                                                         10.3   8.29  )-----------( 68       ( 15 May 2023 13:55 )             33.2     15 May 2023 13:55    136    |  106    |  108    ----------------------------<  181    3.4     |  26     |  4.70     Ca    8.6        15 May 2023 13:55  Phos  4.9       15 May 2023 13:55  Mg     2.6       15 May 2023 13:55    TPro  5.9    /  Alb  1.8    /  TBili  0.8    /  DBili  x      /  AST  17     /  ALT  10     /  AlkPhos  62     15 May 2023 13:55    LIVER FUNCTIONS - ( 15 May 2023 13:55 )  Alb: 1.8 g/dL / Pro: 5.9 g/dL / ALK PHOS: 62 U/L / ALT: 10 U/L / AST: 17 U/L / GGT: x           PT/INR - ( 15 May 2023 13:55 )   PT: 14.0 sec;   INR: 1.19 ratio      CARDIAC MARKERS ( 14 May 2023 11:30 )  x     / x     / 37 U/L / x     / x        albuterol/ipratropium for Nebulization 3 milliLiter(s) Nebulizer every 6 hours PRN  bisacodyl Suppository 10 milliGRAM(s) Rectal daily  chlorhexidine 0.12% Liquid 15 milliLiter(s) Oral Mucosa every 12 hours  chlorhexidine 2% Cloths 1 Application(s) Topical <User Schedule>  clopidogrel Tablet 75 milliGRAM(s) Oral daily  dextrose 5%. 1000 milliLiter(s) IV Continuous <Continuous>  dextrose 5%. 1000 milliLiter(s) IV Continuous <Continuous>  dextrose 50% Injectable 25 Gram(s) IV Push once  dextrose 50% Injectable 25 Gram(s) IV Push once  dextrose 50% Injectable 12.5 Gram(s) IV Push once  insulin lispro (ADMELOG) corrective regimen sliding scale   SubCutaneous every 6 hours  midodrine 5 milliGRAM(s) Oral every 8 hours  norepinephrine Infusion 0.05 MICROgram(s)/kG/Min IV Continuous <Continuous>  pantoprazole   Suspension 40 milliGRAM(s) Oral daily  piperacillin/tazobactam IVPB.. 3.375 Gram(s) IV Intermittent every 12 hours  polyethylene glycol 3350 17 Gram(s) Oral daily  propofol Infusion 5.011 MICROgram(s)/kG/Min IV Continuous <Continuous>  senna 2 Tablet(s) Oral at bedtime  simvastatin 40 milliGRAM(s) Oral at bedtime  sodium chloride 0.9% lock flush 10 milliLiter(s) IV Push every 1 hour PRN  valproate sodium  IVPB 750 milliGRAM(s) IV Intermittent every 8 hours    A/P:    Hemodynamic ATN iso resp failure, s/p arrest, asp PNA (Cr 1.6 - 5/9/23, Cr 1.0 - 4/12/23)  Hx CM, CHF, EF 35-40%, dementia  BP, resp support, anti-sz tx per ICU team  Avoid nephrotoxins  Will f/u BMP, UO   Lytes are stable  Hopefully will avoid the need for RRT  Overall prognosis is poor w/or w/o RRT  D/w ICU team    [phone]

## 2023-05-15 NOTE — CHART NOTE - NSCHARTNOTEFT_GEN_A_CORE
Nutrition Follow Up Note  Hospital Course (Per Electronic Medical Record):   Source: Medical Record [X] Nursing Staff [X]     Diet: Nepro @ 35ml/hr via NGT x 24hrs with 250ml free water q 6 hrs     Patient remains intubated ,Propofol off since 7am , NGT feeds infusing @ 35ml/hr x 24 hrs tolerating as per nursing , Current EN feeds are providing 1512kcals, 68gms protein [id number] ,& additional 100ml free water provided for hydration ,  Labs reviewed , Renal following ,urine output noted improving . Neuro note reviewed , POCT noted  , hx of DM , corrective insulin noted .     Current Weight: (5/12) 147.2/66.8kg                         (5/11) 146.6/66.5kg                         (5/8) 117.9/53.5kg                         (5/7) 117.5/53.3kg             Pertinent Medications: MEDICATIONS  (STANDING):  bisacodyl Suppository 10 milliGRAM(s) Rectal daily  chlorhexidine 0.12% Liquid 15 milliLiter(s) Oral Mucosa every 12 hours  chlorhexidine 2% Cloths 1 Application(s) Topical <User Schedule>  clopidogrel Tablet 75 milliGRAM(s) Oral daily  dextrose 5%. 1000 milliLiter(s) (50 mL/Hr) IV Continuous <Continuous>  dextrose 5%. 1000 milliLiter(s) (100 mL/Hr) IV Continuous <Continuous>  dextrose 50% Injectable 25 Gram(s) IV Push once  dextrose 50% Injectable 25 Gram(s) IV Push once  dextrose 50% Injectable 12.5 Gram(s) IV Push once  insulin lispro (ADMELOG) corrective regimen sliding scale   SubCutaneous every 6 hours  midodrine 5 milliGRAM(s) Oral every 8 hours  norepinephrine Infusion 0.05 MICROgram(s)/kG/Min (5.74 mL/Hr) IV Continuous <Continuous>  pantoprazole   Suspension 40 milliGRAM(s) Oral daily  piperacillin/tazobactam IVPB.. 3.375 Gram(s) IV Intermittent every 12 hours  polyethylene glycol 3350 17 Gram(s) Oral daily  propofol Infusion 5.011 MICROgram(s)/kG/Min (1.84 mL/Hr) IV Continuous <Continuous>  senna 2 Tablet(s) Oral at bedtime  simvastatin 40 milliGRAM(s) Oral at bedtime  valproate sodium  IVPB 750 milliGRAM(s) IV Intermittent every 8 hours    MEDICATIONS  (PRN):  albuterol/ipratropium for Nebulization 3 milliLiter(s) Nebulizer every 6 hours PRN Shortness of Breath and/or Wheezing  sodium chloride 0.9% lock flush 10 milliLiter(s) IV Push every 1 hour PRN Pre/post blood products, medications, blood draw, and to maintain line patency      Pertinent Labs:  05-15 Na140 mmol/L Glu 214 mg/dL<H> K+ 3.8 mmol/L Cr  4.95 mg/dL<H>  mg/dL<H> 05-15 Phos 6.1 mg/dL<H> 05-15 Alb 1.3 g/dL<L> 05-07 Chol 172 mg/dL LDL --    HDL 39 mg/dL<L> Trig 87 mg/dL, Hgb11.2g/dl<L>, Hct 36% <L>   POCT 188,180,217.226      Skin: sacral DTI     Edema: (+3) generalized     Last BM: (5/15)    Estimated Needs:   [X] No Change since Previous Assessment      Previous Nutrition Diagnosis: Severe Protein Calorie Malnutrition, ongoing & addressed with EN feeds , Inadequate EN , resolved ,          New Nutrition Diagnosis: [X] Not Applicable      Interventions:   1. monitor tolerance to EN feeds   2. follow clinical course     Monitoring & Evaluation: will monitor:  [X] Weights   [X] Follow Up (Per Protocol)  [X] Tolerance to Diet Prescription       RD to follow as per Nutrition protocol  Preethi Carnes RDN

## 2023-05-15 NOTE — DISCHARGE NOTE PROVIDER - NSDCFUSCHEDAPPT_GEN_ALL_CORE_FT
Evgeny Shaffer  [facility] Physician [facility]  NEUROLOGY [address]  Scheduled Appointment: 07/10/2023

## 2023-05-15 NOTE — PROGRESS NOTE ADULT - ASSESSMENT
Physical Examination:  GENERAL:                Intubated, eyes closed  HEENT:                    No JVD, Dry MM, eyes constricted, reactive to light  PULM:                     Bilateral air entry, Clear to auscultation bilaterally, no significant sputum production, +Rales, No Rhonchi, No Wheezing  CVS:                         S1, S2,  No Murmur  ABD:                        Soft, nondistended, nontender, normoactive bowel sounds,   EXT:                         + edema, nontender, LE Cyanosis No Clubbing   Vascular:                warm Extremities, Normal Capillary refill, Normal Distal Pulses  NEURO:                  Unresponsive   not interactive, does not follows commands, flacid, myoclonus to deep stimuli  PSYC:                      Calm, no Insight.        Assessment  S/p Cardiac Arrest due to chocking episode  Now with multiorgan dysfunction and anoxic brain injury  Acute hypoxic respiratory failure  Acute Renal Failure on CKD  CT Head showing acute Stroke, not candidate tpa  B/l Pleural Effusions  Underlying HTN (hypertension), HLD (hyperlipidemia), DM2, CAD       Plan  Continue mechanical ventilation, over breathing vent  myoclonus noted off propofol, case d/w neuro regarding second agent, will only add if seizure is noted on repeat EEG,   Dr. Costa recommended MRI, transfer center called and will transport for MRI.   HIT negative, thrombocytopenia improving  On plavix, off asa  hypotension on propofol requiring levo, will start low dose midodrine and give albumin  EEG repeat today  monitor valproic acid level  on empiric abx    EEG abnormal - suggestive of extremely poor prognosis        PMD:	Dr Parra			                   Notified(Date): paged 5/12-13/23 , d/w with him on 5/14/23    Family Updated:  Kuldeep [id number] -[phone]		                                 Date:  5/15/23 updated by ACP, I called unable to leave msg on number       Sedation & Analgesia:	as above,   Diet/Nutrition:		 Tube feeding  GI PPx:		pantoprazole     DVT Ppx:	venodynes   Activity:		bedrest   Head of Bed:               35-45 Deg  Glycemic Control:        Insulin ss    Lines:  PIV  CENTRAL LINE: 	[x] YES [ ] NO	                    LOCATION:   	                       DATE INSERTED:   	                    REMOVE:  [ ] YES [x ] NO    A-LINE:  	                [ x] YES [ ] NO                      LOCATION:   	                       DATE INSERTED: 		            REMOVE:  [ ] YES [ x] NO    [place]: 		        [x ] YES [ ] NO  		                                       DATE INSERTED:		            REMOVE:  [ ] YES [x ] NO      Restraints were deemed necessary to prevent removal of life-sustaining devices [  ] YES   [  x  ]  NO  Disposition: ICU Care  Goals of Care: Full code

## 2023-05-15 NOTE — DISCHARGE NOTE PROVIDER - CARE PROVIDER_API CALL
Alicia Alanis  [facility] ICU  Phone: (   )    -  Fax: (   )    -  Follow Up Time:    Jacky Parra.  Internal Medicine  [address] [id number]  Phone: [phone]  Fax: [phone]  Follow Up Time:

## 2023-05-15 NOTE — DISCHARGE NOTE PROVIDER - PROVIDER TOKENS
FREE:[LAST:[Zeke],FIRST:[Alicia],PHONE:[(   )    -],FAX:[(   )    -],ADDRESS:[[facility]]] PROVIDER:[TOKEN:[[id number]:MIIS:[id number]]]

## 2023-05-15 NOTE — DISCHARGE NOTE PROVIDER - NSDCMRMEDTOKEN_GEN_ALL_CORE_FT
chlorhexidine 0.12% mucous membrane liquid: 15 milliliter(s) mucous membrane every 12 hours  clopidogrel 75 mg oral tablet: 1 tab(s) orally once a day  ipratropium-albuterol 0.5 mg-2.5 mg/3 mL inhalation solution: 3 milliliter(s) inhaled every 6 hours  midodrine 5 mg oral tablet: 1 tab(s) orally every 8 hours  pantoprazole 40 mg intravenous injection: 40 milligram(s) intravenous once a day  piperacillin-tazobactam: 3.375 gram(s) intravenous 2 times a day  polyethylene glycol 3350 oral powder for reconstitution: 17 gram(s) orally once a day  propofol: 1,000 milligram(s) by continuous intravenous infusion once a day  senna leaf extract oral tablet: 2 tab(s) orally once a day (at bedtime)  simvastatin 40 mg oral tablet: 1 tab(s) orally once a day (at bedtime)  valproic acid (as valproate sodium) 100 mg/mL intravenous solution: 7.5 milliliter(s) intravenous every 8 hours   brivaracetam 10 mg/mL oral liquid: 50 milligram(s) by gastrostomy tube every 12 hours  chlorhexidine 0.12% mucous membrane liquid: 15 milliliter(s) mucous membrane every 12 hours  clopidogrel 75 mg oral tablet: 1 tab(s) orally once a day  ipratropium-albuterol 0.5 mg-2.5 mg/3 mL inhalation solution: 3 milliliter(s) inhaled every 6 hours  midodrine 5 mg oral tablet: 1 tab(s) orally every 8 hours  norepinephrine 4 mg/250 mL-D5% intravenous solution: 0.01 mcg/kg intravenous every minute  pantoprazole 40 mg intravenous injection: 40 milligram(s) intravenous once a day  piperacillin-tazobactam: 3.375 gram(s) intravenous 2 times a day  polyethylene glycol 3350 oral powder for reconstitution: 17 gram(s) orally once a day  propofol: 1,000 milligram(s) by continuous intravenous infusion once a day  senna leaf extract oral tablet: 2 tab(s) orally once a day (at bedtime)  simvastatin 40 mg oral tablet: 1 tab(s) orally once a day (at bedtime)  valproic acid (as valproate sodium) 100 mg/mL intravenous solution: 7.5 milliliter(s) intravenous every 8 hours   furosemide 20 mg oral tablet: 1 tab(s) orally once a day  hydroCHLOROthiazide 12.5 mg oral capsule: 1 cap(s) orally once a day  isosorbide mononitrate 60 mg oral tablet, extended release: 1 tab(s) orally once a day  metoprolol succinate 25 mg oral tablet, extended release: 1 tab(s) orally once a day  mirtazapine 15 mg oral tablet: 1 tab(s) orally once a day  ramipril 5 mg oral capsule: 1 cap(s) orally once a day  sertraline 25 mg oral tablet: 1 tab(s) orally once a day   apixaban 2.5 mg oral tablet: 1 tab(s) by PEG tube 2 times a day  aspirin 81 mg oral tablet, chewable: 1 tab(s) orally once a day  brivaracetam 10 mg/mL oral liquid: 25 milligram(s) orally 2 times a day  chlorhexidine 2% topical pad: 1 Apply topically to affected area once a day  metoprolol: 12.5 milligram(s) by PEG tube 2 times a day  Multiple Vitamins with Minerals oral liquid: 15 milliliter(s) by PEG tube once a day  pantoprazole 40 mg oral granule, delayed release: 40 milligram(s) by PEG tube once a day  sodium hypochlorite 0.25% topical solution: 1 Apply topically to affected area once a day  zinc sulfate 220 mg oral capsule: 1 cap(s) orally once a day

## 2023-05-15 NOTE — DISCHARGE NOTE PROVIDER - INSTRUCTIONS
NPO, bedrest Diet, NPO:   Tube Feeding Modality: Nasogastric  Nepro with Carb Steady  Total Volume for 24 Hours (mL): 840  Continuous  Starting Tube Feed Rate {mL per Hour}: 35  Until Goal Tube Feed Rate (mL per Hour): 35  Tube Feed Duration (in Hours): 24  Tube Feed Start Time: 12:00 (05-14-23 @ 10:08) [Active]      NPO, bedrest

## 2023-05-16 ENCOUNTER — INPATIENT (INPATIENT)
Facility: HOSPITAL | Age: 78
LOS: 2 days | Discharge: ANOTHER HOSPITAL | DRG: 91 | End: 2023-05-19
Attending: STUDENT IN AN ORGANIZED HEALTH CARE EDUCATION/TRAINING PROGRAM | Admitting: STUDENT IN AN ORGANIZED HEALTH CARE EDUCATION/TRAINING PROGRAM
Payer: MEDICARE

## 2023-05-16 VITALS
HEART RATE: 118 BPM | SYSTOLIC BLOOD PRESSURE: 161 MMHG | OXYGEN SATURATION: 100 % | RESPIRATION RATE: 35 BRPM | DIASTOLIC BLOOD PRESSURE: 87 MMHG | TEMPERATURE: 97 F

## 2023-05-16 DIAGNOSIS — R56.9 UNSPECIFIED CONVULSIONS: ICD-10-CM

## 2023-05-16 DIAGNOSIS — Z90.49 ACQUIRED ABSENCE OF OTHER SPECIFIED PARTS OF DIGESTIVE TRACT: Chronic | ICD-10-CM

## 2023-05-16 LAB
ALBUMIN SERPL ELPH-MCNC: 1.6 G/DL — LOW (ref 3.3–5)
ALBUMIN SERPL ELPH-MCNC: 1.9 G/DL — LOW (ref 3.3–5)
ALP SERPL-CCNC: 50 U/L — SIGNIFICANT CHANGE UP (ref 40–120)
ALP SERPL-CCNC: 60 U/L — SIGNIFICANT CHANGE UP (ref 40–120)
ALT FLD-CCNC: 11 U/L — SIGNIFICANT CHANGE UP (ref 10–45)
ALT FLD-CCNC: 7 U/L — LOW (ref 10–45)
ANION GAP SERPL CALC-SCNC: 15 MMOL/L — SIGNIFICANT CHANGE UP (ref 5–17)
ANION GAP SERPL CALC-SCNC: 19 MMOL/L — HIGH (ref 5–17)
APTT BLD: 31.3 SEC — SIGNIFICANT CHANGE UP (ref 27.5–35.5)
AST SERPL-CCNC: 15 U/L — SIGNIFICANT CHANGE UP (ref 10–40)
AST SERPL-CCNC: 16 U/L — SIGNIFICANT CHANGE UP (ref 10–40)
BASE EXCESS BLDA CALC-SCNC: 0.3 MMOL/L — SIGNIFICANT CHANGE UP (ref -2–3)
BASE EXCESS BLDV CALC-SCNC: 0.3 MMOL/L — SIGNIFICANT CHANGE UP (ref -2–3)
BASE EXCESS BLDV CALC-SCNC: 1.2 MMOL/L — SIGNIFICANT CHANGE UP (ref -2–3)
BASE EXCESS BLDV CALC-SCNC: 1.2 MMOL/L — SIGNIFICANT CHANGE UP (ref -2–3)
BILIRUB SERPL-MCNC: 0.5 MG/DL — SIGNIFICANT CHANGE UP (ref 0.2–1.2)
BILIRUB SERPL-MCNC: 0.7 MG/DL — SIGNIFICANT CHANGE UP (ref 0.2–1.2)
BLOOD GAS VENOUS - CREATININE: SIGNIFICANT CHANGE UP MG/DL (ref 0.5–1.3)
BLOOD GAS VENOUS - CREATININE: SIGNIFICANT CHANGE UP MG/DL (ref 0.5–1.3)
BUN SERPL-MCNC: 107 MG/DL — HIGH (ref 7–23)
BUN SERPL-MCNC: 116 MG/DL — HIGH (ref 7–23)
CA-I SERPL-SCNC: 1.16 MMOL/L — SIGNIFICANT CHANGE UP (ref 1.15–1.33)
CA-I SERPL-SCNC: 1.2 MMOL/L — SIGNIFICANT CHANGE UP (ref 1.15–1.33)
CA-I SERPL-SCNC: 1.23 MMOL/L — SIGNIFICANT CHANGE UP (ref 1.15–1.33)
CALCIUM SERPL-MCNC: 8.5 MG/DL — SIGNIFICANT CHANGE UP (ref 8.4–10.5)
CALCIUM SERPL-MCNC: 8.7 MG/DL — SIGNIFICANT CHANGE UP (ref 8.4–10.5)
CHLORIDE BLDV-SCNC: 101 MMOL/L — SIGNIFICANT CHANGE UP (ref 96–108)
CHLORIDE BLDV-SCNC: 102 MMOL/L — SIGNIFICANT CHANGE UP (ref 96–108)
CHLORIDE BLDV-SCNC: 103 MMOL/L — SIGNIFICANT CHANGE UP (ref 96–108)
CHLORIDE SERPL-SCNC: 100 MMOL/L — SIGNIFICANT CHANGE UP (ref 96–108)
CHLORIDE SERPL-SCNC: 101 MMOL/L — SIGNIFICANT CHANGE UP (ref 96–108)
CO2 BLDA-SCNC: 27 MMOL/L — HIGH (ref 19–24)
CO2 BLDV-SCNC: 29 MMOL/L — HIGH (ref 22–26)
CO2 SERPL-SCNC: 21 MMOL/L — LOW (ref 22–31)
CO2 SERPL-SCNC: 26 MMOL/L — SIGNIFICANT CHANGE UP (ref 22–31)
CREAT SERPL-MCNC: 4.91 MG/DL — HIGH (ref 0.5–1.3)
CREAT SERPL-MCNC: 5.14 MG/DL — HIGH (ref 0.5–1.3)
EGFR: 11 ML/MIN/1.73M2 — LOW
EGFR: 11 ML/MIN/1.73M2 — LOW
GAS PNL BLDA: SIGNIFICANT CHANGE UP
GAS PNL BLDA: SIGNIFICANT CHANGE UP
GAS PNL BLDV: 134 MMOL/L — LOW (ref 136–145)
GAS PNL BLDV: 136 MMOL/L — SIGNIFICANT CHANGE UP (ref 136–145)
GAS PNL BLDV: 136 MMOL/L — SIGNIFICANT CHANGE UP (ref 136–145)
GAS PNL BLDV: SIGNIFICANT CHANGE UP
GLUCOSE BLDC GLUCOMTR-MCNC: 141 MG/DL — HIGH (ref 70–99)
GLUCOSE BLDC GLUCOMTR-MCNC: 193 MG/DL — HIGH (ref 70–99)
GLUCOSE BLDV-MCNC: 149 MG/DL — HIGH (ref 70–99)
GLUCOSE BLDV-MCNC: 152 MG/DL — HIGH (ref 70–99)
GLUCOSE BLDV-MCNC: 162 MG/DL — HIGH (ref 70–99)
GLUCOSE SERPL-MCNC: 146 MG/DL — HIGH (ref 70–99)
GLUCOSE SERPL-MCNC: 146 MG/DL — HIGH (ref 70–99)
GLUCOSE SERPL-MCNC: 151 MG/DL — HIGH (ref 70–99)
HCO3 BLDA-SCNC: 26 MMOL/L — SIGNIFICANT CHANGE UP (ref 21–28)
HCO3 BLDV-SCNC: 27 MMOL/L — SIGNIFICANT CHANGE UP (ref 22–29)
HCO3 BLDV-SCNC: 27 MMOL/L — SIGNIFICANT CHANGE UP (ref 22–29)
HCO3 BLDV-SCNC: 28 MMOL/L — SIGNIFICANT CHANGE UP (ref 22–29)
HCT VFR BLD CALC: 28.9 % — LOW (ref 39–50)
HCT VFR BLD CALC: 32.1 % — LOW (ref 39–50)
HCT VFR BLDA CALC: 31 % — LOW (ref 39–51)
HGB BLD CALC-MCNC: 10.2 G/DL — LOW (ref 12.6–17.4)
HGB BLD CALC-MCNC: 10.3 G/DL — LOW (ref 12.6–17.4)
HGB BLD CALC-MCNC: 10.4 G/DL — LOW (ref 12.6–17.4)
HGB BLD-MCNC: 10.3 G/DL — LOW (ref 13–17)
HGB BLD-MCNC: 9.5 G/DL — LOW (ref 13–17)
HOROWITZ INDEX BLDA+IHG-RTO: 35 — SIGNIFICANT CHANGE UP
HOROWITZ INDEX BLDV+IHG-RTO: 30 — SIGNIFICANT CHANGE UP
HOROWITZ INDEX BLDV+IHG-RTO: 30 — SIGNIFICANT CHANGE UP
INR BLD: 1.18 RATIO — HIGH (ref 0.88–1.16)
LACTATE BLDV-MCNC: 1.2 MMOL/L — SIGNIFICANT CHANGE UP (ref 0.5–2)
LACTATE BLDV-MCNC: 1.4 MMOL/L — SIGNIFICANT CHANGE UP (ref 0.5–2)
LACTATE BLDV-MCNC: 1.5 MMOL/L — SIGNIFICANT CHANGE UP (ref 0.5–2)
MAGNESIUM SERPL-MCNC: 2.4 MG/DL — SIGNIFICANT CHANGE UP (ref 1.6–2.6)
MAGNESIUM SERPL-MCNC: 2.4 MG/DL — SIGNIFICANT CHANGE UP (ref 1.6–2.6)
MCHC RBC-ENTMCNC: 30.4 PG — SIGNIFICANT CHANGE UP (ref 27–34)
MCHC RBC-ENTMCNC: 30.6 PG — SIGNIFICANT CHANGE UP (ref 27–34)
MCHC RBC-ENTMCNC: 32.1 GM/DL — SIGNIFICANT CHANGE UP (ref 32–36)
MCHC RBC-ENTMCNC: 32.9 GM/DL — SIGNIFICANT CHANGE UP (ref 32–36)
MCV RBC AUTO: 92.3 FL — SIGNIFICANT CHANGE UP (ref 80–100)
MCV RBC AUTO: 95.3 FL — SIGNIFICANT CHANGE UP (ref 80–100)
NRBC # BLD: 0 /100 WBCS — SIGNIFICANT CHANGE UP (ref 0–0)
OB PNL STL: POSITIVE
PCO2 BLDA: 47 MMHG — SIGNIFICANT CHANGE UP (ref 35–48)
PCO2 BLDV: 48 MMHG — SIGNIFICANT CHANGE UP (ref 42–55)
PCO2 BLDV: 51 MMHG — SIGNIFICANT CHANGE UP (ref 42–55)
PCO2 BLDV: 54 MMHG — SIGNIFICANT CHANGE UP (ref 42–55)
PH BLDA: 7.35 — SIGNIFICANT CHANGE UP (ref 7.35–7.45)
PH BLDV: 7.31 — LOW (ref 7.32–7.43)
PH BLDV: 7.34 — SIGNIFICANT CHANGE UP (ref 7.32–7.43)
PH BLDV: 7.36 — SIGNIFICANT CHANGE UP (ref 7.32–7.43)
PHOSPHATE SERPL-MCNC: 5.3 MG/DL — HIGH (ref 2.5–4.5)
PHOSPHATE SERPL-MCNC: 6.3 MG/DL — HIGH (ref 2.5–4.5)
PLATELET # BLD AUTO: 72 K/UL — LOW (ref 150–400)
PLATELET # BLD AUTO: 74 K/UL — LOW (ref 150–400)
PO2 BLDA: 104 MMHG — SIGNIFICANT CHANGE UP (ref 83–108)
PO2 BLDV: 37 MMHG — SIGNIFICANT CHANGE UP (ref 25–45)
PO2 BLDV: 40 MMHG — SIGNIFICANT CHANGE UP (ref 25–45)
PO2 BLDV: 40 MMHG — SIGNIFICANT CHANGE UP (ref 25–45)
POTASSIUM BLDV-SCNC: 3.7 MMOL/L — SIGNIFICANT CHANGE UP (ref 3.5–5.1)
POTASSIUM BLDV-SCNC: 3.9 MMOL/L — SIGNIFICANT CHANGE UP (ref 3.5–5.1)
POTASSIUM BLDV-SCNC: 5.6 MMOL/L — HIGH (ref 3.5–5.1)
POTASSIUM SERPL-MCNC: 3.5 MMOL/L — SIGNIFICANT CHANGE UP (ref 3.5–5.3)
POTASSIUM SERPL-MCNC: 3.5 MMOL/L — SIGNIFICANT CHANGE UP (ref 3.5–5.3)
POTASSIUM SERPL-SCNC: 3.5 MMOL/L — SIGNIFICANT CHANGE UP (ref 3.5–5.3)
POTASSIUM SERPL-SCNC: 3.5 MMOL/L — SIGNIFICANT CHANGE UP (ref 3.5–5.3)
PROT SERPL-MCNC: 5 G/DL — LOW (ref 6–8.3)
PROT SERPL-MCNC: 5.6 G/DL — LOW (ref 6–8.3)
PROTHROM AB SERPL-ACNC: 13.6 SEC — HIGH (ref 10.5–13.4)
RBC # BLD: 3.13 M/UL — LOW (ref 4.2–5.8)
RBC # BLD: 3.37 M/UL — LOW (ref 4.2–5.8)
RBC # FLD: 15.2 % — HIGH (ref 10.3–14.5)
RBC # FLD: 15.3 % — HIGH (ref 10.3–14.5)
SAO2 % BLDA: 98.8 % — HIGH (ref 94–98)
SAO2 % BLDV: 62.2 % — LOW (ref 67–88)
SAO2 % BLDV: 65.2 % — LOW (ref 67–88)
SAO2 % BLDV: 66.2 % — LOW (ref 67–88)
SODIUM SERPL-SCNC: 141 MMOL/L — SIGNIFICANT CHANGE UP (ref 135–145)
SODIUM SERPL-SCNC: 141 MMOL/L — SIGNIFICANT CHANGE UP (ref 135–145)
TROPONIN I, HIGH SENSITIVITY RESULT: 161.6 NG/L — HIGH
VALPROATE SERPL-MCNC: 51 UG/ML — SIGNIFICANT CHANGE UP (ref 50–100)
VALPROATE SERPL-MCNC: 74 UG/ML — SIGNIFICANT CHANGE UP (ref 50–100)
WBC # BLD: 6.11 K/UL — SIGNIFICANT CHANGE UP (ref 3.8–10.5)
WBC # BLD: 7.03 K/UL — SIGNIFICANT CHANGE UP (ref 3.8–10.5)
WBC # FLD AUTO: 6.11 K/UL — SIGNIFICANT CHANGE UP (ref 3.8–10.5)
WBC # FLD AUTO: 7.03 K/UL — SIGNIFICANT CHANGE UP (ref 3.8–10.5)

## 2023-05-16 PROCEDURE — 99232 SBSQ HOSP IP/OBS MODERATE 35: CPT

## 2023-05-16 PROCEDURE — 71045 X-RAY EXAM CHEST 1 VIEW: CPT | Mod: 26

## 2023-05-16 PROCEDURE — 95720 EEG PHY/QHP EA INCR W/VEEG: CPT

## 2023-05-16 PROCEDURE — 99233 SBSQ HOSP IP/OBS HIGH 50: CPT

## 2023-05-16 PROCEDURE — 99291 CRITICAL CARE FIRST HOUR: CPT | Mod: GC,25

## 2023-05-16 PROCEDURE — 93010 ELECTROCARDIOGRAM REPORT: CPT

## 2023-05-16 PROCEDURE — 71045 X-RAY EXAM CHEST 1 VIEW: CPT | Mod: 26,77

## 2023-05-16 RX ORDER — CHLORHEXIDINE GLUCONATE 213 G/1000ML
15 SOLUTION TOPICAL EVERY 12 HOURS
Refills: 0 | Status: DISCONTINUED | OUTPATIENT
Start: 2023-05-16 | End: 2023-05-19

## 2023-05-16 RX ORDER — BRIVARACETAM 25 MG/1
50 TABLET, FILM COATED ORAL
Refills: 0 | Status: DISCONTINUED | OUTPATIENT
Start: 2023-05-16 | End: 2023-05-17

## 2023-05-16 RX ORDER — HUMAN INSULIN 100 [IU]/ML
5 INJECTION, SUSPENSION SUBCUTANEOUS EVERY 6 HOURS
Refills: 0 | Status: DISCONTINUED | OUTPATIENT
Start: 2023-05-16 | End: 2023-05-17

## 2023-05-16 RX ORDER — POLYETHYLENE GLYCOL 3350 17 G/17G
17 POWDER, FOR SOLUTION ORAL DAILY
Refills: 0 | Status: DISCONTINUED | OUTPATIENT
Start: 2023-05-16 | End: 2023-05-19

## 2023-05-16 RX ORDER — MIDAZOLAM HYDROCHLORIDE 1 MG/ML
4 INJECTION, SOLUTION INTRAMUSCULAR; INTRAVENOUS ONCE
Refills: 0 | Status: DISCONTINUED | OUTPATIENT
Start: 2023-05-16 | End: 2023-05-16

## 2023-05-16 RX ORDER — INSULIN LISPRO 100/ML
VIAL (ML) SUBCUTANEOUS
Refills: 0 | Status: DISCONTINUED | OUTPATIENT
Start: 2023-05-16 | End: 2023-05-17

## 2023-05-16 RX ORDER — SODIUM CHLORIDE 9 MG/ML
1000 INJECTION, SOLUTION INTRAVENOUS
Refills: 0 | Status: DISCONTINUED | OUTPATIENT
Start: 2023-05-16 | End: 2023-05-19

## 2023-05-16 RX ORDER — VALPROIC ACID (AS SODIUM SALT) 250 MG/5ML
750 SOLUTION, ORAL ORAL EVERY 8 HOURS
Refills: 0 | Status: DISCONTINUED | OUTPATIENT
Start: 2023-05-16 | End: 2023-05-18

## 2023-05-16 RX ORDER — GLUCAGON INJECTION, SOLUTION 0.5 MG/.1ML
1 INJECTION, SOLUTION SUBCUTANEOUS ONCE
Refills: 0 | Status: DISCONTINUED | OUTPATIENT
Start: 2023-05-16 | End: 2023-05-19

## 2023-05-16 RX ORDER — PERAMPANEL 2 MG/1
4 TABLET ORAL
Refills: 0 | Status: DISCONTINUED | OUTPATIENT
Start: 2023-05-16 | End: 2023-05-16

## 2023-05-16 RX ORDER — PROPOFOL 10 MG/ML
10 INJECTION, EMULSION INTRAVENOUS
Qty: 1000 | Refills: 0 | Status: DISCONTINUED | OUTPATIENT
Start: 2023-05-16 | End: 2023-05-17

## 2023-05-16 RX ORDER — DEXTROSE 50 % IN WATER 50 %
12.5 SYRINGE (ML) INTRAVENOUS ONCE
Refills: 0 | Status: DISCONTINUED | OUTPATIENT
Start: 2023-05-16 | End: 2023-05-19

## 2023-05-16 RX ORDER — BRIVARACETAM 25 MG/1
50 TABLET, FILM COATED ORAL
Refills: 0 | Status: DISCONTINUED | OUTPATIENT
Start: 2023-05-16 | End: 2023-05-16

## 2023-05-16 RX ORDER — BUMETANIDE 0.25 MG/ML
2 INJECTION INTRAMUSCULAR; INTRAVENOUS ONCE
Refills: 0 | Status: COMPLETED | OUTPATIENT
Start: 2023-05-16 | End: 2023-05-16

## 2023-05-16 RX ORDER — APIXABAN 2.5 MG/1
5 TABLET, FILM COATED ORAL EVERY 12 HOURS
Refills: 0 | Status: DISCONTINUED | OUTPATIENT
Start: 2023-05-16 | End: 2023-05-17

## 2023-05-16 RX ORDER — CLOPIDOGREL BISULFATE 75 MG/1
75 TABLET, FILM COATED ORAL DAILY
Refills: 0 | Status: DISCONTINUED | OUTPATIENT
Start: 2023-05-16 | End: 2023-05-16

## 2023-05-16 RX ORDER — DEXTROSE 50 % IN WATER 50 %
25 SYRINGE (ML) INTRAVENOUS ONCE
Refills: 0 | Status: DISCONTINUED | OUTPATIENT
Start: 2023-05-16 | End: 2023-05-19

## 2023-05-16 RX ORDER — NOREPINEPHRINE BITARTRATE/D5W 8 MG/250ML
0.05 PLASTIC BAG, INJECTION (ML) INTRAVENOUS
Qty: 8 | Refills: 0 | Status: DISCONTINUED | OUTPATIENT
Start: 2023-05-16 | End: 2023-05-18

## 2023-05-16 RX ORDER — SENNA PLUS 8.6 MG/1
2 TABLET ORAL AT BEDTIME
Refills: 0 | Status: DISCONTINUED | OUTPATIENT
Start: 2023-05-16 | End: 2023-05-19

## 2023-05-16 RX ORDER — SIMVASTATIN 20 MG/1
40 TABLET, FILM COATED ORAL AT BEDTIME
Refills: 0 | Status: DISCONTINUED | OUTPATIENT
Start: 2023-05-16 | End: 2023-05-19

## 2023-05-16 RX ORDER — PANTOPRAZOLE SODIUM 20 MG/1
40 TABLET, DELAYED RELEASE ORAL
Refills: 0 | Status: DISCONTINUED | OUTPATIENT
Start: 2023-05-16 | End: 2023-05-17

## 2023-05-16 RX ORDER — DEXTROSE 50 % IN WATER 50 %
15 SYRINGE (ML) INTRAVENOUS ONCE
Refills: 0 | Status: DISCONTINUED | OUTPATIENT
Start: 2023-05-16 | End: 2023-05-19

## 2023-05-16 RX ORDER — BRIVARACETAM 25 MG/1
50 TABLET, FILM COATED ORAL
Qty: 0 | Refills: 0 | DISCHARGE
Start: 2023-05-16

## 2023-05-16 RX ORDER — CHLORHEXIDINE GLUCONATE 213 G/1000ML
1 SOLUTION TOPICAL
Refills: 0 | Status: DISCONTINUED | OUTPATIENT
Start: 2023-05-16 | End: 2023-05-19

## 2023-05-16 RX ADMIN — Medication 50 MILLIGRAM(S): at 22:19

## 2023-05-16 RX ADMIN — MIDODRINE HYDROCHLORIDE 5 MILLIGRAM(S): 2.5 TABLET ORAL at 14:22

## 2023-05-16 RX ADMIN — CHLORHEXIDINE GLUCONATE 1 APPLICATION(S): 213 SOLUTION TOPICAL at 05:04

## 2023-05-16 RX ADMIN — Medication 2: at 12:29

## 2023-05-16 RX ADMIN — Medication 6.1 MICROGRAM(S)/KG/MIN: at 21:50

## 2023-05-16 RX ADMIN — Medication 57.5 MILLIGRAM(S): at 08:47

## 2023-05-16 RX ADMIN — MIDODRINE HYDROCHLORIDE 5 MILLIGRAM(S): 2.5 TABLET ORAL at 05:04

## 2023-05-16 RX ADMIN — BUMETANIDE 2 MILLIGRAM(S): 0.25 INJECTION INTRAMUSCULAR; INTRAVENOUS at 14:33

## 2023-05-16 RX ADMIN — MIDAZOLAM HYDROCHLORIDE 4 MILLIGRAM(S): 1 INJECTION, SOLUTION INTRAMUSCULAR; INTRAVENOUS at 03:54

## 2023-05-16 RX ADMIN — PROPOFOL 3.91 MICROGRAM(S)/KG/MIN: 10 INJECTION, EMULSION INTRAVENOUS at 20:00

## 2023-05-16 RX ADMIN — CLOPIDOGREL BISULFATE 75 MILLIGRAM(S): 75 TABLET, FILM COATED ORAL at 12:21

## 2023-05-16 RX ADMIN — MIDAZOLAM HYDROCHLORIDE 4 MILLIGRAM(S): 1 INJECTION, SOLUTION INTRAMUSCULAR; INTRAVENOUS at 00:07

## 2023-05-16 RX ADMIN — CHLORHEXIDINE GLUCONATE 15 MILLILITER(S): 213 SOLUTION TOPICAL at 05:04

## 2023-05-16 RX ADMIN — BRIVARACETAM 50 MILLIGRAM(S): 25 TABLET, FILM COATED ORAL at 12:24

## 2023-05-16 RX ADMIN — PANTOPRAZOLE SODIUM 40 MILLIGRAM(S): 20 TABLET, DELAYED RELEASE ORAL at 12:21

## 2023-05-16 RX ADMIN — Medication 57.5 MILLIGRAM(S): at 00:11

## 2023-05-16 RX ADMIN — SIMVASTATIN 40 MILLIGRAM(S): 20 TABLET, FILM COATED ORAL at 22:18

## 2023-05-16 RX ADMIN — MIDAZOLAM HYDROCHLORIDE 4 MILLIGRAM(S): 1 INJECTION, SOLUTION INTRAMUSCULAR; INTRAVENOUS at 06:12

## 2023-05-16 RX ADMIN — PIPERACILLIN AND TAZOBACTAM 25 GRAM(S): 4; .5 INJECTION, POWDER, LYOPHILIZED, FOR SOLUTION INTRAVENOUS at 05:03

## 2023-05-16 NOTE — PROGRESS NOTE ADULT - SUBJECTIVE AND OBJECTIVE BOX
MARIO RAMOS  [id number]    Cardiology re-consulted due to persistent atrial fibrillation     Chief Complaint: CHF/Atrial tachycardia/Atrial fibrillation/CAD s/p CABG/Aspiration/Cardiac arrest/CVA/Renal failure    Interval History: The patient remains intubated and sedated in ICU, on pressor support.     Tele: atrial fibrillation 70s BPM, NSVT      Current meds:   albuterol/ipratropium for Nebulization 3 milliLiter(s) Nebulizer every 6 hours PRN  bisacodyl Suppository 10 milliGRAM(s) Rectal daily  brivaracetam Oral Solution 50 milliGRAM(s) Oral two times a day  chlorhexidine 0.12% Liquid 15 milliLiter(s) Oral Mucosa every 12 hours  chlorhexidine 2% Cloths 1 Application(s) Topical <User Schedule>  clopidogrel Tablet 75 milliGRAM(s) Oral daily  dextrose 5%. 1000 milliLiter(s) IV Continuous <Continuous>  dextrose 5%. 1000 milliLiter(s) IV Continuous <Continuous>  dextrose 50% Injectable 25 Gram(s) IV Push once  dextrose 50% Injectable 12.5 Gram(s) IV Push once  dextrose 50% Injectable 25 Gram(s) IV Push once  insulin lispro (ADMELOG) corrective regimen sliding scale   SubCutaneous every 6 hours  midodrine 5 milliGRAM(s) Oral every 8 hours  norepinephrine Infusion 0.05 MICROgram(s)/kG/Min IV Continuous <Continuous>  pantoprazole   Suspension 40 milliGRAM(s) Oral daily  polyethylene glycol 3350 17 Gram(s) Oral daily  propofol Infusion 5.011 MICROgram(s)/kG/Min IV Continuous <Continuous>  senna 2 Tablet(s) Oral at bedtime  simvastatin 40 milliGRAM(s) Oral at bedtime  sodium chloride 0.9% lock flush 10 milliLiter(s) IV Push every 1 hour PRN  valproate sodium  IVPB 750 milliGRAM(s) IV Intermittent every 8 hours      Objective:     Vital Signs:   T(C): 36.7 (05-16-23 @ 09:00), Max: 36.7 (05-16-23 @ 00:00)  HR: 72 (05-16-23 @ 12:50) (66 - 82)  BP: 119/64 (05-16-23 @ 09:30) (94/53 - 193/112)  RR: 16 (05-16-23 @ 12:30) (12 - 22)  SpO2: 100% (05-16-23 @ 12:50) (94% - 100%)  Wt(kg): --      Physical Exam:   General: intubated, sedated  Neck: supple   CVS: irregularly irregular, s1, s2  Pulm: coarse breath sounds  Ext: no lower extremity edema b/l   Neuro: intubated, sedated       Labs:   16 May 2023 05:10    141    |  100    |  116    ----------------------------<  151    3.5     |  26     |  5.14     Ca    8.7        16 May 2023 05:10  Phos  6.3       16 May 2023 05:10  Mg     2.4       16 May 2023 05:10    TPro  5.6    /  Alb  1.6    /  TBili  0.7    /  DBili  x      /  AST  16     /  ALT  11     /  AlkPhos  60     16 May 2023 05:10                          10.3   7.03  )-----------( 74       ( 16 May 2023 05:10 )             32.1     PT/INR - ( 15 May 2023 13:55 )   PT: 14.0 sec;   INR: 1.19 ratio         PTT - ( 15 May 2023 13:55 )  PTT:32.9 sec          Coronary angiogram (2014):  DIAGNOSTIC IMPRESSIONS: Patent PETER to LAD. Occluded RCA - collateralized. Moderate OM2 disease. Normal LVEDP.  DIAGNOSTIC RECOMMENDATIONS: Medical therapy and consider restarting ASA as soon as possible post surgery.    TTE (7/2022):  LVEF 40-45%    TTE (1/2023):  LVEF 55-60%, regional wall motion abnormalities  Moderate-severe MR, Moderate TR  Moderate pulmonary hypertension     Outpatient nuclear stress test (2/2023):  Medium sized, moderate defect in the mid inferior and basal inferior walls that are predominantly fixed suggestive of an infarction with mild laura-infarct ischemia, stress LVEF 41%      ECG (5/4/23): possible atrial tachycardia, nonspecific ST abnormalities, prolonged QTc     TTE (5/5/23):  LVEF 40-45%, mild LVH  Biatrial enlargement   Moderate MR, Mild-moderate TR  Mild pulmonary hypertension    TTE (5/11/23):  LVEF 35-40%  Mild-moderate MR, Mild TR      MRI Brain (5/15/23):  Several foci of restricted diffusion in the left frontal and parietal   lobes. No hemorrhagic transformation.

## 2023-05-16 NOTE — CONSULT NOTE ADULT - SUBJECTIVE AND OBJECTIVE BOX
Neurology - Consult Note    -  Spectra: [phone] ([facility]), [phone] ([facility])  -    HPI: Patient MARIO RAMOS is a 78y (1945) man with a PMHx significant for mild dementia, heart failure, CAD s/p CABG with known RCA occlusion, dyslipidemia, and type 2 DM who presented to [facility] on 5/4 for heart failure exacerbation who had a choking episode on 5/9 that subsequently progressed to cardiac arrest lasting ~8 minutes with intubation.  Post arrest patient required vasopressor support and heparin gtt as tx for NSTEMI vs. demand ischemia both have stopped at this point.  Post arrest noted with possible anoxic brain injury and myoclonus, spot EEG suggestive of a severe diffuse encephalopathy.  MRI done yesterday with right frontal and parietal stroke likely due to hypoperfusion. Added biviact today to help with his mental status. Will benefit from continuous video EEG monitoring to evaluate for intermittent subclinical seizures law with the GPDs which put him more at risk for seizure. Transferred to [facility] for 24h EEG (r/o status epilepticus) and further prognostication.      Review of Systems:  INCOMPLETE   CONSTITUTIONAL: No fevers or chills  EYES AND ENT: No visual changes or no throat pain   NECK: No pain or stiffness  RESPIRATORY: No hemoptysis or shortness of breath  CARDIOVASCULAR: No chest pain or palpitations  GASTROINTESTINAL: No melena or hematochezia  GENITOURINARY: No dysuria or hematuria  NEUROLOGICAL: +As stated in HPI above  SKIN: No itching, burning, rashes, or lesions   All other review of systems is negative unless indicated above.    Allergies:  sulfa drugs (Unknown)      PMHx/PSHx/Family Hx: As above, otherwise see below   HTN (hypertension)    HLD (hyperlipidemia)    Diabetes    CAD (coronary artery disease)        Social Hx:  No current use of tobacco, alcohol, or illicit drugs  Lives with ***    Medications:  MEDICATIONS  (STANDING):  chlorhexidine 0.12% Liquid 15 milliLiter(s) Oral Mucosa every 12 hours    MEDICATIONS  (PRN):      Vitals:  T(C): 36.1 (05-16-23 @ 18:22), Max: 36.8 (05-16-23 @ 16:30)  HR: 81 (05-16-23 @ 18:45) (69 - 118)  BP: 161/87 (05-16-23 @ 18:22) (95/52 - 161/87)  RR: 17 (05-16-23 @ 18:45) (12 - 37)  SpO2: 97% (05-16-23 @ 18:45) (96% - 100%)    Physical Examination: INCOMPLETE  General - NAD  Cardiovascular - Peripheral pulses palpable, no edema  Eyes - Fundoscopy with flat, sharp optic discs and no hemorrhage or exudates; Fundoscopy not well visualized; Fundoscopy not performed due to safety precautions in the setting of the COVID-19 pandemic    Neurologic Exam:  Mental status - Awake, Alert, Oriented to person, place, and time. Speech fluent, repetition and naming intact. Follows simple and complex commands. Attention/concentration, recent and remote memory (including registration and recall), and fund of knowledge intact    Cranial nerves - PERRLA, VFF, EOMI, face sensation (V1-V3) intact b/l, facial strength intact without asymmetry b/l, hearing intact b/l, palate with symmetric elevation, trapezius OR sternocleidomastiod 5/5 strength b/l, tongue midline on protrusion with full lateral movement    Motor - Normal bulk and tone throughout. No pronator drift.  Strength testing            Deltoid      Biceps      Triceps     Wrist Extension    Wrist Flexion     Interossei         R            5                 5               5                     5                              5                        5                 5  L             5                 5               5                     5                              5                        5                 5              Hip Flexion    Hip Extension    Knee Flexion    Knee Extension    Dorsiflexion    Plantar Flexion  R              5                           5                       5                           5                            5                          5  L              5                           5                        5                           5                            5                          5    Sensation - Light touch/temperature OR pain/vibration intact throughout    DTR's -             Biceps      Triceps     Brachioradialis      Patellar    Ankle    Toes/plantar response  R             2+             2+                  2+                       2+            2+                 Down  L              2+             2+                 2+                        2+           2+                 Down    Coordination - Finger to Nose intact b/l. No tremors appreciated    Gait and station - Normal casual gait. Romberg (-)    Labs:                        10.3   7.03  )-----------( 74       ( 16 May 2023 05:10 )             32.1     05-16    141  |  100  |  116<H>  ----------------------------<  151<H>  3.5   |  26  |  5.14<H>    Ca    8.7      16 May 2023 05:10  Phos  6.3     05-16  Mg     2.4     05-16    TPro  5.6<L>  /  Alb  1.6<L>  /  TBili  0.7  /  DBili  x   /  AST  16  /  ALT  11  /  AlkPhos  60  05-16    CAPILLARY BLOOD GLUCOSE      POCT Blood Glucose.: 193 mg/dL (16 May 2023 12:27)    LIVER FUNCTIONS - ( 16 May 2023 05:10 )  Alb: 1.6 g/dL / Pro: 5.6 g/dL / ALK PHOS: 60 U/L / ALT: 11 U/L / AST: 16 U/L / GGT: x             PT/INR - ( 15 May 2023 13:55 )   PT: 14.0 sec;   INR: 1.19 ratio         PTT - ( 15 May 2023 13:55 )  PTT:32.9 sec  CSF:                  Radiology:  MR Head No Cont:  (15 May 2023 14:58)  CT Head No Cont:  (11 May 2023 16:53)  CT Head No Cont:  (09 May 2023 21:30)     Neurology - Consult Note    -  Spectra: [phone] ([facility]), [phone] ([facility])  -    HPI: Patient MARIO RAMOS is a 78y (1945) man with a PMHx significant for mild dementia, heart failure, CAD s/p CABG with known RCA occlusion, dyslipidemia, and type 2 DM who presented to [facility] on 5/4 for heart failure exacerbation who had a choking episode on 5/9 that subsequently progressed to cardiac arrest lasting ~8 minutes with intubation.  Post arrest patient required vasopressor support and heparin gtt as tx for NSTEMI vs. demand ischemia both have stopped at this point.  Post arrest noted with possible anoxic brain injury and myoclonus, spot EEG suggestive of a severe diffuse encephalopathy.  MRI done yesterday with right frontal and parietal stroke likely due to hypoperfusion. Added biviact today to help with his mental status. Will benefit from continuous video EEG monitoring to evaluate for intermittent subclinical seizures law with the GPDs which put him more at risk for seizure. Transferred to [facility] for 24h EEG (r/o status epilepticus) and further prognostication.    Went to bedside to assess patient. Examined while on propofol gtt and intubated. Non-responsive to noxious stimuli (sternal rub, stimulating extremities). No purposeful movements.       Review of Systems:  unable to assess due to mental status  CONSTITUTIONAL: No fevers or chills  EYES AND ENT: No visual changes or no throat pain   NECK: No pain or stiffness  RESPIRATORY: No hemoptysis or shortness of breath  CARDIOVASCULAR: No chest pain or palpitations  GASTROINTESTINAL: No melena or hematochezia  GENITOURINARY: No dysuria or hematuria  NEUROLOGICAL: +As stated in HPI above  SKIN: No itching, burning, rashes, or lesions   All other review of systems is negative unless indicated above.    Allergies:  sulfa drugs (Unknown)      PMHx/PSHx/Family Hx: As above, otherwise see below   HTN (hypertension)    HLD (hyperlipidemia)    Diabetes    CAD (coronary artery disease)        Social Hx:  unknown    Medications:  MEDICATIONS  (STANDING):  chlorhexidine 0.12% Liquid 15 milliLiter(s) Oral Mucosa every 12 hours    MEDICATIONS  (PRN):      Vitals:  T(C): 36.1 (05-16-23 @ 18:22), Max: 36.8 (05-16-23 @ 16:30)  HR: 81 (05-16-23 @ 18:45) (69 - 118)  BP: 161/87 (05-16-23 @ 18:22) (95/52 - 161/87)  RR: 17 (05-16-23 @ 18:45) (12 - 37)  SpO2: 97% (05-16-23 @ 18:45) (96% - 100%)    Physical Examination:   General - NAD, intubated critically ill male  Cardiovascular - Peripheral pulses palpable, no edema  Eyes - Fundoscopy not performed due to safety precautions in the setting of the COVID-19 pandemic    Neurologic Exam:  Mental status - comatose, sedated, non-responsive to noxious stimuli    Cranial nerves - pupils 2-3mm b/l and poorly reactive to light, corneal reflexes intact b/l, no gaze preference, facial expression grossly symmetric, Doll's eye reflex intact    Motor - normal bulk throughout. Flaccid tone throughout. No purposeful movement in any extremities    Sensation - non-responsive to noxious stimuli    DTR's -             Biceps      Triceps     Brachioradialis      Patellar    Ankle    Toes/plantar response  R             2+             2+                  2+                       2+            2+                 Down  L              2+             2+                 2+                        2+           2+                 Down    Coordination - intermittent myoclonic jerks of b/l plantar flexion, shoulder flexion, and neck flexion    Gait - did not assess due to mental status    Labs:                        10.3   7.03  )-----------( 74       ( 16 May 2023 05:10 )             32.1     05-16    141  |  100  |  116<H>  ----------------------------<  151<H>  3.5   |  26  |  5.14<H>    Ca    8.7      16 May 2023 05:10  Phos  6.3     05-16  Mg     2.4     05-16    TPro  5.6<L>  /  Alb  1.6<L>  /  TBili  0.7  /  DBili  x   /  AST  16  /  ALT  11  /  AlkPhos  60  05-16    CAPILLARY BLOOD GLUCOSE      POCT Blood Glucose.: 193 mg/dL (16 May 2023 12:27)    LIVER FUNCTIONS - ( 16 May 2023 05:10 )  Alb: 1.6 g/dL / Pro: 5.6 g/dL / ALK PHOS: 60 U/L / ALT: 11 U/L / AST: 16 U/L / GGT: x             PT/INR - ( 15 May 2023 13:55 )   PT: 14.0 sec;   INR: 1.19 ratio         PTT - ( 15 May 2023 13:55 )  PTT:32.9 sec                    Radiology:  MR Head No Cont:  (15 May 2023 14:58)  Several foci of restricted diffusion in the left frontal and parietal   lobes. No hemorrhagic transformation.

## 2023-05-16 NOTE — H&P ADULT - HISTORY OF PRESENT ILLNESS
Mr. Avila is an 78 year old male with a PMH of mild dementia, heart failure, CAD s/p CABG with known RCA occlusion, HLD, and type 2 DM who presented to [facility] on 5/4 for heart failure exacerbation. Course complicated by a choking episode on 5/9 that subsequently progressed to cardiac arrest lasting ~8 minutes with intubation. Pt now post arrest noted with ?anoxic brain injury and myoclonus requiring MRI for evaluation. EEG is suggestive of a severe diffuse encephalopathy. Pt has also required vasopressor support post arrest with sedation and a heparin gtt as tx for NSTEMI vs. demand ischemia which were d/fabián. Pt remans on propofol and full ventilator support.    Patient was noted to have myoclonic activity when weaned off of propofol. Patient received spot EEG which was not specific for epileptic activity but patient was also sedated. Patient was transferred to [facility] for 24hr videoEEG

## 2023-05-16 NOTE — H&P ADULT - NSICDXFAMILYHX_GEN_ALL_CORE_FT
FAMILY HISTORY:  No pertinent family history in first degree relatives     no dyspnea/no hemoptysis/no pleuritic chest pain/no cough/no wheezing

## 2023-05-16 NOTE — PROGRESS NOTE ADULT - SUBJECTIVE AND OBJECTIVE BOX
Follow-up Critical Care Progress Note  Chief Complaint : Atrial fibrillation    Patient seen examined   off sedation, patient develops myoclonus   EEG done today, reviewed with neuro at risk of seizures  remains on &  off pressors        Allergies :sulfa drugs (Unknown)      PAST MEDICAL & SURGICAL HISTORY:  HTN (hypertension)  HLD (hyperlipidemia)  Diabetes  CAD (coronary artery disease)  History of cholecystectomy        Medications:  MEDICATIONS  (STANDING):  bisacodyl Suppository 10 milliGRAM(s) Rectal daily  chlorhexidine 0.12% Liquid 15 milliLiter(s) Oral Mucosa every 12 hours  chlorhexidine 2% Cloths 1 Application(s) Topical <User Schedule>  clopidogrel Tablet 75 milliGRAM(s) Oral daily  dextrose 5%. 1000 milliLiter(s) (50 mL/Hr) IV Continuous <Continuous>  dextrose 5%. 1000 milliLiter(s) (100 mL/Hr) IV Continuous <Continuous>  dextrose 50% Injectable 25 Gram(s) IV Push once  dextrose 50% Injectable 12.5 Gram(s) IV Push once  dextrose 50% Injectable 25 Gram(s) IV Push once  insulin lispro (ADMELOG) corrective regimen sliding scale   SubCutaneous every 6 hours  midodrine 5 milliGRAM(s) Oral every 8 hours  norepinephrine Infusion 0.05 MICROgram(s)/kG/Min (5.74 mL/Hr) IV Continuous <Continuous>  pantoprazole   Suspension 40 milliGRAM(s) Oral daily  polyethylene glycol 3350 17 Gram(s) Oral daily  propofol Infusion 5.011 MICROgram(s)/kG/Min (1.84 mL/Hr) IV Continuous <Continuous>  senna 2 Tablet(s) Oral at bedtime  simvastatin 40 milliGRAM(s) Oral at bedtime  valproate sodium  IVPB 750 milliGRAM(s) IV Intermittent every 8 hours    MEDICATIONS  (PRN):  albuterol/ipratropium for Nebulization 3 milliLiter(s) Nebulizer every 6 hours PRN Shortness of Breath and/or Wheezing  sodium chloride 0.9% lock flush 10 milliLiter(s) IV Push every 1 hour PRN Pre/post blood products, medications, blood draw, and to maintain line patency      Antibiotics History  piperacillin/tazobactam IVPB. 3.375 Gram(s) IV Intermittent once, 05-09-23 @ 10:58, Stop order after: 1 Doses  piperacillin/tazobactam IVPB.- 3.375 Gram(s) IV Intermittent once, 05-09-23 @ 15:00  piperacillin/tazobactam IVPB.. 3.375 Gram(s) IV Intermittent every 8 hours, 05-09-23 @ 22:00, Stop order after: 7 Days  piperacillin/tazobactam IVPB.. 3.375 Gram(s) IV Intermittent every 12 hours, 05-11-23 @ 22:27, Stop order after: 7 Days      Heme Medications   clopidogrel Tablet 75 milliGRAM(s) Oral daily, 05-11-23 @ 17:42      GI Medications  bisacodyl Suppository 10 milliGRAM(s) Rectal daily, 05-13-23 @ 10:06, Now  pantoprazole   Suspension 40 milliGRAM(s) Oral daily, 05-13-23 @ 09:57, Routine  polyethylene glycol 3350 17 Gram(s) Oral daily, 05-13-23 @ 10:06, Now  senna 2 Tablet(s) Oral at bedtime, 05-13-23 @ 09:58, Routine      COVID  05-04-23 @ 19:50  COVID -   Not[facility]  07-13-22 @ 18:33  [facility]   [facility]      COVID Biomarkers    05-04-23 @ 19:50 ESR --  ---  CRP --  ---  DDimer  351<H>   ---   LDH --   ---   Ferritin --         Trend Cardiac Enzymes  05-16-23 @ 05:10  NTR-YJARF-GLDVQ-CPKMM/Trop I - -- - --  - --  -  --  /  161.6<H>  05-15-23 @ 05:45  NOM-QOSNQ-SGVGZ-CPKMM/Trop I - -- - --  - --  -  --  /  212.9<H>  05-14-23 @ 11:30  FJM-FLUMT-YBCOZ-CPKMM/Trop I - 37 - --  - --  -  --  /  --  05-14-23 @ 05:00  ROR-QNGPT-FAZHG-CPKMM/Trop I - -- - --  - --  -  --  /  333.8<H>       Procalcitonin Trend  05-15-23 @ 05:45   -   1.12<H>  05-09-23 @ 13:42   -   0.11<H>    WBC Trend  05-16-23 @ 05:10   -  7.03  05-15-23 @ 13:55   -  8.29  05-15-23 @ 05:45   -  11.34<H>  05-14-23 @ 05:00   -  14.06<H>    H/H Trend  05-16-23 @ 05:10   -   10.3<L>/ 32.1<L>  05-15-23 @ 13:55   -   10.3<L>/ 33.2<L>  05-15-23 @ 05:45   -   11.2<L>/ 36.0<L>  05-14-23 @ 05:00   -   11.3<L>/ 35.0<L>  05-13-23 @ 05:00   -   12.1<L>/ 38.3<L>  05-12-23 @ 05:35   -   11.6<L>/ 35.6<L>     Platelet Trend  05-16-23 @ 05:10   -  74<L>  05-15-23 @ 13:55   -  68<L>  05-15-23 @ 05:45   -  85<L>  05-14-23 @ 05:00   -  76<L>    Trend Sodium  05-16-23 @ 05:10   -  141  05-15-23 @ 13:55   -  136  05-15-23 @ 05:45   -  140  05-14-23 @ 05:00   -  145    Trend Potassium  05-16-23 @ 05:10   -  3.5  05-15-23 @ 13:55   -  3.4<L>  05-15-23 @ 05:45   -  3.8  05-14-23 @ 05:00   -  3.8    Trend Bun/Cr  05-16-23 @ 05:10  BUN/CR -  116<H> / 5.14<H>  05-15-23 @ 13:55  BUN/CR -  108<H> / 4.70<H>  05-15-23 @ 05:45  BUN/CR -  107<H> / 4.95<H>  05-14-23 @ 05:00  BUN/CR -  88<H> / 4.51<H>    Lactic Acid Trend  05-13-23 @ 05:00   -   0.9  05-09-23 @ 15:20   -   1.8  05-09-23 @ 13:42   -   2.0  05-09-23 @ 10:25   -   12.6<HH>    ABG Trend  05-16-23 @ 05:59   - 7.35/47/104/98.8<H>  05-15-23 @ 13:45   - 7.36/47/74<L>/96.6  05-15-23 @ 04:45   - 7.33<L>/48/70<L>/95.7  05-14-23 @ 05:20   - 7.40/46/72<L>/96.5  05-13-23 @ 06:00   - 7.34<L>/46/93/98.7<H>  05-13-23 @ 03:35   - 7.31<L>/46/108/99.6<H>  05-12-23 @ 23:15   - 7.31<L>/49<H>/79<L>/97.1  05-11-23 @ 07:55   - 7.41/42/152<H>/99.2<H>  05-10-23 @ 04:13   - 7.39/47/129<H>/99.5<H>  05-09-23 @ 23:40   - 7.37/49<H>/75<L>/96.9  05-09-23 @ 10:16   - 7.10<LL>/83<HH>/75<L>/91.2<L>  05-09-23 @ 02:00   - 7.39/54<H>/67<L>/94.8    Trend AST/ALT/ALK Phos/Bili  05-16-23 @ 05:10   16/11/60/0.7  05-15-23 @ 13:55   17/10<L>/62/0.8  05-15-23 @ 05:45   18/10/74/0.7  05-14-23 @ 05:00   14/10/71/1.0  05-13-23 @ 05:00   17/10/93/1.2  05-12-23 @ 05:35   22/15/75/1.6<H>  05-11-23 @ 06:05   18/12/82/1.8<H>  05-10-23 @ 05:10   33/17/86/2.3<H>  05-09-23 @ 13:42   40/24/115/1.0  05-09-23 @ 10:25   39/28/143<H>/0.7  05-04-23 @ 19:50   22/21/123<H>/0.9  07-15-22 @ 06:15   24/11/83/0.8      Ammonia Trend  05-14-23 @ 11:30   -   53  05-14-23 @ 05:00   -   16       Albumin Trend  05-16-23 @ 05:10   -   1.6<L>  05-15-23 @ 13:55   -   1.8<L>  05-15-23 @ 05:45   -   1.3<L>  05-14-23 @ 05:00   -   1.3<L>  05-13-23 @ 05:00   -   1.5<L>  05-12-23 @ 05:35   -   1.5<L>      PTT - PT - INR Trend  05-15-23 @ 13:55   -   32.9 - 14.0<H> - 1.19<H>  05-14-23 @ 13:45   -   33.6 - -- - --  05-14-23 @ 11:30   -   34.1 - 14.9<H> - 1.28<H>  05-14-23 @ 05:00   -   50.3<H> - -- - --  05-13-23 @ 05:00   -   59.8<H> - -- - --  05-13-23 @ 02:50   -   58.5<H> - -- - --    Glucose Trend  05-16-23 @ 05:10   -  -- 151<H> --  05-16-23 @ 05:08   -  -- -- 141<H>  05-15-23 @ 23:36   -  -- -- 172<H>  05-15-23 @ 18:27   -  -- -- 135<H>  05-15-23 @ 13:55   -  -- 181<H> --  05-15-23 @ 11:17   -  -- -- 193<H>  05-15-23 @ 05:45   -  -- 214<H> --  05-15-23 @ 05:43   -  -- -- 188<H>  05-14-23 @ 23:22   -  -- -- 180<H>  05-14-23 @ 17:55   -  -- -- 217<H>    A1C with Estimated Average Glucose Result: 7.7 % *H* [4.0 - 5.6] (05-05-23 @ 08:00)      LABS:                        10.3   7.03  )-----------( 74       ( 16 May 2023 05:10 )             32.1     05-16    141  |  100  |  116<H>  ----------------------------<  151<H>  3.5   |  26  |  5.14<H>    Ca    8.7      16 May 2023 05:10  Phos  6.3     05-16  Mg     2.4     05-16    TPro  5.6<L>  /  Alb  1.6<L>  /  TBili  0.7  /  DBili  x   /  AST  16  /  ALT  11  /  AlkPhos  60  05-16         CULTURES: (if applicable)    Rapid RVP Result: NotDetec (05-04-23 @ 19:50)    RADIOLOGY  CXR:  b/l pleural effusion.       CT:    ECHO:      VITALS:  T(C): 36.6 (05-16-23 @ 05:00), Max: 36.7 (05-16-23 @ 00:00)  T(F): 97.8 (05-16-23 @ 05:00), Max: 98.1 (05-16-23 @ 00:00)  HR: 70 (05-16-23 @ 09:00) (66 - 86)  BP: 105/59 (05-16-23 @ 06:30) (94/53 - 193/112)  BP(mean): 72 (05-16-23 @ 06:30) (66 - 145)  ABP: 114/43 (05-16-23 @ 06:30) (96/42 - 270/262)  ABP(mean): 64 (05-16-23 @ 06:30) (59 - 264)  RR: 14 (05-16-23 @ 06:30) (12 - 22)  SpO2: 100% (05-16-23 @ 09:00) (94% - 100%)  CVP(mm Hg): --  CVP(cm H2O): --    Ins and Outs     05-15-23 @ 07:01  -  05-16-23 @ 07:00  --------------------------------------------------------  IN: 1863 mL / OUT: 595 mL / NET: 1268 mL        Height (cm): 154.9 (05-15-23 @ 13:26), 154.9 (05-15-23 @ 13:06)  Weight (kg): 61 (05-15-23 @ 13:26), 61 (05-15-23 @ 13:06)  BMI (kg/m2): 25.4 (05-15-23 @ 13:26), 25.4 (05-15-23 @ 13:26), 25.4 (05-15-23 @ 13:06)    Device: Avea, Mode: AC/ CMV (Assist Control/ Continuous Mandatory Ventilation), RR (machine): 14, RR (patient): 16, TV (machine): 450, TV (patient): 420, FiO2: 35, PEEP: 5, ITime: 0.8, MAP: 10, PIP: 22    I&O's Detail    15 May 2023 07:01  -  16 May 2023 07:00  --------------------------------------------------------  IN:    Enteral Tube Flush: 200 mL    Free Water: 500 mL    IV PiggyBack: 100 mL    IV PiggyBack: 165 mL    IV PiggyBack: 225 mL    Nepro with Carb Steady: 630 mL    Norepinephrine: 16 mL    Propofol: 27 mL  Total IN: 1863 mL    OUT:    Indwelling Catheter - Urethral (mL): 595 mL  Total OUT: 595 mL    Total NET: 1268 mL

## 2023-05-16 NOTE — EEG REPORT - NS EEG TEXT BOX
MARIO RAMOS [id number]     Study Date: 05-16-23  --------------------------------------------------------------------------------------------------  History:  CC/ HPI Patient is a 78y old  Male who presents with a chief complaint of acute systolic CHF (14 May 2023 09:19)    --------------------------------------------------------------------------------------------------  Study Interpretation:    [[[Abbreviation Key:  PDR=alpha rhythm/posterior dominant rhythm. A-P=anterior posterior.  Amplitude: ‘very low’:<20; ‘low’:20-49; ‘medium’:; ‘high’:>150uV.  Persistence for periodic/rhythmic patterns (% of epoch) ‘rare’:<1%; ‘occasional’:1-10%; ‘frequent’:10-50%; ‘abundant’:50-90%; ‘continuous’:>90%.  Persistence for sporadic discharges: ‘rare’:<1/hr; ‘occasional’:1/min-1/hr; ‘frequent’:>1/min; ‘abundant’:>1/10 sec.  RPP=rhythmic and periodic patterns; GRDA=generalized rhythmic delta activity; FIRDA=frontal intermittent GRDA; LRDA=lateralized rhythmic delta activity; TIRDA=temporal intermittent rhythmic delta activity;  LPD=PLED=lateralized periodic discharges; GPD=generalized periodic discharges; BIPDs =bilateral independent periodic discharges; Mf=multifocal; SIRPDs=stimulus induced rhythmic, periodic, or ictal appearing discharges; BIRDs=brief potentially ictal rhythmic discharges >4 Hz, lasting .5-10s; PFA (paroxysmal bursts >13 Hz or =8 Hz <10s).  Modifiers: +F=with fast component; +S=with spike component; +R=with rhythmic component.  S-B=burst suppression pattern.  Max=maximal. N1-drowsy; N2-stage II sleep; N3-slow wave sleep. SSS/BETS=small sharp spikes/benign epileptiform transients of sleep. HV=hyperventilation; PS=photic stimulation]]]    Daily EEG Visual Analysis    FINDINGS:      Background:  Discontinuous to burst-suppressed, consisting of 1-2 second periods of diffuse suppression or attenuation < 20 uV alternating with ~1-20-second bursts of centrally (Cz maximal) spike/polyspike-wave discharges, at times predominant on the left (Cz/C3/P3), appearing as continuous generalized periodic discharges at 1-2 Hz, or occasionally at 2-3 Hz. There is intermittent diffuse theta and polymorphic delta activity both intermixed with GPDs and in between GPDs.    Symmetry: symmetric  PDR: no PDR   Reactivity: not tested   Voltage: intermittently suppressed or attenuated  Anterior Posterior Gradient: absent  Other background findings: none  Breach: absent    Background Slowing:  As above     State Changes:   Absent    Ictal and Interictal Findings:  As above    Other Clinical Events:  None    Activation Procedures:   Hyperventilation was not performed.    Photic stimulation was not performed.    Artifacts:  Intermittent movement artifacts are present.    EKG:  Single-lead EKG shows irregular rhythm and occasional PVCs.    ---------------------------------------------------------------------------------------------------------------------------------------------------------      EEG Classification / Summary:    Abnormal EEG in a comatose patient.    - Continuous generalized periodic discharges (GPDs), centrally predominant, at times left centrally predominant  - Severe diffuse slowing  - Intermittent diffuse theta and polymorphic delta activity both intermixed with GPDs and in between GPDs.    ---------------------------------------------------------------------------------------------------------------------------------------------------------      Clinical Impression:    -GPDs can be secondary to underlying severe metabolic or post-hypoxic diffuse cerebral dysfunction. GPDs may indicate increased risk for seizures in certain clinical scenarios.  -Severe diffuse cerebral dysfunction is nonspecific in etiology, but in this case, sedating medications may be one contributing factor.   -There is intermittent cerebral activity in the background  -Single-lead EKG incidentally shows irregular rhythm.    No significant change compared to prior days of recording.      -------------------------------------------------------------------------------------------------------  [facility] EEG Reading Room Ph#: [phone]  Epilepsy Answering Service after 5PM and before 8:30AM: Ph#: [phone]    Jarret Alberto MD  EEG/Epilepsy Attending

## 2023-05-16 NOTE — PROGRESS NOTE ADULT - SUBJECTIVE AND OBJECTIVE BOX
s: MRI of the brain was reviewed with stroke neurologist who advised the right frontal and parietal stroke are likely due to hypoperfusion.  Recommended CT angio of the head and neck however patient's renal function may pose a barrier.  Spoke with Dr. Collins regarding the history.  Spoke with Dr. Campuzano regarding his GPDs which may increase the risk of stroke.  Initially Fycompa 4 mg was suggested by her.  After pharmacy mention his GFR status she recommended Briviact.  After today's EEG was performed she recommended not adding another agent.     Vital Signs Last 24 Hrs  T(C): 36.6 (16 May 2023 05:00), Max: 36.7 (16 May 2023 00:00)  T(F): 97.8 (16 May 2023 05:00), Max: 98.1 (16 May 2023 00:00)  HR: 72 (16 May 2023 12:00) (66 - 82)  BP: 119/64 (16 May 2023 09:30) (94/53 - 193/112)  BP(mean): 80 (16 May 2023 09:30) (66 - 145)  RR: 18 (16 May 2023 12:00) (12 - 22)  SpO2: 100% (16 May 2023 12:00) (94% - 100%)    Parameters below as of 16 May 2023 09:00  Patient On (Oxygen Delivery Method): ventilator    off propofol  overbreathing the vent. midline gaze.  face sym  no purposeful withdrawal to noxious stimuli  myclonic jerks noted. No eeg correlation.     MEDICATIONS  (STANDING):  bisacodyl Suppository 10 milliGRAM(s) Rectal daily  brivaracetam Oral Solution 50 milliGRAM(s) Oral two times a day  chlorhexidine 0.12% Liquid 15 milliLiter(s) Oral Mucosa every 12 hours  chlorhexidine 2% Cloths 1 Application(s) Topical <User Schedule>  clopidogrel Tablet 75 milliGRAM(s) Oral daily  dextrose 5%. 1000 milliLiter(s) (50 mL/Hr) IV Continuous <Continuous>  dextrose 5%. 1000 milliLiter(s) (100 mL/Hr) IV Continuous <Continuous>  dextrose 50% Injectable 25 Gram(s) IV Push once  dextrose 50% Injectable 25 Gram(s) IV Push once  dextrose 50% Injectable 12.5 Gram(s) IV Push once  insulin lispro (ADMELOG) corrective regimen sliding scale   SubCutaneous every 6 hours  midodrine 5 milliGRAM(s) Oral every 8 hours  norepinephrine Infusion 0.05 MICROgram(s)/kG/Min (5.74 mL/Hr) IV Continuous <Continuous>  pantoprazole   Suspension 40 milliGRAM(s) Oral daily  polyethylene glycol 3350 17 Gram(s) Oral daily  propofol Infusion 5.011 MICROgram(s)/kG/Min (1.84 mL/Hr) IV Continuous <Continuous>  senna 2 Tablet(s) Oral at bedtime  simvastatin 40 milliGRAM(s) Oral at bedtime  valproate sodium  IVPB 750 milliGRAM(s) IV Intermittent every 8 hours    MEDICATIONS  (PRN):  albuterol/ipratropium for Nebulization 3 milliLiter(s) Nebulizer every 6 hours PRN Shortness of Breath and/or Wheezing  sodium chloride 0.9% lock flush 10 milliLiter(s) IV Push every 1 hour PRN Pre/post blood products, medications, blood draw, and to maintain line patency    78 M hx of severe hypoxic ischemic encephalopathy/ anoxic brain injury. There is brainstem function. Continue valproic acid 750mg IV q8. Repeat VPA levels is 74. Due to his low albumin levels, the free form of vpa may be higher. EEG suggests severe diffuse encephalopathy. His myoclonus didn't have an EEG correlation. Differential diagnosis remains anoxic brain injury, uremic/metabolic encephalopathy (may benefit from possible dialysis), subclinical seizures. Will recommend checking ua due to indwelling santos, cxr from 5/15 mild improvement in the pulmonary venous congestion and decrease in the bilateral pleural effusions. wbc is 7 therefore less likely bacteremia.  His dose of proprofol in the evenings may contribute to his mental status. Will avoid sedating medications as much as possible. Vimpat is not recommended due to pt's renal status. Will see if adding briviact can help with his mental status. Will benefit from continuous video eeg monitoring to evaluate for intermittent subclinical seizures law with the GPDs which put him more at risk for seizures.  s: MRI of the brain was reviewed with stroke neurologist who advised the right frontal and parietal stroke are likely due to hypoperfusion.  Recommended CT angio of the head and neck however patient's renal function may pose a barrier.  Spoke with Dr. Collins regarding the history.  Spoke with Dr. Campuzano regarding his GPDs which may increase the risk of stroke.  Initially Fycompa 4 mg was suggested by her.  After pharmacy mention his GFR status she recommended Briviact.  After today's EEG was performed she recommended not adding another agent.     Vital Signs Last 24 Hrs  T(C): 36.6 (16 May 2023 05:00), Max: 36.7 (16 May 2023 00:00)  T(F): 97.8 (16 May 2023 05:00), Max: 98.1 (16 May 2023 00:00)  HR: 72 (16 May 2023 12:00) (66 - 82)  BP: 119/64 (16 May 2023 09:30) (94/53 - 193/112)  BP(mean): 80 (16 May 2023 09:30) (66 - 145)  RR: 18 (16 May 2023 12:00) (12 - 22)  SpO2: 100% (16 May 2023 12:00) (94% - 100%)    Parameters below as of 16 May 2023 09:00  Patient On (Oxygen Delivery Method): ventilator    off propofol  overbreathing the vent. midline gaze.  face sym  no purposeful withdrawal to noxious stimuli  myclonic jerks noted. No eeg correlation.     MEDICATIONS  (STANDING):  bisacodyl Suppository 10 milliGRAM(s) Rectal daily  brivaracetam Oral Solution 50 milliGRAM(s) Oral two times a day  chlorhexidine 0.12% Liquid 15 milliLiter(s) Oral Mucosa every 12 hours  chlorhexidine 2% Cloths 1 Application(s) Topical <User Schedule>  clopidogrel Tablet 75 milliGRAM(s) Oral daily  dextrose 5%. 1000 milliLiter(s) (50 mL/Hr) IV Continuous <Continuous>  dextrose 5%. 1000 milliLiter(s) (100 mL/Hr) IV Continuous <Continuous>  dextrose 50% Injectable 25 Gram(s) IV Push once  dextrose 50% Injectable 25 Gram(s) IV Push once  dextrose 50% Injectable 12.5 Gram(s) IV Push once  insulin lispro (ADMELOG) corrective regimen sliding scale   SubCutaneous every 6 hours  midodrine 5 milliGRAM(s) Oral every 8 hours  norepinephrine Infusion 0.05 MICROgram(s)/kG/Min (5.74 mL/Hr) IV Continuous <Continuous>  pantoprazole   Suspension 40 milliGRAM(s) Oral daily  polyethylene glycol 3350 17 Gram(s) Oral daily  propofol Infusion 5.011 MICROgram(s)/kG/Min (1.84 mL/Hr) IV Continuous <Continuous>  senna 2 Tablet(s) Oral at bedtime  simvastatin 40 milliGRAM(s) Oral at bedtime  valproate sodium  IVPB 750 milliGRAM(s) IV Intermittent every 8 hours    MEDICATIONS  (PRN):  albuterol/ipratropium for Nebulization 3 milliLiter(s) Nebulizer every 6 hours PRN Shortness of Breath and/or Wheezing  sodium chloride 0.9% lock flush 10 milliLiter(s) IV Push every 1 hour PRN Pre/post blood products, medications, blood draw, and to maintain line patency    78 M hx of severe hypoxic ischemic encephalopathy/ anoxic brain injury. There is brainstem function. Continue valproic acid 750mg IV q8. Repeat VPA levels is 74. Due to his low albumin levels, the free form of vpa may be higher. EEG suggests severe diffuse encephalopathy. His myoclonus didn't have an EEG correlation. Differential diagnosis remains anoxic brain injury, uremic/metabolic encephalopathy (may benefit from possible dialysis), subclinical seizures. Will recommend checking ua due to indwelling santos, cxr from 5/15 mild improvement in the pulmonary venous congestion and decrease in the bilateral pleural effusions. wbc is 7 therefore less likely bacteremia.  His dose of proprofol in the evenings may contribute to his mental status. Will avoid sedating medications as much as possible. Vimpat is not recommended due to pt's renal status. Will see if adding briviact can help with his mental status. Will benefit from continuous video eeg monitoring to evaluate for intermittent subclinical seizures law with the GPDs which put him more at risk for seizures. spoke with Kuldeep daughter and updated her. [phone]

## 2023-05-16 NOTE — H&P ADULT - ATTENDING COMMENTS
78M Hx Dementia, HTN, T2DM, CAD s/p CABG, HFrEF 40%, Ischemic Cardiomyopathy p/w GCV with Chocking Episode, PEA with ROSC x 8 Min 5/9/23 admitted to ICU but unable to tolerate Weaning Trial c/w Myoclonus Activities [facility] and vEEG monitoring.  - Unresponsive with minimal sedation on IV Propofol for Vent Support   - Visible Shoulder Myoclonus noted pending Neuro Consult, CT/MRI and vEEG    - Anoxic Brain Injury for Neuro/Aspiration Monitoring, IV Benzo prn for any clinical seizures   - Minimal Vent Support and Hemodynamically stable without Pressor   - Empiric ABx to be continue per transfer ICU pending C&S results   - Enteral Feeding as tolerated via OGT   - ANISH/CKD to monitor I&O and Renal Function   - Serial ABG, CBC, CMPs, PT/INR, EKG   - DVT PPx - SQH   - GOC - Full Code 78M Hx Dementia, HTN, T2DM, CAD s/p CABG, HFrEF 40%, Ischemic Cardiomyopathy p/w GCV with Chocking Episode, PEA with ROSC x 8 Min 5/9/23 admitted to ICU but unable to tolerate Weaning Trial c/w Myoclonus Activities [facility] W/U and vEEG monitoring.  - Unresponsive with minimal sedation on IV Propofol for Vent Support   - Visible Shoulder Myoclonus noted pending Neuro Consult, CT/MRI and vEEG    - Anoxic Brain Injury for Neuro/Aspiration Monitoring, IV Benzo prn for any clinical seizures   - Minimal Vent Support and Hemodynamically stable without Pressor   - Empiric ABx to be continue per transfer ICU pending C&S results   - Enteral Feeding as tolerated via OGT   - ANISH/CKD to monitor I&O and Renal Function   - Serial ABG, CBC, CMPs, PT/INR, EKG   - DVT PPx - ELQ and SQH   - GOC - Full Code     Patient seen and examined with ICU Resident/Fellow at bedside after lab data, medical records and radiology reports reviewed. I have read and agreeable in general with resident's Documented Note, Assessment and Management Plan which reflected my opinions from bedside round and discussion.   Total Critical Care Time = 45 Min excluding teaching and procedure activity.

## 2023-05-16 NOTE — CONSULT NOTE ADULT - ASSESSMENT
78y M with Hx dementia, HF, CAD s/p CABG, HLD, DM2, who is admitted for cardiac arrest    Impression: comatose w/ infarcts on MRI, myoclonic jerks on exam. Admitted for 24h EEG monitoring to rule-out status epilepticus and provide prognostication.    Recommendations:  [] c/w VPA 750mg TID IV   [] c/w Briviact 50mg BID per G-tube  [] fu valproic acid level prior to next dose  [] vEEG 24h  [] frequent neuro checks per ICU protocol  [] rescue medications: 1) Ativan 2mg 1x for GTC>3 min, 2) Vimpat 200mg 1x    Case discussed w/ attendings Dr. Florez and Mike. Will be formally staffed in AM. Recommendations preliminary until attested.

## 2023-05-16 NOTE — H&P ADULT - ASSESSMENT
Mr. Avila is an 78 year old male with a PMH of mild dementia, heart failure, CAD s/p CABG with known RCA occlusion, HLD, and type 2 DM who presented to [facility] on 5/4 for heart failure exacerbation. Course complicated by a choking episode on 5/9 that subsequently progressed to cardiac arrest lasting ~8 minutes with intubation. Pt now post arrest noted with ?anoxic brain injury and myoclonus requiring MRI for evaluation. EEG is suggestive of a severe diffuse encephalopathy. Pt has also required vasopressor support post arrest with sedation and a heparin gtt as tx for NSTEMI vs. demand ischemia which were d/fabián. Pt remans on propofol and full ventilator support.    Patient was noted to have myoclonic activity when weaned off of propofol. Patient received spot EEG which was not specific for epileptic activity but patient was also sedated. Patient was transferred to [facility] for 24hr videoEEG    NEURO:  S/p cardiac arrest with myoclonic activity with spot EEG showing diffuse dysfunction which may represent diffuse anoxic brain injury vs metabolic encephalopathy with labs notable for uremia. MRI brain 5/15 shows some area of foci for thrombotic stroke but no hemorrhage  - vEEG  - Optimize metabolic components  - Wean PRN  - On Brivaracetam and VPA  - Sedated on propofol    CARDIOVASCULAR:  S/p Cardiac arrest in the setting of witnessed choking episode. Received ACS protocol for possible NSTEMI type 1 vs type 2  - Tele  - No pressors    CAD s/p CABG  EKG shows evidence of fragmented QRS most notable in inferior leads corresponding to known RCA stenosis  - aspirin and plaxiv  - atorvastatin    HFrEF/ischemic cardiomyopathy  - Recent TTE noted with EF originally 40-45% on 5/5 and repeated 5/11 35-40% s/p cardiac arrest  - Will introduce GDMT if in lines of GOC and when appropriate    Paroxysmal A fib  - Eliquis    RESPIRATORY:  -Intubated s/p cardiac arrest    GI/NUTRITION:  - Tube feeds    RENAL:  ANISH on CKD  Baseline creatinine around 1.7 and uptrending after cardiac arrest which most likely represents post cardiac ischemic ATN  - CTM  - strict I and O    ID:  s/p zosyn 5/9-5/16  - CTM for signs of sepsis    HEME:  - No active issues    Dvt prophylaxis: eliquis    ENDOCRINE:  DM2  - SSI      ETHICS: Full Code. Mr. Avila is an 78 year old male with a PMH of mild dementia, heart failure, CAD s/p CABG with known RCA occlusion, HLD, and type 2 DM who presented to [facility] on 5/4 for heart failure exacerbation. Course complicated by a choking episode on 5/9 that subsequently progressed to cardiac arrest lasting ~8 minutes with intubation. Pt now post arrest noted with ?anoxic brain injury and myoclonus requiring MRI for evaluation. EEG is suggestive of a severe diffuse encephalopathy. Pt has also required vasopressor support post arrest with sedation and a heparin gtt as tx for NSTEMI vs. demand ischemia which were d/fabián. Pt remans on propofol and full ventilator support.    Patient was noted to have myoclonic activity when weaned off of propofol. Patient received spot EEG which was not specific for epileptic activity but patient was also sedated. Patient was transferred to [facility] for 24hr videoEEG    NEURO:  S/p cardiac arrest with myoclonic activity with spot EEG showing diffuse dysfunction which may represent diffuse anoxic brain injury vs metabolic encephalopathy with labs notable for uremia. MRI brain 5/15 shows some area of foci for thrombotic stroke but no hemorrhage  - vEEG  - Optimize metabolic components  - Wean PRN  - If evidence of epilepsy, would benefit from AED  - Sedated on propofol    CARDIOVASCULAR:  S/p Cardiac arrest in the setting of witnessed choking episode. Received ACS protocol for possible NSTEMI type 1 vs type 2  - Tele  - No pressors    CAD s/p CABG  EKG shows evidence of fragmented QRS most notable in inferior leads corresponding to known RCA stenosis  - aspirin   - atorvastatin    HFrEF/ischemic cardiomyopathy  - Recent TTE noted with EF originally 40-45% on 5/5 and repeated 5/11 35-40% s/p cardiac arrest  - Will introduce GDMT if in lines of GOC and when appropriate    Paroxysmal A fib  - Eliquis    RESPIRATORY:  -Intubated s/p cardiac arrest    GI/NUTRITION:  - Tube feeds    RENAL:  ANISH on CKD  Baseline creatinine around 1.7 and uptrending after cardiac arrest which most likely represents post cardiac ischemic ATN  - CTM  - strict I and O    ID:  s/p zosyn 5/9-5/16  - CTM for signs of sepsis    HEME:  - No active issues    Dvt prophylaxis: eliquis    ENDOCRINE:  DM2  - SSI      ETHICS: Full Code. Mr. Avila is an 78 year old male with a PMH of mild dementia, heart failure, CAD s/p CABG with known RCA occlusion, HLD, and type 2 DM who presented to [facility] on 5/4 for heart failure exacerbation. Course complicated by a choking episode on 5/9 that subsequently progressed to cardiac arrest lasting ~8 minutes with intubation. Pt now post arrest noted with ?anoxic brain injury and myoclonus requiring MRI for evaluation. EEG is suggestive of a severe diffuse encephalopathy. Pt has also required vasopressor support post arrest with sedation and a heparin gtt as tx for NSTEMI vs. demand ischemia which were d/fabián. Pt remans on propofol and full ventilator support.    Patient was noted to have myoclonic activity when weaned off of propofol. Patient received spot EEG which was not specific for epileptic activity but patient was also sedated. Patient was transferred to [facility] for 24hr videoEEG    NEURO:  S/p cardiac arrest with myoclonic activity with spot EEG showing diffuse dysfunction which may represent diffuse anoxic brain injury vs metabolic encephalopathy with labs notable for uremia. MRI brain 5/15 shows some area of foci for thrombotic stroke but no hemorrhage  - vEEG  - Optimize metabolic components  - Wean PRN  - If evidence of epilepsy, would benefit from AED  - Sedated on propofol  - Neurology reccs appreciated    CARDIOVASCULAR:  S/p Cardiac arrest in the setting of witnessed choking episode. Received ACS protocol for possible NSTEMI type 1 vs type 2  - Tele  - Was hypotensive at [facility] on propofol requiring levo    CAD s/p CABG  EKG shows evidence of fragmented QRS most notable in inferior leads corresponding to known RCA stenosis  - plavix  - simvastatin    HFrEF/ischemic cardiomyopathy  - Recent TTE noted with EF originally 40-45% on 5/5 and repeated 5/11 35-40% s/p cardiac arrest  - Will introduce GDMT if in lines of GOC and when appropriate    Paroxysmal A fib  - Eliquis    RESPIRATORY:  -Intubated s/p cardiac arrest    GI/NUTRITION:  - Start tube feeds once OGT placement confirmed  - Continue senna, miralax, bisacodyl  - Continue home pantoprazole    RENAL:  ANISH on CKD  Baseline creatinine around 1.7 and uptrending after cardiac arrest which most likely represents post cardiac ischemic ATN  - CTM  - strict I and O    ID:  s/p zosyn 5/9-5/16  - CTM for signs of sepsis    HEME:  - No active issues  - HIT negative, thrombocytopenia improving  - On plavix, off asa  - Dvt prophylaxis: eliquis    ENDOCRINE:  DM2  - SSI      ETHICS: Full Code.

## 2023-05-16 NOTE — CONSULT NOTE ADULT - ATTENDING COMMENTS
Mr. Avila is an 78 year old male with a PMH of mild dementia, heart failure, CAD s/p CABG with known RCA occlusion, HLD, and type 2 DM who presented to [facility] on 5/4 for heart failure exacerbation. Course complicated by a choking episode on 5/9 that subsequently progressed to cardiac arrest lasting ~8 minutes with intubation. Pt now post arrest noted with ?anoxic brain injury and myoclonus. EEG is suggestive of a severe diffuse encephalopathy. Pt on propofol and full ventilator support. ANISH. MR Head No Cont, may 15th showed  Several foci of restricted diffusion in the left frontal and parietal lobes. No hemorrhagic transformation.     At [facility], Patient was noted to have myoclonic activity when weaned off of propofol. Patient received spot EEG which was not specific for epileptic activity but patient was also sedated. Patient was transferred to [facility] for 24hr videoEEG    patient seen and examined at bedside, daughter present. he is sedated on propofol, intubated. pupils 2mm, reacting to light, gag present, no myoclonic movements seen at present    VEEG today-  GPDs- showing diffuse dysfunction which may represent diffuse anoxic brain injury vs metabolic encephalopathy with labs notable for uremia. no significant change compared to prior recordings at [facility]  - D/C briviact 50mg bid - renally cleared, sedating, no clear SZs on EEG  -continue VPA, check levels  - continue vEEG  - will continue to follow

## 2023-05-16 NOTE — PROGRESS NOTE ADULT - ASSESSMENT
Assessment:  Giovani Avila is an 78 year old man with past medical history of HFrecEF (LVEF 40-45% in 7/2022, then LVEF 55-60% in 1/2023), Coronary artery disease (s/p CABG with known occluded RCA, medically managed) was sent in to ER by PMD due to dyspnea, and concern for atrial arrhythmia, found to have congestive heart failure and acute renal injury now developed aspiration and choking event with hypoxia and cardiac arrest s/p CPR and now intubated and sedated in ICU.    ECG on admission suggestive of atrial tachycardia. Troponins elevated and likely secondary to known occluded RCA, CHF and renal disease. Pro BNP significantly elevated. Echo report with LVEF 40-45% which is a decrement from recent outpatient echo report with LVEF 55-60% in January. Recent outpatient nuclear stress test from February was abnormal and consistent with inferior wall infarct with mild laura-infarct ischemia appears was medically managed.     Repeat echo with further decline in LVEF to 35-40%. CT head reported very small acute left parietal infarct. EEG abnormal with severe diffuse cerebral dysfunction, nonspecific.     Recommendations:  [] Aspiration, hypoxia, cardiac arrest: S/p CPR protocol, intubated and sedated in ICU. Continue supportive care, continue pressor support and ventilator management per ICU. Follow up renal regarding if patient needs dialysis.   [] HFrEF: LVEF now 35-40%. Had recent outpatient non-invasive ischemic evaluation that was abnormal and medically managed. Not candidate for ARNI, SGLT2-I or MR antagonist due to shock state, continue pressor support.   [] CAD s/p CABG, elevated troponins: Troponins further elevated post cardiac arrest, has now peaked. S/p heparin drip. Patient is unstable and not candidate for invasive coronary angiogram at this time, continue optimal medical management.  [] Atrial fibrillation: Rate controlled, due to thrombocytopenia and recent CVA the patient appears to be high risk for bleeding if anticoagulated, follow up with Neuro if patient is candidate for anticoagulation.    Discussed with Dr. Polo. Prognosis appears guarded. We will continue to follow along.     Trang Faulkner MD  Cardiology      Assessment:  Giovani Avila is an 78 year old man with past medical history of HFrecEF (LVEF 40-45% in 7/2022, then LVEF 55-60% in 1/2023), Coronary artery disease (s/p CABG with known occluded RCA, medically managed) was sent in to ER by PMD due to dyspnea, and concern for atrial arrhythmia, found to have congestive heart failure and acute renal injury now developed aspiration and choking event with hypoxia and cardiac arrest s/p CPR and now intubated and sedated in ICU.    ECG on admission suggestive of atrial tachycardia. Troponins elevated and likely secondary to known occluded RCA, CHF and renal disease. Pro BNP significantly elevated. Echo report with LVEF 40-45% which is a decrement from recent outpatient echo report with LVEF 55-60% in January. Recent outpatient nuclear stress test from February was abnormal and consistent with inferior wall infarct with mild laura-infarct ischemia appears was medically managed.     Repeat echo with further decline in LVEF to 35-40%. CT head reported very small acute left parietal infarct. EEG abnormal with severe diffuse cerebral dysfunction, nonspecific.     Recommendations:  [] Aspiration, hypoxia, cardiac arrest: S/p CPR protocol, intubated and sedated in ICU. Continue supportive care, continue pressor support and ventilator management per ICU. Follow up renal regarding if patient needs dialysis.   [] HFrEF: LVEF now 35-40%. Had recent outpatient non-invasive ischemic evaluation that was abnormal and medically managed. Not candidate for ARNI, SGLT2-I or MR antagonist due to shock state, continue pressor support.   [] CAD s/p CABG, elevated troponins: Troponins further elevated post cardiac arrest, has now peaked. S/p heparin drip. Patient is unstable and not candidate for invasive coronary angiogram at this time, continue optimal medical management.  [] Atrial fibrillation: Rate controlled, due to thrombocytopenia and recent CVA the patient appears to be high risk for bleeding if anticoagulated, follow up with Neuro if patient is candidate for anticoagulation.    Discussed with Dr. Polo. Prognosis appears guarded.     Addendum:  Patient being transferred to [facility] for continous EEG, may have cardiology follow up there.    Trang Faulkner MD  Cardiology

## 2023-05-16 NOTE — PROGRESS NOTE ADULT - ASSESSMENT
Physical Examination:  GENERAL:                Intubated, eyes closed  HEENT:                    No JVD, Dry MM, eyes constricted, reactive to light  PULM:                     Bilateral air entry, Clear to auscultation bilaterally, no significant sputum production, +Rales, No Rhonchi, No Wheezing  CVS:                         S1, S2,  No Murmur  ABD:                        Soft, nondistended, nontender, normoactive bowel sounds,   EXT:                         + edema, nontender, LE Cyanosis No Clubbing   Vascular:                warm Extremities, Normal Capillary refill, Normal Distal Pulses  NEURO:                  Unresponsive   not interactive, does not follows commands, flacid, myoclonus to deep stimuli  PSYC:                      Calm, no Insight.        Assessment  S/p Cardiac Arrest due to chocking episode  Now with multiorgan dysfunction and anoxic brain injury  Acute hypoxic respiratory failure  Acute Renal Failure on CKD  CT Head showing acute Stroke, not candidate tpa  B/l Pleural Effusions  Underlying HTN (hypertension), HLD (hyperlipidemia), DM2, CAD       Plan  Continue mechanical ventilation, over breathing vent  myoclonus noted off propofol, case d/w neuro regarding second agent, recommended to start brivaracetam and to obtain 24 hours eeg.   MRI results non conclusive for unresponsive status.   Neuro recommended 24 hour EEG    and to consider HD for uremia if playing role.   d/w Renal no urgent HD at this time, will recommend for HD in am     HIT negative, thrombocytopenia improving  On plavix, off asa  hypotension on propofol requiring levo, continue dose midodrine and give albumin  Daily EEG    monitor valproic acid level - currently pending  stop antibiotics today    EEG abnormal - suggestive of extremely poor prognosis      Transfer center called to setup transfer to tertiary center for 24 hr eeg    PMD:	Dr Parra			                   Notified(Date): paged 5/12-13/23 , d/w with him on 5/14/23    Family Updated:  Kuldeep [phone]	                                 Date:  5/15-16/23  updated on potential transfer to tertiary center discussed [facility] is on diversion and [facility] will be next option, she request do best to go to [facility]  updated on second line agent and possible HD       Sedation & Analgesia:	as above,   Diet/Nutrition:		 Tube feeding  GI PPx:		pantoprazole     DVT Ppx:	venodynes   Activity:		bedrest   Head of Bed:               35-45 Deg  Glycemic Control:        Insulin ss    Lines:  PIV  CENTRAL LINE: 	[x] YES [ ] NO	                    LOCATION:   	                       DATE INSERTED:   	                    REMOVE:  [ ] YES [x ] NO    A-LINE:  	                [ x] YES [ ] NO                      LOCATION:   	                       DATE INSERTED: 		            REMOVE:  [ ] YES [ x] NO    [place]: 		        [x ] YES [ ] NO  		                                       DATE INSERTED:		            REMOVE:  [ ] YES [x ] NO      Restraints were deemed necessary to prevent removal of life-sustaining devices [  ] YES   [  x  ]  NO  Disposition: ICU Care  Goals of Care: Full code Physical Examination:  GENERAL:                Intubated, eyes closed  HEENT:                    No JVD, Dry MM, eyes constricted, reactive to light  PULM:                     Bilateral air entry, Clear to auscultation bilaterally, no significant sputum production, +Rales, No Rhonchi, No Wheezing  CVS:                         S1, S2,  No Murmur  ABD:                        Soft, nondistended, nontender, normoactive bowel sounds,   EXT:                         + edema, nontender, LE Cyanosis No Clubbing   Vascular:                warm Extremities, Normal Capillary refill, Normal Distal Pulses  NEURO:                  Unresponsive   not interactive, does not follows commands, flacid, myoclonus to deep stimuli  PSYC:                      Calm, no Insight.        Assessment  S/p Cardiac Arrest due to chocking episode  Now with multiorgan dysfunction and anoxic brain injury  Acute hypoxic respiratory failure  Acute Renal Failure on CKD  CT Head showing acute Stroke, not candidate tpa  B/l Pleural Effusions  Underlying HTN (hypertension), HLD (hyperlipidemia), DM2, CAD       Plan  Continue mechanical ventilation, over breathing vent  myoclonus noted off propofol, case d/w neuro regarding second agent, recommended to start brivaracetam and to obtain 24 hours eeg.   MRI results non conclusive for unresponsive status.   Neuro recommended 24 hour EEG    and to consider HD for uremia if playing role.   d/w Renal no urgent HD at this time, will recommend for HD in am     HIT negative, thrombocytopenia improving, off AC, but noted afib on monitor, will ask cardio to help for a/c , but low plts, will need o hold off     On plavix, off asa as per neuro  hypotension on propofol requiring levo, continue dose midodrine and give albumin  Daily EEG    monitor valproic acid level - currently pending  stop antibiotics today    EEG abnormal - suggestive of extremely poor prognosis      Transfer center called to setup transfer to tertiary center for 24 hr eeg    PMD:	Dr Parra			                   Notified(Date): paged 5/12-13/23 , d/w with him on 5/14/23    Family Updated:  Kuldeep [phone]	                                 Date:  5/15-16/23  updated on potential transfer to tertiary center discussed [facility] is on diversion and [facility] will be next option, she request do best to go to [facility]  updated on second line agent and possible HD       Sedation & Analgesia:	as above,   Diet/Nutrition:		 Tube feeding  GI PPx:		pantoprazole     DVT Ppx:	venodynes   Activity:		bedrest   Head of Bed:               35-45 Deg  Glycemic Control:        Insulin ss    Lines:  PIV  CENTRAL LINE: 	[x] YES [ ] NO	                    LOCATION:   	                       DATE INSERTED:   	                    REMOVE:  [ ] YES [x ] NO    A-LINE:  	                [ x] YES [ ] NO                      LOCATION:   	                       DATE INSERTED: 		            REMOVE:  [ ] YES [ x] NO    [place]: 		        [x ] YES [ ] NO  		                                       DATE INSERTED:		            REMOVE:  [ ] YES [x ] NO      Restraints were deemed necessary to prevent removal of life-sustaining devices [  ] YES   [  x  ]  NO  Disposition: ICU Care  Goals of Care: Full code

## 2023-05-16 NOTE — PROGRESS NOTE ADULT - SUBJECTIVE AND OBJECTIVE BOX
Intubated in ICU    Vital Signs Last 24 Hrs  T(C): 36.1 (05-16-23 @ 18:22), Max: 36.8 (05-16-23 @ 16:30)  T(F): 97 (05-16-23 @ 18:22), Max: 98.2 (05-16-23 @ 16:30)  HR: 77 (05-16-23 @ 19:00) (69 - 118)  BP: 161/87 (05-16-23 @ 18:22) (105/59 - 161/87)  BP(mean): 115 (05-16-23 @ 18:22) (72 - 115)  RR: 14 (05-16-23 @ 19:00) (12 - 37)  SpO2: 98% (05-16-23 @ 19:00) (96% - 100%)    I&O's Detail    15 May 2023 07:01  -  16 May 2023 07:00  --------------------------------------------------------  IN:    Enteral Tube Flush: 200 mL    Free Water: 500 mL    IV PiggyBack: 100 mL    IV PiggyBack: 165 mL    IV PiggyBack: 225 mL    Nepro with Carb Steady: 630 mL    Norepinephrine: 16 mL    Propofol: 27 mL  Total IN: 1863 mL    OUT:    Indwelling Catheter - Urethral (mL): 595 mL  Total OUT: 595 mL    Total NET: 1268 mL    16 May 2023 07:01  -  16 May 2023 21:09  --------------------------------------------------------  IN:    Free Water: 250 mL    IV PiggyBack: 100 mL    IV PiggyBack: 25 mL    Nepro with Carb Steady: 350 mL    Norepinephrine: 11.5 mL    Propofol: 25.6 mL  Total IN: 762.1 mL    OUT:    Indwelling Catheter - Urethral (mL): 240 mL  Total OUT: 240 mL    Total NET: 522.1 mL    Mode: AC/ CMV (Assist Control/ Continuous Mandatory Ventilation)  RR (machine): 14  TV (machine): 450  FiO2: 30  PEEP: 5  ITime: 0.8  MAP: 9  PIP: 20    s1s2  b/l air entry  soft, ND  tr edema                                                           10.3   7.03  )-----------( 74       ( 16 May 2023 05:10 )             32.1     16 May 2023 05:10    141    |  100    |  116    ----------------------------<  151    3.5     |  26     |  5.14     Ca    8.7        16 May 2023 05:10  Phos  6.3       16 May 2023 05:10  Mg     2.4       16 May 2023 05:10    TPro  5.6    /  Alb  1.6    /  TBili  0.7    /  DBili  x      /  AST  16     /  ALT  11     /  AlkPhos  60     16 May 2023 05:10    LIVER FUNCTIONS - ( 16 May 2023 05:10 )  Alb: 1.6 g/dL / Pro: 5.6 g/dL / ALK PHOS: 60 U/L / ALT: 11 U/L / AST: 16 U/L / GGT: x           PT/INR - ( 15 May 2023 13:55 )   PT: 14.0 sec;   INR: 1.19 ratio      albuterol/ipratropium for Nebulization 3 milliLiter(s) Nebulizer every 6 hours PRN  bisacodyl Suppository 10 milliGRAM(s) Rectal daily  brivaracetam Oral Solution 50 milliGRAM(s) Oral two times a day  chlorhexidine 0.12% Liquid 15 milliLiter(s) Oral Mucosa every 12 hours  chlorhexidine 2% Cloths 1 Application(s) Topical <User Schedule>  clopidogrel Tablet 75 milliGRAM(s) Oral daily  dextrose 5%. 1000 milliLiter(s) IV Continuous <Continuous>  dextrose 5%. 1000 milliLiter(s) IV Continuous <Continuous>  dextrose 50% Injectable 25 Gram(s) IV Push once  dextrose 50% Injectable 25 Gram(s) IV Push once  dextrose 50% Injectable 12.5 Gram(s) IV Push once  insulin lispro (ADMELOG) corrective regimen sliding scale   SubCutaneous every 6 hours  midodrine 5 milliGRAM(s) Oral every 8 hours  norepinephrine Infusion 0.05 MICROgram(s)/kG/Min IV Continuous <Continuous>  pantoprazole   Suspension 40 milliGRAM(s) Oral daily  polyethylene glycol 3350 17 Gram(s) Oral daily  propofol Infusion 5.011 MICROgram(s)/kG/Min IV Continuous <Continuous>  senna 2 Tablet(s) Oral at bedtime  simvastatin 40 milliGRAM(s) Oral at bedtime  valproate sodium  IVPB 750 milliGRAM(s) IV Intermittent every 8 hours    A/P:    Hemodynamic ATN iso resp failure, s/p arrest, asp PNA (Cr 1.6 - 5/9/23, Cr 1.0 - 4/12/23)  Hx CM, CHF, EF 35-40%, dementia  BP, resp support, anti-sz tx per ICU team  Avoid nephrotoxins  Will f/u BMP, UO   Lytes are stable  Trial of Bumex IV  Per d/w ICU team, if no improvement in Cr, UO, will consider RRT in am  Overall prognosis is poor w/or w/o RRT    [phone]

## 2023-05-16 NOTE — H&P ADULT - NSHPPHYSICALEXAM_GEN_ALL_CORE
T(C): 36.1 (05-16-23 @ 18:22), Max: 36.8 (05-16-23 @ 16:30)  HR: 81 (05-16-23 @ 18:45) (69 - 118)  BP: 161/87 (05-16-23 @ 18:22) (95/52 - 161/87)  RR: 17 (05-16-23 @ 18:45) (12 - 37)  SpO2: 97% (05-16-23 @ 18:45) (96% - 100%)    CONSTITUTIONAL: Intubated and sedated, laying in bed for exam   EYES: PERRLA and symmetric, EOMI, No conjunctival or scleral injection, non-icteric  ENMT: Oral mucosa with moist membranes. Normal dentition; no pharyngeal injection or exudates  NECK: Supple, symmetric and without tracheal deviation   RESP: No respiratory distress, no use of accessory muscles; CTA b/l, no WRR  CV: RRR, +S1S2, no MRG; no JVD; no peripheral edema  GI: Soft, NT, ND, no rebound, no guarding; no palpable masses; no hepatosplenomegaly; no hernia palpated  LYMPH: No cervical LAD or tenderness; no axillary LAD or tenderness; no inguinal LAD or tenderness  MSK: No digital clubbing or cyanosis  SKIN: No rashes or ulcers noted; no subcutaneous nodules or induration palpable. + Ecchymosis over left flank   NEURO: PERRLA, + intermittent myoclonic jerks of b/l upper and lower extremities  PSYCH: A&O x 0.

## 2023-05-17 LAB
BASOPHILS # BLD AUTO: 0 K/UL — SIGNIFICANT CHANGE UP (ref 0–0.2)
BASOPHILS # BLD AUTO: 0.04 K/UL — SIGNIFICANT CHANGE UP (ref 0–0.2)
BASOPHILS NFR BLD AUTO: 0 % — SIGNIFICANT CHANGE UP (ref 0–2)
BASOPHILS NFR BLD AUTO: 0.5 % — SIGNIFICANT CHANGE UP (ref 0–2)
BASOPHILS NFR BLD AUTO: 0.6 % — SIGNIFICANT CHANGE UP (ref 0–2)
BASOPHILS NFR BLD AUTO: 0.8 % — SIGNIFICANT CHANGE UP (ref 0–2)
BLD GP AB SCN SERPL QL: NEGATIVE — SIGNIFICANT CHANGE UP
EOSINOPHIL # BLD AUTO: 0.13 K/UL — SIGNIFICANT CHANGE UP (ref 0–0.5)
EOSINOPHIL # BLD AUTO: 0.17 K/UL — SIGNIFICANT CHANGE UP (ref 0–0.5)
EOSINOPHIL # BLD AUTO: 0.2 K/UL — SIGNIFICANT CHANGE UP (ref 0–0.5)
EOSINOPHIL # BLD AUTO: 0.32 K/UL — SIGNIFICANT CHANGE UP (ref 0–0.5)
EOSINOPHIL NFR BLD AUTO: 2.6 % — SIGNIFICANT CHANGE UP (ref 0–6)
EOSINOPHIL NFR BLD AUTO: 2.6 % — SIGNIFICANT CHANGE UP (ref 0–6)
EOSINOPHIL NFR BLD AUTO: 2.7 % — SIGNIFICANT CHANGE UP (ref 0–6)
EOSINOPHIL NFR BLD AUTO: 5.3 % — SIGNIFICANT CHANGE UP (ref 0–6)
GAS PNL BLDA: SIGNIFICANT CHANGE UP
GLUCOSE BLDC GLUCOMTR-MCNC: 112 MG/DL — HIGH (ref 70–99)
GLUCOSE BLDC GLUCOMTR-MCNC: 113 MG/DL — HIGH (ref 70–99)
GLUCOSE BLDC GLUCOMTR-MCNC: 127 MG/DL — HIGH (ref 70–99)
GLUCOSE BLDC GLUCOMTR-MCNC: 132 MG/DL — HIGH (ref 70–99)
GLUCOSE BLDC GLUCOMTR-MCNC: 82 MG/DL — SIGNIFICANT CHANGE UP (ref 70–99)
HCT VFR BLD CALC: 28.4 % — LOW (ref 39–50)
HCT VFR BLD CALC: 28.5 % — LOW (ref 39–50)
HCT VFR BLD CALC: 29 % — LOW (ref 39–50)
HGB BLD-MCNC: 9.5 G/DL — LOW (ref 13–17)
HGB BLD-MCNC: 9.7 G/DL — LOW (ref 13–17)
HGB BLD-MCNC: 9.8 G/DL — LOW (ref 13–17)
IMM GRANULOCYTES NFR BLD AUTO: 3.8 % — HIGH (ref 0–0.9)
IMM GRANULOCYTES NFR BLD AUTO: 4.2 % — HIGH (ref 0–0.9)
IMM GRANULOCYTES NFR BLD AUTO: 5.2 % — HIGH (ref 0–0.9)
LYMPHOCYTES # BLD AUTO: 0.21 K/UL — LOW (ref 1–3.3)
LYMPHOCYTES # BLD AUTO: 0.52 K/UL — LOW (ref 1–3.3)
LYMPHOCYTES # BLD AUTO: 0.52 K/UL — LOW (ref 1–3.3)
LYMPHOCYTES # BLD AUTO: 0.57 K/UL — LOW (ref 1–3.3)
LYMPHOCYTES # BLD AUTO: 10.5 % — LOW (ref 13–44)
LYMPHOCYTES # BLD AUTO: 3.5 % — LOW (ref 13–44)
LYMPHOCYTES # BLD AUTO: 7.8 % — LOW (ref 13–44)
LYMPHOCYTES # BLD AUTO: 8 % — LOW (ref 13–44)
MCHC RBC-ENTMCNC: 30.4 PG — SIGNIFICANT CHANGE UP (ref 27–34)
MCHC RBC-ENTMCNC: 30.4 PG — SIGNIFICANT CHANGE UP (ref 27–34)
MCHC RBC-ENTMCNC: 30.8 PG — SIGNIFICANT CHANGE UP (ref 27–34)
MCHC RBC-ENTMCNC: 33.5 GM/DL — SIGNIFICANT CHANGE UP (ref 32–36)
MCHC RBC-ENTMCNC: 33.8 GM/DL — SIGNIFICANT CHANGE UP (ref 32–36)
MCHC RBC-ENTMCNC: 34 GM/DL — SIGNIFICANT CHANGE UP (ref 32–36)
MCV RBC AUTO: 90.1 FL — SIGNIFICANT CHANGE UP (ref 80–100)
MCV RBC AUTO: 90.5 FL — SIGNIFICANT CHANGE UP (ref 80–100)
MCV RBC AUTO: 90.7 FL — SIGNIFICANT CHANGE UP (ref 80–100)
MONOCYTES # BLD AUTO: 0.16 K/UL — SIGNIFICANT CHANGE UP (ref 0–0.9)
MONOCYTES # BLD AUTO: 0.45 K/UL — SIGNIFICANT CHANGE UP (ref 0–0.9)
MONOCYTES # BLD AUTO: 0.65 K/UL — SIGNIFICANT CHANGE UP (ref 0–0.9)
MONOCYTES # BLD AUTO: 0.68 K/UL — SIGNIFICANT CHANGE UP (ref 0–0.9)
MONOCYTES NFR BLD AUTO: 10.4 % — SIGNIFICANT CHANGE UP (ref 2–14)
MONOCYTES NFR BLD AUTO: 2.6 % — SIGNIFICANT CHANGE UP (ref 2–14)
MONOCYTES NFR BLD AUTO: 8.9 % — SIGNIFICANT CHANGE UP (ref 2–14)
MONOCYTES NFR BLD AUTO: 9.1 % — SIGNIFICANT CHANGE UP (ref 2–14)
NEUTROPHILS # BLD AUTO: 3.56 K/UL — SIGNIFICANT CHANGE UP (ref 1.8–7.4)
NEUTROPHILS # BLD AUTO: 4.85 K/UL — SIGNIFICANT CHANGE UP (ref 1.8–7.4)
NEUTROPHILS # BLD AUTO: 5.3 K/UL — SIGNIFICANT CHANGE UP (ref 1.8–7.4)
NEUTROPHILS # BLD AUTO: 5.57 K/UL — SIGNIFICANT CHANGE UP (ref 1.8–7.4)
NEUTROPHILS NFR BLD AUTO: 71.8 % — SIGNIFICANT CHANGE UP (ref 43–77)
NEUTROPHILS NFR BLD AUTO: 74.6 % — SIGNIFICANT CHANGE UP (ref 43–77)
NEUTROPHILS NFR BLD AUTO: 75.9 % — SIGNIFICANT CHANGE UP (ref 43–77)
NEUTROPHILS NFR BLD AUTO: 85 % — HIGH (ref 43–77)
NRBC # BLD: 0 /100 WBCS — SIGNIFICANT CHANGE UP (ref 0–0)
PLATELET # BLD AUTO: 76 K/UL — LOW (ref 150–400)
PLATELET # BLD AUTO: 83 K/UL — LOW (ref 150–400)
PROCALCITONIN SERPL-MCNC: 0.74 NG/ML — HIGH (ref 0.02–0.1)
RBC # BLD: 3.13 M/UL — LOW (ref 4.2–5.8)
RBC # BLD: 3.15 M/UL — LOW (ref 4.2–5.8)
RBC # BLD: 3.22 M/UL — LOW (ref 4.2–5.8)
RBC # FLD: 15 % — HIGH (ref 10.3–14.5)
RBC # FLD: 15.1 % — HIGH (ref 10.3–14.5)
RBC # FLD: 15.2 % — HIGH (ref 10.3–14.5)
RH IG SCN BLD-IMP: POSITIVE — SIGNIFICANT CHANGE UP
WBC # BLD: 4.96 K/UL — SIGNIFICANT CHANGE UP (ref 3.8–10.5)
WBC # BLD: 6.51 K/UL — SIGNIFICANT CHANGE UP (ref 3.8–10.5)
WBC # BLD: 7.34 K/UL — SIGNIFICANT CHANGE UP (ref 3.8–10.5)
WBC # FLD AUTO: 4.96 K/UL — SIGNIFICANT CHANGE UP (ref 3.8–10.5)
WBC # FLD AUTO: 6.51 K/UL — SIGNIFICANT CHANGE UP (ref 3.8–10.5)
WBC # FLD AUTO: 7.34 K/UL — SIGNIFICANT CHANGE UP (ref 3.8–10.5)

## 2023-05-17 PROCEDURE — 95720 EEG PHY/QHP EA INCR W/VEEG: CPT

## 2023-05-17 PROCEDURE — 99221 1ST HOSP IP/OBS SF/LOW 40: CPT

## 2023-05-17 PROCEDURE — 99232 SBSQ HOSP IP/OBS MODERATE 35: CPT

## 2023-05-17 RX ORDER — INSULIN LISPRO 100/ML
VIAL (ML) SUBCUTANEOUS EVERY 6 HOURS
Refills: 0 | Status: DISCONTINUED | OUTPATIENT
Start: 2023-05-17 | End: 2023-05-19

## 2023-05-17 RX ORDER — BRIVARACETAM 25 MG/1
50 TABLET, FILM COATED ORAL
Refills: 0 | Status: DISCONTINUED | OUTPATIENT
Start: 2023-05-17 | End: 2023-05-17

## 2023-05-17 RX ORDER — ALBUMIN HUMAN 25 %
250 VIAL (ML) INTRAVENOUS ONCE
Refills: 0 | Status: COMPLETED | OUTPATIENT
Start: 2023-05-17 | End: 2023-05-17

## 2023-05-17 RX ORDER — PANTOPRAZOLE SODIUM 20 MG/1
40 TABLET, DELAYED RELEASE ORAL
Refills: 0 | Status: DISCONTINUED | OUTPATIENT
Start: 2023-05-17 | End: 2023-05-19

## 2023-05-17 RX ORDER — DEXMEDETOMIDINE HYDROCHLORIDE IN 0.9% SODIUM CHLORIDE 4 UG/ML
0.1 INJECTION INTRAVENOUS
Qty: 200 | Refills: 0 | Status: DISCONTINUED | OUTPATIENT
Start: 2023-05-17 | End: 2023-05-18

## 2023-05-17 RX ORDER — HUMAN INSULIN 100 [IU]/ML
2 INJECTION, SUSPENSION SUBCUTANEOUS EVERY 6 HOURS
Refills: 0 | Status: DISCONTINUED | OUTPATIENT
Start: 2023-05-17 | End: 2023-05-17

## 2023-05-17 RX ADMIN — SIMVASTATIN 40 MILLIGRAM(S): 20 TABLET, FILM COATED ORAL at 21:40

## 2023-05-17 RX ADMIN — Medication 125 MILLILITER(S): at 21:52

## 2023-05-17 RX ADMIN — PROPOFOL 3.91 MICROGRAM(S)/KG/MIN: 10 INJECTION, EMULSION INTRAVENOUS at 02:00

## 2023-05-17 RX ADMIN — Medication 50 MILLIGRAM(S): at 21:39

## 2023-05-17 RX ADMIN — Medication 50 MILLIGRAM(S): at 05:34

## 2023-05-17 RX ADMIN — CHLORHEXIDINE GLUCONATE 15 MILLILITER(S): 213 SOLUTION TOPICAL at 17:48

## 2023-05-17 RX ADMIN — BRIVARACETAM 50 MILLIGRAM(S): 25 TABLET, FILM COATED ORAL at 05:47

## 2023-05-17 RX ADMIN — DEXMEDETOMIDINE HYDROCHLORIDE IN 0.9% SODIUM CHLORIDE 1.63 MICROGRAM(S)/KG/HR: 4 INJECTION INTRAVENOUS at 19:53

## 2023-05-17 RX ADMIN — HUMAN INSULIN 5 UNIT(S): 100 INJECTION, SUSPENSION SUBCUTANEOUS at 01:03

## 2023-05-17 RX ADMIN — BRIVARACETAM 50 MILLIGRAM(S): 25 TABLET, FILM COATED ORAL at 17:48

## 2023-05-17 RX ADMIN — PANTOPRAZOLE SODIUM 40 MILLIGRAM(S): 20 TABLET, DELAYED RELEASE ORAL at 17:48

## 2023-05-17 RX ADMIN — HUMAN INSULIN 5 UNIT(S): 100 INJECTION, SUSPENSION SUBCUTANEOUS at 12:25

## 2023-05-17 RX ADMIN — Medication 6.1 MICROGRAM(S)/KG/MIN: at 19:53

## 2023-05-17 RX ADMIN — CHLORHEXIDINE GLUCONATE 1 APPLICATION(S): 213 SOLUTION TOPICAL at 05:38

## 2023-05-17 RX ADMIN — PANTOPRAZOLE SODIUM 40 MILLIGRAM(S): 20 TABLET, DELAYED RELEASE ORAL at 05:38

## 2023-05-17 RX ADMIN — Medication 50 MILLIGRAM(S): at 14:11

## 2023-05-17 RX ADMIN — CHLORHEXIDINE GLUCONATE 15 MILLILITER(S): 213 SOLUTION TOPICAL at 05:35

## 2023-05-17 NOTE — CONSULT NOTE ADULT - SUBJECTIVE AND OBJECTIVE BOX
Wound Surgery Consult Note:    HPI:  Mr. Avila is an 78 year old male with a PMH of mild dementia, heart failure, CAD s/p CABG with known RCA occlusion, HLD, and type 2 DM who presented to [facility] on 5/4 for heart failure exacerbation. Course complicated by a choking episode on 5/9 that subsequently progressed to cardiac arrest lasting ~8 minutes with intubation. Pt now post arrest noted with ?anoxic brain injury and myoclonus requiring MRI for evaluation. EEG is suggestive of a severe diffuse encephalopathy. Pt has also required vasopressor support post arrest with sedation and a heparin gtt as tx for NSTEMI vs. demand ischemia which were d/fabián. Pt remans on propofol and full ventilator support.    Patient was noted to have myoclonic activity when weaned off of propofol. Patient received spot EEG which was not specific for epileptic activity but patient was also sedated. Patient was transferred to [facility] for 24hr videoEEG  (16 May 2023 18:56)    Request for wound care consult for sacral/bilateral buttocks skin breakdown received from nursing. Mr. Avial was encountered on an alternating air with low air loss surface. He is incontinent of stool and urine and has a urethral catheter for urine management.  His extreme immobility, inactivity, gross incontinence of stool as well as poor nutritional status all contribute to his high risk for pressure injury development and hinder healing. Identification of the initial signs of deep tissue damage at the level of the skin within 72 hours of admission make this an injury that occurred prior to admission.    PAST MEDICAL & SURGICAL HISTORY:  HTN (hypertension)  HLD (hyperlipidemia)  Diabetes  CAD (coronary artery disease)  History of cholecystectomy    REVIEW OF SYSTEMS  unable to obtain due to patient's mental status    MEDICATIONS  (STANDING):  brivaracetam Oral Solution 50 milliGRAM(s) Oral two times a day  chlorhexidine 0.12% Liquid 15 milliLiter(s) Oral Mucosa every 12 hours  chlorhexidine 4% Liquid 1 Application(s) Topical <User Schedule>  dexMEDEtomidine Infusion 0.1 MICROgram(s)/kG/Hr (1.63 mL/Hr) IV Continuous <Continuous>  dextrose 5%. 1000 milliLiter(s) (100 mL/Hr) IV Continuous <Continuous>  dextrose 5%. 1000 milliLiter(s) (50 mL/Hr) IV Continuous <Continuous>  dextrose 50% Injectable 12.5 Gram(s) IV Push once  dextrose 50% Injectable 25 Gram(s) IV Push once  dextrose 50% Injectable 25 Gram(s) IV Push once  glucagon  Injectable 1 milliGRAM(s) IntraMuscular once  insulin lispro (ADMELOG) corrective regimen sliding scale   SubCutaneous three times a day before meals  insulin NPH human recombinant 5 Unit(s) SubCutaneous every 6 hours  norepinephrine Infusion 0.05 MICROgram(s)/kG/Min (6.1 mL/Hr) IV Continuous <Continuous>  pantoprazole  Injectable 40 milliGRAM(s) IV Push two times a day  polyethylene glycol 3350 17 Gram(s) Oral daily  propofol Infusion 10 MICROgram(s)/kG/Min (3.91 mL/Hr) IV Continuous <Continuous>  senna 2 Tablet(s) Oral at bedtime  simvastatin 40 milliGRAM(s) Oral at bedtime  valproate sodium  IVPB 750 milliGRAM(s) IV Intermittent every 8 hours    MEDICATIONS  (PRN):  bisacodyl Suppository 10 milliGRAM(s) Rectal daily PRN Constipation  dextrose Oral Gel 15 Gram(s) Oral once PRN Blood Glucose LESS THAN 70 milliGRAM(s)/deciliter    Allergies    sulfa drugs (Unknown)    Intolerances    SOCIAL HISTORY:  unable to obtain due to patient's mental status    FAMILY HISTORY:  No pertinent family history in first degree relatives    Vital Signs Last 24 Hrs  T(C): 36.4 (17 May 2023 08:00), Max: 36.8 (16 May 2023 16:30)  T(F): 97.5 (17 May 2023 08:00), Max: 98.2 (16 May 2023 16:30)  HR: 69 (17 May 2023 10:45) (65 - 118)  BP: 157/68 (17 May 2023 07:35) (86/54 - 161/87)  BP(mean): 98 (17 May 2023 07:35) (66 - 115)  RR: 18 (17 May 2023 10:45) (14 - 37)  SpO2: 97% (17 May 2023 10:45) (95% - 100%)    Parameters below as of 17 May 2023 08:00  Patient On (Oxygen Delivery Method): ventilator    O2 Concentration (%): 30    Physical Exam:  General: Sedated, WN  Ophthamology: sclera clear  ENMT: moist mucous membranes, trachea midline, intubated  Respiratory: equal chest rise with respirations, vented  Gastrointestinal: soft NT/ND  Neurology: nonverbal, not following commands  Psych: sedated  Musculoskeletal: no contractures  Vascular: BLE edema equal  Skin:  Sacral/bilateral buttocks with deep maroon discolored intact skin in and around the gluteal cleft  L 5cm X W 7cm x D none, no drainage  No odor, erythema, increased warmth, tenderness, induration, fluctuance    LABS:  05-16    141  |  101  |  107<H>  ----------------------------<  146<H>  3.5   |  21<L>  |  4.91<H>    Ca    8.5      16 May 2023 22:16  Phos  5.3     05-16  Mg     2.4     05-16    TPro  5.0<L>  /  Alb  1.9<L>  /  TBili  0.5  /  DBili  x   /  AST  15  /  ALT  7<L>  /  AlkPhos  50  05-16                          9.7    6.51  )-----------( 76       ( 17 May 2023 05:40 )             28.5     PT/INR - ( 16 May 2023 22:16 )   PT: 13.6 sec;   INR: 1.18 ratio         PTT - ( 16 May 2023 22:16 )  PTT:31.3 sec

## 2023-05-17 NOTE — CONSULT NOTE ADULT - REASON FOR ADMISSION
Patient:   GWYN CHAPIN            MRN: [id number]            FIN: [id number]              Age:   78 years     Sex:  MALE     :  37   Associated Diagnoses:   None   Author:   BENSON CORCORAN     Chief Complaint   Sternal fracture     History of Present Illness   Mr. Gwyn Chapin is a 77 YO male with a history of CAD s/p stent, CHF (EF 40-45%), AICD, COPD, LANDRY, and GERD who is admitted for COPD exacerbation. Course is complicated by PEA arrest on 19 secondary to respiratory arrest, with ROSC but now with mildly displaced sternal fracture and non-displaced rib fractures after CPR compressions. Thoracic surgery consulted for evaluation of sternal and rib fractures.    Patient currently endorses pain in sternal region but denies any pain along the ribs. His sternal pain is worsened by movement and it is very painful when taking a deep breath or coughing.    Of note, the patient did have cardiac arrest 3 months ago per family and required compressions at that time. The sternal pain is new however. The patient is currently on lidocaine patch and tylenol for pain control. He says he has taken ibuprofen in the past and did not have GI bleeding issues then.    Patient quit smoking 1 year ago and prior to that had 61 pack-year smoking history. Patient is on ASA and Plavix for his coronary stents.     Review of Systems   Constitutional:  Negative except as documented in history of present illness.   Eye:  Negative.    Ear/Nose/Mouth/Throat:  Negative.    Cardiovascular:  Negative except as documented in history of present illness.   Respiratory:  Negative except as documented in history of present illness.   Gastrointestinal:  Negative.    Genitourinary:  Negative.    Musculoskeletal:  Negative except as documented in history of present illness.   Integumentary:  Negative.    Hematology/Lymphatics:  Negative.    Neurologic:  Negative.    Endocrine:  Negative.    Allergy/Immunologic:  Negative.    Psychiatric:  
cardiac arrest
Negative.      Histories   Past Med History:    All Problems  Non-ischemic cardiomyopathy / [id number] / Confirmed  COPD (chronic obstructive pulmonary disease) / 11488035 / Confirmed  Chronic pain / 051082616 / Confirmed  CHF (congestive heart failure) / 99825148 / Confirmed  COPD - Chronic obstructive pulmonary disease / 912480530 / Confirmed  Coronary heart disease / 22745892 / Confirmed  Diverticulosis / 2725860805 / Confirmed  Dyslipidemia / 3302112756 / Confirmed  GERD (gastroesophageal reflux disease) / 645346890 / Confirmed  Heart block / 108639001 / Confirmed  Hypertension / 2122867452 / Confirmed  Obstructive sleep apnea / 0652167430 / Confirmed  Pacemaker / [id number] / Confirmed  Painful urination / [id number] / Confirmed  Risk factors for obstructive sleep apnea / 04859671 / Confirmed  Sleep apnea / 776089859 / Confirmed  Smoker / 294263738 / Confirmed  Stent placement / 611231375 / Confirmed   Family History:    History is unknown.   Procedure History:    Insertion of cardiac defibrillator using fluoroscopic guidance ([id number]) on 01/01/2009 at 67 Years.  Cardiac angiogram ([id number]) in 2008 at 66 Years.  Comments:  12/26/2014 15:59 - ANTHONY-NOEMY, YUMIKO  stent x2  Colonoscopy ([id number]).   Social History       Alcohol  Details: Use: None.  Exercise  Details: Excercise: Never.; Comment(s): no known exercise  Home/Environment  Details: Alcohol Abuse in Household: No.  Substance Abuse in Household: No.  Smoker in Household: No.  Substance Abuse  Details: Use: None.  Tobacco  Details: Smoker in Houshold: Yes.  Smoked/Smokeless Tobacco Last 30 Days: Yes.  Smoking Tobacco Use: Current every day smoker.  Smokeless Tobacco Use Smokeless tobacco user within last 30 days.  Type: Cigarettes.  Details: Smoked/Smokeless Tobacco Last 30 Days: No.  Use: Former smoker.  Type: Cigarettes.  Details: Smoked/Smokeless Tobacco Last 30 Days: No.  Use: Heavy tobacco 
smoker.  Type: Cigarettes.  Cigarette Packs/Day: 1 Pack Per Day.  Year(s): 56.; Comment(s): quit 1 year ago  Details: Used in Last 12 Months: Yes.  Use: Former smoker.  Type: Cigarettes.  Cigarette Packs/Day: 1 Pack Per Day.; Comment(s): pt states he quit smoking 6 mo ago  Cultural/Hindu Practices  Details: Hindu or Cultural Practices: No Pork or Pork products..  Hindu or Cultural Practices While in Hospital: Yes.  .       Health Status   Allergies:    Allergic Reactions (All)  2  IVP dye (iodinated radiocontrast dyes)- No reactions were documented.  1  Iodine topical- No reactions were documented.  Severity Not Documented  Penicillins- No reactions were documented.  Pork- No reactions were documented.  Canceled/Inactive Reactions (All)  Severity Not Documented  Aspirin- No reactions were documented.  Carvedilol- No reactions were documented.  Doxazosin- Doxazosin.  Dye- No reactions were documented.  Lisinopril- No reactions were documented.  NKA  PCN (penicillins)- No reactions were documented.  Penicillin- No reactions were documented.  Plavix- No reactions were documented.  SIMVASTS- No reactions were documented.   Current medications:  (Selected)   Inpatient Medications  Ordered  Breo Ellipta inhaler oral 100-25 mcg/puff powder: 1 puff, MDI/DPI, Daily  Lactated Ringers 1,000 mL: 50 mL/hr, IV  Lopressor (metoprolol tartrate): 25 mg, 1 tab, Oral, Q12H  Plavix oral 75 mg tablet: 75 mg, 1 tab, Oral, Daily  Remove Patch - lidocaine topical.: Remove Patch, TransDermal, Q Bedtime  acetaminophen (Tylenol).: 650 mg, 2 tab, Oral, Q6H, PRN: pain mild  acetaminophen oral 325 mg tablet (Tylenol): 650 mg, 2 tab, Oral, Q6H  albuterol-ipratropium inhalation 2.5-0.5 mg/3 mL (DuoNeb).: 3 mL, Nebulizer, Q6H  albuterol-ipratropium inhalation 2.5-0.5 mg/3 mL (DuoNeb).: 3 mL, Nebulizer, Q6H, PRN: respiratory distress  amLODIPine oral 2.5 mg tablet: 2.5 mg, 1 tab, Oral, Daily  aspirin oral 81 mg chewable tablet: 81 mg, 1 
tab, Chewed, Daily  atorvastatin oral 40 mg tablet: 40 mg, 1 tab, Oral, Q Bedtime  cefTRIAXone  IV injection (Rocephin): 2,000 mg, 20 mL, 600 mL/hr, Slow IV Push, Daily  dextrose (glucose) injection 50%.: 12.5 gm, 25 mL, IV Push, As Directed PRN, PRN: low blood glucose  dextrose (glucose) oral.: 15 gm, Oral, As Directed PRN, PRN: low blood glucose  docusate sodium (Colace).: 100 mg, 1 cap, Oral, BID, PRN: constipation  docusate-senna oral 50-8.6 mg tablet (Senokot S).: 2 tab, Oral, Q Bedtime  ergocalciferol (vitamin D2) oral 50,000 unit (1.25 mg) capsule: 50,000 unit, 1 cap, Oral, Weekly  glucagon (GlucaGen).: 1 mg, 1 mL, IM, As Directed PRN, PRN: low blood glucose  guaiFENesin oral 600 mg LA tablet: 600 mg, 1 tab, Oral, Q12H  heparin subcutaneous injection 5,000 unit: 5,000 unit, 1 mL, Subcutaneous, Q8H  hydrALAZINE oral 10 mg tablet (Apresoline): 10 mg, 1 tab, Oral, Q8H, PRN: systolic BP over 160 mmHg  insulin lispro (HumaLOG) dose correction.: 2-12 units, Subcutaneous, QID [before meals & HS]  lidocaine topical 4% (40 mg/gm) patch (Aspercreme with Lidocaine).: 1 patch, TransDermal, Daily  morphine injection: 2 mg, 1 mL, IV Push, Q4H, PRN: pain severe  ondansetron (Zofran).: 4 mg, 2 mL, Slow IV Push, Q8H, PRN: nausea  pantoprazole oral 40 mg DR tablet: 40 mg, 1 tab, Oral, Daily [before breakfast]  predniSONE oral 20 mg tablet: 40 mg, 2 tab, Oral, Daily [with breakfast]  Prescriptions  Prescribed  Robitussin Mucus + Chest Congestion oral 100 mg/5 mL liquid: 200 mg, 10 mL, Oral, Q4H, PRN: cough, 120 mL, 0 Refill(s)  albuterol-ipratropium inhalation 2.5-0.5 mg/3 mL solution (DuoNeb).: 3 mL, Nebulizer, Q6H, for 30 days, 360 mL, 3 Refill(s)  amLODIPine oral 5 mg tablet: 5 mg, 1 tab, Oral, Daily, 30 tab, 0 Refill(s)  doxycycline hyclate 100 mg oral tablet: 100 mg, 1 tab, Oral, Daily, for 7 days, 7 tab, 0 Refill(s)  predniSONE oral 20 mg tablet: 20 mg, 1 tab, Oral, BID, for 7 days, 14 tab, 0 Refill(s)  Documented 
24h EEG
Medications  Documented  Breo Ellipta inhaler oral 100-25 mcg/puff powder: 1 puff, Inhaled, Daily  Lasix oral 40 mg tablet: 40 mg, 1 tab, Oral, Daily, 30 tab  Metoprolol Succinate ER 25 mg oral tablet, extended release: 25 mg, 1 tab, Oral, BID  Plavix oral 75 mg tablet: 75 mg, 1 tab, Oral, Daily  acetaminophen oral 325 mg tablet (Tylenol): 650 mg, 2 tab, Oral, Q6H, PRN: pain mild  albuterol HFA inhaler oral 90 mcg/puff: 2 puff, Inhaled, Q6H, PRN: for wheezing  aspirin oral 81 mg chewable tablet: 81 mg, 1 tab, Chewed, Daily  atorvastatin oral 40 mg tablet: 40 mg, 1 tab, Oral, Q Bedtime, 90 tab  dextromethorphan-guaifenesin oral  mg/5 mL liquid (Robitussin DM): 5 mL, Oral, Q4H, PRN: cough  docusate sodium oral 100 mg capsule: 100 mg, 1 cap, Oral, BID, PRN: constipation  ergocalciferol (vitamin D2) oral 50,000 unit (1.25 mg) capsule: 50,000 unit, 1 cap, Oral, Weekly, 30 cap  losartan oral 25 mg tablet: 25 mg, 1 tab, Daily  omeprazole 20 mg oral delayed release tablet: 20 mg, 1 tab, Oral, BID, 30 tab     Physical Examination   VS/Measurements   Vital Signs   07/24/19 05:07 CDT Heart/Pulse Rate 111 beats/min  HI    SpO2 95 %  Normal   07/24/19 05:07 CDT NIBP Systolic 168 mm Hg  HI    NIBP Diastolic 99 mm Hg  HI    NIBP MAP Manual Entry 122   07/24/19 05:07 CDT Temperature - VS 36.5 deg_C  Normal    Temperature Source - VS Oral    Pulse Source Monitor    NIBP Source Right Arm       Gen: NAD, lying in bed, nasal cannula in place  HEENT: Normocephalic  Cardiac: normal rate  Chest: Mildly dyspneic on NC. Crepitus palpated over manubrium during inspiration. Tender to palpation. Expiratory wheezing on auscultation.  Abd: Soft, ND, NT  Ext: Atraumatic  Neuro: Awake and alert  Psych: normal affect     Review / Management   Laboratory results:       Labs between:  23-JUL-2019 22:04 to 24-JUL-2019 22:04    CBC:                 WBC  HgB  Hct  Plt  MCV  RDW   24-JUL-2019 (H) 32.4  (L) 11.9  (L) 36.5  (L) 117  93.6  14.4 
    DIFF:                 Seg  Neutroph//ABS  Lymph//ABS  Mono//ABS  EOS/ABS  24-JUL-2019 76  // (H) 25.6  // 2.9 // (H) 3.9  // (L) 0.0     BMP:                 Na  Cl  BUN  Glu   24-JUL-2019 136  104  (H) 68  (H) 131                              K  CO2  Cr  Ca                              5.0  24  (H) 1.94  8.9     Other Chem:             Mg  Phos  Triglycerides  GGTP  DirectBili                           (H) 2.8             POC GLU:                 Latest Result  Latest Date  Minimum  Min Date  Maximum  Max Date                             (H) 142  24-JUL-2019 (H) 142  24-JUL-2019 (H) 132  24-JUL-2019    Blood Gas:            Ph  PCO2  PO2  BiCarb  BaseExcess   Arterial:  24-JUL-2019 7.36  47  (L) 51  27  1                  .    CT chest without contrast:  FINDINGS:    Medical devices: Left chest wall cardiac device the leads in the right atrium, right ventricle and coronary sinus.    Thyroid: Normal.    Lymph nodes: Limited evaluation without IV contrast. No supraclavicular, axillary, mediastinal, or hilar lymphadenopathy.    Vasculature: Limited evaluation without IV contrast. Aorta and main pulmonary artery diameters are within normal range.    Heart: Severe coronary artery calcification. No pericardial effusion.    Other mediastinal structures: Interval development of a small amount of gas within the mediastinum near the AP window.    Lung parenchyma: There is a bandlike consolidative opacity in the left lower lobe favored to represent atelectasis, due to mucus impaction.    Airways: Mucous secretions layering within the trachea there is bronchial wall thickening in the lower lobes is likely inflammatory from possible aspiration, but no definitive aspiration changes in the lower lobes.    Pleura: Small left-sided pneumothorax.    Chest wall: Subcutaneous gas is present in the anterior neck soft tissues and along the left lateral chest wall.    Upper abdomen: Significant mild layering gallbladder sludge, but 
no gallbladder wall thickening.  Two hypodensities within the right renal cortex, which are indeterminant, but favored to represent cysts.  No hydronephrosis.    Musculoskeletal: There is an acute, nondisplaced fracture of the left anterior 1st through 7th ribs.  Fracture of the right anterior 1st and 2nd ribs.  There is cortical irregularity of the right anterolateral 3rd through 8th ribs shows sternal callus formation, representing subacute fractures. Minimally displaced acute fractures involving the upper and lower aspects of the sternum.  Additional, subacute fracture of the mid sternum.    IMPRESSION:    1.   Acute nondisplaced fractures of the left anterior 1st through 7th ribs and of the right anterior 1st and 2nd ribs.  A small left pneumothorax is present and pneumomediastinum with air predominantly along the AP window.    2.  Minimally displaced acute fractures involving the upper and lower aspects of the sternum.  Additional, subacute fracture of the mid sternum.    3.   Significant subcutaneous emphysema along the left lateral chest wall in the anterior neck soft tissues.    4.   Mucous secretions within the trachea and bibasilar bronchial wall thickening, left greater than right with atelectatic changes.  Atelectasis may related to mucus impaction and/or aspiration changes.    Findings discussed with NOEMY Taylor at 0742 on 07/24/2019.        Impression and Plan   Mr. Gwyn Chapin is a 79 YO male with a history of CAD s/p stent, CHF (EF 40-45%), AICD, COPD, LANDRY, and GERD who is admitted for COPD exacerbation. Course is complicated by PEA arrest on 7/20/19 secondary to respiratory arrest, with ROSC but now with mildly displaced sternal fracture and non-displaced rib fractures after CPR compressions. Thoracic surgery consulted for evaluation of sternal and rib fractures. Patient would benefit from rib  plating for improved long-term pain control and correct orientation of bones upon healing of fracture, 
however he needs medical optimization first. Will discuss with Pulmonology and Cardiology. Patient and family leaning toward agreement for surgery if patient is medically cleared.    -No emergent thoracic surgical intervention  -Will post for rib plating, bronchoscopy, possible thoracotomy, possible sternotomy on 7/25/19 with Dr. Peña  -NPO at midnight  -AM labs  -Gentle IVF overnight  -Medical clearance by Cardiology, pulmonology  -Await final patient decision  -F/U 3D reconstructions of CT chest      Patient seen with chief resident and discussed with Dr. Casey Tanner MD  PGY1, Thoracic Surgery

## 2023-05-17 NOTE — PROGRESS NOTE ADULT - ASSESSMENT
Mr. Avila is an 78 year old male with a PMH of mild dementia, heart failure, CAD s/p CABG with known RCA occlusion, HLD, and type 2 DM who presented to [facility] on 5/4 for heart failure exacerbation. Course complicated by a choking episode on 5/9 that subsequently progressed to cardiac arrest lasting ~8 minutes with intubation. Pt now post arrest noted with ?anoxic brain injury and myoclonus requiring MRI for evaluation. EEG is suggestive of a severe diffuse encephalopathy. Pt has also required vasopressor support post arrest with sedation and a heparin gtt as tx for NSTEMI vs. demand ischemia which were d/fabián. Pt remans on propofol and full ventilator support.    Patient was noted to have myoclonic activity when weaned off of propofol. Patient received spot EEG which was not specific for epileptic activity but patient was also sedated. Patient was transferred to [facility] for 24hr video EEG.    NEURO:  S/p cardiac arrest with myoclonic activity with spot EEG showing diffuse dysfunction which may represent diffuse anoxic brain injury vs metabolic encephalopathy with labs notable for uremia. MRI brain 5/15 shows some area of foci for thrombotic stroke but no hemorrhage.  - vEEG  - Optimize metabolic components  - Wean PRN  - If evidence of epilepsy, would benefit from AED  - Sedated on propofol  - Neurology reccs appreciated    CARDIOVASCULAR:  S/p Cardiac arrest in the setting of witnessed choking episode. Received ACS protocol for possible NSTEMI type 1 vs type 2  - Tele  - Was hypotensive at [facility] on propofol requiring levo    CAD s/p CABG  EKG shows evidence of fragmented QRS most notable in inferior leads corresponding to known RCA stenosis  - plavix  - simvastatin    HFrEF/ischemic cardiomyopathy  - Recent TTE noted with EF originally 40-45% on 5/5 and repeated 5/11 35-40% s/p cardiac arrest  - Will introduce GDMT if in lines of GOC and when appropriate    Paroxysmal A fib  - Eliquis    RESPIRATORY:  -Intubated s/p cardiac arrest    GI/NUTRITION:  - Start tube feeds once OGT placement confirmed  - Continue senna, miralax, bisacodyl  - Continue home pantoprazole    RENAL:  ANISH on CKD  Baseline creatinine around 1.7 and uptrending after cardiac arrest which most likely represents post cardiac ischemic ATN  - CTM  - strict I and O    ID:  s/p zosyn 5/9-5/16  - CTM for signs of sepsis    HEME:  - No active issues  - HIT negative, thrombocytopenia improving  - On plavix, off asa  - Dvt prophylaxis: eliquis    ENDOCRINE:  DM2  - SSI    ETHICS: Full Code   Mr. Avila is an 78 year old male with a PMH of mild dementia, heart failure, CAD s/p CABG with known RCA occlusion, HLD, and type 2 DM who presented to [facility] on 5/4 for heart failure exacerbation. Course complicated by a choking episode on 5/9 that subsequently progressed to cardiac arrest lasting ~8 minutes with intubation. Pt now post arrest noted with ?anoxic brain injury and myoclonus requiring MRI for evaluation. EEG is suggestive of a severe diffuse encephalopathy. Pt has also required vasopressor support post arrest with sedation and a heparin gtt as tx for NSTEMI vs. demand ischemia which were d/fabián. Pt remans on propofol and full ventilator support.    Patient was noted to have myoclonic activity when weaned off of propofol. Patient received spot EEG which was not specific for epileptic activity but patient was also sedated. Patient was transferred to [facility] for 24hr video EEG to eval for status epilepticus and prognostication. Pt will then be transferred back to [facility].    NEURO:  S/p cardiac arrest with myoclonic activity with spot EEG showing diffuse dysfunction which may represent diffuse anoxic brain injury vs metabolic encephalopathy with labs notable for uremia. MRI brain 5/15 shows some area of foci for thrombotic stroke but no hemorrhage.  - Video EEG for 24 hours  - Sedated on propofol  - on  mg TID  - on Briviact 50 mg BID  - check VPA level prior to morning dose  - F/u neuro recs    CARDIOVASCULAR:  #S/p Cardiac arrest in the setting of witnessed choking episode. Received ACS protocol for possible NSTEMI type 1 vs type 2  - Tele  - Was hypotensive at [facility] on propofol, requiring levo    #CAD s/p CABG  EKG shows evidence of fragmented QRS most notable in inferior leads corresponding to known RCA stenosis  - Plavix - hold 2/2 hematochezia   - Simvastatin    #HFrEF/ischemic cardiomyopathy  - Recent TTE noted with EF originally 40-45% on 5/5 and repeated 5/11 35-40% s/p cardiac arrest  - Will introduce GDMT if in lines of GOC and when appropriate    #Paroxysmal A fib  - Eliquis hold 2/2 hematochezia    RESPIRATORY:  - Intubated s/p cardiac arrest    GI/NUTRITION:  - On tube feeds  - Hold bowel regimen  - Continue home pantoprazole    RENAL:  ANISH on CKD  Baseline creatinine around 1.7 and uptrending after cardiac arrest which most likely represents post cardiac ischemic ATN  - CTM  - strict I and O    ID:  s/p zosyn 5/9-5/16  - CTM for signs of sepsis    HEME:  - No active issues  - HIT negative, thrombocytopenia improving  - hold plavix 2/2 hematochezia  - Dvt prophylaxis: SCDs    ENDOCRINE:  DM2  - ISS    ETHICS: Full Code

## 2023-05-17 NOTE — CHART NOTE - NSCHARTNOTEFT_GEN_A_CORE
:  Erick Nino Fellow  Marian Doyle Attending    INDICATION:    [x] Shock    [x] Acute Hypoxic Respiratory failure    [ ] Other:       PROCEDURE:    [x] LIMITED ECHO    [x] LIMITED CHEST    [ ] LIMITED ABDOMINAL    [ ] OTHER      FINDINGS:  Cardiac:   LV: Moderate to severely decreased LV Function.   RV: Normal RV size.   Pericardium: No pericardial effusion.   IVC: indeterminate    Pulmonary: B lines at the bases. Moderate left pleural effusion.    INTERPRETATION:  Decreased LV function.   Bibasilar B lines  Moderate left pleural effusion.     Images stored on [facility] :  Erick Nino Fellow  Marian Doyle Attending    INDICATION:    [x] Shock    [x] Acute Hypoxic Respiratory failure    [ ] Other:       PROCEDURE:    [x] LIMITED ECHO    [x] LIMITED CHEST    [ ] LIMITED ABDOMINAL    [ ] OTHER      FINDINGS:  Cardiac:   LV: Moderate to severely decreased LV Function.   RV: Normal RV size.   Pericardium: No pericardial effusion.   IVC: indeterminate    Pulmonary: B lines at the bases. Moderate left pleural effusion.    INTERPRETATION:  Decreased LV function.   Bibasilar B lines  Moderate left pleural effusion.     Images stored on [facility]    Attending Note: Agree with above. I was present through out the scan.

## 2023-05-17 NOTE — EEG REPORT - NS EEG TEXT BOX
MARIO RAMOS [id number]     Study Date: 05-16-23 2000 to 5-17-23 0800  Duration: 12 hr     --------------------------------------------------------------------------------------------------  History:  CC/ HPI Patient is a 78y old  Male who presents with a chief complaint of acute systolic CHF - arrest (14 May 2023 09:19)    --------------------------------------------------------------------------------------------------  Study Interpretation:    [[[Abbreviation Key:  PDR=alpha rhythm/posterior dominant rhythm. A-P=anterior posterior.  Amplitude: ‘very low’:<20; ‘low’:20-49; ‘medium’:; ‘high’:>150uV.  Persistence for periodic/rhythmic patterns (% of epoch) ‘rare’:<1%; ‘occasional’:1-10%; ‘frequent’:10-50%; ‘abundant’:50-90%; ‘continuous’:>90%.  Persistence for sporadic discharges: ‘rare’:<1/hr; ‘occasional’:1/min-1/hr; ‘frequent’:>1/min; ‘abundant’:>1/10 sec.  RPP=rhythmic and periodic patterns; GRDA=generalized rhythmic delta activity; FIRDA=frontal intermittent GRDA; LRDA=lateralized rhythmic delta activity; TIRDA=temporal intermittent rhythmic delta activity;  LPD=PLED=lateralized periodic discharges; GPD=generalized periodic discharges; BIPDs =bilateral independent periodic discharges; Mf=multifocal; SIRPDs=stimulus induced rhythmic, periodic, or ictal appearing discharges; BIRDs=brief potentially ictal rhythmic discharges >4 Hz, lasting .5-10s; PFA (paroxysmal bursts >13 Hz or =8 Hz <10s).  Modifiers: +F=with fast component; +S=with spike component; +R=with rhythmic component.  S-B=burst suppression pattern.  Max=maximal. N1-drowsy; N2-stage II sleep; N3-slow wave sleep. SSS/BETS=small sharp spikes/benign epileptiform transients of sleep. HV=hyperventilation; PS=photic stimulation]]]    Daily EEG Visual Analysis    FINDINGS:      Background:  Discontinuous to burst-suppressed, consisting of 1-5 second periods of diffuse suppression or attenuation < 20 uV alternating with ~1-15-second bursts of centrally (Cz maximal) spike/polyspike-wave discharges, at times predominant on the left (Cz/C3/P3), appearing as continuous generalized periodic discharges at 1-2 Hz, or occasionally at 2.5-4.5 Hz for 2-5 cycles. There is intermittent diffuse theta and polymorphic delta activity both intermixed with GPDs and in between GPDs.    There is occasional head and/or body jerking time-locked with GPDs.    Symmetry: symmetric  PDR: no PDR   Reactivity: not tested   Voltage: intermittently suppressed or attenuated  Anterior Posterior Gradient: absent  Other background findings: none  Breach: absent    Background Slowing:  As above     State Changes:   Absent    Ictal and Interictal Findings:  As above    Other Clinical Events:  None    Activation Procedures:   Hyperventilation was not performed.    Photic stimulation was not performed.    Artifacts:  Intermittent movement artifacts are present.    EKG:  Single-lead EKG shows irregular rhythm and occasional PVCs.    ---------------------------------------------------------------------------------------------------------------------------------------------------------      EEG Classification / Summary:    Abnormal EEG in a comatose patient.    - Continuous generalized periodic discharges (GPDs), centrally predominant, at times left centrally predominant  - Occasional head and/or body jerking time-locked to GPDs  - Severe diffuse slowing  - Intermittent diffuse theta and polymorphic delta activity both intermixed with GPDs and in between GPDs.    ---------------------------------------------------------------------------------------------------------------------------------------------------------      Clinical Impression:    -Cortical myoclonus and GPDs can be secondary to underlying severe metabolic or post-hypoxic diffuse cerebral dysfunction. GPDs may indicate increased risk for seizures in certain clinical scenarios.  -Severe diffuse cerebral dysfunction is nonspecific in etiology, but in this case, sedating medications may be one contributing factor.   -There is intermittent cerebral activity in the background  -Single-lead EKG incidentally shows irregular rhythm.    No significant change compared to prior days of recording from [facility].    Jarret Alberto MD  EEG/Epilepsy Attending

## 2023-05-17 NOTE — PROGRESS NOTE ADULT - SUBJECTIVE AND OBJECTIVE BOX
pt was away at [facility] for continuous eeg monitoring.    78 M hx of severe hypoxic ischemic encephalopathy/ anoxic brain injury. There is brainstem function. Continue valproic acid 750mg IV q8. Repeat VPA levels is 74. Due to his low albumin levels, the free form of vpa may be higher. EEG suggests severe diffuse encephalopathy. His myoclonus didn't have an EEG correlation. Differential diagnosis remains anoxic brain injury, uremic/metabolic encephalopathy (may benefit from possible dialysis), subclinical seizures. Will recommend checking ua due to indwelling santos, cxr from 5/15 mild improvement in the pulmonary venous congestion and decrease in the bilateral pleural effusions. wbc is 7 therefore less likely bacteremia.  His dose of proprofol in the evenings may contribute to his mental status. Will avoid sedating medications as much as possible. Vimpat is not recommended due to pt's renal status. Will see if adding briviact can help with his mental status. Will benefit from continuous video eeg monitoring to evaluate for intermittent subclinical seizures law with the GPDs which put him more at risk for seizures.   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 After discussion with daughter, will hold off on further vessel imaging as we would pursue conservative medical management at this time.    pt was away at [facility] for continuous eeg monitoring.    78 M hx of severe hypoxic ischemic encephalopathy/ anoxic brain injury. There is brainstem function. Continue valproic acid 750mg IV q8. Repeat VPA levels is 74. Due to his low albumin levels, the free form of vpa may be higher. EEG suggests severe diffuse encephalopathy. His myoclonus didn't have an EEG correlation. Differential diagnosis remains anoxic brain injury, uremic/metabolic encephalopathy (may benefit from possible dialysis), subclinical seizures. Will recommend checking ua due to indwelling santos, cxr from 5/15 mild improvement in the pulmonary venous congestion and decrease in the bilateral pleural effusions. wbc is 7 therefore less likely bacteremia.  His dose of proprofol in the evenings may contribute to his mental status. Will avoid sedating medications as much as possible. Vimpat is not recommended due to pt's renal status. Will see if adding briviact can help with his mental status. Will benefit from continuous video eeg monitoring to evaluate for intermittent subclinical seizures law with the GPDs which put him more at risk for seizures.

## 2023-05-17 NOTE — CONSULT NOTE ADULT - ASSESSMENT
Impression:    Sacral/bilateral Buttocks deep tissue injury present on admission  Incontinence of bowel and bladder  Incontinence Dermatitis    Recommend:  1.) topical therapy: sacral/buttock injury - cleanse with incontinence cleanser, pat dry, apply allevyn foam dressing daily  2.) Incontinence Management - incontinence cleanser, pads, pericare BID  3.) Maintain on an alternating air with low air loss surface  4.) Turn and reposition Q 2 hours  5.) Nutrition optimization - please add Noman when appropriate  6.) Offload heels/feet with complete cair air fluidized boots; ensure that the soles of the feet are not resting on the foot board of the bed.    Care as per medicine. Will not actively follow but will remain available. Please recall for new issues or deterioration.  Upon [facility] [address] [place] [address] [phone]  Thank you for this consult  Reena Capellan, NP-C, CWOCN [id number] Impression:    Sacral/bilateral Buttocks deep tissue injury present on admission  Incontinence of bowel and bladder  Incontinence Dermatitis    Recommend:  1.) topical therapy: sacral/buttock injury - cleanse with incontinence cleanser, pat dry, apply allevyn foam dressing daily  2.) Incontinence Management - incontinence cleanser, pads, pericare BID  3.) Maintain on an alternating air with low air loss surface  4.) Turn and reposition Q 2 hours  5.) Nutrition optimization - please add Noman when appropriate  6.) Offload heels/feet with complete cair air fluidized boots; ensure that the soles of the feet are not resting on the foot board of the bed.  7.) Toe wound per wound care team Podiatrists, Bakari García/Adilene/Humberto, who will see patient.    Care as per medicine. Will not actively follow but will remain available. Please recall for new issues or deterioration.  Upon discharge f/u as outpatient at [facility] [address] [place] [address] [phone]  Thank you for this consult  Reena Capellan, FREDERICK-C, CWOCN [id number]

## 2023-05-17 NOTE — PROGRESS NOTE ADULT - SUBJECTIVE AND OBJECTIVE BOX
COVERING RESIDENT: Michelle Ferro (PGY-1)     INTERVAL HPI/OVERNIGHT EVENTS:    SUBJECTIVE: Patient seen and examined at bedside.     CONSTITUTIONAL: No weakness, fevers or chills  EYES/ENT: No visual changes;  No vertigo or throat pain   NECK: No pain or stiffness  RESPIRATORY: No cough, wheezing, hemoptysis; No shortness of breath  CARDIOVASCULAR: No chest pain or palpitations  GASTROINTESTINAL: No abdominal or epigastric pain. No nausea, vomiting, or hematemesis; No diarrhea or constipation. No melena or hematochezia.  GENITOURINARY: No dysuria, frequency or hematuria  NEUROLOGICAL: No numbness or weakness  SKIN: No itching, rashes    OBJECTIVE:    VITAL SIGNS:  ICU Vital Signs Last 24 Hrs  T(C): 36.2 (17 May 2023 12:00), Max: 36.8 (16 May 2023 16:30)  T(F): 97.2 (17 May 2023 12:00), Max: 98.2 (16 May 2023 16:30)  HR: 66 (17 May 2023 11:45) (65 - 118)  BP: 157/68 (17 May 2023 07:35) (86/54 - 161/87)  BP(mean): 98 (17 May 2023 07:35) (66 - 115)  ABP: 136/58 (17 May 2023 11:45) (90/39 - 195/86)  ABP(mean): 82 (17 May 2023 11:45) (54 - 122)  RR: 15 (17 May 2023 11:45) (14 - 37)  SpO2: 98% (17 May 2023 11:45) (95% - 100%)    O2 Parameters below as of 17 May 2023 08:00  Patient On (Oxygen Delivery Method): ventilator    O2 Concentration (%): 30      Mode: AC/ CMV (Assist Control/ Continuous Mandatory Ventilation), RR (machine): 14, TV (machine): 450, FiO2: 30, PEEP: 5, ITime: 1, MAP: 9, PIP: 26    05-16 @ 07:01  -  05-17 @ 07:00  --------------------------------------------------------  IN: 342 mL / OUT: 385 mL / NET: -43 mL    05-17 @ 07:01  - 05-17 @ 12:17  --------------------------------------------------------  IN: 36 mL / OUT: 100 mL / NET: -64 mL      CAPILLARY BLOOD GLUCOSE      POCT Blood Glucose.: 132 mg/dL (17 May 2023 11:13)      PHYSICAL EXAM:    General: NAD  HEENT: NC/AT; PERRL, clear conjunctiva  Neck: supple  Respiratory: CTA b/l  Cardiovascular: +S1/S2; RRR  Abdomen: soft, NT/ND; +BS x4  Extremities: WWP, 2+ peripheral pulses b/l; no LE edema  Skin: normal color and turgor; no rash  Neurological:    MEDICATIONS:  MEDICATIONS  (STANDING):  brivaracetam Oral Solution 50 milliGRAM(s) Oral two times a day  chlorhexidine 0.12% Liquid 15 milliLiter(s) Oral Mucosa every 12 hours  chlorhexidine 4% Liquid 1 Application(s) Topical <User Schedule>  dexMEDEtomidine Infusion 0.1 MICROgram(s)/kG/Hr (1.63 mL/Hr) IV Continuous <Continuous>  dextrose 5%. 1000 milliLiter(s) (100 mL/Hr) IV Continuous <Continuous>  dextrose 5%. 1000 milliLiter(s) (50 mL/Hr) IV Continuous <Continuous>  dextrose 50% Injectable 25 Gram(s) IV Push once  dextrose 50% Injectable 12.5 Gram(s) IV Push once  dextrose 50% Injectable 25 Gram(s) IV Push once  glucagon  Injectable 1 milliGRAM(s) IntraMuscular once  insulin lispro (ADMELOG) corrective regimen sliding scale   SubCutaneous three times a day before meals  insulin NPH human recombinant 5 Unit(s) SubCutaneous every 6 hours  norepinephrine Infusion 0.05 MICROgram(s)/kG/Min (6.1 mL/Hr) IV Continuous <Continuous>  pantoprazole  Injectable 40 milliGRAM(s) IV Push two times a day  polyethylene glycol 3350 17 Gram(s) Oral daily  propofol Infusion 10 MICROgram(s)/kG/Min (3.91 mL/Hr) IV Continuous <Continuous>  senna 2 Tablet(s) Oral at bedtime  simvastatin 40 milliGRAM(s) Oral at bedtime  valproate sodium  IVPB 750 milliGRAM(s) IV Intermittent every 8 hours    MEDICATIONS  (PRN):  bisacodyl Suppository 10 milliGRAM(s) Rectal daily PRN Constipation  dextrose Oral Gel 15 Gram(s) Oral once PRN Blood Glucose LESS THAN 70 milliGRAM(s)/deciliter      ALLERGIES:  Allergies    sulfa drugs (Unknown)    Intolerances        LABS:                        9.8    7.34  )-----------( 83       ( 17 May 2023 11:41 )             29.0     05-16    141  |  101  |  107<H>  ----------------------------<  146<H>  3.5   |  21<L>  |  4.91<H>    Ca    8.5      16 May 2023 22:16  Phos  5.3     05-16  Mg     2.4     05-16    TPro  5.0<L>  /  Alb  1.9<L>  /  TBili  0.5  /  DBili  x   /  AST  15  /  ALT  7<L>  /  AlkPhos  50  05-16    PT/INR - ( 16 May 2023 22:16 )   PT: 13.6 sec;   INR: 1.18 ratio         PTT - ( 16 May 2023 22:16 )  PTT:31.3 sec      RADIOLOGY & ADDITIONAL TESTS: Reviewed. COVERING RESIDENT: Michelle Ferro (PGY-1)     INTERVAL HPI/OVERNIGHT EVENTS: Eliquis held given bright red blood per rectum. 24hr EEG started.    SUBJECTIVE: Patient seen and examined at bedside. Unable to obtain ROS given sedation.      OBJECTIVE:    VITAL SIGNS:  ICU Vital Signs Last 24 Hrs  T(C): 36.2 (17 May 2023 12:00), Max: 36.8 (16 May 2023 16:30)  T(F): 97.2 (17 May 2023 12:00), Max: 98.2 (16 May 2023 16:30)  HR: 66 (17 May 2023 11:45) (65 - 118)  BP: 157/68 (17 May 2023 07:35) (86/54 - 161/87)  BP(mean): 98 (17 May 2023 07:35) (66 - 115)  ABP: 136/58 (17 May 2023 11:45) (90/39 - 195/86)  ABP(mean): 82 (17 May 2023 11:45) (54 - 122)  RR: 15 (17 May 2023 11:45) (14 - 37)  SpO2: 98% (17 May 2023 11:45) (95% - 100%)    O2 Parameters below as of 17 May 2023 08:00  Patient On (Oxygen Delivery Method): ventilator    O2 Concentration (%): 30      Mode: AC/ CMV (Assist Control/ Continuous Mandatory Ventilation), RR (machine): 14, TV (machine): 450, FiO2: 30, PEEP: 5, ITime: 1, MAP: 9, PIP: 26    05-16 @ 07:01 - 05-17 @ 07:00  --------------------------------------------------------  IN: 342 mL / OUT: 385 mL / NET: -43 mL    05-17 @ 07:01  -  05-17 @ 12:17  --------------------------------------------------------  IN: 36 mL / OUT: 100 mL / NET: -64 mL      CAPILLARY BLOOD GLUCOSE      POCT Blood Glucose.: 132 mg/dL (17 May 2023 11:13)      PHYSICAL EXAM:    General: NAD  HEENT: NC/AT; PERRL, clear conjunctiva  Respiratory: CTA b/l  Cardiovascular: +S1/S2; RRR  Abdomen: soft, NT/ND  Extremities: WWP, no LE edema  Skin: Left flank ecchymosis  Neurological: Intubated and sedated. Occasional myoclonic jerks of the b/l upper and lower extremities    MEDICATIONS:  MEDICATIONS  (STANDING):  brivaracetam Oral Solution 50 milliGRAM(s) Oral two times a day  chlorhexidine 0.12% Liquid 15 milliLiter(s) Oral Mucosa every 12 hours  chlorhexidine 4% Liquid 1 Application(s) Topical <User Schedule>  dexMEDEtomidine Infusion 0.1 MICROgram(s)/kG/Hr (1.63 mL/Hr) IV Continuous <Continuous>  dextrose 5%. 1000 milliLiter(s) (100 mL/Hr) IV Continuous <Continuous>  dextrose 5%. 1000 milliLiter(s) (50 mL/Hr) IV Continuous <Continuous>  dextrose 50% Injectable 25 Gram(s) IV Push once  dextrose 50% Injectable 12.5 Gram(s) IV Push once  dextrose 50% Injectable 25 Gram(s) IV Push once  glucagon  Injectable 1 milliGRAM(s) IntraMuscular once  insulin lispro (ADMELOG) corrective regimen sliding scale   SubCutaneous three times a day before meals  insulin NPH human recombinant 5 Unit(s) SubCutaneous every 6 hours  norepinephrine Infusion 0.05 MICROgram(s)/kG/Min (6.1 mL/Hr) IV Continuous <Continuous>  pantoprazole  Injectable 40 milliGRAM(s) IV Push two times a day  polyethylene glycol 3350 17 Gram(s) Oral daily  propofol Infusion 10 MICROgram(s)/kG/Min (3.91 mL/Hr) IV Continuous <Continuous>  senna 2 Tablet(s) Oral at bedtime  simvastatin 40 milliGRAM(s) Oral at bedtime  valproate sodium  IVPB 750 milliGRAM(s) IV Intermittent every 8 hours    MEDICATIONS  (PRN):  bisacodyl Suppository 10 milliGRAM(s) Rectal daily PRN Constipation  dextrose Oral Gel 15 Gram(s) Oral once PRN Blood Glucose LESS THAN 70 milliGRAM(s)/deciliter      ALLERGIES:  Allergies    sulfa drugs (Unknown)    Intolerances        LABS:                        9.8    7.34  )-----------( 83       ( 17 May 2023 11:41 )             29.0     05-16    141  |  101  |  107<H>  ----------------------------<  146<H>  3.5   |  21<L>  |  4.91<H>    Ca    8.5      16 May 2023 22:16  Phos  5.3     05-16  Mg     2.4     05-16    TPro  5.0<L>  /  Alb  1.9<L>  /  TBili  0.5  /  DBili  x   /  AST  15  /  ALT  7<L>  /  AlkPhos  50  05-16    PT/INR - ( 16 May 2023 22:16 )   PT: 13.6 sec;   INR: 1.18 ratio         PTT - ( 16 May 2023 22:16 )  PTT:31.3 sec      RADIOLOGY & ADDITIONAL TESTS: Reviewed.

## 2023-05-18 LAB
ALBUMIN SERPL ELPH-MCNC: 2.1 G/DL — LOW (ref 3.3–5)
ALP SERPL-CCNC: 53 U/L — SIGNIFICANT CHANGE UP (ref 40–120)
ALT FLD-CCNC: 5 U/L — LOW (ref 10–45)
ANION GAP SERPL CALC-SCNC: 18 MMOL/L — HIGH (ref 5–17)
APTT BLD: 31.2 SEC — SIGNIFICANT CHANGE UP (ref 27.5–35.5)
AST SERPL-CCNC: 13 U/L — SIGNIFICANT CHANGE UP (ref 10–40)
BILIRUB SERPL-MCNC: 0.5 MG/DL — SIGNIFICANT CHANGE UP (ref 0.2–1.2)
BUN SERPL-MCNC: 115 MG/DL — HIGH (ref 7–23)
CALCIUM SERPL-MCNC: 8.3 MG/DL — LOW (ref 8.4–10.5)
CHLORIDE SERPL-SCNC: 103 MMOL/L — SIGNIFICANT CHANGE UP (ref 96–108)
CO2 SERPL-SCNC: 21 MMOL/L — LOW (ref 22–31)
CREAT SERPL-MCNC: 5.02 MG/DL — HIGH (ref 0.5–1.3)
EGFR: 11 ML/MIN/1.73M2 — LOW
GAS PNL BLDA: SIGNIFICANT CHANGE UP
GLUCOSE BLDC GLUCOMTR-MCNC: 139 MG/DL — HIGH (ref 70–99)
GLUCOSE BLDC GLUCOMTR-MCNC: 140 MG/DL — HIGH (ref 70–99)
GLUCOSE BLDC GLUCOMTR-MCNC: 145 MG/DL — HIGH (ref 70–99)
GLUCOSE SERPL-MCNC: 114 MG/DL — HIGH (ref 70–99)
INR BLD: 1.18 RATIO — HIGH (ref 0.88–1.16)
MAGNESIUM SERPL-MCNC: 2.5 MG/DL — SIGNIFICANT CHANGE UP (ref 1.6–2.6)
PHOSPHATE SERPL-MCNC: 5.7 MG/DL — HIGH (ref 2.5–4.5)
PLATELET # BLD AUTO: 78 K/UL — LOW (ref 150–400)
POTASSIUM SERPL-MCNC: 3.5 MMOL/L — SIGNIFICANT CHANGE UP (ref 3.5–5.3)
POTASSIUM SERPL-SCNC: 3.5 MMOL/L — SIGNIFICANT CHANGE UP (ref 3.5–5.3)
PROCALCITONIN SERPL-MCNC: 0.59 NG/ML — HIGH (ref 0.02–0.1)
PROT SERPL-MCNC: 5.4 G/DL — LOW (ref 6–8.3)
PROTHROM AB SERPL-ACNC: 13.7 SEC — HIGH (ref 10.5–13.4)
SODIUM SERPL-SCNC: 142 MMOL/L — SIGNIFICANT CHANGE UP (ref 135–145)
VALPROATE SERPL-MCNC: 70 UG/ML — SIGNIFICANT CHANGE UP (ref 50–100)

## 2023-05-18 PROCEDURE — 95720 EEG PHY/QHP EA INCR W/VEEG: CPT

## 2023-05-18 RX ORDER — VALPROIC ACID (AS SODIUM SALT) 250 MG/5ML
500 SOLUTION, ORAL ORAL ONCE
Refills: 0 | Status: DISCONTINUED | OUTPATIENT
Start: 2023-05-18 | End: 2023-05-18

## 2023-05-18 RX ADMIN — CHLORHEXIDINE GLUCONATE 1 APPLICATION(S): 213 SOLUTION TOPICAL at 06:43

## 2023-05-18 RX ADMIN — CHLORHEXIDINE GLUCONATE 15 MILLILITER(S): 213 SOLUTION TOPICAL at 05:10

## 2023-05-18 RX ADMIN — Medication 50 MILLIGRAM(S): at 05:08

## 2023-05-18 RX ADMIN — Medication 2 MILLIGRAM(S): at 05:48

## 2023-05-18 RX ADMIN — PANTOPRAZOLE SODIUM 40 MILLIGRAM(S): 20 TABLET, DELAYED RELEASE ORAL at 17:23

## 2023-05-18 RX ADMIN — SIMVASTATIN 40 MILLIGRAM(S): 20 TABLET, FILM COATED ORAL at 22:19

## 2023-05-18 RX ADMIN — DEXMEDETOMIDINE HYDROCHLORIDE IN 0.9% SODIUM CHLORIDE 1.63 MICROGRAM(S)/KG/HR: 4 INJECTION INTRAVENOUS at 05:08

## 2023-05-18 RX ADMIN — PANTOPRAZOLE SODIUM 40 MILLIGRAM(S): 20 TABLET, DELAYED RELEASE ORAL at 05:11

## 2023-05-18 RX ADMIN — CHLORHEXIDINE GLUCONATE 15 MILLILITER(S): 213 SOLUTION TOPICAL at 17:23

## 2023-05-18 NOTE — PROGRESS NOTE ADULT - SUBJECTIVE AND OBJECTIVE BOX
COVERING RESIDENT: Michelle Ferro (PGY-1)     INTERVAL HPI/OVERNIGHT EVENTS: Off precedex. Briviact d/fabián as per neuro. UOp low. Received 2 Ativan for agitation.    SUBJECTIVE: Patient seen and examined at bedside. Unable to obtain ROS 2/2 no mental status.    OBJECTIVE:    VITAL SIGNS:  ICU Vital Signs Last 24 Hrs  T(C): 37.1 (18 May 2023 04:00), Max: 37.1 (18 May 2023 04:00)  T(F): 98.8 (18 May 2023 04:00), Max: 98.8 (18 May 2023 04:00)  HR: 65 (18 May 2023 07:00) (55 - 72)  BP: 98/54 (18 May 2023 07:00) (98/53 - 121/60)  BP(mean): 73 (18 May 2023 07:00) (72 - 83)  ABP: 120/45 (18 May 2023 04:45) (75/27 - 188/80)  ABP(mean): 67 (18 May 2023 04:45) (48 - 114)  RR: 16 (18 May 2023 07:00) (14 - 28)  SpO2: 95% (18 May 2023 07:00) (95% - 100%)    O2 Parameters below as of 17 May 2023 20:00  Patient On (Oxygen Delivery Method): ventilator    O2 Concentration (%): 30      Mode: AC/ CMV (Assist Control/ Continuous Mandatory Ventilation), RR (machine): 14, TV (machine): 450, FiO2: 30, PEEP: 5, ITime: 1, MAP: 9, PIP: 30    05-17 @ 07:01  -  05-18 @ 07:00  --------------------------------------------------------  IN: 1520.4 mL / OUT: 600 mL / NET: 920.4 mL      CAPILLARY BLOOD GLUCOSE      POCT Blood Glucose.: 140 mg/dL (18 May 2023 05:17)      PHYSICAL EXAM:    General: NAD  HEENT: NC/AT; PERRL, clear conjunctiva  Neck: supple  Respiratory: CTA b/l  Cardiovascular: +S1/S2; RRR  Abdomen: soft, NT/ND  Extremities: WWP, no LE edema  Skin: left flank ecchymosis  Neurological: A&Ox0, b/l extremities with myoclonic jerks    MEDICATIONS:  MEDICATIONS  (STANDING):  chlorhexidine 0.12% Liquid 15 milliLiter(s) Oral Mucosa every 12 hours  chlorhexidine 4% Liquid 1 Application(s) Topical <User Schedule>  dexMEDEtomidine Infusion 0.1 MICROgram(s)/kG/Hr (1.63 mL/Hr) IV Continuous <Continuous>  dextrose 5%. 1000 milliLiter(s) (100 mL/Hr) IV Continuous <Continuous>  dextrose 5%. 1000 milliLiter(s) (50 mL/Hr) IV Continuous <Continuous>  dextrose 50% Injectable 25 Gram(s) IV Push once  dextrose 50% Injectable 12.5 Gram(s) IV Push once  dextrose 50% Injectable 25 Gram(s) IV Push once  glucagon  Injectable 1 milliGRAM(s) IntraMuscular once  insulin lispro (ADMELOG) corrective regimen sliding scale   SubCutaneous every 6 hours  norepinephrine Infusion 0.05 MICROgram(s)/kG/Min (6.1 mL/Hr) IV Continuous <Continuous>  pantoprazole  Injectable 40 milliGRAM(s) IV Push two times a day  polyethylene glycol 3350 17 Gram(s) Oral daily  senna 2 Tablet(s) Oral at bedtime  simvastatin 40 milliGRAM(s) Oral at bedtime  valproate sodium  IVPB 750 milliGRAM(s) IV Intermittent every 8 hours    MEDICATIONS  (PRN):  bisacodyl Suppository 10 milliGRAM(s) Rectal daily PRN Constipation  dextrose Oral Gel 15 Gram(s) Oral once PRN Blood Glucose LESS THAN 70 milliGRAM(s)/deciliter      ALLERGIES:  Allergies    sulfa drugs (Unknown)    Intolerances        LABS:                        9.5    4.96  )-----------( 78       ( 17 May 2023 23:44 )             28.4     05-17    142  |  103  |  115<H>  ----------------------------<  114<H>  3.5   |  21<L>  |  5.02<H>    Ca    8.3<L>      17 May 2023 23:44  Phos  5.7     05-17  Mg     2.5     05-17    TPro  5.4<L>  /  Alb  2.1<L>  /  TBili  0.5  /  DBili  x   /  AST  13  /  ALT  5<L>  /  AlkPhos  53  05-17    PT/INR - ( 17 May 2023 23:44 )   PT: 13.7 sec;   INR: 1.18 ratio         PTT - ( 17 May 2023 23:44 )  PTT:31.2 sec      RADIOLOGY & ADDITIONAL TESTS: Reviewed. COVERING RESIDENT: Michelle Ferro (PGY-1)     INTERVAL HPI/OVERNIGHT EVENTS: Off precedex. Briviact d/fabián as per neuro. UOp low. Received 2 Ativan for agitation.    SUBJECTIVE: Patient seen and examined at bedside. Unable to obtain ROS 2/2 mental status.    OBJECTIVE:    VITAL SIGNS:  ICU Vital Signs Last 24 Hrs  T(C): 37.1 (18 May 2023 04:00), Max: 37.1 (18 May 2023 04:00)  T(F): 98.8 (18 May 2023 04:00), Max: 98.8 (18 May 2023 04:00)  HR: 65 (18 May 2023 07:00) (55 - 72)  BP: 98/54 (18 May 2023 07:00) (98/53 - 121/60)  BP(mean): 73 (18 May 2023 07:00) (72 - 83)  ABP: 120/45 (18 May 2023 04:45) (75/27 - 188/80)  ABP(mean): 67 (18 May 2023 04:45) (48 - 114)  RR: 16 (18 May 2023 07:00) (14 - 28)  SpO2: 95% (18 May 2023 07:00) (95% - 100%)    O2 Parameters below as of 17 May 2023 20:00  Patient On (Oxygen Delivery Method): ventilator    O2 Concentration (%): 30      Mode: AC/ CMV (Assist Control/ Continuous Mandatory Ventilation), RR (machine): 14, TV (machine): 450, FiO2: 30, PEEP: 5, ITime: 1, MAP: 9, PIP: 30    05-17 @ 07:01  -  05-18 @ 07:00  --------------------------------------------------------  IN: 1520.4 mL / OUT: 600 mL / NET: 920.4 mL      CAPILLARY BLOOD GLUCOSE      POCT Blood Glucose.: 140 mg/dL (18 May 2023 05:17)      PHYSICAL EXAM:    General: NAD  HEENT: NC/AT; PERRL, clear conjunctiva  Neck: supple  Respiratory: CTA b/l  Cardiovascular: +S1/S2; RRR  Abdomen: soft, NT/ND  Extremities: WWP, no LE edema  Skin: left flank ecchymosis  Neurological: A&Ox0, b/l extremities with myoclonic jerks    MEDICATIONS:  MEDICATIONS  (STANDING):  chlorhexidine 0.12% Liquid 15 milliLiter(s) Oral Mucosa every 12 hours  chlorhexidine 4% Liquid 1 Application(s) Topical <User Schedule>  dexMEDEtomidine Infusion 0.1 MICROgram(s)/kG/Hr (1.63 mL/Hr) IV Continuous <Continuous>  dextrose 5%. 1000 milliLiter(s) (100 mL/Hr) IV Continuous <Continuous>  dextrose 5%. 1000 milliLiter(s) (50 mL/Hr) IV Continuous <Continuous>  dextrose 50% Injectable 25 Gram(s) IV Push once  dextrose 50% Injectable 12.5 Gram(s) IV Push once  dextrose 50% Injectable 25 Gram(s) IV Push once  glucagon  Injectable 1 milliGRAM(s) IntraMuscular once  insulin lispro (ADMELOG) corrective regimen sliding scale   SubCutaneous every 6 hours  norepinephrine Infusion 0.05 MICROgram(s)/kG/Min (6.1 mL/Hr) IV Continuous <Continuous>  pantoprazole  Injectable 40 milliGRAM(s) IV Push two times a day  polyethylene glycol 3350 17 Gram(s) Oral daily  senna 2 Tablet(s) Oral at bedtime  simvastatin 40 milliGRAM(s) Oral at bedtime  valproate sodium  IVPB 750 milliGRAM(s) IV Intermittent every 8 hours    MEDICATIONS  (PRN):  bisacodyl Suppository 10 milliGRAM(s) Rectal daily PRN Constipation  dextrose Oral Gel 15 Gram(s) Oral once PRN Blood Glucose LESS THAN 70 milliGRAM(s)/deciliter      ALLERGIES:  Allergies    sulfa drugs (Unknown)    Intolerances        LABS:                        9.5    4.96  )-----------( 78       ( 17 May 2023 23:44 )             28.4     05-17    142  |  103  |  115<H>  ----------------------------<  114<H>  3.5   |  21<L>  |  5.02<H>    Ca    8.3<L>      17 May 2023 23:44  Phos  5.7     05-17  Mg     2.5     05-17    TPro  5.4<L>  /  Alb  2.1<L>  /  TBili  0.5  /  DBili  x   /  AST  13  /  ALT  5<L>  /  AlkPhos  53  05-17    PT/INR - ( 17 May 2023 23:44 )   PT: 13.7 sec;   INR: 1.18 ratio         PTT - ( 17 May 2023 23:44 )  PTT:31.2 sec      RADIOLOGY & ADDITIONAL TESTS: Reviewed.

## 2023-05-18 NOTE — DIETITIAN NUTRITION RISK NOTIFICATION - TREATMENT: THE FOLLOWING DIET HAS BEEN RECOMMENDED
Diet, NPO with Tube Feed:   Tube Feeding Modality: Orogastric  Nepro with Carb Steady (NEPRORTH)  Total Volume for 24 Hours (mL): 900  Continuous  Starting Tube Feed Rate {mL per Hour}: 10  Increase Tube Feed Rate by (mL): 10     Every hour  Until Goal Tube Feed Rate (mL per Hour): 50  Tube Feed Duration (in Hours): 18  Tube Feed Start Time: 00:00 (05-16-23 @ 20:07) [Active]

## 2023-05-18 NOTE — DIETITIAN INITIAL EVALUATION ADULT - ENTERAL
Nepro at 10 mL/hr increasing only as tolerated and electrolytes WNL to goal rate 40 mL/hr x18 hours with ProSource NoCarb TF x1 daily to provide total volume 720 mL, 1336 kcals, 69 gm protein, and 523 mL free water. Meet 25 kcals/kG and 1.3 gm protein/kG based on lowest wt per previous RD note 53.3 kG (5/7).  Nepro at goal rate 40 mL/hr x18 hours with ProSource NoCarb TF x1 daily to provide total volume 720 mL, 1336 kcals, 69 gm protein, and 523 mL free water. Meet 25 kcals/kG and 1.3 gm protein/kG based on lowest wt per previous RD note 53.3 kG (5/7).

## 2023-05-18 NOTE — EEG REPORT - NS EEG TEXT BOX
MARIO RAMOS [id number]     Study Date: 05-17-23 0800 to 5-18-23 0800  Duration: 24 hr     --------------------------------------------------------------------------------------------------  History:  CC/ HPI Patient is a 78y old  Male who presents with a chief complaint of acute systolic CHF - arrest (14 May 2023 09:19)    --------------------------------------------------------------------------------------------------  Study Interpretation:    [[[Abbreviation Key:  PDR=alpha rhythm/posterior dominant rhythm. A-P=anterior posterior.  Amplitude: ‘very low’:<20; ‘low’:20-49; ‘medium’:; ‘high’:>150uV.  Persistence for periodic/rhythmic patterns (% of epoch) ‘rare’:<1%; ‘occasional’:1-10%; ‘frequent’:10-50%; ‘abundant’:50-90%; ‘continuous’:>90%.  Persistence for sporadic discharges: ‘rare’:<1/hr; ‘occasional’:1/min-1/hr; ‘frequent’:>1/min; ‘abundant’:>1/10 sec.  RPP=rhythmic and periodic patterns; GRDA=generalized rhythmic delta activity; FIRDA=frontal intermittent GRDA; LRDA=lateralized rhythmic delta activity; TIRDA=temporal intermittent rhythmic delta activity;  LPD=PLED=lateralized periodic discharges; GPD=generalized periodic discharges; BIPDs =bilateral independent periodic discharges; Mf=multifocal; SIRPDs=stimulus induced rhythmic, periodic, or ictal appearing discharges; BIRDs=brief potentially ictal rhythmic discharges >4 Hz, lasting .5-10s; PFA (paroxysmal bursts >13 Hz or =8 Hz <10s).  Modifiers: +F=with fast component; +S=with spike component; +R=with rhythmic component.  S-B=burst suppression pattern.  Max=maximal. N1-drowsy; N2-stage II sleep; N3-slow wave sleep. SSS/BETS=small sharp spikes/benign epileptiform transients of sleep. HV=hyperventilation; PS=photic stimulation]]]    Daily EEG Visual Analysis    FINDINGS:      Background:  Discontinuous to burst-suppressed, consisting of 1-8 second periods of diffuse suppression or attenuation < 20 uV alternating with ~1-10-second bursts of centrally (Cz maximal) spike/polyspike-wave discharges, at times predominant on the left (Cz/C3/P3), appearing as continuous generalized periodic discharges at 1-2 Hz, or occasionally at 2.5-4.5 Hz for 2-5 cycles. There is intermittent diffuse theta and polymorphic delta activity both intermixed with GPDs and in between GPDs. GPDs less prominent in the latter portion of the recording.    There is occasional head and/or body jerking time-locked with GPDs.    Symmetry: symmetric  PDR: no PDR   Reactivity: not tested   Voltage: intermittently suppressed or attenuated  Anterior Posterior Gradient: absent  Other background findings: none  Breach: absent    Background Slowing:  As above     State Changes:   Absent    Ictal and Interictal Findings:  As above    Other Clinical Events:  None    Activation Procedures:   Hyperventilation was not performed.    Photic stimulation was not performed.    Artifacts:  Intermittent movement artifacts are present.    EKG:  Single-lead EKG shows irregular rhythm and occasional PVCs.    ---------------------------------------------------------------------------------------------------------------------------------------------------------      EEG Classification / Summary:    Abnormal EEG in a comatose patient.    - Continuous generalized periodic discharges (GPDs), centrally predominant, at times left centrally predominant - less prominent in the latter portion of the recording.  - Occasional head and/or body jerking time-locked to GPDs  - Severe diffuse slowing    ---------------------------------------------------------------------------------------------------------------------------------------------------------      Clinical Impression:    -Cortical myoclonus and GPDs can be secondary to underlying severe metabolic or post-hypoxic diffuse cerebral dysfunction. GPDs may indicate increased risk for seizures in certain clinical scenarios.  -Severe diffuse cerebral dysfunction is nonspecific in etiology, but in this case, sedating medications may be one contributing factor.   -Single-lead EKG incidentally shows irregular rhythm.      Jarret Alberto MD  EEG/Epilepsy Attending

## 2023-05-18 NOTE — DIETITIAN INITIAL EVALUATION ADULT - NSFNSGIIOFT_GEN_A_CORE
05-17-23 @ 07:01  -  05-18-23 @ 07:00  --------------------------------------------------------  OUT:  Total OUT: 0 mL    Total NET: 860 mL

## 2023-05-18 NOTE — PROGRESS NOTE ADULT - SUBJECTIVE AND OBJECTIVE BOX
Events noted, tx to [facility] from [facility] for continuous EEG monitoring, intubated     Vital Signs Last 24 Hrs  T(C): 36.7 (05-18-23 @ 16:00), Max: 37.1 (05-18-23 @ 04:00)  T(F): 98.1 (05-18-23 @ 16:00), Max: 98.8 (05-18-23 @ 04:00)  HR: 79 (05-18-23 @ 17:00) (57 - 80)  BP: 121/58 (05-18-23 @ 17:00) (88/53 - 128/61)  BP(mean): 84 (05-18-23 @ 17:00) (68 - 90)  RR: 14 (05-18-23 @ 17:00) (14 - 28)  SpO2: 100% (05-18-23 @ 17:00) (94% - 100%)    I&O's Detail    17 May 2023 07:01  -  18 May 2023 07:00  --------------------------------------------------------  IN:    Albumin 5%  - 250 mL: 250 mL    Dexmedetomidine: 93.2 mL    Enteral Tube Flush: 105 mL    IV PiggyBack: 150 mL    Nepro with Carb Steady: 860 mL    Norepinephrine: 25.2 mL    Propofol: 37 mL  Total IN: 1520.4 mL    OUT:    Indwelling Catheter - Urethral (mL): 600 mL  Total OUT: 600 mL    Total NET: 920.4 mL    18 May 2023 07:01  -  18 May 2023 17:57  --------------------------------------------------------  IN:    Dexmedetomidine: 14.7 mL    Nepro with Carb Steady: 260 mL    Norepinephrine: 4.8 mL  Total IN: 279.5 mL    OUT:    Indwelling Catheter - Urethral (mL): 250 mL  Total OUT: 250 mL    Total NET: 29.5 mL    Mode: AC/ CMV (Assist Control/ Continuous Mandatory Ventilation)  RR (machine): 14  TV (machine): 450  FiO2: 30  PEEP: 5  ITime: 1  MAP: 10  PIP: 27    s1s2  b/l air entry  soft, ND  tr edema                                                                  9.5    4.96  )-----------( 78       ( 17 May 2023 23:44 )             28.4     17 May 2023 23:44    142    |  103    |  115    ----------------------------<  114    3.5     |  21     |  5.02     Ca    8.3        17 May 2023 23:44  Phos  5.7       17 May 2023 23:44  Mg     2.5       17 May 2023 23:44    TPro  5.4    /  Alb  2.1    /  TBili  0.5    /  DBili  x      /  AST  13     /  ALT  5      /  AlkPhos  53     17 May 2023 23:44    LIVER FUNCTIONS - ( 17 May 2023 23:44 )  Alb: 2.1 g/dL / Pro: 5.4 g/dL / ALK PHOS: 53 U/L / ALT: 5 U/L / AST: 13 U/L / GGT: x           PT/INR - ( 17 May 2023 23:44 )   PT: 13.7 sec;   INR: 1.18 ratio      bisacodyl Suppository 10 milliGRAM(s) Rectal daily PRN  chlorhexidine 0.12% Liquid 15 milliLiter(s) Oral Mucosa every 12 hours  chlorhexidine 4% Liquid 1 Application(s) Topical <User Schedule>  dextrose 5%. 1000 milliLiter(s) IV Continuous <Continuous>  dextrose 5%. 1000 milliLiter(s) IV Continuous <Continuous>  dextrose 50% Injectable 12.5 Gram(s) IV Push once  dextrose 50% Injectable 25 Gram(s) IV Push once  dextrose 50% Injectable 25 Gram(s) IV Push once  dextrose Oral Gel 15 Gram(s) Oral once PRN  glucagon  Injectable 1 milliGRAM(s) IntraMuscular once  insulin lispro (ADMELOG) corrective regimen sliding scale   SubCutaneous every 6 hours  pantoprazole  Injectable 40 milliGRAM(s) IV Push two times a day  polyethylene glycol 3350 17 Gram(s) Oral daily  senna 2 Tablet(s) Oral at bedtime  simvastatin 40 milliGRAM(s) Oral at bedtime    A/P:    Hemodynamic ATN s/p arrest (Cr 1.6 - 5/9/23, Cr 1.0 - 4/12/23)  Resp failure, asp PNA, intubated   Hx CM, EF 35 - 40%, dementia  BP, resp support per ICU team  Concern for anoxic encephalopathy   EEG monitoring  Avoid nephrotoxins  Will f/u BMP, UO   Lytes are stable  Cr appears to reach plateau  No urgent indications for RRT  Given current on-going medical issues, pt is a very poor candidate for RRT if/when it becomes necessary, and RRT is not expected to change outcome and overall poor prognosis  D/w ICU team    [phone]

## 2023-05-18 NOTE — DIETITIAN INITIAL EVALUATION ADULT - REASON INDICATOR FOR ASSESSMENT
Pt seen for length of stay  Source: Medical record and RN 
no diplopia/no photophobia/no blurred vision L/no blurred vision R

## 2023-05-18 NOTE — DIETITIAN INITIAL EVALUATION ADULT - NSFNSADHERENCEPTAFT_GEN_A_CORE
Hx of T2DM: Managed with Farxiga per H&P. Recent A1C 7.7 % (05-05-23 @ 08:00), indicating fair glycemic control with consideration for advanced age.

## 2023-05-18 NOTE — PROGRESS NOTE ADULT - ASSESSMENT
Mr. Avila is an 78 year old male with a PMH of mild dementia, heart failure, CAD s/p CABG with known RCA occlusion, HLD, and type 2 DM who presented to [facility] on 5/4 for heart failure exacerbation. Course complicated by a choking episode on 5/9 that subsequently progressed to cardiac arrest lasting ~8 minutes with intubation. Pt now post arrest noted with ?anoxic brain injury and myoclonus requiring MRI for evaluation. EEG is suggestive of a severe diffuse encephalopathy. Pt has also required vasopressor support post arrest with sedation and a heparin gtt as tx for NSTEMI vs. demand ischemia which were d/fabián. Pt remans on propofol and full ventilator support.    Patient was noted to have myoclonic activity when weaned off of propofol. Patient received spot EEG which was not specific for epileptic activity but patient was also sedated. Patient was transferred to [facility] for 24hr video EEG to eval for status epilepticus and prognostication. Pt will then be transferred back to [facility].    NEURO:  S/p cardiac arrest with myoclonic activity with spot EEG showing diffuse dysfunction which may represent diffuse anoxic brain injury vs metabolic encephalopathy with labs notable for uremia. MRI brain 5/15 shows some area of foci for thrombotic stroke but no hemorrhage.  - Video EEG for 24 hours  - Off sedation  - on  mg TID  - Briviact d/fabián  - check VPA level  - F/u neuro recs  - F/u EEG recs    CARDIOVASCULAR:  #S/p Cardiac arrest in the setting of witnessed choking episode. Received ACS protocol for possible NSTEMI type 1 vs type 2  - Tele  - Was hypotensive at [facility], requiring levo    #CAD s/p CABG  EKG shows evidence of fragmented QRS most notable in inferior leads corresponding to known RCA stenosis  - Plavix - hold 2/2 hematochezia   - Simvastatin    #HFrEF/ischemic cardiomyopathy  - Recent TTE noted with EF originally 40-45% on 5/5 and repeated 5/11 35-40% s/p cardiac arrest  - Will introduce GDMT if in lines of GOC and when appropriate    #Paroxysmal A fib  - Eliquis held 2/2 hematochezia    RESPIRATORY:  - Intubated s/p cardiac arrest    GI/NUTRITION:  - On tube feeds  - Pantoprazole 40 BID    RENAL:  ANISH on CKD  Baseline creatinine around 1.7 and uptrending after cardiac arrest which most likely represents post cardiac ischemic ATN  - monitor sCr  - monitor UOp    ID:  s/p zosyn 5/9-5/16  - CTM for signs of sepsis    HEME:  - No active issues  - HIT negative, thrombocytopenia improving  - hold plavix 2/2 hematochezia  - Dvt prophylaxis: SCDs    ENDOCRINE:  DM2  - ISS    ETHICS: Full Code Mr. Avila is an 78 year old male with a PMH of mild dementia, heart failure, CAD s/p CABG with known RCA occlusion, HLD, and type 2 DM who presented to [facility] on 5/4 for heart failure exacerbation. Course complicated by a choking episode on 5/9 that subsequently progressed to cardiac arrest lasting ~8 minutes with intubation. Pt now post arrest noted with ?anoxic brain injury and myoclonus requiring MRI for evaluation. EEG is suggestive of a severe diffuse encephalopathy. Pt has also required vasopressor support post arrest with sedation and a heparin gtt as tx for NSTEMI vs. demand ischemia which were d/fabián. Pt remans on propofol and full ventilator support.    Patient was noted to have myoclonic activity when weaned off of propofol. Patient received spot EEG which was not specific for epileptic activity but patient was also sedated. Patient was transferred to [facility] for 24hr video EEG to eval for status epilepticus and prognostication. Pt will then be transferred back to [facility].    NEURO:  S/p cardiac arrest with myoclonic activity with spot EEG showing diffuse dysfunction which may represent diffuse anoxic brain injury vs metabolic encephalopathy with labs notable for uremia. MRI brain 5/15 shows some area of foci for thrombotic stroke but no hemorrhage.  - Video EEG for additional 24 hours  - Off sedation  -  mg TID, resume at 21:00 today. Get VPA level and albumin at 03:00 tomorrow. Discuss with neuro before giving 05:00 VPA dose  - Briviact d/fabián  - F/u neuro recs  - F/u EEG recs    CARDIOVASCULAR:  #S/p Cardiac arrest in the setting of witnessed choking episode. Received ACS protocol for possible NSTEMI type 1 vs type 2  - Tele  - Was hypotensive at [facility], requiring levo    #CAD s/p CABG  EKG shows evidence of fragmented QRS most notable in inferior leads corresponding to known RCA stenosis  - Plavix - hold 2/2 hematochezia   - Simvastatin    #HFrEF/ischemic cardiomyopathy  - Recent TTE noted with EF originally 40-45% on 5/5 and repeated 5/11 35-40% s/p cardiac arrest  - Will introduce GDMT if in lines of GOC and when appropriate    #Paroxysmal A fib  - Eliquis held 2/2 hematochezia    RESPIRATORY:  - Intubated s/p cardiac arrest    GI/NUTRITION:  - On tube feeds  - Pantoprazole 40 BID    RENAL:  ANISH on CKD  Baseline creatinine around 1.7 and uptrending after cardiac arrest which most likely represents post cardiac ischemic ATN  - monitor sCr  - monitor UOp    ID:  s/p zosyn 5/9-5/16  - CTM for signs of sepsis    HEME:  - No active issues  - HIT negative, thrombocytopenia improving  - hold plavix 2/2 hematochezia  - Dvt prophylaxis: SCDs    ENDOCRINE:  DM2  - ISS    ETHICS: Full Code

## 2023-05-18 NOTE — DIETITIAN INITIAL EVALUATION ADULT - ORAL INTAKE PTA/DIET HISTORY
Unknown how pt was eating PTA; noted pt at outside [facility] 5/4->5/16. Pt was receiving tube feeds of Nepro at goal rate 35 mL/hr x24 hours. Unknown if pt followed therapeutic diet. Unknown if pt with chewing/swallowing issues. Unknown if pt took oral nutrition supplements. Per H&P on 5/4 pt took KCl. NKFA.

## 2023-05-18 NOTE — DIETITIAN INITIAL EVALUATION ADULT - ADD RECOMMEND
As medically feasible, consider addition of multivitamin and Vitamin C to aid in wound healing. Malnutrition alert placed in chart. Continue to trend labs, weight, skin integrity, and intake.

## 2023-05-18 NOTE — DIETITIAN INITIAL EVALUATION ADULT - OTHER CALCULATIONS
Based on lowest wt per previous RD note 53.3 kG (5/7). The [employer] State Equation (PSU) 2003b was used to calculate resting energy expenditure: 1275 kcals (5/18). Fluid needs deferred to provider.

## 2023-05-18 NOTE — PROGRESS NOTE ADULT - SUBJECTIVE AND OBJECTIVE BOX
Patient seen and examined at bedside. Patient on Precedex which was turned off prior to exam.   VPA level today is 70, corrected for albumin of 2.1 true level is 311.  Patient has bilateral corneal reflex intact, Pupils Equal Round and sluggishly reactive to light from 2mm to 1.5mm. + Cough reflex when suctioned.   Not localizing to painful stimuli. No myoclonus appreciated on exam.    EEG:  Abnormal EEG in a comatose patient.  - Continuous generalized periodic discharges (GPDs), centrally predominant, at times left centrally predominant - less prominent in the latter portion of the recording.  - Occasional head and/or body jerking time-locked to GPDs  - Severe diffuse slowing    Impression:  Post cardiac arrest severe Anoxic Encephalopathy with secondary cortical myoclonus. Worsening ARF w Uremia post cardiac arrest.   Hold VPA  Repeat VPA level in AM 5/19  Continue vEEG until re-eval tomorrow  Plan for family discussion tomorrow with Epileptologist Dr Gong regarding prognosis if family available   Case discussed w Dr Gong

## 2023-05-18 NOTE — CHART NOTE - NSCHARTNOTEFT_GEN_A_CORE
EEG preliminary read (not final) on the initial recording hour(s) = x 9    Unchanged from prior segment.   -Cortical myoclonus and GPDs can be secondary to underlying severe metabolic or post-hypoxic diffuse cerebral dysfunction. GPDs may indicate increased risk for seizures in certain clinical scenarios.  -Severe diffuse cerebral dysfunction is nonspecific in etiology, but in this case, sedating medications may be one contributing factor.   Final report to follow tomorrow morning after completion of study.    [facility] EEG Reading Room Ph#: [phone]  Epilepsy Answering Service after 5PM and before 8:30AM: Ph#: [phone]

## 2023-05-18 NOTE — DIETITIAN INITIAL EVALUATION ADULT - PERTINENT LABORATORY DATA
05-17    142  |  103  |  115<H>  ----------------------------<  114<H>  3.5   |  21<L>  |  5.02<H>    Ca    8.3<L>      17 May 2023 23:44  Phos  5.7     05-17  Mg     2.5     05-17    TPro  5.4<L>  /  Alb  2.1<L>  /  TBili  0.5  /  DBili  x   /  AST  13  /  ALT  5<L>  /  AlkPhos  53  05-17  POCT Blood Glucose.: 140 mg/dL (05-18-23 @ 05:17)  A1C with Estimated Average Glucose Result: 7.7 % (05-05-23 @ 08:00)  A1C with Estimated Average Glucose Result: 7.9 % (07-15-22 @ 06:44)

## 2023-05-18 NOTE — DIETITIAN INITIAL EVALUATION ADULT - NS FNS DIET ORDER
Diet, NPO with Tube Feed:   Tube Feeding Modality: Orogastric  Nepro with Carb Steady (NEPRORTH)  Total Volume for 24 Hours (mL): 900  Continuous  Starting Tube Feed Rate {mL per Hour}: 10  Increase Tube Feed Rate by (mL): 10     Every hour  Until Goal Tube Feed Rate (mL per Hour): 50  Tube Feed Duration (in Hours): 18  Tube Feed Start Time: 00:00 (05-16-23 @ 20:07)

## 2023-05-18 NOTE — DIETITIAN INITIAL EVALUATION ADULT - PERTINENT MEDS FT
MEDICATIONS  (STANDING):  chlorhexidine 0.12% Liquid 15 milliLiter(s) Oral Mucosa every 12 hours  chlorhexidine 4% Liquid 1 Application(s) Topical <User Schedule>  dextrose 5%. 1000 milliLiter(s) (100 mL/Hr) IV Continuous <Continuous>  dextrose 5%. 1000 milliLiter(s) (50 mL/Hr) IV Continuous <Continuous>  dextrose 50% Injectable 12.5 Gram(s) IV Push once  dextrose 50% Injectable 25 Gram(s) IV Push once  dextrose 50% Injectable 25 Gram(s) IV Push once  glucagon  Injectable 1 milliGRAM(s) IntraMuscular once  insulin lispro (ADMELOG) corrective regimen sliding scale   SubCutaneous every 6 hours  pantoprazole  Injectable 40 milliGRAM(s) IV Push two times a day  polyethylene glycol 3350 17 Gram(s) Oral daily  senna 2 Tablet(s) Oral at bedtime  simvastatin 40 milliGRAM(s) Oral at bedtime  valproate sodium  IVPB 500 milliGRAM(s) IV Intermittent once    MEDICATIONS  (PRN):  bisacodyl Suppository 10 milliGRAM(s) Rectal daily PRN Constipation  dextrose Oral Gel 15 Gram(s) Oral once PRN Blood Glucose LESS THAN 70 milliGRAM(s)/deciliter   right knee/yes (describe)

## 2023-05-18 NOTE — DIETITIAN INITIAL EVALUATION ADULT - OTHER INFO
Wt Hx:   Dosing wt 65.1 kG/143.5 lbs  UBW unknown.    Ht: 64 inches     IBW: 130 lbs    IBW%: 90% (based on wt from 5/7/23)  Wt Hx per HIE (lbs): 115 (7/14/22), 115 (8/18/22), 126 (1/25/23), 116 (4/3/23), 116 (4/3/23), 134 (5/5/23 ?accuracy)  Pt with drastically varying wt during admission at outside hospital. Lowest wt per RD note most consistent with HIE wts: 117.5 lb/53.3kg (5/7)     Nutrition-Related Concerns:   - Intubated 5/9.   - Anoxic brain injury vs metabolic encephalopathy. On Video EEG for 24 hours  - ANISH on CKD. Elevated Phos   - Hx of diabetes; ordered for sliding scale of insulin

## 2023-05-19 ENCOUNTER — TRANSCRIPTION ENCOUNTER (OUTPATIENT)
Age: 78
End: 2023-05-19

## 2023-05-19 VITALS
OXYGEN SATURATION: 98 % | DIASTOLIC BLOOD PRESSURE: 60 MMHG | HEART RATE: 97 BPM | SYSTOLIC BLOOD PRESSURE: 130 MMHG | RESPIRATION RATE: 17 BRPM

## 2023-05-19 LAB
ALBUMIN SERPL ELPH-MCNC: 2.4 G/DL — LOW (ref 3.3–5)
ALBUMIN SERPL ELPH-MCNC: 2.5 G/DL — LOW (ref 3.3–5)
ALP SERPL-CCNC: 62 U/L — SIGNIFICANT CHANGE UP (ref 40–120)
ALP SERPL-CCNC: 63 U/L — SIGNIFICANT CHANGE UP (ref 40–120)
ALT FLD-CCNC: 7 U/L — LOW (ref 10–45)
ALT FLD-CCNC: 7 U/L — LOW (ref 10–45)
ANION GAP SERPL CALC-SCNC: 17 MMOL/L — SIGNIFICANT CHANGE UP (ref 5–17)
ANION GAP SERPL CALC-SCNC: 20 MMOL/L — HIGH (ref 5–17)
APTT BLD: 26.5 SEC — LOW (ref 27.5–35.5)
AST SERPL-CCNC: 17 U/L — SIGNIFICANT CHANGE UP (ref 10–40)
AST SERPL-CCNC: 17 U/L — SIGNIFICANT CHANGE UP (ref 10–40)
BASOPHILS # BLD AUTO: 0.02 K/UL — SIGNIFICANT CHANGE UP (ref 0–0.2)
BASOPHILS NFR BLD AUTO: 0.3 % — SIGNIFICANT CHANGE UP (ref 0–2)
BILIRUB SERPL-MCNC: 0.4 MG/DL — SIGNIFICANT CHANGE UP (ref 0.2–1.2)
BILIRUB SERPL-MCNC: 0.5 MG/DL — SIGNIFICANT CHANGE UP (ref 0.2–1.2)
BUN SERPL-MCNC: 120 MG/DL — HIGH (ref 7–23)
BUN SERPL-MCNC: 122 MG/DL — HIGH (ref 7–23)
CALCIUM SERPL-MCNC: 8.5 MG/DL — SIGNIFICANT CHANGE UP (ref 8.4–10.5)
CALCIUM SERPL-MCNC: 8.6 MG/DL — SIGNIFICANT CHANGE UP (ref 8.4–10.5)
CHLORIDE SERPL-SCNC: 104 MMOL/L — SIGNIFICANT CHANGE UP (ref 96–108)
CHLORIDE SERPL-SCNC: 105 MMOL/L — SIGNIFICANT CHANGE UP (ref 96–108)
CO2 SERPL-SCNC: 21 MMOL/L — LOW (ref 22–31)
CO2 SERPL-SCNC: 23 MMOL/L — SIGNIFICANT CHANGE UP (ref 22–31)
CREAT SERPL-MCNC: 5.25 MG/DL — HIGH (ref 0.5–1.3)
CREAT SERPL-MCNC: 5.4 MG/DL — HIGH (ref 0.5–1.3)
EGFR: 10 ML/MIN/1.73M2 — LOW
EGFR: 10 ML/MIN/1.73M2 — LOW
EOSINOPHIL # BLD AUTO: 0.07 K/UL — SIGNIFICANT CHANGE UP (ref 0–0.5)
EOSINOPHIL NFR BLD AUTO: 1 % — SIGNIFICANT CHANGE UP (ref 0–6)
GAS PNL BLDA: SIGNIFICANT CHANGE UP
GLUCOSE BLDC GLUCOMTR-MCNC: 106 MG/DL — HIGH (ref 70–99)
GLUCOSE BLDC GLUCOMTR-MCNC: 124 MG/DL — HIGH (ref 70–99)
GLUCOSE BLDC GLUCOMTR-MCNC: 132 MG/DL — HIGH (ref 70–99)
GLUCOSE BLDC GLUCOMTR-MCNC: 173 MG/DL — HIGH (ref 70–99)
GLUCOSE SERPL-MCNC: 134 MG/DL — HIGH (ref 70–99)
GLUCOSE SERPL-MCNC: 167 MG/DL — HIGH (ref 70–99)
HCT VFR BLD CALC: 28.3 % — LOW (ref 39–50)
HGB BLD-MCNC: 9.2 G/DL — LOW (ref 13–17)
IMM GRANULOCYTES NFR BLD AUTO: 4.5 % — HIGH (ref 0–0.9)
INR BLD: 1.15 RATIO — SIGNIFICANT CHANGE UP (ref 0.88–1.16)
LYMPHOCYTES # BLD AUTO: 0.64 K/UL — LOW (ref 1–3.3)
LYMPHOCYTES # BLD AUTO: 9.3 % — LOW (ref 13–44)
MAGNESIUM SERPL-MCNC: 2.6 MG/DL — SIGNIFICANT CHANGE UP (ref 1.6–2.6)
MCHC RBC-ENTMCNC: 30.2 PG — SIGNIFICANT CHANGE UP (ref 27–34)
MCHC RBC-ENTMCNC: 32.5 GM/DL — SIGNIFICANT CHANGE UP (ref 32–36)
MCV RBC AUTO: 92.8 FL — SIGNIFICANT CHANGE UP (ref 80–100)
MONOCYTES # BLD AUTO: 0.74 K/UL — SIGNIFICANT CHANGE UP (ref 0–0.9)
MONOCYTES NFR BLD AUTO: 10.7 % — SIGNIFICANT CHANGE UP (ref 2–14)
NEUTROPHILS # BLD AUTO: 5.13 K/UL — SIGNIFICANT CHANGE UP (ref 1.8–7.4)
NEUTROPHILS NFR BLD AUTO: 74.2 % — SIGNIFICANT CHANGE UP (ref 43–77)
NRBC # BLD: 0 /100 WBCS — SIGNIFICANT CHANGE UP (ref 0–0)
PHOSPHATE SERPL-MCNC: 6 MG/DL — HIGH (ref 2.5–4.5)
PLATELET # BLD AUTO: 81 K/UL — LOW (ref 150–400)
POTASSIUM SERPL-MCNC: 3.3 MMOL/L — LOW (ref 3.5–5.3)
POTASSIUM SERPL-MCNC: 3.5 MMOL/L — SIGNIFICANT CHANGE UP (ref 3.5–5.3)
POTASSIUM SERPL-SCNC: 3.3 MMOL/L — LOW (ref 3.5–5.3)
POTASSIUM SERPL-SCNC: 3.5 MMOL/L — SIGNIFICANT CHANGE UP (ref 3.5–5.3)
PROT SERPL-MCNC: 5.9 G/DL — LOW (ref 6–8.3)
PROT SERPL-MCNC: 6.2 G/DL — SIGNIFICANT CHANGE UP (ref 6–8.3)
PROTHROM AB SERPL-ACNC: 13.2 SEC — SIGNIFICANT CHANGE UP (ref 10.5–13.4)
RBC # BLD: 3.05 M/UL — LOW (ref 4.2–5.8)
RBC # FLD: 15 % — HIGH (ref 10.3–14.5)
SODIUM SERPL-SCNC: 145 MMOL/L — SIGNIFICANT CHANGE UP (ref 135–145)
SODIUM SERPL-SCNC: 145 MMOL/L — SIGNIFICANT CHANGE UP (ref 135–145)
VALPROATE SERPL-MCNC: 62 UG/ML — SIGNIFICANT CHANGE UP (ref 50–100)
WBC # BLD: 6.91 K/UL — SIGNIFICANT CHANGE UP (ref 3.8–10.5)
WBC # FLD AUTO: 6.91 K/UL — SIGNIFICANT CHANGE UP (ref 3.8–10.5)

## 2023-05-19 PROCEDURE — 99233 SBSQ HOSP IP/OBS HIGH 50: CPT

## 2023-05-19 PROCEDURE — 95718 EEG PHYS/QHP 2-12 HR W/VEEG: CPT

## 2023-05-19 RX ORDER — DEXTROSE 50 % IN WATER 50 %
12.5 SYRINGE (ML) INTRAVENOUS ONCE
Refills: 0 | Status: DISCONTINUED | OUTPATIENT
Start: 2023-05-19 | End: 2023-07-07

## 2023-05-19 RX ORDER — CHLORHEXIDINE GLUCONATE 213 G/1000ML
15 SOLUTION TOPICAL EVERY 12 HOURS
Refills: 0 | Status: DISCONTINUED | OUTPATIENT
Start: 2023-05-19 | End: 2023-06-07

## 2023-05-19 RX ORDER — SIMVASTATIN 20 MG/1
40 TABLET, FILM COATED ORAL AT BEDTIME
Refills: 0 | Status: DISCONTINUED | OUTPATIENT
Start: 2023-05-19 | End: 2023-06-07

## 2023-05-19 RX ORDER — INSULIN HUMAN 100 [IU]/ML
INJECTION, SOLUTION SUBCUTANEOUS EVERY 6 HOURS
Refills: 0 | Status: DISCONTINUED | OUTPATIENT
Start: 2023-05-19 | End: 2023-05-20

## 2023-05-19 RX ORDER — VALPROIC ACID (AS SODIUM SALT) 250 MG/5ML
100 SOLUTION, ORAL ORAL EVERY 8 HOURS
Refills: 0 | Status: DISCONTINUED | OUTPATIENT
Start: 2023-05-19 | End: 2023-05-19

## 2023-05-19 RX ORDER — PANTOPRAZOLE SODIUM 20 MG/1
40 TABLET, DELAYED RELEASE ORAL DAILY
Refills: 0 | Status: DISCONTINUED | OUTPATIENT
Start: 2023-05-19 | End: 2023-05-22

## 2023-05-19 RX ORDER — BRIVARACETAM 25 MG/1
50 TABLET, FILM COATED ORAL
Refills: 0 | Status: DISCONTINUED | OUTPATIENT
Start: 2023-05-19 | End: 2023-06-03

## 2023-05-19 RX ORDER — CHLORHEXIDINE GLUCONATE 213 G/1000ML
1 SOLUTION TOPICAL
Refills: 0 | Status: DISCONTINUED | OUTPATIENT
Start: 2023-05-19 | End: 2023-05-27

## 2023-05-19 RX ORDER — DEXTROSE 50 % IN WATER 50 %
25 SYRINGE (ML) INTRAVENOUS ONCE
Refills: 0 | Status: DISCONTINUED | OUTPATIENT
Start: 2023-05-19 | End: 2023-07-07

## 2023-05-19 RX ORDER — SENNA PLUS 8.6 MG/1
2 TABLET ORAL AT BEDTIME
Refills: 0 | Status: DISCONTINUED | OUTPATIENT
Start: 2023-05-19 | End: 2023-06-02

## 2023-05-19 RX ORDER — POTASSIUM CHLORIDE 20 MEQ
10 PACKET (EA) ORAL
Refills: 0 | Status: COMPLETED | OUTPATIENT
Start: 2023-05-19 | End: 2023-05-19

## 2023-05-19 RX ORDER — NOREPINEPHRINE BITARTRATE/D5W 8 MG/250ML
0.01 PLASTIC BAG, INJECTION (ML) INTRAVENOUS
Qty: 0 | Refills: 0 | DISCHARGE

## 2023-05-19 RX ORDER — GLUCAGON INJECTION, SOLUTION 0.5 MG/.1ML
1 INJECTION, SOLUTION SUBCUTANEOUS ONCE
Refills: 0 | Status: DISCONTINUED | OUTPATIENT
Start: 2023-05-19 | End: 2023-05-24

## 2023-05-19 RX ORDER — IPRATROPIUM/ALBUTEROL SULFATE 18-103MCG
3 AEROSOL WITH ADAPTER (GRAM) INHALATION EVERY 6 HOURS
Refills: 0 | Status: DISCONTINUED | OUTPATIENT
Start: 2023-05-19 | End: 2023-05-27

## 2023-05-19 RX ORDER — CLOPIDOGREL BISULFATE 75 MG/1
75 TABLET, FILM COATED ORAL DAILY
Refills: 0 | Status: DISCONTINUED | OUTPATIENT
Start: 2023-05-19 | End: 2023-05-19

## 2023-05-19 RX ORDER — SENNA PLUS 8.6 MG/1
2 TABLET ORAL
Qty: 0 | Refills: 0 | DISCHARGE
Start: 2023-05-19

## 2023-05-19 RX ORDER — SODIUM CHLORIDE 9 MG/ML
1000 INJECTION, SOLUTION INTRAVENOUS
Refills: 0 | Status: DISCONTINUED | OUTPATIENT
Start: 2023-05-19 | End: 2023-07-07

## 2023-05-19 RX ORDER — PANTOPRAZOLE SODIUM 20 MG/1
40 TABLET, DELAYED RELEASE ORAL
Qty: 0 | Refills: 0 | DISCHARGE
Start: 2023-05-19

## 2023-05-19 RX ORDER — DEXTROSE 50 % IN WATER 50 %
15 SYRINGE (ML) INTRAVENOUS ONCE
Refills: 0 | Status: DISCONTINUED | OUTPATIENT
Start: 2023-05-19 | End: 2023-05-24

## 2023-05-19 RX ADMIN — CHLORHEXIDINE GLUCONATE 15 MILLILITER(S): 213 SOLUTION TOPICAL at 17:22

## 2023-05-19 RX ADMIN — PANTOPRAZOLE SODIUM 40 MILLIGRAM(S): 20 TABLET, DELAYED RELEASE ORAL at 17:22

## 2023-05-19 RX ADMIN — PANTOPRAZOLE SODIUM 40 MILLIGRAM(S): 20 TABLET, DELAYED RELEASE ORAL at 05:00

## 2023-05-19 RX ADMIN — Medication 1: at 00:55

## 2023-05-19 RX ADMIN — Medication 3 MILLILITER(S): at 22:31

## 2023-05-19 RX ADMIN — BRIVARACETAM 50 MILLIGRAM(S): 25 TABLET, FILM COATED ORAL at 22:41

## 2023-05-19 RX ADMIN — CHLORHEXIDINE GLUCONATE 15 MILLILITER(S): 213 SOLUTION TOPICAL at 05:00

## 2023-05-19 RX ADMIN — CHLORHEXIDINE GLUCONATE 1 APPLICATION(S): 213 SOLUTION TOPICAL at 06:49

## 2023-05-19 RX ADMIN — Medication 100 MILLIEQUIVALENT(S): at 04:20

## 2023-05-19 RX ADMIN — Medication 100 MILLIEQUIVALENT(S): at 03:18

## 2023-05-19 RX ADMIN — SENNA PLUS 2 TABLET(S): 8.6 TABLET ORAL at 22:34

## 2023-05-19 RX ADMIN — SIMVASTATIN 40 MILLIGRAM(S): 20 TABLET, FILM COATED ORAL at 22:34

## 2023-05-19 RX ADMIN — Medication 100 MILLIEQUIVALENT(S): at 02:08

## 2023-05-19 NOTE — PROGRESS NOTE ADULT - SUBJECTIVE AND OBJECTIVE BOX
Intubated in ICU     Vital Signs Last 24 Hrs  T(C): 37.7 (05-19-23 @ 12:00), Max: 37.7 (05-19-23 @ 00:00)  T(F): 99.9 (05-19-23 @ 12:00), Max: 99.9 (05-19-23 @ 00:00)  HR: 97 (05-19-23 @ 12:00) (73 - 98)  BP: 124/64 (05-19-23 @ 12:00) (103/58 - 146/67)  BP(mean): 89 (05-19-23 @ 12:00) (77 - 97)  RR: 14 (05-19-23 @ 12:00) (14 - 20)  SpO2: 97% (05-19-23 @ 12:00) (95% - 100%)    I&O's Detail    18 May 2023 07:01  -  19 May 2023 07:00  --------------------------------------------------------  IN:    Dexmedetomidine: 14.7 mL    Enteral Tube Flush: 90 mL    IV PiggyBack: 300 mL    Nepro with Carb Steady: 660 mL    Norepinephrine: 4.8 mL  Total IN: 1069.5 mL    OUT:    Indwelling Catheter - Urethral (mL): 875 mL  Total OUT: 875 mL    Total NET: 194.5 mL    19 May 2023 07:01  -  19 May 2023 12:40  --------------------------------------------------------  IN:    Nepro with Carb Steady: 120 mL  Total IN: 120 mL    OUT:    Indwelling Catheter - Urethral (mL): 225 mL  Total OUT: 225 mL    Total NET: -105 mL    Mode: AC/ CMV (Assist Control/ Continuous Mandatory Ventilation)  RR (machine): 14  TV (machine): 450  FiO2: 30  PEEP: 5  ITime: 1  MAP: 9  PIP: 30    s1s2  b/l air entry  soft, ND  tr edema                                                                          9.2    6.91  )-----------( 81       ( 19 May 2023 00:44 )             28.3     19 May 2023 04:08    145    |  105    |  122    ----------------------------<  134    3.5     |  23     |  5.25     Ca    8.5        19 May 2023 04:08  Phos  6.0       19 May 2023 00:44  Mg     2.6       19 May 2023 00:44    TPro  5.9    /  Alb  2.4    /  TBili  0.4    /  DBili  x      /  AST  17     /  ALT  7      /  AlkPhos  63     19 May 2023 04:08    LIVER FUNCTIONS - ( 19 May 2023 04:08 )  Alb: 2.4 g/dL / Pro: 5.9 g/dL / ALK PHOS: 63 U/L / ALT: 7 U/L / AST: 17 U/L / GGT: x           PT/INR - ( 19 May 2023 00:44 )   PT: 13.2 sec;   INR: 1.15 ratio      bisacodyl Suppository 10 milliGRAM(s) Rectal daily PRN  chlorhexidine 0.12% Liquid 15 milliLiter(s) Oral Mucosa every 12 hours  chlorhexidine 4% Liquid 1 Application(s) Topical <User Schedule>  dextrose 5%. 1000 milliLiter(s) IV Continuous <Continuous>  dextrose 5%. 1000 milliLiter(s) IV Continuous <Continuous>  dextrose 50% Injectable 25 Gram(s) IV Push once  dextrose 50% Injectable 12.5 Gram(s) IV Push once  dextrose 50% Injectable 25 Gram(s) IV Push once  dextrose Oral Gel 15 Gram(s) Oral once PRN  glucagon  Injectable 1 milliGRAM(s) IntraMuscular once  insulin lispro (ADMELOG) corrective regimen sliding scale   SubCutaneous every 6 hours  pantoprazole  Injectable 40 milliGRAM(s) IV Push two times a day  polyethylene glycol 3350 17 Gram(s) Oral daily  senna 2 Tablet(s) Oral at bedtime  simvastatin 40 milliGRAM(s) Oral at bedtime    A/P:    Hemodynamic ATN s/p arrest (Cr 1.6 - 5/9/23, Cr 1.0 - 4/12/23)  Resp failure, asp PNA, intubated   Hx CM, EF 35 - 40%, dementia  BP, resp support per ICU team  Concern for anoxic encephalopathy   S/p EEG monitoring  Avoid nephrotoxins  F/u BMP, UO   Lytes are stable  No urgent indications for RRT  Given multiple medical issues, pt is a very poor candidate for RRT if/when it becomes necessary, and RRT is not expected to change outcome and overall poor prognosis  D/w ICU team    [phone]

## 2023-05-19 NOTE — PROGRESS NOTE ADULT - SUBJECTIVE AND OBJECTIVE BOX
24 hour events:     Review of Systems:  Constitutional: No fever, chills, fatigue  Neuro: No headache, numbness, weakness  Resp: No cough, wheezing, shortness of breath  CVS: No chest pain, palpitations, leg swelling  GI: No abdominal pain, nausea, vomiting, diarrhea   : No dysuria, frequency, incontinence  Skin: No itching, burning, rashes, or lesions   Msk: No joint pain or swelling  Psych: No depression, anxiety, mood swings    T(F): 99.5 (05-19-23 @ 21:00), Max: 99.9 (05-19-23 @ 00:00)  HR: 95 (05-19-23 @ 21:00) (86 - 98)  BP: 132/72 (05-19-23 @ 21:00) (119/58 - 146/67)  RR: 20 (05-19-23 @ 21:00) (14 - 20)  SpO2: 100% (05-19-23 @ 21:00) (95% - 100%)  Wt(kg): --      05-19-23 @ 07:01  -  05-19-23 @ 21:25  --------------------------------------------------------  IN: 0 mL / OUT: 45 mL / NET: -45 mL        CAPILLARY BLOOD GLUCOSE      POCT Blood Glucose.: 132 mg/dL (19 May 2023 17:08)      I&O's Summary    19 May 2023 07:01  -  19 May 2023 21:25  --------------------------------------------------------  IN: 0 mL / OUT: 45 mL / NET: -45 mL        Physical Exam:   Gen:  Neuro:  HEENT:  Resp:  CVS:  Abd:  Ext:  Skin:    Meds:                        chlorhexidine 0.12% Liquid Oral Mucosa  chlorhexidine 4% Liquid Topical                              9.2    6.91  )-----------( 81       ( 19 May 2023 00:44 )             28.3       05-19    145  |  105  |  122<H>  ----------------------------<  134<H>  3.5   |  23  |  5.25<H>    Ca    8.5      19 May 2023 04:08  Phos  6.0     05-19  Mg     2.6     05-19    TPro  5.9<L>  /  Alb  2.4<L>  /  TBili  0.4  /  DBili  x   /  AST  17  /  ALT  7<L>  /  AlkPhos  63  05-19          PT/INR - ( 19 May 2023 00:44 )   PT: 13.2 sec;   INR: 1.15 ratio         PTT - ( 19 May 2023 00:44 )  PTT:26.5 sec                Radiology: ***  Bedside ultrasound: ***    CENTRAL LINE: N/Y          DATE INSERTED:              REMOVE: Y/N  [place]: N/Y                       DATE INSERTED:              REMOVE: Y/N  A-LINE: N/Y                       DATE INSERTED:              REMOVE: Y/N    GLOBAL ISSUE/BEST PRACTICE:  Analgesia:  Sedation:  CAM-ICU:   HOB elevation: yes  Stress ulcer prophylaxis:  VTE prophylaxis:  Glycemic control:  Nutrition:    CODE STATUS: ***    CRITICAL CARE TIME SPENT:  (Assessing presenting problems of acute illness, which pose high probability of life threatening deterioration or end organ damage/dysfunction, as well as medical decision making including initiating plan of care, reviewing data, reviewing radiologic exams, discussing with multidisciplinary team,  discussing goals of care with patient/family, and writing this note.  Non-inclusive of procedures performed)     HPI: 79 y/o M w/ pmh of dementia, HF, cad s/p cabg, RCA occlusion, hld, dm2, presented to [facility] on 5/4 for CHF exacerbation, hospital courser c/b chocking episode leading to cardiac arrest (down time 8 mins). Pt was transferred to the MICU after cardiac arrest underwent an EEG was noted to have finding concerning for severe diffuse encephalopathy now s/p transfer to [facility] for 24H continuos video EEG.    24 hour events: Pt now transferred back to [facility]. Pt 24 hour video EEG finding noted for cortical myoclonus pt VPA were held 2/2 to elevated VPA levels, Eliquis/plavix was being held 2/2 to episode of hematochezia.      Review of Systems: Intubated and unresponsive    T(F): 99.5 (05-19-23 @ 21:00), Max: 99.9 (05-19-23 @ 00:00)  HR: 95 (05-19-23 @ 21:00) (86 - 98)  BP: 132/72 (05-19-23 @ 21:00) (119/58 - 146/67)  RR: 20 (05-19-23 @ 21:00) (14 - 20)  SpO2: 100% (05-19-23 @ 21:00) (95% - 100%)  Wt(kg): --      05-19-23 @ 07:01  -  05-19-23 @ 21:25  --------------------------------------------------------  IN: 0 mL / OUT: 45 mL / NET: -45 mL        CAPILLARY BLOOD GLUCOSE      POCT Blood Glucose.: 132 mg/dL (19 May 2023 17:08)      I&O's Summary    19 May 2023 07:01  -  19 May 2023 21:25  --------------------------------------------------------  IN: 0 mL / OUT: 45 mL / NET: -45 mL        Physical Exam:   Gen: Comfortable in bed in NAD  Neuro: intubated and unresponsive on vent w/ frequent myclonic activity  HEENT: NC/AT  Resp: good air entry b/l  CVS: +RRR  Abd: BSx4, soft, ND  Ext: no edema   Skin: warm/dry    Meds:      chlorhexidine 0.12% Liquid Oral Mucosa  chlorhexidine 4% Liquid Topical                              9.2    6.91  )-----------( 81       ( 19 May 2023 00:44 )             28.3       05-19    145  |  105  |  122<H>  ----------------------------<  134<H>  3.5   |  23  |  5.25<H>    Ca    8.5      19 May 2023 04:08  Phos  6.0     05-19  Mg     2.6     05-19    TPro  5.9<L>  /  Alb  2.4<L>  /  TBili  0.4  /  DBili  x   /  AST  17  /  ALT  7<L>  /  AlkPhos  63  05-19          PT/INR - ( 19 May 2023 00:44 )   PT: 13.2 sec;   INR: 1.15 ratio         PTT - ( 19 May 2023 00:44 )  PTT:26.5 sec                Radiology:     < from: Xray Chest 1 View- PORTABLE-Urgent (Xray Chest 1 View- PORTABLE-Urgent .) (05.16.23 @ 20:23) >  ACC: [id number] EXAM:  XR CHEST PORTABLE URGENT 1V   ORDERED BY:  MARTINEZ PEREZ     PROCEDURE DATE:  05/16/2023          INTERPRETATION:  EXAMINATION: XR CHEST URGENT    CLINICAL INDICATION: assess OG tube and ET tube    TECHNIQUE: Single frontal, portable view of the chest was obtained.    COMPARISON: Chest x-ray 5/16/2023.    FINDINGS:  Support devices: Endotracheal tube tip within the distal trachea above   the laura. Enteric tube tip in stomach. Median sternotomy. Mediastinal   surgical clips.  The cardiomediastinal silhouette is  not accurately assessed in this AP   projection.  Decreased small bilateral layering pleural effusions with associated   compressive atelectasis.  No pneumothorax.  No acute bony abnormality.    IMPRESSION:  Endotracheal tube tip in the distal trachea above the laura. Enteric   tube tip in stomach.  Decreased small bilateral layering pleural effusions with associated   compressive atelectasis.    --- End of Report ---       ROSALIE SON MD; Resident Radiologist  This document has been electronically signed.  PEETY SHAVER MD; Attending Radiologist  This document has been electronically signed. May 17 2023  9:36AM    < end of copied text >        CODE STATUS: FULL CODE

## 2023-05-19 NOTE — PATIENT PROFILE ADULT - FALL HARM RISK - HARM RISK INTERVENTIONS

## 2023-05-19 NOTE — DISCHARGE NOTE PROVIDER - DETAILS OF MALNUTRITION DIAGNOSIS/DIAGNOSES
This patient has been assessed with a concern for Malnutrition and was treated during this hospitalization for the following Nutrition diagnosis/diagnoses:     -  05/18/2023: Moderate protein-calorie malnutrition

## 2023-05-19 NOTE — DISCHARGE NOTE NURSING/CASE MANAGEMENT/SOCIAL WORK - NSDCPEFALRISK_GEN_ALL_CORE
For information on Fall & Injury Prevention, visit: [email or web address] OR  [email or web address] OR  https://www.cdc.gov/steadi/patient.html

## 2023-05-19 NOTE — PROGRESS NOTE ADULT - ASSESSMENT
77 y/o M w/ pmh of dementia, HF, cad s/p cabg, RCA occlusion, hld, dm2, presented to [facility] on 5/4 for CHF exacerbation, hospital courser c/b chocking episode leading to cardiac arrest (down time 8 mins). Pt was transferred to the MICU after cardiac arrest underwent an EEG was noted to have finding concerning for severe diffuse encephalopathy now s/p transfer to [facility] for 24H continuos video EEG.    -Neuro: Diffuse anoxic injury vs metabolic encephalopathy  77 y/o M w/ pmh of dementia, HF, cad s/p cabg, RCA occlusion, hld, dm2, presented to [facility] on 5/4 for CHF exacerbation, hospital courser c/b chocking episode leading to cardiac arrest (down time 8 mins). Pt was transferred to the MICU after cardiac arrest underwent an EEG was noted to have finding concerning for severe diffuse encephalopathy now s/p transfer to [facility] for 24H continuos video EEG.    -Neuro: Diffuse anoxic injury vs metabolic encephalopathy plan to hold VPA until repeat levels back in the AM, cont briviact, consult neurology in the AM to cont following pt, cont to monitor off sedation  -Cardiac: Cardiac arrest s/p chocking episode now off pressors maintain MAP >65  -Resp: Acute Hypoxic resp failure cont lung protective ventilation will titrate vent setting as needed to maintain o2 >90%, cont pulm toliet  -GI: Cont TF as ordered/tolerated, GI ppx w/ protonix, hld on statins, cont current bowel regimen  -Renal: ANISH on CKD cont to monitor renal function along w/ lytes  -ID: s/p courser of IV zosyn monitor off IVAB  -EndoL DM start ISS  -Heme: holding eliquis/plavix due to reports of hematochezia will plan to monitor CBC, DVT ppx w/ SCD  -Dispo: Admit to MICU, pt is full code, GOC per day team, dispo pending ICU course, case d/w eicu dr. jose

## 2023-05-19 NOTE — DISCHARGE NOTE PROVIDER - NSDCMRMEDTOKEN_GEN_ALL_CORE_FT
bisacodyl 10 mg rectal suppository: 1 suppository(ies) rectal once a day As needed Constipation  pantoprazole 40 mg intravenous injection: 40 milligram(s) intravenous 2 times a day  senna leaf extract oral tablet: 2 tab(s) orally once a day (at bedtime)

## 2023-05-19 NOTE — DISCHARGE NOTE NURSING/CASE MANAGEMENT/SOCIAL WORK - PATIENT PORTAL LINK FT
You can access the FollowMyHealth Patient Portal offered by [facility] by registering at the following website: [email or web address]. By joining [facility]’s FollowMyHealth portal, you will also be able to view your health information using other applications (apps) compatible with our system.

## 2023-05-19 NOTE — CHART NOTE - NSCHARTNOTEFT_GEN_A_CORE
Nutrition Follow Up Note  Patient seen for: initial malnutrition follow up. Chart reviewed and events noted.     Source: [] Patient       [x] Medical Record        [x] RN        [] Family at bedside       [] Other:    -If unable to interview patient: [x] Trach/Vent/BiPAP  [] Disoriented/confused/inappropriate to interview    Nutrition-Related Events:   - Pressors:  [] Yes    [x] No   - Propofol:  [] Yes    [x] No         - Rate: __mL/hr. If maintained x 24 hours, propofol will provide:     Diet Order:   Diet, NPO with Tube Feed:   Tube Feeding Modality: Orogastric  Nepro with Carb Steady (NEPRORTH)  Total Volume for 24 Hours (mL): 720  Continuous  Starting Tube Feed Rate {mL per Hour}: 40  Until Goal Tube Feed Rate (mL per Hour): 40  Tube Feed Duration (in Hours): 18  Tube Feed Start Time: 14:00 (05-18-23)    EN Order Provides: total volume 720 mL, 1296 kcals, 58 gm protein, and 523 mL free water. Meets 24 kcals/kG and ~1.1 gm protein/kG based on lowest wt per previous RD note 53.3 kG (5/7).   Current Pump Rate: Currently on hold per MICU 18 hr policy. Was at 40 mL/hr  EN Provision per RN flowsheet 5/18: 660 mL (92% goal)    Is current diet order appropriate/adequate? See recommendations below    PO intake :   [] >75%  Adequate    [] 50-75%  Fair       [] <50%  Poor   [x] N/A    Nutrition-related concerns:  - Intubated 5/9.   - Anoxic brain injury vs metabolic encephalopathy. On Video EEG for 24 hours  - ANISH on CKD. Elevated Phos 5/19. Low K 5/19 @ 00:44 s/p KCl, last drawn WNL   -> Per Nephrology, "pt is a very poor candidate for RRT if/when it becomes necessary, and RRT is not expected to change outcome and overall poor prognosis"  - Hx of diabetes; ordered for sliding scale of insulin    GI:  Last BM 5/19 x2 - moderate soft brown small mucoid.   Bowel Regimen? [x] Yes   [] No  -> Ordered for pantoprazole     Weights:   Lowest wt per RD note most consistent with HIE wts: 117.5 lb/53.3kg (5/7)   No daily wts to address. RD will continue to trend as new wts available/able.     Drug Dosing Weight  Height (cm): 162.6 (16 May 2023 20:00)  Weight (kg): 65.1 (16 May 2023 20:00)  BMI (kg/m2): 24.6 (16 May 2023 20:00)    MEDICATIONS  (STANDING):  dextrose 5%. 1000 milliLiter(s) (100 mL/Hr) IV Continuous <Continuous>  dextrose 5%. 1000 milliLiter(s) (50 mL/Hr) IV Continuous <Continuous>  dextrose 50% Injectable 12.5 Gram(s) IV Push once  dextrose 50% Injectable 25 Gram(s) IV Push once  dextrose 50% Injectable 25 Gram(s) IV Push once  glucagon  Injectable 1 milliGRAM(s) IntraMuscular once  insulin lispro (ADMELOG) corrective regimen sliding scale   SubCutaneous every 6 hours  pantoprazole  Injectable 40 milliGRAM(s) IV Push two times a day  polyethylene glycol 3350 17 Gram(s) Oral daily  senna 2 Tablet(s) Oral at bedtime  simvastatin 40 milliGRAM(s) Oral at bedtime    Pertinent Labs: 05-19 @ 04:08: Na 145, <H>, Cr 5.25<H>, <H>, K+ 3.5, Alk Phos 63, ALT/SGPT 7<L>, AST/SGOT 17  05-19 @ 00:44: Na 145, <H>, Cr 5.40<H>, <H>, K+ 3.3<L>, Phos 6.0<H>, Mg 2.6, Alk Phos 62, ALT/SGPT 7<L>, AST/SGOT 17    A1C with Estimated Average Glucose Result: 7.7 % (05-05-23 @ 08:00)    Finger Sticks:  POCT Blood Glucose.: 124 mg/dL (05-19 @ 05:03)  POCT Blood Glucose.: 173 mg/dL (05-19 @ 00:49)  POCT Blood Glucose.: 145 mg/dL (05-18 @ 17:10)  POCT Blood Glucose.: 139 mg/dL (05-18 @ 12:08)    Triglycerides, Serum: 87 mg/dL (05-07-23 @ 06:47)    Skin per nursing documentation: Suspected deep tissue injury: Right first toe, sacral spine  Edema per nursing documentation: 1+ Generalized     Based on lowest wt per previous RD note 53.3 kG (5/7)  Estimated Energy Needs: (22-27 kcals/kG) 0789-6785 kcals   Estimated Protein Needs: (1.2-1.5 gm/kG) 64-80 gm  Fluid needs deferred to provider.   Jeronimo State Equation: 1275 kcals (5/18)    Previous Nutrition Diagnosis:   1) Moderate chronic malnutrition  2) Increased protein-energy needs   Nutrition Diagnosis is: [x] ongoing  [] resolved [] not applicable     Nutrition Care Plan:  [x] In Progress  [] Achieved  [] Not applicable    New Nutrition Diagnosis: [x] Not applicable    Nutrition Interventions:     Education Provided:       [] Yes:  [x] No: Not appropriate at this time.     Recommendations:      1) Nepro at goal rate 40 mL/hr x18 hours with ProSource NoCarb TF x1 daily to provide total volume 720 mL, 1336 kcals, 69 gm protein, and 523 mL free water. Meet 25 kcals/kG and 1.3 gm protein/kG based on lowest wt per previous RD note 53.3 kG (5/7).  2) As medically feasible, consider addition of multivitamin and Vitamin C to aid in wound healing.     Monitoring and Evaluation:   Continue to monitor nutritional intake, tolerance to diet prescription, weights, labs, skin integrity    RD remains available upon request and will follow up per protocol  Rhonda Sutton RD, MS, CDN, [facility] Pager #[phone]

## 2023-05-19 NOTE — DISCHARGE NOTE PROVIDER - NSDCFUSCHEDAPPT_GEN_ALL_CORE_FT
Evgeny Shaffer  [facility]  NEUROLOGY [address]  Scheduled Appointment: 07/10/2023

## 2023-05-19 NOTE — DISCHARGE NOTE PROVIDER - HOSPITAL COURSE
Mr. Avila is an 78 year old male with a PMH of mild dementia, heart failure, CAD s/p CABG with known RCA occlusion, HLD, and type 2 DM who presented to [facility] on 5/4 for heart failure exacerbation. Course complicated by a choking episode on 5/9 that subsequently progressed to cardiac arrest lasting ~8 minutes with intubation. Pt now post arrest noted with ?anoxic brain injury and myoclonus requiring MRI for evaluation. EEG is suggestive of a severe diffuse encephalopathy. Pt has also required vasopressor support post arrest with sedation and a heparin gtt as tx for NSTEMI vs. demand ischemia, which were d/fabián. Pt remains on full ventilator support.    Patient was noted to have myoclonic activity when weaned off of propofol. Patient received spot EEG which was not specific for epileptic activity but patient was also sedated. Patient was transferred to [facility] for 24hr video EEG to eval for status epilepticus and prognostication. Pt will then be transferred back to [facility].    At [facility], pt was on EEG for 24hrs but given that pt was still sedated, neurology wanted to continue the EEG for an additional 24hrs. Pt was noted to have hematochezia and blood thinners were held. No other episodes of hematochezia observed. Neurology and Epilepsy discussed with pt's daughter that the EEG shows decreased and worsening brain function. No seizures. Anti-epileptic medications were discontinued. Goals of care discussed with daughter. She wants to transfer pt back to [facility] until family makes a decision of how to proceed.

## 2023-05-19 NOTE — EEG REPORT - NS EEG TEXT BOX
MARIO RAMOS [id number]     Study Date: 05-18-23 0800 to 5-19-23 0800  Duration: 24 hr     --------------------------------------------------------------------------------------------------  History:  CC/ HPI Patient is a 78y old  Male who presents with a chief complaint of acute systolic CHF - arrest (14 May 2023 09:19)    --------------------------------------------------------------------------------------------------  Study Interpretation:    [[[Abbreviation Key:  PDR=alpha rhythm/posterior dominant rhythm. A-P=anterior posterior.  Amplitude: ‘very low’:<20; ‘low’:20-49; ‘medium’:; ‘high’:>150uV.  Persistence for periodic/rhythmic patterns (% of epoch) ‘rare’:<1%; ‘occasional’:1-10%; ‘frequent’:10-50%; ‘abundant’:50-90%; ‘continuous’:>90%.  Persistence for sporadic discharges: ‘rare’:<1/hr; ‘occasional’:1/min-1/hr; ‘frequent’:>1/min; ‘abundant’:>1/10 sec.  RPP=rhythmic and periodic patterns; GRDA=generalized rhythmic delta activity; FIRDA=frontal intermittent GRDA; LRDA=lateralized rhythmic delta activity; TIRDA=temporal intermittent rhythmic delta activity;  LPD=PLED=lateralized periodic discharges; GPD=generalized periodic discharges; BIPDs =bilateral independent periodic discharges; Mf=multifocal; SIRPDs=stimulus induced rhythmic, periodic, or ictal appearing discharges; BIRDs=brief potentially ictal rhythmic discharges >4 Hz, lasting .5-10s; PFA (paroxysmal bursts >13 Hz or =8 Hz <10s).  Modifiers: +F=with fast component; +S=with spike component; +R=with rhythmic component.  S-B=burst suppression pattern.  Max=maximal. N1-drowsy; N2-stage II sleep; N3-slow wave sleep. SSS/BETS=small sharp spikes/benign epileptiform transients of sleep. HV=hyperventilation; PS=photic stimulation]]]    Daily EEG Visual Analysis    FINDINGS:      Background:    Discontinuous, consisting of 1-3 second periods of diffuse suppression or attenuation < 20 uV alternating with ~1-10-second bursts of centrally (Cz maximal) spike/polyspike-wave discharges, at times predominant on the left (Cz/C3/P3), appearing as continuous generalized periodic discharges at <0.5-1.5 Hz, or occasionally up to 2.5 Hz for 2-5 cycles. There is intermittent diffuse theta and polymorphic delta activity both intermixed with GPDs and in between GPDs. GPDs less prominent in the latter portion of the recording.    Symmetry: symmetric  PDR: no PDR   Reactivity: not tested   Voltage: intermittently suppressed or attenuated  Anterior Posterior Gradient: absent  Other background findings: none  Breach: absent    Background Slowing:  As above     State Changes:   Absent    Ictal and Interictal Findings:  As above    Other Clinical Events:  Frequent stimulus induced myoclonus, increased in prevalence during periods of tactile stimulation (nursing care etc) other times likely stimulated by the ventilator.         Activation Procedures:   Hyperventilation was not performed.    Photic stimulation was not performed.    Artifacts:  Intermittent movement artifacts are present.    EKG:  Single-lead EKG shows irregular rhythm and occasional PVCs.    ---------------------------------------------------------------------------------------------------------------------------------------------------------      EEG Classification / Summary:    Abnormal EEG in a comatose patient.    - Continuous generalized periodic discharges (GPDs), centrally predominant, at times left centrally predominant - less prominent in the latter portion of the recording.  - Severe diffuse slowing  - Stimulus induced myoclonus     ---------------------------------------------------------------------------------------------------------------------------------------------------------      Clinical Impression:    -Stimulus induced myoclonus and GPDs can be secondary to underlying severe metabolic or post-hypoxic diffuse cerebral dysfunction. GPDs may indicate increased risk for seizures in certain clinical scenarios.  -Severe diffuse cerebral dysfunction is nonspecific in etiology, but in this case, sedating medications may be one contributing factor.   -Single-lead EKG incidentally shows irregular rhythm.        This is a preliminary read    Dewey العراقي MD   Epilepsy Fellow, [facility] [place]	       MARIO RAMOS [id number]     Study Date: 05-18-23 0800 to 5-19-23 0800  Duration: 24 hr     --------------------------------------------------------------------------------------------------  History:  CC/ HPI Patient is a 78y old  Male who presents with a chief complaint of acute systolic CHF - arrest (14 May 2023 09:19)    --------------------------------------------------------------------------------------------------  Study Interpretation:    [[[Abbreviation Key:  PDR=alpha rhythm/posterior dominant rhythm. A-P=anterior posterior.  Amplitude: ‘very low’:<20; ‘low’:20-49; ‘medium’:; ‘high’:>150uV.  Persistence for periodic/rhythmic patterns (% of epoch) ‘rare’:<1%; ‘occasional’:1-10%; ‘frequent’:10-50%; ‘abundant’:50-90%; ‘continuous’:>90%.  Persistence for sporadic discharges: ‘rare’:<1/hr; ‘occasional’:1/min-1/hr; ‘frequent’:>1/min; ‘abundant’:>1/10 sec.  RPP=rhythmic and periodic patterns; GRDA=generalized rhythmic delta activity; FIRDA=frontal intermittent GRDA; LRDA=lateralized rhythmic delta activity; TIRDA=temporal intermittent rhythmic delta activity;  LPD=PLED=lateralized periodic discharges; GPD=generalized periodic discharges; BIPDs =bilateral independent periodic discharges; Mf=multifocal; SIRPDs=stimulus induced rhythmic, periodic, or ictal appearing discharges; BIRDs=brief potentially ictal rhythmic discharges >4 Hz, lasting .5-10s; PFA (paroxysmal bursts >13 Hz or =8 Hz <10s).  Modifiers: +F=with fast component; +S=with spike component; +R=with rhythmic component.  S-B=burst suppression pattern.  Max=maximal. N1-drowsy; N2-stage II sleep; N3-slow wave sleep. SSS/BETS=small sharp spikes/benign epileptiform transients of sleep. HV=hyperventilation; PS=photic stimulation]]]    Daily EEG Visual Analysis    FINDINGS:      Background:    Discontinuous, consisting of 1-3 second periods of diffuse suppression or attenuation < 20 uV alternating with ~1-10-second bursts of centrally (Cz maximal) spike/polyspike-wave discharges, at times predominant on the left (Cz/C3/P3), appearing as continuous generalized periodic discharges at <0.5-1.5 Hz, or occasionally up to 2.5 Hz for 2-5 cycles. There is intermittent diffuse theta and polymorphic delta activity both intermixed with GPDs and in between GPDs. GPDs less prominent in the latter portion of the recording.    Symmetry: symmetric  PDR: no PDR   Reactivity: not tested   Voltage: intermittently suppressed or attenuated  Anterior Posterior Gradient: absent  Other background findings: none  Breach: absent    Background Slowing:  As above     State Changes:   Absent    Ictal and Interictal Findings:  As above    Other Clinical Events:  Frequent stimulus induced myoclonus, increased in prevalence during periods of tactile stimulation (nursing care etc) other times likely stimulated by the ventilator.         Activation Procedures:   Hyperventilation was not performed.    Photic stimulation was not performed.    Artifacts:  Intermittent movement artifacts are present.    EKG:  Single-lead EKG shows irregular rhythm and occasional PVCs.    ---------------------------------------------------------------------------------------------------------------------------------------------------------      EEG Classification / Summary:    Abnormal EEG in a comatose patient.    - Continuous generalized periodic discharges (GPDs), centrally predominant, at times left centrally predominant - less prominent in the latter portion of the recording.  - Severe diffuse slowing  - Stimulus induced myoclonus    ---------------------------------------------------------------------------------------------------------------------------------------------------------      Clinical Impression:    -Stimulus induced myoclonus (e.g [place]) and GPDs can be secondary to underlying severe metabolic or post-hypoxic diffuse cerebral dysfunction. GPDs may indicate increased risk for seizures in certain clinical scenarios.  -Severe diffuse cerebral dysfunction is nonspecific in etiology, but in this case, sedating medications may be one contributing factor.   -Single-lead EKG incidentally shows irregular rhythm.        This is a preliminary read    Dewey العراقي MD   Epilepsy Fellow, [facility] [place]	       MARIO RAMOS [id number]     Study Date: 05-18-23 0800 to 5-19-23 0800  Duration: 24 hr     --------------------------------------------------------------------------------------------------  History:  CC/ HPI Patient is a 78y old  Male who presents with a chief complaint of acute systolic CHF - arrest (14 May 2023 09:19)    --------------------------------------------------------------------------------------------------  Study Interpretation:    [[[Abbreviation Key:  PDR=alpha rhythm/posterior dominant rhythm. A-P=anterior posterior.  Amplitude: ‘very low’:<20; ‘low’:20-49; ‘medium’:; ‘high’:>150uV.  Persistence for periodic/rhythmic patterns (% of epoch) ‘rare’:<1%; ‘occasional’:1-10%; ‘frequent’:10-50%; ‘abundant’:50-90%; ‘continuous’:>90%.  Persistence for sporadic discharges: ‘rare’:<1/hr; ‘occasional’:1/min-1/hr; ‘frequent’:>1/min; ‘abundant’:>1/10 sec.  RPP=rhythmic and periodic patterns; GRDA=generalized rhythmic delta activity; FIRDA=frontal intermittent GRDA; LRDA=lateralized rhythmic delta activity; TIRDA=temporal intermittent rhythmic delta activity;  LPD=PLED=lateralized periodic discharges; GPD=generalized periodic discharges; BIPDs =bilateral independent periodic discharges; Mf=multifocal; SIRPDs=stimulus induced rhythmic, periodic, or ictal appearing discharges; BIRDs=brief potentially ictal rhythmic discharges >4 Hz, lasting .5-10s; PFA (paroxysmal bursts >13 Hz or =8 Hz <10s).  Modifiers: +F=with fast component; +S=with spike component; +R=with rhythmic component.  S-B=burst suppression pattern.  Max=maximal. N1-drowsy; N2-stage II sleep; N3-slow wave sleep. SSS/BETS=small sharp spikes/benign epileptiform transients of sleep. HV=hyperventilation; PS=photic stimulation]]]    Daily EEG Visual Analysis    FINDINGS:      Background:    Discontinuous, consisting of 1-3 second periods of diffuse suppression or attenuation < 20 uV alternating with ~1-10-second bursts of centrally (Cz maximal) spike/polyspike-wave discharges, at times predominant on the left (Cz/C3/P3), appearing as continuous generalized periodic discharges at <0.5-1.5 Hz, or occasionally up to 2.5 Hz for 2-5 cycles. There is intermittent diffuse theta and polymorphic delta activity both intermixed with GPDs and in between GPDs.     GPDs less prominent in the latter portion of the recording and vs prior day    Symmetry: symmetric  PDR: no PDR   Reactivity: minimal  Voltage: intermittently suppressed or attenuated  Anterior Posterior Gradient: absent  Other background findings: none  Breach: absent    Background Slowing:  As above     State Changes:   Absent    Ictal and Interictal Findings:  As above    Other Clinical Events:  Frequent stimulus induced myoclonus, increased in prevalence during periods of tactile stimulation (nursing care etc) other times likely stimulated by the ventilator.         Activation Procedures:   Hyperventilation was not performed.    Photic stimulation was not performed.    Artifacts:  Intermittent movement artifacts are present.    EKG:  Single-lead EKG shows irregular rhythm and occasional PVCs.    ---------------------------------------------------------------------------------------------------------------------------------------------------------      EEG Classification / Summary:    Abnormal EEG in a comatose patient.  - Nearly continuous generalized periodic discharges (GPDs), centrally predominant, at times left centrally predominant - lower amplitude vs prior day.  - Severe diffuse slowing  - Stimulus induced myoclonus    ---------------------------------------------------------------------------------------------------------------------------------------------------------      Clinical Impression:    -Stimulus induced myoclonus and GPDs s/p hypoxic diffuse indicative of severe cerebral dysfunction  -Single-lead EKG incidentally shows irregular rhythm.        Dewey العراقي MD   Epilepsy Fellow, [facility]	       MARIO RAMOS [id number]     Study Date: 05-18-23 0800 to 5-19-23 1200  Duration: 28 hr     --------------------------------------------------------------------------------------------------  History:  CC/ HPI Patient is a 78y old  Male who presents with a chief complaint of acute systolic CHF - arrest (14 May 2023 09:19)    --------------------------------------------------------------------------------------------------  Study Interpretation:    [[[Abbreviation Key:  PDR=alpha rhythm/posterior dominant rhythm. A-P=anterior posterior.  Amplitude: ‘very low’:<20; ‘low’:20-49; ‘medium’:; ‘high’:>150uV.  Persistence for periodic/rhythmic patterns (% of epoch) ‘rare’:<1%; ‘occasional’:1-10%; ‘frequent’:10-50%; ‘abundant’:50-90%; ‘continuous’:>90%.  Persistence for sporadic discharges: ‘rare’:<1/hr; ‘occasional’:1/min-1/hr; ‘frequent’:>1/min; ‘abundant’:>1/10 sec.  RPP=rhythmic and periodic patterns; GRDA=generalized rhythmic delta activity; FIRDA=frontal intermittent GRDA; LRDA=lateralized rhythmic delta activity; TIRDA=temporal intermittent rhythmic delta activity;  LPD=PLED=lateralized periodic discharges; GPD=generalized periodic discharges; BIPDs =bilateral independent periodic discharges; Mf=multifocal; SIRPDs=stimulus induced rhythmic, periodic, or ictal appearing discharges; BIRDs=brief potentially ictal rhythmic discharges >4 Hz, lasting .5-10s; PFA (paroxysmal bursts >13 Hz or =8 Hz <10s).  Modifiers: +F=with fast component; +S=with spike component; +R=with rhythmic component.  S-B=burst suppression pattern.  Max=maximal. N1-drowsy; N2-stage II sleep; N3-slow wave sleep. SSS/BETS=small sharp spikes/benign epileptiform transients of sleep. HV=hyperventilation; PS=photic stimulation]]]    Daily EEG Visual Analysis    FINDINGS:      Background:    Discontinuous, consisting of 1-3 second periods of diffuse suppression or attenuation < 20 uV alternating with ~1-10-second bursts of centrally (Cz maximal) spike/polyspike-wave discharges, at times predominant on the left (Cz/C3/P3), appearing as continuous generalized periodic discharges at <0.5-1.5 Hz, or occasionally up to 2.5 Hz for 2-5 cycles. There is intermittent diffuse theta and polymorphic delta activity both intermixed with GPDs and in between GPDs.     GPDs less prominent in the latter portion of the recording and vs prior day    Symmetry: symmetric  PDR: no PDR   Reactivity: minimal  Voltage: intermittently suppressed or attenuated  Anterior Posterior Gradient: absent  Other background findings: none  Breach: absent    Background Slowing:  As above     State Changes:   Absent    Ictal and Interictal Findings:  As above    Other Clinical Events:  Frequent stimulus induced myoclonus, increased in prevalence during periods of tactile stimulation (nursing care etc) other times likely stimulated by the ventilator.         Activation Procedures:   Hyperventilation was not performed.    Photic stimulation was not performed.    Artifacts:  Intermittent movement artifacts are present.    EKG:  Single-lead EKG shows irregular rhythm and occasional PVCs.    ---------------------------------------------------------------------------------------------------------------------------------------------------------      EEG Classification / Summary:    Abnormal EEG in a comatose patient.  - Nearly continuous generalized periodic discharges (GPDs), centrally predominant, at times left centrally predominant - lower amplitude vs prior day.  - Severe diffuse slowing  - Stimulus induced myoclonus    ---------------------------------------------------------------------------------------------------------------------------------------------------------      Clinical Impression:    -Stimulus induced myoclonus and GPDs s/p hypoxic diffuse indicative of severe cerebral dysfunction  -Single-lead EKG incidentally shows irregular rhythm.        Dewey العراقي MD   Epilepsy Fellow, [facility]

## 2023-05-19 NOTE — PROGRESS NOTE ADULT - ATTENDING COMMENTS
patient seen and examined at bedside with daughter and MICU team  discussed EEG results, over all poor prognosis due to anoxic brain injury.   will discontinue EEG, hold VPA  all questions were addressed and answered

## 2023-05-19 NOTE — PROGRESS NOTE ADULT - NUTRITIONAL ASSESSMENT
This patient has been assessed with a concern for Malnutrition and has been determined to have a diagnosis/diagnoses of Moderate protein-calorie malnutrition.    This patient is being managed with:   Diet NPO with Tube Feed-  Tube Feeding Modality: Orogastric  Nepro with Carb Steady (NEPRORTH)  Total Volume for 24 Hours (mL): 720  Continuous  Starting Tube Feed Rate {mL per Hour}: 40  Until Goal Tube Feed Rate (mL per Hour): 40  Tube Feed Duration (in Hours): 18  Tube Feed Start Time: 14:00  Entered: May 18 2023  1:14PM  
This patient has been assessed with a concern for Malnutrition and has been determined to have a diagnosis/diagnoses of Moderate protein-calorie malnutrition.    This patient is being managed with:   Diet NPO with Tube Feed-  Tube Feeding Modality: Orogastric  Nepro with Carb Steady (NEPRORTH)  Total Volume for 24 Hours (mL): 900  Continuous  Starting Tube Feed Rate {mL per Hour}: 10  Increase Tube Feed Rate by (mL): 10     Every hour  Until Goal Tube Feed Rate (mL per Hour): 50  Tube Feed Duration (in Hours): 18  Tube Feed Start Time: 00:00  Entered: May 16 2023  8:03PM

## 2023-05-19 NOTE — DISCHARGE NOTE PROVIDER - NSDCCPCAREPLAN_GEN_ALL_CORE_FT
PRINCIPAL DISCHARGE DIAGNOSIS  Diagnosis: Anoxic brain injury  Assessment and Plan of Treatment:

## 2023-05-19 NOTE — PROGRESS NOTE ADULT - ASSESSMENT
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 Discussion was held at bedside with Dr. Gong epilepsy attending and Dr. Mckeon general neurology attending in presence of MICU team with Wife. Discussed eeg findings and imaging results. EEG continues to show poor brain functionality even off propofol. Continuing eeg would not  and decision is to discontinue eeg. No further imaging would  at this point. Discussed with daughter patient's poor prognosis.     Impression: Post cardiac arrest severe Anoxic Encephalopathy with secondary cortical myoclonus. Worsening ARF w Uremia post cardiac arrest.     Recommendation:  [] Discontinue EEG  [] No further imaging at this time as will not   [] Hold VPA  [] Palliative care consult    Discussed and seen with Dr. Gong and Dr. Mckeon.    78y (1945) man with mild dementia, heart failure, CAD s/p CABG with known RCA occlusion, dyslipidemia, and type 2 DM who presented to [facility] on 5/4 for heart failure exacerbation who had a choking episode on 5/9 that subsequently progressed to cardiac arrest lasting ~8 minutes with intubation. Discussion was held at bedside with Dr. Gong epilepsy attending and Dr. Mckeon general neurology attending in presence of MICU team with daughter. Discussed eeg findings and imaging results. EEG continues to show poor brain functionality even off sedation. Continuing eeg would not  and decision is to discontinue eeg. No further imaging would  at this point. Discussed with daughter patient's poor prognosis.     Impression: Post cardiac arrest severe Anoxic Encephalopathy with secondary cortical myoclonus. Worsening ARF w Uremia post cardiac arrest.     Recommendation:  [] Discontinue EEG  [] No further imaging at this time as will not   [] Hold VPA  [] Palliative care consult    Discussed and seen with Dr. Gong and Dr. Mckeon.

## 2023-05-19 NOTE — PROGRESS NOTE ADULT - SUBJECTIVE AND OBJECTIVE BOX
Neurology - Consult Follow up Progress Note     Patient MARIO RAMOS is a 78y (1945) man with mild dementia, heart failure, CAD s/p CABG with known RCA occlusion, dyslipidemia, and type 2 DM who presented to [facility] on 5/4 for heart failure exacerbation who had a choking episode on 5/9 that subsequently progressed to cardiac arrest lasting ~8 minutes with intubation.     Interval History: Patient examined at bedside with attending. Daughter at bedside. Patient remains intubated. Patient now off propofol.     NIHSS 27  pre mRS 1    Review of Systems:   Unable to obtain     Allergies:  sulfa drugs (Unknown)      PMHx/PSHx/Family Hx: As above, otherwise see below   HTN (hypertension)    HLD (hyperlipidemia)    Diabetes    CAD (coronary artery disease)        Social Hx:  No current use of tobacco, alcohol, or illicit drugs    Medications:  MEDICATIONS  (STANDING):  chlorhexidine 0.12% Liquid 15 milliLiter(s) Oral Mucosa every 12 hours  chlorhexidine 4% Liquid 1 Application(s) Topical <User Schedule>  dextrose 5%. 1000 milliLiter(s) (100 mL/Hr) IV Continuous <Continuous>  dextrose 5%. 1000 milliLiter(s) (50 mL/Hr) IV Continuous <Continuous>  dextrose 50% Injectable 12.5 Gram(s) IV Push once  dextrose 50% Injectable 25 Gram(s) IV Push once  dextrose 50% Injectable 25 Gram(s) IV Push once  glucagon  Injectable 1 milliGRAM(s) IntraMuscular once  insulin lispro (ADMELOG) corrective regimen sliding scale   SubCutaneous every 6 hours  pantoprazole  Injectable 40 milliGRAM(s) IV Push two times a day  polyethylene glycol 3350 17 Gram(s) Oral daily  senna 2 Tablet(s) Oral at bedtime  simvastatin 40 milliGRAM(s) Oral at bedtime    MEDICATIONS  (PRN):  bisacodyl Suppository 10 milliGRAM(s) Rectal daily PRN Constipation  dextrose Oral Gel 15 Gram(s) Oral once PRN Blood Glucose LESS THAN 70 milliGRAM(s)/deciliter      Vitals:  T(C): 37.7 (05-19-23 @ 12:00), Max: 37.7 (05-19-23 @ 00:00)  HR: 97 (05-19-23 @ 12:00) (74 - 98)  BP: 124/64 (05-19-23 @ 12:00) (109/57 - 146/67)  RR: 14 (05-19-23 @ 12:00) (14 - 20)  SpO2: 97% (05-19-23 @ 12:00) (95% - 100%)    Physical Examination:   General -Intubated, ill appearing    Neurologic Exam:  Mental status - comatose, eyes closed, no response to sternal rub, not following commands, not tracking or attending to examiner    Cranial nerves - pupils 3mm b/l, slowly reactive to light, corneal reflexes intact b/l, eyes able to cross midline, facial expression grossly symmetric    Motor - normal bulk throughout. Flaccid tone throughout. No purposeful movement in any extremities    Sensation - non-responsive to noxious stimuli    DTR's -             Biceps      Triceps     Brachioradialis       Patellar    Ankle    Toes/plantar response  R             2+             2+                  2+                       2+            2+                 neurtral   L              2+             2+                 2+                        2+           2+                 neutral     Coordination - intermittent myoclonic jerks of b/l plantar flexion, shoulder flexion, and neck flexion    Gait - did not assess due to mental status    Labs:                        9.2    6.91  )-----------( 81       ( 19 May 2023 00:44 )             28.3     05-19    145  |  105  |  122<H>  ----------------------------<  134<H>  3.5   |  23  |  5.25<H>    Ca    8.5      19 May 2023 04:08  Phos  6.0     05-19  Mg     2.6     05-19    TPro  5.9<L>  /  Alb  2.4<L>  /  TBili  0.4  /  DBili  x   /  AST  17  /  ALT  7<L>  /  AlkPhos  63  05-19    CAPILLARY BLOOD GLUCOSE      POCT Blood Glucose.: 106 mg/dL (19 May 2023 12:08)    LIVER FUNCTIONS - ( 19 May 2023 04:08 )  Alb: 2.4 g/dL / Pro: 5.9 g/dL / ALK PHOS: 63 U/L / ALT: 7 U/L / AST: 17 U/L / GGT: x             PT/INR - ( 19 May 2023 00:44 )   PT: 13.2 sec;   INR: 1.15 ratio         PTT - ( 19 May 2023 00:44 )  PTT:26.5 sec          Radiology:  MR Head No Cont:  (15 May 2023 14:58)  < from: MR Head No Cont (05.15.23 @ 14:58) >  Several foci of restricted diffusion in the left frontal and parietal   lobes. No hemorrhagic transformation.    EEG 5/19  -Stimulus induced myoclonus and GPDs s/p hypoxic diffuse indicative of severe cerebral dysfunction  -Single-lead EKG incidentally shows irregular rhythm.     Neurology - Consult Follow up Progress Note     Patient MARIO RAMOS is a 78y (1945) man with mild dementia, heart failure, CAD s/p CABG with known RCA occlusion, dyslipidemia, and type 2 DM who presented to [facility] on 5/4 for heart failure exacerbation who had a choking episode on 5/9 that subsequently progressed to cardiac arrest lasting ~8 minutes with intubation.     Interval History: Patient examined at bedside with attending. Daughter at bedside. Patient remains intubated. Patient now off propofol/precedex.     NIHSS 27  pre mRS 1    Review of Systems:   Unable to obtain     Allergies:  sulfa drugs (Unknown)      PMHx/PSHx/Family Hx: As above, otherwise see below   HTN (hypertension)    HLD (hyperlipidemia)    Diabetes    CAD (coronary artery disease)        Social Hx:  No current use of tobacco, alcohol, or illicit drugs    Medications:  MEDICATIONS  (STANDING):  chlorhexidine 0.12% Liquid 15 milliLiter(s) Oral Mucosa every 12 hours  chlorhexidine 4% Liquid 1 Application(s) Topical <User Schedule>  dextrose 5%. 1000 milliLiter(s) (100 mL/Hr) IV Continuous <Continuous>  dextrose 5%. 1000 milliLiter(s) (50 mL/Hr) IV Continuous <Continuous>  dextrose 50% Injectable 12.5 Gram(s) IV Push once  dextrose 50% Injectable 25 Gram(s) IV Push once  dextrose 50% Injectable 25 Gram(s) IV Push once  glucagon  Injectable 1 milliGRAM(s) IntraMuscular once  insulin lispro (ADMELOG) corrective regimen sliding scale   SubCutaneous every 6 hours  pantoprazole  Injectable 40 milliGRAM(s) IV Push two times a day  polyethylene glycol 3350 17 Gram(s) Oral daily  senna 2 Tablet(s) Oral at bedtime  simvastatin 40 milliGRAM(s) Oral at bedtime    MEDICATIONS  (PRN):  bisacodyl Suppository 10 milliGRAM(s) Rectal daily PRN Constipation  dextrose Oral Gel 15 Gram(s) Oral once PRN Blood Glucose LESS THAN 70 milliGRAM(s)/deciliter      Vitals:  T(C): 37.7 (05-19-23 @ 12:00), Max: 37.7 (05-19-23 @ 00:00)  HR: 97 (05-19-23 @ 12:00) (74 - 98)  BP: 124/64 (05-19-23 @ 12:00) (109/57 - 146/67)  RR: 14 (05-19-23 @ 12:00) (14 - 20)  SpO2: 97% (05-19-23 @ 12:00) (95% - 100%)    Physical Examination:   General -Intubated, ill appearing    Neurologic Exam:  Mental status - comatose, eyes closed, no response to sternal rub, not following commands, not tracking or attending to examiner    Cranial nerves - pupils 3mm b/l, slowly reactive to light, corneal reflexes intact b/l, eyes able to cross midline    Motor - normal bulk throughout. Flaccid tone throughout. No purposeful movement in any extremities    Sensation - non-responsive to noxious stimuli    DTR's -             Biceps      Triceps     Brachioradialis       Patellar    Ankle    Toes/plantar response  R             2+             2+                  2+                       2+            2+                 neurtral   L              2+             2+                 2+                        2+           2+                 neutral     Coordination - intermittent myoclonic jerks of b/l plantar flexion, shoulder flexion, and neck flexion    Gait - did not assess due to mental status    Labs:                        9.2    6.91  )-----------( 81       ( 19 May 2023 00:44 )             28.3     05-19    145  |  105  |  122<H>  ----------------------------<  134<H>  3.5   |  23  |  5.25<H>    Ca    8.5      19 May 2023 04:08  Phos  6.0     05-19  Mg     2.6     05-19    TPro  5.9<L>  /  Alb  2.4<L>  /  TBili  0.4  /  DBili  x   /  AST  17  /  ALT  7<L>  /  AlkPhos  63  05-19    CAPILLARY BLOOD GLUCOSE      POCT Blood Glucose.: 106 mg/dL (19 May 2023 12:08)    LIVER FUNCTIONS - ( 19 May 2023 04:08 )  Alb: 2.4 g/dL / Pro: 5.9 g/dL / ALK PHOS: 63 U/L / ALT: 7 U/L / AST: 17 U/L / GGT: x             PT/INR - ( 19 May 2023 00:44 )   PT: 13.2 sec;   INR: 1.15 ratio         PTT - ( 19 May 2023 00:44 )  PTT:26.5 sec          Radiology:  MR Head No Cont:  (15 May 2023 14:58)  < from: MR Head No Cont (05.15.23 @ 14:58) >  Several foci of restricted diffusion in the left frontal and parietal   lobes. No hemorrhagic transformation.    EEG 5/19  -Stimulus induced myoclonus and GPDs s/p hypoxic diffuse indicative of severe cerebral dysfunction  -Single-lead EKG incidentally shows irregular rhythm.

## 2023-05-19 NOTE — PROGRESS NOTE ADULT - ASSESSMENT
Mr. Avila is an 78 year old male with a PMH of mild dementia, heart failure, CAD s/p CABG with known RCA occlusion, HLD, and type 2 DM who presented to [facility] on 5/4 for heart failure exacerbation. Course complicated by a choking episode on 5/9 that subsequently progressed to cardiac arrest lasting ~8 minutes with intubation. Pt now post arrest noted with ?anoxic brain injury and myoclonus requiring MRI for evaluation. EEG is suggestive of a severe diffuse encephalopathy. Pt has also required vasopressor support post arrest with sedation and a heparin gtt as tx for NSTEMI vs. demand ischemia which were d/fabián. Pt remans on propofol and full ventilator support.    Patient was noted to have myoclonic activity when weaned off of propofol. Patient received spot EEG which was not specific for epileptic activity but patient was also sedated. Patient was transferred to [facility] for 24hr video EEG to eval for status epilepticus and prognostication. Pt will then be transferred back to [facility].    NEURO:  S/p cardiac arrest with myoclonic activity with spot EEG showing diffuse dysfunction which may represent diffuse anoxic brain injury vs metabolic encephalopathy with labs notable for uremia. MRI brain 5/15 shows some area of foci for thrombotic stroke but no hemorrhage.  - d/c EEG  - off sedation  - d/c VPA  - Briviact d/fabián on 5/18  - F/u neuro recs and EEG report     CARDIOVASCULAR:  #S/p Cardiac arrest in the setting of witnessed choking episode. Received ACS protocol for possible NSTEMI type 1 vs type 2  - Tele  - Was hypotensive at [facility] on propofol and levo  - off pressors    #CAD s/p CABG  EKG shows evidence of fragmented QRS most notable in inferior leads corresponding to known RCA stenosis  - Plavix - hold 2/2 hematochezia   - Simvastatin    #HFrEF/ischemic cardiomyopathy  - Recent TTE noted with EF originally 40-45% on 5/5 and repeated 5/11 35-40% s/p cardiac arrest  - Will introduce GDMT if in lines of GOC and when appropriate    #Paroxysmal A fib  - Eliquis hold 2/2 hematochezia    RESPIRATORY:  - Intubated s/p cardiac arrest    GI/NUTRITION:  - On tube feeds  - on bowel regimen  - Continue home pantoprazole    RENAL:  ANISH on CKD  Baseline creatinine around 1.7 and uptrending after cardiac arrest which most likely represents post cardiac ischemic ATN  - CTM  - Nephro does not recommend HD  - strict Is&Os    ID:  s/p zosyn 5/9-5/16  - CTM for signs of sepsis    HEME:  - No active issues  - HIT negative, thrombocytopenia improving  - hold plavix 2/2 hematochezia  - Dvt prophylaxis: SCDs    ENDOCRINE:  DM2  - ISS    ETHICS: Full Code

## 2023-05-20 LAB
ALBUMIN SERPL ELPH-MCNC: 1.6 G/DL — LOW (ref 3.3–5)
ALP SERPL-CCNC: 65 U/L — SIGNIFICANT CHANGE UP (ref 40–120)
ALT FLD-CCNC: 7 U/L — LOW (ref 10–45)
ANION GAP SERPL CALC-SCNC: 13 MMOL/L — SIGNIFICANT CHANGE UP (ref 5–17)
AST SERPL-CCNC: 26 U/L — SIGNIFICANT CHANGE UP (ref 10–40)
BASOPHILS # BLD AUTO: 0.06 K/UL — SIGNIFICANT CHANGE UP (ref 0–0.2)
BASOPHILS NFR BLD AUTO: 0.6 % — SIGNIFICANT CHANGE UP (ref 0–2)
BILIRUB SERPL-MCNC: 0.6 MG/DL — SIGNIFICANT CHANGE UP (ref 0.2–1.2)
BUN SERPL-MCNC: 137 MG/DL — HIGH (ref 7–23)
CALCIUM SERPL-MCNC: 8.8 MG/DL — SIGNIFICANT CHANGE UP (ref 8.4–10.5)
CHLORIDE SERPL-SCNC: 109 MMOL/L — HIGH (ref 96–108)
CO2 SERPL-SCNC: 26 MMOL/L — SIGNIFICANT CHANGE UP (ref 22–31)
CREAT SERPL-MCNC: 5.06 MG/DL — HIGH (ref 0.5–1.3)
EGFR: 11 ML/MIN/1.73M2 — LOW
EOSINOPHIL # BLD AUTO: 0.04 K/UL — SIGNIFICANT CHANGE UP (ref 0–0.5)
EOSINOPHIL NFR BLD AUTO: 0.4 % — SIGNIFICANT CHANGE UP (ref 0–6)
GLUCOSE BLDC GLUCOMTR-MCNC: 147 MG/DL — HIGH (ref 70–99)
GLUCOSE BLDC GLUCOMTR-MCNC: 151 MG/DL — HIGH (ref 70–99)
GLUCOSE BLDC GLUCOMTR-MCNC: 154 MG/DL — HIGH (ref 70–99)
GLUCOSE BLDC GLUCOMTR-MCNC: 179 MG/DL — HIGH (ref 70–99)
GLUCOSE BLDC GLUCOMTR-MCNC: 245 MG/DL — HIGH (ref 70–99)
GLUCOSE BLDC GLUCOMTR-MCNC: 253 MG/DL — HIGH (ref 70–99)
GLUCOSE SERPL-MCNC: 177 MG/DL — HIGH (ref 70–99)
HCT VFR BLD CALC: 27.8 % — LOW (ref 39–50)
HCT VFR BLD CALC: 28.8 % — LOW (ref 39–50)
HGB BLD-MCNC: 9.2 G/DL — LOW (ref 13–17)
HGB BLD-MCNC: 9.3 G/DL — LOW (ref 13–17)
IMM GRANULOCYTES NFR BLD AUTO: 4.2 % — HIGH (ref 0–0.9)
LYMPHOCYTES # BLD AUTO: 0.64 K/UL — LOW (ref 1–3.3)
LYMPHOCYTES # BLD AUTO: 6.6 % — LOW (ref 13–44)
MAGNESIUM SERPL-MCNC: 2.7 MG/DL — HIGH (ref 1.6–2.6)
MCHC RBC-ENTMCNC: 30.9 PG — SIGNIFICANT CHANGE UP (ref 27–34)
MCHC RBC-ENTMCNC: 31.6 PG — SIGNIFICANT CHANGE UP (ref 27–34)
MCHC RBC-ENTMCNC: 32.3 GM/DL — SIGNIFICANT CHANGE UP (ref 32–36)
MCHC RBC-ENTMCNC: 33.1 GM/DL — SIGNIFICANT CHANGE UP (ref 32–36)
MCV RBC AUTO: 95.5 FL — SIGNIFICANT CHANGE UP (ref 80–100)
MCV RBC AUTO: 95.7 FL — SIGNIFICANT CHANGE UP (ref 80–100)
MONOCYTES # BLD AUTO: 1.1 K/UL — HIGH (ref 0–0.9)
MONOCYTES NFR BLD AUTO: 11.3 % — SIGNIFICANT CHANGE UP (ref 2–14)
NEUTROPHILS # BLD AUTO: 7.51 K/UL — HIGH (ref 1.8–7.4)
NEUTROPHILS NFR BLD AUTO: 76.9 % — SIGNIFICANT CHANGE UP (ref 43–77)
NRBC # BLD: 0 /100 WBCS — SIGNIFICANT CHANGE UP (ref 0–0)
NRBC # BLD: 0 /100 WBCS — SIGNIFICANT CHANGE UP (ref 0–0)
PHOSPHATE SERPL-MCNC: 6.1 MG/DL — HIGH (ref 2.5–4.5)
PLATELET # BLD AUTO: 162 K/UL — SIGNIFICANT CHANGE UP (ref 150–400)
PLATELET # BLD AUTO: 89 K/UL — LOW (ref 150–400)
POTASSIUM SERPL-MCNC: 3.3 MMOL/L — LOW (ref 3.5–5.3)
POTASSIUM SERPL-SCNC: 3.3 MMOL/L — LOW (ref 3.5–5.3)
PROT SERPL-MCNC: 6.1 G/DL — SIGNIFICANT CHANGE UP (ref 6–8.3)
RBC # BLD: 2.91 M/UL — LOW (ref 4.2–5.8)
RBC # BLD: 3.01 M/UL — LOW (ref 4.2–5.8)
RBC # FLD: 15.2 % — HIGH (ref 10.3–14.5)
RBC # FLD: 15.6 % — HIGH (ref 10.3–14.5)
SODIUM SERPL-SCNC: 148 MMOL/L — HIGH (ref 135–145)
VALPROATE SERPL-MCNC: 42 UG/ML — LOW (ref 50–100)
WBC # BLD: 10.67 K/UL — HIGH (ref 3.8–10.5)
WBC # BLD: 9.76 K/UL — SIGNIFICANT CHANGE UP (ref 3.8–10.5)
WBC # FLD AUTO: 10.67 K/UL — HIGH (ref 3.8–10.5)
WBC # FLD AUTO: 9.76 K/UL — SIGNIFICANT CHANGE UP (ref 3.8–10.5)

## 2023-05-20 RX ORDER — INSULIN LISPRO 100/ML
VIAL (ML) SUBCUTANEOUS EVERY 6 HOURS
Refills: 0 | Status: DISCONTINUED | OUTPATIENT
Start: 2023-05-20 | End: 2023-05-22

## 2023-05-20 RX ORDER — ASPIRIN/CALCIUM CARB/MAGNESIUM 324 MG
81 TABLET ORAL DAILY
Refills: 0 | Status: DISCONTINUED | OUTPATIENT
Start: 2023-05-20 | End: 2023-05-22

## 2023-05-20 RX ORDER — HEPARIN SODIUM 5000 [USP'U]/ML
5000 INJECTION INTRAVENOUS; SUBCUTANEOUS EVERY 12 HOURS
Refills: 0 | Status: DISCONTINUED | OUTPATIENT
Start: 2023-05-20 | End: 2023-05-21

## 2023-05-20 RX ADMIN — Medication 1: at 12:51

## 2023-05-20 RX ADMIN — Medication 1: at 17:43

## 2023-05-20 RX ADMIN — SIMVASTATIN 40 MILLIGRAM(S): 20 TABLET, FILM COATED ORAL at 22:15

## 2023-05-20 RX ADMIN — Medication 2: at 23:48

## 2023-05-20 RX ADMIN — Medication 3 MILLILITER(S): at 21:51

## 2023-05-20 RX ADMIN — PANTOPRAZOLE SODIUM 40 MILLIGRAM(S): 20 TABLET, DELAYED RELEASE ORAL at 13:43

## 2023-05-20 RX ADMIN — CHLORHEXIDINE GLUCONATE 1 APPLICATION(S): 213 SOLUTION TOPICAL at 05:00

## 2023-05-20 RX ADMIN — HEPARIN SODIUM 5000 UNIT(S): 5000 INJECTION INTRAVENOUS; SUBCUTANEOUS at 17:13

## 2023-05-20 RX ADMIN — Medication 3 MILLILITER(S): at 15:26

## 2023-05-20 RX ADMIN — Medication 2 MILLIGRAM(S): at 13:42

## 2023-05-20 RX ADMIN — CHLORHEXIDINE GLUCONATE 15 MILLILITER(S): 213 SOLUTION TOPICAL at 17:12

## 2023-05-20 RX ADMIN — Medication 3 MILLILITER(S): at 05:13

## 2023-05-20 RX ADMIN — CHLORHEXIDINE GLUCONATE 15 MILLILITER(S): 213 SOLUTION TOPICAL at 05:13

## 2023-05-20 RX ADMIN — Medication 3 MILLILITER(S): at 08:49

## 2023-05-20 RX ADMIN — BRIVARACETAM 50 MILLIGRAM(S): 25 TABLET, FILM COATED ORAL at 05:14

## 2023-05-20 RX ADMIN — BRIVARACETAM 50 MILLIGRAM(S): 25 TABLET, FILM COATED ORAL at 17:12

## 2023-05-20 RX ADMIN — Medication 81 MILLIGRAM(S): at 13:43

## 2023-05-20 NOTE — PROGRESS NOTE ADULT - ASSESSMENT
78 year old male with a PMH of dementia, HTN, cardiomyopathy, HFrEF, CAD s/p CABG, with known current RCA occlusion, and DM type 2 who was admitted to [facility] on 5/4 with hypoxic respiratory failure in setting of CHF exacerbation and ANISH with course complicated by acute hypoxic respiratory failure in setting of choking episode causing cardiac arrest, shock, worsening hypoxemic respiratory failure, and NSTEMI on 5/9. Pt ICU course noted post cardiac arrest anoxic brain injury and myoclonus. Pt was transferred to [facility] for VEEG which required 48 hours of monitoring as he was noted to still be sedated. While there pt noted to have hematochezia and any ac / antiplatelet agents were held at that time. EEG findings consistent with stimulus induced myoclonus and GPDs s/p hypoxic diffuse indicative of severe cerebral dysfunction.    Patient returned to [facility] ICU on 5/19 and continues tx / supportive care for management of:      Post cardiac arrest 2/2 choking episode / respiratory failure  Severe Anoxic Encephalopathy with secondary cortical myoclonus  Worsening ARF w Uremia post cardiac arrest  Hemtochezia episode, h/h stable no further episodes noted  Heart failure  NSTEMI post arrest       Underlying PMH of dementia, HTN, cardiomyopathy, HFrEF, CAD s/p CABG, with known current RCA occlusion, and DM type 2      Plan as below:  --cont to monitor vitals per ICU policy  --sedation currently held, and continuing to hold VPA per neurology  --maintain ett, pt currently requiring minimal vent settings, abg prn, cxr, + nebs  --supportive care  --TF as tolerated  --monitor blood glucose levels, + ISS coverage  --restarting AC and sqh for dvt prophylaxis as pt has had no further episodes of bleeding noted and h/h stable  --ppi for gi prophylaxis   --monitor renal function, nephrology following, appreciate their rec's  --am labs, replace lytes prn  --family education / emotional support  --GOC ongoing  --Full code    78 year old male with a PMH of dementia, HTN, cardiomyopathy, HFrEF, CAD s/p CABG, with known current RCA occlusion, and DM type 2 who was admitted to [facility] on 5/4 with hypoxic respiratory failure in setting of CHF exacerbation and ANISH with course complicated by acute hypoxic respiratory failure in setting of choking episode causing cardiac arrest, shock, worsening hypoxemic respiratory failure, and NSTEMI on 5/9. Pt ICU course noted post cardiac arrest anoxic brain injury and myoclonus. Pt was transferred to [facility] for VEEG which required 48 hours of monitoring as he was noted to still be sedated. While there pt noted to have hematochezia and any ac / antiplatelet agents were held at that time. EEG findings consistent with stimulus induced myoclonus and GPDs s/p hypoxic diffuse indicative of severe cerebral dysfunction.    Patient returned to [facility] ICU on 5/19 and continues tx / supportive care for management of:    Assessment:   Post cardiac arrest 2/2 choking episode / respiratory failure  Acute respiratory failure   Severe Anoxic Encephalopathy with secondary cortical myoclonus  Acute renal failure w Uremia post cardiac arrest  Hemtochezia episode, h/h stable no further episodes noted  Heart failure with reduced EF  NSTEMI post arrest       Underlying PMH of dementia, HTN, cardiomyopathy, HFrEF, CAD s/p CABG, with known current RCA occlusion, and DM type 2      Plan as below:  --cont to monitor vitals per ICU policy  --sedation currently held, and continuing to hold VPA per neurology  --maintain ett, pt currently requiring minimal vent settings, abg prn, cxr, + nebs  --supportive care  --TF as tolerated  --monitor blood glucose levels, + ISS coverage  --restarting AC and sqh for dvt prophylaxis as pt has had no further episodes of bleeding noted and h/h stable  --ppi for gi prophylaxis   --monitor renal function, nephrology following, appreciate their rec's  --am labs, replace lytes prn  --family education / emotional support  --GOC ongoing  --Full code

## 2023-05-20 NOTE — CHART NOTE - NSCHARTNOTEFT_GEN_A_CORE
Nutrition Follow Up Note  Hospital Course (Per Electronic Medical Record):   Source: Medical Record [X] MD/NP[X] Nursing Staff [X]     Diet: Nepro @ 35ml/hr x 24 hrs with 200ml free water q 6 hrs     Patient remains intubated , NGT feeds infusing @ 35ml/hr x 24 hrs , no signs of intolerances noted , (+) BM x 2 (5/19) bowel regimen noted , Present EN feeds are providing 1525kcals, 68gms protein ,[id number] ,with additional 800ml free water for hydration .  ? weight status due to (+) edema & ANISH on CKD , labs reviewed. POCT reviewed ,hx of DM , A1c 7.7% (5/5) corrective insulin regimen noted , Difficult to assess weight due to (+) edema, Interdisciplinary notes reviewed , will follow clinical course .    Current Weight: (5/20) 167.1/75.8kg                         (5/16) 151.4/68.7kg                          (5/12) 147.2/66.8kg     Pertinent Medications: MEDICATIONS  (STANDING):  albuterol/ipratropium for Nebulization 3 milliLiter(s) Nebulizer every 6 hours  brivaracetam Oral Solution 50 milliGRAM(s) Oral two times a day  chlorhexidine 0.12% Liquid 15 milliLiter(s) Oral Mucosa every 12 hours  chlorhexidine 4% Liquid 1 Application(s) Topical <User Schedule>  dextrose 5%. 1000 milliLiter(s) (50 mL/Hr) IV Continuous <Continuous>  dextrose 5%. 1000 milliLiter(s) (100 mL/Hr) IV Continuous <Continuous>  dextrose 50% Injectable 25 Gram(s) IV Push once  dextrose 50% Injectable 25 Gram(s) IV Push once  dextrose 50% Injectable 12.5 Gram(s) IV Push once  glucagon  Injectable 1 milliGRAM(s) IntraMuscular once  insulin lispro (ADMELOG) corrective regimen sliding scale   SubCutaneous every 6 hours  pantoprazole   Suspension 40 milliGRAM(s) Oral daily  senna 2 Tablet(s) Oral at bedtime  simvastatin 40 milliGRAM(s) Oral at bedtime    MEDICATIONS  (PRN):  dextrose Oral Gel 15 Gram(s) Oral once PRN Blood Glucose LESS THAN 70 milliGRAM(s)/deciliter      Pertinent Labs:  05-19 Na145 mmol/L Glu 134 mg/dL<H> K+ 3.5 mmol/L Cr  5.25 mg/dL<H>  mg/dL<H> 05-20 Phos 6.1 mg/dL<H> 05-19 Alb 2.4 g/dL<L> 05-07 Chol 172 mg/dL LDL --    HDL 39 mg/dL<L> Trig 87 mg/dL,   POCT 147,154,132,106      Skin: DTI sacrum     Edema: (+3) Arms     Last BM: (5/19) (+) bowel regimen noted     Estimated Needs:   [X] No Change since Previous Assessment      Previous Nutrition Diagnosis: Severe Protein Calorie Malnutrition, ongoing & addressed with EN feeds , Inadequate EN , resolved ,          New Nutrition Diagnosis: [X] Not Applicable      Interventions:   1. continue current En feeds       Monitoring & Evaluation: will monitor:  [X] Weights   [X] Follow Up (Per Protocol)  [X] Tolerance to Diet Prescription       RD to follow as per Nutrition protocol  Preethi Carnes RDN

## 2023-05-20 NOTE — PROGRESS NOTE ADULT - SUBJECTIVE AND OBJECTIVE BOX
NEPHROLOGY PROGRESS NOTE    CHIEF COMPLAINT:  ANISH/CKD    HPI:  Renal function about same.   mL/24 hrs.  Vented.  Hemodynamically stable.      EXAM:  T(F): 100.4 (05-20-23 @ 04:00)  HR: 93 (05-20-23 @ 09:00)  BP: 124/72 (05-20-23 @ 08:00)  RR: 17 (05-20-23 @ 09:00)  SpO2: 97% (05-20-23 @ 09:00)    Unresponsive  Normal respiratory effort, lungs clear bilaterally  Heart RRR with no murmur, no peripheral edema         LABS                             9.2    9.76  )-----------( 162      ( 20 May 2023 05:00 )             27.8          05-20    148<H>  |  109<H>  |  137<H>  ----------------------------<  177<H>  3.3<L>   |  26  |  5.06<H>    Ca    8.8      20 May 2023 07:50  Phos  6.1     05-20  Mg     2.7     05-20    TPro  6.1  /  Alb  1.6<L>  /  TBili  0.6  /  DBili  x   /  AST  26  /  ALT  7<L>  /  AlkPhos  65  05-20    Impression  Hemodynamic ATN iso resp failure, s/p arrest, asp PNA (Cr 1.6 - 5/9/23, Cr 1.0 - 4/12/23)  Hx CM, CHF, EF 35-40%, dementia  Anoxic brain injury;  no suspicion for uremia since his neurological changes predated the ANISH and coincided with a cardiac arrest    Recommend  Overall prognosis is poor w/or w/o RRT

## 2023-05-20 NOTE — PROGRESS NOTE ADULT - SUBJECTIVE AND OBJECTIVE BOX
78 year old M w/HTN, Cardiomyopathy, chronic systolic CHF, DM2, Dementia, sent to the ED by PMD because of increased dyspnea, leg edema x past 2 days.  Per ED, daughter states that PMD noted pt to have new afib on EKG.  Pt is confused, demented (baseline) and unable to provide a reliable history.  There was no report of  chest pain, cough, fever chills, vomiting, diarrhea.   1st trop is 131.  EKG showed HR of 86, SR w/ APCs. Cxray c/w CHF (venous congestion, bilat pl effusions)  Pt's last echo was from 07/2022 w/c reported LVEF of  40 to 45%.. Increased LV wall thickness.. Moderately reduced RV systolic function. Moderately enlarged left atrium.. Moderate mitral valve regurgitation.Mild tricuspid regurgitation.     (04 May 2023 21:17)      24 hr events: Pt returned to [facility] yesterday evening from [facility]. No active issues overnight. VSS remained stable.       ## ROS: [ x ] unable to obtain      ## Labs:  CBC:                        9.2    9.76  )-----------( 162      ( 20 May 2023 05:00 )             27.8     Chem:  05-20    148<H>  |  109<H>  |  137<H>  ----------------------------<  177<H>  3.3<L>   |  26  |  5.06<H>    Ca    8.8      20 May 2023 07:50  Phos  6.1     05-20  Mg     2.7     05-20    TPro  6.1  /  Alb  1.6<L>  /  TBili  0.6  /  DBili  x   /  AST  26  /  ALT  7<L>  /  AlkPhos  65  05-20    Coags:  PT/INR - ( 19 May 2023 00:44 )   PT: 13.2 sec;   INR: 1.15 ratio    PTT - ( 19 May 2023 00:44 )  PTT:26.5 sec        ## Imaging:  MR Head No Cont (05.15.23 @ 14:58)   IMPRESSION:    Several foci of restricted diffusion in the left frontal and parietal   lobes. No hemorrhagic transformation.        ## Medications:  albuterol/ipratropium for Nebulization 3 milliLiter(s) Nebulizer every 6 hours  dextrose 50% Injectable 25 Gram(s) IV Push once  dextrose 50% Injectable 12.5 Gram(s) IV Push once  dextrose 50% Injectable 25 Gram(s) IV Push once  dextrose Oral Gel 15 Gram(s) Oral once PRN  glucagon  Injectable 1 milliGRAM(s) IntraMuscular once  insulin lispro (ADMELOG) corrective regimen sliding scale   SubCutaneous every 6 hours  simvastatin 40 milliGRAM(s) Oral at bedtime  pantoprazole   Suspension 40 milliGRAM(s) Oral daily  senna 2 Tablet(s) Oral at bedtime  brivaracetam Oral Solution 50 milliGRAM(s) Oral two times a day        ## Vitals:  T(C): 38 (05-20-23 @ 04:00), Max: 38 (05-20-23 @ 04:00)  HR: 93 (05-20-23 @ 09:00) (87 - 100)  BP: 124/72 (05-20-23 @ 08:00) (124/64 - 144/79)  BP(mean): 87 (05-20-23 @ 08:00) (80 - 97)  RR: 17 (05-20-23 @ 09:00) (14 - 21)  SpO2: 97% (05-20-23 @ 09:00) (96% - 100%)  Vent: Mode: AC/ CMV (Assist Control/ Continuous Mandatory Ventilation), RR (machine): 14, RR (patient): 16, TV (machine): 450, FiO2: 40, PEEP: 5, PIP: 16  ABG: ABG - ( 19 May 2023 00:22 )  pH, Arterial: 7.37  pH, Blood: x     /  pCO2: 43    /  pO2: 144   / HCO3: 25    / Base Excess: -0.5  /  SaO2: 99.8          05-19 @ 07:01  -  05-20 @ 07:00  --------------------------------------------------------  IN: 335 mL / OUT: 720 mL / NET: -385 mL        ## P/E:  Gen: lying comfortably in bed in no apparent distress  HEENT: PERRL; left pupil sluggish compared to right  Resp: CTA B/L no c/r/w  CVS: S1S2 no m/r/g  Abd: soft NT/ND +BS  Ext: no c/c/e  Neuro: unresponsive, frequent jerking / myoclonus movements noted        CODE STATUS: full code 78 year old M w/HTN, Cardiomyopathy, chronic systolic CHF, DM2, Dementia, sent to the ED by PMD because of increased dyspnea, leg edema x past 2 days.  Per ED, daughter states that PMD noted pt to have new afib on EKG.  Pt is confused, demented (baseline) and unable to provide a reliable history.  There was no report of  chest pain, cough, fever chills, vomiting, diarrhea.   1st trop is 131.  EKG showed HR of 86, SR w/ APCs. Cxray c/w CHF (venous congestion, bilat pl effusions)  Pt's last echo was from 07/2022 w/c reported LVEF of  40 to 45%.. Increased LV wall thickness.. Moderately reduced RV systolic function. Moderately enlarged left atrium.. Moderate mitral valve regurgitation.Mild tricuspid regurgitation.     (04 May 2023 21:17)    24 hr events: Pt returned to [facility] yesterday evening from [facility]. No active issues overnight. VSS remained stable.     ## ROS: [ x ] unable to obtain      ## Labs:  CBC:                        9.2    9.76  )-----------( 162      ( 20 May 2023 05:00 )             27.8     Chem:  05-20    148<H>  |  109<H>  |  137<H>  ----------------------------<  177<H>  3.3<L>   |  26  |  5.06<H>    Ca    8.8      20 May 2023 07:50  Phos  6.1     05-20  Mg     2.7     05-20    TPro  6.1  /  Alb  1.6<L>  /  TBili  0.6  /  DBili  x   /  AST  26  /  ALT  7<L>  /  AlkPhos  65  05-20    Coags:  PT/INR - ( 19 May 2023 00:44 )   PT: 13.2 sec;   INR: 1.15 ratio    PTT - ( 19 May 2023 00:44 )  PTT:26.5 sec        ## Imaging:  MR Head No Cont (05.15.23 @ 14:58)   IMPRESSION:    Several foci of restricted diffusion in the left frontal and parietal   lobes. No hemorrhagic transformation.        ## Medications:  albuterol/ipratropium for Nebulization 3 milliLiter(s) Nebulizer every 6 hours  dextrose 50% Injectable 25 Gram(s) IV Push once  dextrose 50% Injectable 12.5 Gram(s) IV Push once  dextrose 50% Injectable 25 Gram(s) IV Push once  dextrose Oral Gel 15 Gram(s) Oral once PRN  glucagon  Injectable 1 milliGRAM(s) IntraMuscular once  insulin lispro (ADMELOG) corrective regimen sliding scale   SubCutaneous every 6 hours  simvastatin 40 milliGRAM(s) Oral at bedtime  pantoprazole   Suspension 40 milliGRAM(s) Oral daily  senna 2 Tablet(s) Oral at bedtime  brivaracetam Oral Solution 50 milliGRAM(s) Oral two times a day        ## Vitals:  T(C): 38 (05-20-23 @ 04:00), Max: 38 (05-20-23 @ 04:00)  HR: 93 (05-20-23 @ 09:00) (87 - 100)  BP: 124/72 (05-20-23 @ 08:00) (124/64 - 144/79)  BP(mean): 87 (05-20-23 @ 08:00) (80 - 97)  RR: 17 (05-20-23 @ 09:00) (14 - 21)  SpO2: 97% (05-20-23 @ 09:00) (96% - 100%)  Vent: Mode: AC/ CMV (Assist Control/ Continuous Mandatory Ventilation), RR (machine): 14, RR (patient): 16, TV (machine): 450, FiO2: 40, PEEP: 5, PIP: 16  ABG: ABG - ( 19 May 2023 00:22 )  pH, Arterial: 7.37  pH, Blood: x     /  pCO2: 43    /  pO2: 144   / HCO3: 25    / Base Excess: -0.5  /  SaO2: 99.8          05-19 @ 07:01  -  05-20 @ 07:00  --------------------------------------------------------  IN: 335 mL / OUT: 720 mL / NET: -385 mL        Physical exam:   Gen: lying comfortably in bed in no apparent distress  HEENT: PERRL; left pupil sluggish compared to right  Resp: CTA B/L no c/r/w  CVS: S1S2 no m/r/g  Abd: soft NT/ND +BS  Ext: no c/c/e  Neuro: unresponsive, frequent jerking / myoclonus movements noted        CODE STATUS: full code

## 2023-05-21 LAB
ALBUMIN SERPL ELPH-MCNC: 1.7 G/DL — LOW (ref 3.3–5)
ALP SERPL-CCNC: 69 U/L — SIGNIFICANT CHANGE UP (ref 40–120)
ALT FLD-CCNC: 10 U/L — SIGNIFICANT CHANGE UP (ref 10–45)
ANION GAP SERPL CALC-SCNC: 13 MMOL/L — SIGNIFICANT CHANGE UP (ref 5–17)
AST SERPL-CCNC: 14 U/L — SIGNIFICANT CHANGE UP (ref 10–40)
BILIRUB SERPL-MCNC: 0.5 MG/DL — SIGNIFICANT CHANGE UP (ref 0.2–1.2)
BUN SERPL-MCNC: 132 MG/DL — HIGH (ref 7–23)
CALCIUM SERPL-MCNC: 9.2 MG/DL — SIGNIFICANT CHANGE UP (ref 8.4–10.5)
CHLORIDE SERPL-SCNC: 111 MMOL/L — HIGH (ref 96–108)
CO2 SERPL-SCNC: 28 MMOL/L — SIGNIFICANT CHANGE UP (ref 22–31)
CREAT SERPL-MCNC: 4.52 MG/DL — HIGH (ref 0.5–1.3)
EGFR: 12 ML/MIN/1.73M2 — LOW
GLUCOSE BLDC GLUCOMTR-MCNC: 206 MG/DL — HIGH (ref 70–99)
GLUCOSE BLDC GLUCOMTR-MCNC: 232 MG/DL — HIGH (ref 70–99)
GLUCOSE BLDC GLUCOMTR-MCNC: 240 MG/DL — HIGH (ref 70–99)
GLUCOSE BLDC GLUCOMTR-MCNC: 242 MG/DL — HIGH (ref 70–99)
GLUCOSE SERPL-MCNC: 214 MG/DL — HIGH (ref 70–99)
HCT VFR BLD CALC: 29.8 % — LOW (ref 39–50)
HGB BLD-MCNC: 9.5 G/DL — LOW (ref 13–17)
MAGNESIUM SERPL-MCNC: 2.6 MG/DL — SIGNIFICANT CHANGE UP (ref 1.6–2.6)
MCHC RBC-ENTMCNC: 30.8 PG — SIGNIFICANT CHANGE UP (ref 27–34)
MCHC RBC-ENTMCNC: 31.9 GM/DL — LOW (ref 32–36)
MCV RBC AUTO: 96.8 FL — SIGNIFICANT CHANGE UP (ref 80–100)
NRBC # BLD: 0 /100 WBCS — SIGNIFICANT CHANGE UP (ref 0–0)
PHOSPHATE SERPL-MCNC: 5.4 MG/DL — HIGH (ref 2.5–4.5)
PLATELET # BLD AUTO: 102 K/UL — LOW (ref 150–400)
POTASSIUM SERPL-MCNC: 3.2 MMOL/L — LOW (ref 3.5–5.3)
POTASSIUM SERPL-SCNC: 3.2 MMOL/L — LOW (ref 3.5–5.3)
PROT SERPL-MCNC: 6.4 G/DL — SIGNIFICANT CHANGE UP (ref 6–8.3)
RBC # BLD: 3.08 M/UL — LOW (ref 4.2–5.8)
RBC # FLD: 15.4 % — HIGH (ref 10.3–14.5)
SODIUM SERPL-SCNC: 152 MMOL/L — HIGH (ref 135–145)
WBC # BLD: 11.15 K/UL — HIGH (ref 3.8–10.5)
WBC # FLD AUTO: 11.15 K/UL — HIGH (ref 3.8–10.5)

## 2023-05-21 RX ORDER — POTASSIUM CHLORIDE 20 MEQ
20 PACKET (EA) ORAL ONCE
Refills: 0 | Status: COMPLETED | OUTPATIENT
Start: 2023-05-21 | End: 2023-05-21

## 2023-05-21 RX ADMIN — Medication 2: at 05:34

## 2023-05-21 RX ADMIN — Medication 3 MILLILITER(S): at 08:53

## 2023-05-21 RX ADMIN — SIMVASTATIN 40 MILLIGRAM(S): 20 TABLET, FILM COATED ORAL at 21:52

## 2023-05-21 RX ADMIN — Medication 3 MILLILITER(S): at 15:36

## 2023-05-21 RX ADMIN — BRIVARACETAM 50 MILLIGRAM(S): 25 TABLET, FILM COATED ORAL at 18:21

## 2023-05-21 RX ADMIN — Medication 2 MILLIGRAM(S): at 05:35

## 2023-05-21 RX ADMIN — CHLORHEXIDINE GLUCONATE 1 APPLICATION(S): 213 SOLUTION TOPICAL at 06:00

## 2023-05-21 RX ADMIN — Medication 20 MILLIEQUIVALENT(S): at 11:02

## 2023-05-21 RX ADMIN — Medication 2: at 23:49

## 2023-05-21 RX ADMIN — BRIVARACETAM 50 MILLIGRAM(S): 25 TABLET, FILM COATED ORAL at 05:33

## 2023-05-21 RX ADMIN — PANTOPRAZOLE SODIUM 40 MILLIGRAM(S): 20 TABLET, DELAYED RELEASE ORAL at 11:02

## 2023-05-21 RX ADMIN — Medication 3 MILLILITER(S): at 04:45

## 2023-05-21 RX ADMIN — CHLORHEXIDINE GLUCONATE 15 MILLILITER(S): 213 SOLUTION TOPICAL at 17:19

## 2023-05-21 RX ADMIN — Medication 3 MILLILITER(S): at 20:35

## 2023-05-21 RX ADMIN — Medication 81 MILLIGRAM(S): at 11:03

## 2023-05-21 RX ADMIN — Medication 2 MILLIGRAM(S): at 17:57

## 2023-05-21 RX ADMIN — Medication 2: at 11:58

## 2023-05-21 RX ADMIN — Medication 2: at 17:18

## 2023-05-21 RX ADMIN — HEPARIN SODIUM 5000 UNIT(S): 5000 INJECTION INTRAVENOUS; SUBCUTANEOUS at 05:33

## 2023-05-21 RX ADMIN — CHLORHEXIDINE GLUCONATE 15 MILLILITER(S): 213 SOLUTION TOPICAL at 05:33

## 2023-05-21 NOTE — PROGRESS NOTE ADULT - SUBJECTIVE AND OBJECTIVE BOX
Follow-up Critical Care Progress Note  Chief Complaint : Atrial fibrillation    Remains intubated. Intermittently with myoclonus    Allergies :sulfa drugs (Unknown)    PAST MEDICAL & SURGICAL HISTORY:  HTN (hypertension)    HLD (hyperlipidemia)    Diabetes    CAD (coronary artery disease)    History of cholecystectomy    Medications:  MEDICATIONS  (STANDING):  albuterol/ipratropium for Nebulization 3 milliLiter(s) Nebulizer every 6 hours  aspirin  chewable 81 milliGRAM(s) Enteral Tube daily  brivaracetam Oral Solution 50 milliGRAM(s) Oral two times a day  chlorhexidine 0.12% Liquid 15 milliLiter(s) Oral Mucosa every 12 hours  chlorhexidine 4% Liquid 1 Application(s) Topical <User Schedule>  dextrose 5%. 1000 milliLiter(s) (100 mL/Hr) IV Continuous <Continuous>  dextrose 5%. 1000 milliLiter(s) (50 mL/Hr) IV Continuous <Continuous>  dextrose 50% Injectable 25 Gram(s) IV Push once  dextrose 50% Injectable 12.5 Gram(s) IV Push once  dextrose 50% Injectable 25 Gram(s) IV Push once  glucagon  Injectable 1 milliGRAM(s) IntraMuscular once  heparin   Injectable 5000 Unit(s) SubCutaneous every 12 hours  insulin lispro (ADMELOG) corrective regimen sliding scale   SubCutaneous every 6 hours  pantoprazole   Suspension 40 milliGRAM(s) Oral daily  potassium chloride   Solution 20 milliEquivalent(s) Oral once  senna 2 Tablet(s) Oral at bedtime  simvastatin 40 milliGRAM(s) Oral at bedtime    MEDICATIONS  (PRN):  dextrose Oral Gel 15 Gram(s) Oral once PRN Blood Glucose LESS THAN 70 milliGRAM(s)/deciliter  LORazepam   Injectable 2 milliGRAM(s) IV Push every 4 hours PRN myoclonus      Antibiotics History  piperacillin/tazobactam IVPB. 3.375 Gram(s) IV Intermittent once, 05-09-23 @ 10:58, Stop order after: 1 Doses  piperacillin/tazobactam IVPB.- 3.375 Gram(s) IV Intermittent once, 05-09-23 @ 15:00  piperacillin/tazobactam IVPB.. 3.375 Gram(s) IV Intermittent every 8 hours, 05-09-23 @ 22:00, Stop order after: 7 Days  piperacillin/tazobactam IVPB.. 3.375 Gram(s) IV Intermittent every 12 hours, 05-11-23 @ 22:27, Stop order after: 7 Days      Heme Medications   aspirin  chewable 81 milliGRAM(s) Enteral Tube daily, 05-20-23 @ 10:07  heparin   Injectable 5000 Unit(s) SubCutaneous every 12 hours, 05-20-23 @ 10:12    GI Medications  pantoprazole   Suspension 40 milliGRAM(s) Oral daily, 05-19-23 @ 21:36, Routine  senna 2 Tablet(s) Oral at bedtime, 05-19-23 @ 21:37, Routine    LABS:                        9.5    11.15 )-----------( 102      ( 21 May 2023 06:15 )             29.8     05-21    152<H>  |  111<H>  |  132<H>  ----------------------------<  214<H>  3.2<L>   |  28  |  4.52<H>    Ca    9.2      21 May 2023 06:15  Phos  5.4     05-21  Mg     2.6     05-21    TPro  6.4  /  Alb  1.7<L>  /  TBili  0.5  /  DBili  x   /  AST  14  /  ALT  10  /  AlkPhos  69  05-21    POCT Blood Glucose.: 206 mg/dL (21 May 2023 05:17)    VITALS:  T(C): 37.1 (05-21-23 @ 05:00), Max: 37.1 (05-20-23 @ 16:00)  T(F): 98.8 (05-21-23 @ 05:00), Max: 98.8 (05-21-23 @ 05:00)  HR: 98 (05-21-23 @ 07:00) (84 - 98)  BP: 117/65 (05-21-23 @ 07:00) (90/79 - 132/91)  BP(mean): 79 (05-21-23 @ 07:00) (75 - 102)  ABP: --  ABP(mean): --  RR: 15 (05-21-23 @ 07:00) (6 - 22)  SpO2: 98% (05-21-23 @ 07:00) (90% - 100%)  CVP(mm Hg): --  CVP(cm H2O): --    Ins and Outs     05-20-23 @ 07:01  -  05-21-23 @ 07:00  --------------------------------------------------------  IN: 1005 mL / OUT: 790 mL / NET: 215 mL        Height (cm): 154.9 (05-19-23 @ 21:00)  Weight (kg): 77.6 (05-19-23 @ 21:00)  BMI (kg/m2): 32.3 (05-19-23 @ 21:00)    Device: Avea, Mode: AC/ CMV (Assist Control/ Continuous Mandatory Ventilation), RR (machine): 14, RR (patient): 15, TV (machine): 450, TV (patient): 370, FiO2: 40, PEEP: 5, ITime: 1, MAP: 14, PIP: 21    I&O's Detail    20 May 2023 07:01  -  21 May 2023 07:00  --------------------------------------------------------  IN:    Enteral Tube Flush: 200 mL    Free Water: 400 mL    Nepro with Carb Steady: 405 mL  Total IN: 1005 mL    OUT:    Indwelling Catheter - Urethral (mL): 790 mL  Total OUT: 790 mL    Total NET: 215 mL    Physical Examination:  Gen: lying comfortably in bed in no apparent distress  HEENT: PERRL; left pupil sluggish compared to right, NGT  Resp: CTA B/L no c/r/w, orally intubated   CVS: S1S2 no m/r/g  Abd: soft NT/ND +BS  Ext: no c/c/e  Neuro: unresponsive, frequent jerking / myoclonus movements noted also in response to touch  PSYCH: No insight

## 2023-05-21 NOTE — PROGRESS NOTE ADULT - SUBJECTIVE AND OBJECTIVE BOX
NEPHROLOGY PROGRESS NOTE    CHIEF COMPLAINT:  ANISH/CKD    HPI:  Renal function a little better.  More hypernatremic.  Vented, hemodynamics stable.   mL.      EXAM:  T(F): 98.8 (05-21-23 @ 05:00)  HR: 98 (05-21-23 @ 07:00)  BP: 117/65 (05-21-23 @ 07:00)  RR: 15 (05-21-23 @ 07:00)  SpO2: 98% (05-21-23 @ 09:52)    Unresponsive  Normal respiratory effort, lungs clear bilaterally  Heart RRR with no murmur, no peripheral edema         LABS                             9.5    11.15 )-----------( 102      ( 21 May 2023 06:15 )             29.8          05-21    152<H>  |  111<H>  |  132<H>  ----------------------------<  214<H>  3.2<L>   |  28  |  4.52<H>    Ca    9.2      21 May 2023 06:15  Phos  5.4     05-21  Mg     2.6     05-21    TPro  6.4  /  Alb  1.7<L>  /  TBili  0.5  /  DBili  x   /  AST  14  /  ALT  10  /  AlkPhos  69  05-21           Impression  Hemodynamic ATN iso resp failure, s/p arrest, asp PNA (Cr 1.6 - 5/9/23, Cr 1.0 - 4/12/23)  Hx CM, CHF, EF 35-40%, dementia  Anoxic brain injury;  no suspicion for uremia since his neurological changes predated the ANISH and coincided with a cardiac arrest  Hypernatremia, worsening    Recommend  Overall prognosis is poor w/or w/o RRT  Increase enteral free water or provide IV [id number]

## 2023-05-21 NOTE — PROGRESS NOTE ADULT - ASSESSMENT
78 year old male with a PMH of dementia, HTN, cardiomyopathy, HFrEF, CAD s/p CABG, with known current RCA occlusion, and DM type 2 who was admitted to [facility] on 5/4 with hypoxic respiratory failure in setting of CHF exacerbation and ANISH with course complicated by acute hypoxic respiratory failure in setting of choking episode causing cardiac arrest, shock, worsening hypoxemic respiratory failure, and NSTEMI on 5/9. Pt ICU course noted post cardiac arrest anoxic brain injury and myoclonus. Pt was transferred to [facility] for VEEG which required 48 hours of monitoring as he was noted to still be sedated. While there pt noted to have hematochezia and any ac / antiplatelet agents were held at that time. EEG findings consistent with stimulus induced myoclonus and GPDs s/p hypoxic diffuse indicative of severe cerebral dysfunction.    Patient returned to [facility] ICU on 5/19 and continues tx / supportive care for management of:    Assessment:   Post cardiac arrest 2/2 choking episode / respiratory failure  Acute respiratory failure   Severe Anoxic Encephalopathy with secondary cortical myoclonus  Acute renal failure w Uremia post cardiac arrest  Hemtochezia episode, h/h stable no further episodes noted  Heart failure with reduced EF  NSTEMI post arrest       Underlying PMH of dementia, HTN, cardiomyopathy, HFrEF, CAD s/p CABG, with known current RCA occlusion, and DM type 2      Plan as below:  --cont to monitor vitals per ICU policy  --sedation currently held, and continuing to hold VPA per neurology recs from [facility]  --PRN ativan for myoclonus   --maintain ett, pt currently requiring minimal vent settings  --supportive care  --TF as tolerated  --Increase free water   --monitor blood glucose levels, + ISS coverage  --Cont Aspirin and sqh for dvt prophylaxis as pt has had no further episodes of bleeding noted and h/h stable  --ppi for gi prophylaxis   --monitor renal function, nephrology following, appreciate their rec's, slight decrease in creatinine  --am labs, replace lytes prn  --GOC ongoing  --Full code   --Called Daughter Kuldeep and updated about current clinical condition

## 2023-05-22 LAB
ANION GAP SERPL CALC-SCNC: 11 MMOL/L — SIGNIFICANT CHANGE UP (ref 5–17)
BUN SERPL-MCNC: 120 MG/DL — HIGH (ref 7–23)
CALCIUM SERPL-MCNC: 8.8 MG/DL — SIGNIFICANT CHANGE UP (ref 8.4–10.5)
CHLORIDE SERPL-SCNC: 113 MMOL/L — HIGH (ref 96–108)
CO2 SERPL-SCNC: 29 MMOL/L — SIGNIFICANT CHANGE UP (ref 22–31)
CREAT SERPL-MCNC: 3.8 MG/DL — HIGH (ref 0.5–1.3)
EGFR: 15 ML/MIN/1.73M2 — LOW
GLUCOSE BLDC GLUCOMTR-MCNC: 273 MG/DL — HIGH (ref 70–99)
GLUCOSE BLDC GLUCOMTR-MCNC: 279 MG/DL — HIGH (ref 70–99)
GLUCOSE BLDC GLUCOMTR-MCNC: 282 MG/DL — HIGH (ref 70–99)
GLUCOSE BLDC GLUCOMTR-MCNC: 302 MG/DL — HIGH (ref 70–99)
GLUCOSE BLDC GLUCOMTR-MCNC: 363 MG/DL — HIGH (ref 70–99)
GLUCOSE BLDC GLUCOMTR-MCNC: 85 MG/DL — SIGNIFICANT CHANGE UP (ref 70–99)
GLUCOSE SERPL-MCNC: 260 MG/DL — HIGH (ref 70–99)
HCT VFR BLD CALC: 28.8 % — LOW (ref 39–50)
HCT VFR BLD CALC: 29.9 % — LOW (ref 39–50)
HGB BLD-MCNC: 9.1 G/DL — LOW (ref 13–17)
HGB BLD-MCNC: 9.4 G/DL — LOW (ref 13–17)
MAGNESIUM SERPL-MCNC: 2.2 MG/DL — SIGNIFICANT CHANGE UP (ref 1.6–2.6)
MCHC RBC-ENTMCNC: 30.8 PG — SIGNIFICANT CHANGE UP (ref 27–34)
MCHC RBC-ENTMCNC: 30.8 PG — SIGNIFICANT CHANGE UP (ref 27–34)
MCHC RBC-ENTMCNC: 31.4 GM/DL — LOW (ref 32–36)
MCHC RBC-ENTMCNC: 31.6 GM/DL — LOW (ref 32–36)
MCV RBC AUTO: 97.6 FL — SIGNIFICANT CHANGE UP (ref 80–100)
MCV RBC AUTO: 98 FL — SIGNIFICANT CHANGE UP (ref 80–100)
NRBC # BLD: 0 /100 WBCS — SIGNIFICANT CHANGE UP (ref 0–0)
NRBC # BLD: 0 /100 WBCS — SIGNIFICANT CHANGE UP (ref 0–0)
PHOSPHATE SERPL-MCNC: 3.6 MG/DL — SIGNIFICANT CHANGE UP (ref 2.5–4.5)
PLATELET # BLD AUTO: 111 K/UL — LOW (ref 150–400)
PLATELET # BLD AUTO: 114 K/UL — LOW (ref 150–400)
POTASSIUM SERPL-MCNC: 3.2 MMOL/L — LOW (ref 3.5–5.3)
POTASSIUM SERPL-SCNC: 3.2 MMOL/L — LOW (ref 3.5–5.3)
RBC # BLD: 2.95 M/UL — LOW (ref 4.2–5.8)
RBC # BLD: 3.05 M/UL — LOW (ref 4.2–5.8)
RBC # FLD: 15.4 % — HIGH (ref 10.3–14.5)
RBC # FLD: 15.4 % — HIGH (ref 10.3–14.5)
SODIUM SERPL-SCNC: 153 MMOL/L — HIGH (ref 135–145)
WBC # BLD: 12.7 K/UL — HIGH (ref 3.8–10.5)
WBC # BLD: 13.89 K/UL — HIGH (ref 3.8–10.5)
WBC # FLD AUTO: 12.7 K/UL — HIGH (ref 3.8–10.5)
WBC # FLD AUTO: 13.89 K/UL — HIGH (ref 3.8–10.5)

## 2023-05-22 RX ORDER — SODIUM CHLORIDE 9 MG/ML
1000 INJECTION, SOLUTION INTRAVENOUS
Refills: 0 | Status: DISCONTINUED | OUTPATIENT
Start: 2023-05-22 | End: 2023-05-23

## 2023-05-22 RX ORDER — INSULIN LISPRO 100/ML
VIAL (ML) SUBCUTANEOUS EVERY 6 HOURS
Refills: 0 | Status: DISCONTINUED | OUTPATIENT
Start: 2023-05-22 | End: 2023-06-07

## 2023-05-22 RX ORDER — INSULIN GLARGINE 100 [IU]/ML
10 INJECTION, SOLUTION SUBCUTANEOUS ONCE
Refills: 0 | Status: COMPLETED | OUTPATIENT
Start: 2023-05-22 | End: 2023-05-22

## 2023-05-22 RX ORDER — POTASSIUM CHLORIDE 20 MEQ
20 PACKET (EA) ORAL ONCE
Refills: 0 | Status: COMPLETED | OUTPATIENT
Start: 2023-05-22 | End: 2023-05-22

## 2023-05-22 RX ORDER — PANTOPRAZOLE SODIUM 20 MG/1
40 TABLET, DELAYED RELEASE ORAL EVERY 12 HOURS
Refills: 0 | Status: DISCONTINUED | OUTPATIENT
Start: 2023-05-22 | End: 2023-05-25

## 2023-05-22 RX ORDER — INSULIN LISPRO 100/ML
5 VIAL (ML) SUBCUTANEOUS ONCE
Refills: 0 | Status: COMPLETED | OUTPATIENT
Start: 2023-05-22 | End: 2023-05-22

## 2023-05-22 RX ORDER — SODIUM CHLORIDE 9 MG/ML
1000 INJECTION, SOLUTION INTRAVENOUS
Refills: 0 | Status: DISCONTINUED | OUTPATIENT
Start: 2023-05-22 | End: 2023-05-22

## 2023-05-22 RX ORDER — CHLORHEXIDINE GLUCONATE 213 G/1000ML
1 SOLUTION TOPICAL DAILY
Refills: 0 | Status: DISCONTINUED | OUTPATIENT
Start: 2023-05-22 | End: 2023-07-07

## 2023-05-22 RX ORDER — DEXTROSE MONOHYDRATE, SODIUM CHLORIDE, AND POTASSIUM CHLORIDE 50; .745; 4.5 G/1000ML; G/1000ML; G/1000ML
1000 INJECTION, SOLUTION INTRAVENOUS
Refills: 0 | Status: DISCONTINUED | OUTPATIENT
Start: 2023-05-22 | End: 2023-05-22

## 2023-05-22 RX ADMIN — Medication 3 MILLILITER(S): at 09:44

## 2023-05-22 RX ADMIN — PANTOPRAZOLE SODIUM 40 MILLIGRAM(S): 20 TABLET, DELAYED RELEASE ORAL at 21:21

## 2023-05-22 RX ADMIN — Medication 3: at 06:06

## 2023-05-22 RX ADMIN — DEXTROSE MONOHYDRATE, SODIUM CHLORIDE, AND POTASSIUM CHLORIDE 100 MILLILITER(S): 50; .745; 4.5 INJECTION, SOLUTION INTRAVENOUS at 18:43

## 2023-05-22 RX ADMIN — BRIVARACETAM 50 MILLIGRAM(S): 25 TABLET, FILM COATED ORAL at 17:55

## 2023-05-22 RX ADMIN — CHLORHEXIDINE GLUCONATE 1 APPLICATION(S): 213 SOLUTION TOPICAL at 05:05

## 2023-05-22 RX ADMIN — Medication 3 MILLILITER(S): at 20:47

## 2023-05-22 RX ADMIN — Medication 5: at 17:54

## 2023-05-22 RX ADMIN — INSULIN GLARGINE 10 UNIT(S): 100 INJECTION, SOLUTION SUBCUTANEOUS at 18:38

## 2023-05-22 RX ADMIN — Medication 3 MILLILITER(S): at 04:30

## 2023-05-22 RX ADMIN — CHLORHEXIDINE GLUCONATE 1 APPLICATION(S): 213 SOLUTION TOPICAL at 11:10

## 2023-05-22 RX ADMIN — CHLORHEXIDINE GLUCONATE 15 MILLILITER(S): 213 SOLUTION TOPICAL at 17:53

## 2023-05-22 RX ADMIN — SODIUM CHLORIDE 100 MILLILITER(S): 9 INJECTION, SOLUTION INTRAVENOUS at 09:56

## 2023-05-22 RX ADMIN — SODIUM CHLORIDE 100 MILLILITER(S): 9 INJECTION, SOLUTION INTRAVENOUS at 21:20

## 2023-05-22 RX ADMIN — Medication 5 UNIT(S): at 18:43

## 2023-05-22 RX ADMIN — Medication 2 MILLIGRAM(S): at 19:58

## 2023-05-22 RX ADMIN — SIMVASTATIN 40 MILLIGRAM(S): 20 TABLET, FILM COATED ORAL at 21:21

## 2023-05-22 RX ADMIN — Medication 3 MILLILITER(S): at 16:21

## 2023-05-22 RX ADMIN — BRIVARACETAM 50 MILLIGRAM(S): 25 TABLET, FILM COATED ORAL at 05:04

## 2023-05-22 RX ADMIN — Medication 20 MILLIEQUIVALENT(S): at 09:56

## 2023-05-22 RX ADMIN — CHLORHEXIDINE GLUCONATE 15 MILLILITER(S): 213 SOLUTION TOPICAL at 05:04

## 2023-05-22 RX ADMIN — Medication 3: at 11:12

## 2023-05-22 RX ADMIN — PANTOPRAZOLE SODIUM 40 MILLIGRAM(S): 20 TABLET, DELAYED RELEASE ORAL at 11:10

## 2023-05-22 RX ADMIN — Medication 6: at 23:34

## 2023-05-22 NOTE — PROGRESS NOTE ADULT - SUBJECTIVE AND OBJECTIVE BOX
Intubated in ICU     Vital Signs Last 24 Hrs  T(C): 37.3 (05-22-23 @ 19:00), Max: 37.9 (05-22-23 @ 16:00)  T(F): 99.2 (05-22-23 @ 19:00), Max: 100.3 (05-22-23 @ 16:00)  HR: 88 (05-22-23 @ 20:00) (80 - 99)  BP: 130/65 (05-22-23 @ 20:00) (107/56 - 141/72)  BP(mean): 85 (05-22-23 @ 20:00) (73 - 92)  RR: 20 (05-22-23 @ 20:00) (15 - 20)  SpO2: 100% (05-22-23 @ 20:00) (98% - 100%)    I&O's Detail    21 May 2023 07:01  -  22 May 2023 07:00  --------------------------------------------------------  IN:    Enteral Tube Flush: 500 mL    Free Water: 1100 mL    Nepro with Carb Steady: 840 mL  Total IN: 2440 mL    OUT:    Indwelling Catheter - Urethral (mL): 1325 mL  Total OUT: 1325 mL    Total NET: 1115 mL    22 May 2023 07:01  -  22 May 2023 20:49  --------------------------------------------------------  IN:    dextrose 5% w/ Additives: 900 mL    Free Water: 900 mL    Nepro with Carb Steady: 490 mL    sodium chloride 0.45% w/ Additives: 400 mL  Total IN: 2690 mL    OUT:    Indwelling Catheter - Urethral (mL): 605 mL  Total OUT: 605 mL    Total NET: 2085 mL    Mode: AC/ CMV (Assist Control/ Continuous Mandatory Ventilation)  RR (machine): 14  TV (machine): 450  FiO2: 40  PEEP: 5  ITime: 1  MAP: 9  PIP: 24    s1s2  b/l air entry  soft, ND  tr edema                                                                                   9.1    13.89 )-----------( 114      ( 22 May 2023 13:18 )             28.8     22 May 2023 05:00    153    |  113    |  120    ----------------------------<  260    3.2     |  29     |  3.80     Ca    8.8        22 May 2023 05:00  Phos  3.6       22 May 2023 05:00  Mg     2.2       22 May 2023 05:00    TPro  6.4    /  Alb  1.7    /  TBili  0.5    /  DBili  x      /  AST  14     /  ALT  10     /  AlkPhos  69     21 May 2023 06:15    LIVER FUNCTIONS - ( 21 May 2023 06:15 )  Alb: 1.7 g/dL / Pro: 6.4 g/dL / ALK PHOS: 69 U/L / ALT: 10 U/L / AST: 14 U/L / GGT: x           albuterol/ipratropium for Nebulization 3 milliLiter(s) Nebulizer every 6 hours  brivaracetam Oral Solution 50 milliGRAM(s) Oral two times a day  chlorhexidine 0.12% Liquid 15 milliLiter(s) Oral Mucosa every 12 hours  chlorhexidine 2% Cloths 1 Application(s) Topical daily  chlorhexidine 4% Liquid 1 Application(s) Topical <User Schedule>  dextrose 5%. 1000 milliLiter(s) IV Continuous <Continuous>  dextrose 5%. 1000 milliLiter(s) IV Continuous <Continuous>  dextrose 50% Injectable 25 Gram(s) IV Push once  dextrose 50% Injectable 25 Gram(s) IV Push once  dextrose 50% Injectable 12.5 Gram(s) IV Push once  dextrose Oral Gel 15 Gram(s) Oral once PRN  glucagon  Injectable 1 milliGRAM(s) IntraMuscular once  insulin lispro (ADMELOG) corrective regimen sliding scale   SubCutaneous every 6 hours  LORazepam   Injectable 2 milliGRAM(s) IV Push every 4 hours PRN  pantoprazole  Injectable 40 milliGRAM(s) IV Push every 12 hours  senna 2 Tablet(s) Oral at bedtime  simvastatin 40 milliGRAM(s) Oral at bedtime  sodium chloride 0.45% with potassium chloride 20 mEq/L 1000 milliLiter(s) IV Continuous <Continuous>    A/P:    Hemodynamic ATN s/p arrest (Cr 1.6 - 5/9/23, Cr 1.0 - 4/12/23)  Resp failure, asp PNA, intubated   Hx CM, EF 35 - 40%, dementia  BP, resp support per ICU team  Concern for anoxic encephalopathy   S/p EEG monitoring  Avoid nephrotoxins  F/u BMP, UO   Lytes are stable  Cr is improving  Good UO   Hypernatremia, free water deficit > 4L  Hypotonic IVF  Glu control  Overall prognosis is poor     [phone]

## 2023-05-22 NOTE — PROGRESS NOTE ADULT - SUBJECTIVE AND OBJECTIVE BOX
Follow-up Critical Care Progress Note  Chief Complaint : Atrial fibrillation      patient seen and examined  intubated  unresponsive   eyes not open    noted to have tarry bm today    Allergies :sulfa drugs (Unknown)      PAST MEDICAL & SURGICAL HISTORY:  HTN (hypertension)    HLD (hyperlipidemia)    Diabetes    CAD (coronary artery disease)    History of cholecystectomy        Medications:  MEDICATIONS  (STANDING):  albuterol/ipratropium for Nebulization 3 milliLiter(s) Nebulizer every 6 hours  aspirin  chewable 81 milliGRAM(s) Enteral Tube daily  brivaracetam Oral Solution 50 milliGRAM(s) Oral two times a day  chlorhexidine 0.12% Liquid 15 milliLiter(s) Oral Mucosa every 12 hours  chlorhexidine 2% Cloths 1 Application(s) Topical daily  chlorhexidine 4% Liquid 1 Application(s) Topical <User Schedule>  dextrose 5% 1000 milliLiter(s) (100 mL/Hr) IV Continuous <Continuous>  dextrose 5%. 1000 milliLiter(s) (100 mL/Hr) IV Continuous <Continuous>  dextrose 5%. 1000 milliLiter(s) (50 mL/Hr) IV Continuous <Continuous>  dextrose 50% Injectable 25 Gram(s) IV Push once  dextrose 50% Injectable 25 Gram(s) IV Push once  dextrose 50% Injectable 12.5 Gram(s) IV Push once  glucagon  Injectable 1 milliGRAM(s) IntraMuscular once  insulin lispro (ADMELOG) corrective regimen sliding scale   SubCutaneous every 6 hours  pantoprazole   Suspension 40 milliGRAM(s) Oral daily  senna 2 Tablet(s) Oral at bedtime  simvastatin 40 milliGRAM(s) Oral at bedtime    MEDICATIONS  (PRN):  dextrose Oral Gel 15 Gram(s) Oral once PRN Blood Glucose LESS THAN 70 milliGRAM(s)/deciliter  LORazepam   Injectable 2 milliGRAM(s) IV Push every 4 hours PRN myoclonus      Antibiotics History  piperacillin/tazobactam IVPB. 3.375 Gram(s) IV Intermittent once, 05-09-23 @ 10:58, Stop order after: 1 Doses  piperacillin/tazobactam IVPB.- 3.375 Gram(s) IV Intermittent once, 05-09-23 @ 15:00  piperacillin/tazobactam IVPB.. 3.375 Gram(s) IV Intermittent every 8 hours, 05-09-23 @ 22:00, Stop order after: 7 Days  piperacillin/tazobactam IVPB.. 3.375 Gram(s) IV Intermittent every 12 hours, 05-11-23 @ 22:27, Stop order after: 7 Days      Heme Medications   aspirin  chewable 81 milliGRAM(s) Enteral Tube daily, 05-20-23 @ 10:07      GI Medications  pantoprazole   Suspension 40 milliGRAM(s) Oral daily, 05-19-23 @ 21:36, Routine  senna 2 Tablet(s) Oral at bedtime, 05-19-23 @ 21:37, Routine      COVID  05-04-23 @ 19:50  COVID -   NotDetec  07-13-22 @ 18:33  COVID -   NotDete      COVID Biomarkers    05-04-23 @ 19:50 ESR --  ---  CRP --  ---  DDimer  351<H>   ---   LDH --   ---   Ferritin --         Procalcitonin Trend  05-17-23 @ 23:44   -   0.59<H>  05-16-23 @ 22:16   -   0.74<H>  05-15-23 @ 05:45   -   1.12<H>  05-09-23 @ 13:42   -   0.11<H>    WBC Trend  05-22-23 @ 05:00   -  12.70<H>  05-21-23 @ 06:15   -  11.15<H>  05-20-23 @ 16:20   -  10.67<H>  05-20-23 @ 05:00   -  9.76    H/H Trend  05-22-23 @ 05:00   -   9.4<L>/ 29.9<L>  05-21-23 @ 06:15   -   9.5<L>/ 29.8<L>  05-20-23 @ 16:20   -   9.3<L>/ 28.8<L>  05-20-23 @ 05:00   -   9.2<L>/ 27.8<L>  05-19-23 @ 00:44   -   9.2<L>/ 28.3<L>  05-17-23 @ 23:44   -   9.5<L>/ 28.4<L>    Stool Occult Blood  05-16-23 @ 17:00   -   Positive<!>    Platelet Trend  05-22-23 @ 05:00   -  111<L>  05-21-23 @ 06:15   -  102<L>  05-20-23 @ 16:20   -  89<L>  05-20-23 @ 05:00   -  162    Trend Sodium  05-22-23 @ 05:00   -  153<H>  05-21-23 @ 06:15   -  152<H>  05-20-23 @ 07:50   -  148<H>    Trend Potassium  05-22-23 @ 05:00   -  3.2<L>  05-21-23 @ 06:15   -  3.2<L>  05-20-23 @ 07:50   -  3.3<L>    Trend Bun/Cr  05-22-23 @ 05:00  BUN/CR -  120<H> / 3.80<H>  05-21-23 @ 06:15  BUN/CR -  132<H> / 4.52<H>  05-20-23 @ 07:50  BUN/CR -  137<H> / 5.06<H>    Lactic Acid Trend  05-13-23 @ 05:00   -   0.9    ABG Trend  05-19-23 @ 00:22   - 7.37/43/144<H>/99.8<H>  05-18-23 @ 05:38   - 7.41/37/97/98.4<H>  05-17-23 @ 23:36   - 7.42/37/97/98.3<H>  05-16-23 @ 22:10   - 7.41/41/92/98.5<H>  05-16-23 @ 05:59   - 7.35/47/104/98.8<H>  05-15-23 @ 13:45   - 7.36/47/74<L>/96.6  05-15-23 @ 04:45   - 7.33<L>/48/70<L>/95.7  05-14-23 @ 05:20   - 7.40/46/72<L>/96.5  05-13-23 @ 06:00   - 7.34<L>/46/93/98.7<H>  05-13-23 @ 03:35   - 7.31<L>/46/108/99.6<H>  05-12-23 @ 23:15   - 7.31<L>/49<H>/79<L>/97.1  05-11-23 @ 07:55   - 7.41/42/152<H>/99.2<H>    Trend AST/ALT/ALK Phos/Bili  05-21-23 @ 06:15   14/10/69/0.5  05-20-23 @ 07:50   26/7<L>/65/0.6  05-19-23 @ 04:08   17/7<L>/63/0.4  05-19-23 @ 00:44   17/7<L>/62/0.5  05-17-23 @ 23:44   13/5<L>/53/0.5  05-16-23 @ 22:16   15/7<L>/50/0.5  05-16-23 @ 05:10   16/11/60/0.7  05-15-23 @ 13:55   17/10<L>/62/0.8  05-15-23 @ 05:45   18/10/74/0.7  05-14-23 @ 05:00   14/10/71/1.0  05-13-23 @ 05:00   17/10/93/1.2  05-12-23 @ 05:35   22/15/75/1.6<H>      Ammonia Trend  05-14-23 @ 11:30   -   53  05-14-23 @ 05:00   -   16      Amylase / Lipase Trend      Albumin Trend  05-21-23 @ 06:15   -   1.7<L>  05-20-23 @ 07:50   -   1.6<L>  05-19-23 @ 04:08   -   2.4<L>  05-19-23 @ 00:44   -   2.5<L>  05-17-23 @ 23:44   -   2.1<L>  05-16-23 @ 22:16   -   1.9<L>      PTT - PT - INR Trend  05-19-23 @ 00:44   -   26.5<L> - 13.2 - 1.15  05-17-23 @ 23:44   -   31.2 - 13.7<H> - 1.18<H>  05-16-23 @ 22:16   -   31.3 - 13.6<H> - 1.18<H>  05-15-23 @ 13:55   -   32.9 - 14.0<H> - 1.19<H>  05-14-23 @ 13:45   -   33.6 - -- - --  05-14-23 @ 11:30   -   34.1 - 14.9<H> - 1.28<H>    Glucose Trend  05-22-23 @ 11:09   -  -- -- 273<H>  05-22-23 @ 06:02   -  -- -- 279<H>  05-22-23 @ 06:01   -  -- -- 85  05-22-23 @ 05:00   -  -- 260<H> --  05-21-23 @ 23:46   -  -- -- 240<H>  05-21-23 @ 17:17   -  -- -- 232<H>  05-21-23 @ 11:43   -  -- -- 242<H>  05-21-23 @ 06:15   -  -- 214<H> --  05-21-23 @ 05:17   -  -- -- 206<H>  05-20-23 @ 23:26   -  -- -- 245<H>    A1C with Estimated Average Glucose Result: 7.7 % *H* [4.0 - 5.6] (05-05-23 @ 08:00)      LABS:                        9.4    12.70 )-----------( 111      ( 22 May 2023 05:00 )             29.9     05-22    153<H>  |  113<H>  |  120<H>  ----------------------------<  260<H>  3.2<L>   |  29  |  3.80<H>    Ca    8.8      22 May 2023 05:00  Phos  3.6     05-22  Mg     2.2     05-22    TPro  6.4  /  Alb  1.7<L>  /  TBili  0.5  /  DBili  x   /  AST  14  /  ALT  10  /  AlkPhos  69  05-21            VITALS:  T(C): 37.6 (05-22-23 @ 08:00), Max: 37.6 (05-21-23 @ 18:00)  T(F): 99.7 (05-22-23 @ 08:00), Max: 99.7 (05-21-23 @ 20:00)  HR: 93 (05-22-23 @ 11:00) (82 - 99)  BP: 113/72 (05-22-23 @ 11:00) (107/56 - 141/72)  BP(mean): 85 (05-22-23 @ 11:00) (73 - 110)  ABP: --  ABP(mean): --  RR: 16 (05-22-23 @ 11:00) (15 - 19)  SpO2: 100% (05-22-23 @ 11:00) (89% - 100%)  CVP(mm Hg): --  CVP(cm H2O): --    Ins and Outs     05-21-23 @ 07:01  -  05-22-23 @ 07:00  --------------------------------------------------------  IN: 2440 mL / OUT: 1325 mL / NET: 1115 mL    05-22-23 @ 07:01  -  05-22-23 @ 11:25  --------------------------------------------------------  IN: 740 mL / OUT: 200 mL / NET: 540 mL        Height (cm): 154.9 (05-19-23 @ 21:00)  Weight (kg): 77.6 (05-19-23 @ 21:00)  BMI (kg/m2): 32.3 (05-19-23 @ 21:00)    Device: Avea, Mode: AC/ CMV (Assist Control/ Continuous Mandatory Ventilation), RR (machine): 14, RR (patient): 15, TV (machine): 450, TV (patient): 410, FiO2: 40, PEEP: 5, ITime: 1, MAP: 10, PIP: 24    I&O's Detail    21 May 2023 07:01  -  22 May 2023 07:00  --------------------------------------------------------  IN:    Enteral Tube Flush: 500 mL    Free Water: 1100 mL    Nepro with Carb Steady: 840 mL  Total IN: 2440 mL    OUT:    Indwelling Catheter - Urethral (mL): 1325 mL  Total OUT: 1325 mL    Total NET: 1115 mL      22 May 2023 07:01  -  22 May 2023 11:25  --------------------------------------------------------  IN:    dextrose 5% w/ Additives: 300 mL    Free Water: 300 mL    Nepro with Carb Steady: 140 mL  Total IN: 740 mL    OUT:    Indwelling Catheter - Urethral (mL): 200 mL  Total OUT: 200 mL    Total NET: 540 mL

## 2023-05-22 NOTE — PROGRESS NOTE ADULT - ASSESSMENT
Physical Examination:  GENERAL:               Eyes closed, unresponsive  HEENT:                    Pupils equal, reactive to light.  constricted . No JVD, Dry MM  PULM:                     Bilateral air entry, Clear to auscultation bilaterally, no significant sputum production, No Rales, No Rhonchi, No Wheezing  CVS:                         S1, S2,  +Murmur  ABD:                        Soft, nondistended, nontender, normoactive bowel sounds,   EXT:                         No edema, nontender, No Cyanosis or Clubbing   Vascular:                Warm Extremities,   NEURO:                  Eyes closd, episodes of myoclonus  PSYC:                      Calm, no Insight.      Assessment  78 year old male with a PMH of dementia, HTN, cardiomyopathy, HFrEF, CAD s/p CABG, with known current RCA occlusion, and DM type 2 who was admitted to [facility] on 5/4 with hypoxic respiratory failure in setting of CHF exacerbation and ANISH with course complicated by acute hypoxic respiratory failure in setting of choking episode causing cardiac arrest, shock, worsening hypoxemic respiratory failure, and NSTEMI on 5/9. Pt ICU course noted post cardiac arrest anoxic brain injury and myoclonus. Pt was transferred to [facility] for VEEG which required 48 hours of monitoring as he was noted to still be sedated. While there pt noted to have hematochezia and any ac / antiplatelet agents were held at that time. EEG findings consistent with stimulus induced myoclonus and GPDs s/p hypoxic diffuse indicative of severe cerebral dysfunction.    Patient returned to [facility] ICU on 5/19 and continues tx / supportive care for management of:    Problem list  Post cardiac arrest 2/2 choking episode / respiratory failure  Acute respiratory failure   Severe Anoxic Encephalopathy with secondary cortical myoclonus  Acute renal failure w Uremia post cardiac arrest  Hemtochezia episode, h/h stable no further episodes noted  Heart failure with reduced EF  NSTEMI post arrest       Underlying PMH of dementia, HTN, cardiomyopathy, HFrEF, CAD s/p CABG, with known current RCA occlusion, and DM type 2      Plan:    Continue mechanical ventilation  off Valproic acid at this time and myoclonus appears to be less.  PRN ativan for myoclonus   noted tarry BM, will make PPI BID, and hold asa, check stool occult blood  TF as tolerated  Start IV F with K     monitor blood glucose levels, + ISS coverage  Venodynes for dvt ppx    ppi for gi prophylaxis   monitor renal function, nephrology following, appreciate their rec's, slight decrease in creatinine  am labs, replace lytes prn  GOC ongoing discussion with family        PMD:				                   Notified(Date):  Family Updated: 	Kuldeep [phone]	                                 Date:  5/22  updated on status  states wants family meeting later this week  discussed need for trach/peg    Sedation & Analgesia:	  Diet/Nutrition:		 Diet, NPO with Tube Feed:   Tube Feeding Modality: Nasogastric         GI PPx:			pantoprazole   Suspension 40 milliGRAM(s) Oral daily    DVT Ppx:		Venodynes       Activity:		    Head of Bed:               35-45 Deg  Glycemic Control:          dextrose 5% 1000 milliLiter(s) IV Continuous <Continuous>  dextrose 5%. 1000 milliLiter(s) IV Continuous <Continuous>  dextrose 5%. 1000 milliLiter(s) IV Continuous <Continuous>  dextrose 50% Injectable 25 Gram(s) IV Push once  dextrose 50% Injectable 25 Gram(s) IV Push once  dextrose 50% Injectable 12.5 Gram(s) IV Push once  glucagon  Injectable 1 milliGRAM(s) IntraMuscular once  insulin lispro (ADMELOG) corrective regimen sliding scale   SubCutaneous every 6 hours  simvastatin 40 milliGRAM(s) Oral at bedtime      Lines:  CENTRAL LINE: 	[ ] YES [ ] NO	                    LOCATION:   	                       DATE INSERTED:   	                    REMOVE:  [ ] YES [ ] NO    A-LINE:  	                [ ] YES [ ] NO                      LOCATION:   	                       DATE INSERTED: 		            REMOVE:  [ ] YES [ ] NO    [place]: 		        [ ] YES [ ] NO  		                                       DATE INSERTED:		            REMOVE:  [ ] YES [ ] NO      Restraints were deemed necessary to prevent removal of life-sustaining devices [  ] YES   [    ]  NO    Disposition:    Goals of Care:

## 2023-05-23 LAB
ANION GAP SERPL CALC-SCNC: 9 MMOL/L — SIGNIFICANT CHANGE UP (ref 5–17)
BUN SERPL-MCNC: 103 MG/DL — HIGH (ref 7–23)
CALCIUM SERPL-MCNC: 8.8 MG/DL — SIGNIFICANT CHANGE UP (ref 8.4–10.5)
CHLORIDE SERPL-SCNC: 106 MMOL/L — SIGNIFICANT CHANGE UP (ref 96–108)
CO2 SERPL-SCNC: 28 MMOL/L — SIGNIFICANT CHANGE UP (ref 22–31)
CREAT SERPL-MCNC: 3.02 MG/DL — HIGH (ref 0.5–1.3)
EGFR: 20 ML/MIN/1.73M2 — LOW
GLUCOSE BLDC GLUCOMTR-MCNC: 240 MG/DL — HIGH (ref 70–99)
GLUCOSE BLDC GLUCOMTR-MCNC: 282 MG/DL — HIGH (ref 70–99)
GLUCOSE BLDC GLUCOMTR-MCNC: 284 MG/DL — HIGH (ref 70–99)
GLUCOSE BLDC GLUCOMTR-MCNC: 307 MG/DL — HIGH (ref 70–99)
GLUCOSE BLDC GLUCOMTR-MCNC: 308 MG/DL — HIGH (ref 70–99)
GLUCOSE BLDC GLUCOMTR-MCNC: 333 MG/DL — HIGH (ref 70–99)
GLUCOSE SERPL-MCNC: 300 MG/DL — HIGH (ref 70–99)
HCT VFR BLD CALC: 31.6 % — LOW (ref 39–50)
HGB BLD-MCNC: 10.1 G/DL — LOW (ref 13–17)
MAGNESIUM SERPL-MCNC: 2 MG/DL — SIGNIFICANT CHANGE UP (ref 1.6–2.6)
MCHC RBC-ENTMCNC: 31.4 PG — SIGNIFICANT CHANGE UP (ref 27–34)
MCHC RBC-ENTMCNC: 32 GM/DL — SIGNIFICANT CHANGE UP (ref 32–36)
MCV RBC AUTO: 98.1 FL — SIGNIFICANT CHANGE UP (ref 80–100)
NRBC # BLD: 0 /100 WBCS — SIGNIFICANT CHANGE UP (ref 0–0)
PHOSPHATE SERPL-MCNC: 3 MG/DL — SIGNIFICANT CHANGE UP (ref 2.5–4.5)
PLATELET # BLD AUTO: 85 K/UL — LOW (ref 150–400)
POTASSIUM SERPL-MCNC: 4.2 MMOL/L — SIGNIFICANT CHANGE UP (ref 3.5–5.3)
POTASSIUM SERPL-SCNC: 4.2 MMOL/L — SIGNIFICANT CHANGE UP (ref 3.5–5.3)
RBC # BLD: 3.22 M/UL — LOW (ref 4.2–5.8)
RBC # FLD: 15.1 % — HIGH (ref 10.3–14.5)
SODIUM SERPL-SCNC: 143 MMOL/L — SIGNIFICANT CHANGE UP (ref 135–145)
WBC # BLD: 16.38 K/UL — HIGH (ref 3.8–10.5)
WBC # FLD AUTO: 16.38 K/UL — HIGH (ref 3.8–10.5)

## 2023-05-23 PROCEDURE — 99223 1ST HOSP IP/OBS HIGH 75: CPT

## 2023-05-23 PROCEDURE — 71045 X-RAY EXAM CHEST 1 VIEW: CPT | Mod: 26

## 2023-05-23 RX ORDER — SODIUM CHLORIDE 9 MG/ML
1000 INJECTION, SOLUTION INTRAVENOUS
Refills: 0 | Status: DISCONTINUED | OUTPATIENT
Start: 2023-05-23 | End: 2023-05-24

## 2023-05-23 RX ORDER — INSULIN GLARGINE 100 [IU]/ML
10 INJECTION, SOLUTION SUBCUTANEOUS AT BEDTIME
Refills: 0 | Status: DISCONTINUED | OUTPATIENT
Start: 2023-05-23 | End: 2023-06-07

## 2023-05-23 RX ADMIN — CHLORHEXIDINE GLUCONATE 15 MILLILITER(S): 213 SOLUTION TOPICAL at 17:37

## 2023-05-23 RX ADMIN — INSULIN GLARGINE 10 UNIT(S): 100 INJECTION, SOLUTION SUBCUTANEOUS at 21:15

## 2023-05-23 RX ADMIN — Medication 8: at 11:21

## 2023-05-23 RX ADMIN — PANTOPRAZOLE SODIUM 40 MILLIGRAM(S): 20 TABLET, DELAYED RELEASE ORAL at 21:15

## 2023-05-23 RX ADMIN — BRIVARACETAM 50 MILLIGRAM(S): 25 TABLET, FILM COATED ORAL at 06:45

## 2023-05-23 RX ADMIN — Medication 2 MILLIGRAM(S): at 02:30

## 2023-05-23 RX ADMIN — Medication 8: at 17:37

## 2023-05-23 RX ADMIN — SODIUM CHLORIDE 50 MILLILITER(S): 9 INJECTION, SOLUTION INTRAVENOUS at 11:20

## 2023-05-23 RX ADMIN — Medication 3 MILLILITER(S): at 16:49

## 2023-05-23 RX ADMIN — Medication 6: at 06:45

## 2023-05-23 RX ADMIN — SENNA PLUS 2 TABLET(S): 8.6 TABLET ORAL at 21:15

## 2023-05-23 RX ADMIN — CHLORHEXIDINE GLUCONATE 15 MILLILITER(S): 213 SOLUTION TOPICAL at 06:43

## 2023-05-23 RX ADMIN — Medication 3 MILLILITER(S): at 05:20

## 2023-05-23 RX ADMIN — CHLORHEXIDINE GLUCONATE 1 APPLICATION(S): 213 SOLUTION TOPICAL at 11:20

## 2023-05-23 RX ADMIN — Medication 3 MILLILITER(S): at 08:20

## 2023-05-23 RX ADMIN — SIMVASTATIN 40 MILLIGRAM(S): 20 TABLET, FILM COATED ORAL at 21:15

## 2023-05-23 RX ADMIN — PANTOPRAZOLE SODIUM 40 MILLIGRAM(S): 20 TABLET, DELAYED RELEASE ORAL at 11:21

## 2023-05-23 RX ADMIN — BRIVARACETAM 50 MILLIGRAM(S): 25 TABLET, FILM COATED ORAL at 17:37

## 2023-05-23 RX ADMIN — Medication 3 MILLILITER(S): at 21:38

## 2023-05-23 NOTE — PROGRESS NOTE ADULT - ASSESSMENT
Patient is an 78 year old man admitted 5/4/23 with dyspnea and leg edema. He was treated for CHF. He required resuscitation for cardiac arrest including intubation. He had a NSTEMI. He was noted to have myoclonus. Transferred to [facility] for extended EEG monitoring. EEG with finding of stimulus induced myoclonus, severe diffuse slowing with generalized periodic discharges nearly continuous indicative of severe cerebral dysfunction. He was transferred from [facility] to [facility] and has remained intubated with occasional myoclonus. Neurological exam as above. Would be concerned with bilateral hemispheric dysfunction secondary to anoxia and contributing factor of uremia.

## 2023-05-23 NOTE — PROGRESS NOTE ADULT - SUBJECTIVE AND OBJECTIVE BOX
78 year old M w/HTN, Cardiomyopathy, chronic systolic CHF, DM2, Dementia, sent to the ED by PMD because of increased dyspnea, leg edema x past 2 days.  Per ED, daughter states that PMD noted pt to have new afib on EKG.  Pt is confused, demented (baseline) and unable to provide a reliable history.  There was no report of  chest pain, cough, fever chills, vomiting, diarrhea.   1st trop is 131.  EKG showed HR of 86, SR w/ APCs. Cxray c/w CHF (venous congestion, bilat pl effusions)  Pt's last echo was from 07/2022 w/c reported LVEF of  40 to 45%.. Increased LV wall thickness.. Moderately reduced RV systolic function. Moderately enlarged left atrium.. Moderate mitral valve regurgitation.Mild tricuspid regurgitation.     (04 May 2023 21:17)      24 hr events: No active issues overnight, VSS and pt remains on minimal ventilator settings. Myoclonus activity continued to be noted.         ## ROS: [ x ] unable to obtain        ## Labs:  CBC:                        10.1   16.38 )-----------( 85       ( 23 May 2023 06:30 )             31.6     Chem:  05-23    143  |  106  |  103<H>  ----------------------------<  300<H>  4.2   |  28  |  3.02<H>    Ca    8.8      23 May 2023 06:30  Phos  3.0     05-23  Mg     2.0     05-23        ## Medications:  albuterol/ipratropium for Nebulization 3 milliLiter(s) Nebulizer every 6 hours  dextrose 50% Injectable 25 Gram(s) IV Push once  dextrose 50% Injectable 12.5 Gram(s) IV Push once  dextrose 50% Injectable 25 Gram(s) IV Push once  dextrose Oral Gel 15 Gram(s) Oral once PRN  glucagon  Injectable 1 milliGRAM(s) IntraMuscular once  insulin glargine Injectable (LANTUS) 10 Unit(s) SubCutaneous at bedtime  insulin lispro (ADMELOG) corrective regimen sliding scale   SubCutaneous every 6 hours  simvastatin 40 milliGRAM(s) Oral at bedtime  pantoprazole  Injectable 40 milliGRAM(s) IV Push every 12 hours  senna 2 Tablet(s) Oral at bedtime  brivaracetam Oral Solution 50 milliGRAM(s) Oral two times a day  LORazepam   Injectable 2 milliGRAM(s) IV Push every 4 hours PRN      ## Vitals:  T(C): 37.2 (05-23-23 @ 08:00), Max: 37.9 (05-22-23 @ 16:00)  HR: 94 (05-23-23 @ 08:22) (80 - 97)  BP: 133/68 (05-23-23 @ 08:00) (96/64 - 147/89)  BP(mean): 85 (05-23-23 @ 08:00) (74 - 105)  RR: 18 (05-23-23 @ 08:00) (14 - 21)  SpO2: 100% (05-23-23 @ 08:22) (99% - 100%)  Vent: Mode: AC/ CMV (Assist Control/ Continuous Mandatory Ventilation), RR (machine): 14, RR (patient): 18, TV (machine): 450, FiO2: 40, PEEP: 5, PIP: 26        05-22 @ 07:01  -  05-23 @ 07:00  --------------------------------------------------------  IN: 5160 mL / OUT: 1355 mL / NET: 3805 mL        ## P/E:  Gen: lying comfortably in bed in no apparent distress  HEENT: PERRL; left pupil sluggish compared to right  Resp: CTA B/L no c/r/w  CVS: S1S2 no m/r/g  Abd: soft NT/ND +BS  Ext: no c/c/e  Neuro: unresponsive, frequent jerking / myoclonus movements noted      CODE STATUS: [ x ] full code 78 year old M w/HTN, Cardiomyopathy, chronic systolic CHF, DM2, Dementia, sent to the ED by PMD because of increased dyspnea, leg edema x past 2 days.  Per ED, daughter states that PMD noted pt to have new afib on EKG.  Pt is confused, demented (baseline) and unable to provide a reliable history.  There was no report of  chest pain, cough, fever chills, vomiting, diarrhea.   1st trop is 131.  EKG showed HR of 86, SR w/ APCs. Cxray c/w CHF (venous congestion, bilat pl effusions)  Pt's last echo was from 07/2022 w/c reported LVEF of  40 to 45%.. Increased LV wall thickness.. Moderately reduced RV systolic function. Moderately enlarged left atrium.. Moderate mitral valve regurgitation. Mild tricuspid regurgitation.     (04 May 2023 21:17)      24 hr events: No active issues overnight, VSS and pt remains on minimal ventilator settings. Myoclonus activity continued to be noted.         ## ROS: [ x ] unable to obtain        ## Labs:  CBC:                        10.1   16.38 )-----------( 85       ( 23 May 2023 06:30 )             31.6     Chem:  05-23    143  |  106  |  103<H>  ----------------------------<  300<H>  4.2   |  28  |  3.02<H>    Ca    8.8      23 May 2023 06:30  Phos  3.0     05-23  Mg     2.0     05-23        ## Medications:  albuterol/ipratropium for Nebulization 3 milliLiter(s) Nebulizer every 6 hours  dextrose 50% Injectable 25 Gram(s) IV Push once  dextrose 50% Injectable 12.5 Gram(s) IV Push once  dextrose 50% Injectable 25 Gram(s) IV Push once  dextrose Oral Gel 15 Gram(s) Oral once PRN  glucagon  Injectable 1 milliGRAM(s) IntraMuscular once  insulin glargine Injectable (LANTUS) 10 Unit(s) SubCutaneous at bedtime  insulin lispro (ADMELOG) corrective regimen sliding scale   SubCutaneous every 6 hours  simvastatin 40 milliGRAM(s) Oral at bedtime  pantoprazole  Injectable 40 milliGRAM(s) IV Push every 12 hours  senna 2 Tablet(s) Oral at bedtime  brivaracetam Oral Solution 50 milliGRAM(s) Oral two times a day  LORazepam   Injectable 2 milliGRAM(s) IV Push every 4 hours PRN      ## Vitals:  T(C): 37.2 (05-23-23 @ 08:00), Max: 37.9 (05-22-23 @ 16:00)  HR: 94 (05-23-23 @ 08:22) (80 - 97)  BP: 133/68 (05-23-23 @ 08:00) (96/64 - 147/89)  BP(mean): 85 (05-23-23 @ 08:00) (74 - 105)  RR: 18 (05-23-23 @ 08:00) (14 - 21)  SpO2: 100% (05-23-23 @ 08:22) (99% - 100%)  Vent: Mode: AC/ CMV (Assist Control/ Continuous Mandatory Ventilation), RR (machine): 14, RR (patient): 18, TV (machine): 450, FiO2: 40, PEEP: 5, PIP: 26        05-22 @ 07:01  -  05-23 @ 07:00  --------------------------------------------------------  IN: 5160 mL / OUT: 1355 mL / NET: 3805 mL        ## P/E:  Gen: lying comfortably in bed in no apparent distress  HEENT: PERRL; left pupil sluggish compared to right  Resp: CTA B/L no c/r/w  CVS: S1S2 no m/r/g  Abd: soft NT/ND +BS  Ext: no c/c/e  Neuro: unresponsive, frequent jerking / myoclonus movements noted      CODE STATUS: [ x ] full code

## 2023-05-23 NOTE — PROVIDER CONTACT NOTE (EICU) - ACTION/TREATMENT ORDERED:
E-alerted by bedside team, requesting CXR ordered for NGT adjustments, order placed as requested, results to be followed up by bedside provider.

## 2023-05-23 NOTE — CONSULT NOTE ADULT - SUBJECTIVE AND OBJECTIVE BOX
HPI: 78 year old M w/HTN, Cardiomyopathy, chronic systolic CHF, DM2, Dementia, sent to the ED by PMD because of increased dyspnea, leg edema .  Per ED, daughter states that PMD noted pt to have new afib on EKG.  Pt is confused, demented (baseline) and unable to provide a reliable history.  There was no report of  chest pain, cough, fever chills, vomiting, diarrhea.   1st trop is 131.  EKG showed HR of 86, SR w/ APCs. Cxray c/w CHF (venous congestion, bilat pl effusions)  Pt's last echo was from 07/2022 w/c reported LVEF of  40 to 45%.. Increased LV wall thickness.. Moderately reduced RV systolic function. Moderately enlarged left atrium.. Moderate mitral valve regurgitation .Mild tricuspid regurgitation.           PAST MEDICAL & SURGICAL HISTORY:  HTN (hypertension)      HLD (hyperlipidemia)      Diabetes      CAD (coronary artery disease)      History of cholecystectomy          SOCIAL HISTORY:    Admitted from:  home   Substance abuse history:              Tobacco hx:                  Alcohol hx:              Home Opioid hx:  Baptism:                                    Preferred Language:    Surrogate/HCP/:    son and daughter         son Franko [phone]    Daughter Kuldeep      FAMILY HISTORY:  No pertinent family history in first degree relatives      Baseline ADLs (prior to admission):    Allergies    sulfa drugs (Unknown)    Intolerances           Review of Systems:Unable to obtain due to poor mentation/intubated     MEDICATIONS  (STANDING):  albuterol/ipratropium for Nebulization 3 milliLiter(s) Nebulizer every 6 hours  brivaracetam Oral Solution 50 milliGRAM(s) Oral two times a day  chlorhexidine 0.12% Liquid 15 milliLiter(s) Oral Mucosa every 12 hours  chlorhexidine 2% Cloths 1 Application(s) Topical daily  chlorhexidine 4% Liquid 1 Application(s) Topical <User Schedule>  dextrose 5%. 1000 milliLiter(s) (100 mL/Hr) IV Continuous <Continuous>  dextrose 5%. 1000 milliLiter(s) (50 mL/Hr) IV Continuous <Continuous>  dextrose 50% Injectable 25 Gram(s) IV Push once  dextrose 50% Injectable 12.5 Gram(s) IV Push once  dextrose 50% Injectable 25 Gram(s) IV Push once  glucagon  Injectable 1 milliGRAM(s) IntraMuscular once  insulin glargine Injectable (LANTUS) 10 Unit(s) SubCutaneous at bedtime  insulin lispro (ADMELOG) corrective regimen sliding scale   SubCutaneous every 6 hours  pantoprazole  Injectable 40 milliGRAM(s) IV Push every 12 hours  senna 2 Tablet(s) Oral at bedtime  simvastatin 40 milliGRAM(s) Oral at bedtime    MEDICATIONS  (PRN):  dextrose Oral Gel 15 Gram(s) Oral once PRN Blood Glucose LESS THAN 70 milliGRAM(s)/deciliter  LORazepam   Injectable 2 milliGRAM(s) IV Push every 4 hours PRN myoclonus      PHYSICAL EXAM:    Vital Signs Last 24 Hrs  T(C): 37.2 (23 May 2023 11:00), Max: 37.9 (22 May 2023 16:00)  T(F): 99 (23 May 2023 11:00), Max: 100.3 (22 May 2023 16:00)  HR: 93 (23 May 2023 14:00) (80 - 97)  BP: 137/80 (23 May 2023 14:00) (96/64 - 147/89)  BP(mean): 98 (23 May 2023 14:00) (74 - 105)  RR: 17 (23 May 2023 14:00) (14 - 21)  SpO2: 100% (23 May 2023 14:00) (99% - 100%)    Parameters below as of 23 May 2023 13:00  Patient On (Oxygen Delivery Method): ventilator    O2 Concentration (%): 40    General:  nonverbal  unarousable , intubated    Karnofsky Performance Score/Palliative Performance Status Version2:    10 %  PPSV: 10%  HEENT: normal  dry mouth, Ng tube ,  ET tube  Lungs: coarse on vent   CV: normal  - tachy     GI: normal , flat, soft    : normal  incontinent   santos  Musculoskeletal: normal  w/ weakness  edema    presently bedbound      Skin: normal    Neuro: + deficits non verbal , unarousable   Oral intake ability: unable/only mouth care   Diet: [NPO] Ng tube     LABS:                        10.1   16.38 )-----------( 85       ( 23 May 2023 06:30 )             31.6     05-23    143  |  106  |  103<H>  ----------------------------<  300<H>  4.2   |  28  |  3.02<H>    Ca    8.8      23 May 2023 06:30  Phos  3.0     05-23  Mg     2.0     05-23          RADIOLOGY & ADDITIONAL STUDIES: < from: MR Head No Cont (05.15.23 @ 14:58) >  ACC: [id number] EXAM:  MR BRAIN   ORDERED BY: SHAYY MARTIN     PROCEDURE DATE:  05/15/2023          INTERPRETATION:  Non-contrast MRI of the brain.    CLINICAL INDICATION: Status post cardiac arrest    TECHNIQUE:  Multiplanar, multisequence MR imagesof the brain were   obtained without the administration of intravenous contrast.    COMPARISON: CT brain 5/11/2023    FINDINGS:    Several foci of restricted diffusion in the left frontal and parietal   lobes. No hemorrhagic transformation.    Mild white matter microvascular ischemic disease. Small chronic right   superior cerebellar hemisphere infarct.    No hydrocephalus, midline shift, or effacement of basal cisterns.    Signal voids are seen within the major intracranial vessels consistent   with their patency.    Scattered paranasal sinus mucosal thickening. Small bilateral mastoid air   cell effusions.    The orbits, sellar and suprasellar structures, and craniocervical   junction are unremarkable.    IMPRESSION:    Several foci of restricted diffusion in the left frontal and parietal   lobes. No hemorrhagic transformation.        < from: Xray Chest 1 View- PORTABLE-Urgent (Xray Chest 1 View- PORTABLE-Urgent .) (05.16.23 @ 20:23) >  ACC: [id number] EXAM:  XR CHEST PORTABLE URGENT 1V   ORDERED BY:  MARTINEZ PEREZ     PROCEDURE DATE:  05/16/2023          INTERPRETATION:  EXAMINATION: XR CHEST URGENT    CLINICAL INDICATION: assess OG tube and ET tube    TECHNIQUE: Single frontal, portable view of the chest was obtained.    COMPARISON: Chest x-ray 5/16/2023.    FINDINGS:  Support devices: Endotracheal tube tip within the distal trachea above   the laura. Enteric tube tip in stomach. Median sternotomy. Mediastinal   surgical clips.  The cardiomediastinal silhouette is  not accurately assessed in this AP   projection.  Decreased small bilateral layering pleural effusions with associated   compressive atelectasis.  No pneumothorax.  No acute bony abnormality.    IMPRESSION:  Endotracheal tube tip in the distal trachea above the laura. Enteric   tube tip in stomach.  Decreased small bilateral layering pleural effusions with associated   compressive atelectasis.          EEG REPORT:   EEG Report:  · EEG Report	  MARIO RAMOS [id number]     Study Date: 05-18-23 0800 to 5-19-23 1200  Duration: 28 hr EEG Classification / Summary:    Abnormal EEG in a comatose patient.  - Nearly continuous generalized periodic discharges (GPDs), centrally predominant, at times left centrally predominant - lower amplitude vs prior day.  - Severe diffuse slowing  - Stimulus induced myoclonus    ---------------------------------------------------------------------------------------------------------------------------------------------------------      Clinical Impression:    -Stimulus induced myoclonus and GPDs s/p hypoxic diffuse indicative of severe cerebral dysfunction  -Single-lead EKG incidentally shows irregular rhythm.      ADVANCE DIRECTIVES:  no   full code   Advanced Care Planning discussion total time spent:

## 2023-05-23 NOTE — PROGRESS NOTE ADULT - SUBJECTIVE AND OBJECTIVE BOX
Intubated in ICU     Vital Signs Last 24 Hrs  T(C): 38.1 (05-23-23 @ 16:00), Max: 38.1 (05-23-23 @ 16:00)  T(F): 100.5 (05-23-23 @ 16:00), Max: 100.5 (05-23-23 @ 16:00)  HR: 94 (05-23-23 @ 18:00) (80 - 98)  BP: 114/63 (05-23-23 @ 18:00) (96/64 - 147/89)  BP(mean): 77 (05-23-23 @ 18:00) (74 - 105)  RR: 18 (05-23-23 @ 18:00) (14 - 21)  SpO2: 100% (05-23-23 @ 18:00) (99% - 100%)    I&O's Detail    22 May 2023 07:01  -  23 May 2023 07:00  --------------------------------------------------------  IN:    dextrose 5% w/ Additives: 900 mL    dextrose 5% w/ Additives: 1100 mL    Enteral Tube Flush: 120 mL    Free Water: 1800 mL    Nepro with Carb Steady: 840 mL    sodium chloride 0.45% w/ Additives: 400 mL  Total IN: 5160 mL    OUT:    Indwelling Catheter - Urethral (mL): 1430 mL  Total OUT: 1430 mL    Total NET: 3730 mL    23 May 2023 07:01  -  23 May 2023 19:01  --------------------------------------------------------  IN:    dextrose 5% w/ Additives: 300 mL    Free Water: 600 mL    Nepro with Carb Steady: 485 mL  Total IN: 1385 mL    OUT:    Indwelling Catheter - Urethral (mL): 300 mL    Voided (mL): 200 mL  Total OUT: 500 mL    Total NET: 885 mL    Mode: AC/ CMV (Assist Control/ Continuous Mandatory Ventilation)  RR (machine): 14  TV (machine): 450  FiO2: 40  PEEP: 5  ITime: 1  MAP: 11  PIP: 29    s1s2  b/l air entry  soft, ND  tr edema                                                                                  10.1   16.38 )-----------( 85       ( 23 May 2023 06:30 )             31.6     23 May 2023 06:30    143    |  106    |  103    ----------------------------<  300    4.2     |  28     |  3.02     Ca    8.8        23 May 2023 06:30  Phos  3.0       23 May 2023 06:30  Mg     2.0       23 May 2023 06:30    albuterol/ipratropium for Nebulization 3 milliLiter(s) Nebulizer every 6 hours  brivaracetam Oral Solution 50 milliGRAM(s) Oral two times a day  chlorhexidine 0.12% Liquid 15 milliLiter(s) Oral Mucosa every 12 hours  chlorhexidine 2% Cloths 1 Application(s) Topical daily  chlorhexidine 4% Liquid 1 Application(s) Topical <User Schedule>  dextrose 5%. 1000 milliLiter(s) IV Continuous <Continuous>  dextrose 5%. 1000 milliLiter(s) IV Continuous <Continuous>  dextrose 50% Injectable 25 Gram(s) IV Push once  dextrose 50% Injectable 12.5 Gram(s) IV Push once  dextrose 50% Injectable 25 Gram(s) IV Push once  dextrose Oral Gel 15 Gram(s) Oral once PRN  glucagon  Injectable 1 milliGRAM(s) IntraMuscular once  insulin glargine Injectable (LANTUS) 10 Unit(s) SubCutaneous at bedtime  insulin lispro (ADMELOG) corrective regimen sliding scale   SubCutaneous every 6 hours  LORazepam   Injectable 2 milliGRAM(s) IV Push every 4 hours PRN  pantoprazole  Injectable 40 milliGRAM(s) IV Push every 12 hours  senna 2 Tablet(s) Oral at bedtime  simvastatin 40 milliGRAM(s) Oral at bedtime    A/P:    Hemodynamic ATN s/p arrest (Cr 1.6 - 5/9/23, Cr 1.0 - 4/12/23)  Resp failure, asp PNA, intubated   Hx CM, EF 35 - 40%, dementia  BP, resp support per ICU team  Concern for anoxic encephalopathy   S/p EEG monitoring  Avoid nephrotoxins  Lytes are stable  Renal fx is improving, good UO   Overall prognosis is poor     [phone]

## 2023-05-23 NOTE — PROGRESS NOTE ADULT - ASSESSMENT
78 year old male with a PMH of dementia, HTN, cardiomyopathy, HFrEF, CAD s/p CABG, with known current RCA occlusion, and DM type 2 who was admitted to [facility] on 5/4 with hypoxic respiratory failure in setting of CHF exacerbation and ANISH with course complicated by acute hypoxic respiratory failure in setting of choking episode causing cardiac arrest, shock, worsening hypoxemic respiratory failure, and NSTEMI on 5/9. Pt ICU course noted post cardiac arrest anoxic brain injury and myoclonus. Pt was transferred to [facility] for VEEG which required 48 hours of monitoring as he was noted to still be sedated. While there pt noted to have hematochezia and any ac / antiplatelet agents were held at that time. EEG findings consistent with stimulus induced myoclonus and GPDs s/p hypoxic diffuse indicative of severe cerebral dysfunction.    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  Acute renal failure w Uremia post cardiac arrest  Hemtochezia episode, h/h stable no further episodes noted  Heart failure with reduced EF  NSTEMI post arrest       Underlying PMH of dementia, HTN, cardiomyopathy, HFrEF, CAD s/p CABG, with known current RCA occlusion, and DM type 2      Plan as below:  --cont to monitor vitals per ICU policy  --sedation currently held, and continuing to hold VPA per neurology; additional neurologist to see patient today, pending their rec's / input  --maintain ett, pt currently requiring minimal vent settings, abg prn, cxr, + nebs  --supportive care  --TF as tolerated  --monitor blood glucose levels; increased ISS coverage and added Lantus given persistent elevated glucose levels  --Held AC and sqh for dvt prophylaxis given episode of melanotic stool yesterday, hgb remained stable with no addition melena noted  --ppi for gi prophylaxis   --monitor renal function, i/o's; nephrology following, appreciate their rec's; continues to improve, d5w1/2 ns continous gtt per nephro  --am labs, replace lytes prn  --skin care per nursing  --family education / emotional support  --GOC ongoing, plan for family meeting tomorrow   --Full code       Attending physician updated pt daughter Kuldeep this AM, all questions answered.

## 2023-05-23 NOTE — PROGRESS NOTE ADULT - SUBJECTIVE AND OBJECTIVE BOX
{\rtf1\zorzva84470\ansi\ciebphi1776\ftnbj\uc1\deff0  {\fonttbl{\f0 \fnil Segoe UI;}{\f1 \fnil \fcharset0 Segoe UI;}{\f2 \fnil Times New Gary;}}  {\colortbl ;\yin613\qpfio063\vrfy329 ;\red0\green0\blue0 ;\red0\green0\lyht588 ;\red0\green0\blue0 ;}  {\stylesheet{\f0\fs20 Normal;}{\cs1 Default Paragraph Font;}{\cs2\f0\fs16 Line Number;}{\cs3\f2\fs24\ul\cf3 Hyperlink;}}  {\*\revtbl{Unknown;}}  \zcwegl83626\tgsvxf27244\qnftc9514\pzbeg0840\otjaz3496\hrciy5856\wzvxtnr284\ytyklqn779\nogrowautofit\gvvabk101\formshade\nofeaturethrottle1\dntblnsbdb\fet4\aendnotes\aftnnrlc\pgbrdrhead\pgbrdrfoot  \sectd\sxvwqg84191\uweifj49516\guttersxn0\xqrzcqtt2646\jufbolnp7047\oqqlqxnw5663\avblqlur4578\yzmciak645\vcvbmxf120\sbkpage\pgncont\pgndec  \plain\plain\f0\fs24\ql\plain\f0\fs24\plain\f0\fs20\jpro4210\hich\f0\dbch\f0\loch\f0\fs20\par  Patient is an 78 year old man admitted 5/4/23 with dyspnea and leg edema. He was treated for CHF. He required resuscitation for cardiac arrest including intubation. He had a NSTEMI. He was noted to have myoclonus. Transferred to [facility] for extended EEG   monitoring. EEG with finding of stimulus induced myoclonus, severe diffuse slowing with generalized periodic discharges nearly continuous indicative of severe cerebral dysfunction. He was transferred from [facility] to [facility] and has remained intubated   with occasional myoclonus.\par  \par  PMH: Cardiomyopathy\par          CHF\par         S/P CABG\par        Chronic RCA occlusion\par       Atrial Fibrillation\par      HTN\par       DM\par      Dementia\par  \par  SH: Allergy to sulfa drugs\par  \par  Exam: 17/14 RR\par            Laying in bed, intubated, no attempt to vocalize, follows no commands\par            Pupils 2mm\par           No facial asymmetry\par          Flaccid extremities\par  \par  \pard\plain\f0\fs24\plain\f0\fs20\bzgm6965\hich\f0\dbch\f0\loch\f0\fs20          \par             10.1 \par  16.38 )-----------( 85       ( 23 May 2023 06:30 )\par             31.6 \par  \ql\plain\f0\fs24\plain\f0\fs20\vilt7113\hich\f0\dbch\f0\loch\f0\fs20\par  \pard\plain\f0\fs24\plain\f0\fs20\zmds8690\hich\f0\dbch\f0\loch\f0\fs20 05-23\par  \par  143  |  106  |  103<H>\par  ----------------------------<  300<H>\par  4.2   |  28  |  3.02<H>\par  \par  Ca    8.8      23 May 2023 06:30\par  Phos  3.0     05-23\par  Mg     2.0     05-23\par  \par  \par  \ql\plain\f0\fs24\plain\f1\fs16\rcrx7572\hich\f1\dbch\f1\loch\f1\cf2\fs16\par  \par  EEG Classification / Summary:\par  \par  Abnormal EEG in a comatose patient.\par  - Nearly continuous generalized periodic discharges (GPDs), centrally predominant, at times left centrally predominant - lower amplitude vs prior day.\par  - Severe diffuse slowing\par  - Stimulus induced myoclonus\par  \par  ---------------------------------------------------------------------------------------------------------------------------------------------------------\par  \par  \par  Clinical Impression:\par  \par  -Stimulus induced myoclonus and GPDs s/p hypoxic diffuse indicative of severe cerebral dysfunction\par  -Single-lead EKG incidentally shows irregular rhythm.\par  \par  \par  \par  MD jimbo Yates, [facility]\tab\par  \par  \par  \par  \par  \plain\f1\fs16\zmnp0975\hich\f1\dbch\f1\loch\f1\cf2\fs16\strike\plain\f1\fs16\sdbr5376\hich\f1\dbch\f1\loch\f1\cf2\fs16\plain\f1\fs16\xzmw8112\hich\f1\dbch\f1\loch\f1\cf2\fs16\par  \par  {\*\bkmkstart bkClinDocSignatures}{\*\bkmkend bkClinDocSignatures}{\*\bkmkstart bkcommentSBK}{\*\bkmkend bkcommentSBK}\plain\f1\fs16\auvb3354\hich\f1\dbch\f1\loch\f1\cf2\fs16\b Electronic Signatures:\plain\f1\fs16\qsbg3949\hich\f1\dbch\f1\loch\f1\cf2\fs16\par  \plain\f1\fs16\rrxh6762\hich\f1\dbch\f1\loch\f1\cf2\fs16\b\ul Moshe Linarse (MD)\plain\f1\fs16\chqt9834\hich\f1\dbch\f1\loch\f1\cf2\fs16   \plain\f1\fs18\mcly8885\hich\f1\dbch\f1\loch\f1\cf2\fs18 (Signed 19-May-2023 11:09)\par  \fi-360\li720\plain\f0\fs24\plain\f1\fs18\uhzb4882\hich\f1\dbch\f1\loch\f1\cf2\fs18\tab\plain\f1\fs16\lwhq2187\hich\f1\dbch\f1\loch\f1\cf2\fs16\b\i Authored: \plain\f1\fs16\hdjl6943\hich\f1\dbch\f1\loch\f1\cf2\fs16\i EEG REPORT\plain\f1\fs16\lzmq9795\hich\f1\dbch\f1\loch\f1\cf2\fs16\par  \tab\plain\f1\fs16\anse7592\hich\f1\dbch\f1\loch\f1\cf2\fs16\b\i Co-Signer: \plain\f1\fs16\pcve6848\hich\f1\dbch\f1\loch\f1\cf2\fs16\i TECH INFORMATION, EEG REPORT\plain\f1\fs16\clqx1090\hich\f1\dbch\f1\loch\f1\cf2\fs16\par  \fi0\li0\plain\f0\fs24\plain\f1\fs16\dhim4566\hich\f1\dbch\f1\loch\f1\cf2\fs16\b\ul Zengin, Erkam (MD)\plain\f1\fs16\zkgr1700\hich\f1\dbch\f1\loch\f1\cf2\fs16   \plain\f1\fs18\qjro7417\hich\f1\dbch\f1\loch\f1\cf2\fs18 (Signed 19-May-2023 17:28)\par  \fi-360\li720\plain\f0\fs24\plain\f1\fs18\yofo2738\hich\f1\dbch\f1\loch\f1\cf2\fs18\tab\plain\f1\fs16\bskx8771\hich\f1\dbch\f1\loch\f1\cf2\fs16\b\i Authored: \plain\f1\fs16\hkwg3930\hich\f1\dbch\f1\loch\f1\cf2\fs16\i TECH INFORMATION, EEG REPORT\plain\f1\fs16\npms1353\hich\f1\dbch\f1\loch\f1\cf2\fs16\par  \fi0\li0\plain\f0\fs24\plain\f1\fs16\ikxh3915\hich\f1\dbch\f1\loch\f1\cf2\fs16\par  \par  \plain\f1\fs16\ijol2489\hich\f1\dbch\f1\loch\f1\cf2\fs16\b\i Last Updated: \plain\f1\fs16\jzgy3503\hich\f1\dbch\f1\loch\f1\cf2\fs16\i 19-May-2023 17:28 by Dewey العراقي (MD)\plain\f0\fs20\ezft6172\hich\f0\dbch\f0\loch\f0\fs20\par  \pard\plain\f0\fs24\plain\f0\fs20\vmvo1319\hich\f0\dbch\f0\loch\f0\fs20\par  \par  < from: CT Head No Cont (05.11.23 @ 16:53) >\par  \ql\plain\f0\fs24\plain\f1\fs16\njjl3370\hich\f1\dbch\f1\loch\f1\cf2\fs16\par  COMPARISON:\par  Compared to study dated 5/9/2023\par  \par  FINDINGS:\par  HEMORRHAGE:  No evidence of intracranial hemorrhage.\par  BRAIN PARENCHYMA:  Moderate atrophy. Mild-to-moderate periventricular and \par  subcortical white matter ischemic changes. Chronic very smallsuperior \par  right cerebellar infarct. There is a very small acute left parietal \par  infarct (2-21, 602-42, 601-42) which is better seen than on prior exam..  \par  No parenchymal mass or mass effect.\par  VENTRICLES / SHIFT:  No hydrocephalus. No midline shift.\par  EXTRA-AXIAL / BASAL CISTERNS:  No extra-axial mass. Basal cisterns \par  preserved.\par  CALVARIUM AND EXTRACRANIAL SOFT TISSUES:  No depressed calvarial fracture.\par  SINUSES, ORBITS, MASTOIDS:  The visualized paranasal sinuses and mastoid \par  air cells are well aerated.\par  \par  IMPRESSION:\par  No evidence of an acute intracranial hemorrhage, midline shift, or \par  hydrocephalus.\par  Very small acute left parietal infarct.\par  \par  Findings discussed with LADAN Kaufman at 5:03 pm on 5/11/2023\par  \pard\plain\f0\fs24\plain\f1\fs16\aida0795\hich\f1\dbch\f1\loch\f1\cf2\fs16\par  \par  < from: MR Head No Cont (05.15.23 @ 14:58) >\par  \ql\plain\f0\fs24\plain\f1\fs16\xzzq1896\hich\f1\dbch\f1\loch\f1\cf2\fs16\par  COMPARISON: CT brain 5/11/2023\par  \par  FINDINGS:\par  \par  Several foci of restricted diffusion in the left frontal and parietal \par  lobes. No hemorrhagic transformation.\par  \par  Mild white matter microvascular ischemic disease. Small chronic right \par  superior cerebellar hemisphere infarct.\par  \par  No hydrocephalus, midline shift, or effacement of basal cisterns.\par  \par  Signal voids are seen within the major intracranial vessels consistent \par  with their patency.\par  \par  Scattered paranasal sinus mucosal thickening. Small bilateral mastoid air \par  cell effusions.\par  \par  The orbits, sellar and suprasellar structures, and craniocervical \par  junction are unremarkable.\par  \par  IMPRESSION:\par  \par  Several foci of restricted diffusion in the left frontal and parietal \par  lobes. No hemorrhagic transformation.\par  \par  Dr. Recinos discussed these findings with Dr. Alanis on 5/15/2023 3:10 PM \par  with read back.\par  \pard\plain\f0\fs24\plain\f1\fs16\timm0123\hich\f1\dbch\f1\loch\f1\cf2\fs16\par  \par  \par  \ql\plain\f0\fs24\plain\f0\fs20\xwsa3928\hich\f0\dbch\f0\loch\f0\fs20\par    \par  }

## 2023-05-23 NOTE — CHART NOTE - NSCHARTNOTEFT_GEN_A_CORE
Nutrition Follow Up Note  Hospital Course (Per Electronic Medical Record):   Source: Medical Record [X] MD[X}  Nursing Staff [X]     Diet: Nepro @ 35ml/hr x 24 hrs with 300ml free water flush q 4 hrs & D5% with potassium chloride @ 50ml/hr    Patient remains intubated , no sedation noted , NGT feeds infusing @ 35ml/hr , Albumin (1.7mg/dl <L> (+) edema , suggest increase rate of NGT to 45ml/hr which will provide ~ 1944kcals, 87gms protein [id number] (36kcals. /1.6gms protein per kg/ admit 117lbs, ) Difficult to assess true weight status due to edema , Cr noted improving , free water flush & IVF provided due to hypernatremia . POCT reviewed , insulin regimen modified     Current Weight: (5/22) 166.8/75.7kg                          (5/21) 156.2/75.4kg                          (5/20) 167.1/75.8kg                Pertinent Medications: MEDICATIONS  (STANDING):  albuterol/ipratropium for Nebulization 3 milliLiter(s) Nebulizer every 6 hours  brivaracetam Oral Solution 50 milliGRAM(s) Oral two times a day  chlorhexidine 0.12% Liquid 15 milliLiter(s) Oral Mucosa every 12 hours  chlorhexidine 2% Cloths 1 Application(s) Topical daily  chlorhexidine 4% Liquid 1 Application(s) Topical <User Schedule>  dextrose 5% 1000 milliLiter(s) (50 mL/Hr) IV Continuous <Continuous>  dextrose 5%. 1000 milliLiter(s) (100 mL/Hr) IV Continuous <Continuous>  dextrose 5%. 1000 milliLiter(s) (50 mL/Hr) IV Continuous <Continuous>  dextrose 50% Injectable 25 Gram(s) IV Push once  dextrose 50% Injectable 12.5 Gram(s) IV Push once  dextrose 50% Injectable 25 Gram(s) IV Push once  glucagon  Injectable 1 milliGRAM(s) IntraMuscular once  insulin glargine Injectable (LANTUS) 10 Unit(s) SubCutaneous at bedtime  insulin lispro (ADMELOG) corrective regimen sliding scale   SubCutaneous every 6 hours  pantoprazole  Injectable 40 milliGRAM(s) IV Push every 12 hours  senna 2 Tablet(s) Oral at bedtime  simvastatin 40 milliGRAM(s) Oral at bedtime    MEDICATIONS  (PRN):  dextrose Oral Gel 15 Gram(s) Oral once PRN Blood Glucose LESS THAN 70 milliGRAM(s)/deciliter  LORazepam   Injectable 2 milliGRAM(s) IV Push every 4 hours PRN myoclonus      Pertinent Labs:  05-23 Na143 mmol/L Glu 300 mg/dL<H> K+ 4.2 mmol/L Cr  3.02 mg/dL<H>  mg/dL<H> 05-23 Phos 3.0 mg/dL 05-21 Alb 1.7 g/dL<L> 05-07 Chol 172 mg/dL LDL --    HDL 39 mg/dL<L> Trig 87 mg/dL        Skin: (L) scapular abrasion ,(R) great toe , sacral DTI noted     Edema: (+2) generalized & hands , (+3) scrotum     Last BM: (5/22)     Estimated Needs:   [X] No Change since Previous Assessment      Previous Nutrition Diagnosis: Severe Protein Calorie Malnutrition    Nutrition Diagnosis is [X] Ongoing         New Nutrition Diagnosis: [X] Not Applicable      Interventions:   1. increase EN feeds to 45ml/hr x 24 hrs       Monitoring & Evaluation: will monitor:  [X] Weights   [X] Follow Up (Per Protocol)  [X] Tolerance to Diet Prescription       RD to follow as per Nutrition protocol  Preethi Carnes RDN

## 2023-05-23 NOTE — CONSULT NOTE ADULT - ASSESSMENT
A/P 78 year old male with a PMH of dementia, HTN, cardiomyopathy, HFrEF, CAD s/p CABG, with known current RCA occlusion, and DM type 2 who was admitted to [facility] on 5/4 with hypoxic respiratory failure in setting of CHF exacerbation and ANISH with course complicated by acute hypoxic respiratory failure in setting of choking episode causing cardiac arrest, shock, worsening hypoxemic respiratory failure, and NSTEMI on 5/9. Pt ICU course noted post cardiac arrest anoxic brain injury and myoclonus. Pt was transferred to [facility] for VEEG which required 48 hours of monitoring as he was noted to still be sedated. While there pt noted to have hematochezia and any ac / antiplatelet agents were held at that time. EEG findings consistent with stimulus induced myoclonus and GPDs s/p hypoxic diffuse indicative of severe cerebral dysfunction.    Patient returned to [facility] ICU on 5/19 and continues tx / supportive care in icu   neuro status remains poor and not awake enough to extubate  --  neuro f/u      Assessment:   Post cardiac arrest 2/2 choking episode / respiratory failure  Acute respiratory failure - intubated   Severe Anoxic Encephalopathy with secondary cortical myoclonus  Heart failure with reduced EF  NSTEMI post arrest   likely anoxic injury     Underlying PMH of dementia, HTN, cardiomyopathy, HFrEF, CAD s/p CABG, with known current RCA occlusion, and DM type 2      Plan:    -cont to monitor vitals per ICU policy   - additional neurologist to see patient today, pending their rec's / input  -supportive care  - care per ccu team   -family emotional support     --GOC full code       Palliative ;   asked today for  Goc assist /support.  Case d/w ccu team today , chart reviewed, pt seen bedside, remains intubated  in ccu  w/ likely anoxic brain injury s/p cardiac arrest .  Neuro is following - noted   Presently remains full code .    Plan is for a family meeting tomorrow w/ ccu team , palliative to attend .

## 2023-05-23 NOTE — PROGRESS NOTE ADULT - CRITICAL CARE ATTENDING COMMENT
78 year old male with a PMH of mild dementia, heart failure, CAD s/p CABG with known RCA occlusion, HLD, and type 2 DM who presented to [facility] on 5/4 for heart failure exacerbation. S/p cardiac arrest. Was transferred to [facility] post arrest for concern of myoclonus. EEG performed showing hypoxia induced neurologic injury. Now patient back in ICU at [facility] for further management. Patient is not a candidate for medical weaning off the vent given neurologic status.
pt seen and examined  neuro status remains poor and not awake enough to extubate  d/w adali that patient needs trach and peg, and discussed prolonged et tube can lead to complications and states can we wait till complications occur as she feels unable to decide on trach vs palliative wean.  states wants more time, considering repeat eeg vs imaging  neuro f/u  voiding trial

## 2023-05-24 LAB
ALBUMIN SERPL ELPH-MCNC: 1.7 G/DL — LOW (ref 3.3–5)
ALP SERPL-CCNC: 98 U/L — SIGNIFICANT CHANGE UP (ref 40–120)
ALT FLD-CCNC: 12 U/L — SIGNIFICANT CHANGE UP (ref 10–45)
ANION GAP SERPL CALC-SCNC: 9 MMOL/L — SIGNIFICANT CHANGE UP (ref 5–17)
APPEARANCE UR: ABNORMAL
APPEARANCE UR: CLEAR — SIGNIFICANT CHANGE UP
AST SERPL-CCNC: 24 U/L — SIGNIFICANT CHANGE UP (ref 10–40)
BACTERIA # UR AUTO: NEGATIVE /HPF — SIGNIFICANT CHANGE UP
BILIRUB SERPL-MCNC: 0.6 MG/DL — SIGNIFICANT CHANGE UP (ref 0.2–1.2)
BILIRUB UR-MCNC: NEGATIVE — SIGNIFICANT CHANGE UP
BILIRUB UR-MCNC: NEGATIVE — SIGNIFICANT CHANGE UP
BUN SERPL-MCNC: 96 MG/DL — HIGH (ref 7–23)
CALCIUM SERPL-MCNC: 9 MG/DL — SIGNIFICANT CHANGE UP (ref 8.4–10.5)
CHLORIDE SERPL-SCNC: 103 MMOL/L — SIGNIFICANT CHANGE UP (ref 96–108)
CO2 SERPL-SCNC: 29 MMOL/L — SIGNIFICANT CHANGE UP (ref 22–31)
COLOR SPEC: YELLOW — SIGNIFICANT CHANGE UP
COLOR SPEC: YELLOW — SIGNIFICANT CHANGE UP
COMMENT - URINE: SIGNIFICANT CHANGE UP
CREAT SERPL-MCNC: 2.81 MG/DL — HIGH (ref 0.5–1.3)
DIFF PNL FLD: ABNORMAL
DIFF PNL FLD: ABNORMAL
EGFR: 22 ML/MIN/1.73M2 — LOW
EPI CELLS # UR: SIGNIFICANT CHANGE UP
GLUCOSE BLDC GLUCOMTR-MCNC: 205 MG/DL — HIGH (ref 70–99)
GLUCOSE BLDC GLUCOMTR-MCNC: 224 MG/DL — HIGH (ref 70–99)
GLUCOSE BLDC GLUCOMTR-MCNC: 251 MG/DL — HIGH (ref 70–99)
GLUCOSE BLDC GLUCOMTR-MCNC: 264 MG/DL — HIGH (ref 70–99)
GLUCOSE BLDC GLUCOMTR-MCNC: 269 MG/DL — HIGH (ref 70–99)
GLUCOSE BLDC GLUCOMTR-MCNC: 270 MG/DL — HIGH (ref 70–99)
GLUCOSE BLDC GLUCOMTR-MCNC: 297 MG/DL — HIGH (ref 70–99)
GLUCOSE SERPL-MCNC: 220 MG/DL — HIGH (ref 70–99)
GLUCOSE UR QL: 100 MG/DL
GLUCOSE UR QL: NEGATIVE MG/DL — SIGNIFICANT CHANGE UP
GRAM STN FLD: SIGNIFICANT CHANGE UP
HCT VFR BLD CALC: 30.1 % — LOW (ref 39–50)
HGB BLD-MCNC: 9.7 G/DL — LOW (ref 13–17)
KETONES UR-MCNC: NEGATIVE MG/DL — SIGNIFICANT CHANGE UP
KETONES UR-MCNC: NEGATIVE MG/DL — SIGNIFICANT CHANGE UP
LEUKOCYTE ESTERASE UR-ACNC: ABNORMAL
LEUKOCYTE ESTERASE UR-ACNC: ABNORMAL
MAGNESIUM SERPL-MCNC: 1.9 MG/DL — SIGNIFICANT CHANGE UP (ref 1.6–2.6)
MCHC RBC-ENTMCNC: 31.5 PG — SIGNIFICANT CHANGE UP (ref 27–34)
MCHC RBC-ENTMCNC: 32.2 GM/DL — SIGNIFICANT CHANGE UP (ref 32–36)
MCV RBC AUTO: 97.7 FL — SIGNIFICANT CHANGE UP (ref 80–100)
MRSA PCR RESULT.: SIGNIFICANT CHANGE UP
NITRITE UR-MCNC: NEGATIVE — SIGNIFICANT CHANGE UP
NITRITE UR-MCNC: POSITIVE
NRBC # BLD: 0 /100 WBCS — SIGNIFICANT CHANGE UP (ref 0–0)
PH UR: 5 — SIGNIFICANT CHANGE UP (ref 5–8)
PH UR: 5.5 — SIGNIFICANT CHANGE UP (ref 5–8)
PHOSPHATE SERPL-MCNC: 3.5 MG/DL — SIGNIFICANT CHANGE UP (ref 2.5–4.5)
PLATELET # BLD AUTO: 142 K/UL — LOW (ref 150–400)
POTASSIUM SERPL-MCNC: 3.9 MMOL/L — SIGNIFICANT CHANGE UP (ref 3.5–5.3)
POTASSIUM SERPL-SCNC: 3.9 MMOL/L — SIGNIFICANT CHANGE UP (ref 3.5–5.3)
PROT SERPL-MCNC: 7.5 G/DL — SIGNIFICANT CHANGE UP (ref 6–8.3)
PROT UR-MCNC: 100 MG/DL
PROT UR-MCNC: 100 MG/DL
RBC # BLD: 3.08 M/UL — LOW (ref 4.2–5.8)
RBC # FLD: 14.9 % — HIGH (ref 10.3–14.5)
RBC CASTS # UR COMP ASSIST: 11 /HPF — HIGH (ref 0–4)
S AUREUS DNA NOSE QL NAA+PROBE: DETECTED
SODIUM SERPL-SCNC: 141 MMOL/L — SIGNIFICANT CHANGE UP (ref 135–145)
SP GR SPEC: 1.01 — SIGNIFICANT CHANGE UP (ref 1–1.03)
SP GR SPEC: 1.01 — SIGNIFICANT CHANGE UP (ref 1–1.03)
SPECIMEN SOURCE: SIGNIFICANT CHANGE UP
UROBILINOGEN FLD QL: 0.2 MG/DL — SIGNIFICANT CHANGE UP (ref 0.2–1)
UROBILINOGEN FLD QL: 1 MG/DL — SIGNIFICANT CHANGE UP (ref 0.2–1)
WBC # BLD: 17.12 K/UL — HIGH (ref 3.8–10.5)
WBC # FLD AUTO: 17.12 K/UL — HIGH (ref 3.8–10.5)
WBC UR QL: 10 /HPF — HIGH (ref 0–5)

## 2023-05-24 PROCEDURE — 99497 ADVNCD CARE PLAN 30 MIN: CPT

## 2023-05-24 PROCEDURE — 71045 X-RAY EXAM CHEST 1 VIEW: CPT | Mod: 26

## 2023-05-24 PROCEDURE — 99232 SBSQ HOSP IP/OBS MODERATE 35: CPT

## 2023-05-24 RX ORDER — CEFEPIME 1 G/1
INJECTION, POWDER, FOR SOLUTION INTRAMUSCULAR; INTRAVENOUS
Refills: 0 | Status: COMPLETED | OUTPATIENT
Start: 2023-05-24 | End: 2023-05-29

## 2023-05-24 RX ORDER — CEFEPIME 1 G/1
500 INJECTION, POWDER, FOR SOLUTION INTRAMUSCULAR; INTRAVENOUS ONCE
Refills: 0 | Status: COMPLETED | OUTPATIENT
Start: 2023-05-24 | End: 2023-05-24

## 2023-05-24 RX ORDER — ACETAMINOPHEN 500 MG
650 TABLET ORAL EVERY 6 HOURS
Refills: 0 | Status: DISCONTINUED | OUTPATIENT
Start: 2023-05-24 | End: 2023-06-07

## 2023-05-24 RX ORDER — CEFEPIME 1 G/1
500 INJECTION, POWDER, FOR SOLUTION INTRAMUSCULAR; INTRAVENOUS DAILY
Refills: 0 | Status: COMPLETED | OUTPATIENT
Start: 2023-05-25 | End: 2023-05-29

## 2023-05-24 RX ADMIN — SENNA PLUS 2 TABLET(S): 8.6 TABLET ORAL at 21:21

## 2023-05-24 RX ADMIN — BRIVARACETAM 50 MILLIGRAM(S): 25 TABLET, FILM COATED ORAL at 05:50

## 2023-05-24 RX ADMIN — CHLORHEXIDINE GLUCONATE 1 APPLICATION(S): 213 SOLUTION TOPICAL at 11:32

## 2023-05-24 RX ADMIN — INSULIN GLARGINE 10 UNIT(S): 100 INJECTION, SOLUTION SUBCUTANEOUS at 21:21

## 2023-05-24 RX ADMIN — SIMVASTATIN 40 MILLIGRAM(S): 20 TABLET, FILM COATED ORAL at 21:21

## 2023-05-24 RX ADMIN — PANTOPRAZOLE SODIUM 40 MILLIGRAM(S): 20 TABLET, DELAYED RELEASE ORAL at 11:58

## 2023-05-24 RX ADMIN — CEFEPIME 100 MILLIGRAM(S): 1 INJECTION, POWDER, FOR SOLUTION INTRAMUSCULAR; INTRAVENOUS at 17:34

## 2023-05-24 RX ADMIN — Medication 650 MILLIGRAM(S): at 14:40

## 2023-05-24 RX ADMIN — Medication 650 MILLIGRAM(S): at 13:40

## 2023-05-24 RX ADMIN — Medication 4: at 05:50

## 2023-05-24 RX ADMIN — Medication 3 MILLILITER(S): at 15:31

## 2023-05-24 RX ADMIN — CHLORHEXIDINE GLUCONATE 15 MILLILITER(S): 213 SOLUTION TOPICAL at 05:50

## 2023-05-24 RX ADMIN — Medication 3 MILLILITER(S): at 09:03

## 2023-05-24 RX ADMIN — Medication 2 MILLIGRAM(S): at 00:58

## 2023-05-24 RX ADMIN — Medication 3 MILLILITER(S): at 04:15

## 2023-05-24 RX ADMIN — Medication 6: at 23:16

## 2023-05-24 RX ADMIN — BRIVARACETAM 50 MILLIGRAM(S): 25 TABLET, FILM COATED ORAL at 17:34

## 2023-05-24 RX ADMIN — Medication 6: at 00:54

## 2023-05-24 RX ADMIN — Medication 4: at 11:59

## 2023-05-24 RX ADMIN — PANTOPRAZOLE SODIUM 40 MILLIGRAM(S): 20 TABLET, DELAYED RELEASE ORAL at 21:21

## 2023-05-24 RX ADMIN — Medication 2 MILLIGRAM(S): at 05:23

## 2023-05-24 RX ADMIN — Medication 3 MILLILITER(S): at 21:15

## 2023-05-24 RX ADMIN — CHLORHEXIDINE GLUCONATE 15 MILLILITER(S): 213 SOLUTION TOPICAL at 17:35

## 2023-05-24 RX ADMIN — Medication 6: at 17:36

## 2023-05-24 NOTE — PROVIDER CONTACT NOTE (OTHER) - SITUATION
Pt.'s capillary blood glucose this AM was 307 first time. Second was 282. D5 + 20 mEq KCL running @100 cc/hr.
Pt.'s capillary blood glucose @00:50 was 270. Repeat test @00:53 was 251.
Pt.'s capillary blood glucose @23:30 was 302, and repeat @23:31 was 282. D5 + 20 mEq KCL running @100 cc/hr.
pt admitted for CHF exacerbation. desatting at room air to 70s. Pt confused and keeps taking off nasal cannula.

## 2023-05-24 NOTE — PROGRESS NOTE ADULT - ASSESSMENT
Physical Examination:  GENERAL:               Eyes closed, unresponsive  HEENT:                    Pupils equal, reactive to light.  constricted . No JVD, Dry MM  PULM:                     Bilateral air entry, Clear to auscultation bilaterally, no significant sputum production, No Rales, No Rhonchi, No Wheezing  CVS:                         S1, S2,  +Murmur  ABD:                        Soft, nondistended, nontender, normoactive bowel sounds,   EXT:                         No edema, nontender, No Cyanosis or Clubbing   Vascular:                Warm Extremities,   NEURO:                  Eyes closd, episodes of myoclonus  PSYC:                      Calm, no Insight.      Assessment  78 year old male with a PMH of dementia, HTN, cardiomyopathy, HFrEF, CAD s/p CABG, with known current RCA occlusion, and DM type 2 who was admitted to [facility] on 5/4 with hypoxic respiratory failure in setting of CHF exacerbation and ANISH with course complicated by acute hypoxic respiratory failure in setting of choking episode causing cardiac arrest, shock, worsening hypoxemic respiratory failure, and NSTEMI on 5/9. Pt ICU course noted post cardiac arrest anoxic brain injury and myoclonus. Pt was transferred to [facility] for VEEG which required 48 hours of monitoring as he was noted to still be sedated. While there pt noted to have hematochezia and any ac / antiplatelet agents were held at that time. EEG findings consistent with stimulus induced myoclonus and GPDs s/p hypoxic diffuse indicative of severe cerebral dysfunction.    Patient returned to [facility] ICU on 5/19 and continues tx / supportive care for management of:    Problem list  Post cardiac arrest 2/2 choking episode / respiratory failure  Acute respiratory failure   Severe Anoxic Encephalopathy with secondary cortical myoclonus  Acute renal failure w Uremia post cardiac arrest improving  Hemtochezia episode, h/h stable no further episodes noted  Heart failure with reduced EF  NSTEMI post arrest       Underlying PMH of dementia, HTN, cardiomyopathy, HFrEF, CAD s/p CABG, with known current RCA occlusion, and DM type 2      Plan:    Continue mechanical ventilation  Ativan prn for myoclonus, will decrease dose   no more tarry bm,   as wbc increasing no source will change NGT, check sputum culture, blood cx, ua  no BM   TF as tolerated  IVF held     monitor blood glucose levels, + ISS coverage  Venodynes for dvt ppx    ppi for gi prophylaxis   monitor renal function, nephrology following, appreciate their rec's, slight decrease in creatinine  am labs, replace lytes prn  GOC ongoing discussion with family        PMD:				                   Notified(Date):  Family Updated: 	Kuldeep [phone]	                                 Date:  5/24  updated on status  as above  discussed need for trach/peg    Sedation & Analgesia:	  Diet/Nutrition:		 Diet, NPO with Tube Feed:   Tube Feeding Modality: Nasogastric         GI PPx:			pantoprazole   Suspension 40 milliGRAM(s) Oral daily    DVT Ppx:		Venodynes       Activity:		    Head of Bed:               35-45 Deg  Glycemic Control:         Insulin    Lines:  CENTRAL LINE: 	[ ] YES [ x] NO	                    LOCATION:   	                       DATE INSERTED:   	                    REMOVE:  [ ] YES [ ] NO    A-LINE:  	                [ ] YES [ x] NO                      LOCATION:   	                       DATE INSERTED: 		            REMOVE:  [ ] YES [ ] NO    [place]: 		        [ ] YES [x ] NO  		                                       DATE INSERTED:		            REMOVE:  [ ] YES [ ] NO      Restraints were deemed necessary to prevent removal of life-sustaining devices [  ] YES   [  x  ]  NO    Disposition: ICU Care    Goals of Care: Full code

## 2023-05-24 NOTE — PROGRESS NOTE ADULT - SUBJECTIVE AND OBJECTIVE BOX
Intubated in ICU     Vital Signs Last 24 Hrs  T(C): 37.8 (23 @ 16:00), Max: 38.1 (23 @ 12:00)  T(F): 100 (23 @ 16:00), Max: 100.5 (23 @ 12:00)  HR: 97 (23 @ 19:00) (84 - 102)  BP: 91/57 (23 @ 19:00) (85/44 - 158/80)  BP(mean): 68 (23 @ 19:00) (56 - 102)  RR: 16 (23 @ 19:00) (14 - 20)  SpO2: 99% (23 @ 19:00) (99% - 100%)    I&O's Detail    23 May 2023 07:01  -  24 May 2023 07:00  --------------------------------------------------------  IN:    dextrose 5% w/ Additives: 300 mL    Enteral Tube Flush: 120 mL    Free Water: 1800 mL    Nepro with Carb Steady: 1025 mL  Total IN: 3245 mL    OUT:    Indwelling Catheter - Urethral (mL): 300 mL    Voided (mL): 810 mL  Total OUT: 1110 mL    Total NET: 2135 mL    24 May 2023 07:01  -  24 May 2023 19:51  --------------------------------------------------------  IN:    Free Water: 900 mL    Nepro with Carb Steady: 585 mL  Total IN: 1485 mL    OUT:    Voided (mL): 550 mL  Total OUT: 550 mL    Total NET: 935 mL    Mode: AC/ CMV (Assist Control/ Continuous Mandatory Ventilation)  RR (machine): 14  TV (machine): 450  FiO2: 40  PEEP: 5  MAP: 10  PIP: 23    s1s2  b/l air entry  soft, ND  tr edema                                                                                           9.7    17.12 )-----------( 142      ( 24 May 2023 05:30 )             30.1     24 May 2023 05:30    141    |  103    |  96     ----------------------------<  220    3.9     |  29     |  2.81     Ca    9.0        24 May 2023 05:30  Phos  3.5       24 May 2023 05:30  Mg     1.9       24 May 2023 05:30    TPro  7.5    /  Alb  1.7    /  TBili  0.6    /  DBili  x      /  AST  24     /  ALT  12     /  AlkPhos  98     24 May 2023 05:30    LIVER FUNCTIONS - ( 24 May 2023 05:30 )  Alb: 1.7 g/dL / Pro: 7.5 g/dL / ALK PHOS: 98 U/L / ALT: 12 U/L / AST: 24 U/L / GGT: x           Urinalysis Basic - ( 24 May 2023 15:05 )    Color: Yellow / Appearance: Clear / S.013 / pH: x  Gluc: x / Ketone: Negative mg/dL  / Bili: Negative / Urobili: 1.0 mg/dL   Blood: x / Protein: 100 mg/dL / Nitrite: Negative   Leuk Esterase: Trace / RBC: 7 /HPF / WBC 3 /HPF   Sq Epi: x / Non Sq Epi: x / Bacteria: Occasional /HPF    acetaminophen   Oral Liquid .. 650 milliGRAM(s) Oral every 6 hours PRN  albuterol/ipratropium for Nebulization 3 milliLiter(s) Nebulizer every 6 hours  brivaracetam Oral Solution 50 milliGRAM(s) Oral two times a day  cefepime   IVPB      chlorhexidine 0.12% Liquid 15 milliLiter(s) Oral Mucosa every 12 hours  chlorhexidine 2% Cloths 1 Application(s) Topical daily  chlorhexidine 4% Liquid 1 Application(s) Topical <User Schedule>  dextrose 5%. 1000 milliLiter(s) IV Continuous <Continuous>  dextrose 5%. 1000 milliLiter(s) IV Continuous <Continuous>  dextrose 50% Injectable 25 Gram(s) IV Push once  dextrose 50% Injectable 12.5 Gram(s) IV Push once  dextrose 50% Injectable 25 Gram(s) IV Push once  insulin glargine Injectable (LANTUS) 10 Unit(s) SubCutaneous at bedtime  insulin lispro (ADMELOG) corrective regimen sliding scale   SubCutaneous every 6 hours  LORazepam   Injectable 1 milliGRAM(s) IV Push every 6 hours PRN  pantoprazole  Injectable 40 milliGRAM(s) IV Push every 12 hours  senna 2 Tablet(s) Oral at bedtime  simvastatin 40 milliGRAM(s) Oral at bedtime    A/P:    Hemodynamic ATN s/p arrest (Cr 1.6 - 23, Cr 1.0 - 23)  Resp failure, asp PNA, intubated   Hx CM, EF 35 - 40%, dementia  BP, resp support per ICU team  Concern for anoxic encephalopathy   S/p EEG monitoring  Avoid nephrotoxins  Renal fx is improving, good UO   Overall prognosis is poor     [phone]

## 2023-05-24 NOTE — PROVIDER CONTACT NOTE (OTHER) - ACTION/TREATMENT ORDERED:
Continue IVFs and administer indicated corrective insulin dose (6 u SQ Admelog). Continue testing capillary blood glucose at ordered frequency.
Administer indicated corrective insulin dose (6 u SQ Admelog).
Administer indicated corrective insulin dose (6 u SQ Admelog)

## 2023-05-24 NOTE — PROGRESS NOTE ADULT - SUBJECTIVE AND OBJECTIVE BOX
Follow-up Critical Care Progress Note  Chief Complaint : Atrial fibrillation      patient seen and examined  clinically unchanged  does not take good breaths on cpap ps 5  noted to have increasing wbc.  pt noted to have increased secretions bloody at times    Allergies :sulfa drugs (Unknown)      PAST MEDICAL & SURGICAL HISTORY:  HTN (hypertension)    HLD (hyperlipidemia)    Diabetes    CAD (coronary artery disease)    History of cholecystectomy        Medications:  MEDICATIONS  (STANDING):  albuterol/ipratropium for Nebulization 3 milliLiter(s) Nebulizer every 6 hours  brivaracetam Oral Solution 50 milliGRAM(s) Oral two times a day  chlorhexidine 0.12% Liquid 15 milliLiter(s) Oral Mucosa every 12 hours  chlorhexidine 2% Cloths 1 Application(s) Topical daily  chlorhexidine 4% Liquid 1 Application(s) Topical <User Schedule>  dextrose 5%. 1000 milliLiter(s) (50 mL/Hr) IV Continuous <Continuous>  dextrose 5%. 1000 milliLiter(s) (100 mL/Hr) IV Continuous <Continuous>  dextrose 50% Injectable 12.5 Gram(s) IV Push once  dextrose 50% Injectable 25 Gram(s) IV Push once  dextrose 50% Injectable 25 Gram(s) IV Push once  insulin glargine Injectable (LANTUS) 10 Unit(s) SubCutaneous at bedtime  insulin lispro (ADMELOG) corrective regimen sliding scale   SubCutaneous every 6 hours  pantoprazole  Injectable 40 milliGRAM(s) IV Push every 12 hours  senna 2 Tablet(s) Oral at bedtime  simvastatin 40 milliGRAM(s) Oral at bedtime    MEDICATIONS  (PRN):  LORazepam   Injectable 1 milliGRAM(s) IV Push every 6 hours PRN myoclonus      Antibiotics History  piperacillin/tazobactam IVPB. 3.375 Gram(s) IV Intermittent once, 05-09-23 @ 10:58, Stop order after: 1 Doses  piperacillin/tazobactam IVPB.- 3.375 Gram(s) IV Intermittent once, 05-09-23 @ 15:00  piperacillin/tazobactam IVPB.. 3.375 Gram(s) IV Intermittent every 8 hours, 05-09-23 @ 22:00, Stop order after: 7 Days  piperacillin/tazobactam IVPB.. 3.375 Gram(s) IV Intermittent every 12 hours, 05-11-23 @ 22:27, Stop order after: 7 Days      Heme Medications       GI Medications  pantoprazole  Injectable 40 milliGRAM(s) IV Push every 12 hours, 05-22-23 @ 21:00, 21:00  senna 2 Tablet(s) Oral at bedtime, 05-19-23 @ 21:37, Routine      COVID  05-04-23 @ 19:50  COVID -   NotDetec  07-13-22 @ 18:33  COVID -   NotDetec      COVID Biomarkers    05-04-23 @ 19:50 ESR --  ---  CRP --  ---  DDimer  351<H>   ---   LDH --   ---   Ferritin --         Procalcitonin Trend  05-17-23 @ 23:44   -   0.59<H>  05-16-23 @ 22:16   -   0.74<H>  05-15-23 @ 05:45   -   1.12<H>  05-09-23 @ 13:42   -   0.11<H>    WBC Trend  05-24-23 @ 05:30   -  17.12<H>  05-23-23 @ 06:30   -  16.38<H>  05-22-23 @ 13:18   -  13.89<H>  05-22-23 @ 05:00   -  12.70<H>    H/H Trend  05-24-23 @ 05:30   -   9.7<L>/ 30.1<L>  05-23-23 @ 06:30   -   10.1<L>/ 31.6<L>  05-22-23 @ 13:18   -   9.1<L>/ 28.8<L>  05-22-23 @ 05:00   -   9.4<L>/ 29.9<L>  05-21-23 @ 06:15   -   9.5<L>/ 29.8<L>  05-20-23 @ 16:20   -   9.3<L>/ 28.8<L>    Stool Occult Blood  05-16-23 @ 17:00   -   Positive<!>    Platelet Trend  05-24-23 @ 05:30   -  142<L>  05-23-23 @ 06:30   -  85<L>  05-22-23 @ 13:18   -  114<L>  05-22-23 @ 05:00   -  111<L>    Trend Sodium  05-24-23 @ 05:30   -  141  05-23-23 @ 06:30   -  143  05-22-23 @ 05:00   -  153<H>    Trend Potassium  05-24-23 @ 05:30   -  3.9  05-23-23 @ 06:30   -  4.2  05-22-23 @ 05:00   -  3.2<L>    Trend Bun/Cr  05-24-23 @ 05:30  BUN/CR -  96<H> / 2.81<H>  05-23-23 @ 06:30  BUN/CR -  103<H> / 3.02<H>  05-22-23 @ 05:00  BUN/CR -  120<H> / 3.80<H>    Lactic Acid Trend  05-13-23 @ 05:00   -   0.9    ABG Trend  05-19-23 @ 00:22   - 7.37/43/144<H>/99.8<H>  05-18-23 @ 05:38   - 7.41/37/97/98.4<H>  05-17-23 @ 23:36   - 7.42/37/97/98.3<H>  05-16-23 @ 22:10   - 7.41/41/92/98.5<H>  05-16-23 @ 05:59   - 7.35/47/104/98.8<H>     Trend AST/ALT/ALK Phos/Bili  05-24-23 @ 05:30   24/12/98/0.6  05-21-23 @ 06:15   14/10/69/0.5  05-20-23 @ 07:50   26/7<L>/65/0.6  05-19-23 @ 04:08   17/7<L>/63/0.4  05-19-23 @ 00:44   17/7<L>/62/0.5  05-17-23 @ 23:44   13/5<L>/53/0.5  05-16-23 @ 22:16   15/7<L>/50/0.5       Albumin Trend  05-24-23 @ 05:30   -   1.7<L>  05-21-23 @ 06:15   -   1.7<L>  05-20-23 @ 07:50   -   1.6<L>  05-19-23 @ 04:08   -   2.4<L>  05-19-23 @ 00:44   -   2.5<L>  05-17-23 @ 23:44   -   2.1<L>      PTT - PT - INR Trend  05-19-23 @ 00:44   -   26.5<L> - 13.2 - 1.15  05-17-23 @ 23:44   -   31.2 - 13.7<H> - 1.18<H>  05-16-23 @ 22:16   -   31.3 - 13.6<H> - 1.18<H>  05-15-23 @ 13:55   -   32.9 - 14.0<H> - 1.19<H>  05-14-23 @ 13:45   -   33.6 - -- - --  05-14-23 @ 11:30   -   34.1 - 14.9<H> - 1.28<H>    Glucose Trend  05-24-23 @ 11:57   -  -- -- 224<H>  05-24-23 @ 05:47   -  -- -- 205<H>  05-24-23 @ 05:30   -  -- 220<H> --  05-24-23 @ 00:53   -  -- -- 251<H>  05-24-23 @ 00:50   -  -- -- 270<H>  05-23-23 @ 21:13   -  -- -- 240<H>  05-23-23 @ 21:11   -  -- -- 284<H>  05-23-23 @ 17:36   -  -- -- 308<H>  05-23-23 @ 11:19   -  -- -- 333<H>  05-23-23 @ 06:43   -  -- -- 282<H>    A1C with Estimated Average Glucose Result: 7.7 % *H* [4.0 - 5.6] (05-05-23 @ 08:00)      LABS:                        9.7    17.12 )-----------( 142      ( 24 May 2023 05:30 )             30.1     05-24    141  |  103  |  96<H>  ----------------------------<  220<H>  3.9   |  29  |  2.81<H>    Ca    9.0      24 May 2023 05:30  Phos  3.5     05-24  Mg     1.9     05-24    TPro  7.5  /  Alb  1.7<L>  /  TBili  0.6  /  DBili  x   /  AST  24  /  ALT  12  /  AlkPhos  98  05-24       RADIOLOGY  CXR:     < from: Xray Chest 1 View-PORTABLE IMMEDIATE (Xray Chest 1 View-PORTABLE IMMEDIATE .) (05.23.23 @ 20:25) >  Impression:    ET tube andNG tube in good position    Mild pulmonary venous congestion and small pleural effusions are again   noted.    --- End of Report ---      < end of copied text >      VITALS:  T(C): 37.8 (05-24-23 @ 08:00), Max: 38.1 (05-23-23 @ 16:00)  T(F): 100 (05-24-23 @ 08:00), Max: 100.5 (05-23-23 @ 16:00)  HR: 102 (05-24-23 @ 11:00) (89 - 102)  BP: 119/65 (05-24-23 @ 11:00) (112/66 - 158/80)  BP(mean): 80 (05-24-23 @ 11:00) (77 - 102)  ABP: --  ABP(mean): --  RR: 20 (05-24-23 @ 11:00) (14 - 20)  SpO2: 100% (05-24-23 @ 11:00) (99% - 100%)  CVP(mm Hg): --  CVP(cm H2O): --    Ins and Outs     05-23-23 @ 07:01  -  05-24-23 @ 07:00  --------------------------------------------------------  IN: 3245 mL / OUT: 1110 mL / NET: 2135 mL    05-24-23 @ 07:01  -  05-24-23 @ 12:23  --------------------------------------------------------  IN: 480 mL / OUT: 200 mL / NET: 280 mL            Device: Avea, Mode: AC/ CMV (Assist Control/ Continuous Mandatory Ventilation), RR (machine): 14, RR (patient): 18, TV (machine): 450, TV (patient): 430, FiO2: 40, PEEP: 5, ITime: 1, MAP: 10, PIP: 24    I&O's Detail    23 May 2023 07:01  -  24 May 2023 07:00  --------------------------------------------------------  IN:    dextrose 5% w/ Additives: 300 mL    Enteral Tube Flush: 120 mL    Free Water: 1800 mL    Nepro with Carb Steady: 1025 mL  Total IN: 3245 mL    OUT:    Indwelling Catheter - Urethral (mL): 300 mL    Voided (mL): 810 mL  Total OUT: 1110 mL    Total NET: 2135 mL      24 May 2023 07:01  -  24 May 2023 12:23  --------------------------------------------------------  IN:    Free Water: 300 mL    Nepro with Carb Steady: 180 mL  Total IN: 480 mL    OUT:    Voided (mL): 200 mL  Total OUT: 200 mL    Total NET: 280 mL

## 2023-05-24 NOTE — PROGRESS NOTE ADULT - ASSESSMENT
A/P 78 year old male with a PMH of dementia, HTN, cardiomyopathy, HFrEF, CAD s/p CABG, with known current RCA occlusion, and DM type 2 who was admitted to [facility] on 5/4 with hypoxic respiratory failure in setting of CHF exacerbation and ANISH with course complicated by acute hypoxic respiratory failure in setting of choking episode causing cardiac arrest, shock, worsening hypoxemic respiratory failure, and NSTEMI on 5/9. Pt ICU course noted post cardiac arrest anoxic brain injury and myoclonus. Pt was transferred to [facility] for VEEG which required 48 hours of monitoring as he was noted to still be sedated. While there pt noted to have hematochezia and any ac / antiplatelet agents were held at that time. EEG findings consistent with stimulus induced myoclonus and GPDs s/p hypoxic diffuse indicative of severe cerebral dysfunction.Patient returned to [facility] ICU on 5/19 and continues tx / supportive care in icu       neuro status remains poor and not awake enough to extubate  --  neuro following       Assessment:   Post cardiac arrest 2/2 choking episode / respiratory failure  Acute respiratory failure - intubated   Severe Anoxic Encephalopathy with secondary cortical myoclonus  Heart failure with reduced EF  NSTEMI post arrest   likely anoxic injury   WBC increasing     Underlying PMH of dementia, HTN, cardiomyopathy, HFrEF, CAD s/p CABG, with known current RCA occlusion, and DM type 2    Plan:    Continue mechanical ventilation  Ativan prn for myoclonus,  decreased dose today  no more tarry bm,   TF as tolerated  family emotional support         Palliative ;   f/u today as asked  for  c assist /support.  Case d/w ccu team  , chart reviewed, pt seen bedside, remains intubated  in ccu    w/ likely anoxic brain injury s/p cardiac arrest . Pt now w/ myoclonus   Neuro is following - noted recs  Met for family mtg today w/ Dr Polo , children Kuldeep and Franko See GOC note above.   Family having hard time accepting  pt present clinical condition and  his poor prognosis w/ brain injury . Therefore are asking for a few more days to come to a final decision  w/ hopes he may respond.   Much supportive  care given

## 2023-05-24 NOTE — PROGRESS NOTE ADULT - SUBJECTIVE AND OBJECTIVE BOX
Progress:  remains intubated, not waking enough to extubate, w/ myoclonus      Review of Systems: [ Unable to obtain due to poor mentation]    MEDICATIONS  (STANDING):  albuterol/ipratropium for Nebulization 3 milliLiter(s) Nebulizer every 6 hours  brivaracetam Oral Solution 50 milliGRAM(s) Oral two times a day  chlorhexidine 0.12% Liquid 15 milliLiter(s) Oral Mucosa every 12 hours  chlorhexidine 2% Cloths 1 Application(s) Topical daily  chlorhexidine 4% Liquid 1 Application(s) Topical <User Schedule>  dextrose 5%. 1000 milliLiter(s) (50 mL/Hr) IV Continuous <Continuous>  dextrose 5%. 1000 milliLiter(s) (100 mL/Hr) IV Continuous <Continuous>  dextrose 50% Injectable 25 Gram(s) IV Push once  dextrose 50% Injectable 12.5 Gram(s) IV Push once  dextrose 50% Injectable 25 Gram(s) IV Push once  insulin glargine Injectable (LANTUS) 10 Unit(s) SubCutaneous at bedtime  insulin lispro (ADMELOG) corrective regimen sliding scale   SubCutaneous every 6 hours  pantoprazole  Injectable 40 milliGRAM(s) IV Push every 12 hours  senna 2 Tablet(s) Oral at bedtime  simvastatin 40 milliGRAM(s) Oral at bedtime    MEDICATIONS  (PRN):  acetaminophen   Oral Liquid .. 650 milliGRAM(s) Oral every 6 hours PRN Temp greater or equal to 38C (100.4F)  LORazepam   Injectable 1 milliGRAM(s) IV Push every 6 hours PRN myoclonus      PHYSICAL EXAM:  Vital Signs Last 24 Hrs  T(C): 38.1 (24 May 2023 12:00), Max: 38.1 (23 May 2023 16:00)  T(F): 100.5 (24 May 2023 12:00), Max: 100.5 (23 May 2023 16:00)  HR: 94 (24 May 2023 12:56) (90 - 102)  BP: 117/59 (24 May 2023 12:00) (112/66 - 158/80)  BP(mean): 77 (24 May 2023 12:00) (77 - 102)  RR: 16 (24 May 2023 12:00) (14 - 20)  SpO2: 100% (24 May 2023 12:56) (99% - 100%)    Parameters below as of 24 May 2023 11:00  Patient On (Oxygen Delivery Method): ventilator    O2 Concentration (%): 40  General: not interactive, non verbal       HEENT: n/c, a/t , ET Ng tubes    Lungs: coarse on vent    CV: normal  -tachy  GI: abd soft,     : normal/santos    Musculoskeletal: flaccid, w/twitching at times     Skin: w/d     Neuro: + deficits , not waking, not interactive at all   Oral intake ability:  unable   Diet: NPO, Ng     LABS:                          9.7    17.12 )-----------( 142      ( 24 May 2023 05:30 )             30.1     05-24    141  |  103  |  96<H>  ----------------------------<  220<H>  3.9   |  29  |  2.81<H>    Ca    9.0      24 May 2023 05:30  Phos  3.5     05-24  Mg     1.9     05-24    TPro  7.5  /  Alb  1.7<L>  /  TBili  0.6  /  DBili  x   /  AST  24  /  ALT  12  /  AlkPhos  98  05-24        RADIOLOGY & ADDITIONAL STUDIES: < from: Xray Chest 1 View-PORTABLE IMMEDIATE (Xray Chest 1 View-PORTABLE IMMEDIATE .) (05.23.23 @ 20:25) >  PROCEDURE DATE:  05/23/2023          INTERPRETATION:  INDICATION: NG tube    COMPARISON: 5/16/2023    Portable chest 5/23/2023 at 6:11 AM    Patient rotated.    FINDINGS:  Heart/Vascular: The heart size, mediastinum, hilum and aorta are stable.   Status post median sternotomy and CABG. Atherosclerotic change.  Pulmonary: Midline trachea. There is mild to moderate pulmonary venous   congestion and small pleural effusions.  Bones: There is no fracture.  Lines and catheter: ET tube is 3.2 cm above laura. Tip and side-port of   NG tube is in the stomach.    Portable chest 5/23/2023 at 8:24 PM    FINDINGS: ET tube 4 cm above laura. The tip and side-port of the NG tube   in stomach. Patient better positioned. Mild pulmonary venous congestion,   small pleural effusions and cardiomegaly again noted.    Impression:    ET tube andNG tube in good position    Mild pulmonary venous congestion and small pleural effusions are again   noted.      EEG Classification / Summary:    Abnormal EEG in a comatose patient.  - Nearly continuous generalized periodic discharges (GPDs), centrally predominant, at times left centrally predominant - lower amplitude vs prior day.  - Severe diffuse slowing  - Stimulus induced myoclonus    ---------------------------------------------------------------------------------------------------------------------------------------------------------      Clinical Impression:    -Stimulus induced myoclonus and GPDs s/p hypoxic diffuse indicative of severe cerebral dysfunction  -Single-lead EKG incidentally shows irregular rhythm.      ADVANCE DIRECTIVES:  no  full code   Advanced Care Planning discussion total time spent:

## 2023-05-25 LAB
ALBUMIN SERPL ELPH-MCNC: 1.4 G/DL — LOW (ref 3.3–5)
ALP SERPL-CCNC: 98 U/L — SIGNIFICANT CHANGE UP (ref 40–120)
ALT FLD-CCNC: 14 U/L — SIGNIFICANT CHANGE UP (ref 10–45)
ANION GAP SERPL CALC-SCNC: 8 MMOL/L — SIGNIFICANT CHANGE UP (ref 5–17)
AST SERPL-CCNC: 29 U/L — SIGNIFICANT CHANGE UP (ref 10–40)
BILIRUB SERPL-MCNC: 0.5 MG/DL — SIGNIFICANT CHANGE UP (ref 0.2–1.2)
BUN SERPL-MCNC: 96 MG/DL — HIGH (ref 7–23)
CALCIUM SERPL-MCNC: 8.4 MG/DL — SIGNIFICANT CHANGE UP (ref 8.4–10.5)
CHLORIDE SERPL-SCNC: 103 MMOL/L — SIGNIFICANT CHANGE UP (ref 96–108)
CO2 SERPL-SCNC: 28 MMOL/L — SIGNIFICANT CHANGE UP (ref 22–31)
CREAT SERPL-MCNC: 2.56 MG/DL — HIGH (ref 0.5–1.3)
EGFR: 25 ML/MIN/1.73M2 — LOW
GLUCOSE BLDC GLUCOMTR-MCNC: 217 MG/DL — HIGH (ref 70–99)
GLUCOSE BLDC GLUCOMTR-MCNC: 220 MG/DL — HIGH (ref 70–99)
GLUCOSE BLDC GLUCOMTR-MCNC: 227 MG/DL — HIGH (ref 70–99)
GLUCOSE BLDC GLUCOMTR-MCNC: 231 MG/DL — HIGH (ref 70–99)
GLUCOSE BLDC GLUCOMTR-MCNC: 231 MG/DL — HIGH (ref 70–99)
GLUCOSE SERPL-MCNC: 222 MG/DL — HIGH (ref 70–99)
HCT VFR BLD CALC: 24.9 % — LOW (ref 39–50)
HGB BLD-MCNC: 7.9 G/DL — LOW (ref 13–17)
MAGNESIUM SERPL-MCNC: 1.9 MG/DL — SIGNIFICANT CHANGE UP (ref 1.6–2.6)
MCHC RBC-ENTMCNC: 30.9 PG — SIGNIFICANT CHANGE UP (ref 27–34)
MCHC RBC-ENTMCNC: 31.7 GM/DL — LOW (ref 32–36)
MCV RBC AUTO: 97.3 FL — SIGNIFICANT CHANGE UP (ref 80–100)
NRBC # BLD: 0 /100 WBCS — SIGNIFICANT CHANGE UP (ref 0–0)
OB PNL STL: NEGATIVE — SIGNIFICANT CHANGE UP
PHOSPHATE SERPL-MCNC: 3.8 MG/DL — SIGNIFICANT CHANGE UP (ref 2.5–4.5)
PLATELET # BLD AUTO: 172 K/UL — SIGNIFICANT CHANGE UP (ref 150–400)
POTASSIUM SERPL-MCNC: 4 MMOL/L — SIGNIFICANT CHANGE UP (ref 3.5–5.3)
POTASSIUM SERPL-SCNC: 4 MMOL/L — SIGNIFICANT CHANGE UP (ref 3.5–5.3)
PROT SERPL-MCNC: 6.6 G/DL — SIGNIFICANT CHANGE UP (ref 6–8.3)
RBC # BLD: 2.56 M/UL — LOW (ref 4.2–5.8)
RBC # FLD: 14.8 % — HIGH (ref 10.3–14.5)
SODIUM SERPL-SCNC: 139 MMOL/L — SIGNIFICANT CHANGE UP (ref 135–145)
WBC # BLD: 14.01 K/UL — HIGH (ref 3.8–10.5)
WBC # FLD AUTO: 14.01 K/UL — HIGH (ref 3.8–10.5)

## 2023-05-25 PROCEDURE — 71045 X-RAY EXAM CHEST 1 VIEW: CPT | Mod: 26

## 2023-05-25 RX ORDER — PANTOPRAZOLE SODIUM 20 MG/1
40 TABLET, DELAYED RELEASE ORAL EVERY 12 HOURS
Refills: 0 | Status: DISCONTINUED | OUTPATIENT
Start: 2023-05-25 | End: 2023-06-07

## 2023-05-25 RX ORDER — MODAFINIL 200 MG/1
100 TABLET ORAL DAILY
Refills: 0 | Status: DISCONTINUED | OUTPATIENT
Start: 2023-05-25 | End: 2023-05-30

## 2023-05-25 RX ORDER — MUPIROCIN 20 MG/G
1 OINTMENT TOPICAL EVERY 12 HOURS
Refills: 0 | Status: COMPLETED | OUTPATIENT
Start: 2023-05-25 | End: 2023-05-30

## 2023-05-25 RX ORDER — IPRATROPIUM/ALBUTEROL SULFATE 18-103MCG
3 AEROSOL WITH ADAPTER (GRAM) INHALATION EVERY 6 HOURS
Refills: 0 | Status: DISCONTINUED | OUTPATIENT
Start: 2023-05-25 | End: 2023-06-07

## 2023-05-25 RX ORDER — POLYETHYLENE GLYCOL 3350 17 G/17G
17 POWDER, FOR SOLUTION ORAL DAILY
Refills: 0 | Status: DISCONTINUED | OUTPATIENT
Start: 2023-05-25 | End: 2023-06-02

## 2023-05-25 RX ADMIN — MODAFINIL 100 MILLIGRAM(S): 200 TABLET ORAL at 12:22

## 2023-05-25 RX ADMIN — INSULIN GLARGINE 10 UNIT(S): 100 INJECTION, SOLUTION SUBCUTANEOUS at 21:19

## 2023-05-25 RX ADMIN — MUPIROCIN 1 APPLICATION(S): 20 OINTMENT TOPICAL at 21:22

## 2023-05-25 RX ADMIN — BRIVARACETAM 50 MILLIGRAM(S): 25 TABLET, FILM COATED ORAL at 18:48

## 2023-05-25 RX ADMIN — SIMVASTATIN 40 MILLIGRAM(S): 20 TABLET, FILM COATED ORAL at 21:16

## 2023-05-25 RX ADMIN — PANTOPRAZOLE SODIUM 40 MILLIGRAM(S): 20 TABLET, DELAYED RELEASE ORAL at 17:44

## 2023-05-25 RX ADMIN — POLYETHYLENE GLYCOL 3350 17 GRAM(S): 17 POWDER, FOR SOLUTION ORAL at 12:22

## 2023-05-25 RX ADMIN — Medication 3 MILLILITER(S): at 21:45

## 2023-05-25 RX ADMIN — Medication 4: at 23:56

## 2023-05-25 RX ADMIN — Medication 4: at 17:45

## 2023-05-25 RX ADMIN — SENNA PLUS 2 TABLET(S): 8.6 TABLET ORAL at 21:16

## 2023-05-25 RX ADMIN — Medication 650 MILLIGRAM(S): at 18:29

## 2023-05-25 RX ADMIN — Medication 4: at 12:20

## 2023-05-25 RX ADMIN — Medication 3 MILLILITER(S): at 04:42

## 2023-05-25 RX ADMIN — CHLORHEXIDINE GLUCONATE 1 APPLICATION(S): 213 SOLUTION TOPICAL at 12:22

## 2023-05-25 RX ADMIN — CHLORHEXIDINE GLUCONATE 15 MILLILITER(S): 213 SOLUTION TOPICAL at 17:44

## 2023-05-25 RX ADMIN — BRIVARACETAM 50 MILLIGRAM(S): 25 TABLET, FILM COATED ORAL at 05:06

## 2023-05-25 RX ADMIN — Medication 4: at 05:06

## 2023-05-25 RX ADMIN — CEFEPIME 100 MILLIGRAM(S): 1 INJECTION, POWDER, FOR SOLUTION INTRAMUSCULAR; INTRAVENOUS at 13:00

## 2023-05-25 RX ADMIN — Medication 3 MILLILITER(S): at 16:00

## 2023-05-25 RX ADMIN — Medication 3 MILLILITER(S): at 09:10

## 2023-05-25 RX ADMIN — CHLORHEXIDINE GLUCONATE 15 MILLILITER(S): 213 SOLUTION TOPICAL at 05:05

## 2023-05-25 NOTE — PROGRESS NOTE ADULT - SUBJECTIVE AND OBJECTIVE BOX
Follow-up Critical Care Progress Note  Chief Complaint : Atrial fibrillation        patient seen and examined  tries to open eyes to sternal stimuli  no overnight events        Allergies :sulfa drugs (Unknown)      PAST MEDICAL & SURGICAL HISTORY:  HTN (hypertension)    HLD (hyperlipidemia)    Diabetes    CAD (coronary artery disease)    History of cholecystectomy        Medications:  MEDICATIONS  (STANDING):  albuterol/ipratropium for Nebulization 3 milliLiter(s) Nebulizer every 6 hours  brivaracetam Oral Solution 50 milliGRAM(s) Oral two times a day  cefepime   IVPB      cefepime   IVPB 500 milliGRAM(s) IV Intermittent daily  chlorhexidine 0.12% Liquid 15 milliLiter(s) Oral Mucosa every 12 hours  chlorhexidine 2% Cloths 1 Application(s) Topical daily  chlorhexidine 4% Liquid 1 Application(s) Topical <User Schedule>  dextrose 5%. 1000 milliLiter(s) (50 mL/Hr) IV Continuous <Continuous>  dextrose 5%. 1000 milliLiter(s) (100 mL/Hr) IV Continuous <Continuous>  dextrose 50% Injectable 25 Gram(s) IV Push once  dextrose 50% Injectable 12.5 Gram(s) IV Push once  dextrose 50% Injectable 25 Gram(s) IV Push once  insulin glargine Injectable (LANTUS) 10 Unit(s) SubCutaneous at bedtime  insulin lispro (ADMELOG) corrective regimen sliding scale   SubCutaneous every 6 hours  pantoprazole   Suspension 40 milliGRAM(s) Oral every 12 hours  polyethylene glycol 3350 17 Gram(s) Oral daily  senna 2 Tablet(s) Oral at bedtime  simvastatin 40 milliGRAM(s) Oral at bedtime    MEDICATIONS  (PRN):  acetaminophen   Oral Liquid .. 650 milliGRAM(s) Oral every 6 hours PRN Temp greater or equal to 38C (100.4F)  LORazepam   Injectable 1 milliGRAM(s) IV Push every 8 hours PRN myoclonus      Antibiotics History  cefepime   IVPB    , 23 @ 17:36  cefepime   IVPB 500 milliGRAM(s) IV Intermittent once, 23 @ 16:39  cefepime   IVPB 500 milliGRAM(s) IV Intermittent daily, 23 @ 12:00  piperacillin/tazobactam IVPB. 3.375 Gram(s) IV Intermittent once, 23 @ 10:58, Stop order after: 1 Doses  piperacillin/tazobactam IVPB.- 3.375 Gram(s) IV Intermittent once, 23 @ 15:00  piperacillin/tazobactam IVPB.. 3.375 Gram(s) IV Intermittent every 8 hours, 23 @ 22:00, Stop order after: 7 Days  piperacillin/tazobactam IVPB.. 3.375 Gram(s) IV Intermittent every 12 hours, 23 @ 22:27, Stop order after: 7 Days      Heme Medications       GI Medications  pantoprazole   Suspension 40 milliGRAM(s) Oral every 12 hours, 23 @ 08:37, Routine  polyethylene glycol 3350 17 Gram(s) Oral daily, 23 @ 08:36, Routine  senna 2 Tablet(s) Oral at bedtime, 23 @ 21:37, Routine      COVID  23 @ 19:50  COVID -   NotDetec  22 @ 18:33  COVID -   NotDetec      COVID Biomarkers    23 @ 19:50 ESR --  ---  CRP --  ---  DDimer  351<H>   ---   LDH --   ---   Ferritin --         Procalcitonin Trend  23 @ 23:44   -   0.59<H>  23 @ 22:16   -   0.74<H>  05-15-23 @ 05:45   -   1.12<H>  23 @ 13:42   -   0.11<H>    WBC Trend  23 @ 05:00   -  14.01<H>  23 @ 05:30   -  17.12<H>  23 @ 06:30   -  16.38<H>  23 @ 13:18   -  13.89<H>    H/H Trend  23 @ 05:00   -   7.9<L>/ 24.9<L>  23 @ 05:30   -   9.7<L>/ 30.1<L>  23 @ 06:30   -   10.1<L>/ 31.6<L>  23 @ 13:18   -   9.1<L>/ 28.8<L>  23 @ 05:00   -   9.4<L>/ 29.9<L>  23 @ 06:15   -   9.5<L>/ 29.8<L>    Stool Occult Blood  23 @ 17:00   -   Positive<!>    Platelet Trend  23 @ 05:00   -  172  23 @ 05:30   -  142<L>  23 @ 06:30   -  85<L>  23 @ 13:18   -  114<L>    Trend Sodium  23 @ 05:00   -  139  23 @ 05:30   -  141  23 @ 06:30   -  143    Trend Potassium  23 @ 05:00   -  4.0  23 @ 05:30   -  3.9  23 @ 06:30   -  4.2    Trend Bun/Cr  23 @ 05:00  BUN/CR -  96<H> / 2.56<H>  23 @ 05:30  BUN/CR -  96<H> / 2.81<H>  23 @ 06:30  BUN/CR -  103<H> / 3.02<H>    Lactic Acid Trend    ABG Trend  23 @ 00:22   - 7.37/43/144<H>/99.8<H>  23 @ 05:38   - 7.41/37/97/98.4<H>  23 @ 23:36   - 7.42/37/97/98.3<H>  23 @ 22:10   - 7.41/41/92/98.5<H>  23 @ 05:59   - 7.35/47/104/98.8<H>  05-15-23 @ 13:45   - 7.36/47/74<L>/96.6  05-15-23 @ 04:45   - 7.33<L>/48/70<L>/95.7  23 @ 05:20   - 7.40/46/72<L>/96.5  23 @ 06:00   - 7.34<L>/46/93/98.7<H>  23 @ 03:35   - 7.31<L>/46/108/99.6<H>  23 @ 23:15   - 7.31<L>/49<H>/79<L>/97.1  23 @ 07:55   - 7.41/42/152<H>/99.2<H>    Trend AST/ALT/ALK Phos/Bili  23 @ 05:00   98/0.5  23 @ 05:30   24/98/0.6  23 @ 06:15   14/10/69/0.5  23 @ 07:50   26/7<L>/65/0.6  23 @ 04:08   17/7<L>/63/0.4  23 @ 00:44   17/7<L>/62/0.5  23 @ 23:44   13/5<L>/53/0.5  23 @ 22:16   15/7<L>/50/0.5  23 @ 05:10   16/60/0.7  05-15-23 @ 13:55   17/10<L>/62/0.8  05-15-23 @ 05:45   18/10/74/0.7  23 @ 05:00   14/10/71/1.0      Ammonia Trend  23 @ 11:30   -   53  23 @ 05:00   -   16      Amylase / Lipase Trend      Albumin Trend  23 @ 05:00   -   1.4<L>  23 @ 05:30   -   1.7<L>  23 @ 06:15   -   1.7<L>  23 @ 07:50   -   1.6<L>  23 @ 04:08   -   2.4<L>  23 @ 00:44   -   2.5<L>      PTT - PT - INR Trend  23 @ 00:44   -   26.5<L> - 13.2 - 1.15  23 @ 23:44   -   31.2 - 13.7<H> - 1.18<H>  1623 @ 22:16   -   31.3 - 13.6<H> - 1.18<H>  1523 @ 13:55   -   32.9 - 14.0<H> - 1.19<H>  23 @ 13:45   -   33.6 - -- - --  23 @ 11:30   -   34.1 - 14.9<H> - 1.28<H>    Glucose Trend  23 @ 05:02   -  -- -- 231<H>  23 @ 05:00   -  -- 222<H> --  23 @ 23:14   -  -- -- 264<H>  23 @ 21:19   -  -- -- 269<H>  23 @ 17:33   -  -- -- 297<H>  23 @ 11:57   -  -- -- 224<H>  23 @ 05:47   -  -- -- 205<H>  23 @ 05:30   -  -- 220<H> --  23 @ 00:53   -  -- -- 251<H>  23 @ 00:50   -  -- -- 270<H>    A1C with Estimated Average Glucose Result: 7.7 % *H* [4.0 - 5.6] (23 @ 08:00)      LABS:                        7.9    14.01 )-----------( 172      ( 25 May 2023 05:00 )             24.9     0525    139  |  103  |  96<H>  ----------------------------<  222<H>  4.0   |  28  |  2.56<H>    Ca    8.4      25 May 2023 05:00  Phos  3.8       Mg     1.9         TPro  6.6  /  Alb  1.4<L>  /  TBili  0.5  /  DBili  x   /  AST  29  /  ALT  14  /  AlkPhos  98         Urinalysis Basic - ( 24 May 2023 15:05 )    Color: Yellow / Appearance: Clear / S.013 / pH: x  Gluc: x / Ketone: Negative mg/dL  / Bili: Negative / Urobili: 1.0 mg/dL   Blood: x / Protein: 100 mg/dL / Nitrite: Negative   Leuk Esterase: Trace / RBC: 7 /HPF / WBC 3 /HPF   Sq Epi: x / Non Sq Epi: x / Bacteria: Occasional /HPF       CULTURES: (if applicable)    Culture - Sputum (collected 23 @ 14:14)  Source: .Sputum Sputum  Gram Stain (23 @ 22:38):    Numerous polymorphonuclear leukocytes per low power field    No Squamous epithelial cells per low power field    Moderate Gram Positive Cocci in Clusters per oil power field         RADIOLOGY  < from: Xray Chest 1 View- PORTABLE-Urgent (Xray Chest 1 View- PORTABLE-Urgent .) (23 @ 15:29) >    ACC: [id number] EXAM:  XR CHEST PORTABLE URGENT 1V   ORDERED BY: SAAD OLSEN     PROCEDURE DATE:  2023          INTERPRETATION:  AP chest on May 24, 2023 at 2:20 PM. Patient had   replacement of NG tube.    Heart magnified by technique. Sternotomy and endotracheal tube remain.    There is perihilar infiltration on the right somewhat increased from May 23.    There has been exchange of NG tube for different style. NG tube position is good.    IMPRESSION: Increasing right perihilar infiltration. Exchange of NG tube.    --- End of Report ---      < end of copied text >        VITALS:  T(C): 36.7 (23 @ 06:00), Max: 38.1 (23 @ 12:00)  T(F): 98 (23 @ 06:00), Max: 100.5 (23 @ 12:00)  HR: 91 (23 @ 10:00) (75 - 100)  BP: 107/62 (23 @ 10:00) (85/44 - 124/62)  BP(mean): 77 (23 @ 10:00) (56 - 85)  ABP: --  ABP(mean): --  RR: 21 (23 @ 10:00) (14 - 21)  SpO2: 98% (23 @ 10:00) (98% - 100%)  CVP(mm Hg): --  CVP(cm H2O): --    Ins and Outs     23 @ 07:01  -  23 @ 07:00  --------------------------------------------------------  IN: 2880 mL / OUT: 1250 mL / NET: 1630 mL            Device: Avea, Mode: AC/ CMV (Assist Control/ Continuous Mandatory Ventilation), RR (machine): 12, RR (patient): 17, TV (machine): 450, TV (patient): 410, FiO2: 30, PEEP: 5, MAP: 11, PIP: 27    I&O's Detail    24 May 2023 07:01  -  25 May 2023 07:00  --------------------------------------------------------  IN:    Free Water: 1800 mL    Nepro with Carb Steady: 1080 mL  Total IN: 2880 mL    OUT:    Voided (mL): 1250 mL  Total OUT: 1250 mL    Total NET: 1630 mL

## 2023-05-25 NOTE — PROGRESS NOTE ADULT - SUBJECTIVE AND OBJECTIVE BOX
Intubated in ICU     Vital Signs Last 24 Hrs  T(C): 38 (23 @ 18:00), Max: 38 (23 @ 18:00)  T(F): 100.4 (23 @ 18:00), Max: 100.4 (23 @ 18:00)  HR: 87 (23 @ 18:00) (75 - 91)  BP: 116/57 (23 @ 18:00) (95/52 - 124/69)  BP(mean): 73 (23 @ 18:00) (64 - 86)  RR: 19 (23 @ 18:00) (14 - 26)  SpO2: 98% (23 @ 18:00) (98% - 100%)    I&O's Detail    24 May 2023 07:01  -  25 May 2023 07:00  --------------------------------------------------------  IN:    Free Water: 1800 mL    Nepro with Carb Steady: 1080 mL  Total IN: 2880 mL    OUT:    Voided (mL): 1250 mL  Total OUT: 1250 mL    Total NET: 1630 mL    25 May 2023 07:01  -  25 May 2023 21:18  --------------------------------------------------------  IN:    Free Water: 600 mL    Nepro with Carb Steady: 540 mL  Total IN: 1140 mL    OUT:    Voided (mL): 900 mL  Total OUT: 900 mL    Total NET: 240 mL    Mode: AC/ CMV (Assist Control/ Continuous Mandatory Ventilation)  RR (machine): 12  TV (machine): 450  FiO2: 30  PEEP: 5  MAP: 12  PIP: 22    s1s2  b/l air entry  soft, ND  + edema                                                                                   7.9    14.01 )-----------( 172      ( 25 May 2023 05:00 )             24.9     25 May 2023 05:00    139    |  103    |  96     ----------------------------<  222    4.0     |  28     |  2.56     Ca    8.4        25 May 2023 05:00  Phos  3.8       25 May 2023 05:00  Mg     1.9       25 May 2023 05:00    TPro  6.6    /  Alb  1.4    /  TBili  0.5    /  DBili  x      /  AST  29     /  ALT  14     /  AlkPhos  98     25 May 2023 05:00    LIVER FUNCTIONS - ( 25 May 2023 05:00 )  Alb: 1.4 g/dL / Pro: 6.6 g/dL / ALK PHOS: 98 U/L / ALT: 14 U/L / AST: 29 U/L / GGT: x           Urinalysis Basic - ( 24 May 2023 15:05 )    Color: Yellow / Appearance: Clear / S.013 / pH: x  Gluc: x / Ketone: Negative mg/dL  / Bili: Negative / Urobili: 1.0 mg/dL   Blood: x / Protein: 100 mg/dL / Nitrite: Negative   Leuk Esterase: Trace / RBC: 7 /HPF / WBC 3 /HPF   Sq Epi: x / Non Sq Epi: x / Bacteria: Occasional /HPF    Culture - Blood (collected 24 May 2023 14:30)  Source: .Blood Blood  Preliminary Report (25 May 2023 19:02):    No growth to date.    Culture - Sputum (collected 24 May 2023 14:14)  Source: .Sputum Sputum  Gram Stain (24 May 2023 22:38):    Numerous polymorphonuclear leukocytes per low power field    No Squamous epithelial cells per low power field    Moderate Gram Positive Cocci in Clusters per oil power field  Preliminary Report (25 May 2023 16:04):    Moderate Staphylococcus aureus    acetaminophen   Oral Liquid .. 650 milliGRAM(s) Oral every 6 hours PRN  albuterol/ipratropium for Nebulization 3 milliLiter(s) Nebulizer every 6 hours  brivaracetam Oral Solution 50 milliGRAM(s) Oral two times a day  cefepime   IVPB      cefepime   IVPB 500 milliGRAM(s) IV Intermittent daily  chlorhexidine 0.12% Liquid 15 milliLiter(s) Oral Mucosa every 12 hours  chlorhexidine 2% Cloths 1 Application(s) Topical daily  chlorhexidine 4% Liquid 1 Application(s) Topical <User Schedule>  dextrose 5%. 1000 milliLiter(s) IV Continuous <Continuous>  dextrose 5%. 1000 milliLiter(s) IV Continuous <Continuous>  dextrose 50% Injectable 25 Gram(s) IV Push once  dextrose 50% Injectable 12.5 Gram(s) IV Push once  dextrose 50% Injectable 25 Gram(s) IV Push once  insulin glargine Injectable (LANTUS) 10 Unit(s) SubCutaneous at bedtime  insulin lispro (ADMELOG) corrective regimen sliding scale   SubCutaneous every 6 hours  LORazepam   Injectable 1 milliGRAM(s) IV Push every 8 hours PRN  modafinil 100 milliGRAM(s) Oral daily  mupirocin 2% Nasal 1 Application(s) Both Nostrils every 12 hours  pantoprazole   Suspension 40 milliGRAM(s) Oral every 12 hours  polyethylene glycol 3350 17 Gram(s) Oral daily  senna 2 Tablet(s) Oral at bedtime  simvastatin 40 milliGRAM(s) Oral at bedtime    A/P:    Hemodynamic ATN s/p arrest (Cr 1.6 - 23, Cr 1.0 - 23)  Resp failure, asp PNA, intubated   Hx CM, EF 35 - 40%, dementia  BP, resp support per ICU team  Concern for anoxic encephalopathy   S/p EEG monitoring  Avoid nephrotoxins  F/u BMP, UO   Lytes are stable  Cr is improving  Good UO  No objections to diuretics as needed  Overall prognosis is poor    [phone]

## 2023-05-25 NOTE — PROGRESS NOTE ADULT - ASSESSMENT
Physical Examination:  GENERAL:               Eyes closed, unresponsive  HEENT:                    Pupils equal, reactive to light.  constricted . No JVD, Dry MM  PULM:                     Bilateral air entry, Clear to auscultation bilaterally, no significant sputum production, No Rales, No Rhonchi, No Wheezing  CVS:                         S1, S2,  +Murmur  ABD:                        Soft, nondistended, nontender, normoactive bowel sounds,   EXT:                         No edema, nontender, No Cyanosis or Clubbing   Vascular:                Warm Extremities,   NEURO:                  Eyes closd, episodes of myoclonus  PSYC:                      Calm, no Insight.      Assessment  78 year old male with a PMH of dementia, HTN, cardiomyopathy, HFrEF, CAD s/p CABG, with known current RCA occlusion, and DM type 2 who was admitted to [facility] on 5/4 with hypoxic respiratory failure in setting of CHF exacerbation and ANISH with course complicated by acute hypoxic respiratory failure in setting of choking episode causing cardiac arrest, shock, worsening hypoxemic respiratory failure, and NSTEMI on 5/9. Pt ICU course noted post cardiac arrest anoxic brain injury and myoclonus. Pt was transferred to [facility] for VEEG which required 48 hours of monitoring as he was noted to still be sedated. While there pt noted to have hematochezia and any ac / antiplatelet agents were held at that time. EEG findings consistent with stimulus induced myoclonus and GPDs s/p hypoxic diffuse indicative of severe cerebral dysfunction.    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  Acute renal failure w Uremia post cardiac arrest improving  Hemtochezia episode, h/h stable no further episodes noted  Heart failure with reduced EF  NSTEMI post arrest       Underlying PMH of dementia, HTN, cardiomyopathy, HFrEF, CAD s/p CABG, with known current RCA occlusion, and DM type 2      Plan:    Continue mechanical ventilation  Ativan prn for myoclonus, will decrease dose   no bm, continue PPI BID, trend cbc  on antibiotics, f/u cultures   no BM   TF as tolerated  IVF held   will start modafanil see if metnal status improve  monitor blood glucose levels, + ISS coverage  Venodynes for dvt ppx  failed cpap trial due to low tv  ppi for gi prophylaxis   monitor renal function, nephrology following, appreciate their rec's, slight decrease in creatinine  am labs, replace lytes prn  GO ongoing discussion with family        PMD:				                   Notified(Date):  Family Updated: 	Kuldeep [phone]	                                 Date:  5/25  updated on status  as above  discussed need for trach/peg    Sedation & Analgesia:	  Diet/Nutrition:		 Diet, NPO with Tube Feed:   Tube Feeding Modality: Nasogastric         GI PPx:			pantoprazole   Suspension 40 milliGRAM(s) Oral daily    DVT Ppx:		Venodynes       Activity:		    Head of Bed:               35-45 Deg  Glycemic Control:         Insulin    Lines:  CENTRAL LINE: 	[ ] YES [ x] NO	                    LOCATION:   	                       DATE INSERTED:   	                    REMOVE:  [ ] YES [ ] NO    A-LINE:  	                [ ] YES [ x] NO                      LOCATION:   	                       DATE INSERTED: 		            REMOVE:  [ ] YES [ ] NO    [place]: 		        [ ] YES [x ] NO  		                                       DATE INSERTED:		            REMOVE:  [ ] YES [ ] NO      Restraints were deemed necessary to prevent removal of life-sustaining devices [  ] YES   [  x  ]  NO    Disposition: ICU Care    Goals of Care: Full code

## 2023-05-25 NOTE — PROGRESS NOTE ADULT - SUBJECTIVE AND OBJECTIVE BOX
pt vent noted with elevated peak pressures.   pt suctioned for thick yellow secretion.   pt lavaged and ambued with good compliance noted     sputum culture sent   bronchodilator started

## 2023-05-26 LAB
-  AMPICILLIN/SULBACTAM: SIGNIFICANT CHANGE UP
-  CEFAZOLIN: SIGNIFICANT CHANGE UP
-  CLINDAMYCIN: SIGNIFICANT CHANGE UP
-  ERYTHROMYCIN: SIGNIFICANT CHANGE UP
-  GENTAMICIN: SIGNIFICANT CHANGE UP
-  OXACILLIN: SIGNIFICANT CHANGE UP
-  PENICILLIN: SIGNIFICANT CHANGE UP
-  RIFAMPIN: SIGNIFICANT CHANGE UP
-  TETRACYCLINE: SIGNIFICANT CHANGE UP
-  TRIMETHOPRIM/SULFAMETHOXAZOLE: SIGNIFICANT CHANGE UP
-  VANCOMYCIN: SIGNIFICANT CHANGE UP
ANION GAP SERPL CALC-SCNC: 7 MMOL/L — SIGNIFICANT CHANGE UP (ref 5–17)
BUN SERPL-MCNC: 95 MG/DL — HIGH (ref 7–23)
CALCIUM SERPL-MCNC: 8.6 MG/DL — SIGNIFICANT CHANGE UP (ref 8.4–10.5)
CHLORIDE SERPL-SCNC: 103 MMOL/L — SIGNIFICANT CHANGE UP (ref 96–108)
CO2 SERPL-SCNC: 29 MMOL/L — SIGNIFICANT CHANGE UP (ref 22–31)
CREAT SERPL-MCNC: 2.44 MG/DL — HIGH (ref 0.5–1.3)
CULTURE RESULTS: SIGNIFICANT CHANGE UP
EGFR: 26 ML/MIN/1.73M2 — LOW
GLUCOSE BLDC GLUCOMTR-MCNC: 232 MG/DL — HIGH (ref 70–99)
GLUCOSE BLDC GLUCOMTR-MCNC: 239 MG/DL — HIGH (ref 70–99)
GLUCOSE BLDC GLUCOMTR-MCNC: 262 MG/DL — HIGH (ref 70–99)
GLUCOSE SERPL-MCNC: 211 MG/DL — HIGH (ref 70–99)
GRAM STN FLD: SIGNIFICANT CHANGE UP
HCT VFR BLD CALC: 25.8 % — LOW (ref 39–50)
HGB BLD-MCNC: 8.2 G/DL — LOW (ref 13–17)
MAGNESIUM SERPL-MCNC: 2 MG/DL — SIGNIFICANT CHANGE UP (ref 1.6–2.6)
MCHC RBC-ENTMCNC: 30.9 PG — SIGNIFICANT CHANGE UP (ref 27–34)
MCHC RBC-ENTMCNC: 31.8 GM/DL — LOW (ref 32–36)
MCV RBC AUTO: 97.4 FL — SIGNIFICANT CHANGE UP (ref 80–100)
METHOD TYPE: SIGNIFICANT CHANGE UP
NRBC # BLD: 0 /100 WBCS — SIGNIFICANT CHANGE UP (ref 0–0)
ORGANISM # SPEC MICROSCOPIC CNT: SIGNIFICANT CHANGE UP
ORGANISM # SPEC MICROSCOPIC CNT: SIGNIFICANT CHANGE UP
PHOSPHATE SERPL-MCNC: 3.7 MG/DL — SIGNIFICANT CHANGE UP (ref 2.5–4.5)
PLATELET # BLD AUTO: 189 K/UL — SIGNIFICANT CHANGE UP (ref 150–400)
POTASSIUM SERPL-MCNC: 4 MMOL/L — SIGNIFICANT CHANGE UP (ref 3.5–5.3)
POTASSIUM SERPL-SCNC: 4 MMOL/L — SIGNIFICANT CHANGE UP (ref 3.5–5.3)
RBC # BLD: 2.65 M/UL — LOW (ref 4.2–5.8)
RBC # FLD: 14.9 % — HIGH (ref 10.3–14.5)
SODIUM SERPL-SCNC: 139 MMOL/L — SIGNIFICANT CHANGE UP (ref 135–145)
SPECIMEN SOURCE: SIGNIFICANT CHANGE UP
SPECIMEN SOURCE: SIGNIFICANT CHANGE UP
WBC # BLD: 10.98 K/UL — HIGH (ref 3.8–10.5)
WBC # FLD AUTO: 10.98 K/UL — HIGH (ref 3.8–10.5)

## 2023-05-26 RX ORDER — ALBUMIN HUMAN 25 %
50 VIAL (ML) INTRAVENOUS ONCE
Refills: 0 | Status: DISCONTINUED | OUTPATIENT
Start: 2023-05-26 | End: 2023-05-26

## 2023-05-26 RX ADMIN — PANTOPRAZOLE SODIUM 40 MILLIGRAM(S): 20 TABLET, DELAYED RELEASE ORAL at 05:11

## 2023-05-26 RX ADMIN — POLYETHYLENE GLYCOL 3350 17 GRAM(S): 17 POWDER, FOR SOLUTION ORAL at 12:25

## 2023-05-26 RX ADMIN — CHLORHEXIDINE GLUCONATE 15 MILLILITER(S): 213 SOLUTION TOPICAL at 05:12

## 2023-05-26 RX ADMIN — Medication 3 MILLILITER(S): at 16:37

## 2023-05-26 RX ADMIN — PANTOPRAZOLE SODIUM 40 MILLIGRAM(S): 20 TABLET, DELAYED RELEASE ORAL at 17:41

## 2023-05-26 RX ADMIN — CHLORHEXIDINE GLUCONATE 15 MILLILITER(S): 213 SOLUTION TOPICAL at 17:41

## 2023-05-26 RX ADMIN — INSULIN GLARGINE 10 UNIT(S): 100 INJECTION, SOLUTION SUBCUTANEOUS at 21:26

## 2023-05-26 RX ADMIN — Medication 4: at 05:17

## 2023-05-26 RX ADMIN — CEFEPIME 100 MILLIGRAM(S): 1 INJECTION, POWDER, FOR SOLUTION INTRAMUSCULAR; INTRAVENOUS at 12:26

## 2023-05-26 RX ADMIN — CHLORHEXIDINE GLUCONATE 1 APPLICATION(S): 213 SOLUTION TOPICAL at 12:26

## 2023-05-26 RX ADMIN — Medication 2: at 18:11

## 2023-05-26 RX ADMIN — Medication 3 MILLILITER(S): at 21:35

## 2023-05-26 RX ADMIN — MUPIROCIN 1 APPLICATION(S): 20 OINTMENT TOPICAL at 17:42

## 2023-05-26 RX ADMIN — MUPIROCIN 1 APPLICATION(S): 20 OINTMENT TOPICAL at 05:02

## 2023-05-26 RX ADMIN — BRIVARACETAM 50 MILLIGRAM(S): 25 TABLET, FILM COATED ORAL at 05:22

## 2023-05-26 RX ADMIN — BRIVARACETAM 50 MILLIGRAM(S): 25 TABLET, FILM COATED ORAL at 18:17

## 2023-05-26 RX ADMIN — SIMVASTATIN 40 MILLIGRAM(S): 20 TABLET, FILM COATED ORAL at 21:18

## 2023-05-26 RX ADMIN — MODAFINIL 100 MILLIGRAM(S): 200 TABLET ORAL at 16:17

## 2023-05-26 RX ADMIN — Medication 3 MILLILITER(S): at 04:55

## 2023-05-26 RX ADMIN — Medication 4: at 12:25

## 2023-05-26 RX ADMIN — Medication 3 MILLILITER(S): at 09:31

## 2023-05-26 RX ADMIN — Medication 6: at 23:31

## 2023-05-26 RX ADMIN — SENNA PLUS 2 TABLET(S): 8.6 TABLET ORAL at 21:18

## 2023-05-26 NOTE — PROGRESS NOTE ADULT - SUBJECTIVE AND OBJECTIVE BOX
Follow-up Critical Care Progress Note  Chief Complaint : Atrial fibrillation    pt on vent  opens eyes to touch  remains non verbal  has not gotten ativan since              Allergies :sulfa drugs (Unknown)      PAST MEDICAL & SURGICAL HISTORY:  HTN (hypertension)  HLD (hyperlipidemia)  Diabetes  CAD (coronary artery disease)  History of cholecystectomy    Medications:  MEDICATIONS  (STANDING):  albuterol/ipratropium for Nebulization 3 milliLiter(s) Nebulizer every 6 hours  albuterol/ipratropium for Nebulization 3 milliLiter(s) Nebulizer every 6 hours  brivaracetam Oral Solution 50 milliGRAM(s) Oral two times a day  cefepime   IVPB 500 milliGRAM(s) IV Intermittent daily  cefepime   IVPB      chlorhexidine 0.12% Liquid 15 milliLiter(s) Oral Mucosa every 12 hours  chlorhexidine 2% Cloths 1 Application(s) Topical daily  chlorhexidine 4% Liquid 1 Application(s) Topical <User Schedule>  dextrose 5%. 1000 milliLiter(s) (100 mL/Hr) IV Continuous <Continuous>  dextrose 5%. 1000 milliLiter(s) (50 mL/Hr) IV Continuous <Continuous>  dextrose 50% Injectable 25 Gram(s) IV Push once  dextrose 50% Injectable 12.5 Gram(s) IV Push once  dextrose 50% Injectable 25 Gram(s) IV Push once  insulin glargine Injectable (LANTUS) 10 Unit(s) SubCutaneous at bedtime  insulin lispro (ADMELOG) corrective regimen sliding scale   SubCutaneous every 6 hours  modafinil 100 milliGRAM(s) Oral daily  mupirocin 2% Nasal 1 Application(s) Both Nostrils every 12 hours  pantoprazole   Suspension 40 milliGRAM(s) Oral every 12 hours  polyethylene glycol 3350 17 Gram(s) Oral daily  senna 2 Tablet(s) Oral at bedtime  simvastatin 40 milliGRAM(s) Oral at bedtime    MEDICATIONS  (PRN):  acetaminophen   Oral Liquid .. 650 milliGRAM(s) Oral every 6 hours PRN Temp greater or equal to 38C (100.4F)  LORazepam   Injectable 1 milliGRAM(s) IV Push every 8 hours PRN myoclonus      Antibiotics History  cefepime   IVPB 500 milliGRAM(s) IV Intermittent once, 23 @ 16:39  cefepime   IVPB 500 milliGRAM(s) IV Intermittent daily, 23 @ 12:00  cefepime   IVPB    , 23 @ 17:36  piperacillin/tazobactam IVPB. 3.375 Gram(s) IV Intermittent once, 23 @ 10:58, Stop order after: 1 Doses  piperacillin/tazobactam IVPB.- 3.375 Gram(s) IV Intermittent once, 23 @ 15:00  piperacillin/tazobactam IVPB.. 3.375 Gram(s) IV Intermittent every 8 hours, 23 @ 22:00, Stop order after: 7 Days  piperacillin/tazobactam IVPB.. 3.375 Gram(s) IV Intermittent every 12 hours, 23 @ 22:27, Stop order after: 7 Days      Heme Medications       GI Medications  pantoprazole   Suspension 40 milliGRAM(s) Oral every 12 hours, 23 @ 08:37, Routine  polyethylene glycol 3350 17 Gram(s) Oral daily, 23 @ 08:36, Routine  senna 2 Tablet(s) Oral at bedtime, 23 @ 21:37, Routine      COVID  23 @ 19:50  COVID -   NotDetec  22 @ 18:33  COVID -   NotDetec      Procalcitonin Trend  23 @ 23:44   -   0.59<H>  23 @ 22:16   -   0.74<H>  05-15-23 @ 05:45   -   1.12<H>  23 @ 13:42   -   0.11<H>    WBC Trend  23 @ 05:05   -  10.98<H>  23 @ 05:00   -  14.01<H>  23 @ 05:30   -  17.12<H>    H/H Trend  23 @ 05:05   -   8.2<L>/ 25.8<L>  23 @ 05:00   -   7.9<L>/ 24.9<L>  23 @ 05:30   -   9.7<L>/ 30.1<L>  23 @ 06:30   -   10.1<L>/ 31.6<L>  23 @ 13:18   -   9.1<L>/ 28.8<L>  23 @ 05:00   -   9.4<L>/ 29.9<L>    Stool Occult Blood  23 @ 18:20   -   Negative  23 @ 17:00   -   Positive<!>    Platelet Trend  23 @ 05:05   -  189  23 @ 05:00   -  172  23 @ 05:30   -  142<L>    Trend Sodium  23 @ 05:05   -  139  23 @ 05:00   -  139  23 @ 05:30   -  141    Trend Potassium  23 @ 05:05   -  4.0  23 @ 05:00   -  4.0  23 @ 05:30   -  3.9    Trend Bun/Cr  23 @ 05:05  BUN/CR -  95<H> / 2.44<H>  23 @ 05:00  BUN/CR -  96<H> / 2.56<H>  23 @ 05:30  BUN/CR -  96<H> / 2.81<H>    Lactic Acid Trend    ABG Trend  23 @ 00:22   - 7.37/43/144<H>/99.8<H>  23 @ 05:38   - 7.41/37/97/98.4<H>  23 @ 23:36   - 7.42/37/97/98.3<H>  23 @ 22:10   - 7.41/41/92/98.5<H>  23 @ 05:59   - 7.35/47/104/98.8<H>  05-15-23 @ 13:45   - 7.36/47/74<L>/96.6  05-15-23 @ 04:45   - 7.33<L>/48/70<L>/95.7  23 @ 05:20   - 7.40/46/72<L>/96.5  23 @ 06:00   - 7.34<L>/46/93/98.7<H>  05-13-23 @ 03:35   - 7.31<L>/46/108/99.6<H>  23 @ 23:15   - 7.31<L>/49<H>/79<L>/97.1  05 @ 07:55   - 7.41/42/152<H>/99.2<H>    Trend AST/ALT/ALK Phos/Bili  23 @ 05:00   /98/0.5  23 @ 05:30   /98/0.6  23 @ 06:15   14/10/69/0.5  23 @ 07:50   26/7<L>/65/0.6  23 @ 04:08   17/7<L>/63/0.4  23 @ 00:44   17/7<L>/62/0.5  23 @ 23:44   13/5<L>/53/0.5  23 @ 22:16   15/7<L>/50/0.5  23 @ 05:10   16/60/0.7  05-15-23 @ 13:55   17/10<L>/62/0.8  05-15-23 @ 05:45   18/10/74/0.7  23 @ 05:00   14/10/71/1.0      Ammonia Trend  23 @ 11:30   -   53  23 @ 05:00   -   16       Albumin Trend  23 @ 05:00   -   1.4<L>  23 @ 05:30   -   1.7<L>  23 @ 06:15   -   1.7<L>  23 @ 07:50   -   1.6<L>  23 @ 04:08   -   2.4<L>  23 @ 00:44   -   2.5<L>      PTT - PT - INR Trend  23 @ 00:44   -   26.5<L> - 13.2 - 1.15  23 @ 23:44   -   31.2 - 13.7<H> - 1.18<H>  23 @ 22:16   -   31.3 - 13.6<H> - 1.18<H>  05-15-23 @ 13:55   -   32.9 - 14.0<H> - 1.19<H>  23 @ 13:45   -   33.6 - -- - --  23 @ 11:30   -   34.1 - 14.9<H> - 1.28<H>    Glucose Trend  23 @ 05:15   -  -- -- 232<H>  23 @ 05:05   -  -- 211<H> --  23 @ 23:43   -  -- -- 227<H>  23 @ 21:10   -  -- -- 220<H>  23 @ 17:42   -  -- -- 231<H>  23 @ 12:16   -  -- -- 217<H>  23 @ 05:02   -  -- -- 231<H>  23 @ 05:00   -  -- 222<H> --  23 @ 23:14   -  -- -- 264<H>  23 @ 21:19   -  -- -- 269<H>    A1C with Estimated Average Glucose Result: 7.7 % *H* [4.0 - 5.6] (23 @ 08:00)     LABS:                        8.2    10.98 )-----------( 189      ( 26 May 2023 05:05 )             25.8     26    139  |  103  |  95<H>  ----------------------------<  211<H>  4.0   |  29  |  2.44<H>    Ca    8.6      26 May 2023 05:05  Phos  3.7       Mg     2.0         TPro  6.6  /  Alb  1.4<L>  /  TBili  0.5  /  DBili  x   /  AST  29  /  ALT  14  /  AlkPhos  98  05-25     Urinalysis Basic - ( 24 May 2023 15:05 )    Color: Yellow / Appearance: Clear / S.013 / pH: x / Gluc: x / Ketone: Negative mg/dL  / Bili: Negative / Urobili: 1.0 mg/dL  / Blood: x / Protein: 100 mg/dL / Nitrite: Negative   Leuk Esterase: Trace / RBC: 7 /HPF / WBC 3 /HPF / Sq Epi: x / Non Sq Epi: x / Bacteria: Occasional /HPF     CULTURES: (if applicable)    Culture - Blood (collected 23 @ 15:05)  Source: .Blood Blood  Preliminary Report (23 @ 01:02):    No growth to date.    Culture - Blood (collected 23 @ 14:30)  Source: .Blood Blood  Preliminary Report (23 @ 19:02):    No growth to date.    Culture - Sputum (collected 23 @ 14:14)  Source: .Sputum Sputum  Gram Stain (23 @ 22:38):    Numerous polymorphonuclear leukocytes per low power field    No Squamous epithelial cells per low power field    Moderate Gram Positive Cocci in Clusters per oil power field  Preliminary Report (23 @ 16:04):    Moderate Staphylococcus aureus           VITALS:  T(C): 37.1 (23 @ 04:00), Max: 38 (23 @ 18:00)  T(F): 98.7 (23 @ 04:00), Max: 100.4 (23 @ 18:00)  HR: 90 (23 @ 10:00) (83 - 97)  BP: 122/65 (23 @ 10:00) (103/61 - 151/72)  BP(mean): 80 (23 @ 10:00) (73 - 98)  ABP: --  ABP(mean): --  RR: 27 (23 @ 10:00) (15 - 37)  SpO2: 98% (23 @ 10:00) (97% - 100%)  CVP(mm Hg): --  CVP(cm H2O): --    Ins and Outs     23 @ 07:01  -  23 @ 07:00  --------------------------------------------------------  IN: 2235 mL / OUT: 1750 mL / NET: 485 mL    23 @ 07:01  -  23 @ 11:48  --------------------------------------------------------  IN: 0 mL / OUT: 500 mL / NET: -500 mL            Device: Avea, Mode: AC/ CMV (Assist Control/ Continuous Mandatory Ventilation), RR (machine): 12, RR (patient): 18, TV (machine): 450, TV (patient): 430, FiO2: 30, PEEP: 5, ITime: 1, MAP: 13, PIP: 29    I&O's Detail    25 May 2023 07:  -  26 May 2023 07:00  --------------------------------------------------------  IN:    Free Water: 1200 mL    Nepro with Carb Steady: 1035 mL  Total IN: 2235 mL    OUT:    Voided (mL): 1750 mL  Total OUT: 1750 mL    Total NET: 485 mL      26 May 2023 07:01  -  26 May 2023 11:48  --------------------------------------------------------  IN:  Total IN: 0 mL    OUT:    Voided (mL): 500 mL  Total OUT: 500 mL    Total NET: -500 mL

## 2023-05-26 NOTE — PROGRESS NOTE ADULT - ASSESSMENT
Physical Examination:  GENERAL:               Eyes open at times, unresponsive  HEENT:                    Pupils equal, reactive to light.  constricted . No JVD, Dry MM  PULM:                     Bilateral air entry, Clear to auscultation bilaterally, no significant sputum production, No Rales, No Rhonchi, No Wheezing  CVS:                         S1, S2,  +Murmur  ABD:                        Soft, nondistended, nontender, normoactive bowel sounds,   EXT:                         No edema, nontender, No Cyanosis or Clubbing   Vascular:                Warm Extremities,   NEURO:                  Eyes closd, episodes of myoclonus  PSYC:                      Calm, no Insight.      Assessment  78 year old male with a PMH of dementia, HTN, cardiomyopathy, HFrEF, CAD s/p CABG, with known current RCA occlusion, and DM type 2 who was admitted to [facility] on 5/4 with hypoxic respiratory failure in setting of CHF exacerbation and ANISH with course complicated by acute hypoxic respiratory failure in setting of choking episode causing cardiac arrest, shock, worsening hypoxemic respiratory failure, and NSTEMI on 5/9. Pt ICU course noted post cardiac arrest anoxic brain injury and myoclonus. Pt was transferred to [facility] for VEEG which required 48 hours of monitoring as he was noted to still be sedated. While there pt noted to have hematochezia and any ac / antiplatelet agents were held at that time. EEG findings consistent with stimulus induced myoclonus and GPDs s/p hypoxic diffuse indicative of severe cerebral dysfunction.    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  Acute renal failure w Uremia post cardiac arrest improving  Hemtochezia episode, h/h stable no further episodes noted  Heart failure with reduced EF  NSTEMI post arrest       Underlying PMH of dementia, HTN, cardiomyopathy, HFrEF, CAD s/p CABG, with known current RCA occlusion, and DM type 2      Plan:    Continue mechanical ventilation  Ativan prn for myoclonus, will decrease dose   no bm, continue PPI BID, trend cbc    on antibiotics, f/u cultures   no BM   TF as tolerated  IVF held   continue  modafanil see if metnal status improve  monitor blood glucose levels, + ISS coverage  Venodynes for dvt ppx  failed cpap trial due to low tv  ppi for gi prophylaxis   monitor renal function, nephrology following, appreciate their rec's, slight decrease in creatinine  am labs, replace eveliotd prn  GOC ongoing discussion with family        PMD:				                   Notified(Date):  Family Updated: 	Kuldeep [phone]	                                 Date:  5/26  updated on status  as above  discussed need for trach/peg    Sedation & Analgesia:	  Diet/Nutrition:		 Diet, NPO with Tube Feed:   Tube Feeding Modality: Nasogastric         GI PPx:			pantoprazole   Suspension 40 milliGRAM(s) Oral daily    DVT Ppx:		Venodynes       Activity:		    Head of Bed:               35-45 Deg  Glycemic Control:         Insulin    Lines:  CENTRAL LINE: 	[ ] YES [ x] NO	                    LOCATION:   	                       DATE INSERTED:   	                    REMOVE:  [ ] YES [ ] NO    A-LINE:  	                [ ] YES [ x] NO                      LOCATION:   	                       DATE INSERTED: 		            REMOVE:  [ ] YES [ ] NO    [place]: 		        [ ] YES [x ] NO  		                                       DATE INSERTED:		            REMOVE:  [ ] YES [ ] NO      Restraints were deemed necessary to prevent removal of life-sustaining devices [  ] YES   [  x  ]  NO    Disposition: ICU Care    Goals of Care: Full code

## 2023-05-26 NOTE — PROGRESS NOTE ADULT - SUBJECTIVE AND OBJECTIVE BOX
Intubated in ICU, + NGT    Vital Signs Last 24 Hrs  T(C): 36.9 (05-26-23 @ 12:00), Max: 38 (05-25-23 @ 18:00)  T(F): 98.4 (05-26-23 @ 12:00), Max: 100.4 (05-25-23 @ 18:00)  HR: 82 (05-26-23 @ 16:34) (82 - 97)  BP: 121/69 (05-26-23 @ 16:00) (103/61 - 151/72)  BP(mean): 85 (05-26-23 @ 16:00) (73 - 98)  RR: 17 (05-26-23 @ 16:00) (17 - 37)  SpO2: 98% (05-26-23 @ 16:34) (97% - 100%)    I&O's Detail    25 May 2023 07:01  -  26 May 2023 07:00  --------------------------------------------------------  IN:    Free Water: 1200 mL    Nepro with Carb Steady: 1080 mL  Total IN: 2280 mL    OUT:    Voided (mL): 1750 mL  Total OUT: 1750 mL    Total NET: 530 mL    26 May 2023 07:01  -  26 May 2023 17:43  --------------------------------------------------------  IN:    Enteral Tube Flush: 200 mL    Nepro with Carb Steady: 405 mL  Total IN: 605 mL    OUT:    Voided (mL): 500 mL  Total OUT: 500 mL    Total NET: 105 mL    Mode: AC/ CMV (Assist Control/ Continuous Mandatory Ventilation)  RR (machine): 12  TV (machine): 450  FiO2: 30  PEEP: 5  ITime: 1  MAP: 10  PIP: 26    s1s2  b/l air entry  soft, ND  + edema                                                                                   8.2    10.98 )-----------( 189      ( 26 May 2023 05:05 )             25.8     26 May 2023 05:05    139    |  103    |  95     ----------------------------<  211    4.0     |  29     |  2.44     Ca    8.6        26 May 2023 05:05  Phos  3.7       26 May 2023 05:05  Mg     2.0       26 May 2023 05:05    TPro  6.6    /  Alb  1.4    /  TBili  0.5    /  DBili  x      /  AST  29     /  ALT  14     /  AlkPhos  98     25 May 2023 05:00    LIVER FUNCTIONS - ( 25 May 2023 05:00 )  Alb: 1.4 g/dL / Pro: 6.6 g/dL / ALK PHOS: 98 U/L / ALT: 14 U/L / AST: 29 U/L / GGT: x           Culture - Sputum (collected 26 May 2023 06:00)  Source: ET Tube ET Tube  Gram Stain (26 May 2023 13:21):    Few polymorphonuclear leukocytes seen per low power field    Moderate Squamous epithelial cells seen per low power field    Moderate Gram positive cocci in pairs, chains and clusters seen per oil    power field    Few Yeast like cells seen per oil power field    Rare Gram Negative Rods seen per oil power field    Culture - Blood (collected 24 May 2023 15:05)  Source: .Blood Blood  Preliminary Report (26 May 2023 01:02):    No growth to date.    Culture - Blood (collected 24 May 2023 14:30)  Source: .Blood Blood  Preliminary Report (25 May 2023 19:02):    No growth to date.    Culture - Sputum (collected 24 May 2023 14:14)  Source: .Sputum Sputum  Gram Stain (24 May 2023 22:38):    Numerous polymorphonuclear leukocytes per low power field    No Squamous epithelial cells per low power field    Moderate Gram Positive Cocci in Clusters per oil power field  Final Report (26 May 2023 16:39):    Moderate Staphylococcus aureus    Normal Respiratory [place] absent  Organism: Staphylococcus aureus (26 May 2023 16:39)  Organism: Staphylococcus aureus (26 May 2023 16:39)    acetaminophen   Oral Liquid .. 650 milliGRAM(s) Oral every 6 hours PRN  albuterol/ipratropium for Nebulization 3 milliLiter(s) Nebulizer every 6 hours  albuterol/ipratropium for Nebulization 3 milliLiter(s) Nebulizer every 6 hours  brivaracetam Oral Solution 50 milliGRAM(s) Oral two times a day  cefepime   IVPB      cefepime   IVPB 500 milliGRAM(s) IV Intermittent daily  chlorhexidine 0.12% Liquid 15 milliLiter(s) Oral Mucosa every 12 hours  chlorhexidine 2% Cloths 1 Application(s) Topical daily  chlorhexidine 4% Liquid 1 Application(s) Topical <User Schedule>  dextrose 5%. 1000 milliLiter(s) IV Continuous <Continuous>  dextrose 5%. 1000 milliLiter(s) IV Continuous <Continuous>  dextrose 50% Injectable 25 Gram(s) IV Push once  dextrose 50% Injectable 12.5 Gram(s) IV Push once  dextrose 50% Injectable 25 Gram(s) IV Push once  insulin glargine Injectable (LANTUS) 10 Unit(s) SubCutaneous at bedtime  insulin lispro (ADMELOG) corrective regimen sliding scale   SubCutaneous every 6 hours  LORazepam   Injectable 1 milliGRAM(s) IV Push every 8 hours PRN  modafinil 100 milliGRAM(s) Oral daily  mupirocin 2% Nasal 1 Application(s) Both Nostrils every 12 hours  pantoprazole   Suspension 40 milliGRAM(s) Oral every 12 hours  polyethylene glycol 3350 17 Gram(s) Oral daily  senna 2 Tablet(s) Oral at bedtime  simvastatin 40 milliGRAM(s) Oral at bedtime    A/P:    Hemodynamic ATN s/p arrest (Cr 1.6 - 5/9/23, Cr 1.0 - 4/12/23)  Resp failure, asp PNA, intubated   CM, EF 35 - 40%, dementia  Concern for anoxic encephalopathy   Vent support per ICU team  S/p EEG monitoring  Avoid nephrotoxins  F/u BMP, UO   Lytes are stable  Cr is improving  Good UO  No objections to diuretics as needed  Overall prognosis is poor    [phone]

## 2023-05-26 NOTE — CHART NOTE - NSCHARTNOTEFT_GEN_A_CORE
Nutrition Follow Up Note  Hospital Course (Per Electronic Medical Record):   Source: Medical Record [X] Patient [X] Family [X] Nursing Staff [X]     Diet: Nepro @ 45ml/hr free water 300ml q 6 hrs     Patient remains intubated , no sedation noted EN feeds continue @ 45ml/hr x 24 hrs , Current EN feeds are providing 1944 calories , 87gms protein 785ml/h20 , patient receiving an additional 1200ml free water , labs reviewed , Cr noted slowing improving , unable to trend weight due to various recoded weights , admit weight noted admit weight noted 117lbs , POCT reviewed ,Insulin regimen noted     Current Weight:(5/26) 175.9/79.8kg                         (5/24) 211.4/95.9kg                         95/22) 166.8/75.7kg                           Pertinent Medications: MEDICATIONS  (STANDING):  albuterol/ipratropium for Nebulization 3 milliLiter(s) Nebulizer every 6 hours  albuterol/ipratropium for Nebulization 3 milliLiter(s) Nebulizer every 6 hours  brivaracetam Oral Solution 50 milliGRAM(s) Oral two times a day  cefepime   IVPB 500 milliGRAM(s) IV Intermittent daily  cefepime   IVPB      chlorhexidine 0.12% Liquid 15 milliLiter(s) Oral Mucosa every 12 hours  chlorhexidine 2% Cloths 1 Application(s) Topical daily  chlorhexidine 4% Liquid 1 Application(s) Topical <User Schedule>  dextrose 5%. 1000 milliLiter(s) (100 mL/Hr) IV Continuous <Continuous>  dextrose 5%. 1000 milliLiter(s) (50 mL/Hr) IV Continuous <Continuous>  dextrose 50% Injectable 25 Gram(s) IV Push once  dextrose 50% Injectable 12.5 Gram(s) IV Push once  dextrose 50% Injectable 25 Gram(s) IV Push once  insulin glargine Injectable (LANTUS) 10 Unit(s) SubCutaneous at bedtime  insulin lispro (ADMELOG) corrective regimen sliding scale   SubCutaneous every 6 hours  modafinil 100 milliGRAM(s) Oral daily  mupirocin 2% Nasal 1 Application(s) Both Nostrils every 12 hours  pantoprazole   Suspension 40 milliGRAM(s) Oral every 12 hours  polyethylene glycol 3350 17 Gram(s) Oral daily  senna 2 Tablet(s) Oral at bedtime  simvastatin 40 milliGRAM(s) Oral at bedtime    MEDICATIONS  (PRN):  acetaminophen   Oral Liquid .. 650 milliGRAM(s) Oral every 6 hours PRN Temp greater or equal to 38C (100.4F)  LORazepam   Injectable 1 milliGRAM(s) IV Push every 8 hours PRN myoclonus      Pertinent Labs:  05-26 Na139 mmol/L Glu 211 mg/dL<H> K+ 4.0 mmol/L Cr  2.44 mg/dL<H> BUN 95 mg/dL<H> 05-26 Phos 3.7 mg/dL 05-25 Alb 1.4 g/dL<L> 05-07 Chol 172 mg/dL LDL --    HDL 39 mg/dL<L> Trig 87 mg/dL, Hgb 8.2g/dl<L> , Hct 8.2g/dl <L> , Hct 25.8% <L>   POCT 232,227,220,231      Skin: sacral DTI , suggest MVI , Vit C added     Edema: (+2) arm     Last BM: (5/26)     Estimated Needs:   [X] No Change since Previous Assessment       Previous Nutrition Diagnosis: Severe Protein Calorie Malnutrition     Nutrition Diagnosis is [X] Ongoing         New Nutrition Diagnosis: [X] Not Applicable      Interventions:   1. continue current EN feeds   2 request current weight from nursing     Monitoring & Evaluation: will monitor:  [X] Weights     [X] Follow Up (Per Protocol)        RD to follow as per Nutrition protocol  Preethi Carnes RDN

## 2023-05-27 LAB
ALBUMIN SERPL ELPH-MCNC: 1.4 G/DL — LOW (ref 3.3–5)
ALP SERPL-CCNC: 129 U/L — HIGH (ref 40–120)
ALT FLD-CCNC: 18 U/L — SIGNIFICANT CHANGE UP (ref 10–45)
ANION GAP SERPL CALC-SCNC: 4 MMOL/L — LOW (ref 5–17)
AST SERPL-CCNC: 31 U/L — SIGNIFICANT CHANGE UP (ref 10–40)
BILIRUB SERPL-MCNC: 0.4 MG/DL — SIGNIFICANT CHANGE UP (ref 0.2–1.2)
BUN SERPL-MCNC: 95 MG/DL — HIGH (ref 7–23)
CALCIUM SERPL-MCNC: 8.4 MG/DL — SIGNIFICANT CHANGE UP (ref 8.4–10.5)
CHLORIDE SERPL-SCNC: 102 MMOL/L — SIGNIFICANT CHANGE UP (ref 96–108)
CO2 SERPL-SCNC: 29 MMOL/L — SIGNIFICANT CHANGE UP (ref 22–31)
CREAT SERPL-MCNC: 2.32 MG/DL — HIGH (ref 0.5–1.3)
EGFR: 28 ML/MIN/1.73M2 — LOW
GLUCOSE BLDC GLUCOMTR-MCNC: 205 MG/DL — HIGH (ref 70–99)
GLUCOSE BLDC GLUCOMTR-MCNC: 207 MG/DL — HIGH (ref 70–99)
GLUCOSE BLDC GLUCOMTR-MCNC: 220 MG/DL — HIGH (ref 70–99)
GLUCOSE BLDC GLUCOMTR-MCNC: 224 MG/DL — HIGH (ref 70–99)
GLUCOSE BLDC GLUCOMTR-MCNC: 248 MG/DL — HIGH (ref 70–99)
GLUCOSE SERPL-MCNC: 249 MG/DL — HIGH (ref 70–99)
HCT VFR BLD CALC: 24.5 % — LOW (ref 39–50)
HGB BLD-MCNC: 7.9 G/DL — LOW (ref 13–17)
MAGNESIUM SERPL-MCNC: 1.9 MG/DL — SIGNIFICANT CHANGE UP (ref 1.6–2.6)
MCHC RBC-ENTMCNC: 31.2 PG — SIGNIFICANT CHANGE UP (ref 27–34)
MCHC RBC-ENTMCNC: 32.2 GM/DL — SIGNIFICANT CHANGE UP (ref 32–36)
MCV RBC AUTO: 96.8 FL — SIGNIFICANT CHANGE UP (ref 80–100)
NRBC # BLD: 0 /100 WBCS — SIGNIFICANT CHANGE UP (ref 0–0)
PHOSPHATE SERPL-MCNC: 3.9 MG/DL — SIGNIFICANT CHANGE UP (ref 2.5–4.5)
PLATELET # BLD AUTO: 227 K/UL — SIGNIFICANT CHANGE UP (ref 150–400)
POTASSIUM SERPL-MCNC: 4.2 MMOL/L — SIGNIFICANT CHANGE UP (ref 3.5–5.3)
POTASSIUM SERPL-SCNC: 4.2 MMOL/L — SIGNIFICANT CHANGE UP (ref 3.5–5.3)
PROT SERPL-MCNC: 7 G/DL — SIGNIFICANT CHANGE UP (ref 6–8.3)
RBC # BLD: 2.53 M/UL — LOW (ref 4.2–5.8)
RBC # FLD: 15.1 % — HIGH (ref 10.3–14.5)
SODIUM SERPL-SCNC: 135 MMOL/L — SIGNIFICANT CHANGE UP (ref 135–145)
WBC # BLD: 10.6 K/UL — HIGH (ref 3.8–10.5)
WBC # FLD AUTO: 10.6 K/UL — HIGH (ref 3.8–10.5)

## 2023-05-27 RX ADMIN — POLYETHYLENE GLYCOL 3350 17 GRAM(S): 17 POWDER, FOR SOLUTION ORAL at 11:54

## 2023-05-27 RX ADMIN — Medication 3 MILLILITER(S): at 08:49

## 2023-05-27 RX ADMIN — CHLORHEXIDINE GLUCONATE 15 MILLILITER(S): 213 SOLUTION TOPICAL at 17:42

## 2023-05-27 RX ADMIN — Medication 4: at 17:42

## 2023-05-27 RX ADMIN — Medication 3 MILLILITER(S): at 21:18

## 2023-05-27 RX ADMIN — Medication 3 MILLILITER(S): at 04:49

## 2023-05-27 RX ADMIN — Medication 650 MILLIGRAM(S): at 02:17

## 2023-05-27 RX ADMIN — SIMVASTATIN 40 MILLIGRAM(S): 20 TABLET, FILM COATED ORAL at 21:22

## 2023-05-27 RX ADMIN — PANTOPRAZOLE SODIUM 40 MILLIGRAM(S): 20 TABLET, DELAYED RELEASE ORAL at 05:54

## 2023-05-27 RX ADMIN — Medication 4: at 12:11

## 2023-05-27 RX ADMIN — CHLORHEXIDINE GLUCONATE 1 APPLICATION(S): 213 SOLUTION TOPICAL at 11:54

## 2023-05-27 RX ADMIN — MODAFINIL 100 MILLIGRAM(S): 200 TABLET ORAL at 12:49

## 2023-05-27 RX ADMIN — CHLORHEXIDINE GLUCONATE 1 APPLICATION(S): 213 SOLUTION TOPICAL at 06:08

## 2023-05-27 RX ADMIN — PANTOPRAZOLE SODIUM 40 MILLIGRAM(S): 20 TABLET, DELAYED RELEASE ORAL at 17:42

## 2023-05-27 RX ADMIN — CEFEPIME 100 MILLIGRAM(S): 1 INJECTION, POWDER, FOR SOLUTION INTRAMUSCULAR; INTRAVENOUS at 12:11

## 2023-05-27 RX ADMIN — Medication 4: at 23:27

## 2023-05-27 RX ADMIN — Medication 4: at 05:53

## 2023-05-27 RX ADMIN — BRIVARACETAM 50 MILLIGRAM(S): 25 TABLET, FILM COATED ORAL at 17:42

## 2023-05-27 RX ADMIN — Medication 3 MILLILITER(S): at 15:43

## 2023-05-27 RX ADMIN — MUPIROCIN 1 APPLICATION(S): 20 OINTMENT TOPICAL at 05:54

## 2023-05-27 RX ADMIN — CHLORHEXIDINE GLUCONATE 15 MILLILITER(S): 213 SOLUTION TOPICAL at 05:54

## 2023-05-27 RX ADMIN — SENNA PLUS 2 TABLET(S): 8.6 TABLET ORAL at 21:22

## 2023-05-27 RX ADMIN — INSULIN GLARGINE 10 UNIT(S): 100 INJECTION, SOLUTION SUBCUTANEOUS at 21:22

## 2023-05-27 RX ADMIN — BRIVARACETAM 50 MILLIGRAM(S): 25 TABLET, FILM COATED ORAL at 05:58

## 2023-05-27 RX ADMIN — Medication 650 MILLIGRAM(S): at 03:17

## 2023-05-27 RX ADMIN — MUPIROCIN 1 APPLICATION(S): 20 OINTMENT TOPICAL at 17:42

## 2023-05-27 NOTE — PROGRESS NOTE ADULT - SUBJECTIVE AND OBJECTIVE BOX
[facility] NEPHROLOGY SERVICES, [facility]  NEPHROLOGY AND HYPERTENSION  [address]  [address]  [place] [address]  [phone]    MD ALEX NORMAN MD YELENA ROSENBERG, MD BINNY KOSHY, MD CHRISTOPHER CAPUTO, MD EDWARD BOVER, MD          Patient events noted  vent dependent   no distress    MEDICATIONS  (STANDING):  albuterol/ipratropium for Nebulization 3 milliLiter(s) Nebulizer every 6 hours  brivaracetam Oral Solution 50 milliGRAM(s) Oral two times a day  cefepime   IVPB      cefepime   IVPB 500 milliGRAM(s) IV Intermittent daily  chlorhexidine 0.12% Liquid 15 milliLiter(s) Oral Mucosa every 12 hours  chlorhexidine 2% Cloths 1 Application(s) Topical daily  dextrose 5%. 1000 milliLiter(s) (100 mL/Hr) IV Continuous <Continuous>  dextrose 5%. 1000 milliLiter(s) (50 mL/Hr) IV Continuous <Continuous>  dextrose 50% Injectable 25 Gram(s) IV Push once  dextrose 50% Injectable 12.5 Gram(s) IV Push once  dextrose 50% Injectable 25 Gram(s) IV Push once  insulin glargine Injectable (LANTUS) 10 Unit(s) SubCutaneous at bedtime  insulin lispro (ADMELOG) corrective regimen sliding scale   SubCutaneous every 6 hours  modafinil 100 milliGRAM(s) Oral daily  mupirocin 2% Nasal 1 Application(s) Both Nostrils every 12 hours  pantoprazole   Suspension 40 milliGRAM(s) Oral every 12 hours  polyethylene glycol 3350 17 Gram(s) Oral daily  senna 2 Tablet(s) Oral at bedtime  simvastatin 40 milliGRAM(s) Oral at bedtime    MEDICATIONS  (PRN):  acetaminophen   Oral Liquid .. 650 milliGRAM(s) Oral every 6 hours PRN Temp greater or equal to 38C (100.4F)      05-26-23 @ 07:01  -  05-27-23 @ 07:00  --------------------------------------------------------  IN: 2345 mL / OUT: 1300 mL / NET: 1045 mL    05-27-23 @ 07:01  -  05-27-23 @ 21:07  --------------------------------------------------------  IN: 1050 mL / OUT: 0 mL / NET: 1050 mL      PHYSICAL EXAM:      T(C): 37.3 (05-27-23 @ 18:00), Max: 38.2 (05-27-23 @ 02:00)  HR: 80 (05-27-23 @ 20:00) (80 - 101)  BP: 107/58 (05-27-23 @ 20:00) (99/59 - 130/79)  RR: 18 (05-27-23 @ 20:00) (12 - 35)  SpO2: 100% (05-27-23 @ 20:00) (97% - 100%)  Wt(kg): --  Lungs clear  Heart S1S2  Abd soft NT ND  Extremities:   tr edema                                    7.9    10.60 )-----------( 227      ( 27 May 2023 06:15 )             24.5     05-27    135  |  102  |  95<H>  ----------------------------<  249<H>  4.2   |  29  |  2.32<H>    Ca    8.4      27 May 2023 06:15  Phos  3.9     05-27  Mg     1.9     05-27    TPro  7.0  /  Alb  1.4<L>  /  TBili  0.4  /  DBili  x   /  AST  31  /  ALT  18  /  AlkPhos  129<H>  05-27      LIVER FUNCTIONS - ( 27 May 2023 06:15 )  Alb: 1.4 g/dL / Pro: 7.0 g/dL / ALK PHOS: 129 U/L / ALT: 18 U/L / AST: 31 U/L / GGT: x           Creatinine Trend: 2.32<--, 2.44<--, 2.56<--, 2.81<--, 3.02<--, 3.80<--  Mode: AC/ CMV (Assist Control/ Continuous Mandatory Ventilation)  RR (machine): 12  TV (machine): 450  FiO2: 30  PEEP: 5  ITime: 45  MAP: 12  PIP: 30    A/P:    Hemodynamic ATN s/p arrest (Cr 1.6 - 5/9/23, Cr 1.0 - 4/12/23)  Resp failure, asp PNA, intubated   CM, EF 35 - 40%, dementia  Concern for anoxic encephalopathy   Vent support per ICU team  S/p EEG monitoring  Avoid nephrotoxins  F/u BMP, UO   Lytes are stable  Cr is improving  Good UO  No objections to diuretics as needed  Overall prognosis is poor          Avila Ross MD

## 2023-05-27 NOTE — PROGRESS NOTE ADULT - SUBJECTIVE AND OBJECTIVE BOX
Follow-up Critical Care Progress Note  Chief Complaint : Atrial fibrillation    patient opens eyes to touch  does not track, does not follow commands    + secretions        Allergies :sulfa drugs (Unknown)      PAST MEDICAL & SURGICAL HISTORY:  HTN (hypertension)    HLD (hyperlipidemia)    Diabetes    CAD (coronary artery disease)    History of cholecystectomy        Medications:  MEDICATIONS  (STANDING):  albuterol/ipratropium for Nebulization 3 milliLiter(s) Nebulizer every 6 hours  brivaracetam Oral Solution 50 milliGRAM(s) Oral two times a day  cefepime   IVPB      cefepime   IVPB 500 milliGRAM(s) IV Intermittent daily  chlorhexidine 0.12% Liquid 15 milliLiter(s) Oral Mucosa every 12 hours  chlorhexidine 2% Cloths 1 Application(s) Topical daily  dextrose 5%. 1000 milliLiter(s) (100 mL/Hr) IV Continuous <Continuous>  dextrose 5%. 1000 milliLiter(s) (50 mL/Hr) IV Continuous <Continuous>  dextrose 50% Injectable 12.5 Gram(s) IV Push once  dextrose 50% Injectable 25 Gram(s) IV Push once  dextrose 50% Injectable 25 Gram(s) IV Push once  insulin glargine Injectable (LANTUS) 10 Unit(s) SubCutaneous at bedtime  insulin lispro (ADMELOG) corrective regimen sliding scale   SubCutaneous every 6 hours  modafinil 100 milliGRAM(s) Oral daily  mupirocin 2% Nasal 1 Application(s) Both Nostrils every 12 hours  pantoprazole   Suspension 40 milliGRAM(s) Oral every 12 hours  polyethylene glycol 3350 17 Gram(s) Oral daily  senna 2 Tablet(s) Oral at bedtime  simvastatin 40 milliGRAM(s) Oral at bedtime    MEDICATIONS  (PRN):  acetaminophen   Oral Liquid .. 650 milliGRAM(s) Oral every 6 hours PRN Temp greater or equal to 38C (100.4F)  LORazepam   Injectable 1 milliGRAM(s) IV Push every 8 hours PRN myoclonus      Antibiotics History  cefepime   IVPB 500 milliGRAM(s) IV Intermittent daily, 05-25-23 @ 12:00  cefepime   IVPB    , 05-24-23 @ 17:36  cefepime   IVPB 500 milliGRAM(s) IV Intermittent once, 05-24-23 @ 16:39  piperacillin/tazobactam IVPB. 3.375 Gram(s) IV Intermittent once, 05-09-23 @ 10:58, Stop order after: 1 Doses  piperacillin/tazobactam IVPB.- 3.375 Gram(s) IV Intermittent once, 05-09-23 @ 15:00  piperacillin/tazobactam IVPB.. 3.375 Gram(s) IV Intermittent every 8 hours, 05-09-23 @ 22:00, Stop order after: 7 Days  piperacillin/tazobactam IVPB.. 3.375 Gram(s) IV Intermittent every 12 hours, 05-11-23 @ 22:27, Stop order after: 7 Days      Heme Medications       GI Medications  pantoprazole   Suspension 40 milliGRAM(s) Oral every 12 hours, 05-25-23 @ 08:37, Routine  polyethylene glycol 3350 17 Gram(s) Oral daily, 05-25-23 @ 08:36, Routine  senna 2 Tablet(s) Oral at bedtime, 05-19-23 @ 21:37, Routine      COVID  05-04-23 @ 19:50  COVID -   NotDetec  07-13-22 @ 18:33  COVID -   NotDetec      COVID Biomarkers    05-04-23 @ 19:50 ESR --  ---  CRP --  ---  DDimer  351<H>   ---   LDH --   ---   Ferritin --       Procalcitonin Trend  05-17-23 @ 23:44   -   0.59<H>  05-16-23 @ 22:16   -   0.74<H>  05-15-23 @ 05:45   -   1.12<H>  05-09-23 @ 13:42   -   0.11<H>    WBC Trend  05-27-23 @ 06:15   -  10.60<H>  05-26-23 @ 05:05   -  10.98<H>  05-25-23 @ 05:00   -  14.01<H>    H/H Trend  05-27-23 @ 06:15   -   7.9<L>/ 24.5<L>  05-26-23 @ 05:05   -   8.2<L>/ 25.8<L>  05-25-23 @ 05:00   -   7.9<L>/ 24.9<L>  05-24-23 @ 05:30   -   9.7<L>/ 30.1<L>  05-23-23 @ 06:30   -   10.1<L>/ 31.6<L>  05-22-23 @ 13:18   -   9.1<L>/ 28.8<L>    Stool Occult Blood  05-25-23 @ 18:20   -   Negative  05-16-23 @ 17:00   -   Positive<!>    Platelet Trend  05-27-23 @ 06:15   -  227  05-26-23 @ 05:05   -  189  05-25-23 @ 05:00   -  172    Trend Sodium  05-27-23 @ 06:15   -  135  05-26-23 @ 05:05   -  139  05-25-23 @ 05:00   -  139    Trend Potassium  05-27-23 @ 06:15   -  4.2  05-26-23 @ 05:05   -  4.0  05-25-23 @ 05:00   -  4.0    Trend Bun/Cr  05-27-23 @ 06:15  BUN/CR -  95<H> / 2.32<H>  05-26-23 @ 05:05  BUN/CR -  95<H> / 2.44<H>  05-25-23 @ 05:00  BUN/CR -  96<H> / 2.56<H>    Lactic Acid Trend    ABG Trend  05-19-23 @ 00:22   - 7.37/43/144<H>/99.8<H>  05-18-23 @ 05:38   - 7.41/37/97/98.4<H>  05-17-23 @ 23:36   - 7.42/37/97/98.3<H>  05-16-23 @ 22:10   - 7.41/41/92/98.5<H>  05-16-23 @ 05:59   - 7.35/47/104/98.8<H>  05-15-23 @ 13:45   - 7.36/47/74<L>/96.6     Trend AST/ALT/ALK Phos/Bili  05-27-23 @ 06:15   31/18/129<H>/0.4  05-25-23 @ 05:00   29/14/98/0.5  05-24-23 @ 05:30   24/12/98/0.6  05-21-23 @ 06:15   14/10/69/0.5  05-20-23 @ 07:50   26/7<L>/65/0.6  05-19-23 @ 04:08   17/7<L>/63/0.4     Ammonia Trend  05-14-23 @ 11:30   -   53  05-14-23 @ 05:00   -   16         Albumin Trend  05-27-23 @ 06:15   -   1.4<L>  05-25-23 @ 05:00   -   1.4<L>  05-24-23 @ 05:30   -   1.7<L>  05-21-23 @ 06:15   -   1.7<L>  05-20-23 @ 07:50   -   1.6<L>  05-19-23 @ 04:08   -   2.4<L>      PTT - PT - INR Trend  05-19-23 @ 00:44   -   26.5<L> - 13.2 - 1.15  05-17-23 @ 23:44   -   31.2 - 13.7<H> - 1.18<H>  05-16-23 @ 22:16   -   31.3 - 13.6<H> - 1.18<H>  05-15-23 @ 13:55   -   32.9 - 14.0<H> - 1.19<H>  05-14-23 @ 13:45   -   33.6 - -- - --  05-14-23 @ 11:30   -   34.1 - 14.9<H> - 1.28<H>    Glucose Trend  05-27-23 @ 06:15   -  -- 249<H> --  05-27-23 @ 05:48   -  -- -- 248<H>  05-26-23 @ 23:13   -  -- -- 262<H>  05-26-23 @ 12:22   -  -- -- 239<H>  05-26-23 @ 05:15   -  -- -- 232<H>  05-26-23 @ 05:05   -  -- 211<H> --  05-25-23 @ 23:43   -  -- -- 227<H>  05-25-23 @ 21:10   -  -- -- 220<H>  05-25-23 @ 17:42   -  -- -- 231<H>  05-25-23 @ 12:16   -  -- -- 217<H>    A1C with Estimated Average Glucose Result: 7.7 % *H* [4.0 - 5.6] (05-05-23 @ 08:00)      LABS:                        7.9    10.60 )-----------( 227      ( 27 May 2023 06:15 )             24.5     05-27    135  |  102  |  95<H>  ----------------------------<  249<H>  4.2   |  29  |  2.32<H>    Ca    8.4      27 May 2023 06:15  Phos  3.9     05-27  Mg     1.9     05-27    TPro  7.0  /  Alb  1.4<L>  /  TBili  0.4  /  DBili  x   /  AST  31  /  ALT  18  /  AlkPhos  129<H>  05-27           CULTURES: (if applicable)    Culture - Sputum (collected 05-26-23 @ 06:00)  Source: ET Tube ET Tube  Gram Stain (05-26-23 @ 13:21):    Few polymorphonuclear leukocytes seen per low power field    Moderate Squamous epithelial cells seen per low power field    Moderate Gram positive cocci in pairs, chains and clusters seen per oil    power field    Few Yeast like cells seen per oil power field    Rare Gram Negative Rods seen per oil power field  Preliminary Report (05-27-23 @ 10:43):    Moderate Staphylococcus aureus    Normal Respiratory Chelsea present    Culture - Blood (collected 05-24-23 @ 15:05)  Source: .Blood Blood  Preliminary Report (05-26-23 @ 01:02):    No growth to date.    Culture - Blood (collected 05-24-23 @ 14:30)  Source: .Blood Blood  Preliminary Report (05-25-23 @ 19:02):    No growth to date.    Culture - Sputum (collected 05-24-23 @ 14:14)  Source: .Sputum Sputum  Gram Stain (05-24-23 @ 22:38):    Numerous polymorphonuclear leukocytes per low power field    No Squamous epithelial cells per low power field    Moderate Gram Positive Cocci in Clusters per oil power field  Final Report (05-26-23 @ 16:39):    Moderate Staphylococcus aureus    Normal Respiratory Chelsea absent  Organism: Staphylococcus aureus (05-26-23 @ 16:39)  Organism: Staphylococcus aureus (05-26-23 @ 16:39)      Method Type: ALDO      -  Ampicillin/Sulbactam: S <=8/4      -  Cefazolin: S <=4      -  Clindamycin: S <=0.25      -  Erythromycin: S <=0.25      -  Gentamicin: S <=1 Should not be used as monotherapy      -  Oxacillin: S 0.5 Oxacillin predicts susceptibility for dicloxacillin, methicillin, and nafcillin      -  Penicillin: R >8      -  Rifampin: S <=1 Should not be used as monotherapy      -  Tetracycline: R >8      -  Trimethoprim/Sulfamethoxazole: S <=0.5/9.5      -  Vancomycin: S 1          VITALS:  T(C): 37.8 (05-27-23 @ 05:00), Max: 38.2 (05-27-23 @ 02:00)  T(F): 100 (05-27-23 @ 05:00), Max: 100.8 (05-27-23 @ 02:00)  HR: 89 (05-27-23 @ 10:00) (82 - 101)  BP: 109/55 (05-27-23 @ 10:00) (105/61 - 132/67)  BP(mean): 71 (05-27-23 @ 10:00) (71 - 100)  ABP: --  ABP(mean): --  RR: 16 (05-27-23 @ 10:00) (13 - 35)  SpO2: 98% (05-27-23 @ 10:00) (97% - 100%)  CVP(mm Hg): --  CVP(cm H2O): --    Ins and Outs     05-26-23 @ 07:01  -  05-27-23 @ 07:00  --------------------------------------------------------  IN: 2345 mL / OUT: 1300 mL / NET: 1045 mL    Device: Avea, Mode: AC/ CMV (Assist Control/ Continuous Mandatory Ventilation), RR (machine): 12, RR (patient): 19, TV (machine): 450, TV (patient): 44, FiO2: 30, PEEP: 5, ITime: 45, MAP: 13, PIP: 32    I&O's Detail    26 May 2023 07:01  -  27 May 2023 07:00  --------------------------------------------------------  IN:    Enteral Tube Flush: 200 mL    Free Water: 1200 mL    Nepro with Carb Steady: 945 mL  Total IN: 2345 mL    OUT:    Voided (mL): 1300 mL  Total OUT: 1300 mL    Total NET: 1045 mL

## 2023-05-27 NOTE — PROGRESS NOTE ADULT - ASSESSMENT
Physical Examination:  GENERAL:               Eyes open at times, not responsive  HEENT:                    Pupils equal, reactive to light.   No JVD, Dry MM  PULM:                     Bilateral air entry, Clear to auscultation bilaterally, no significant sputum production, No Rales, No Rhonchi, No Wheezing  CVS:                         S1, S2,  +Murmur  ABD:                        Soft, nondistended, nontender, normoactive bowel sounds,   EXT:                         No edema, nontender, No Cyanosis or Clubbing   Vascular:                Warm Extremities,   NEURO:                  Eyes closd, episodes of myoclonus  PSYC:                      Calm, no Insight.      Assessment  78 year old male with a PMH of dementia, HTN, cardiomyopathy, HFrEF, CAD s/p CABG, with known current RCA occlusion, and DM type 2 who was admitted to [facility] on 5/4 with hypoxic respiratory failure in setting of CHF exacerbation and ANISH with course complicated by acute hypoxic respiratory failure in setting of choking episode causing cardiac arrest, shock, worsening hypoxemic respiratory failure, and NSTEMI on 5/9. Pt ICU course noted post cardiac arrest anoxic brain injury and myoclonus. Pt was transferred to [facility] for VEEG which required 48 hours of monitoring as he was noted to still be sedated. While there pt noted to have hematochezia and any ac / antiplatelet agents were held at that time. EEG findings consistent with stimulus induced myoclonus and GPDs s/p hypoxic diffuse indicative of severe cerebral dysfunction.    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  Acute renal failure w Uremia post cardiac arrest improving  Hemtochezia episode, h/h stable no further episodes noted  Heart failure with reduced EF  NSTEMI post arrest       Underlying PMH of dementia, HTN, cardiomyopathy, HFrEF, CAD s/p CABG, with known current RCA occlusion, and DM type 2      Plan:    Continue mechanical ventilation  d/c ativan as not used in days  continue PPI BID, trend cbc  on antibiotics, MSSA in sputum  TF as tolerated  IVF held   continue  Modafinil see if metnal status improve  monitor blood glucose levels, + ISS coverage  Venodynes for dvt ppx  failed cpap trial due to low tv today   ppi for gi prophylaxis      am labs, replace lytes prn  GOC ongoing discussion with family        PMD:				                   Notified(Date):  Family Updated: 	Kuldeep [phone]	                                 Date:  5/27  updated on status  as above  discussed need for trach/peg    Sedation & Analgesia:	  Diet/Nutrition:		 Diet, NPO with Tube Feed:   Tube Feeding Modality: Nasogastric         GI PPx:			pantoprazole   Suspension 40 milliGRAM(s) Oral daily  DVT Ppx:		Venodynes   Activity:		  bedrest  Head of Bed:               35-45 Deg  Glycemic Control:         Insulin    Lines: piv    Restraints were deemed necessary to prevent removal of life-sustaining devices [  ] YES   [  x  ]  NO    Disposition: ICU Care    Goals of Care: Full code

## 2023-05-28 LAB
-  AMPICILLIN/SULBACTAM: SIGNIFICANT CHANGE UP
-  CEFAZOLIN: SIGNIFICANT CHANGE UP
-  CLINDAMYCIN: SIGNIFICANT CHANGE UP
-  ERYTHROMYCIN: SIGNIFICANT CHANGE UP
-  GENTAMICIN: SIGNIFICANT CHANGE UP
-  OXACILLIN: SIGNIFICANT CHANGE UP
-  PENICILLIN: SIGNIFICANT CHANGE UP
-  RIFAMPIN: SIGNIFICANT CHANGE UP
-  TETRACYCLINE: SIGNIFICANT CHANGE UP
-  TRIMETHOPRIM/SULFAMETHOXAZOLE: SIGNIFICANT CHANGE UP
-  VANCOMYCIN: SIGNIFICANT CHANGE UP
ALBUMIN SERPL ELPH-MCNC: 1.3 G/DL — LOW (ref 3.3–5)
ALP SERPL-CCNC: 122 U/L — HIGH (ref 40–120)
ALT FLD-CCNC: 18 U/L — SIGNIFICANT CHANGE UP (ref 10–45)
ANION GAP SERPL CALC-SCNC: 7 MMOL/L — SIGNIFICANT CHANGE UP (ref 5–17)
AST SERPL-CCNC: 29 U/L — SIGNIFICANT CHANGE UP (ref 10–40)
BILIRUB SERPL-MCNC: 0.4 MG/DL — SIGNIFICANT CHANGE UP (ref 0.2–1.2)
BUN SERPL-MCNC: 90 MG/DL — HIGH (ref 7–23)
CALCIUM SERPL-MCNC: 8.5 MG/DL — SIGNIFICANT CHANGE UP (ref 8.4–10.5)
CHLORIDE SERPL-SCNC: 103 MMOL/L — SIGNIFICANT CHANGE UP (ref 96–108)
CO2 SERPL-SCNC: 28 MMOL/L — SIGNIFICANT CHANGE UP (ref 22–31)
CREAT SERPL-MCNC: 2.37 MG/DL — HIGH (ref 0.5–1.3)
EGFR: 27 ML/MIN/1.73M2 — LOW
GLUCOSE BLDC GLUCOMTR-MCNC: 197 MG/DL — HIGH (ref 70–99)
GLUCOSE BLDC GLUCOMTR-MCNC: 209 MG/DL — HIGH (ref 70–99)
GLUCOSE BLDC GLUCOMTR-MCNC: 217 MG/DL — HIGH (ref 70–99)
GLUCOSE BLDC GLUCOMTR-MCNC: 218 MG/DL — HIGH (ref 70–99)
GLUCOSE BLDC GLUCOMTR-MCNC: 233 MG/DL — HIGH (ref 70–99)
GLUCOSE SERPL-MCNC: 190 MG/DL — HIGH (ref 70–99)
HCT VFR BLD CALC: 23.4 % — LOW (ref 39–50)
HGB BLD-MCNC: 7.4 G/DL — LOW (ref 13–17)
MAGNESIUM SERPL-MCNC: 2 MG/DL — SIGNIFICANT CHANGE UP (ref 1.6–2.6)
MCHC RBC-ENTMCNC: 30.5 PG — SIGNIFICANT CHANGE UP (ref 27–34)
MCHC RBC-ENTMCNC: 31.6 GM/DL — LOW (ref 32–36)
MCV RBC AUTO: 96.3 FL — SIGNIFICANT CHANGE UP (ref 80–100)
METHOD TYPE: SIGNIFICANT CHANGE UP
NRBC # BLD: 0 /100 WBCS — SIGNIFICANT CHANGE UP (ref 0–0)
PHOSPHATE SERPL-MCNC: 3.7 MG/DL — SIGNIFICANT CHANGE UP (ref 2.5–4.5)
PLATELET # BLD AUTO: 262 K/UL — SIGNIFICANT CHANGE UP (ref 150–400)
POTASSIUM SERPL-MCNC: 4 MMOL/L — SIGNIFICANT CHANGE UP (ref 3.5–5.3)
POTASSIUM SERPL-SCNC: 4 MMOL/L — SIGNIFICANT CHANGE UP (ref 3.5–5.3)
PROT SERPL-MCNC: 6.9 G/DL — SIGNIFICANT CHANGE UP (ref 6–8.3)
RBC # BLD: 2.43 M/UL — LOW (ref 4.2–5.8)
RBC # FLD: 15 % — HIGH (ref 10.3–14.5)
SODIUM SERPL-SCNC: 138 MMOL/L — SIGNIFICANT CHANGE UP (ref 135–145)
WBC # BLD: 9.46 K/UL — SIGNIFICANT CHANGE UP (ref 3.8–10.5)
WBC # FLD AUTO: 9.46 K/UL — SIGNIFICANT CHANGE UP (ref 3.8–10.5)

## 2023-05-28 PROCEDURE — 71045 X-RAY EXAM CHEST 1 VIEW: CPT | Mod: 26

## 2023-05-28 RX ADMIN — PANTOPRAZOLE SODIUM 40 MILLIGRAM(S): 20 TABLET, DELAYED RELEASE ORAL at 05:33

## 2023-05-28 RX ADMIN — Medication 4: at 17:23

## 2023-05-28 RX ADMIN — Medication 650 MILLIGRAM(S): at 06:02

## 2023-05-28 RX ADMIN — Medication 4: at 12:54

## 2023-05-28 RX ADMIN — Medication 3 MILLILITER(S): at 16:23

## 2023-05-28 RX ADMIN — CHLORHEXIDINE GLUCONATE 1 APPLICATION(S): 213 SOLUTION TOPICAL at 12:55

## 2023-05-28 RX ADMIN — MUPIROCIN 1 APPLICATION(S): 20 OINTMENT TOPICAL at 05:33

## 2023-05-28 RX ADMIN — BRIVARACETAM 50 MILLIGRAM(S): 25 TABLET, FILM COATED ORAL at 17:24

## 2023-05-28 RX ADMIN — CHLORHEXIDINE GLUCONATE 15 MILLILITER(S): 213 SOLUTION TOPICAL at 05:33

## 2023-05-28 RX ADMIN — Medication 2: at 23:57

## 2023-05-28 RX ADMIN — MODAFINIL 100 MILLIGRAM(S): 200 TABLET ORAL at 12:54

## 2023-05-28 RX ADMIN — MUPIROCIN 1 APPLICATION(S): 20 OINTMENT TOPICAL at 17:24

## 2023-05-28 RX ADMIN — Medication 3 MILLILITER(S): at 08:26

## 2023-05-28 RX ADMIN — INSULIN GLARGINE 10 UNIT(S): 100 INJECTION, SOLUTION SUBCUTANEOUS at 21:39

## 2023-05-28 RX ADMIN — Medication 3 MILLILITER(S): at 21:11

## 2023-05-28 RX ADMIN — CEFEPIME 100 MILLIGRAM(S): 1 INJECTION, POWDER, FOR SOLUTION INTRAMUSCULAR; INTRAVENOUS at 13:40

## 2023-05-28 RX ADMIN — PANTOPRAZOLE SODIUM 40 MILLIGRAM(S): 20 TABLET, DELAYED RELEASE ORAL at 17:56

## 2023-05-28 RX ADMIN — BRIVARACETAM 50 MILLIGRAM(S): 25 TABLET, FILM COATED ORAL at 05:32

## 2023-05-28 RX ADMIN — SIMVASTATIN 40 MILLIGRAM(S): 20 TABLET, FILM COATED ORAL at 21:49

## 2023-05-28 RX ADMIN — CHLORHEXIDINE GLUCONATE 15 MILLILITER(S): 213 SOLUTION TOPICAL at 17:23

## 2023-05-28 RX ADMIN — Medication 4: at 05:31

## 2023-05-28 RX ADMIN — Medication 650 MILLIGRAM(S): at 05:32

## 2023-05-28 RX ADMIN — Medication 3 MILLILITER(S): at 03:47

## 2023-05-28 NOTE — PROGRESS NOTE ADULT - ASSESSMENT
Physical Examination:  GENERAL:               Eyes open at times, not responsive  HEENT:                    Pupils equal, reactive to light.   No JVD, Dry MM  PULM:                     Bilateral air entry, Clear to auscultation bilaterally, no significant sputum production, No Rales, No Rhonchi, No Wheezing  CVS:                         S1, S2,  +Murmur  ABD:                        Soft, nondistended, nontender, normoactive bowel sounds,   EXT:                         No edema, nontender, No Cyanosis or Clubbing   Vascular:                Warm Extremities,   NEURO:                  Eyes closd, episodes of myoclonus  PSYC:                      Calm, no Insight.      Assessment  78 year old male with a PMH of dementia, HTN, cardiomyopathy, HFrEF, CAD s/p CABG, with known current RCA occlusion, and DM type 2 who was admitted to [facility] on 5/4 with hypoxic respiratory failure in setting of CHF exacerbation and ANISH with course complicated by acute hypoxic respiratory failure in setting of choking episode causing cardiac arrest, shock, worsening hypoxemic respiratory failure, and NSTEMI on 5/9. Pt ICU course noted post cardiac arrest anoxic brain injury and myoclonus. Pt was transferred to [facility] for VEEG which required 48 hours of monitoring as he was noted to still be sedated. While there pt noted to have hematochezia and any ac / antiplatelet agents were held at that time. EEG findings consistent with stimulus induced myoclonus and GPDs s/p hypoxic diffuse indicative of severe cerebral dysfunction.    Patient returned to 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  Acute renal failure w Uremia post cardiac arrest improving  Hemtochezia episode, h/h stable no further episodes noted  Heart failure with reduced EF  NSTEMI post arrest       Underlying PMH of dementia, HTN, cardiomyopathy, HFrEF, CAD s/p CABG, with known current RCA occlusion, and DM type 2      Plan:    Continue mechanical ventilation  continue PPI BID, trend cbc  on antibiotics, MSSA in sputum  TF as tolerated  IVF held   continue  Modafinil see if metnal status improve  monitor blood glucose levels, + ISS coverage  Venodynes for dvt ppx  failed cpap trial due to low tv today   ppi for gi prophylaxis      am labs, replace lytes prn  GOC ongoing discussion with family        PMD:				                   Notified(Date):  Family Updated: 	Kuldeep [phone]	                                 Date:  5/27  updated on status  as above  discussed need for trach/peg    Sedation & Analgesia:	  Diet/Nutrition:		 Diet, NPO with Tube Feed:   Tube Feeding Modality: Nasogastric         GI PPx:			pantoprazole   Suspension 40 milliGRAM(s) Oral daily  DVT Ppx:		Venodynes   Activity:		  bedrest  Head of Bed:               35-45 Deg  Glycemic Control:         Insulin    Lines: piv    Restraints were deemed necessary to prevent removal of life-sustaining devices [  ] YES   [  x  ]  NO    Disposition: ICU Care    Goals of Care: Full code

## 2023-05-28 NOTE — PROGRESS NOTE ADULT - SUBJECTIVE AND OBJECTIVE BOX
Follow-up Critical Care Progress Note  Chief Complaint : Atrial fibrillation    has increased secretions  not tolerate cpap  opens eyes to tachtile stimuli       Allergies :sulfa drugs (Unknown)      PAST MEDICAL & SURGICAL HISTORY:  HTN (hypertension)    HLD (hyperlipidemia)    Diabetes    CAD (coronary artery disease)    History of cholecystectomy        Medications:  MEDICATIONS  (STANDING):  albuterol/ipratropium for Nebulization 3 milliLiter(s) Nebulizer every 6 hours  brivaracetam Oral Solution 50 milliGRAM(s) Oral two times a day  cefepime   IVPB      cefepime   IVPB 500 milliGRAM(s) IV Intermittent daily  chlorhexidine 0.12% Liquid 15 milliLiter(s) Oral Mucosa every 12 hours  chlorhexidine 2% Cloths 1 Application(s) Topical daily  dextrose 5%. 1000 milliLiter(s) (100 mL/Hr) IV Continuous <Continuous>  dextrose 5%. 1000 milliLiter(s) (50 mL/Hr) IV Continuous <Continuous>  dextrose 50% Injectable 25 Gram(s) IV Push once  dextrose 50% Injectable 25 Gram(s) IV Push once  dextrose 50% Injectable 12.5 Gram(s) IV Push once  insulin glargine Injectable (LANTUS) 10 Unit(s) SubCutaneous at bedtime  insulin lispro (ADMELOG) corrective regimen sliding scale   SubCutaneous every 6 hours  modafinil 100 milliGRAM(s) Oral daily  mupirocin 2% Nasal 1 Application(s) Both Nostrils every 12 hours  pantoprazole   Suspension 40 milliGRAM(s) Oral every 12 hours  polyethylene glycol 3350 17 Gram(s) Oral daily  senna 2 Tablet(s) Oral at bedtime  simvastatin 40 milliGRAM(s) Oral at bedtime    MEDICATIONS  (PRN):  acetaminophen   Oral Liquid .. 650 milliGRAM(s) Oral every 6 hours PRN Temp greater or equal to 38C (100.4F)      Antibiotics History  cefepime   IVPB    , 05-24-23 @ 17:36  cefepime   IVPB 500 milliGRAM(s) IV Intermittent once, 05-24-23 @ 16:39  cefepime   IVPB 500 milliGRAM(s) IV Intermittent daily, 05-25-23 @ 12:00  piperacillin/tazobactam IVPB. 3.375 Gram(s) IV Intermittent once, 05-09-23 @ 10:58, Stop order after: 1 Doses  piperacillin/tazobactam IVPB.- 3.375 Gram(s) IV Intermittent once, 05-09-23 @ 15:00  piperacillin/tazobactam IVPB.. 3.375 Gram(s) IV Intermittent every 8 hours, 05-09-23 @ 22:00, Stop order after: 7 Days  piperacillin/tazobactam IVPB.. 3.375 Gram(s) IV Intermittent every 12 hours, 05-11-23 @ 22:27, Stop order after: 7 Days      Heme Medications       GI Medications  pantoprazole   Suspension 40 milliGRAM(s) Oral every 12 hours, 05-25-23 @ 08:37, Routine  polyethylene glycol 3350 17 Gram(s) Oral daily, 05-25-23 @ 08:36, Routine  senna 2 Tablet(s) Oral at bedtime, 05-19-23 @ 21:37, Routine      COVID  05-04-23 @ 19:50  COVID -   NotDetec  07-13-22 @ 18:33  COVID -   NotDetec      COVID Biomarkers    05-04-23 @ 19:50 ESR --  ---  CRP --  ---  DDimer  351<H>   ---   LDH --   ---   Ferritin --          Procalcitonin Trend  05-17-23 @ 23:44   -   0.59<H>  05-16-23 @ 22:16   -   0.74<H>  05-15-23 @ 05:45   -   1.12<H>  05-09-23 @ 13:42   -   0.11<H>    WBC Trend  05-28-23 @ 05:00   -  9.46  05-27-23 @ 06:15   -  10.60<H>  05-26-23 @ 05:05   -  10.98<H>    H/H Trend  05-28-23 @ 05:00   -   7.4<L>/ 23.4<L>  05-27-23 @ 06:15   -   7.9<L>/ 24.5<L>  05-26-23 @ 05:05   -   8.2<L>/ 25.8<L>  05-25-23 @ 05:00   -   7.9<L>/ 24.9<L>  05-24-23 @ 05:30   -   9.7<L>/ 30.1<L>  05-23-23 @ 06:30   -   10.1<L>/ 31.6<L>    Stool Occult Blood  05-25-23 @ 18:20   -   Negative  05-16-23 @ 17:00   -   Positive<!>    Platelet Trend  05-28-23 @ 05:00   -  262  05-27-23 @ 06:15   -  227  05-26-23 @ 05:05   -  189    Trend Sodium  05-28-23 @ 05:00   -  138  05-27-23 @ 06:15   -  135  05-26-23 @ 05:05   -  139    Trend Potassium  05-28-23 @ 05:00   -  4.0  05-27-23 @ 06:15   -  4.2  05-26-23 @ 05:05   -  4.0    Trend Bun/Cr  05-28-23 @ 05:00  BUN/CR -  90<H> / 2.37<H>  05-27-23 @ 06:15  BUN/CR -  95<H> / 2.32<H>  05-26-23 @ 05:05  BUN/CR -  95<H> / 2.44<H>       ABG Trend  05-19-23 @ 00:22   - 7.37/43/144<H>/99.8<H>  05-18-23 @ 05:38   - 7.41/37/97/98.4<H>  05-17-23 @ 23:36   - 7.42/37/97/98.3<H>  05-16-23 @ 22:10   - 7.41/41/92/98.5<H>  05-16-23 @ 05:59   - 7.35/47/104/98.8<H>  05-15-23 @ 13:45   - 7.36/47/74<L>/96.6     Trend AST/ALT/ALK Phos/Bili  05-28-23 @ 05:00   29/18/122<H>/0.4  05-27-23 @ 06:15   31/18/129<H>/0.4  05-25-23 @ 05:00   29/14/98/0.5  05-24-23 @ 05:30   24/12/98/0.6  05-21-23 @ 06:15   14/10/69/0.5       Ammonia Trend  05-14-23 @ 11:30   -   53  05-14-23 @ 05:00   -   16     Albumin Trend  05-28-23 @ 05:00   -   1.3<L>  05-27-23 @ 06:15   -   1.4<L>  05-25-23 @ 05:00   -   1.4<L>  05-24-23 @ 05:30   -   1.7<L>  05-21-23 @ 06:15   -   1.7<L>  05-20-23 @ 07:50   -   1.6<L>      PTT - PT - INR Trend  05-19-23 @ 00:44   -   26.5<L> - 13.2 - 1.15  05-17-23 @ 23:44   -   31.2 - 13.7<H> - 1.18<H>  05-16-23 @ 22:16   -   31.3 - 13.6<H> - 1.18<H>  05-15-23 @ 13:55   -   32.9 - 14.0<H> - 1.19<H>  05-14-23 @ 13:45   -   33.6 - -- - --  05-14-23 @ 11:30   -   34.1 - 14.9<H> - 1.28<H>    Glucose Trend  05-28-23 @ 05:23   -  -- -- 209<H>  05-28-23 @ 05:00   -  -- 190<H> --  05-27-23 @ 23:25   -  -- -- 224<H>  05-27-23 @ 21:20   -  -- -- 207<H>  05-27-23 @ 17:40   -  -- -- 220<H>  05-27-23 @ 11:57   -  -- -- 205<H>  05-27-23 @ 06:15   -  -- 249<H> --  05-27-23 @ 05:48   -  -- -- 248<H>  05-26-23 @ 23:13   -  -- -- 262<H>  05-26-23 @ 12:22   -  -- -- 239<H>    A1C with Estimated Average Glucose Result: 7.7 % *H* [4.0 - 5.6] (05-05-23 @ 08:00)      LABS:                        7.4    9.46  )-----------( 262      ( 28 May 2023 05:00 )             23.4     05-28    138  |  103  |  90<H>  ----------------------------<  190<H>  4.0   |  28  |  2.37<H>    Ca    8.5      28 May 2023 05:00  Phos  3.7     05-28  Mg     2.0     05-28    TPro  6.9  /  Alb  1.3<L>  /  TBili  0.4  /  DBili  x   /  AST  29  /  ALT  18  /  AlkPhos  122<H>  05-28         CULTURES: (if applicable)    Culture - Sputum (collected 05-26-23 @ 06:00)  Source: ET Tube ET Tube  Gram Stain (05-26-23 @ 13:21):    Few polymorphonuclear leukocytes seen per low power field    Moderate Squamous epithelial cells seen per low power field    Moderate Gram positive cocci in pairs, chains and clusters seen per oil    power field    Few Yeast like cells seen per oil power field    Rare Gram Negative Rods seen per oil power field  Preliminary Report (05-27-23 @ 10:43):    Moderate Staphylococcus aureus    Normal Respiratory Chelsea present    Culture - Blood (collected 05-24-23 @ 15:05)  Source: .Blood Blood  Preliminary Report (05-26-23 @ 01:02):    No growth to date.    Culture - Blood (collected 05-24-23 @ 14:30)  Source: .Blood Blood  Preliminary Report (05-25-23 @ 19:02):    No growth to date.    Culture - Sputum (collected 05-24-23 @ 14:14)  Source: .Sputum Sputum  Gram Stain (05-24-23 @ 22:38):    Numerous polymorphonuclear leukocytes per low power field    No Squamous epithelial cells per low power field    Moderate Gram Positive Cocci in Clusters per oil power field  Final Report (05-26-23 @ 16:39):    Moderate Staphylococcus aureus    Normal Respiratory Chelsea absent  Organism: Staphylococcus aureus (05-26-23 @ 16:39)  Organism: Staphylococcus aureus (05-26-23 @ 16:39)      Method Type: ALDO      -  Ampicillin/Sulbactam: S <=8/4      -  Cefazolin: S <=4      -  Clindamycin: S <=0.25      -  Erythromycin: S <=0.25      -  Gentamicin: S <=1 Should not be used as monotherapy      -  Oxacillin: S 0.5 Oxacillin predicts susceptibility for dicloxacillin, methicillin, and nafcillin      -  Penicillin: R >8      -  Rifampin: S <=1 Should not be used as monotherapy      -  Tetracycline: R >8      -  Trimethoprim/Sulfamethoxazole: S <=0.5/9.5      -  Vancomycin: S 1       RADIOLOGY  CXR:    < from: Xray Chest 1 View- PORTABLE-Routine (Xray Chest 1 View- PORTABLE-Routine in AM.) (05.25.23 @ 06:53) >    ACC: [id number] EXAM:  XR CHEST PORTABLE ROUTINE 1V   ORDERED BY: JC CARD     PROCEDURE DATE:  05/25/2023          INTERPRETATION:  Clinical history: 78-year-old male, evaluate pneumonia.    Portable view of the chest is compared to 5/24/2023.    ET tube with the tip 3 cm above the laura and NG tube with the tip in the stomach, unchanged.    Mild cardiomegaly and mild pulmonary venous congestion with no pneumothorax or acute osseous finding.    Right perihilar infiltrate, unchanged.    IMPRESSION:  Right perihilar infiltrate, unchanged. No consolidation    --- End of Report ---    < end of copied text >         VITALS:  T(C): 37.3 (05-28-23 @ 07:00), Max: 38.2 (05-28-23 @ 06:00)  T(F): 99.2 (05-28-23 @ 07:00), Max: 100.8 (05-28-23 @ 06:00)  HR: 98 (05-28-23 @ 09:00) (78 - 99)  BP: 131/70 (05-28-23 @ 07:00) (99/59 - 137/73)  BP(mean): 88 (05-28-23 @ 07:00) (68 - 92)  ABP: --  ABP(mean): --  RR: 24 (05-28-23 @ 07:00) (12 - 24)  SpO2: 100% (05-28-23 @ 09:00) (97% - 100%)  CVP(mm Hg): --  CVP(cm H2O): --    Ins and Outs     05-27-23 @ 07:01  -  05-28-23 @ 07:00  --------------------------------------------------------  IN: 2490 mL / OUT: 1050 mL / NET: 1440 mL    05-28-23 @ 07:01  -  05-28-23 @ 09:52  --------------------------------------------------------  IN: 45 mL / OUT: 0 mL / NET: 45 mL            Device: Avea, Mode: AC/ CMV (Assist Control/ Continuous Mandatory Ventilation), RR (machine): 12, RR (patient): 21, TV (machine): 450, TV (patient): 430, FiO2: 30, PEEP: 5, MAP: 11, PIP: 27    I&O's Detail    27 May 2023 07:01  -  28 May 2023 07:00  --------------------------------------------------------  IN:    Free Water: 1500 mL    Nepro with Carb Steady: 990 mL  Total IN: 2490 mL    OUT:    Voided (mL): 1050 mL  Total OUT: 1050 mL    Total NET: 1440 mL      28 May 2023 07:01  -  28 May 2023 09:52  --------------------------------------------------------  IN:    Nepro with Carb Steady: 45 mL  Total IN: 45 mL    OUT:  Total OUT: 0 mL    Total NET: 45 mL

## 2023-05-28 NOTE — PROGRESS NOTE ADULT - SUBJECTIVE AND OBJECTIVE BOX
[facility] NEPHROLOGY SERVICES, [facility]  NEPHROLOGY AND HYPERTENSION  [address]  [address]  [place] [address]  [phone]    MD ALEX NORMAN MD YELENA ROSENBERG, MD BINNY KOSHY, MD CHRISTOPHER CAPUTO, MD EDWARD BOVER, MD          Patient events noted    MEDICATIONS  (STANDING):  albuterol/ipratropium for Nebulization 3 milliLiter(s) Nebulizer every 6 hours  brivaracetam Oral Solution 50 milliGRAM(s) Oral two times a day  cefepime   IVPB      cefepime   IVPB 500 milliGRAM(s) IV Intermittent daily  chlorhexidine 0.12% Liquid 15 milliLiter(s) Oral Mucosa every 12 hours  chlorhexidine 2% Cloths 1 Application(s) Topical daily  dextrose 5%. 1000 milliLiter(s) (100 mL/Hr) IV Continuous <Continuous>  dextrose 5%. 1000 milliLiter(s) (50 mL/Hr) IV Continuous <Continuous>  dextrose 50% Injectable 25 Gram(s) IV Push once  dextrose 50% Injectable 25 Gram(s) IV Push once  dextrose 50% Injectable 12.5 Gram(s) IV Push once  insulin glargine Injectable (LANTUS) 10 Unit(s) SubCutaneous at bedtime  insulin lispro (ADMELOG) corrective regimen sliding scale   SubCutaneous every 6 hours  modafinil 100 milliGRAM(s) Oral daily  mupirocin 2% Nasal 1 Application(s) Both Nostrils every 12 hours  pantoprazole   Suspension 40 milliGRAM(s) Oral every 12 hours  polyethylene glycol 3350 17 Gram(s) Oral daily  senna 2 Tablet(s) Oral at bedtime  simvastatin 40 milliGRAM(s) Oral at bedtime    MEDICATIONS  (PRN):  acetaminophen   Oral Liquid .. 650 milliGRAM(s) Oral every 6 hours PRN Temp greater or equal to 38C (100.4F)      05-27-23 @ 07:01  -  05-28-23 @ 07:00  --------------------------------------------------------  IN: 2490 mL / OUT: 1050 mL / NET: 1440 mL    05-28-23 @ 07:01  -  05-28-23 @ 22:13  --------------------------------------------------------  IN: 1830 mL / OUT: 300 mL / NET: 1530 mL      PHYSICAL EXAM:      T(C): 37.2 (05-28-23 @ 16:00), Max: 38.2 (05-28-23 @ 06:00)  HR: 82 (05-28-23 @ 21:12) (78 - 99)  BP: 112/64 (05-28-23 @ 20:00) (105/58 - 154/64)  RR: 19 (05-28-23 @ 20:00) (4 - 24)  SpO2: 99% (05-28-23 @ 21:12) (97% - 100%)  Wt(kg): --  Lungs clear  Heart S1S2  Abd soft NT ND  Extremities:   tr edema                                    7.4    9.46  )-----------( 262      ( 28 May 2023 05:00 )             23.4     05-28    138  |  103  |  90<H>  ----------------------------<  190<H>  4.0   |  28  |  2.37<H>    Ca    8.5      28 May 2023 05:00  Phos  3.7     05-28  Mg     2.0     05-28    TPro  6.9  /  Alb  1.3<L>  /  TBili  0.4  /  DBili  x   /  AST  29  /  ALT  18  /  AlkPhos  122<H>  05-28      LIVER FUNCTIONS - ( 28 May 2023 05:00 )  Alb: 1.3 g/dL / Pro: 6.9 g/dL / ALK PHOS: 122 U/L / ALT: 18 U/L / AST: 29 U/L / GGT: x           Creatinine Trend: 2.37<--, 2.32<--, 2.44<--, 2.56<--, 2.81<--, 3.02<--  Mode: AC/ CMV (Assist Control/ Continuous Mandatory Ventilation)  RR (machine): 12  TV (machine): 450  FiO2: 30  PEEP: 5  MAP: 12  PIP: 25      A/P:    Hemodynamic ATN s/p arrest (Cr 1.6 - 5/9/23, Cr 1.0 - 4/12/23)  Resp failure, asp PNA, intubated   CM, EF 35 - 40%, dementia  Concern for anoxic encephalopathy   Vent support per ICU team  S/p EEG monitoring  Avoid nephrotoxins  F/u BMP, UO   Lytes are stable  Cr is improving  Good UO  No objections to diuretics as needed  Overall prognosis is poor      Avila Ross MD

## 2023-05-29 LAB
ALBUMIN SERPL ELPH-MCNC: 1.3 G/DL — LOW (ref 3.3–5)
ALP SERPL-CCNC: 128 U/L — HIGH (ref 40–120)
ALT FLD-CCNC: 20 U/L — SIGNIFICANT CHANGE UP (ref 10–45)
ANION GAP SERPL CALC-SCNC: 6 MMOL/L — SIGNIFICANT CHANGE UP (ref 5–17)
AST SERPL-CCNC: 30 U/L — SIGNIFICANT CHANGE UP (ref 10–40)
BILIRUB SERPL-MCNC: 0.4 MG/DL — SIGNIFICANT CHANGE UP (ref 0.2–1.2)
BUN SERPL-MCNC: 83 MG/DL — HIGH (ref 7–23)
CALCIUM SERPL-MCNC: 8.3 MG/DL — LOW (ref 8.4–10.5)
CHLORIDE SERPL-SCNC: 105 MMOL/L — SIGNIFICANT CHANGE UP (ref 96–108)
CO2 SERPL-SCNC: 31 MMOL/L — SIGNIFICANT CHANGE UP (ref 22–31)
CREAT SERPL-MCNC: 2.23 MG/DL — HIGH (ref 0.5–1.3)
CULTURE RESULTS: SIGNIFICANT CHANGE UP
EGFR: 29 ML/MIN/1.73M2 — LOW
GLUCOSE BLDC GLUCOMTR-MCNC: 172 MG/DL — HIGH (ref 70–99)
GLUCOSE BLDC GLUCOMTR-MCNC: 186 MG/DL — HIGH (ref 70–99)
GLUCOSE BLDC GLUCOMTR-MCNC: 187 MG/DL — HIGH (ref 70–99)
GLUCOSE BLDC GLUCOMTR-MCNC: 209 MG/DL — HIGH (ref 70–99)
GLUCOSE SERPL-MCNC: 167 MG/DL — HIGH (ref 70–99)
HCT VFR BLD CALC: 25.4 % — LOW (ref 39–50)
HGB BLD-MCNC: 8 G/DL — LOW (ref 13–17)
MAGNESIUM SERPL-MCNC: 1.9 MG/DL — SIGNIFICANT CHANGE UP (ref 1.6–2.6)
MCHC RBC-ENTMCNC: 30.9 PG — SIGNIFICANT CHANGE UP (ref 27–34)
MCHC RBC-ENTMCNC: 31.5 GM/DL — LOW (ref 32–36)
MCV RBC AUTO: 98.1 FL — SIGNIFICANT CHANGE UP (ref 80–100)
NRBC # BLD: 0 /100 WBCS — SIGNIFICANT CHANGE UP (ref 0–0)
PHOSPHATE SERPL-MCNC: 3.2 MG/DL — SIGNIFICANT CHANGE UP (ref 2.5–4.5)
PLATELET # BLD AUTO: 257 K/UL — SIGNIFICANT CHANGE UP (ref 150–400)
POTASSIUM SERPL-MCNC: 3.3 MMOL/L — LOW (ref 3.5–5.3)
POTASSIUM SERPL-SCNC: 3.3 MMOL/L — LOW (ref 3.5–5.3)
PROT SERPL-MCNC: 7.2 G/DL — SIGNIFICANT CHANGE UP (ref 6–8.3)
RBC # BLD: 2.59 M/UL — LOW (ref 4.2–5.8)
RBC # FLD: 15.4 % — HIGH (ref 10.3–14.5)
SODIUM SERPL-SCNC: 142 MMOL/L — SIGNIFICANT CHANGE UP (ref 135–145)
SPECIMEN SOURCE: SIGNIFICANT CHANGE UP
WBC # BLD: 9.44 K/UL — SIGNIFICANT CHANGE UP (ref 3.8–10.5)
WBC # FLD AUTO: 9.44 K/UL — SIGNIFICANT CHANGE UP (ref 3.8–10.5)

## 2023-05-29 PROCEDURE — 71045 X-RAY EXAM CHEST 1 VIEW: CPT | Mod: 26

## 2023-05-29 PROCEDURE — 70450 CT HEAD/BRAIN W/O DYE: CPT | Mod: 26

## 2023-05-29 RX ORDER — MAGNESIUM SULFATE 500 MG/ML
1 VIAL (ML) INJECTION ONCE
Refills: 0 | Status: COMPLETED | OUTPATIENT
Start: 2023-05-29 | End: 2023-05-29

## 2023-05-29 RX ORDER — POTASSIUM CHLORIDE 20 MEQ
40 PACKET (EA) ORAL ONCE
Refills: 0 | Status: COMPLETED | OUTPATIENT
Start: 2023-05-29 | End: 2023-05-29

## 2023-05-29 RX ADMIN — SIMVASTATIN 40 MILLIGRAM(S): 20 TABLET, FILM COATED ORAL at 22:17

## 2023-05-29 RX ADMIN — PANTOPRAZOLE SODIUM 40 MILLIGRAM(S): 20 TABLET, DELAYED RELEASE ORAL at 17:35

## 2023-05-29 RX ADMIN — Medication 2: at 23:01

## 2023-05-29 RX ADMIN — CEFEPIME 100 MILLIGRAM(S): 1 INJECTION, POWDER, FOR SOLUTION INTRAMUSCULAR; INTRAVENOUS at 11:18

## 2023-05-29 RX ADMIN — INSULIN GLARGINE 10 UNIT(S): 100 INJECTION, SOLUTION SUBCUTANEOUS at 22:58

## 2023-05-29 RX ADMIN — BRIVARACETAM 50 MILLIGRAM(S): 25 TABLET, FILM COATED ORAL at 05:24

## 2023-05-29 RX ADMIN — Medication 3 MILLILITER(S): at 04:22

## 2023-05-29 RX ADMIN — Medication 650 MILLIGRAM(S): at 07:00

## 2023-05-29 RX ADMIN — CHLORHEXIDINE GLUCONATE 1 APPLICATION(S): 213 SOLUTION TOPICAL at 11:48

## 2023-05-29 RX ADMIN — PANTOPRAZOLE SODIUM 40 MILLIGRAM(S): 20 TABLET, DELAYED RELEASE ORAL at 05:24

## 2023-05-29 RX ADMIN — MUPIROCIN 1 APPLICATION(S): 20 OINTMENT TOPICAL at 17:35

## 2023-05-29 RX ADMIN — Medication 3 MILLILITER(S): at 09:06

## 2023-05-29 RX ADMIN — CHLORHEXIDINE GLUCONATE 15 MILLILITER(S): 213 SOLUTION TOPICAL at 17:35

## 2023-05-29 RX ADMIN — CHLORHEXIDINE GLUCONATE 15 MILLILITER(S): 213 SOLUTION TOPICAL at 05:23

## 2023-05-29 RX ADMIN — Medication 2: at 17:35

## 2023-05-29 RX ADMIN — Medication 3 MILLILITER(S): at 21:38

## 2023-05-29 RX ADMIN — POLYETHYLENE GLYCOL 3350 17 GRAM(S): 17 POWDER, FOR SOLUTION ORAL at 11:17

## 2023-05-29 RX ADMIN — Medication 40 MILLIEQUIVALENT(S): at 11:17

## 2023-05-29 RX ADMIN — Medication 100 GRAM(S): at 09:06

## 2023-05-29 RX ADMIN — Medication 3 MILLILITER(S): at 15:21

## 2023-05-29 RX ADMIN — Medication 4: at 11:17

## 2023-05-29 RX ADMIN — Medication 650 MILLIGRAM(S): at 06:02

## 2023-05-29 RX ADMIN — MODAFINIL 100 MILLIGRAM(S): 200 TABLET ORAL at 11:16

## 2023-05-29 RX ADMIN — SENNA PLUS 2 TABLET(S): 8.6 TABLET ORAL at 22:18

## 2023-05-29 RX ADMIN — BRIVARACETAM 50 MILLIGRAM(S): 25 TABLET, FILM COATED ORAL at 17:35

## 2023-05-29 RX ADMIN — Medication 2: at 05:28

## 2023-05-29 RX ADMIN — MUPIROCIN 1 APPLICATION(S): 20 OINTMENT TOPICAL at 05:23

## 2023-05-29 NOTE — PROGRESS NOTE ADULT - SUBJECTIVE AND OBJECTIVE BOX
[facility] NEPHROLOGY SERVICES, [facility]  NEPHROLOGY AND HYPERTENSION  [address]  [address]  [place] [address]  [phone]    MD ALEX NORMAN MD YELENA ROSENBERG, MD BINNY KOSHY, MD CHRISTOPHER CAPUTO, MD EDWARD BOVER, MD          Patient events noted    MEDICATIONS  (STANDING):  albuterol/ipratropium for Nebulization 3 milliLiter(s) Nebulizer every 6 hours  brivaracetam Oral Solution 50 milliGRAM(s) Oral two times a day  chlorhexidine 0.12% Liquid 15 milliLiter(s) Oral Mucosa every 12 hours  chlorhexidine 2% Cloths 1 Application(s) Topical daily  dextrose 5%. 1000 milliLiter(s) (100 mL/Hr) IV Continuous <Continuous>  dextrose 5%. 1000 milliLiter(s) (50 mL/Hr) IV Continuous <Continuous>  dextrose 50% Injectable 25 Gram(s) IV Push once  dextrose 50% Injectable 25 Gram(s) IV Push once  dextrose 50% Injectable 12.5 Gram(s) IV Push once  insulin glargine Injectable (LANTUS) 10 Unit(s) SubCutaneous at bedtime  insulin lispro (ADMELOG) corrective regimen sliding scale   SubCutaneous every 6 hours  modafinil 100 milliGRAM(s) Oral daily  mupirocin 2% Nasal 1 Application(s) Both Nostrils every 12 hours  pantoprazole   Suspension 40 milliGRAM(s) Oral every 12 hours  polyethylene glycol 3350 17 Gram(s) Oral daily  senna 2 Tablet(s) Oral at bedtime  simvastatin 40 milliGRAM(s) Oral at bedtime    MEDICATIONS  (PRN):  acetaminophen   Oral Liquid .. 650 milliGRAM(s) Oral every 6 hours PRN Temp greater or equal to 38C (100.4F)      05-28-23 @ 07:01 - 05-29-23 @ 07:00  --------------------------------------------------------  IN: 2880 mL / OUT: 1075 mL / NET: 1805 mL    05-29-23 @ 07:01 - 05-29-23 @ 21:45  --------------------------------------------------------  IN: 1595 mL / OUT: 550 mL / NET: 1045 mL      PHYSICAL EXAM:      T(C): 37 (05-29-23 @ 16:00), Max: 38 (05-29-23 @ 06:00)  HR: 94 (05-29-23 @ 20:00) (75 - 104)  BP: 127/80 (05-29-23 @ 19:00) (100/56 - 142/82)  RR: 18 (05-29-23 @ 20:00) (16 - 28)  SpO2: 100% (05-29-23 @ 20:00) (99% - 100%)  Wt(kg): --  Lungs clear  Heart S1S2  Abd soft NT ND  Extremities:   tr edema                                    8.0    9.44  )-----------( 257      ( 29 May 2023 06:00 )             25.4     05-29    142  |  105  |  83<H>  ----------------------------<  167<H>  3.3<L>   |  31  |  2.23<H>    Ca    8.3<L>      29 May 2023 06:00  Phos  3.2     05-29  Mg     1.9     05-29    TPro  7.2  /  Alb  1.3<L>  /  TBili  0.4  /  DBili  x   /  AST  30  /  ALT  20  /  AlkPhos  128<H>  05-29      LIVER FUNCTIONS - ( 29 May 2023 06:00 )  Alb: 1.3 g/dL / Pro: 7.2 g/dL / ALK PHOS: 128 U/L / ALT: 20 U/L / AST: 30 U/L / GGT: x           Creatinine Trend: 2.23<--, 2.37<--, 2.32<--, 2.44<--, 2.56<--, 2.81<--  Mode: AC/ CMV (Assist Control/ Continuous Mandatory Ventilation)  RR (machine): 12  TV (machine): 450  FiO2: 30  PEEP: 5  MAP: 10  PIP: 24      A/P:    Hemodynamic ATN s/p arrest (Cr 1.6 - 5/9/23, Cr 1.0 - 4/12/23)  Resp failure, asp PNA, intubated   CM, EF 35 - 40%, dementia  Concern for anoxic encephalopathy   Vent support per ICU team  S/p EEG monitoring  Avoid nephrotoxins  F/u BMP, UO   Lytes are stable  Cr is improving  Good UO  No objections to diuretics as needed  Overall prognosis is poor    Avila Ross MD

## 2023-05-29 NOTE — PROGRESS NOTE ADULT - SUBJECTIVE AND OBJECTIVE BOX
78 year old M w/HTN, Cardiomyopathy, chronic systolic CHF, DM2, Dementia, sent to the ED by PMD because of increased dyspnea, leg edema x past 2 days.  Per ED, daughter states that PMD noted pt to have new afib on EKG.  Pt is confused, demented (baseline) and unable to provide a reliable history.  There was no report of  chest pain, cough, fever chills, vomiting, diarrhea.   1st trop is 131.  EKG showed HR of 86, SR w/ APCs. Cxray c/w CHF (venous congestion, bilat pl effusions)  Pt's last echo was from 07/2022 w/c reported LVEF of  40 to 45%.. Increased LV wall thickness.. Moderately reduced RV systolic function. Moderately enlarged left atrium.. Moderate mitral valve regurgitation.Mild tricuspid regurgitation.   (04 May 2023 21:17)      24 hr events: Patient has remained HDS overnight, but remains with elevated low grade fevers. He is reaching toward the endotracheal tube this AM.         ## ROS: [ x ] unable to obtain        ## Labs:  CBC:                        8.0    9.44  )-----------( 257      ( 29 May 2023 06:00 )             25.4     Chem:  05-29    142  |  105  |  83<H>  ----------------------------<  167<H>  3.3<L>   |  31  |  2.23<H>    Ca    8.3<L>      29 May 2023 06:00  Phos  3.2     05-29  Mg     1.9     05-29    TPro  7.2  /  Alb  1.3<L>  /  TBili  0.4  /  DBili  x   /  AST  30  /  ALT  20  /  AlkPhos  128<H>  05-29        ## Medications:  cefepime   IVPB      cefepime   IVPB 500 milliGRAM(s) IV Intermittent daily  albuterol/ipratropium for Nebulization 3 milliLiter(s) Nebulizer every 6 hours  dextrose 50% Injectable 25 Gram(s) IV Push once  dextrose 50% Injectable 25 Gram(s) IV Push once  dextrose 50% Injectable 12.5 Gram(s) IV Push once  insulin glargine Injectable (LANTUS) 10 Unit(s) SubCutaneous at bedtime  insulin lispro (ADMELOG) corrective regimen sliding scale   SubCutaneous every 6 hours  simvastatin 40 milliGRAM(s) Oral at bedtime  pantoprazole   Suspension 40 milliGRAM(s) Oral every 12 hours  polyethylene glycol 3350 17 Gram(s) Oral daily  senna 2 Tablet(s) Oral at bedtime  acetaminophen   Oral Liquid .. 650 milliGRAM(s) Oral every 6 hours PRN  brivaracetam Oral Solution 50 milliGRAM(s) Oral two times a day  modafinil 100 milliGRAM(s) Oral daily        ## Vitals:  T(C): 38 (05-29-23 @ 06:00), Max: 38 (05-29-23 @ 06:00)  HR: 100 (05-29-23 @ 08:00) (75 - 104)  BP: 142/69 (05-29-23 @ 08:00) (103/56 - 154/64)  BP(mean): 90 (05-29-23 @ 08:00) (70 - 101)  RR: 23 (05-29-23 @ 08:00) (4 - 28)  SpO2: 99% (05-29-23 @ 08:00) (98% - 100%)  Vent: Mode: AC/ CMV (Assist Control/ Continuous Mandatory Ventilation), RR (machine): 12, RR (patient): 18, TV (machine): 450, FiO2: 30, PEEP: 5, PIP: 23        05-28 @ 07:01  -  05-29 @ 07:00  --------------------------------------------------------  IN: 2835 mL / OUT: 1075 mL / NET: 1760 mL        ## P/E:  GENERAL:               Eyes open at times, not responsive  HEENT:                    Pupils equal, reactive to light.   No JVD, Dry MM  PULM:                     Bilateral air entry, Clear to auscultation bilaterally, no significant sputum production, No Rales, No Rhonchi, No Wheezing  CVS:                         S1, S2,  +Murmur  ABD:                        Soft, nondistended, nontender, normoactive bowel sounds,   EXT:                         No edema, nontender, No Cyanosis or Clubbing   Vascular:                Warm Extremities,   NEURO:                  Eyes closd, episodes of myoclonus  PSYC:                      Calm, no Insight      ## CODE STATUS: full code 78 year old M w/HTN, Cardiomyopathy, chronic systolic CHF, DM2, Dementia, sent to the ED by PMD because of increased dyspnea, leg edema x past 2 days.  Per ED, daughter states that PMD noted pt to have new afib on EKG.  Pt is confused, demented (baseline) and unable to provide a reliable history.  There was no report of  chest pain, cough, fever chills, vomiting, diarrhea.   1st trop is 131.  EKG showed HR of 86, SR w/ APCs. Cxray c/w CHF (venous congestion, bilat pl effusions)  Pt's last echo was from 07/2022 w/c reported LVEF of  40 to 45%.. Increased LV wall thickness.. Moderately reduced RV systolic function. Moderately enlarged left atrium.. Moderate mitral valve regurgitation.Mild tricuspid regurgitation.   (04 May 2023 21:17)      24 hr events: Patient has remained HDS overnight, but remains with elevated low grade fevers. He is reaching toward the endotracheal tube this AM.         ## ROS: [ x ] unable to obtain        ## Labs:  CBC:                        8.0    9.44  )-----------( 257      ( 29 May 2023 06:00 )             25.4     Chem:  05-29    142  |  105  |  83<H>  ----------------------------<  167<H>  3.3<L>   |  31  |  2.23<H>    Ca    8.3<L>      29 May 2023 06:00  Phos  3.2     05-29  Mg     1.9     05-29    TPro  7.2  /  Alb  1.3<L>  /  TBili  0.4  /  DBili  x   /  AST  30  /  ALT  20  /  AlkPhos  128<H>  05-29        ## Medications:  cefepime   IVPB      cefepime   IVPB 500 milliGRAM(s) IV Intermittent daily  albuterol/ipratropium for Nebulization 3 milliLiter(s) Nebulizer every 6 hours  dextrose 50% Injectable 25 Gram(s) IV Push once  dextrose 50% Injectable 25 Gram(s) IV Push once  dextrose 50% Injectable 12.5 Gram(s) IV Push once  insulin glargine Injectable (LANTUS) 10 Unit(s) SubCutaneous at bedtime  insulin lispro (ADMELOG) corrective regimen sliding scale   SubCutaneous every 6 hours  simvastatin 40 milliGRAM(s) Oral at bedtime  pantoprazole   Suspension 40 milliGRAM(s) Oral every 12 hours  polyethylene glycol 3350 17 Gram(s) Oral daily  senna 2 Tablet(s) Oral at bedtime  acetaminophen   Oral Liquid .. 650 milliGRAM(s) Oral every 6 hours PRN  brivaracetam Oral Solution 50 milliGRAM(s) Oral two times a day  modafinil 100 milliGRAM(s) Oral daily        ## Vitals:  T(C): 38 (05-29-23 @ 06:00), Max: 38 (05-29-23 @ 06:00)  HR: 100 (05-29-23 @ 08:00) (75 - 104)  BP: 142/69 (05-29-23 @ 08:00) (103/56 - 154/64)  BP(mean): 90 (05-29-23 @ 08:00) (70 - 101)  RR: 23 (05-29-23 @ 08:00) (4 - 28)  SpO2: 99% (05-29-23 @ 08:00) (98% - 100%)  Vent: Mode: AC/ CMV (Assist Control/ Continuous Mandatory Ventilation), RR (machine): 12, RR (patient): 18, TV (machine): 450, FiO2: 30, PEEP: 5, PIP: 23        05-28 @ 07:01  -  05-29 @ 07:00  --------------------------------------------------------  IN: 2835 mL / OUT: 1075 mL / NET: 1760 mL        ## P/E:  GENERAL:               Eyes open at times, not responsive  HEENT:                    Pupils equal, reactive to light.   No JVD, Dry MM  PULM:                     Bilateral air entry, Clear to auscultation bilaterally, no significant sputum production, No Rales, No Rhonchi, No Wheezing  CVS:                         S1, S2,  +Murmur  ABD:                        Soft, nondistended, nontender, normoactive bowel sounds,   EXT:                         No edema, nontender, No Cyanosis or Clubbing   Vascular:                Warm Extremities,   NEURO:                  Eyes open, some random hand movement up  PSYC:                      Calm, no Insight      ## CODE STATUS: full code

## 2023-05-29 NOTE — PROGRESS NOTE ADULT - ASSESSMENT
78 year old male with a PMH of dementia, HTN, cardiomyopathy, HFrEF, CAD s/p CABG, with known current RCA occlusion, and DM type 2 who was admitted to [facility] on 5/4 with hypoxic respiratory failure in setting of CHF exacerbation and ANISH with course complicated by acute hypoxic respiratory failure in setting of choking episode causing cardiac arrest, shock, worsening hypoxemic respiratory failure, and NSTEMI on 5/9. Pt ICU course noted post cardiac arrest anoxic brain injury and myoclonus. Pt was transferred to [facility] for VEEG which required 48 hours of monitoring as he was noted to still be sedated. While there pt noted to have hematochezia and any ac / antiplatelet agents were held at that time. EEG findings consistent with stimulus induced myoclonus and GPDs s/p hypoxic diffuse indicative of severe cerebral dysfunction.    Patient returned to [facility] ICU on 5/19 and continues tx / supportive care for management of:    Problem list  Post cardiac arrest 2/2 choking episode / respiratory failure  Acute respiratory failure   Severe Anoxic Encephalopathy with secondary cortical myoclonus  Acute renal failure w Uremia post cardiac arrest improving  Hemtochezia episode, h/h stable no further episodes noted  Heart failure with reduced EF  NSTEMI post arrest       Underlying PMH of dementia, HTN, cardiomyopathy, HFrEF, CAD s/p CABG, with known current RCA occlusion, and DM type 2      Plan:    --cont to monitor vitals per policy  --maintain ett, abg prn; pt currently on minimal vent settings, daily SBT  --CT head and EEG for fup of neurological status  --cont modafanil as ordered, assess pt response  --pt remains with low grade temps, on cefepime and mupirocin to nares for mssa pna; continue regimen  --surgeon contacted for eval for ?debridement of sacral PU  --bun continues to improve, monitor u/o, bun/creatinine, avoid nephrotoxic agents, renally dose meds as indicated  --tf as tolerated; + bowel regimen  --Lantus +ISS, monitor blood glucose levels  --cont to hold aspirin + dvt prophylaxis given prior evidence of GIB and uremic state  --ppi for  gi prophylaxis   --am labs, replace lytes prn  --skin care per nursing, frequent position changes / shift of pressure  --family education / emotional support  --continued GOC discussions with family with frequent updates     78 year old male with a PMH of dementia, HTN, cardiomyopathy, HFrEF, CAD s/p CABG, with known current RCA occlusion, and DM type 2 who was admitted to [facility] on 5/4 with hypoxic respiratory failure in setting of CHF exacerbation and ANISH with course complicated by acute hypoxic respiratory failure in setting of choking episode causing cardiac arrest, shock, worsening hypoxemic respiratory failure, and NSTEMI on 5/9. Pt ICU course noted post cardiac arrest anoxic brain injury and myoclonus. Pt was transferred to [facility] for VEEG which required 48 hours of monitoring as he was noted to still be sedated. While there pt noted to have hematochezia and any ac / antiplatelet agents were held at that time. EEG findings consistent with stimulus induced myoclonus and GPDs s/p hypoxic diffuse indicative of severe cerebral dysfunction.    Patient returned to [facility] ICU on 5/19 and continues tx / supportive care for management of:    Problem list  Post cardiac arrest 2/2 choking episode / respiratory failure  Acute respiratory failure   Severe Anoxic Encephalopathy with secondary cortical myoclonus  Acute renal failure w Uremia post cardiac arrest improving  Hemtochezia episode, h/h stable no further episodes noted  Heart failure with reduced EF  NSTEMI post arrest       Underlying PMH of dementia, HTN, cardiomyopathy, HFrEF, CAD s/p CABG, with known current RCA occlusion, and DM type 2      Plan:    --cont to monitor vitals per policy  --maintain ett, abg prn; pt currently on minimal vent settings, daily SBT, but not candidate for extubation due to mental status   --CT head and EEG for fup of neurological status  --cont modafanil as ordered, assess pt response  --pt remains with low grade temps, on cefepime and mupirocin to nares for mssa pna; continue regimen  --surgeon contacted for eval for ?debridement of sacral PU  --bun continues to improve, monitor u/o, bun/creatinine, avoid nephrotoxic agents, renally dose meds as indicated  --tf as tolerated; + bowel regimen  --Lantus +ISS, monitor blood glucose levels  --cont to hold aspirin + dvt prophylaxis given prior evidence of GIB and uremic state  --ppi for  gi prophylaxis   --am labs, replace lytes prn  --skin care per nursing, frequent position changes / shift of pressure  --family education / emotional support  --continued GOC discussions with family with frequent updates

## 2023-05-29 NOTE — PROGRESS NOTE ADULT - NS ATTEND AMEND GEN_ALL_CORE FT
patient seen and examined  has new movement of arms  d/w DTR adali  plan for repeat ct and EEG  adali would like to continue current care to see where mental status goes. for extubation

## 2023-05-30 DIAGNOSIS — Z00.00 ENCOUNTER FOR GENERAL ADULT MEDICAL EXAMINATION WITHOUT ABNORMAL FINDINGS: ICD-10-CM

## 2023-05-30 DIAGNOSIS — I48.91 UNSPECIFIED ATRIAL FIBRILLATION: ICD-10-CM

## 2023-05-30 LAB
-  CEFTRIAXONE (MENINGITIDIS): SIGNIFICANT CHANGE UP
-  CEFTRIAXONE (NON-MENINGITIDIS): SIGNIFICANT CHANGE UP
-  CLINDAMYCIN: SIGNIFICANT CHANGE UP
-  ERYTHROMYCIN: SIGNIFICANT CHANGE UP
-  LEVOFLOXACIN: SIGNIFICANT CHANGE UP
-  PENICILLIN (MENINGITIDIS): SIGNIFICANT CHANGE UP
-  PENICILLIN (NON-MENINGITIDIS): SIGNIFICANT CHANGE UP
-  PENICILLIN (ORAL PENICILLIN V): SIGNIFICANT CHANGE UP
-  TRIMETHOPRIM/SULFAMETHOXAZOLE: SIGNIFICANT CHANGE UP
-  VANCOMYCIN: SIGNIFICANT CHANGE UP
ANION GAP SERPL CALC-SCNC: 6 MMOL/L — SIGNIFICANT CHANGE UP (ref 5–17)
BUN SERPL-MCNC: 80 MG/DL — HIGH (ref 7–23)
CALCIUM SERPL-MCNC: 8.3 MG/DL — LOW (ref 8.4–10.5)
CHLORIDE SERPL-SCNC: 106 MMOL/L — SIGNIFICANT CHANGE UP (ref 96–108)
CO2 SERPL-SCNC: 30 MMOL/L — SIGNIFICANT CHANGE UP (ref 22–31)
CREAT SERPL-MCNC: 2.17 MG/DL — HIGH (ref 0.5–1.3)
CULTURE RESULTS: SIGNIFICANT CHANGE UP
CULTURE RESULTS: SIGNIFICANT CHANGE UP
EGFR: 30 ML/MIN/1.73M2 — LOW
GLUCOSE BLDC GLUCOMTR-MCNC: 165 MG/DL — HIGH (ref 70–99)
GLUCOSE BLDC GLUCOMTR-MCNC: 185 MG/DL — HIGH (ref 70–99)
GLUCOSE BLDC GLUCOMTR-MCNC: 191 MG/DL — HIGH (ref 70–99)
GLUCOSE BLDC GLUCOMTR-MCNC: 198 MG/DL — HIGH (ref 70–99)
GLUCOSE BLDC GLUCOMTR-MCNC: 204 MG/DL — HIGH (ref 70–99)
GLUCOSE SERPL-MCNC: 196 MG/DL — HIGH (ref 70–99)
HCT VFR BLD CALC: 23.8 % — LOW (ref 39–50)
HGB BLD-MCNC: 7.6 G/DL — LOW (ref 13–17)
MAGNESIUM SERPL-MCNC: 1.9 MG/DL — SIGNIFICANT CHANGE UP (ref 1.6–2.6)
MCHC RBC-ENTMCNC: 31.3 PG — SIGNIFICANT CHANGE UP (ref 27–34)
MCHC RBC-ENTMCNC: 31.9 GM/DL — LOW (ref 32–36)
MCV RBC AUTO: 97.9 FL — SIGNIFICANT CHANGE UP (ref 80–100)
METHOD TYPE: SIGNIFICANT CHANGE UP
NRBC # BLD: 0 /100 WBCS — SIGNIFICANT CHANGE UP (ref 0–0)
ORGANISM # SPEC MICROSCOPIC CNT: SIGNIFICANT CHANGE UP
PHOSPHATE SERPL-MCNC: 3.1 MG/DL — SIGNIFICANT CHANGE UP (ref 2.5–4.5)
PLATELET # BLD AUTO: 277 K/UL — SIGNIFICANT CHANGE UP (ref 150–400)
POTASSIUM SERPL-MCNC: 3.6 MMOL/L — SIGNIFICANT CHANGE UP (ref 3.5–5.3)
POTASSIUM SERPL-SCNC: 3.6 MMOL/L — SIGNIFICANT CHANGE UP (ref 3.5–5.3)
RBC # BLD: 2.43 M/UL — LOW (ref 4.2–5.8)
RBC # FLD: 15.4 % — HIGH (ref 10.3–14.5)
SODIUM SERPL-SCNC: 142 MMOL/L — SIGNIFICANT CHANGE UP (ref 135–145)
SPECIMEN SOURCE: SIGNIFICANT CHANGE UP
SPECIMEN SOURCE: SIGNIFICANT CHANGE UP
WBC # BLD: 8.79 K/UL — SIGNIFICANT CHANGE UP (ref 3.8–10.5)
WBC # FLD AUTO: 8.79 K/UL — SIGNIFICANT CHANGE UP (ref 3.8–10.5)

## 2023-05-30 PROCEDURE — 99232 SBSQ HOSP IP/OBS MODERATE 35: CPT

## 2023-05-30 PROCEDURE — 71045 X-RAY EXAM CHEST 1 VIEW: CPT | Mod: 26

## 2023-05-30 PROCEDURE — 99223 1ST HOSP IP/OBS HIGH 75: CPT

## 2023-05-30 RX ORDER — MODAFINIL 200 MG/1
200 TABLET ORAL DAILY
Refills: 0 | Status: DISCONTINUED | OUTPATIENT
Start: 2023-05-30 | End: 2023-06-04

## 2023-05-30 RX ADMIN — POLYETHYLENE GLYCOL 3350 17 GRAM(S): 17 POWDER, FOR SOLUTION ORAL at 11:21

## 2023-05-30 RX ADMIN — Medication 4: at 05:25

## 2023-05-30 RX ADMIN — Medication 2: at 11:24

## 2023-05-30 RX ADMIN — Medication 2: at 17:24

## 2023-05-30 RX ADMIN — BRIVARACETAM 50 MILLIGRAM(S): 25 TABLET, FILM COATED ORAL at 17:24

## 2023-05-30 RX ADMIN — CHLORHEXIDINE GLUCONATE 15 MILLILITER(S): 213 SOLUTION TOPICAL at 05:23

## 2023-05-30 RX ADMIN — INSULIN GLARGINE 10 UNIT(S): 100 INJECTION, SOLUTION SUBCUTANEOUS at 22:29

## 2023-05-30 RX ADMIN — BRIVARACETAM 50 MILLIGRAM(S): 25 TABLET, FILM COATED ORAL at 05:31

## 2023-05-30 RX ADMIN — Medication 3 MILLILITER(S): at 04:19

## 2023-05-30 RX ADMIN — Medication 3 MILLILITER(S): at 21:50

## 2023-05-30 RX ADMIN — PANTOPRAZOLE SODIUM 40 MILLIGRAM(S): 20 TABLET, DELAYED RELEASE ORAL at 05:26

## 2023-05-30 RX ADMIN — CHLORHEXIDINE GLUCONATE 1 APPLICATION(S): 213 SOLUTION TOPICAL at 12:06

## 2023-05-30 RX ADMIN — MODAFINIL 100 MILLIGRAM(S): 200 TABLET ORAL at 11:21

## 2023-05-30 RX ADMIN — MUPIROCIN 1 APPLICATION(S): 20 OINTMENT TOPICAL at 05:26

## 2023-05-30 RX ADMIN — PANTOPRAZOLE SODIUM 40 MILLIGRAM(S): 20 TABLET, DELAYED RELEASE ORAL at 17:24

## 2023-05-30 RX ADMIN — Medication 3 MILLILITER(S): at 17:03

## 2023-05-30 RX ADMIN — CHLORHEXIDINE GLUCONATE 15 MILLILITER(S): 213 SOLUTION TOPICAL at 17:24

## 2023-05-30 RX ADMIN — Medication 2: at 23:59

## 2023-05-30 RX ADMIN — SIMVASTATIN 40 MILLIGRAM(S): 20 TABLET, FILM COATED ORAL at 22:24

## 2023-05-30 RX ADMIN — Medication 3 MILLILITER(S): at 09:10

## 2023-05-30 RX ADMIN — SENNA PLUS 2 TABLET(S): 8.6 TABLET ORAL at 22:24

## 2023-05-30 NOTE — PROGRESS NOTE ADULT - SUBJECTIVE AND OBJECTIVE BOX
Follow-up Critical Care Progress Note  Chief Complaint : Atrial fibrillation    patient seen and examined  less responsive today opens eyes to tachtile  stimuli  not moving extremities as spontaneously as yesterday         Allergies :sulfa drugs (Unknown)      PAST MEDICAL & SURGICAL HISTORY:  HTN (hypertension)    HLD (hyperlipidemia)    Diabetes    CAD (coronary artery disease)    History of cholecystectomy        Medications:  MEDICATIONS  (STANDING):  albuterol/ipratropium for Nebulization 3 milliLiter(s) Nebulizer every 6 hours  brivaracetam Oral Solution 50 milliGRAM(s) Oral two times a day  chlorhexidine 0.12% Liquid 15 milliLiter(s) Oral Mucosa every 12 hours  chlorhexidine 2% Cloths 1 Application(s) Topical daily  dextrose 5%. 1000 milliLiter(s) (50 mL/Hr) IV Continuous <Continuous>  dextrose 5%. 1000 milliLiter(s) (100 mL/Hr) IV Continuous <Continuous>  dextrose 50% Injectable 12.5 Gram(s) IV Push once  dextrose 50% Injectable 25 Gram(s) IV Push once  dextrose 50% Injectable 25 Gram(s) IV Push once  insulin glargine Injectable (LANTUS) 10 Unit(s) SubCutaneous at bedtime  insulin lispro (ADMELOG) corrective regimen sliding scale   SubCutaneous every 6 hours  modafinil 100 milliGRAM(s) Oral daily  pantoprazole   Suspension 40 milliGRAM(s) Oral every 12 hours  polyethylene glycol 3350 17 Gram(s) Oral daily  senna 2 Tablet(s) Oral at bedtime  simvastatin 40 milliGRAM(s) Oral at bedtime    MEDICATIONS  (PRN):  acetaminophen   Oral Liquid .. 650 milliGRAM(s) Oral every 6 hours PRN Temp greater or equal to 38C (100.4F)      Antibiotics History  cefepime   IVPB    , 05-24-23 @ 17:36  cefepime   IVPB 500 milliGRAM(s) IV Intermittent daily, 05-25-23 @ 12:00  cefepime   IVPB 500 milliGRAM(s) IV Intermittent once, 05-24-23 @ 16:39  piperacillin/tazobactam IVPB.. 3.375 Gram(s) IV Intermittent every 12 hours, 05-11-23 @ 22:27, Stop order after: 7 Days      Heme Medications       GI Medications  pantoprazole   Suspension 40 milliGRAM(s) Oral every 12 hours, 05-25-23 @ 08:37, Routine  polyethylene glycol 3350 17 Gram(s) Oral daily, 05-25-23 @ 08:36, Routine  senna 2 Tablet(s) Oral at bedtime, 05-19-23 @ 21:37, Routine      COVID  05-04-23 @ 19:50  COVID -   NotDetec  07-13-22 @ 18:33  COVID -   NotDetec      COVID Biomarkers    05-04-23 @ 19:50 ESR --  ---  CRP --  ---  DDimer  351<H>   ---   LDH --   ---   Ferritin --         Trend BNP  05-14-23 @ 11:30   -  80299<H>  05-11-23 @ 06:05   -  04860<H>  05-10-23 @ 05:10   -  79010<H>  05-08-23 @ 09:05   -  72311<H>  05-04-23 @ 19:50   -  36059<H>    Procalcitonin Trend  05-17-23 @ 23:44   -   0.59<H>  05-16-23 @ 22:16   -   0.74<H>  05-15-23 @ 05:45   -   1.12<H>  05-09-23 @ 13:42   -   0.11<H>    WBC Trend  05-30-23 @ 05:15   -  8.79  05-29-23 @ 06:00   -  9.44  05-28-23 @ 05:00   -  9.46    H/H Trend  05-30-23 @ 05:15   -   7.6<L>/ 23.8<L>  05-29-23 @ 06:00   -   8.0<L>/ 25.4<L>  05-28-23 @ 05:00   -   7.4<L>/ 23.4<L>  05-27-23 @ 06:15   -   7.9<L>/ 24.5<L>  05-26-23 @ 05:05   -   8.2<L>/ 25.8<L>  05-25-23 @ 05:00   -   7.9<L>/ 24.9<L>    Stool Occult Blood  05-25-23 @ 18:20   -   Negative  05-16-23 @ 17:00   -   Positive<!>    Platelet Trend  05-30-23 @ 05:15   -  277  05-29-23 @ 06:00   -  257  05-28-23 @ 05:00   -  262    Trend Sodium  05-30-23 @ 05:15   -  142  05-29-23 @ 06:00   -  142  05-28-23 @ 05:00   -  138    Trend Potassium  05-30-23 @ 05:15   -  3.6  05-29-23 @ 06:00   -  3.3<L>  05-28-23 @ 05:00   -  4.0    Trend Bun/Cr  05-30-23 @ 05:15  BUN/CR -  80<H> / 2.17<H>  05-29-23 @ 06:00  BUN/CR -  83<H> / 2.23<H>  05-28-23 @ 05:00  BUN/CR -  90<H> / 2.37<H>    Lactic Acid Trend    ABG Trend  05-19-23 @ 00:22   - 7.37/43/144<H>/99.8<H>  05-18-23 @ 05:38   - 7.41/37/97/98.4<H>  05-17-23 @ 23:36   - 7.42/37/97/98.3<H>  05-16-23 @ 22:10   - 7.41/41/92/98.5<H>  05-16-23 @ 05:59   - 7.35/47/104/98.8<H>  05-15-23 @ 13:45   - 7.36/47/74<L>/96.6  05-15-23 @ 04:45   - 7.33<L>/48/70<L>/95.7  05-14-23 @ 05:20   - 7.40/46/72<L>/96.5  05-13-23 @ 06:00   - 7.34<L>/46/93/98.7<H>  05-13-23 @ 03:35   - 7.31<L>/46/108/99.6<H>  05-12-23 @ 23:15   - 7.31<L>/49<H>/79<L>/97.1  05-11-23 @ 07:55   - 7.41/42/152<H>/99.2<H>    Trend AST/ALT/ALK Phos/Bili  05-29-23 @ 06:00   30/20/128<H>/0.4  05-28-23 @ 05:00   29/18/122<H>/0.4  05-27-23 @ 06:15   31/18/129<H>/0.4  05-25-23 @ 05:00   29/14/98/0.5  05-24-23 @ 05:30   24/12/98/0.6       Albumin Trend  05-29-23 @ 06:00   -   1.3<L>  05-28-23 @ 05:00   -   1.3<L>  05-27-23 @ 06:15   -   1.4<L>  05-25-23 @ 05:00   -   1.4<L>  05-24-23 @ 05:30   -   1.7<L>  05-21-23 @ 06:15   -   1.7<L>      PTT - PT - INR Trend  05-19-23 @ 00:44   -   26.5<L> - 13.2 - 1.15  05-17-23 @ 23:44   -   31.2 - 13.7<H> - 1.18<H>  05-16-23 @ 22:16   -   31.3 - 13.6<H> - 1.18<H>  05-15-23 @ 13:55   -   32.9 - 14.0<H> - 1.19<H>  05-14-23 @ 13:45   -   33.6 - -- - --  05-14-23 @ 11:30   -   34.1 - 14.9<H> - 1.28<H>    Glucose Trend  05-30-23 @ 11:23   -  -- -- 165<H>  05-30-23 @ 05:22   -  -- -- 204<H>  05-30-23 @ 05:15   -  -- 196<H> --  05-29-23 @ 22:56   -  -- -- 187<H>  05-29-23 @ 17:10   -  -- -- 186<H>  05-29-23 @ 11:10   -  -- -- 209<H>  05-29-23 @ 06:00   -  -- 167<H> --  05-29-23 @ 05:26   -  -- -- 172<H>  05-28-23 @ 23:51   -  -- -- 197<H>  05-28-23 @ 21:37   -  -- -- 233<H>    A1C with Estimated Average Glucose Result: 7.7 % *H* [4.0 - 5.6] (05-05-23 @ 08:00)      LABS:                        7.6    8.79  )-----------( 277      ( 30 May 2023 05:15 )             23.8     05-30    142  |  106  |  80<H>  ----------------------------<  196<H>  3.6   |  30  |  2.17<H>    Ca    8.3<L>      30 May 2023 05:15  Phos  3.1     05-30  Mg     1.9     05-30    TPro  7.2  /  Alb  1.3<L>  /  TBili  0.4  /  DBili  x   /  AST  30  /  ALT  20  /  AlkPhos  128<H>  05-29           CULTURES: (if applicable)    Culture - Sputum (collected 05-26-23 @ 06:00)  Source: ET Tube ET Tube  Gram Stain (05-26-23 @ 13:21):    Few polymorphonuclear leukocytes seen per low power field    Moderate Squamous epithelial cells seen per low power field    Moderate Gram positive cocci in pairs, chains and clusters seen per oil    power field    Few Yeast like cells seen per oil power field    Rare Gram Negative Rods seen per oil power field  Preliminary Report (05-29-23 @ 15:14):    Moderate Staphylococcus aureus    Moderate Streptococcus pneumoniae Susceptibility to follow.    Normal Respiratory Chelsea present  Organism: Staphylococcus aureus (05-28-23 @ 11:21)  Organism: Staphylococcus aureus (05-28-23 @ 11:21)      Method Type: ALDO      -  Ampicillin/Sulbactam: S <=8/4      -  Cefazolin: S <=4      -  Clindamycin: S <=0.25      -  Erythromycin: S <=0.25      -  Gentamicin: S <=1 Should not be used as monotherapy      -  Oxacillin: S 0.5 Oxacillin predicts susceptibility for dicloxacillin, methicillin, and nafcillin      -  Penicillin: R >8      -  Rifampin: S <=1 Should not be used as monotherapy      -  Tetracycline: R >8      -  Trimethoprim/Sulfamethoxazole: S <=0.5/9.5      -  Vancomycin: S 2    Culture - Blood (collected 05-24-23 @ 15:05)  Source: .Blood Blood  Final Report (05-30-23 @ 01:00):    No Growth Final    Culture - Blood (collected 05-24-23 @ 14:30)  Source: .Blood Blood  Final Report (05-29-23 @ 19:00):    No Growth Final    Culture - Sputum (collected 05-24-23 @ 14:14)  Source: .Sputum Sputum  Gram Stain (05-24-23 @ 22:38):    Numerous polymorphonuclear leukocytes per low power field    No Squamous epithelial cells per low power field    Moderate Gram Positive Cocci in Clusters per oil power field  Final Report (05-26-23 @ 16:39):    Moderate Staphylococcus aureus    Normal Respiratory Chelsea absent  Organism: Staphylococcus aureus (05-26-23 @ 16:39)  Organism: Staphylococcus aureus (05-26-23 @ 16:39)      Method Type: ALDO      -  Ampicillin/Sulbactam: S <=8/4      -  Cefazolin: S <=4      -  Clindamycin: S <=0.25      -  Erythromycin: S <=0.25      -  Gentamicin: S <=1 Should not be used as monotherapy      -  Oxacillin: S 0.5 Oxacillin predicts susceptibility for dicloxacillin, methicillin, and nafcillin      -  Penicillin: R >8      -  Rifampin: S <=1 Should not be used as monotherapy      -  Tetracycline: R >8      -  Trimethoprim/Sulfamethoxazole: S <=0.5/9.5      -  Vancomycin: S 1         VITALS:  T(C): 37.1 (05-30-23 @ 12:00), Max: 37.8 (05-30-23 @ 08:00)  T(F): 98.8 (05-30-23 @ 12:00), Max: 100 (05-30-23 @ 08:00)  HR: 87 (05-30-23 @ 12:39) (70 - 98)  BP: 116/68 (05-30-23 @ 12:00) (95/54 - 139/60)  BP(mean): 83 (05-30-23 @ 12:00) (67 - 104)  ABP: --  ABP(mean): --  RR: 15 (05-30-23 @ 12:00) (13 - 27)  SpO2: 99% (05-30-23 @ 12:39) (99% - 100%)  CVP(mm Hg): --  CVP(cm H2O): --    Ins and Outs     05-29-23 @ 07:01  -  05-30-23 @ 07:00  --------------------------------------------------------  IN: 3280 mL / OUT: 1350 mL / NET: 1930 mL    05-30-23 @ 07:01  -  05-30-23 @ 13:04  --------------------------------------------------------  IN: 225 mL / OUT: 0 mL / NET: 225 mL            Device: Avea, Mode: AC/ CMV (Assist Control/ Continuous Mandatory Ventilation), RR (machine): 12, RR (patient): 17, TV (machine): 450, TV (patient): 470, FiO2: 30, PEEP: 5, ITime: 1, MAP: 11, PIP: 28    I&O's Detail    29 May 2023 07:01  -  30 May 2023 07:00  --------------------------------------------------------  IN:    Enteral Tube Flush: 200 mL    Free Water: 2000 mL    Nepro with Carb Steady: 1080 mL  Total IN: 3280 mL    OUT:    Voided (mL): 1350 mL  Total OUT: 1350 mL    Total NET: 1930 mL      30 May 2023 07:01  -  30 May 2023 13:04  --------------------------------------------------------  IN:    Nepro with Carb Steady: 225 mL  Total IN: 225 mL    OUT:  Total OUT: 0 mL    Total NET: 225 mL

## 2023-05-30 NOTE — CHART NOTE - NSCHARTNOTEFT_GEN_A_CORE
Nutrition Follow Up Note  Hospital Course (Per Electronic Medical Record):   Source: Medical Record [X] Nursing Staff [X]     Diet: Nepro @ 45ml/hr via NGT with 200ml free water flush q 6 hrs     Patient remains intubated , tolerating current NGT feeds , no signs of intolerance noted , POCT reviewed , insulin regimen noted , no recent follow weight since (5/26)  suggest nursing to obtain current weight . May suggest placement of PEG tube due to prolonged NGT placement . MD to discuss possible PEG / Trach with family . Will follow clinical course , ? Weight status due to various noted weights , (+) edema noted Albumin (5/29) 1.3% , Current EN feeds are providing 1944 calories , 87gms protein 785ml/h20 , patient receiving an additional 1200ml free water , may suggest adding Prosource 30ml QD for an addition al 60kcals/15gms protein to current regimen .     Current Weight: (5/26) 175.9/79.8kg                          (5/24) 211.4/95.9kg                          (5/22) 166.8/75.7kg     Pertinent Medications: MEDICATIONS  (STANDING):  albuterol/ipratropium for Nebulization 3 milliLiter(s) Nebulizer every 6 hours  brivaracetam Oral Solution 50 milliGRAM(s) Oral two times a day  chlorhexidine 0.12% Liquid 15 milliLiter(s) Oral Mucosa every 12 hours  chlorhexidine 2% Cloths 1 Application(s) Topical daily  dextrose 5%. 1000 milliLiter(s) (50 mL/Hr) IV Continuous <Continuous>  dextrose 5%. 1000 milliLiter(s) (100 mL/Hr) IV Continuous <Continuous>  dextrose 50% Injectable 25 Gram(s) IV Push once  dextrose 50% Injectable 25 Gram(s) IV Push once  dextrose 50% Injectable 12.5 Gram(s) IV Push once  insulin glargine Injectable (LANTUS) 10 Unit(s) SubCutaneous at bedtime  insulin lispro (ADMELOG) corrective regimen sliding scale   SubCutaneous every 6 hours  modafinil 100 milliGRAM(s) Oral daily  pantoprazole   Suspension 40 milliGRAM(s) Oral every 12 hours  polyethylene glycol 3350 17 Gram(s) Oral daily  senna 2 Tablet(s) Oral at bedtime  simvastatin 40 milliGRAM(s) Oral at bedtime    MEDICATIONS  (PRN):  acetaminophen   Oral Liquid .. 650 milliGRAM(s) Oral every 6 hours PRN Temp greater or equal to 38C (100.4F)      Pertinent Labs:  05-30 Na142 mmol/L Glu 196 mg/dL<H> K+ 3.6 mmol/L Cr  2.17 mg/dL<H> BUN 80 mg/dL<H> 05-30 Phos 3.1 mg/dL 05-29 Alb 1.3 g/dL<L> 05-07 Chol 172 mg/dL LDL --    HDL 39 mg/dL<L> Trig 87 mg/dL Hgb 7.6g/dl<L> Hct 23.8%   POCT 204,187,186,209      Skin: sacral DTI , (L) scapular abrasion , (R) distal great toe     Edema: (+2) arms     Last BM: (5/29) - bowel regimen noted     Estimated Needs:   [X] No Change since Previous Assessment      Previous Nutrition Diagnosis: Severe Protein Calorie Malnutrition     Nutrition Diagnosis is [X] Ongoing         New Nutrition Diagnosis: [X] Not Applicable      Interventions:   1. Recommend continue current EN feeds   2. add Prosource 30ml QD     Monitoring & Evaluation: will monitor:  [X] Weights   [X] Follow Up (Per Protocol)  [X] Tolerance to Diet Prescription       RD to follow as per Nutrition protocol  Preethi Carnes RDN

## 2023-05-30 NOTE — PROGRESS NOTE ADULT - ASSESSMENT
Physical Examination:  GENERAL:               Eyes open at times, minimally responsive  HEENT:                    Pupils equal, reactive to light.   No JVD, Dry MM  PULM:                     Bilateral air entry, Clear to auscultation bilaterally, no significant sputum production, No Rales, No Rhonchi, No Wheezing  CVS:                         S1, S2,  +Murmur  ABD:                        Soft, nondistended, nontender, normoactive bowel sounds,   EXT:                         No edema, nontender, No Cyanosis or Clubbing   Vascular:                Warm Extremities,   NEURO:                  Eyes closd, episodes of myoclonus, spontaneous movements of UE   PSYC:                      Calm, no Insight.      Assessment  78 year old male with a PMH of dementia, HTN, cardiomyopathy, HFrEF, CAD s/p CABG, with known current RCA occlusion, and DM type 2 who was admitted to [facility] on 5/4 with hypoxic respiratory failure in setting of CHF exacerbation and ANISH with course complicated by acute hypoxic respiratory failure in setting of choking episode causing cardiac arrest, shock, worsening hypoxemic respiratory failure, and NSTEMI on 5/9. Pt ICU course noted post cardiac arrest anoxic brain injury and myoclonus. Pt was transferred to [facility] for VEEG which required 48 hours of monitoring as he was noted to still be sedated. While there pt noted to have hematochezia and any ac / antiplatelet agents were held at that time. EEG findings consistent with stimulus induced myoclonus and GPDs s/p hypoxic diffuse indicative of severe cerebral dysfunction.    Patient returned to [facility] ICU on 5/19 and continues tx / supportive care for management of:    Problem list  Post cardiac arrest 2/2 choking episode / respiratory failure  Acute respiratory failure   Severe Anoxic Encephalopathy with secondary cortical myoclonus  Acute renal failure w Uremia post cardiac arrest improving  Hemtochezia episode, h/h stable no further episodes noted  Heart failure with reduced EF  NSTEMI post arrest       Underlying PMH of dementia, HTN, cardiomyopathy, HFrEF, CAD s/p CABG, with known current RCA occlusion, and DM type 2      Plan:    Continue mechanical ventilation  continue PPI BID, trend cbc  on antibiotics, MSSA in sputum  TF as tolerated  IVF held   continue  Modafinil see if metnal status improve  monitor blood glucose levels, + ISS coverage  Venodynes for dvt ppx  failed cpap trial due to low tv today   ppi for gi prophylaxis        am labs, replace lytes prn  GOC ongoing discussion with family  Repeat CT head   < from: CT Head No Cont (05.29.23 @ 10:02) >  There is redemonstration of a small subacute infarct in the left parietal convexity, which has slightly evolved since 05/09/2023 when itwas acute.  A small infarct seen in the left frontal white matter on MRI 05/15/2023 is not well visualized by CT technique.  < end of copied text >        PMD:				                   Notified(Date):  Family Updated: 	Kuldeep [phone]	                                 Date:  5/30-31  updated on status  does not want trach & states does not withdraw care  wants to wait for EEG     Sedation & Analgesia:	  Diet/Nutrition:		 Diet, NPO with Tube Feed:   Tube Feeding Modality: Nasogastric         GI PPx:			pantoprazole   Suspension 40 milliGRAM(s) Oral daily  DVT Ppx:		Venodynes   Activity:		  bedrest  Head of Bed:               35-45 Deg  Glycemic Control:         Insulin    Lines: piv    Restraints were deemed necessary to prevent removal of life-sustaining devices [  ] YES   [  x  ]  NO    Disposition: ICU Care    Goals of Care: Full code

## 2023-05-30 NOTE — CONSULT NOTE ADULT - SUBJECTIVE AND OBJECTIVE BOX
CC:  Patient is a 78y old  Male who presents with a chief complaint of acute systolic CHF (30 May 2023 13:04)      HPI:  78 year old M w/HTN, Cardiomyopathy, chronic systolic CHF, DM2, Dementia, sent to the ED by PMD because of increased dyspnea, leg edema x past 2 days.  Per ED, daughter states that PMD noted pt to have new afib on EKG.  Pt is confused, demented (baseline) and unable to provide a reliable history.  There was no report of  chest pain, cough, fever chills, vomiting, diarrhea.   1st trop is 131.  EKG showed HR of 86, SR w/ APCs. Cxray c/w CHF (venous congestion, bilat pl effusions)  Pt's last echo was from 07/2022 w/c reported LVEF of  40 to 45%.. Increased LV wall thickness.. Moderately reduced RV systolic function. Moderately enlarged left atrium.. Moderate mitral valve regurgitation.Mild tricuspid regurgitation.     (04 May 2023 21:17)      PAST MEDICAL & SURGICAL HISTORY:  HTN (hypertension)      HLD (hyperlipidemia)      Diabetes      CAD (coronary artery disease)      History of cholecystectomy          Allergies    sulfa drugs (Unknown)    Intolerances        SOCIAL HISTORY          Smoking: Yes [ ]  No [ ]   ______pk yrs          ETOH  Yes [ ]  No [ ]  Social [ ]          DRUGS:  Yes [ ]  No [ ]  if so what______________    FAMILY HISTORY:  No pertinent family history in first degree relatives        MEDICATIONS  (STANDING):  albuterol/ipratropium for Nebulization 3 milliLiter(s) Nebulizer every 6 hours  brivaracetam Oral Solution 50 milliGRAM(s) Oral two times a day  chlorhexidine 0.12% Liquid 15 milliLiter(s) Oral Mucosa every 12 hours  chlorhexidine 2% Cloths 1 Application(s) Topical daily  dextrose 5%. 1000 milliLiter(s) (100 mL/Hr) IV Continuous <Continuous>  dextrose 5%. 1000 milliLiter(s) (50 mL/Hr) IV Continuous <Continuous>  dextrose 50% Injectable 25 Gram(s) IV Push once  dextrose 50% Injectable 25 Gram(s) IV Push once  dextrose 50% Injectable 12.5 Gram(s) IV Push once  insulin glargine Injectable (LANTUS) 10 Unit(s) SubCutaneous at bedtime  insulin lispro (ADMELOG) corrective regimen sliding scale   SubCutaneous every 6 hours  modafinil 100 milliGRAM(s) Oral daily  pantoprazole   Suspension 40 milliGRAM(s) Oral every 12 hours  polyethylene glycol 3350 17 Gram(s) Oral daily  senna 2 Tablet(s) Oral at bedtime  simvastatin 40 milliGRAM(s) Oral at bedtime    MEDICATIONS  (PRN):  acetaminophen   Oral Liquid .. 650 milliGRAM(s) Oral every 6 hours PRN Temp greater or equal to 38C (100.4F)         Review of systems:  General:  no fever or chills  Pulmonary:  no SOB  Cardiac:  denies chest pain, palpitations  GI:  no nausea, vomitting, or abddominal pain  :  no increase frequency, or burning  Heme:  no easy bruising with minor trauma  Musculoskeletal:  No history of back pain or trauma  Skin:  no history of rashes, injury, trauma  Neuro:  no weakness         Vital Signs Last 24 Hrs  T(C): 37.1 (30 May 2023 12:00), Max: 37.8 (30 May 2023 08:00)  T(F): 98.8 (30 May 2023 12:00), Max: 100 (30 May 2023 08:00)  HR: 81 (30 May 2023 14:00) (70 - 98)  BP: 114/63 (30 May 2023 14:00) (95/54 - 139/60)  BP(mean): 79 (30 May 2023 14:00) (67 - 104)  RR: 15 (30 May 2023 14:00) (13 - 25)  SpO2: 100% (30 May 2023 14:00) (99% - 100%)    Parameters below as of 30 May 2023 13:00  Patient On (Oxygen Delivery Method): ventilator    O2 Concentration (%): 30    Physical Exam:    General:  Appears stated age, well-groomed, well-nourished, no distress  Extremities:    Skin:     other:  Musculoskeletal:    Neuro/Psych:  Alert, oriented tp time, place and person       LABS:                        7.6    8.79  )-----------( 277      ( 30 May 2023 05:15 )             23.8     05-30    142  |  106  |  80<H>  ----------------------------<  196<H>  3.6   |  30  |  2.17<H>    Ca    8.3<L>      30 May 2023 05:15  Phos  3.1     05-30  Mg     1.9     05-30    TPro  7.2  /  Alb  1.3<L>  /  TBili  0.4  /  DBili  x   /  AST  30  /  ALT  20  /  AlkPhos  128<H>  05-29          RADIOLOGY & ADDITIONAL STUDIES:    Risks, benefits, and alternatives to treatment discussed. All questions answered with understanding.    Procedure Performed:  (  )Yes     (  )No  Name of Procedure:      [  ]Debridement     [  ]I&D    [  ]Laceration Repair     [  ]Other:  (  )partial thickness     (  )full thickness     (  )subcutaneous     (  )muscle/tendon     (  )bone  (  )sharp     (  )surgical

## 2023-05-30 NOTE — EEG REPORT - NS EEG TEXT BOX
MARIO RAMOS [id number]     Study Date: 05-30-23  Duration: 21 min  --------------------------------------------------------------------------------------------------  History:  CC/ HPI Patient is a 78y old  Male who presents with a chief complaint of acute systolic CHF (30 May 2023 13:04)    MEDICATIONS  (STANDING):  albuterol/ipratropium for Nebulization 3 milliLiter(s) Nebulizer every 6 hours  brivaracetam Oral Solution 50 milliGRAM(s) Oral two times a day  chlorhexidine 0.12% Liquid 15 milliLiter(s) Oral Mucosa every 12 hours  chlorhexidine 2% Cloths 1 Application(s) Topical daily  dextrose 5%. 1000 milliLiter(s) (100 mL/Hr) IV Continuous <Continuous>  dextrose 5%. 1000 milliLiter(s) (50 mL/Hr) IV Continuous <Continuous>  dextrose 50% Injectable 25 Gram(s) IV Push once  dextrose 50% Injectable 25 Gram(s) IV Push once  dextrose 50% Injectable 12.5 Gram(s) IV Push once  insulin glargine Injectable (LANTUS) 10 Unit(s) SubCutaneous at bedtime  insulin lispro (ADMELOG) corrective regimen sliding scale   SubCutaneous every 6 hours  modafinil 100 milliGRAM(s) Oral daily  pantoprazole   Suspension 40 milliGRAM(s) Oral every 12 hours  polyethylene glycol 3350 17 Gram(s) Oral daily  senna 2 Tablet(s) Oral at bedtime  simvastatin 40 milliGRAM(s) Oral at bedtime    --------------------------------------------------------------------------------------------------  Study Interpretation:    [[[Abbreviation Key:  PDR=alpha rhythm/posterior dominant rhythm. A-P=anterior posterior.  Amplitude: ‘very low’:<20; ‘low’:20-49; ‘medium’:; ‘high’:>150uV.  Persistence for periodic/rhythmic patterns (% of epoch) ‘rare’:<1%; ‘occasional’:1-10%; ‘frequent’:10-50%; ‘abundant’:50-90%; ‘continuous’:>90%.  Persistence for sporadic discharges: ‘rare’:<1/hr; ‘occasional’:1/min-1/hr; ‘frequent’:>1/min; ‘abundant’:>1/10 sec.  RPP=rhythmic and periodic patterns; GRDA=generalized rhythmic delta activity; FIRDA=frontal intermittent GRDA; LRDA=lateralized rhythmic delta activity; TIRDA=temporal intermittent rhythmic delta activity;  LPD=PLED=lateralized periodic discharges; GPD=generalized periodic discharges; BIPDs =bilateral independent periodic discharges; Mf=multifocal; SIRPDs=stimulus induced rhythmic, periodic, or ictal appearing discharges; BIRDs=brief potentially ictal rhythmic discharges >4 Hz, lasting .5-10s; PFA (paroxysmal bursts >13 Hz or =8 Hz <10s).  Modifiers: +F=with fast component; +S=with spike component; +R=with rhythmic component.  S-B=burst suppression pattern.  Max=maximal. N1-drowsy; N2-stage II sleep; N3-slow wave sleep. SSS/BETS=small sharp spikes/benign epileptiform transients of sleep. HV=hyperventilation; PS=photic stimulation]]]    Daily EEG Visual Analysis    FINDINGS:      Background:  The background is continuous and symmetric and consists of diffuse polymorphic delta and theta slowing, occasionally sharply contoured.    Background Slowing:  Generalized slowing: As above  Focal slowing: None    State Changes:   None    Sporadic Epileptiform Discharges:    Occasional central (Cz) spike-wave discharges, at times with field to the left parasagittal region.    Rhythmic and Periodic Patterns (RPPs):  None     Electrographic and Electroclinical seizures:  None    Other Clinical Events:  None    Activation Procedures:   Hyperventilation was not performed.    Photic stimulation was not performed.  Hyperventilation was performed and did not elicit any abnormalities.    There was mild accentuation of fast activity, and an increase in diffuse polymorphic slowing.    Photic stimulation was performed and did not elicit any abnormalities.      Artifacts:  Intermittent myogenic and movement artifacts are present.    EKG:  Single-lead EKG shows regular rhythm with occasional PVCs.    EEG Classification / Summary:  Abnormal routine EEG in a lethargic patient.  -Occasional central spike-wave discharges, at times with field to the left parasagittal region  -Moderate-severe diffuse slowing  -No electrographic seizures captured  -No clinical events reported during the study    Clinical Impression:  -Central epileptiform discharges indicate risk of central or generalized seizures. Epileptiform discharges have decreased in prevalence compared to prior EEGs.  -Moderate-severe diffuse cerebral dysfunction is nonspecific in etiology and has also improved compared to prior EEGs.        -------------------------------------------------------------------------------------------------------  [facility] EEG Reading Room Ph#: [phone]  Epilepsy Answering Service after 5PM and before 8:30AM: Ph#: [phone]    Anh Morrison MD  Attending Physician, [facility]

## 2023-05-30 NOTE — PROGRESS NOTE ADULT - SUBJECTIVE AND OBJECTIVE BOX
Progress: still intubated in ccu       Review of Systems:  Unable to obtain due to poor mentation]/intubated    MEDICATIONS  (STANDING):  albuterol/ipratropium for Nebulization 3 milliLiter(s) Nebulizer every 6 hours  brivaracetam Oral Solution 50 milliGRAM(s) Oral two times a day  chlorhexidine 0.12% Liquid 15 milliLiter(s) Oral Mucosa every 12 hours  chlorhexidine 2% Cloths 1 Application(s) Topical daily  dextrose 5%. 1000 milliLiter(s) (100 mL/Hr) IV Continuous <Continuous>  dextrose 5%. 1000 milliLiter(s) (50 mL/Hr) IV Continuous <Continuous>  dextrose 50% Injectable 12.5 Gram(s) IV Push once  dextrose 50% Injectable 25 Gram(s) IV Push once  dextrose 50% Injectable 25 Gram(s) IV Push once  insulin glargine Injectable (LANTUS) 10 Unit(s) SubCutaneous at bedtime  insulin lispro (ADMELOG) corrective regimen sliding scale   SubCutaneous every 6 hours  modafinil 100 milliGRAM(s) Oral daily  pantoprazole   Suspension 40 milliGRAM(s) Oral every 12 hours  polyethylene glycol 3350 17 Gram(s) Oral daily  senna 2 Tablet(s) Oral at bedtime  simvastatin 40 milliGRAM(s) Oral at bedtime    MEDICATIONS  (PRN):  acetaminophen   Oral Liquid .. 650 milliGRAM(s) Oral every 6 hours PRN Temp greater or equal to 38C (100.4F)      PHYSICAL EXAM:  Vital Signs Last 24 Hrs  T(C): 37.1 (30 May 2023 12:00), Max: 37.8 (30 May 2023 08:00)  T(F): 98.8 (30 May 2023 12:00), Max: 100 (30 May 2023 08:00)  HR: 89 (30 May 2023 15:00) (70 - 98)  BP: 124/73 (30 May 2023 15:00) (95/54 - 139/60)  BP(mean): 88 (30 May 2023 15:00) (67 - 104)  RR: 21 (30 May 2023 15:00) (13 - 25)  SpO2: 100% (30 May 2023 15:00) (99% - 100%)    Parameters below as of 30 May 2023 15:00  Patient On (Oxygen Delivery Method): ventilator    O2 Concentration (%): 30  General: intubated, non verbal eyes may open       HEENT: n/c, a/t, + ET tube     Lungs: coarse on vent   CV: normal  - tachy  GI: abd soft     : normal  incontinent   Musculoskeletal: flaccid    Skin: w/d sacral wound     Neuro: + deficits, intubated, unresponsive    Oral intake ability:  unable   Diet: NPO,     LABS:                          7.6    8.79  )-----------( 277      ( 30 May 2023 05:15 )             23.8     05-30    142  |  106  |  80<H>  ----------------------------<  196<H>  3.6   |  30  |  2.17<H>    Ca    8.3<L>      30 May 2023 05:15  Phos  3.1     05-30  Mg     1.9     05-30    TPro  7.2  /  Alb  1.3<L>  /  TBili  0.4  /  DBili  x   /  AST  30  /  ALT  20  /  AlkPhos  128<H>  05-29        RADIOLOGY & ADDITIONAL STUDIES:  < from: CT Head No Cont (05.29.23 @ 10:02) >    ACC: [id number] EXAM:  CT BRAIN   ORDERED BY: JC CARD     PROCEDURE DATE:  05/29/2023          INTERPRETATION:  CLINICAL INFORMATION: Evaluate anoxic brain injury.    Status post cardiac arrest on 05/09/2023 with left frontal and parietal   infarcts seen on MRI of 05/15/2023.    TECHNIQUE:  Noncontrast axial CT images were acquired through the head.  Sagittal and coronal reformats were performed.    COMPARISON STUDY: Right brain from 05/15/2023.  Head CT scans from   5/11/2023 and 5/9/2023.    FINDINGS:  There is redemonstration of a small subacute infarct in the left parietal   convexity (2:19-21) corresponding to a focus of diffusion restriction on   MRI of 05/15/2023; this is slightly evolved from original head CT on   05/09/2023.    There is no CT evidence of acute intracranial hemorrhage, mass affect or   midline shift.  There is no acute loss of gray matter-white matter   differentiation.    There is moderate generalized cerebral volume loss and mild patchy   periventricularwhite matter hypoattenuation compatible with chronic   microvascular ischemic disease.  There is a tiny chronic infarct in the   right cerebellar hemisphere (2:10-11).      There is mild mucosal thickening in the maxillary sinuses bilaterally.    Small mucous retention cyst in the left maxillary sinus measuring up to 5   mm (3:5)..    The mastoids are clear bilaterally.    The patient is status post intraocular lens placement bilaterally.    The calvarium and skull base appear within normal limits.    A nasogastric tube is partially visualized within the left nasal cavity   and nasopharynx.    IMPRESSION:  There is redemonstration of a small subacute infarct in the left parietal   convexity, which has slightly evolved since 05/09/2023 when itwas acute.    A small infarct seen in the left frontal white matter on MRI 05/15/2023   is not well visualized by CT technique.        ADVANCE DIRECTIVES: full code   Advanced Care Planning discussion total time spent:

## 2023-05-30 NOTE — CONSULT NOTE ADULT - CONSULT REQUESTED BY NAME
Shama Alexandra, NP recovery area and discharged in stable condition per protocol.      Procedural Complications: None  Estimated Blood Loss: 0 mL      IV sedation was used during the procedure:  - Moderate intravenous conscious sedation was supervised by Dr. Evelyn Hassan  - The patient was independently monitored by a Registered Nurse assigned to the procedure room  - Monitoring included automated blood pressure, continuous EKG, and continuous pulse oximetry  - The detailed conscious record is permanently stored in the [place] [id number]  - The following is the conscious sedation record:  Start Time: 13:46  End Time : 14:01  Duration: 15 minutes   Medications Administered: 1 mg Versed, 50 mcg Fentanyl    Teofilo Rosa DO  Interventional Pain Management/PM&R   [place]

## 2023-05-30 NOTE — CONSULT NOTE ADULT - ASSESSMENT
excensive sacral region and bilateral buttocks wounds with necrosis, slough, granulation tissues  discussed management with wife Jose C, medical treatment recommended with silvadene cream daily, with off loading and nutrition support

## 2023-05-30 NOTE — PROGRESS NOTE ADULT - ASSESSMENT
A/P 78 year old male with a PMH of dementia, HTN, cardiomyopathy, HFrEF, CAD s/p CABG, with known current RCA occlusion, and DM type 2 who was admitted to [facility] on 5/4 with hypoxic respiratory failure in setting of CHF exacerbation and ANISH with course complicated by acute hypoxic respiratory failure in setting of choking episode causing cardiac arrest, shock, worsening hypoxemic respiratory failure, and NSTEMI on 5/9. Pt ICU course noted post cardiac arrest anoxic brain injury and myoclonus. Pt was transferred to [facility] for VEEG which required 48 hours of monitoring as he was noted to still be sedated. While there pt noted to have hematochezia and any ac / antiplatelet agents were held at that time. EEG findings consistent with stimulus induced myoclonus and GPDs s/p hypoxic diffuse indicative of severe cerebral dysfunction.    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 Pt remains intubated , may open eyes to stim., but not following commands .    Neuro follows,  likely anoxic injury to brain .   Family wanted repeat Head CT and repeat EEG done prior to making decisions.    As d/w ccu team,  Family to decide trach /Peg vs withdrawal of care . Will cont to follow  on going goc-  may be follow up family meeting tomorrow.   Poor prognosis

## 2023-05-30 NOTE — CONSULT NOTE ADULT - SUBJECTIVE AND OBJECTIVE BOX
Neurology consult    MARIO [id number]     Patient is an 78 year old man admitted 5/4/23 with dyspnea and leg edema. He was treated for CHF. He required resuscitation for cardiac arrest including intubation. He had a NSTEMI. He was noted to have myoclonus. Transferred to [facility] for extended EEG monitoring. EEG with finding of stimulus induced myoclonus, severe diffuse slowing with generalized periodic discharges nearly continuous indicative of severe cerebral dysfunction. He was transferred from [facility] to [facility] and has remained intubated with occasional myoclonus.        MEDICATIONS    acetaminophen   Oral Liquid .. 650 milliGRAM(s) Oral every 6 hours PRN  albuterol/ipratropium for Nebulization 3 milliLiter(s) Nebulizer every 6 hours  brivaracetam Oral Solution 50 milliGRAM(s) Oral two times a day  chlorhexidine 0.12% Liquid 15 milliLiter(s) Oral Mucosa every 12 hours  chlorhexidine 2% Cloths 1 Application(s) Topical daily  dextrose 5%. 1000 milliLiter(s) IV Continuous <Continuous>  dextrose 5%. 1000 milliLiter(s) IV Continuous <Continuous>  dextrose 50% Injectable 25 Gram(s) IV Push once  dextrose 50% Injectable 12.5 Gram(s) IV Push once  dextrose 50% Injectable 25 Gram(s) IV Push once  insulin glargine Injectable (LANTUS) 10 Unit(s) SubCutaneous at bedtime  insulin lispro (ADMELOG) corrective regimen sliding scale   SubCutaneous every 6 hours  modafinil 100 milliGRAM(s) Oral daily  pantoprazole   Suspension 40 milliGRAM(s) Oral every 12 hours  polyethylene glycol 3350 17 Gram(s) Oral daily  senna 2 Tablet(s) Oral at bedtime  simvastatin 40 milliGRAM(s) Oral at bedtime      PMH: HTN (hypertension)    HLD (hyperlipidemia)    Diabetes    CAD (coronary artery disease)         PSH: No significant past surgical history    History of cholecystectomy        Family history:   FAMILY HISTORY:  No pertinent family history in first degree relatives        SOCIAL HISTORY:  No history of tobacco or alcohol use     Allergies    sulfa drugs (Unknown)    Intolerances            Vital Signs Last 24 Hrs  T(C): 37.1 (30 May 2023 12:00), Max: 37.8 (30 May 2023 08:00)  T(F): 98.8 (30 May 2023 12:00), Max: 100 (30 May 2023 08:00)  HR: 87 (30 May 2023 16:00) (70 - 98)  BP: 132/75 (30 May 2023 16:00) (95/54 - 139/60)  BP(mean): 93 (30 May 2023 16:00) (67 - 104)  RR: 17 (30 May 2023 16:00) (13 - 25)  SpO2: 100% (30 May 2023 16:00) (99% - 100%)    Parameters below as of 30 May 2023 16:00  Patient On (Oxygen Delivery Method): ventilator    O2 Concentration (%): 30      On Neurological Examination:    Head: normocephalic no meningeal rigidity    Mental Status - Patient is obtunded occasionally eyes open  and he seems awake  but he  does not regard examiner or respond to commands. No response to noxious stimuli    Cranial Nerves - Pupils reactive positive oculocephalics positive corneal reflexes and no gaze preference No VII wealness    Motor Exam : Non focal hypertonic random movements but no posturing or seizure activity. No purposeful movements seen    Sensory: No purposeful response to noxious stimuli     Reflexes:  Symmetric plantars neutral                                                            LABS:  CBC Full  -  ( 30 May 2023 05:15 )  WBC Count : 8.79 K/uL  RBC Count : 2.43 M/uL  Hemoglobin : 7.6 g/dL  Hematocrit : 23.8 %  Platelet Count - Automated : 277 K/uL  Mean Cell Volume : 97.9 fl  Mean Cell Hemoglobin : 31.3 pg  Mean Cell Hemoglobin Concentration : 31.9 gm/dL  Auto Neutrophil # : x  Auto Lymphocyte # : x  Auto Monocyte # : x  Auto Eosinophil # : x  Auto Basophil # : x  Auto Neutrophil % : x  Auto Lymphocyte % : x  Auto Monocyte % : x  Auto Eosinophil % : x  Auto Basophil % : x      05-30    142  |  106  |  80<H>  ----------------------------<  196<H>  3.6   |  30  |  2.17<H>    Ca    8.3<L>      30 May 2023 05:15  Phos  3.1     05-30  Mg     1.9     05-30    TPro  7.2  /  Alb  1.3<L>  /  TBili  0.4  /  DBili  x   /  AST  30  /  ALT  20  /  AlkPhos  128<H>  05-29    LIVER FUNCTIONS - ( 29 May 2023 06:00 )  Alb: 1.3 g/dL / Pro: 7.2 g/dL / ALK PHOS: 128 U/L / ALT: 20 U/L / AST: 30 U/L / GGT: x           Hemoglobin A1C:                 RADIOLOGY    < from: CT Head No Cont (05.29.23 @ 10:02) >    ACC: [id number] EXAM:  CT BRAIN   ORDERED BY: JC CARD     PROCEDURE DATE:  05/29/2023          INTERPRETATION:  CLINICAL INFORMATION: Evaluate anoxic brain injury.    Status post cardiac arrest on 05/09/2023 with left frontal and parietal   infarcts seen on MRI of 05/15/2023.    TECHNIQUE:  Noncontrast axial CT images were acquired through the head.  Sagittal and coronal reformats were performed.    COMPARISON STUDY: Right brain from 05/15/2023.  Head CT scans from   5/11/2023 and 5/9/2023.    FINDINGS:  There is redemonstration of a small subacute infarct in the left parietal   convexity (2:19-21) corresponding to a focus of diffusion restriction on   MRI of 05/15/2023; this is slightly evolved from original head CT on   05/09/2023.    There is no CT evidence of acute intracranial hemorrhage, mass affect or   midline shift.  There is no acute loss of gray matter-white matter   differentiation.    There is moderate generalized cerebral volume loss and mild patchy   periventricularwhite matter hypoattenuation compatible with chronic   microvascular ischemic disease.  There is a tiny chronic infarct in the   right cerebellar hemisphere (2:10-11).      There is mild mucosal thickening in the maxillary sinuses bilaterally.    Small mucous retention cyst in the left maxillary sinus measuring up to 5   mm (3:5)..    The mastoids are clear bilaterally.    The patient is status post intraocular lens placement bilaterally.    The calvarium and skull base appear within normal limits.    A nasogastric tube is partially visualized within the left nasal cavity   and nasopharynx.    IMPRESSION:  There is redemonstration of a small subacute infarct in the left parietal   convexity, which has slightly evolved since 05/09/2023 when itwas acute.    A small infarct seen in the left frontal white matter on MRI 05/15/2023   is not well visualized by CT technique.    --- End of Report ---        < end of copied text >

## 2023-05-31 LAB
ALBUMIN SERPL ELPH-MCNC: 1.5 G/DL — LOW (ref 3.3–5)
ALP SERPL-CCNC: 136 U/L — HIGH (ref 40–120)
ALT FLD-CCNC: 16 U/L — SIGNIFICANT CHANGE UP (ref 10–45)
ANION GAP SERPL CALC-SCNC: 7 MMOL/L — SIGNIFICANT CHANGE UP (ref 5–17)
AST SERPL-CCNC: 29 U/L — SIGNIFICANT CHANGE UP (ref 10–40)
BILIRUB SERPL-MCNC: 0.3 MG/DL — SIGNIFICANT CHANGE UP (ref 0.2–1.2)
BUN SERPL-MCNC: 72 MG/DL — HIGH (ref 7–23)
CALCIUM SERPL-MCNC: 8.1 MG/DL — LOW (ref 8.4–10.5)
CHLORIDE SERPL-SCNC: 107 MMOL/L — SIGNIFICANT CHANGE UP (ref 96–108)
CO2 SERPL-SCNC: 30 MMOL/L — SIGNIFICANT CHANGE UP (ref 22–31)
CREAT SERPL-MCNC: 2.26 MG/DL — HIGH (ref 0.5–1.3)
EGFR: 29 ML/MIN/1.73M2 — LOW
GLUCOSE BLDC GLUCOMTR-MCNC: 106 MG/DL — HIGH (ref 70–99)
GLUCOSE BLDC GLUCOMTR-MCNC: 162 MG/DL — HIGH (ref 70–99)
GLUCOSE BLDC GLUCOMTR-MCNC: 166 MG/DL — HIGH (ref 70–99)
GLUCOSE BLDC GLUCOMTR-MCNC: 190 MG/DL — HIGH (ref 70–99)
GLUCOSE SERPL-MCNC: 150 MG/DL — HIGH (ref 70–99)
HCT VFR BLD CALC: 24.4 % — LOW (ref 39–50)
HGB BLD-MCNC: 7.6 G/DL — LOW (ref 13–17)
MAGNESIUM SERPL-MCNC: 1.8 MG/DL — SIGNIFICANT CHANGE UP (ref 1.6–2.6)
MCHC RBC-ENTMCNC: 31.1 GM/DL — LOW (ref 32–36)
MCHC RBC-ENTMCNC: 31.1 PG — SIGNIFICANT CHANGE UP (ref 27–34)
MCV RBC AUTO: 100 FL — SIGNIFICANT CHANGE UP (ref 80–100)
NRBC # BLD: 0 /100 WBCS — SIGNIFICANT CHANGE UP (ref 0–0)
PHOSPHATE SERPL-MCNC: 3.2 MG/DL — SIGNIFICANT CHANGE UP (ref 2.5–4.5)
PLATELET # BLD AUTO: 270 K/UL — SIGNIFICANT CHANGE UP (ref 150–400)
POTASSIUM SERPL-MCNC: 3.4 MMOL/L — LOW (ref 3.5–5.3)
POTASSIUM SERPL-SCNC: 3.4 MMOL/L — LOW (ref 3.5–5.3)
PROT SERPL-MCNC: 7.4 G/DL — SIGNIFICANT CHANGE UP (ref 6–8.3)
RBC # BLD: 2.44 M/UL — LOW (ref 4.2–5.8)
RBC # FLD: 15.6 % — HIGH (ref 10.3–14.5)
SODIUM SERPL-SCNC: 144 MMOL/L — SIGNIFICANT CHANGE UP (ref 135–145)
WBC # BLD: 9.3 K/UL — SIGNIFICANT CHANGE UP (ref 3.8–10.5)
WBC # FLD AUTO: 9.3 K/UL — SIGNIFICANT CHANGE UP (ref 3.8–10.5)

## 2023-05-31 PROCEDURE — 71045 X-RAY EXAM CHEST 1 VIEW: CPT | Mod: 26

## 2023-05-31 PROCEDURE — 99497 ADVNCD CARE PLAN 30 MIN: CPT

## 2023-05-31 RX ADMIN — Medication 3 MILLILITER(S): at 21:31

## 2023-05-31 RX ADMIN — Medication 3 MILLILITER(S): at 15:19

## 2023-05-31 RX ADMIN — PANTOPRAZOLE SODIUM 40 MILLIGRAM(S): 20 TABLET, DELAYED RELEASE ORAL at 05:37

## 2023-05-31 RX ADMIN — SENNA PLUS 2 TABLET(S): 8.6 TABLET ORAL at 23:03

## 2023-05-31 RX ADMIN — CHLORHEXIDINE GLUCONATE 15 MILLILITER(S): 213 SOLUTION TOPICAL at 18:01

## 2023-05-31 RX ADMIN — CHLORHEXIDINE GLUCONATE 1 APPLICATION(S): 213 SOLUTION TOPICAL at 13:06

## 2023-05-31 RX ADMIN — BRIVARACETAM 50 MILLIGRAM(S): 25 TABLET, FILM COATED ORAL at 05:37

## 2023-05-31 RX ADMIN — Medication 2: at 05:38

## 2023-05-31 RX ADMIN — BRIVARACETAM 50 MILLIGRAM(S): 25 TABLET, FILM COATED ORAL at 18:09

## 2023-05-31 RX ADMIN — Medication 2: at 12:02

## 2023-05-31 RX ADMIN — PANTOPRAZOLE SODIUM 40 MILLIGRAM(S): 20 TABLET, DELAYED RELEASE ORAL at 18:09

## 2023-05-31 RX ADMIN — MODAFINIL 200 MILLIGRAM(S): 200 TABLET ORAL at 11:57

## 2023-05-31 RX ADMIN — Medication 3 MILLILITER(S): at 09:02

## 2023-05-31 RX ADMIN — SIMVASTATIN 40 MILLIGRAM(S): 20 TABLET, FILM COATED ORAL at 23:03

## 2023-05-31 RX ADMIN — Medication 2: at 23:04

## 2023-05-31 RX ADMIN — POLYETHYLENE GLYCOL 3350 17 GRAM(S): 17 POWDER, FOR SOLUTION ORAL at 11:58

## 2023-05-31 RX ADMIN — Medication 3 MILLILITER(S): at 04:43

## 2023-05-31 RX ADMIN — INSULIN GLARGINE 10 UNIT(S): 100 INJECTION, SOLUTION SUBCUTANEOUS at 23:03

## 2023-05-31 RX ADMIN — CHLORHEXIDINE GLUCONATE 15 MILLILITER(S): 213 SOLUTION TOPICAL at 05:37

## 2023-05-31 NOTE — PROGRESS NOTE ADULT - SUBJECTIVE AND OBJECTIVE BOX
Intubated in ICU     Vital Signs Last 24 Hrs  T(C): 37.3 (05-31-23 @ 17:00), Max: 37.7 (05-30-23 @ 21:00)  T(F): 99.1 (05-31-23 @ 17:00), Max: 99.8 (05-30-23 @ 21:00)  HR: 97 (05-31-23 @ 19:00) (71 - 97)  BP: 128/87 (05-31-23 @ 19:00) (107/66 - 148/81)  BP(mean): 99 (05-31-23 @ 19:00) (76 - 107)  RR: 19 (05-31-23 @ 19:00) (14 - 22)  SpO2: 100% (05-31-23 @ 19:00) (99% - 100%)    I&O's Detail    30 May 2023 07:01  -  31 May 2023 07:00  --------------------------------------------------------  IN:    Enteral Tube Flush: 230 mL    Free Water: 600 mL    Nepro with Carb Steady: 945 mL  Total IN: 1775 mL    OUT:    Voided (mL): 1150 mL  Total OUT: 1150 mL    Total NET: 625 mL    31 May 2023 07:01  -  31 May 2023 19:55  --------------------------------------------------------  IN:    Free Water: 200 mL    Nepro with Carb Steady: 135 mL  Total IN: 335 mL    OUT:    Voided (mL): 320 mL  Total OUT: 320 mL    Total NET: 15 mL    Mode: AC/ CMV (Assist Control/ Continuous Mandatory Ventilation)  RR (machine): 12  TV (machine): 450  FiO2: 30  PEEP: 5  ITime: 1  MAP: 10  PIP: 26    s1s2  b/l air entry  soft, ND  sm edema                                                                                  7.6    9.30  )-----------( 270      ( 31 May 2023 04:50 )             24.4     31 May 2023 04:50    144    |  107    |  72     ----------------------------<  150    3.4     |  30     |  2.26     Ca    8.1        31 May 2023 04:50  Phos  3.2       31 May 2023 04:50  Mg     1.8       31 May 2023 04:50    TPro  7.4    /  Alb  1.5    /  TBili  0.3    /  DBili  x      /  AST  29     /  ALT  16     /  AlkPhos  136    31 May 2023 04:50    LIVER FUNCTIONS - ( 31 May 2023 04:50 )  Alb: 1.5 g/dL / Pro: 7.4 g/dL / ALK PHOS: 136 U/L / ALT: 16 U/L / AST: 29 U/L / GGT: x           acetaminophen   Oral Liquid .. 650 milliGRAM(s) Oral every 6 hours PRN  albuterol/ipratropium for Nebulization 3 milliLiter(s) Nebulizer every 6 hours  brivaracetam Oral Solution 50 milliGRAM(s) Oral two times a day  chlorhexidine 0.12% Liquid 15 milliLiter(s) Oral Mucosa every 12 hours  chlorhexidine 2% Cloths 1 Application(s) Topical daily  dextrose 5%. 1000 milliLiter(s) IV Continuous <Continuous>  dextrose 5%. 1000 milliLiter(s) IV Continuous <Continuous>  dextrose 50% Injectable 25 Gram(s) IV Push once  dextrose 50% Injectable 12.5 Gram(s) IV Push once  dextrose 50% Injectable 25 Gram(s) IV Push once  insulin glargine Injectable (LANTUS) 10 Unit(s) SubCutaneous at bedtime  insulin lispro (ADMELOG) corrective regimen sliding scale   SubCutaneous every 6 hours  modafinil 200 milliGRAM(s) Oral daily  pantoprazole   Suspension 40 milliGRAM(s) Oral every 12 hours  polyethylene glycol 3350 17 Gram(s) Oral daily  senna 2 Tablet(s) Oral at bedtime  simvastatin 40 milliGRAM(s) Oral at bedtime    A/P:    Hemodynamic ATN s/p arrest (Cr 1.6 - 5/9/23, Cr 1.0 - 4/12/23)  Resp failure, intubated   CM, EF 35 - 40%, dementia  Anoxic encephalopathy   Vent support per ICU team  Avoid nephrotoxins  F/u BMP, UO   Lytes are stable  Cr is improving and maybe at new baseline   Good UO  No objections to diuretics as needed  Overall prognosis is very poor, no meaningful recovery can be expected   Palliative approach would be most appropriate    [phone]

## 2023-05-31 NOTE — PROGRESS NOTE ADULT - SUBJECTIVE AND OBJECTIVE BOX
Follow-up Critical Care Progress Note  Chief Complaint : Atrial fibrillation    patient seen and examined  having mor spontanous movements.   unable to provide history or ros     Allergies :sulfa drugs (Unknown)      PAST MEDICAL & SURGICAL HISTORY:  HTN (hypertension)  HLD (hyperlipidemia)  Diabetes  CAD (coronary artery disease)  History of cholecystectomy        Medications:  MEDICATIONS  (STANDING):  albuterol/ipratropium for Nebulization 3 milliLiter(s) Nebulizer every 6 hours  brivaracetam Oral Solution 50 milliGRAM(s) Oral two times a day  chlorhexidine 0.12% Liquid 15 milliLiter(s) Oral Mucosa every 12 hours  chlorhexidine 2% Cloths 1 Application(s) Topical daily  dextrose 5%. 1000 milliLiter(s) (100 mL/Hr) IV Continuous <Continuous>  dextrose 5%. 1000 milliLiter(s) (50 mL/Hr) IV Continuous <Continuous>  dextrose 50% Injectable 25 Gram(s) IV Push once  dextrose 50% Injectable 25 Gram(s) IV Push once  dextrose 50% Injectable 12.5 Gram(s) IV Push once  insulin glargine Injectable (LANTUS) 10 Unit(s) SubCutaneous at bedtime  insulin lispro (ADMELOG) corrective regimen sliding scale   SubCutaneous every 6 hours  modafinil 200 milliGRAM(s) Oral daily  pantoprazole   Suspension 40 milliGRAM(s) Oral every 12 hours  polyethylene glycol 3350 17 Gram(s) Oral daily  senna 2 Tablet(s) Oral at bedtime  simvastatin 40 milliGRAM(s) Oral at bedtime    MEDICATIONS  (PRN):  acetaminophen   Oral Liquid .. 650 milliGRAM(s) Oral every 6 hours PRN Temp greater or equal to 38C (100.4F)      Antibiotics History  cefepime   IVPB 500 milliGRAM(s) IV Intermittent daily, 05-25-23 @ 12:00  cefepime   IVPB    , 05-24-23 @ 17:36  cefepime   IVPB 500 milliGRAM(s) IV Intermittent once, 05-24-23 @ 16:39  piperacillin/tazobactam IVPB.. 3.375 Gram(s) IV Intermittent every 12 hours, 05-11-23 @ 22:27, Stop order after: 7 Days      Heme Medications       GI Medications  pantoprazole   Suspension 40 milliGRAM(s) Oral every 12 hours, 05-25-23 @ 08:37, Routine  polyethylene glycol 3350 17 Gram(s) Oral daily, 05-25-23 @ 08:36, Routine  senna 2 Tablet(s) Oral at bedtime, 05-19-23 @ 21:37, Routine      COVID  05-04-23 @ 19:50  COVID -   NotDetec  07-13-22 @ 18:33  COVID -   NotDetec      COVID Biomarkers    05-04-23 @ 19:50 ESR --  ---  CRP --  ---  DDimer  351<H>   ---   LDH --   ---   Ferritin --         Trend BNP  05-14-23 @ 11:30   -  58494<H>  05-11-23 @ 06:05   -  05600<H>  05-10-23 @ 05:10   -  51645<H>  05-08-23 @ 09:05   -  50941<H>  05-04-23 @ 19:50   -  04102<H>       WBC Trend  05-31-23 @ 04:50   -  9.30  05-30-23 @ 05:15   -  8.79  05-29-23 @ 06:00   -  9.44    H/H Trend  05-31-23 @ 04:50   -   7.6<L>/ 24.4<L>  05-30-23 @ 05:15   -   7.6<L>/ 23.8<L>  05-29-23 @ 06:00   -   8.0<L>/ 25.4<L>  05-28-23 @ 05:00   -   7.4<L>/ 23.4<L>  05-27-23 @ 06:15   -   7.9<L>/ 24.5<L>  05-26-23 @ 05:05   -   8.2<L>/ 25.8<L>    Stool Occult Blood  05-25-23 @ 18:20   -   Negative  05-16-23 @ 17:00   -   Positive<!>    Platelet Trend  05-31-23 @ 04:50   -  270  05-30-23 @ 05:15   -  277  05-29-23 @ 06:00   -  257    Trend Sodium  05-31-23 @ 04:50   -  144  05-30-23 @ 05:15   -  142  05-29-23 @ 06:00   -  142    Trend Potassium  05-31-23 @ 04:50   -  3.4<L>  05-30-23 @ 05:15   -  3.6  05-29-23 @ 06:00   -  3.3<L>    Trend Bun/Cr  05-31-23 @ 04:50  BUN/CR -  72<H> / 2.26<H>  05-30-23 @ 05:15  BUN/CR -  80<H> / 2.17<H>  05-29-23 @ 06:00  BUN/CR -  83<H> / 2.23<H>       ABG Trend  05-19-23 @ 00:22   - 7.37/43/144<H>/99.8<H>  05-18-23 @ 05:38   - 7.41/37/97/98.4<H>  05-17-23 @ 23:36   - 7.42/37/97/98.3<H>  05-16-23 @ 22:10   - 7.41/41/92/98.5<H>  05-16-23 @ 05:59   - 7.35/47/104/98.8<H>     Trend AST/ALT/ALK Phos/Bili  05-31-23 @ 04:50   29/16/136<H>/0.3  05-29-23 @ 06:00   30/20/128<H>/0.4  05-28-23 @ 05:00   29/18/122<H>/0.4  05-27-23 @ 06:15   31/18/129<H>/0.4  05-25-23 @ 05:00   29/14/98/0.5  05-24-23 @ 05:30   24/12/98/0.6       Ammonia Trend  05-14-23 @ 11:30   -   53  05-14-23 @ 05:00   -   16      Albumin Trend  05-31-23 @ 04:50   -   1.5<L>  05-29-23 @ 06:00   -   1.3<L>  05-28-23 @ 05:00   -   1.3<L>  05-27-23 @ 06:15   -   1.4<L>  05-25-23 @ 05:00   -   1.4<L>  05-24-23 @ 05:30   -   1.7<L>      PTT - PT - INR Trend  05-19-23 @ 00:44   -   26.5<L> - 13.2 - 1.15  05-17-23 @ 23:44   -   31.2 - 13.7<H> - 1.18<H>  05-16-23 @ 22:16   -   31.3 - 13.6<H> - 1.18<H>  05-15-23 @ 13:55   -   32.9 - 14.0<H> - 1.19<H>  05-14-23 @ 13:45   -   33.6 - -- - --  05-14-23 @ 11:30   -   34.1 - 14.9<H> - 1.28<H>    Glucose Trend  05-31-23 @ 05:34   -  -- -- 162<H>  05-31-23 @ 04:50   -  -- 150<H> --  05-30-23 @ 23:52   -  -- -- 185<H>  05-30-23 @ 22:27   -  -- -- 191<H>  05-30-23 @ 17:20   -  -- -- 198<H>  05-30-23 @ 11:23   -  -- -- 165<H>  05-30-23 @ 05:22   -  -- -- 204<H>  05-30-23 @ 05:15   -  -- 196<H> --  05-29-23 @ 22:56   -  -- -- 187<H>  05-29-23 @ 17:10   -  -- -- 186<H>    A1C with Estimated Average Glucose Result: 7.7 % *H* [4.0 - 5.6] (05-05-23 @ 08:00)      LABS:                        7.6    9.30  )-----------( 270      ( 31 May 2023 04:50 )             24.4     05-31    144  |  107  |  72<H>  ----------------------------<  150<H>  3.4<L>   |  30  |  2.26<H>    Ca    8.1<L>      31 May 2023 04:50  Phos  3.2     05-31  Mg     1.8     05-31    TPro  7.4  /  Alb  1.5<L>  /  TBili  0.3  /  DBili  x   /  AST  29  /  ALT  16  /  AlkPhos  136<H>  05-31     CULTURES: (if applicable)    Culture - Sputum (collected 05-26-23 @ 06:00)  Source: ET Tube ET Tube  Gram Stain (05-26-23 @ 13:21):    Few polymorphonuclear leukocytes seen per low power field    Moderate Squamous epithelial cells seen per low power field    Moderate Gram positive cocci in pairs, chains and clusters seen per oil    power field    Few Yeast like cells seen per oil power field    Rare Gram Negative Rods seen per oil power field  Final Report (05-30-23 @ 14:09):    Moderate Staphylococcus aureus    Moderate Streptococcus pneumoniae    Normal Respiratory Chelsea present  Organism: Staphylococcus aureus  Streptococcus pneumoniae (05-30-23 @ 14:09)  Organism: Streptococcus pneumoniae (05-30-23 @ 14:09)      -  Levofloxacin: I 4      -  Clindamycin: I 0.5      -  Penicillin (oral penicillin V): R 2      -  Vancomycin: S 0.5      Method Type: ALDO      -  Ceftriaxone (non-meningitidis): S 1      -  Ceftriaxone (meningitidis): I 1      -  Erythromycin: R >0.5 Predicts results for azithromycin.      -  Penicillin (non-meningitidis): S 2      -  Penicillin (meningitidis): R 2      -  Trimethoprim/Sulfamethoxazole: S <=.25/4.75  Organism: Staphylococcus aureus (05-30-23 @ 14:09)      -  Clindamycin: S <=0.25      -  Oxacillin: S 0.5 Oxacillin predicts susceptibility for dicloxacillin, methicillin, and nafcillin      -  Gentamicin: S <=1 Should not be used as monotherapy      -  Cefazolin: S <=4      -  Vancomycin: S 2      -  Tetracycline: R >8      Method Type: ALDO      -  Ampicillin/Sulbactam: S <=8/4      -  Penicillin: R >8      -  Rifampin: S <=1 Should not be used as monotherapy      -  Erythromycin: S <=0.25      -  Trimethoprim/Sulfamethoxazole: S <=0.5/9.5    Culture - Blood (collected 05-24-23 @ 15:05)  Source: .Blood Blood  Final Report (05-30-23 @ 01:00):    No Growth Final    Culture - Blood (collected 05-24-23 @ 14:30)  Source: .Blood Blood  Final Report (05-29-23 @ 19:00):    No Growth Final    Culture - Sputum (collected 05-24-23 @ 14:14)  Source: .Sputum Sputum  Gram Stain (05-24-23 @ 22:38):    Numerous polymorphonuclear leukocytes per low power field    No Squamous epithelial cells per low power field    Moderate Gram Positive Cocci in Clusters per oil power field  Final Report (05-26-23 @ 16:39):    Moderate Staphylococcus aureus    Normal Respiratory Chelsea absent  Organism: Staphylococcus aureus (05-26-23 @ 16:39)  Organism: Staphylococcus aureus (05-26-23 @ 16:39)      -  Clindamycin: S <=0.25      -  Oxacillin: S 0.5 Oxacillin predicts susceptibility for dicloxacillin, methicillin, and nafcillin      -  Gentamicin: S <=1 Should not be used as monotherapy      -  Cefazolin: S <=4      -  Vancomycin: S 1      -  Tetracycline: R >8      Method Type: ALDO      -  Ampicillin/Sulbactam: S <=8/4      -  Penicillin: R >8      -  Rifampin: S <=1 Should not be used as monotherapy      -  Erythromycin: S <=0.25      -  Trimethoprim/Sulfamethoxazole: S <=0.5/9.5    VITALS:  T(C): 37.6 (05-31-23 @ 05:00), Max: 37.7 (05-30-23 @ 21:00)  T(F): 99.6 (05-31-23 @ 05:00), Max: 99.8 (05-30-23 @ 21:00)  HR: 86 (05-31-23 @ 09:03) (71 - 96)  BP: 107/66 (05-31-23 @ 08:00) (102/55 - 146/93)  BP(mean): 79 (05-31-23 @ 08:00) (69 - 107)  ABP: --  ABP(mean): --  RR: 16 (05-31-23 @ 08:00) (14 - 22)  SpO2: 100% (05-31-23 @ 09:03) (99% - 100%)  CVP(mm Hg): --  CVP(cm H2O): --    Ins and Outs     05-30-23 @ 07:01  -  05-31-23 @ 07:00  --------------------------------------------------------  IN: 1775 mL / OUT: 1150 mL / NET: 625 mL            Device: Avea, Mode: AC/ CMV (Assist Control/ Continuous Mandatory Ventilation), RR (machine): 12, RR (patient): 19, TV (machine): 450, TV (patient): 420, FiO2: 30, PEEP: 5, ITime: 1, MAP: 10, PIP: 21    I&O's Detail    30 May 2023 07:01  -  31 May 2023 07:00  --------------------------------------------------------  IN:    Enteral Tube Flush: 230 mL    Free Water: 600 mL    Nepro with Carb Steady: 945 mL  Total IN: 1775 mL    OUT:    Voided (mL): 1150 mL  Total OUT: 1150 mL    Total NET: 625 mL

## 2023-05-31 NOTE — PROGRESS NOTE ADULT - ASSESSMENT
Physical Examination:  GENERAL:               Eyes open at times, partially responsive  HEENT:                    Pupils equal, reactive to light.   No JVD, Dry MM  PULM:                     Bilateral air entry, Clear to auscultation bilaterally, no significant sputum production, No Rales, No Rhonchi, No Wheezing  CVS:                         S1, S2,  +Murmur  ABD:                        Soft, nondistended, nontender, normoactive bowel sounds,   EXT:                         No edema, nontender, No Cyanosis or Clubbing   Vascular:                Warm Extremities,   NEURO:                  Eyes closd, episodes of myoclonus, spontaneous movements of UE   PSYC:                      Calm, no Insight.      Assessment  78 year old male with a PMH of dementia, HTN, cardiomyopathy, HFrEF, CAD s/p CABG, with known current RCA occlusion, and DM type 2 who was admitted to [facility] on 5/4 with hypoxic respiratory failure in setting of CHF exacerbation and ANISH with course complicated by acute hypoxic respiratory failure in setting of choking episode causing cardiac arrest, shock, worsening hypoxemic respiratory failure, and NSTEMI on 5/9. Pt ICU course noted post cardiac arrest anoxic brain injury and myoclonus. Pt was transferred to [facility] for VEEG which required 48 hours of monitoring as he was noted to still be sedated. While there pt noted to have hematochezia and any ac / antiplatelet agents were held at that time. EEG findings consistent with stimulus induced myoclonus and GPDs s/p hypoxic diffuse indicative of severe cerebral dysfunction.    Patient returned to [facility] ICU on 5/19 and continues tx / supportive care for management of:    Problem list  Post cardiac arrest 2/2 choking episode / respiratory failure  Acute respiratory failure   Severe Anoxic Encephalopathy with secondary cortical myoclonus  Acute renal failure w Uremia post cardiac arrest improving  Hemtochezia episode, h/h stable no further episodes noted  Heart failure with reduced EF  NSTEMI post arrest       Underlying PMH of dementia, HTN, cardiomyopathy, HFrEF, CAD s/p CABG, with known current RCA occlusion, and DM type 2      Plan:    Continue mechanical ventilation  continue PPI BID, trend cbc daily   finished course of abx  TF as tolerated  IVF held   increase modafinil   monitor blood glucose levels, + ISS coverage  Venodynes for dvt ppx  failed cpap trial due to low tv today          am labs, replace lytes prn  GOC ongoing discussion with family       PMD:				                   Notified(Date):  Family Updated: 	Kuldeep [phone]	                                 Date:  5/30-31  updated on status  does not want trach & states does not withdraw care  states wants to wait another week to withdraw care as she sees improvement in mental status  discussed that he still will need trach as metnal status not safe to extubate and he is frail and week, not tolerating cpap ps 5 to state safe discharge  discussed DNR with her if event occurs and states wants to discuss with brother first  Sedation & Analgesia:	  Diet/Nutrition:		 Diet, NPO with Tube Feed:   Tube Feeding Modality: Nasogastric         GI PPx:			pantoprazole   Suspension 40 milliGRAM(s) Oral daily  DVT Ppx:		Venodynes   Activity:		  bedrest  Head of Bed:               35-45 Deg  Glycemic Control:         Insulin    Lines: piv    Restraints were deemed necessary to prevent removal of life-sustaining devices [  ] YES   [  x  ]  NO    Disposition: ICU Care    Goals of Care: Full code Physical Examination:  GENERAL:               Eyes open at times, partially responsive  HEENT:                    Pupils equal, reactive to light.   No JVD, Dry MM  PULM:                     Bilateral air entry, Clear to auscultation bilaterally, no significant sputum production, No Rales, No Rhonchi, No Wheezing  CVS:                         S1, S2,  +Murmur  ABD:                        Soft, nondistended, nontender, normoactive bowel sounds,   EXT:                         No edema, nontender, No Cyanosis or Clubbing   Vascular:                Warm Extremities,   NEURO:                  Eyes closd, episodes of myoclonus, spontaneous movements of UE   PSYC:                      Calm, no Insight.      Assessment  78 year old male with a PMH of dementia, HTN, cardiomyopathy, HFrEF, CAD s/p CABG, with known current RCA occlusion, and DM type 2 who was admitted to [facility] on 5/4 with hypoxic respiratory failure in setting of CHF exacerbation and ANISH with course complicated by acute hypoxic respiratory failure in setting of choking episode causing cardiac arrest, shock, worsening hypoxemic respiratory failure, and NSTEMI on 5/9. Pt ICU course noted post cardiac arrest anoxic brain injury and myoclonus. Pt was transferred to [facility] for VEEG which required 48 hours of monitoring as he was noted to still be sedated. While there pt noted to have hematochezia and any ac / antiplatelet agents were held at that time. EEG findings consistent with stimulus induced myoclonus and GPDs s/p hypoxic diffuse indicative of severe cerebral dysfunction.    Patient returned to [facility] ICU on 5/19 and continues tx / supportive care for management of:    Problem list  Post cardiac arrest 2/2 choking episode / respiratory failure  Acute respiratory failure   Severe Anoxic Encephalopathy with secondary cortical myoclonus  Acute renal failure w Uremia post cardiac arrest improving  Hemtochezia episode, h/h stable no further episodes noted  Heart failure with reduced EF  NSTEMI post arrest       Underlying PMH of dementia, HTN, cardiomyopathy, HFrEF, CAD s/p CABG, with known current RCA occlusion, and DM type 2      Plan:    Continue mechanical ventilation  continue PPI BID, trend cbc daily   finished course of abx  TF as tolerated  IVF held   increase modafinil   monitor blood glucose levels, + ISS coverage  Venodynes for dvt ppx  failed cpap trial due to low tv today          am labs, replace lytes prn  GOC ongoing discussion with family       PMD:				                   Notified(Date):  Family Updated: 	Kuldeep [phone]	                                 Date:  5/30-31  updated on status  does not want trach & states does not withdraw care  states wants to wait another week to withdraw care as she sees improvement in mental status  discussed that he still will need trach as metnal status not safe to extubate and he is frail and week, not tolerating cpap ps 5 to state safe discharge  discussed DNR with her if event occurs and states wants to discuss with brother first  risked of continued ET tube and complications from et tube including airway edema, vocal cord dysfunction, stricture and others explained if continue to to keep patient on ventilator.   discussed no meaningful recovery will be found in 1 week that will allow patient to breath spontaneously on his own  Sedation & Analgesia:	  Diet/Nutrition:		 Diet, NPO with Tube Feed:   Tube Feeding Modality: Nasogastric         GI PPx:			pantoprazole   Suspension 40 milliGRAM(s) Oral daily  DVT Ppx:		Venodynes   Activity:		  bedrest  Head of Bed:               35-45 Deg  Glycemic Control:         Insulin    Lines: piv    Restraints were deemed necessary to prevent removal of life-sustaining devices [  ] YES   [  x  ]  NO    Disposition: ICU Care    Goals of Care: Full code

## 2023-06-01 LAB
ANION GAP SERPL CALC-SCNC: 8 MMOL/L — SIGNIFICANT CHANGE UP (ref 5–17)
ANION GAP SERPL CALC-SCNC: 8 MMOL/L — SIGNIFICANT CHANGE UP (ref 5–17)
BUN SERPL-MCNC: 71 MG/DL — HIGH (ref 7–23)
BUN SERPL-MCNC: 73 MG/DL — HIGH (ref 7–23)
CALCIUM SERPL-MCNC: 7.9 MG/DL — LOW (ref 8.4–10.5)
CALCIUM SERPL-MCNC: 8.4 MG/DL — SIGNIFICANT CHANGE UP (ref 8.4–10.5)
CHLORIDE SERPL-SCNC: 107 MMOL/L — SIGNIFICANT CHANGE UP (ref 96–108)
CHLORIDE SERPL-SCNC: 110 MMOL/L — HIGH (ref 96–108)
CO2 SERPL-SCNC: 29 MMOL/L — SIGNIFICANT CHANGE UP (ref 22–31)
CO2 SERPL-SCNC: 30 MMOL/L — SIGNIFICANT CHANGE UP (ref 22–31)
CREAT SERPL-MCNC: 1.95 MG/DL — HIGH (ref 0.5–1.3)
CREAT SERPL-MCNC: 2.21 MG/DL — HIGH (ref 0.5–1.3)
EGFR: 29 ML/MIN/1.73M2 — LOW
EGFR: 34 ML/MIN/1.73M2 — LOW
GLUCOSE BLDC GLUCOMTR-MCNC: 192 MG/DL — HIGH (ref 70–99)
GLUCOSE BLDC GLUCOMTR-MCNC: 201 MG/DL — HIGH (ref 70–99)
GLUCOSE BLDC GLUCOMTR-MCNC: 209 MG/DL — HIGH (ref 70–99)
GLUCOSE BLDC GLUCOMTR-MCNC: 210 MG/DL — HIGH (ref 70–99)
GLUCOSE BLDC GLUCOMTR-MCNC: 249 MG/DL — HIGH (ref 70–99)
GLUCOSE SERPL-MCNC: 165 MG/DL — HIGH (ref 70–99)
GLUCOSE SERPL-MCNC: 176 MG/DL — HIGH (ref 70–99)
HCT VFR BLD CALC: 24.6 % — LOW (ref 39–50)
HGB BLD-MCNC: 7.6 G/DL — LOW (ref 13–17)
MAGNESIUM SERPL-MCNC: 1.9 MG/DL — SIGNIFICANT CHANGE UP (ref 1.6–2.6)
MAGNESIUM SERPL-MCNC: 1.9 MG/DL — SIGNIFICANT CHANGE UP (ref 1.6–2.6)
MCHC RBC-ENTMCNC: 30.9 GM/DL — LOW (ref 32–36)
MCHC RBC-ENTMCNC: 31.1 PG — SIGNIFICANT CHANGE UP (ref 27–34)
MCV RBC AUTO: 100.8 FL — HIGH (ref 80–100)
NRBC # BLD: 0 /100 WBCS — SIGNIFICANT CHANGE UP (ref 0–0)
PHOSPHATE SERPL-MCNC: 3.2 MG/DL — SIGNIFICANT CHANGE UP (ref 2.5–4.5)
PHOSPHATE SERPL-MCNC: 3.5 MG/DL — SIGNIFICANT CHANGE UP (ref 2.5–4.5)
PLATELET # BLD AUTO: 201 K/UL — SIGNIFICANT CHANGE UP (ref 150–400)
POTASSIUM SERPL-MCNC: 3.4 MMOL/L — LOW (ref 3.5–5.3)
POTASSIUM SERPL-MCNC: 4 MMOL/L — SIGNIFICANT CHANGE UP (ref 3.5–5.3)
POTASSIUM SERPL-SCNC: 3.4 MMOL/L — LOW (ref 3.5–5.3)
POTASSIUM SERPL-SCNC: 4 MMOL/L — SIGNIFICANT CHANGE UP (ref 3.5–5.3)
RBC # BLD: 2.44 M/UL — LOW (ref 4.2–5.8)
RBC # FLD: 15.8 % — HIGH (ref 10.3–14.5)
SODIUM SERPL-SCNC: 144 MMOL/L — SIGNIFICANT CHANGE UP (ref 135–145)
SODIUM SERPL-SCNC: 148 MMOL/L — HIGH (ref 135–145)
WBC # BLD: 8.71 K/UL — SIGNIFICANT CHANGE UP (ref 3.8–10.5)
WBC # FLD AUTO: 8.71 K/UL — SIGNIFICANT CHANGE UP (ref 3.8–10.5)

## 2023-06-01 PROCEDURE — 71045 X-RAY EXAM CHEST 1 VIEW: CPT | Mod: 26

## 2023-06-01 RX ORDER — ZINC SULFATE TAB 220 MG (50 MG ZINC EQUIVALENT) 220 (50 ZN) MG
220 TAB ORAL DAILY
Refills: 0 | Status: DISCONTINUED | OUTPATIENT
Start: 2023-06-01 | End: 2023-06-07

## 2023-06-01 RX ORDER — POTASSIUM CHLORIDE 20 MEQ
10 PACKET (EA) ORAL
Refills: 0 | Status: COMPLETED | OUTPATIENT
Start: 2023-06-01 | End: 2023-06-01

## 2023-06-01 RX ORDER — ASCORBIC ACID 60 MG
500 TABLET,CHEWABLE ORAL EVERY 12 HOURS
Refills: 0 | Status: DISCONTINUED | OUTPATIENT
Start: 2023-06-01 | End: 2023-06-01

## 2023-06-01 RX ORDER — SODIUM CHLORIDE 9 MG/ML
1000 INJECTION, SOLUTION INTRAVENOUS
Refills: 0 | Status: DISCONTINUED | OUTPATIENT
Start: 2023-06-01 | End: 2023-06-02

## 2023-06-01 RX ORDER — POTASSIUM CHLORIDE 20 MEQ
40 PACKET (EA) ORAL ONCE
Refills: 0 | Status: COMPLETED | OUTPATIENT
Start: 2023-06-01 | End: 2023-06-01

## 2023-06-01 RX ORDER — MULTIVIT-MIN/FERROUS GLUCONATE 9 MG/15 ML
15 LIQUID (ML) ORAL DAILY
Refills: 0 | Status: DISCONTINUED | OUTPATIENT
Start: 2023-06-01 | End: 2023-06-07

## 2023-06-01 RX ADMIN — MODAFINIL 200 MILLIGRAM(S): 200 TABLET ORAL at 11:09

## 2023-06-01 RX ADMIN — CHLORHEXIDINE GLUCONATE 15 MILLILITER(S): 213 SOLUTION TOPICAL at 17:29

## 2023-06-01 RX ADMIN — Medication 3 MILLILITER(S): at 04:23

## 2023-06-01 RX ADMIN — Medication 650 MILLIGRAM(S): at 09:44

## 2023-06-01 RX ADMIN — Medication 650 MILLIGRAM(S): at 10:44

## 2023-06-01 RX ADMIN — SODIUM CHLORIDE 50 MILLILITER(S): 9 INJECTION, SOLUTION INTRAVENOUS at 18:55

## 2023-06-01 RX ADMIN — Medication 2: at 17:29

## 2023-06-01 RX ADMIN — Medication 100 MILLIEQUIVALENT(S): at 11:06

## 2023-06-01 RX ADMIN — Medication 4: at 11:20

## 2023-06-01 RX ADMIN — SIMVASTATIN 40 MILLIGRAM(S): 20 TABLET, FILM COATED ORAL at 21:04

## 2023-06-01 RX ADMIN — SODIUM CHLORIDE 50 MILLILITER(S): 9 INJECTION, SOLUTION INTRAVENOUS at 09:42

## 2023-06-01 RX ADMIN — Medication 4: at 05:49

## 2023-06-01 RX ADMIN — CHLORHEXIDINE GLUCONATE 15 MILLILITER(S): 213 SOLUTION TOPICAL at 05:50

## 2023-06-01 RX ADMIN — Medication 100 MILLIEQUIVALENT(S): at 11:09

## 2023-06-01 RX ADMIN — ZINC SULFATE TAB 220 MG (50 MG ZINC EQUIVALENT) 220 MILLIGRAM(S): 220 (50 ZN) TAB at 17:30

## 2023-06-01 RX ADMIN — SENNA PLUS 2 TABLET(S): 8.6 TABLET ORAL at 21:04

## 2023-06-01 RX ADMIN — Medication 100 MILLIEQUIVALENT(S): at 09:43

## 2023-06-01 RX ADMIN — PANTOPRAZOLE SODIUM 40 MILLIGRAM(S): 20 TABLET, DELAYED RELEASE ORAL at 05:50

## 2023-06-01 RX ADMIN — Medication 3 MILLILITER(S): at 21:09

## 2023-06-01 RX ADMIN — Medication 40 MILLIEQUIVALENT(S): at 09:43

## 2023-06-01 RX ADMIN — BRIVARACETAM 50 MILLIGRAM(S): 25 TABLET, FILM COATED ORAL at 17:41

## 2023-06-01 RX ADMIN — PANTOPRAZOLE SODIUM 40 MILLIGRAM(S): 20 TABLET, DELAYED RELEASE ORAL at 17:29

## 2023-06-01 RX ADMIN — Medication 4: at 23:06

## 2023-06-01 RX ADMIN — Medication 3 MILLILITER(S): at 15:16

## 2023-06-01 RX ADMIN — INSULIN GLARGINE 10 UNIT(S): 100 INJECTION, SOLUTION SUBCUTANEOUS at 21:05

## 2023-06-01 RX ADMIN — Medication 3 MILLILITER(S): at 09:15

## 2023-06-01 RX ADMIN — BRIVARACETAM 50 MILLIGRAM(S): 25 TABLET, FILM COATED ORAL at 05:50

## 2023-06-01 RX ADMIN — Medication 15 MILLILITER(S): at 17:31

## 2023-06-01 RX ADMIN — CHLORHEXIDINE GLUCONATE 1 APPLICATION(S): 213 SOLUTION TOPICAL at 11:06

## 2023-06-01 RX ADMIN — POLYETHYLENE GLYCOL 3350 17 GRAM(S): 17 POWDER, FOR SOLUTION ORAL at 11:07

## 2023-06-01 NOTE — PROGRESS NOTE ADULT - ASSESSMENT
Physical Examination:  GENERAL:               Eyes open at times, partially responsive where opens eyes to touch   HEENT:                    Pupils equal, reactive to light.   No JVD, Dry MM  PULM:                     Bilateral air entry, Clear to auscultation bilaterally, no significant sputum production, No Rales, No Rhonchi, No Wheezing  CVS:                         S1, S2,  +Murmur  ABD:                        Soft, nondistended, nontender, normoactive bowel sounds,   EXT:                         No edema, nontender, No Cyanosis or Clubbing   Vascular:                Warm Extremities,   NEURO:                  Eyes open , no episodes of myoclonus, spontaneous movements of UE   PSYC:                      Calm, no Insight.      Assessment  78 year old male with a PMH of dementia, HTN, cardiomyopathy, HFrEF, CAD s/p CABG, with known current RCA occlusion, and DM type 2 who was admitted to [facility] on 5/4 with hypoxic respiratory failure in setting of CHF exacerbation and ANISH with course complicated by acute hypoxic respiratory failure in setting of choking episode causing cardiac arrest, shock, worsening hypoxemic respiratory failure, and NSTEMI on 5/9. Pt ICU course noted post cardiac arrest anoxic brain injury and myoclonus. Pt was transferred to [facility] for VEEG which required 48 hours of monitoring as he was noted to still be sedated. While there pt noted to have hematochezia and any ac / antiplatelet agents were held at that time. EEG findings consistent with stimulus induced myoclonus and GPDs s/p hypoxic diffuse indicative of severe cerebral dysfunction.    Patient returned to [facility] ICU on 5/19 and continues tx / supportive care for management of:    Problem list  Post cardiac arrest 2/2 choking episode / respiratory failure  Acute respiratory failure   Severe Anoxic Encephalopathy with secondary cortical myoclonus  Acute renal failure w Uremia post cardiac arrest improving  Hemtochezia episode, h/h stable no further episodes noted  Heart failure with reduced EF  NSTEMI post arrest       Underlying PMH of dementia, HTN, cardiomyopathy, HFrEF, CAD s/p CABG, with known current RCA occlusion, and DM type 2      Plan:    Continue mechanical ventilation - as has poor mental status to extubate, unable to protect airway  CPAP trial done by me with ps 5, only able to take small shallow breaths     continue PPI BID, trend cbc daily   TF as tolerated  noted hypernatremia, restart IVF x 20 hours  increased modafinil   monitor blood glucose levels, + ISS coverage  Sacral decub - wound care called   Venodynes for dvt ppx    Ethics consult       [facility] ongoing discussion with family       PMD:				                   Notified(Date):  Family Updated: 	Kuldeep [phone]	                                 Date:  6/1/2023  updated on status  does not want trach & states does not withdraw care at this time wants to wait till Monday and will consider palliative wean     states wants to wait another week to withdraw care as she sees improvement in mental status  discussed that he still will need trach as metnal status not safe to extubate and he is frail and week, not tolerating cpap ps 5 to state safe discharge  discussed DNR with her if event occurs and states wants to discuss with brother first  risked of continued ET tube and complications from et tube including airway edema, vocal cord dysfunction, stricture and others explained if continue to to keep patient on ventilator.   discussed no meaningful recovery will be found in 1 week that will allow patient to breath spontaneously on his own  discussed wound and need for debridement     Sedation & Analgesia:	  Diet/Nutrition:		 Diet, NPO with Tube Feed:   Tube Feeding Modality: Nasogastric         GI PPx:			pantoprazole   Suspension 40 milliGRAM(s) Oral daily  DVT Ppx:		Venodynes   Activity:		  bedrest  Head of Bed:               35-45 Deg  Glycemic Control:         Insulin    Lines: piv    Restraints were deemed necessary to prevent removal of life-sustaining devices [  ] YES   [  x  ]  NO    Disposition: ICU Care    Goals of Care: Full code

## 2023-06-01 NOTE — CHART NOTE - NSCHARTNOTEFT_GEN_A_CORE
Nutrition Follow Up Note  Hospital Course (Per Electronic Medical Record):   Source: Medical Record [X] MD[X] Nursing Staff [X]     Diet: Nepro@ 40ml/hr x 24 hrs     Patient remains  intubated , EN feeds infusing @ 45ml/hr , no signs of intolerance noted ,Labs reviewed, Potassium being supplemented , Cr improved , may suggest change EN feeds to Glucerna 1.5 @ 55ml/hr which will provide 1980kcals, 109gms protein (27kcals/1.5gms protein )POCT reviewed ,insulin regimen noted , D5% @ 50ml/hr infusing x  20 hrs , no free water flushes noted , suggest providing MVI , Vit C to promote wound healing. Requested current weight from nursing due to no follow up weight since (5/26) weight obtained tody (6/1) 162.8/74kg ,     Current Weight: (6/1) 162.8/74kg                          (5/26) 175.9/79.8kg                          (5/24) 211.4/95.9kg       Pertinent Medications: MEDICATIONS  (STANDING):  albuterol/ipratropium for Nebulization 3 milliLiter(s) Nebulizer every 6 hours  brivaracetam Oral Solution 50 milliGRAM(s) Oral two times a day  chlorhexidine 0.12% Liquid 15 milliLiter(s) Oral Mucosa every 12 hours  chlorhexidine 2% Cloths 1 Application(s) Topical daily  dextrose 5%. 1000 milliLiter(s) (50 mL/Hr) IV Continuous <Continuous>  dextrose 5%. 1000 milliLiter(s) (100 mL/Hr) IV Continuous <Continuous>  dextrose 5%. 1000 milliLiter(s) (50 mL/Hr) IV Continuous <Continuous>  dextrose 50% Injectable 12.5 Gram(s) IV Push once  dextrose 50% Injectable 25 Gram(s) IV Push once  dextrose 50% Injectable 25 Gram(s) IV Push once  insulin glargine Injectable (LANTUS) 10 Unit(s) SubCutaneous at bedtime  insulin lispro (ADMELOG) corrective regimen sliding scale   SubCutaneous every 6 hours  modafinil 200 milliGRAM(s) Oral daily  pantoprazole   Suspension 40 milliGRAM(s) Oral every 12 hours  polyethylene glycol 3350 17 Gram(s) Oral daily  potassium chloride  10 mEq/100 mL IVPB 10 milliEquivalent(s) IV Intermittent every 1 hour  senna 2 Tablet(s) Oral at bedtime  simvastatin 40 milliGRAM(s) Oral at bedtime    MEDICATIONS  (PRN):  acetaminophen   Oral Liquid .. 650 milliGRAM(s) Oral every 6 hours PRN Temp greater or equal to 38C (100.4F)      Pertinent Labs:  06-01 Na148 mmol/L<H> Glu 165 mg/dL<H> K+ 3.4 mmol/L<L> Cr  1.95 mg/dL<H> BUN 71 mg/dL<H> 06-01 Phos 3.5 mg/dL 05-31 Alb 1.5 g/dL<L> 05-07 Chol 172 mg/dL LDL --    HDL 39 mg/dL<L> Trig 87 mg/dL , Hgb 7.6g/dl <L>, Hct 24.6% <L>         Skin: sacral DTI , (L) scapular abrasion , (R) distal great toe     Edema: (+1) (L/R) arm ,     Last BM: (5/31) bowel regimen noted     Estimated Needs:   [X] No Change since Previous Assessment    Previous Nutrition Diagnosis: Severe Protein Calorie Malnutrition     Nutrition Diagnosis is [X] Ongoing       New Nutrition Diagnosis:   [X] Increased Nutrient Needs related to increased demand of nutrients as evidence by noted pressure injuries     Interventions:   1. Recommend add MVI , Vit C    2. change EN feeds to Glucerna 1.5 @ 55ml/hr via NGT     Monitoring & Evaluation: will monitor:  [X] Weights   [X] tolerance to EN   [X] Follow Up (Per Protocol)  [X] Tolerance to Diet Prescription       RD to follow as per Nutrition protocol  Preethi Carnes RDN

## 2023-06-01 NOTE — PROGRESS NOTE ADULT - SUBJECTIVE AND OBJECTIVE BOX
Follow-up Critical Care Progress Note  Chief Complaint : Atrial fibrillation      patient seen and examined  when call name on left side patient opens eyes and looks towards you  when call name on right side no gross response.         Allergies :sulfa drugs (Unknown)      PAST MEDICAL & SURGICAL HISTORY:  HTN (hypertension)  HLD (hyperlipidemia)  Diabetes  CAD (coronary artery disease)  History of cholecystectomy    Medications:  MEDICATIONS  (STANDING):  albuterol/ipratropium for Nebulization 3 milliLiter(s) Nebulizer every 6 hours  brivaracetam Oral Solution 50 milliGRAM(s) Oral two times a day  chlorhexidine 0.12% Liquid 15 milliLiter(s) Oral Mucosa every 12 hours  chlorhexidine 2% Cloths 1 Application(s) Topical daily  dextrose 5%. 1000 milliLiter(s) (50 mL/Hr) IV Continuous <Continuous>  dextrose 5%. 1000 milliLiter(s) (100 mL/Hr) IV Continuous <Continuous>  dextrose 5%. 1000 milliLiter(s) (50 mL/Hr) IV Continuous <Continuous>  dextrose 50% Injectable 12.5 Gram(s) IV Push once  dextrose 50% Injectable 25 Gram(s) IV Push once  dextrose 50% Injectable 25 Gram(s) IV Push once  insulin glargine Injectable (LANTUS) 10 Unit(s) SubCutaneous at bedtime  insulin lispro (ADMELOG) corrective regimen sliding scale   SubCutaneous every 6 hours  modafinil 200 milliGRAM(s) Oral daily  pantoprazole   Suspension 40 milliGRAM(s) Oral every 12 hours  polyethylene glycol 3350 17 Gram(s) Oral daily  potassium chloride  10 mEq/100 mL IVPB 10 milliEquivalent(s) IV Intermittent every 1 hour  senna 2 Tablet(s) Oral at bedtime  simvastatin 40 milliGRAM(s) Oral at bedtime    MEDICATIONS  (PRN):  acetaminophen   Oral Liquid .. 650 milliGRAM(s) Oral every 6 hours PRN Temp greater or equal to 38C (100.4F)      Antibiotics History  cefepime   IVPB    , 05-24-23 @ 17:36  cefepime   IVPB 500 milliGRAM(s) IV Intermittent daily, 05-25-23 @ 12:00  cefepime   IVPB 500 milliGRAM(s) IV Intermittent once, 05-24-23 @ 16:39      Heme Medications       GI Medications  pantoprazole   Suspension 40 milliGRAM(s) Oral every 12 hours, 05-25-23 @ 08:37, Routine  polyethylene glycol 3350 17 Gram(s) Oral daily, 05-25-23 @ 08:36, Routine  senna 2 Tablet(s) Oral at bedtime, 05-19-23 @ 21:37, Routine      COVID  05-04-23 @ 19:50  COVID -   NotDete  07-13-22 @ 18:33  COVID -   NotDete      COVID Biomarkers    05-04-23 @ 19:50 ESR --  ---  CRP --  ---  DDimer  351<H>   ---   LDH --   ---   Ferritin --         Trend BNP  05-14-23 @ 11:30   -  43034<H>  05-11-23 @ 06:05   -  56639<H>  05-10-23 @ 05:10   -  75912<H>  05-08-23 @ 09:05   -  72719<H>  05-04-23 @ 19:50   -  68395<H>    Procalcitonin Trend  05-17-23 @ 23:44   -   0.59<H>  05-16-23 @ 22:16   -   0.74<H>  05-15-23 @ 05:45   -   1.12<H>  05-09-23 @ 13:42   -   0.11<H>    WBC Trend  06-01-23 @ 05:00   -  8.71  05-31-23 @ 04:50   -  9.30  05-30-23 @ 05:15   -  8.79    H/H Trend  06-01-23 @ 05:00   -   7.6<L>/ 24.6<L>  05-31-23 @ 04:50   -   7.6<L>/ 24.4<L>  05-30-23 @ 05:15   -   7.6<L>/ 23.8<L>  05-29-23 @ 06:00   -   8.0<L>/ 25.4<L>  05-28-23 @ 05:00   -   7.4<L>/ 23.4<L>  05-27-23 @ 06:15   -   7.9<L>/ 24.5<L>    Stool Occult Blood  05-25-23 @ 18:20   -   Negative  05-16-23 @ 17:00   -   Positive<!>    Platelet Trend  06-01-23 @ 05:00   -  201  05-31-23 @ 04:50   -  270  05-30-23 @ 05:15   -  277    Trend Sodium  06-01-23 @ 05:00   -  148<H>  05-31-23 @ 04:50   -  144  05-30-23 @ 05:15   -  142    Trend Potassium  06-01-23 @ 05:00   -  3.4<L>  05-31-23 @ 04:50   -  3.4<L>  05-30-23 @ 05:15   -  3.6    Trend Bun/Cr  06-01-23 @ 05:00  BUN/CR -  71<H> / 1.95<H>  05-31-23 @ 04:50  BUN/CR -  72<H> / 2.26<H>  05-30-23 @ 05:15  BUN/CR -  80<H> / 2.17<H>       ABG Trend  05-19-23 @ 00:22   - 7.37/43/144<H>/99.8<H>  05-18-23 @ 05:38   - 7.41/37/97/98.4<H>  05-17-23 @ 23:36   - 7.42/37/97/98.3<H>  05-16-23 @ 22:10   - 7.41/41/92/98.5<H>  05-16-23 @ 05:59   - 7.35/47/104/98.8<H>     Trend AST/ALT/ALK Phos/Bili  05-31-23 @ 04:50   29/16/136<H>/0.3  05-29-23 @ 06:00   30/20/128<H>/0.4  05-28-23 @ 05:00   29/18/122<H>/0.4  05-27-23 @ 06:15   31/18/129<H>/0.4  05-25-23 @ 05:00   29/14/98/0.5  05-24-23 @ 05:30   24/12/98/0.6       Ammonia Trend  05-14-23 @ 11:30   -   53  05-14-23 @ 05:00   -   16    Albumin Trend  05-31-23 @ 04:50   -   1.5<L>  05-29-23 @ 06:00   -   1.3<L>  05-28-23 @ 05:00   -   1.3<L>  05-27-23 @ 06:15   -   1.4<L>  05-25-23 @ 05:00   -   1.4<L>  05-24-23 @ 05:30   -   1.7<L>      PTT - PT - INR Trend  05-19-23 @ 00:44   -   26.5<L> - 13.2 - 1.15  05-17-23 @ 23:44   -   31.2 - 13.7<H> - 1.18<H>  05-16-23 @ 22:16   -   31.3 - 13.6<H> - 1.18<H>  05-15-23 @ 13:55   -   32.9 - 14.0<H> - 1.19<H>  05-14-23 @ 13:45   -   33.6 - -- - --  05-14-23 @ 11:30   -   34.1 - 14.9<H> - 1.28<H>    Glucose Trend  06-01-23 @ 05:48   -  -- -- 201<H>  06-01-23 @ 05:00   -  -- 165<H> --  05-31-23 @ 23:01   -  -- -- 190<H>  05-31-23 @ 18:02   -  -- -- 106<H>  05-31-23 @ 11:58   -  -- -- 166<H>  05-31-23 @ 05:34   -  -- -- 162<H>  05-31-23 @ 04:50   -  -- 150<H> --  05-30-23 @ 23:52   -  -- -- 185<H>  05-30-23 @ 22:27   -  -- -- 191<H>  05-30-23 @ 17:20   -  -- -- 198<H>    A1C with Estimated Average Glucose Result: 7.7 % *H* [4.0 - 5.6] (05-05-23 @ 08:00)      LABS:                        7.6    8.71  )-----------( 201      ( 01 Jun 2023 05:00 )             24.6     06-01    148<H>  |  110<H>  |  71<H>  ----------------------------<  165<H>  3.4<L>   |  30  |  1.95<H>    Ca    7.9<L>      01 Jun 2023 05:00  Phos  3.5     06-01  Mg     1.9     06-01    TPro  7.4  /  Alb  1.5<L>  /  TBili  0.3  /  DBili  x   /  AST  29  /  ALT  16  /  AlkPhos  136<H>  05-31         CULTURES: (if applicable)    Culture - Sputum (collected 05-26-23 @ 06:00)  Source: ET Tube ET Tube  Gram Stain (05-26-23 @ 13:21):    Few polymorphonuclear leukocytes seen per low power field    Moderate Squamous epithelial cells seen per low power field    Moderate Gram positive cocci in pairs, chains and clusters seen per oil    power field    Few Yeast like cells seen per oil power field    Rare Gram Negative Rods seen per oil power field  Final Report (05-30-23 @ 14:09):    Moderate Staphylococcus aureus    Moderate Streptococcus pneumoniae    Normal Respiratory Chelsea present  Organism: Staphylococcus aureus  Streptococcus pneumoniae (05-30-23 @ 14:09)  Organism: Streptococcus pneumoniae (05-30-23 @ 14:09)      Method Type: ALDO      -  Clindamycin: I 0.5      -  Erythromycin: R >0.5 Predicts results for azithromycin.      -  Levofloxacin: I 4      -  Trimethoprim/Sulfamethoxazole: S <=.25/4.75      -  Vancomycin: S 0.5      -  Ceftriaxone (meningitidis): I 1      -  Ceftriaxone (non-meningitidis): S 1      -  Penicillin (meningitidis): R 2      -  Penicillin (non-meningitidis): S 2      -  Penicillin (oral penicillin V): R 2  Organism: Staphylococcus aureus (05-30-23 @ 14:09)      Method Type: ALDO      -  Ampicillin/Sulbactam: S <=8/4      -  Cefazolin: S <=4      -  Clindamycin: S <=0.25      -  Erythromycin: S <=0.25      -  Gentamicin: S <=1 Should not be used as monotherapy      -  Oxacillin: S 0.5 Oxacillin predicts susceptibility for dicloxacillin, methicillin, and nafcillin      -  Penicillin: R >8      -  Rifampin: S <=1 Should not be used as monotherapy      -  Tetracycline: R >8      -  Trimethoprim/Sulfamethoxazole: S <=0.5/9.5      -  Vancomycin: S 2    Culture - Blood (collected 05-24-23 @ 15:05)  Source: .Blood Blood  Final Report (05-30-23 @ 01:00):    No Growth Final    Culture - Blood (collected 05-24-23 @ 14:30)  Source: .Blood Blood  Final Report (05-29-23 @ 19:00):    No Growth Final    Culture - Sputum (collected 05-24-23 @ 14:14)  Source: .Sputum Sputum  Gram Stain (05-24-23 @ 22:38):    Numerous polymorphonuclear leukocytes per low power field    No Squamous epithelial cells per low power field    Moderate Gram Positive Cocci in Clusters per oil power field  Final Report (05-26-23 @ 16:39):    Moderate Staphylococcus aureus    Normal Respiratory Chelsea absent  Organism: Staphylococcus aureus (05-26-23 @ 16:39)  Organism: Staphylococcus aureus (05-26-23 @ 16:39)      Method Type: ALDO      -  Ampicillin/Sulbactam: S <=8/4      -  Cefazolin: S <=4      -  Clindamycin: S <=0.25      -  Erythromycin: S <=0.25      -  Gentamicin: S <=1 Should not be used as monotherapy      -  Oxacillin: S 0.5 Oxacillin predicts susceptibility for dicloxacillin, methicillin, and nafcillin      -  Penicillin: R >8      -  Rifampin: S <=1 Should not be used as monotherapy      -  Tetracycline: R >8      -  Trimethoprim/Sulfamethoxazole: S <=0.5/9.5      -  Vancomycin: S 1         RADIOLOGY  CXR:  no gross infiltate        VITALS:  T(C): 37.1 (06-01-23 @ 05:00), Max: 37.4 (05-31-23 @ 20:00)  T(F): 98.8 (06-01-23 @ 05:00), Max: 99.3 (05-31-23 @ 20:00)  HR: 83 (06-01-23 @ 09:16) (78 - 99)  BP: 157/95 (06-01-23 @ 06:00) (109/59 - 157/95)  BP(mean): 114 (06-01-23 @ 06:00) (74 - 114)  ABP: --  ABP(mean): --  RR: 21 (06-01-23 @ 06:00) (15 - 23)  SpO2: 100% (06-01-23 @ 09:16) (98% - 100%)  CVP(mm Hg): --  CVP(cm H2O): --    Ins and Outs     05-31-23 @ 07:01  -  06-01-23 @ 07:00  --------------------------------------------------------  IN: 1430 mL / OUT: 620 mL / NET: 810 mL            Device: Avea, Mode: AC/ CMV (Assist Control/ Continuous Mandatory Ventilation), RR (machine): 12, RR (patient): 17, TV (machine): 450, TV (patient): 430, FiO2: 30, PEEP: 5, MAP: 11, PIP: 24    I&O's Detail    31 May 2023 07:01  -  01 Jun 2023 07:00  --------------------------------------------------------  IN:    Free Water: 800 mL    Nepro with Carb Steady: 630 mL  Total IN: 1430 mL    OUT:    Voided (mL): 620 mL  Total OUT: 620 mL    Total NET: 810 mL

## 2023-06-01 NOTE — CONSULT NOTE ADULT - ASSESSMENT
Unstageable wound sacral wound, excision even partial at bedside would improve efficacy of local wound care with collagenase.  Pt for possible trach/PEG next week (further debridement in OR also possible at that time)  -discussed with Dr. Horn  -will discuss with family  -continue local wound care off loading and nutrition support

## 2023-06-01 NOTE — CONSULT NOTE ADULT - SUBJECTIVE AND OBJECTIVE BOX
CC:  Patient is a 78y old  Male who presents with a chief complaint of acute systolic CHF.  Pt intubated on vent in CCU, asked to see pat for sacral wound  HPI:  78 year old M w/HTN, Cardiomyopathy, chronic systolic CHF, DM2, Dementia, sent to the ED by PMD because of increased dyspnea, leg edema x past 2 days.  Per ED, daughter states that PMD noted pt to have new afib on EKG.  Pt is confused, demented (baseline) and unable to provide a reliable history.  There was no report of  chest pain, cough, fever chills, vomiting, diarrhea.   1st trop is 131.  EKG showed HR of 86, SR w/ APCs. Cxray c/w CHF (venous congestion, bilat pl effusions)  Pt's last echo was from 07/2022 w/c reported LVEF of  40 to 45%.. Increased LV wall thickness.. Moderately reduced RV systolic function. Moderately enlarged left atrium.. Moderate mitral valve regurgitation.Mild tricuspid regurgitation.           PAST MEDICAL & SURGICAL HISTORY:  HTN (hypertension)      HLD (hyperlipidemia)      Diabetes      CAD (coronary artery disease)      History of cholecystectomy          Allergies    sulfa drugs (Unknown)    Intolerances        SOCIAL HISTORY          Smoking: Yes [ ]  No [ ]   ______pk yrs          ETOH  Yes [ ]  No [ ]  Social [ ]          DRUGS:  Yes [ ]  No [ ]  if so what______________    FAMILY HISTORY:  No pertinent family history in first degree relatives        MEDICATIONS  (STANDING):  albuterol/ipratropium for Nebulization 3 milliLiter(s) Nebulizer every 6 hours  brivaracetam Oral Solution 50 milliGRAM(s) Oral two times a day  chlorhexidine 0.12% Liquid 15 milliLiter(s) Oral Mucosa every 12 hours  chlorhexidine 2% Cloths 1 Application(s) Topical daily  dextrose 5%. 1000 milliLiter(s) (50 mL/Hr) IV Continuous <Continuous>  dextrose 5%. 1000 milliLiter(s) (100 mL/Hr) IV Continuous <Continuous>  dextrose 5%. 1000 milliLiter(s) (50 mL/Hr) IV Continuous <Continuous>  dextrose 50% Injectable 12.5 Gram(s) IV Push once  dextrose 50% Injectable 25 Gram(s) IV Push once  dextrose 50% Injectable 25 Gram(s) IV Push once  insulin glargine Injectable (LANTUS) 10 Unit(s) SubCutaneous at bedtime  insulin lispro (ADMELOG) corrective regimen sliding scale   SubCutaneous every 6 hours  modafinil 200 milliGRAM(s) Oral daily  pantoprazole   Suspension 40 milliGRAM(s) Oral every 12 hours  polyethylene glycol 3350 17 Gram(s) Oral daily  senna 2 Tablet(s) Oral at bedtime  simvastatin 40 milliGRAM(s) Oral at bedtime    MEDICATIONS  (PRN):  acetaminophen   Oral Liquid .. 650 milliGRAM(s) Oral every 6 hours PRN Temp greater or equal to 38C (100.4F)         Review of systems:  General:  no fever or chills  Pulmonary:  no SOB  Cardiac:  denies chest pain, palpitations  GI:  no nausea, vomitting, or abddominal pain  :  no increase frequency, or burning  Heme:  no easy bruising with minor trauma  Musculoskeletal:  No history of back pain or trauma  Skin:  no history of rashes, injury, trauma  Neuro:    weakness         Vital Signs Last 24 Hrs  T(C): 38.1 (01 Jun 2023 09:00), Max: 38.1 (01 Jun 2023 09:00)  T(F): 100.5 (01 Jun 2023 09:00), Max: 100.5 (01 Jun 2023 09:00)  HR: 86 (01 Jun 2023 10:00) (78 - 99)  BP: 159/94 (01 Jun 2023 10:00) (109/59 - 159/94)  BP(mean): 114 (01 Jun 2023 10:00) (74 - 114)  RR: 18 (01 Jun 2023 10:00) (15 - 34)  SpO2: 100% (01 Jun 2023 10:00) (98% - 100%)    Parameters below as of 01 Jun 2023 09:16  Patient On (Oxygen Delivery Method): ventilator        Physical Exam:  General: Sedated, DAVID in place  ENMT: moist mucous membranes, trachea midline, intubated  Respiratory: equal chest rise with respirations, vented  Gastrointestinal: soft NT/ND  Neurology: nonverbal, not following commands  Psych: sedated  Musculoskeletal: no contractures  Vascular: BLE edema equal  Skin:  Sacral/bilateral buttocks medial with black grey soft eschararound the gluteal cleft  L 5cm X W6cm x D none, scant drainage  No odor, erythema, increased warmth, tenderness, induration, fluctuance      LABS:                        7.6    8.71  )-----------( 201      ( 01 Jun 2023 05:00 )             24.6     06-01    148<H>  |  110<H>  |  71<H>  ----------------------------<  165<H>  3.4<L>   |  30  |  1.95<H>    Ca    7.9<L>      01 Jun 2023 05:00  Phos  3.5     06-01  Mg     1.9     06-01    TPro  7.4  /  Alb  1.5<L>  /  TBili  0.3  /  DBili  x   /  AST  29  /  ALT  16  /  AlkPhos  136<H>  05-31          RADIOLOGY & ADDITIONAL STUDIES:    Risks, benefits, and alternatives to treatment discussed. All questions answered with understanding.    Procedure Performed:  (  )Yes     (x  )No  Name of Procedure:      [  ]Debridement     [  ]I&D    [  ]Laceration Repair     [  ]Other:  (  )partial thickness     (  )full thickness     (  )subcutaneous     (  )muscle/tendon     (  )bone  (  )sharp     (  )surgical

## 2023-06-01 NOTE — PROGRESS NOTE ADULT - SUBJECTIVE AND OBJECTIVE BOX
Intubated in ICU, + NGT    Vital Signs Last 24 Hrs  T(C): 37.7 (06-01-23 @ 17:00), Max: 38.1 (06-01-23 @ 09:00)  T(F): 99.8 (06-01-23 @ 17:00), Max: 100.5 (06-01-23 @ 09:00)  HR: 85 (06-01-23 @ 19:00) (78 - 99)  BP: 132/75 (06-01-23 @ 19:00) (94/80 - 159/94)  BP(mean): 93 (06-01-23 @ 19:00) (74 - 114)  RR: 21 (06-01-23 @ 19:00) (15 - 34)  SpO2: 100% (06-01-23 @ 19:00) (98% - 100%)    I&O's Detail    31 May 2023 07:01  -  01 Jun 2023 07:00  --------------------------------------------------------  IN:    Free Water: 800 mL    Nepro with Carb Steady: 675 mL  Total IN: 1475 mL    OUT:    Voided (mL): 620 mL  Total OUT: 620 mL    Total NET: 855 mL    01 Jun 2023 07:01  -  01 Jun 2023 19:32  --------------------------------------------------------  IN:    dextrose 5%: 550 mL    Enteral Tube Flush: 400 mL    IV PiggyBack: 300 mL    Nepro with Carb Steady: 560 mL  Total IN: 1810 mL    OUT:    Voided (mL): 500 mL  Total OUT: 500 mL    Total NET: 1310 mL    Mode: AC/ CMV (Assist Control/ Continuous Mandatory Ventilation)  RR (machine): 12  TV (machine): 450  FiO2: 30  PEEP: 5  MAP: 11  PIP: 25    s1s2  b/l air entry  soft, ND  tr edema                                                                                  7.6    8.71  )-----------( 201      ( 01 Jun 2023 05:00 )             24.6     01 Jun 2023 17:00    144    |  107    |  73     ----------------------------<  176    4.0     |  29     |  2.21     Ca    8.4        01 Jun 2023 17:00  Phos  3.2       01 Jun 2023 17:00  Mg     1.9       01 Jun 2023 17:00    TPro  7.4    /  Alb  1.5    /  TBili  0.3    /  DBili  x      /  AST  29     /  ALT  16     /  AlkPhos  136    31 May 2023 04:50    LIVER FUNCTIONS - ( 31 May 2023 04:50 )  Alb: 1.5 g/dL / Pro: 7.4 g/dL / ALK PHOS: 136 U/L / ALT: 16 U/L / AST: 29 U/L / GGT: x           acetaminophen   Oral Liquid .. 650 milliGRAM(s) Oral every 6 hours PRN  albuterol/ipratropium for Nebulization 3 milliLiter(s) Nebulizer every 6 hours  brivaracetam Oral Solution 50 milliGRAM(s) Oral two times a day  chlorhexidine 0.12% Liquid 15 milliLiter(s) Oral Mucosa every 12 hours  chlorhexidine 2% Cloths 1 Application(s) Topical daily  dextrose 5%. 1000 milliLiter(s) IV Continuous <Continuous>  dextrose 5%. 1000 milliLiter(s) IV Continuous <Continuous>  dextrose 5%. 1000 milliLiter(s) IV Continuous <Continuous>  dextrose 50% Injectable 12.5 Gram(s) IV Push once  dextrose 50% Injectable 25 Gram(s) IV Push once  dextrose 50% Injectable 25 Gram(s) IV Push once  insulin glargine Injectable (LANTUS) 10 Unit(s) SubCutaneous at bedtime  insulin lispro (ADMELOG) corrective regimen sliding scale   SubCutaneous every 6 hours  modafinil 200 milliGRAM(s) Oral daily  multivitamin/minerals/iron Oral Solution (CENTRUM) 15 milliLiter(s) Oral daily  pantoprazole   Suspension 40 milliGRAM(s) Oral every 12 hours  polyethylene glycol 3350 17 Gram(s) Oral daily  senna 2 Tablet(s) Oral at bedtime  simvastatin 40 milliGRAM(s) Oral at bedtime  zinc sulfate 220 milliGRAM(s) Oral daily    A/P:    Hemodynamic ATN s/p arrest (Cr 1.6 - 5/9/23, Cr 1.0 - 4/12/23)  Resp failure, intubated   CM, EF 35 - 40%, dementia  Anoxic encephalopathy   Vent support per ICU team  Avoid nephrotoxins  F/u BMP, UO   Lytes are stable  Cr is improving and maybe at new baseline   Overall prognosis is very poor, no meaningful recovery can be expected   Palliative approach would be appropriate    [phone]

## 2023-06-02 LAB
ALBUMIN SERPL ELPH-MCNC: 1.5 G/DL — LOW (ref 3.3–5)
ALP SERPL-CCNC: 144 U/L — HIGH (ref 40–120)
ALT FLD-CCNC: 14 U/L — SIGNIFICANT CHANGE UP (ref 10–45)
ANION GAP SERPL CALC-SCNC: 8 MMOL/L — SIGNIFICANT CHANGE UP (ref 5–17)
APPEARANCE UR: CLEAR — SIGNIFICANT CHANGE UP
AST SERPL-CCNC: 26 U/L — SIGNIFICANT CHANGE UP (ref 10–40)
BACTERIA # UR AUTO: ABNORMAL /HPF
BASOPHILS # BLD AUTO: 0.1 K/UL — SIGNIFICANT CHANGE UP (ref 0–0.2)
BASOPHILS NFR BLD AUTO: 0.9 % — SIGNIFICANT CHANGE UP (ref 0–2)
BILIRUB SERPL-MCNC: 0.4 MG/DL — SIGNIFICANT CHANGE UP (ref 0.2–1.2)
BILIRUB UR-MCNC: NEGATIVE — SIGNIFICANT CHANGE UP
BLD GP AB SCN SERPL QL: SIGNIFICANT CHANGE UP
BUN SERPL-MCNC: 69 MG/DL — HIGH (ref 7–23)
CALCIUM SERPL-MCNC: 7.9 MG/DL — LOW (ref 8.4–10.5)
CHLORIDE SERPL-SCNC: 106 MMOL/L — SIGNIFICANT CHANGE UP (ref 96–108)
CO2 SERPL-SCNC: 29 MMOL/L — SIGNIFICANT CHANGE UP (ref 22–31)
COLOR SPEC: YELLOW — SIGNIFICANT CHANGE UP
CREAT SERPL-MCNC: 2.19 MG/DL — HIGH (ref 0.5–1.3)
DIFF PNL FLD: ABNORMAL
EGFR: 30 ML/MIN/1.73M2 — LOW
EOSINOPHIL # BLD AUTO: 0.15 K/UL — SIGNIFICANT CHANGE UP (ref 0–0.5)
EOSINOPHIL NFR BLD AUTO: 1.3 % — SIGNIFICANT CHANGE UP (ref 0–6)
EPI CELLS # UR: 0 — SIGNIFICANT CHANGE UP
GI PCR PANEL: SIGNIFICANT CHANGE UP
GLUCOSE BLDC GLUCOMTR-MCNC: 121 MG/DL — HIGH (ref 70–99)
GLUCOSE BLDC GLUCOMTR-MCNC: 139 MG/DL — HIGH (ref 70–99)
GLUCOSE BLDC GLUCOMTR-MCNC: 157 MG/DL — HIGH (ref 70–99)
GLUCOSE BLDC GLUCOMTR-MCNC: 158 MG/DL — HIGH (ref 70–99)
GLUCOSE BLDC GLUCOMTR-MCNC: 175 MG/DL — HIGH (ref 70–99)
GLUCOSE SERPL-MCNC: 165 MG/DL — HIGH (ref 70–99)
GLUCOSE UR QL: NEGATIVE MG/DL — SIGNIFICANT CHANGE UP
HCT VFR BLD CALC: 22.8 % — LOW (ref 39–50)
HCT VFR BLD CALC: 23.9 % — LOW (ref 39–50)
HGB BLD-MCNC: 7 G/DL — CRITICAL LOW (ref 13–17)
HGB BLD-MCNC: 7.3 G/DL — LOW (ref 13–17)
IMM GRANULOCYTES NFR BLD AUTO: 0.4 % — SIGNIFICANT CHANGE UP (ref 0–0.9)
KETONES UR-MCNC: NEGATIVE MG/DL — SIGNIFICANT CHANGE UP
LEUKOCYTE ESTERASE UR-ACNC: ABNORMAL
LYMPHOCYTES # BLD AUTO: 0.64 K/UL — LOW (ref 1–3.3)
LYMPHOCYTES # BLD AUTO: 5.7 % — LOW (ref 13–44)
MAGNESIUM SERPL-MCNC: 1.7 MG/DL — SIGNIFICANT CHANGE UP (ref 1.6–2.6)
MCHC RBC-ENTMCNC: 30.5 GM/DL — LOW (ref 32–36)
MCHC RBC-ENTMCNC: 30.7 GM/DL — LOW (ref 32–36)
MCHC RBC-ENTMCNC: 31.3 PG — SIGNIFICANT CHANGE UP (ref 27–34)
MCHC RBC-ENTMCNC: 31.3 PG — SIGNIFICANT CHANGE UP (ref 27–34)
MCV RBC AUTO: 101.8 FL — HIGH (ref 80–100)
MCV RBC AUTO: 102.6 FL — HIGH (ref 80–100)
MONOCYTES # BLD AUTO: 0.27 K/UL — SIGNIFICANT CHANGE UP (ref 0–0.9)
MONOCYTES NFR BLD AUTO: 2.4 % — SIGNIFICANT CHANGE UP (ref 2–14)
NEUTROPHILS # BLD AUTO: 10.01 K/UL — HIGH (ref 1.8–7.4)
NEUTROPHILS NFR BLD AUTO: 89.3 % — HIGH (ref 43–77)
NITRITE UR-MCNC: NEGATIVE — SIGNIFICANT CHANGE UP
NRBC # BLD: 0 /100 WBCS — SIGNIFICANT CHANGE UP (ref 0–0)
NRBC # BLD: 0 /100 WBCS — SIGNIFICANT CHANGE UP (ref 0–0)
OB PNL STL: NEGATIVE — SIGNIFICANT CHANGE UP
PH UR: 8 — SIGNIFICANT CHANGE UP (ref 5–8)
PHOSPHATE SERPL-MCNC: 3.2 MG/DL — SIGNIFICANT CHANGE UP (ref 2.5–4.5)
PLATELET # BLD AUTO: 216 K/UL — SIGNIFICANT CHANGE UP (ref 150–400)
PLATELET # BLD AUTO: 245 K/UL — SIGNIFICANT CHANGE UP (ref 150–400)
POTASSIUM SERPL-MCNC: 4 MMOL/L — SIGNIFICANT CHANGE UP (ref 3.5–5.3)
POTASSIUM SERPL-SCNC: 4 MMOL/L — SIGNIFICANT CHANGE UP (ref 3.5–5.3)
PROT SERPL-MCNC: 7.3 G/DL — SIGNIFICANT CHANGE UP (ref 6–8.3)
PROT UR-MCNC: 300 MG/DL
RBC # BLD: 2.24 M/UL — LOW (ref 4.2–5.8)
RBC # BLD: 2.33 M/UL — LOW (ref 4.2–5.8)
RBC # FLD: 16.3 % — HIGH (ref 10.3–14.5)
RBC # FLD: 16.4 % — HIGH (ref 10.3–14.5)
RBC CASTS # UR COMP ASSIST: >50 /HPF — HIGH (ref 0–4)
SODIUM SERPL-SCNC: 143 MMOL/L — SIGNIFICANT CHANGE UP (ref 135–145)
SP GR SPEC: 1.01 — SIGNIFICANT CHANGE UP (ref 1–1.03)
UROBILINOGEN FLD QL: 2 MG/DL (ref 0.2–1)
WBC # BLD: 11.22 K/UL — HIGH (ref 3.8–10.5)
WBC # BLD: 13.16 K/UL — HIGH (ref 3.8–10.5)
WBC # FLD AUTO: 11.22 K/UL — HIGH (ref 3.8–10.5)
WBC # FLD AUTO: 13.16 K/UL — HIGH (ref 3.8–10.5)
WBC UR QL: 3 /HPF — SIGNIFICANT CHANGE UP (ref 0–5)

## 2023-06-02 PROCEDURE — 71045 X-RAY EXAM CHEST 1 VIEW: CPT | Mod: 26

## 2023-06-02 PROCEDURE — 71045 X-RAY EXAM CHEST 1 VIEW: CPT | Mod: 26,77

## 2023-06-02 RX ORDER — COLLAGENASE CLOSTRIDIUM HIST. 250 UNIT/G
1 OINTMENT (GRAM) TOPICAL DAILY
Refills: 0 | Status: DISCONTINUED | OUTPATIENT
Start: 2023-06-02 | End: 2023-06-07

## 2023-06-02 RX ORDER — LIDOCAINE 4 G/100G
1 CREAM TOPICAL EVERY 12 HOURS
Refills: 0 | Status: DISCONTINUED | OUTPATIENT
Start: 2023-06-02 | End: 2023-06-07

## 2023-06-02 RX ORDER — SENNA PLUS 8.6 MG/1
2 TABLET ORAL AT BEDTIME
Refills: 0 | Status: DISCONTINUED | OUTPATIENT
Start: 2023-06-02 | End: 2023-06-07

## 2023-06-02 RX ADMIN — Medication 2: at 05:09

## 2023-06-02 RX ADMIN — PANTOPRAZOLE SODIUM 40 MILLIGRAM(S): 20 TABLET, DELAYED RELEASE ORAL at 05:09

## 2023-06-02 RX ADMIN — Medication 650 MILLIGRAM(S): at 12:10

## 2023-06-02 RX ADMIN — SIMVASTATIN 40 MILLIGRAM(S): 20 TABLET, FILM COATED ORAL at 21:05

## 2023-06-02 RX ADMIN — LIDOCAINE 1 APPLICATION(S): 4 CREAM TOPICAL at 17:54

## 2023-06-02 RX ADMIN — Medication 2: at 17:50

## 2023-06-02 RX ADMIN — BRIVARACETAM 50 MILLIGRAM(S): 25 TABLET, FILM COATED ORAL at 17:49

## 2023-06-02 RX ADMIN — CHLORHEXIDINE GLUCONATE 1 APPLICATION(S): 213 SOLUTION TOPICAL at 11:37

## 2023-06-02 RX ADMIN — CHLORHEXIDINE GLUCONATE 15 MILLILITER(S): 213 SOLUTION TOPICAL at 17:43

## 2023-06-02 RX ADMIN — INSULIN GLARGINE 10 UNIT(S): 100 INJECTION, SOLUTION SUBCUTANEOUS at 21:05

## 2023-06-02 RX ADMIN — BRIVARACETAM 50 MILLIGRAM(S): 25 TABLET, FILM COATED ORAL at 05:09

## 2023-06-02 RX ADMIN — ZINC SULFATE TAB 220 MG (50 MG ZINC EQUIVALENT) 220 MILLIGRAM(S): 220 (50 ZN) TAB at 12:36

## 2023-06-02 RX ADMIN — Medication 15 MILLILITER(S): at 11:37

## 2023-06-02 RX ADMIN — MODAFINIL 200 MILLIGRAM(S): 200 TABLET ORAL at 11:37

## 2023-06-02 RX ADMIN — Medication 3 MILLILITER(S): at 21:10

## 2023-06-02 RX ADMIN — PANTOPRAZOLE SODIUM 40 MILLIGRAM(S): 20 TABLET, DELAYED RELEASE ORAL at 17:45

## 2023-06-02 RX ADMIN — Medication 3 MILLILITER(S): at 03:32

## 2023-06-02 RX ADMIN — Medication 3 MILLILITER(S): at 15:58

## 2023-06-02 RX ADMIN — Medication 3 MILLILITER(S): at 08:43

## 2023-06-02 RX ADMIN — CHLORHEXIDINE GLUCONATE 15 MILLILITER(S): 213 SOLUTION TOPICAL at 05:08

## 2023-06-02 RX ADMIN — Medication 650 MILLIGRAM(S): at 13:00

## 2023-06-02 RX ADMIN — Medication 2: at 12:31

## 2023-06-02 RX ADMIN — Medication 0: at 23:15

## 2023-06-02 NOTE — PROGRESS NOTE ADULT - SUBJECTIVE AND OBJECTIVE BOX
Intubated in ICU     Vital Signs Last 24 Hrs  T(C): 37.3 (23 @ 17:00), Max: 38.3 (23 @ 08:00)  T(F): 99.1 (23 @ 17:00), Max: 100.9 (23 @ 08:00)  HR: 87 (23 @ 20:00) (74 - 109)  BP: 130/84 (23 @ 20:00) (106/88 - 146/78)  BP(mean): 98 (23 @ 20:00) (75 - 111)  RR: 15 (23 @ 20:00) (15 - 27)  SpO2: 100% (23 @ 20:00) (99% - 100%)    I&O's Detail    2023 07:01  -  2023 07:00  --------------------------------------------------------  IN:    dextrose 5%: 1100 mL    Enteral Tube Flush: 1150 mL    IV PiggyBack: 300 mL    Nepro with Carb Steady: 1220 mL  Total IN: 3770 mL    OUT:    Voided (mL): 1000 mL  Total OUT: 1000 mL    Total NET: 2770 mL    Mode: AC/ CMV (Assist Control/ Continuous Mandatory Ventilation)  RR (machine): 12  TV (machine): 450  FiO2: 30  PEEP: 5  MAP: 12  PIP: 24    s1s2  b/l air entry  soft, ND  tr edema                                                                                   7.3    13.16 )-----------( 245      ( 2023 11:32 )             23.9     2023 06:00    143    |  106    |  69     ----------------------------<  165    4.0     |  29     |  2.19     Ca    7.9        2023 06:00  Phos  3.2       2023 06:00  Mg     1.7       2023 06:00    TPro  7.3    /  Alb  1.5    /  TBili  0.4    /  DBili  x      /  AST  26     /  ALT  14     /  AlkPhos  144    2023 06:00    LIVER FUNCTIONS - ( 2023 06:00 )  Alb: 1.5 g/dL / Pro: 7.3 g/dL / ALK PHOS: 144 U/L / ALT: 14 U/L / AST: 26 U/L / GGT: x           Urinalysis Basic - ( 2023 20:30 )    Color: Yellow / Appearance: Clear / S.014 / pH: x  Gluc: x / Ketone: Negative mg/dL  / Bili: Negative / Urobili: 2.0 mg/dL   Blood: x / Protein: 300 mg/dL / Nitrite: Negative   Leuk Esterase: Small / RBC: x / WBC x   Sq Epi: x / Non Sq Epi: x / Bacteria: x    acetaminophen   Oral Liquid .. 650 milliGRAM(s) Oral every 6 hours PRN  albuterol/ipratropium for Nebulization 3 milliLiter(s) Nebulizer every 6 hours  brivaracetam Oral Solution 50 milliGRAM(s) Oral two times a day  chlorhexidine 0.12% Liquid 15 milliLiter(s) Oral Mucosa every 12 hours  chlorhexidine 2% Cloths 1 Application(s) Topical daily  collagenase Ointment 1 Application(s) Topical daily  dextrose 5%. 1000 milliLiter(s) IV Continuous <Continuous>  dextrose 5%. 1000 milliLiter(s) IV Continuous <Continuous>  dextrose 50% Injectable 25 Gram(s) IV Push once  dextrose 50% Injectable 25 Gram(s) IV Push once  dextrose 50% Injectable 12.5 Gram(s) IV Push once  insulin glargine Injectable (LANTUS) 10 Unit(s) SubCutaneous at bedtime  insulin lispro (ADMELOG) corrective regimen sliding scale   SubCutaneous every 6 hours  lidocaine 5% Ointment 1 Application(s) Topical every 12 hours  modafinil 200 milliGRAM(s) Oral daily  multivitamin/minerals/iron Oral Solution (CENTRUM) 15 milliLiter(s) Oral daily  pantoprazole   Suspension 40 milliGRAM(s) Oral every 12 hours  senna 2 Tablet(s) Oral at bedtime PRN  simvastatin 40 milliGRAM(s) Oral at bedtime  zinc sulfate 220 milliGRAM(s) Oral daily    A/P:    Hemodynamic ATN s/p arrest (Cr 1.6 - 23, Cr 1.0 - 23)  Resp failure, intubated   CM, EF 35 - 40%, dementia  Anoxic encephalopathy   Vent support per ICU team  Avoid nephrotoxins  F/u BMP, UO   Lytes are stable  Cr is likely at new baseline   Overall prognosis is very poor, no meaningful recovery can be expected   Palliative approach would be appropriate    [phone]

## 2023-06-02 NOTE — PROGRESS NOTE ADULT - SUBJECTIVE AND OBJECTIVE BOX
Follow-up Critical Care Progress Note  Chief Complaint : Atrial fibrillation      patient seen and examined  spontanous movement of left side noted  eyes closed  does not open eyes to verbal stimuli  mild myoclonus noted     + Fever   Allergies :sulfa drugs (Unknown)      PAST MEDICAL & SURGICAL HISTORY:  HTN (hypertension)  HLD (hyperlipidemia)  Diabetes  CAD (coronary artery disease)  History of cholecystectomy        Medications:  MEDICATIONS  (STANDING):  albuterol/ipratropium for Nebulization 3 milliLiter(s) Nebulizer every 6 hours  brivaracetam Oral Solution 50 milliGRAM(s) Oral two times a day  chlorhexidine 0.12% Liquid 15 milliLiter(s) Oral Mucosa every 12 hours  chlorhexidine 2% Cloths 1 Application(s) Topical daily  dextrose 5%. 1000 milliLiter(s) (100 mL/Hr) IV Continuous <Continuous>  dextrose 5%. 1000 milliLiter(s) (50 mL/Hr) IV Continuous <Continuous>  dextrose 50% Injectable 12.5 Gram(s) IV Push once  dextrose 50% Injectable 25 Gram(s) IV Push once  dextrose 50% Injectable 25 Gram(s) IV Push once  insulin glargine Injectable (LANTUS) 10 Unit(s) SubCutaneous at bedtime  insulin lispro (ADMELOG) corrective regimen sliding scale   SubCutaneous every 6 hours  lidocaine 5% Ointment 1 Application(s) Topical every 12 hours  modafinil 200 milliGRAM(s) Oral daily  multivitamin/minerals/iron Oral Solution (CENTRUM) 15 milliLiter(s) Oral daily  pantoprazole   Suspension 40 milliGRAM(s) Oral every 12 hours  simvastatin 40 milliGRAM(s) Oral at bedtime  zinc sulfate 220 milliGRAM(s) Oral daily    MEDICATIONS  (PRN):  acetaminophen   Oral Liquid .. 650 milliGRAM(s) Oral every 6 hours PRN Temp greater or equal to 38C (100.4F)  senna 2 Tablet(s) Oral at bedtime PRN Constipation      Antibiotics History  cefepime   IVPB    , 05-24-23 @ 17:36  cefepime   IVPB 500 milliGRAM(s) IV Intermittent once, 05-24-23 @ 16:39  cefepime   IVPB 500 milliGRAM(s) IV Intermittent daily, 05-25-23 @ 12:00      Heme Medications       GI Medications  pantoprazole   Suspension 40 milliGRAM(s) Oral every 12 hours, 05-25-23 @ 08:37, Routine  senna 2 Tablet(s) Oral at bedtime, 06-02-23 @ 08:38,  PRN      COVID  05-04-23 @ 19:50  COVID -   NotDetec  07-13-22 @ 18:33  COVID -   NotDetec      COVID Biomarkers    05-04-23 @ 19:50 ESR --  ---  CRP --  ---  DDimer  351<H>   ---   LDH --   ---   Ferritin --          Procalcitonin Trend  05-17-23 @ 23:44   -   0.59<H>  05-16-23 @ 22:16   -   0.74<H>  05-15-23 @ 05:45   -   1.12<H>  05-09-23 @ 13:42   -   0.11<H>    WBC Trend  06-02-23 @ 11:32   -  13.16<H>  06-02-23 @ 06:00   -  11.22<H>  06-01-23 @ 05:00   -  8.71  05-31-23 @ 04:50   -  9.30    H/H Trend  06-02-23 @ 11:32   -   7.3<L>/ 23.9<L>  06-02-23 @ 06:00   -   7.0<LL>/ 22.8<L>  06-01-23 @ 05:00   -   7.6<L>/ 24.6<L>  05-31-23 @ 04:50   -   7.6<L>/ 24.4<L>  05-30-23 @ 05:15   -   7.6<L>/ 23.8<L>  05-29-23 @ 06:00   -   8.0<L>/ 25.4<L>    Stool Occult Blood  06-02-23 @ 08:20   -   Negative  05-25-23 @ 18:20   -   Negative  05-16-23 @ 17:00   -   Positive<!>    Platelet Trend  06-02-23 @ 11:32   -  245  06-02-23 @ 06:00   -  216  06-01-23 @ 05:00   -  201  05-31-23 @ 04:50   -  270    Trend Sodium  06-02-23 @ 06:00   -  143  06-01-23 @ 17:00   -  144  06-01-23 @ 05:00   -  148<H>  05-31-23 @ 04:50   -  144    Trend Potassium  06-02-23 @ 06:00   -  4.0  06-01-23 @ 17:00   -  4.0  06-01-23 @ 05:00   -  3.4<L>  05-31-23 @ 04:50   -  3.4<L>    Trend Bun/Cr  06-02-23 @ 06:00  BUN/CR -  69<H> / 2.19<H>  06-01-23 @ 17:00  BUN/CR -  73<H> / 2.21<H>  06-01-23 @ 05:00  BUN/CR -  71<H> / 1.95<H>  05-31-23 @ 04:50  BUN/CR -  72<H> / 2.26<H>    Lactic Acid Trend    ABG Trend  05-19-23 @ 00:22   - 7.37/43/144<H>/99.8<H>  05-18-23 @ 05:38   - 7.41/37/97/98.4<H>  05-17-23 @ 23:36   - 7.42/37/97/98.3<H>  05-16-23 @ 22:10   - 7.41/41/92/98.5<H>  05-16-23 @ 05:59   - 7.35/47/104/98.8<H>  05-15-23 @ 13:45   - 7.36/47/74<L>/96.6  05-15-23 @ 04:45   - 7.33<L>/48/70<L>/95.7  05-14-23 @ 05:20   - 7.40/46/72<L>/96.5  05-13-23 @ 06:00   - 7.34<L>/46/93/98.7<H>  05-13-23 @ 03:35   - 7.31<L>/46/108/99.6<H>  05-12-23 @ 23:15   - 7.31<L>/49<H>/79<L>/97.1  05-11-23 @ 07:55   - 7.41/42/152<H>/99.2<H>    Trend AST/ALT/ALK Phos/Bili  06-02-23 @ 06:00   26/14/144<H>/0.4  05-31-23 @ 04:50   29/16/136<H>/0.3  05-29-23 @ 06:00   30/20/128<H>/0.4  05-28-23 @ 05:00   29/18/122<H>/0.4  05-27-23 @ 06:15   31/18/129<H>/0.4  05-25-23 @ 05:00   29/14/98/0.5  05-24-23 @ 05:30   24/12/98/0.6  05-21-23 @ 06:15   14/10/69/0.5  05-20-23 @ 07:50   26/7<L>/65/0.6  05-19-23 @ 04:08   17/7<L>/63/0.4  05-19-23 @ 00:44   17/7<L>/62/0.5  05-17-23 @ 23:44   13/5<L>/53/0.5      Ammonia Trend  05-14-23 @ 11:30   -   53  05-14-23 @ 05:00   -   16    Albumin Trend  06-02-23 @ 06:00   -   1.5<L>  05-31-23 @ 04:50   -   1.5<L>  05-29-23 @ 06:00   -   1.3<L>  05-28-23 @ 05:00   -   1.3<L>  05-27-23 @ 06:15   -   1.4<L>  05-25-23 @ 05:00   -   1.4<L>      PTT - PT - INR Trend  05-19-23 @ 00:44   -   26.5<L> - 13.2 - 1.15  05-17-23 @ 23:44   -   31.2 - 13.7<H> - 1.18<H>  05-16-23 @ 22:16   -   31.3 - 13.6<H> - 1.18<H>  05-15-23 @ 13:55   -   32.9 - 14.0<H> - 1.19<H>  05-14-23 @ 13:45   -   33.6 - -- - --  05-14-23 @ 11:30   -   34.1 - 14.9<H> - 1.28<H>    Glucose Trend  06-02-23 @ 06:00   -  -- 165<H> --  06-02-23 @ 05:02   -  -- -- 175<H>  06-01-23 @ 23:04   -  -- -- 209<H>  06-01-23 @ 21:01   -  -- -- 210<H>  06-01-23 @ 17:11   -  -- -- 192<H>  06-01-23 @ 17:00   -  -- 176<H> --  06-01-23 @ 11:13   -  -- -- 249<H>  06-01-23 @ 05:48   -  -- -- 201<H>  06-01-23 @ 05:00   -  -- 165<H> --  05-31-23 @ 23:01   -  -- -- 190<H>    A1C with Estimated Average Glucose Result: 7.7 % *H* [4.0 - 5.6] (05-05-23 @ 08:00)      LABS:                        7.3    13.16 )-----------( 245      ( 02 Jun 2023 11:32 )             23.9     06-02    143  |  106  |  69<H>  ----------------------------<  165<H>  4.0   |  29  |  2.19<H>    Ca    7.9<L>      02 Jun 2023 06:00  Phos  3.2     06-02  Mg     1.7     06-02    TPro  7.3  /  Alb  1.5<L>  /  TBili  0.4  /  DBili  x   /  AST  26  /  ALT  14  /  AlkPhos  144<H>  06-02         CULTURES: (if applicable)    Culture - Sputum (collected 05-26-23 @ 06:00)  Source: ET Tube ET Tube  Gram Stain (05-26-23 @ 13:21):    Few polymorphonuclear leukocytes seen per low power field    Moderate Squamous epithelial cells seen per low power field    Moderate Gram positive cocci in pairs, chains and clusters seen per oil    power field    Few Yeast like cells seen per oil power field    Rare Gram Negative Rods seen per oil power field  Final Report (05-30-23 @ 14:09):    Moderate Staphylococcus aureus    Moderate Streptococcus pneumoniae    Normal Respiratory Chelsea present  Organism: Staphylococcus aureus  Streptococcus pneumoniae (05-30-23 @ 14:09)  Organism: Streptococcus pneumoniae (05-30-23 @ 14:09)      Method Type: ALDO      -  Clindamycin: I 0.5      -  Erythromycin: R >0.5 Predicts results for azithromycin.      -  Levofloxacin: I 4      -  Trimethoprim/Sulfamethoxazole: S <=.25/4.75      -  Vancomycin: S 0.5      -  Ceftriaxone (meningitidis): I 1      -  Ceftriaxone (non-meningitidis): S 1      -  Penicillin (meningitidis): R 2      -  Penicillin (non-meningitidis): S 2      -  Penicillin (oral penicillin V): R 2  Organism: Staphylococcus aureus (05-30-23 @ 14:09)      Method Type: ALDO      -  Ampicillin/Sulbactam: S <=8/4      -  Cefazolin: S <=4      -  Clindamycin: S <=0.25      -  Erythromycin: S <=0.25      -  Gentamicin: S <=1 Should not be used as monotherapy      -  Oxacillin: S 0.5 Oxacillin predicts susceptibility for dicloxacillin, methicillin, and nafcillin      -  Penicillin: R >8      -  Rifampin: S <=1 Should not be used as monotherapy      -  Tetracycline: R >8      -  Trimethoprim/Sulfamethoxazole: S <=0.5/9.5      -  Vancomycin: S 2    Culture - Blood (collected 05-24-23 @ 15:05)  Source: .Blood Blood  Final Report (05-30-23 @ 01:00):    No Growth Final    Culture - Blood (collected 05-24-23 @ 14:30)  Source: .Blood Blood  Final Report (05-29-23 @ 19:00):    No Growth Final    Culture - Sputum (collected 05-24-23 @ 14:14)  Source: .Sputum Sputum  Gram Stain (05-24-23 @ 22:38):    Numerous polymorphonuclear leukocytes per low power field    No Squamous epithelial cells per low power field    Moderate Gram Positive Cocci in Clusters per oil power field  Final Report (05-26-23 @ 16:39):    Moderate Staphylococcus aureus    Normal Respiratory Chelsea absent  Organism: Staphylococcus aureus (05-26-23 @ 16:39)  Organism: Staphylococcus aureus (05-26-23 @ 16:39)      Method Type: ALDO      -  Ampicillin/Sulbactam: S <=8/4      -  Cefazolin: S <=4      -  Clindamycin: S <=0.25      -  Erythromycin: S <=0.25      -  Gentamicin: S <=1 Should not be used as monotherapy      -  Oxacillin: S 0.5 Oxacillin predicts susceptibility for dicloxacillin, methicillin, and nafcillin      -  Penicillin: R >8      -  Rifampin: S <=1 Should not be used as monotherapy      -  Tetracycline: R >8      -  Trimethoprim/Sulfamethoxazole: S <=0.5/9.5      -  Vancomycin: S 1         VITALS:  T(C): 38.3 (06-02-23 @ 08:00), Max: 38.3 (06-02-23 @ 08:00)  T(F): 100.9 (06-02-23 @ 08:00), Max: 100.9 (06-02-23 @ 08:00)  HR: 101 (06-02-23 @ 10:00) (74 - 101)  BP: 128/85 (06-02-23 @ 10:00) (94/80 - 146/78)  BP(mean): 99 (06-02-23 @ 10:00) (75 - 99)  ABP: --  ABP(mean): --  RR: 16 (06-02-23 @ 10:00) (16 - 25)  SpO2: 100% (06-02-23 @ 10:00) (98% - 100%)  CVP(mm Hg): --  CVP(cm H2O): --    Ins and Outs     06-01-23 @ 07:01  -  06-02-23 @ 07:00  --------------------------------------------------------  IN: 3770 mL / OUT: 1000 mL / NET: 2770 mL    06-02-23 @ 07:01  -  06-02-23 @ 12:02  --------------------------------------------------------  IN: 55 mL / OUT: 0 mL / NET: 55 mL       Device: Avea, Mode: AC/ CMV (Assist Control/ Continuous Mandatory Ventilation), RR (machine): 12, RR (patient): 30, TV (machine): 450, TV (patient): 450, FiO2: 30, PEEP: 5, MAP: 12, PIP: 25    I&O's Detail    01 Jun 2023 07:01  -  02 Jun 2023 07:00  --------------------------------------------------------  IN:    dextrose 5%: 1100 mL    Enteral Tube Flush: 1150 mL    IV PiggyBack: 300 mL    Nepro with Carb Steady: 1220 mL  Total IN: 3770 mL    OUT:    Voided (mL): 1000 mL  Total OUT: 1000 mL    Total NET: 2770 mL      02 Jun 2023 07:01  -  02 Jun 2023 12:02  --------------------------------------------------------  IN:    Nepro with Carb Steady: 55 mL  Total IN: 55 mL    OUT:  Total OUT: 0 mL    Total NET: 55 mL

## 2023-06-02 NOTE — PROGRESS NOTE ADULT - ASSESSMENT
Physical Examination:  GENERAL:               Eyes closed at times, partially responsive where opens eyes to touch   HEENT:                    Pupils equal, reactive to light.   No JVD, Dry MM  PULM:                     Bilateral air entry, Clear to auscultation bilaterally, no significant sputum production, No Rales, No Rhonchi, No Wheezing  CVS:                         S1, S2,  +Murmur  ABD:                        Soft, nondistended, nontender, normoactive bowel sounds,   EXT:                         No edema, nontender, No Cyanosis or Clubbing   Vascular:                Warm Extremities,   NEURO:                  Eyes open , + episodes of myoclonus, spontaneous movements of UE   PSYC:                      Calm, no Insight.      Assessment  78 year old male with a PMH of dementia, HTN, cardiomyopathy, HFrEF, CAD s/p CABG, with known current RCA occlusion, and DM type 2 who was admitted to [facility] on 5/4 with hypoxic respiratory failure in setting of CHF exacerbation and ANISH with course complicated by acute hypoxic respiratory failure in setting of choking episode causing cardiac arrest, shock, worsening hypoxemic respiratory failure, and NSTEMI on 5/9. Pt ICU course noted post cardiac arrest anoxic brain injury and myoclonus. Pt was transferred to [facility] for VEEG which required 48 hours of monitoring as he was noted to still be sedated. While there pt noted to have hematochezia and any ac / antiplatelet agents were held at that time. EEG findings consistent with stimulus induced myoclonus and GPDs s/p hypoxic diffuse indicative of severe cerebral dysfunction.    Patient returned to [facility] ICU on 5/19 and continues tx / supportive care for management of:    Problem list  Post cardiac arrest 2/2 choking episode / respiratory failure  Acute respiratory failure   Severe Anoxic Encephalopathy with secondary cortical myoclonus  Acute renal failure w Uremia post cardiac arrest improving  Hemtochezia episode, h/h stable no further episodes noted  Heart failure with reduced EF  NSTEMI post arrest       Underlying PMH of dementia, HTN, cardiomyopathy, HFrEF, CAD s/p CABG, with known current RCA occlusion, and DM type 2      Plan:    Continue mechanical ventilation - as has poor mental status to extubate, unable to protect airway  CPAP trial done by me with ps 5, only able to take rapid shallow breaths   Noted fever, check pan culture no source noted, noted diarrhea hold laxatives, check gi pcr  Continue brivaracetam   continue PPI BID, trend cbc daily   TF as tolerated  increased modafinil   monitor blood glucose levels, + ISS coverage  Sacral decub - wound care called continue local wound care off loading and nutrition support  Venodynes for dvt ppx    Ethics consult will f/u with family after monday      GO ongoing discussion with family       PMD:				                   Notified(Date):  Family Updated: 	Kuldeep [phone]	                                 Date:  6/2/2023  updated on status  updated status  wants to continue to wait  discussed fever, diarrhea, cultures, abx  Sedation & Analgesia:	  Diet/Nutrition:		 Diet, NPO with Tube Feed:   Tube Feeding Modality: Nasogastric         GI PPx:			pantoprazole   Suspension 40 milliGRAM(s) Oral daily  DVT Ppx:		Venodynes   Activity:		  bedrest  Head of Bed:               35-45 Deg  Glycemic Control:         Insulin    Lines: piv    Restraints were deemed necessary to prevent removal of life-sustaining devices [  ] YES   [  x  ]  NO    Disposition: ICU Care    Goals of Care: Full code

## 2023-06-03 LAB
-  STAPHYLOCOCCUS EPIDERMIDIS, METHICILLIN RESISTANT: SIGNIFICANT CHANGE UP
ABO RH CONFIRMATION: SIGNIFICANT CHANGE UP
ALBUMIN SERPL ELPH-MCNC: 1.5 G/DL — LOW (ref 3.3–5)
ALP SERPL-CCNC: 152 U/L — HIGH (ref 40–120)
ALT FLD-CCNC: 19 U/L — SIGNIFICANT CHANGE UP (ref 10–45)
ANION GAP SERPL CALC-SCNC: 8 MMOL/L — SIGNIFICANT CHANGE UP (ref 5–17)
AST SERPL-CCNC: 28 U/L — SIGNIFICANT CHANGE UP (ref 10–40)
BASOPHILS # BLD AUTO: 0.08 K/UL — SIGNIFICANT CHANGE UP (ref 0–0.2)
BASOPHILS NFR BLD AUTO: 0.8 % — SIGNIFICANT CHANGE UP (ref 0–2)
BILIRUB SERPL-MCNC: 0.5 MG/DL — SIGNIFICANT CHANGE UP (ref 0.2–1.2)
BUN SERPL-MCNC: 69 MG/DL — HIGH (ref 7–23)
CALCIUM SERPL-MCNC: 7.9 MG/DL — LOW (ref 8.4–10.5)
CHLORIDE SERPL-SCNC: 108 MMOL/L — SIGNIFICANT CHANGE UP (ref 96–108)
CO2 SERPL-SCNC: 29 MMOL/L — SIGNIFICANT CHANGE UP (ref 22–31)
CREAT SERPL-MCNC: 2.23 MG/DL — HIGH (ref 0.5–1.3)
EGFR: 29 ML/MIN/1.73M2 — LOW
EOSINOPHIL # BLD AUTO: 0.17 K/UL — SIGNIFICANT CHANGE UP (ref 0–0.5)
EOSINOPHIL NFR BLD AUTO: 1.7 % — SIGNIFICANT CHANGE UP (ref 0–6)
GLUCOSE BLDC GLUCOMTR-MCNC: 120 MG/DL — HIGH (ref 70–99)
GLUCOSE BLDC GLUCOMTR-MCNC: 147 MG/DL — HIGH (ref 70–99)
GLUCOSE BLDC GLUCOMTR-MCNC: 173 MG/DL — HIGH (ref 70–99)
GLUCOSE BLDC GLUCOMTR-MCNC: 174 MG/DL — HIGH (ref 70–99)
GLUCOSE BLDC GLUCOMTR-MCNC: 177 MG/DL — HIGH (ref 70–99)
GLUCOSE SERPL-MCNC: 124 MG/DL — HIGH (ref 70–99)
GRAM STN FLD: SIGNIFICANT CHANGE UP
GRAM STN FLD: SIGNIFICANT CHANGE UP
HCT VFR BLD CALC: 22.1 % — LOW (ref 39–50)
HGB BLD-MCNC: 6.8 G/DL — CRITICAL LOW (ref 13–17)
IMM GRANULOCYTES NFR BLD AUTO: 0.4 % — SIGNIFICANT CHANGE UP (ref 0–0.9)
LYMPHOCYTES # BLD AUTO: 0.66 K/UL — LOW (ref 1–3.3)
LYMPHOCYTES # BLD AUTO: 6.6 % — LOW (ref 13–44)
MAGNESIUM SERPL-MCNC: 1.9 MG/DL — SIGNIFICANT CHANGE UP (ref 1.6–2.6)
MCHC RBC-ENTMCNC: 30.8 GM/DL — LOW (ref 32–36)
MCHC RBC-ENTMCNC: 31.3 PG — SIGNIFICANT CHANGE UP (ref 27–34)
MCV RBC AUTO: 101.8 FL — HIGH (ref 80–100)
METHOD TYPE: SIGNIFICANT CHANGE UP
MONOCYTES # BLD AUTO: 0.26 K/UL — SIGNIFICANT CHANGE UP (ref 0–0.9)
MONOCYTES NFR BLD AUTO: 2.6 % — SIGNIFICANT CHANGE UP (ref 2–14)
NEUTROPHILS # BLD AUTO: 8.82 K/UL — HIGH (ref 1.8–7.4)
NEUTROPHILS NFR BLD AUTO: 87.9 % — HIGH (ref 43–77)
NRBC # BLD: 0 /100 WBCS — SIGNIFICANT CHANGE UP (ref 0–0)
PHOSPHATE SERPL-MCNC: 3.4 MG/DL — SIGNIFICANT CHANGE UP (ref 2.5–4.5)
PLATELET # BLD AUTO: 201 K/UL — SIGNIFICANT CHANGE UP (ref 150–400)
POTASSIUM SERPL-MCNC: 4.2 MMOL/L — SIGNIFICANT CHANGE UP (ref 3.5–5.3)
POTASSIUM SERPL-SCNC: 4.2 MMOL/L — SIGNIFICANT CHANGE UP (ref 3.5–5.3)
PROT SERPL-MCNC: 7.3 G/DL — SIGNIFICANT CHANGE UP (ref 6–8.3)
RBC # BLD: 2.17 M/UL — LOW (ref 4.2–5.8)
RBC # FLD: 16.2 % — HIGH (ref 10.3–14.5)
SODIUM SERPL-SCNC: 145 MMOL/L — SIGNIFICANT CHANGE UP (ref 135–145)
SPECIMEN SOURCE: SIGNIFICANT CHANGE UP
WBC # BLD: 10.03 K/UL — SIGNIFICANT CHANGE UP (ref 3.8–10.5)
WBC # FLD AUTO: 10.03 K/UL — SIGNIFICANT CHANGE UP (ref 3.8–10.5)

## 2023-06-03 RX ORDER — LIDOCAINE 4 G/100G
1 CREAM TOPICAL EVERY 12 HOURS
Refills: 0 | Status: DISCONTINUED | OUTPATIENT
Start: 2023-06-03 | End: 2023-06-03

## 2023-06-03 RX ORDER — ERYTHROPOIETIN 10000 [IU]/ML
10000 INJECTION, SOLUTION INTRAVENOUS; SUBCUTANEOUS
Refills: 0 | Status: DISCONTINUED | OUTPATIENT
Start: 2023-06-03 | End: 2023-06-07

## 2023-06-03 RX ORDER — VANCOMYCIN HCL 1 G
1000 VIAL (EA) INTRAVENOUS EVERY 24 HOURS
Refills: 0 | Status: DISCONTINUED | OUTPATIENT
Start: 2023-06-04 | End: 2023-06-06

## 2023-06-03 RX ORDER — VANCOMYCIN HCL 1 G
1000 VIAL (EA) INTRAVENOUS ONCE
Refills: 0 | Status: COMPLETED | OUTPATIENT
Start: 2023-06-03 | End: 2023-06-03

## 2023-06-03 RX ORDER — LOPERAMIDE HCL 2 MG
2 TABLET ORAL ONCE
Refills: 0 | Status: COMPLETED | OUTPATIENT
Start: 2023-06-03 | End: 2023-06-03

## 2023-06-03 RX ORDER — VANCOMYCIN HCL 1 G
VIAL (EA) INTRAVENOUS
Refills: 0 | Status: DISCONTINUED | OUTPATIENT
Start: 2023-06-03 | End: 2023-06-06

## 2023-06-03 RX ORDER — BRIVARACETAM 25 MG/1
50 TABLET, FILM COATED ORAL
Refills: 0 | Status: DISCONTINUED | OUTPATIENT
Start: 2023-06-03 | End: 2023-06-07

## 2023-06-03 RX ADMIN — PANTOPRAZOLE SODIUM 40 MILLIGRAM(S): 20 TABLET, DELAYED RELEASE ORAL at 05:12

## 2023-06-03 RX ADMIN — Medication 3 MILLILITER(S): at 09:04

## 2023-06-03 RX ADMIN — PANTOPRAZOLE SODIUM 40 MILLIGRAM(S): 20 TABLET, DELAYED RELEASE ORAL at 17:23

## 2023-06-03 RX ADMIN — Medication 3 MILLILITER(S): at 22:45

## 2023-06-03 RX ADMIN — LIDOCAINE 1 APPLICATION(S): 4 CREAM TOPICAL at 17:45

## 2023-06-03 RX ADMIN — CHLORHEXIDINE GLUCONATE 15 MILLILITER(S): 213 SOLUTION TOPICAL at 05:11

## 2023-06-03 RX ADMIN — Medication 2: at 17:24

## 2023-06-03 RX ADMIN — Medication 3 MILLILITER(S): at 15:26

## 2023-06-03 RX ADMIN — Medication 0: at 05:18

## 2023-06-03 RX ADMIN — SIMVASTATIN 40 MILLIGRAM(S): 20 TABLET, FILM COATED ORAL at 21:07

## 2023-06-03 RX ADMIN — CHLORHEXIDINE GLUCONATE 1 APPLICATION(S): 213 SOLUTION TOPICAL at 13:04

## 2023-06-03 RX ADMIN — LIDOCAINE 1 APPLICATION(S): 4 CREAM TOPICAL at 05:19

## 2023-06-03 RX ADMIN — Medication 15 MILLILITER(S): at 12:40

## 2023-06-03 RX ADMIN — Medication 1 APPLICATION(S): at 13:04

## 2023-06-03 RX ADMIN — Medication 250 MILLIGRAM(S): at 13:04

## 2023-06-03 RX ADMIN — Medication 2 MILLIGRAM(S): at 15:48

## 2023-06-03 RX ADMIN — MODAFINIL 200 MILLIGRAM(S): 200 TABLET ORAL at 13:04

## 2023-06-03 RX ADMIN — Medication 3 MILLILITER(S): at 04:17

## 2023-06-03 RX ADMIN — INSULIN GLARGINE 10 UNIT(S): 100 INJECTION, SOLUTION SUBCUTANEOUS at 21:08

## 2023-06-03 RX ADMIN — Medication 2: at 12:41

## 2023-06-03 RX ADMIN — CHLORHEXIDINE GLUCONATE 15 MILLILITER(S): 213 SOLUTION TOPICAL at 17:23

## 2023-06-03 RX ADMIN — BRIVARACETAM 50 MILLIGRAM(S): 25 TABLET, FILM COATED ORAL at 05:12

## 2023-06-03 RX ADMIN — ZINC SULFATE TAB 220 MG (50 MG ZINC EQUIVALENT) 220 MILLIGRAM(S): 220 (50 ZN) TAB at 12:40

## 2023-06-03 RX ADMIN — BRIVARACETAM 50 MILLIGRAM(S): 25 TABLET, FILM COATED ORAL at 17:37

## 2023-06-03 RX ADMIN — Medication 2: at 23:04

## 2023-06-03 NOTE — PROGRESS NOTE ADULT - ASSESSMENT
Unstageable sacral wound.  Spoke with pt's daughter yesterday, she wants not bedside debridement at this time, but to continue wound care with collagenase.  Family may consider OR debridement with Trach/PEG next week  -continue local wound care, off loading, and nutrition support.

## 2023-06-03 NOTE — PROGRESS NOTE ADULT - SUBJECTIVE AND OBJECTIVE BOX
( x ) No complaints (  ) Complains of:   intubated on vent  T(F): 98.5 (23 @ 10:00), Max: 100.1 (23 @ 08:00)  HR: 89 (23 @ 13:21) (73 - 97)  BP: 137/86 (23 @ 13:21) (102/69 - 141/82)  RR: 7 (23 @ 13:21) (7 - 27)  SpO2: 100% (23 @ 13:21) (100% - 100%)        Wound Location 1:  sacral wound with heavy fecal soilage                               6.8    10.03 )-----------( 201      ( 2023 06:20 )             22.1     CBC Full  -  ( 2023 06:20 )  WBC Count : 10.03 K/uL  RBC Count : 2.17 M/uL  Hemoglobin : 6.8 g/dL  Hematocrit : 22.1 %  Platelet Count - Automated : 201 K/uL  Mean Cell Volume : 101.8 fl  Mean Cell Hemoglobin : 31.3 pg  Mean Cell Hemoglobin Concentration : 30.8 gm/dL  Auto Neutrophil # : 8.82 K/uL  Auto Lymphocyte # : 0.66 K/uL  Auto Monocyte # : 0.26 K/uL  Auto Eosinophil # : 0.17 K/uL  Auto Basophil # : 0.08 K/uL  Auto Neutrophil % : 87.9 %  Auto Lymphocyte % : 6.6 %  Auto Monocyte % : 2.6 %  Auto Eosinophil % : 1.7 %  Auto Basophil % : 0.8 %    145|108|69<124  4.2|29|2.23  7.9,1.9,3.4   @ 06:20      Urinalysis Basic - ( 2023 20:30 )    Color: Yellow / Appearance: Clear / S.014 / pH: x  Gluc: x / Ketone: Negative mg/dL  / Bili: Negative / Urobili: 2.0 mg/dL   Blood: x / Protein: 300 mg/dL / Nitrite: Negative   Leuk Esterase: Small / RBC: >50 /HPF / WBC 3 /HPF   Sq Epi: x / Non Sq Epi: x / Bacteria: Few /HPF                Procedure Performed:  (  )Yes     (x  )No  Name of Procedure:  (  )debridement    (  )I&D    (  )Other:  (  )partial thickness     (  )full thickness     (  )subcutaneous     (  )muscle/tendon     (  )bone  (  )sharp     (  )surgical

## 2023-06-03 NOTE — PROGRESS NOTE ADULT - SUBJECTIVE AND OBJECTIVE BOX
Intubated in ICU     Vital Signs Last 24 Hrs  T(C): 36.9 (23 @ 10:00), Max: 37.8 (23 @ 08:00)  T(F): 98.5 (23 @ 10:00), Max: 100.1 (23 @ 08:00)  HR: 89 (23 @ 17:06) (73 - 99)  BP: 143/83 (23 @ 15:00) (102/69 - 143/83)  BP(mean): 101 (23 @ 15:00) (78 - 103)  RR: 22 (23 @ 15:00) (7 - 27)  SpO2: 100% (23 @ 17:06) (100% - 100%)    I&O's Detail    2023 07:01  -  2023 07:00  --------------------------------------------------------  IN:    Free Water: 750 mL    Glucerna 1.5: 715 mL    Nepro with Carb Steady: 495 mL  Total IN: 1960 mL    OUT:    Voided (mL): 200 mL  Total OUT: 200 mL    Total NET: 1760 mL    2023 07:01  -  2023 17:45  --------------------------------------------------------  IN:    Glucerna 1.5: 440 mL    IV PiggyBack: 250 mL    PRBCs (Packed Red Blood Cells): 328 mL  Total IN: 1018 mL    OUT:    Voided (mL): 200 mL  Total OUT: 200 mL    Total NET: 818 mL    Mode: AC/ CMV (Assist Control/ Continuous Mandatory Ventilation)  RR (machine): 12  TV (machine): 450  FiO2: 30  PEEP: 5  MAP: 11  PIP: 25    s1s2  b/l air entry  soft, ND  tr edema                                                                                  6.8    10.03 )-----------( 201      ( 2023 06:20 )             22.1     2023 06:20    145    |  108    |  69     ----------------------------<  124    4.2     |  29     |  2.23     Ca    7.9        2023 06:20  Phos  3.4       2023 06:20  Mg     1.9       2023 06:20    TPro  7.3    /  Alb  1.5    /  TBili  0.5    /  DBili  x      /  AST  28     /  ALT  19     /  AlkPhos  152    2023 06:20    LIVER FUNCTIONS - ( 2023 06:20 )  Alb: 1.5 g/dL / Pro: 7.3 g/dL / ALK PHOS: 152 U/L / ALT: 19 U/L / AST: 28 U/L / GGT: x           Urinalysis Basic - ( 2023 20:30 )    Color: Yellow / Appearance: Clear / S.014 / pH: x  Gluc: x / Ketone: Negative mg/dL  / Bili: Negative / Urobili: 2.0 mg/dL   Blood: x / Protein: 300 mg/dL / Nitrite: Negative   Leuk Esterase: Small / RBC: >50 /HPF / WBC 3 /HPF   Sq Epi: x / Non Sq Epi: x / Bacteria: Few /HPF    Culture - Blood (collected 2023 09:45)  Source: .Blood Blood  Preliminary Report (2023 17:01):    No growth to date.    Culture - Blood (collected 2023 09:10)  Source: .Blood Blood  Gram Stain (2023 16:57):    Growth in aerobic bottle: Gram Positive Cocci in Clusters    Growth in anaerobic bottle: Gram Positive Cocci in Clusters  Preliminary Report (2023 16:57):    Growth in aerobic bottle: Gram Positive Cocci in Clusters    Growth in anaerobic bottle: Gram Positive Cocci in Clusters    ***Blood Panel PCR results on this specimen are available    approximately 3 hours after the Gram stain result.***    Gram stain, PCR, and/or culture results may not always    correspond due to difference in methodologies.    ************************************************************    This PCR assay was performed by multiplex PCR. This    Assay tests for 66 bacterial and resistance gene targets.    Please refer to the [facility] test directory    at [email or web address] for details.  Organism: Blood Culture PCR (2023 14:36)  Organism: Blood Culture PCR (2023 14:36)    acetaminophen   Oral Liquid .. 650 milliGRAM(s) Oral every 6 hours PRN  albuterol/ipratropium for Nebulization 3 milliLiter(s) Nebulizer every 6 hours  brivaracetam Oral Solution 50 milliGRAM(s) Oral two times a day  chlorhexidine 0.12% Liquid 15 milliLiter(s) Oral Mucosa every 12 hours  chlorhexidine 2% Cloths 1 Application(s) Topical daily  collagenase Ointment 1 Application(s) Topical daily  dextrose 5%. 1000 milliLiter(s) IV Continuous <Continuous>  dextrose 5%. 1000 milliLiter(s) IV Continuous <Continuous>  dextrose 50% Injectable 25 Gram(s) IV Push once  dextrose 50% Injectable 12.5 Gram(s) IV Push once  dextrose 50% Injectable 25 Gram(s) IV Push once  insulin glargine Injectable (LANTUS) 10 Unit(s) SubCutaneous at bedtime  insulin lispro (ADMELOG) corrective regimen sliding scale   SubCutaneous every 6 hours  lidocaine 5% Ointment 1 Application(s) Topical every 12 hours  modafinil 200 milliGRAM(s) Oral daily  multivitamin/minerals/iron Oral Solution (CENTRUM) 15 milliLiter(s) Oral daily  pantoprazole   Suspension 40 milliGRAM(s) Oral every 12 hours  senna 2 Tablet(s) Oral at bedtime PRN  simvastatin 40 milliGRAM(s) Oral at bedtime  vancomycin  IVPB      zinc sulfate 220 milliGRAM(s) Oral daily    A/P:    Hemodynamic ATN s/p arrest (Cr 1.6 - 23, Cr 1.0 - 23)  Resp failure, intubated   CM, EF 35 - 40%, dementia  Anoxic encephalopathy   Vent support per ICU team  Avoid nephrotoxins  F/u CBC, BMP, UO   Lytes are stable  Cr is likely at new baseline   Now w/severe anemia, bacteremia  Abx, bld tx per ICU team  Epoetin  Overall prognosis is very poor, no meaningful recovery can be expected   Palliative approach would be appropriate    [phone]

## 2023-06-03 NOTE — PROGRESS NOTE ADULT - ASSESSMENT
Physical Examination:  GENERAL:               Eyes opens at times, partially responsive where opens eyes to touch and voice  HEENT:                    Pupils equal, reactive to light.   No JVD, Dry MM  PULM:                     Bilateral air entry, Clear to auscultation bilaterally, no significant sputum production, No Rales, No Rhonchi, No Wheezing  CVS:                         S1, S2,  +Murmur  ABD:                        Soft, nondistended, nontender, normoactive bowel sounds,   EXT:                         No edema, nontender, No Cyanosis or Clubbing   Vascular:                Warm Extremities,   NEURO:                  Eyes open , + episodes of myoclonus, spontaneous movements of UE Left > Right  PSYC:                      Calm, no Insight.      Assessment  78 year old male with a PMH of dementia, HTN, cardiomyopathy, HFrEF, CAD s/p CABG, with known current RCA occlusion, and DM type 2 who was admitted to [facility] on 5/4 with hypoxic respiratory failure in setting of CHF exacerbation and ANISH with course complicated by acute hypoxic respiratory failure in setting of choking episode causing cardiac arrest, shock, worsening hypoxemic respiratory failure, and NSTEMI on 5/9. Pt ICU course noted post cardiac arrest anoxic brain injury and myoclonus. Pt was transferred to [facility] for VEEG which required 48 hours of monitoring as he was noted to still be sedated. While there pt noted to have hematochezia and any ac / antiplatelet agents were held at that time. EEG findings consistent with stimulus induced myoclonus and GPDs s/p hypoxic diffuse indicative of severe cerebral dysfunction.    Patient returned to [facility] ICU on 5/19 and continues tx / supportive care for management of:    Problem list  Post cardiac arrest 2/2 choking episode / respiratory failure  Acute respiratory failure   Severe Anoxic Encephalopathy with secondary cortical myoclonus  Acute renal failure w Uremia post cardiac arrest improving  Hemtochezia episode, h/h stable no further episodes noted  Heart failure with reduced EF  NSTEMI post arrest       Underlying PMH of dementia, HTN, cardiomyopathy, HFrEF, CAD s/p CABG, with known current RCA occlusion, and DM type 2      Plan:    Continue mechanical ventilation - as has poor mental status to extubate, unable to protect airway  CPAP trial done by me with ps 5, only able to take rapid shallow breaths   Noted fever, cultures neg to date, wbc 10, will monitor off antibiotics till source identified,   Continue brivaracetam   continue PPI BID, trend cbc daily   TF as tolerated  increased modafinil but more mycolonus noted  Anemia noted ? from renal failure vs frequent blood draws, no active bleeding noted    monitor blood glucose levels, + ISS coverage  Sacral decub - wound care called continue local wound care off loading and nutrition support  Venodynes for dvt ppx    Ethics consult will f/u with family after Monday      GOC ongoing discussion with family       PMD:				                   Notified(Date):  Family Updated: 	Kuldeep [phone]	                                 Date:  6/3/2023  Consent for blood obtained  discussed trach/peg and how reversible it is  discussed if extubated not candidate for NIV due to mental status  discussed if extubated will va pass as not able to protect airway / secretions  reviewed labs      Sedation & Analgesia:	  Diet/Nutrition:		 Diet, NPO with Tube Feed:   Tube Feeding Modality: Nasogastric         GI PPx:			pantoprazole   Suspension 40 milliGRAM(s) Oral daily  DVT Ppx:		Venodynes   Activity:		  bedrest  Head of Bed:               35-45 Deg  Glycemic Control:         Insulin    Lines: piv    Restraints were deemed necessary to prevent removal of life-sustaining devices [  ] YES   [  x  ]  NO    Disposition: ICU Care    Goals of Care: Full code

## 2023-06-03 NOTE — PROGRESS NOTE ADULT - SUBJECTIVE AND OBJECTIVE BOX
Follow-up Critical Care Progress Note  Chief Complaint : Atrial fibrillation    patient seen and examined  opens eyes and looks towards left side, has right sided marleen neglect  placed patient on cpap ps 5, RR went up to 30s TV < 200cc with in 1 min  no secretions noted  + increased myoclonus noted  + diarrhea brown        Allergies :sulfa drugs (Unknown)      PAST MEDICAL & SURGICAL HISTORY:  HTN (hypertension)    HLD (hyperlipidemia)    Diabetes    CAD (coronary artery disease)    History of cholecystectomy        Medications:  MEDICATIONS  (STANDING):  albuterol/ipratropium for Nebulization 3 milliLiter(s) Nebulizer every 6 hours  brivaracetam Oral Solution 50 milliGRAM(s) Oral two times a day  chlorhexidine 0.12% Liquid 15 milliLiter(s) Oral Mucosa every 12 hours  chlorhexidine 2% Cloths 1 Application(s) Topical daily  collagenase Ointment 1 Application(s) Topical daily  dextrose 5%. 1000 milliLiter(s) (100 mL/Hr) IV Continuous <Continuous>  dextrose 5%. 1000 milliLiter(s) (50 mL/Hr) IV Continuous <Continuous>  dextrose 50% Injectable 25 Gram(s) IV Push once  dextrose 50% Injectable 12.5 Gram(s) IV Push once  dextrose 50% Injectable 25 Gram(s) IV Push once  insulin glargine Injectable (LANTUS) 10 Unit(s) SubCutaneous at bedtime  insulin lispro (ADMELOG) corrective regimen sliding scale   SubCutaneous every 6 hours  lidocaine 5% Ointment 1 Application(s) Topical every 12 hours  modafinil 200 milliGRAM(s) Oral daily  multivitamin/minerals/iron Oral Solution (CENTRUM) 15 milliLiter(s) Oral daily  pantoprazole   Suspension 40 milliGRAM(s) Oral every 12 hours  simvastatin 40 milliGRAM(s) Oral at bedtime  zinc sulfate 220 milliGRAM(s) Oral daily    MEDICATIONS  (PRN):  acetaminophen   Oral Liquid .. 650 milliGRAM(s) Oral every 6 hours PRN Temp greater or equal to 38C (100.4F)  senna 2 Tablet(s) Oral at bedtime PRN Constipation      Antibiotics History  cefepime   IVPB    , 23 @ 17:36  cefepime   IVPB 500 milliGRAM(s) IV Intermittent once, 23 @ 16:39  cefepime   IVPB 500 milliGRAM(s) IV Intermittent daily, 23 @ 12:00      Heme Medications       GI Medications  pantoprazole   Suspension 40 milliGRAM(s) Oral every 12 hours, 23 @ 08:37, Routine  senna 2 Tablet(s) Oral at bedtime, 23 @ 08:38,  PRN      COVID  23 @ 19:50  COVID -   NotDetec  22 @ 18:33  COVID -   NotDetec      COVID Biomarkers    23 @ 19:50 ESR --  ---  CRP --  ---  DDimer  351<H>   ---   LDH --   ---   Ferritin --         Procalcitonin Trend  23 @ 23:44   -   0.59<H>  23 @ 22:16   -   0.74<H>  05-15-23 @ 05:45   -   1.12<H>  23 @ 13:42   -   0.11<H>    WBC Trend  23 @ 06:20   -  10.03  23 @ 11:32   -  13.16<H>  23 @ 06:00   -  11.22<H>  23 @ 05:00   -  8.71    H/H Trend  23 @ 06:20   -   6.8<LL>/ 22.1<L>  23 @ 11:32   -   7.3<L>/ 23.9<L>  23 @ 06:00   -   7.0<LL>/ 22.8<L>  23 @ 05:00   -   7.6<L>/ 24.6<L>  23 @ 04:50   -   7.6<L>/ 24.4<L>  23 @ 05:15   -   7.6<L>/ 23.8<L>    Stool Occult Blood  23 @ 08:20   -   Negative  23 @ 18:20   -   Negative  23 @ 17:00   -   Positive<!>    Platelet Trend  23 @ 06:20   -  201  23 @ 11:32   -  245  23 @ 06:00   -  216  23 @ 05:00   -  201    Trend Sodium  23 @ 06:20   -  145  06-02-23 @ 06:00   -  143  23 @ 17:00   -  144  23 @ 05:00   -  148<H>    Trend Potassium  23 @ 06:20   -  4.2  23 @ 06:00   -  4.0  23 @ 17:00   -  4.0  23 @ 05:00   -  3.4<L>    Trend Bun/Cr  23 @ 06:20  BUN/CR -  69<H> / 2.23<H>  23 @ 06:00  BUN/CR -  69<H> / 2.19<H>  23 @ 17:00  BUN/CR -  73<H> / 2.21<H>  23 @ 05:00  BUN/CR -  71<H> / 1.95<H>    Lactic Acid Trend    ABG Trend  23 @ 00:22   - 7.37/43/144<H>/99.8<H>  23 @ 05:38   - 7.41/37/97/98.4<H>  23 @ 23:36   - 7.42/37/97/98.3<H>  23 @ 22:10   - 7.41/41/92/98.5<H>  23 @ 05:59   - 7.35/47/104/98.8<H>  05-15-23 @ 13:45   - 7.36/47/74<L>/96.6  05-15-23 @ 04:45   - 7.33<L>/48/70<L>/95.7  23 @ 05:20   - 7.40/46/72<L>/96.5  23 @ 06:00   - 7.34<L>/46/93/98.7<H>  23 @ 03:35   - 7.31<L>/46/108/99.6<H>  23 @ 23:15   - 7.31<L>/49<H>/79<L>/97.1  23 @ 07:55   - 7.41/42/152<H>/99.2<H>    Trend AST/ALT/ALK Phos/Bili  23 @ 06:20   /152<H>/0.5  23 @ 06:00   /144<H>/0.4  23 @ 04:50   /136<H>/0.3  23 @ 06:00   /128<H>/0.4  23 @ 05:00   /122<H>/0.4  23 @ 06:15   /129<H>/0.4  23 @ 05:00   98/0.5  23 @ 05:30   98/0.6  23 @ 06:15   14/10/69/0.5  23 @ 07:50   26/7<L>/65/0.6  23 @ 04:08   17/7<L>/63/0.4  23 @ 00:44   17/7<L>/62/0.5      Ammonia Trend  23 @ 11:30   -   53  23 @ 05:00   -   16      Amylase / Lipase Trend      Albumin Trend  23 @ 06:20   -   1.5<L>  23 @ 06:00   -   1.5<L>  23 @ 04:50   -   1.5<L>  23 @ 06:00   -   1.3<L>  23 @ 05:00   -   1.3<L>  23 @ 06:15   -   1.4<L>      PTT - PT - INR Trend  23 @ 00:44   -   26.5<L> - 13.2 - 1.15  23 @ 23:44   -   31.2 - 13.7<H> - 1.18<H>  23 @ 22:16   -   31.3 - 13.6<H> - 1.18<H>  05-15-23 @ 13:55   -   32.9 - 14.0<H> - 1.19<H>  23 @ 13:45   -   33.6 - -- - --  23 @ 11:30   -   34.1 - 14.9<H> - 1.28<H>    Glucose Trend  23 @ 06:20   -  -- 124<H> --  23 @ 05:15   -  -- -- 120<H>  23 @ 23:13   -  -- -- 139<H>  23 @ 21:02   -  -- -- 121<H>  23 @ 17:42   -  -- -- 158<H>  23 @ 12:13   -  -- -- 157<H>  23 @ 06:00   -  -- 165<H> --  23 @ 05:02   -  -- -- 175<H>  23 @ 23:04   -  -- -- 209<H>  23 @ 21:01   -  -- -- 210<H>    A1C with Estimated Average Glucose Result: 7.7 % *H* [4.0 - 5.6] (23 @ 08:00)      LABS:                        6.8    10.03 )-----------( 201      ( 2023 06:20 )             22.1     06-03    145  |  108  |  69<H>  ----------------------------<  124<H>  4.2   |  29  |  2.23<H>    Ca    7.9<L>      2023 06:20  Phos  3.4     06-  Mg     1.9     -    TPro  7.3  /  Alb  1.5<L>  /  TBili  0.5  /  DBili  x   /  AST  28  /  ALT  19  /  AlkPhos  152<H>  06-03     Urinalysis Basic - ( 2023 20:30 )    Color: Yellow / Appearance: Clear / S.014 / pH: x  Gluc: x / Ketone: Negative mg/dL  / Bili: Negative / Urobili: 2.0 mg/dL   Blood: x / Protein: 300 mg/dL / Nitrite: Negative   Leuk Esterase: Small / RBC: >50 /HPF / WBC 3 /HPF   Sq Epi: x / Non Sq Epi: x / Bacteria: Few /HPF       CULTURES: (if applicable)    Culture - Sputum (collected 23 @ 06:00)  Source: ET Tube ET Tube  Gram Stain (23 @ 13:21):    Few polymorphonuclear leukocytes seen per low power field    Moderate Squamous epithelial cells seen per low power field    Moderate Gram positive cocci in pairs, chains and clusters seen per oil    power field    Few Yeast like cells seen per oil power field    Rare Gram Negative Rods seen per oil power field  Final Report (23 @ 14:09):    Moderate Staphylococcus aureus    Moderate Streptococcus pneumoniae    Normal Respiratory Chelsea present  Organism: Staphylococcus aureus  Streptococcus pneumoniae (23 @ 14:09)  Organism: Streptococcus pneumoniae (23 @ 14:09)      Method Type: ALDO      -  Clindamycin: I 0.5      -  Erythromycin: R >0.5 Predicts results for azithromycin.      -  Levofloxacin: I 4      -  Trimethoprim/Sulfamethoxazole: S <=.25/4.75      -  Vancomycin: S 0.5      -  Ceftriaxone (meningitidis): I 1      -  Ceftriaxone (non-meningitidis): S 1      -  Penicillin (meningitidis): R 2      -  Penicillin (non-meningitidis): S 2      -  Penicillin (oral penicillin V): R 2  Organism: Staphylococcus aureus (23 @ 14:09)      Method Type: ALDO      -  Ampicillin/Sulbactam: S <=8/4      -  Cefazolin: S <=4      -  Clindamycin: S <=0.25      -  Erythromycin: S <=0.25      -  Gentamicin: S <=1 Should not be used as monotherapy      -  Oxacillin: S 0.5 Oxacillin predicts susceptibility for dicloxacillin, methicillin, and nafcillin      -  Penicillin: R >8      -  Rifampin: S <=1 Should not be used as monotherapy      -  Tetracycline: R >8      -  Trimethoprim/Sulfamethoxazole: S <=0.5/9.5      -  Vancomycin: S 2    Culture - Blood (collected 23 @ 15:05)  Source: .Blood Blood  Final Report (23 @ 01:00):    No Growth Final    Culture - Blood (collected 23 @ 14:30)  Source: .Blood Blood  Final Report (23 @ 19:00):    No Growth Final    Culture - Sputum (collected 23 @ 14:14)  Source: .Sputum Sputum  Gram Stain (23 @ 22:38):    Numerous polymorphonuclear leukocytes per low power field    No Squamous epithelial cells per low power field    Moderate Gram Positive Cocci in Clusters per oil power field  Final Report (23 @ 16:39):    Moderate Staphylococcus aureus    Normal Respiratory Chelsea absent  Organism: Staphylococcus aureus (23 @ 16:39)  Organism: Staphylococcus aureus (23 @ 16:39)      Method Type: ALDO      -  Ampicillin/Sulbactam: S <=8/4      -  Cefazolin: S <=4      -  Clindamycin: S <=0.25      -  Erythromycin: S <=0.25      -  Gentamicin: S <=1 Should not be used as monotherapy      -  Oxacillin: S 0.5 Oxacillin predicts susceptibility for dicloxacillin, methicillin, and nafcillin      -  Penicillin: R >8      -  Rifampin: S <=1 Should not be used as monotherapy      -  Tetracycline: R >8      -  Trimethoprim/Sulfamethoxazole: S <=0.5/9.5      -  Vancomycin: S 1          VITALS:  T(C): 37.5 (23 @ 05:00), Max: 37.6 (23 @ 21:00)  T(F): 99.5 (23 @ 05:00), Max: 99.6 (23 @ 21:00)  HR: 91 (23 @ 06:00) (73 - 109)  BP: 135/80 (23 @ 06:00) (102/69 - 142/100)  BP(mean): 96 (23 @ 06:00) (78 - 111)  ABP: --  ABP(mean): --  RR: 20 (23 @ 06:00) (8 - 27)  SpO2: 100% (23 @ 06:00) (99% - 100%)  CVP(mm Hg): --  CVP(cm H2O): --    Ins and Outs     23 @ 07:01  -  23 @ 07:00  --------------------------------------------------------  IN: 1905 mL / OUT: 200 mL / NET: 1705 mL            Device: Avea, Mode: AC/ CMV (Assist Control/ Continuous Mandatory Ventilation), RR (machine): 12, RR (patient): 21, TV (machine): 450, TV (patient): 440, FiO2: 30, PEEP: 5, MAP: 11, PIP: 21    I&O's Detail    2023 07:01  -  2023 07:00  --------------------------------------------------------  IN:    Free Water: 750 mL    Glucerna 1.5: 660 mL    Nepro with Carb Steady: 495 mL  Total IN: 1905 mL    OUT:    Voided (mL): 200 mL  Total OUT: 200 mL    Total NET: 1705 mL

## 2023-06-04 LAB
ALBUMIN SERPL ELPH-MCNC: 1.6 G/DL — LOW (ref 3.3–5)
ALP SERPL-CCNC: 167 U/L — HIGH (ref 40–120)
ALT FLD-CCNC: 19 U/L — SIGNIFICANT CHANGE UP (ref 10–45)
ANION GAP SERPL CALC-SCNC: 5 MMOL/L — SIGNIFICANT CHANGE UP (ref 5–17)
AST SERPL-CCNC: 32 U/L — SIGNIFICANT CHANGE UP (ref 10–40)
BASOPHILS # BLD AUTO: 0.09 K/UL — SIGNIFICANT CHANGE UP (ref 0–0.2)
BASOPHILS NFR BLD AUTO: 0.8 % — SIGNIFICANT CHANGE UP (ref 0–2)
BILIRUB SERPL-MCNC: 0.7 MG/DL — SIGNIFICANT CHANGE UP (ref 0.2–1.2)
BUN SERPL-MCNC: 78 MG/DL — HIGH (ref 7–23)
CALCIUM SERPL-MCNC: 7.8 MG/DL — LOW (ref 8.4–10.5)
CHLORIDE SERPL-SCNC: 106 MMOL/L — SIGNIFICANT CHANGE UP (ref 96–108)
CO2 SERPL-SCNC: 30 MMOL/L — SIGNIFICANT CHANGE UP (ref 22–31)
CREAT SERPL-MCNC: 2.17 MG/DL — HIGH (ref 0.5–1.3)
CULTURE RESULTS: SIGNIFICANT CHANGE UP
EGFR: 30 ML/MIN/1.73M2 — LOW
EOSINOPHIL # BLD AUTO: 0.24 K/UL — SIGNIFICANT CHANGE UP (ref 0–0.5)
EOSINOPHIL NFR BLD AUTO: 2.1 % — SIGNIFICANT CHANGE UP (ref 0–6)
GLUCOSE BLDC GLUCOMTR-MCNC: 111 MG/DL — HIGH (ref 70–99)
GLUCOSE BLDC GLUCOMTR-MCNC: 150 MG/DL — HIGH (ref 70–99)
GLUCOSE BLDC GLUCOMTR-MCNC: 153 MG/DL — HIGH (ref 70–99)
GLUCOSE BLDC GLUCOMTR-MCNC: 158 MG/DL — HIGH (ref 70–99)
GLUCOSE BLDC GLUCOMTR-MCNC: 174 MG/DL — HIGH (ref 70–99)
GLUCOSE SERPL-MCNC: 139 MG/DL — HIGH (ref 70–99)
HCT VFR BLD CALC: 24.9 % — LOW (ref 39–50)
HGB BLD-MCNC: 7.8 G/DL — LOW (ref 13–17)
IMM GRANULOCYTES NFR BLD AUTO: 0.3 % — SIGNIFICANT CHANGE UP (ref 0–0.9)
LYMPHOCYTES # BLD AUTO: 0.61 K/UL — LOW (ref 1–3.3)
LYMPHOCYTES # BLD AUTO: 5.3 % — LOW (ref 13–44)
MAGNESIUM SERPL-MCNC: 1.8 MG/DL — SIGNIFICANT CHANGE UP (ref 1.6–2.6)
MCHC RBC-ENTMCNC: 30.7 PG — SIGNIFICANT CHANGE UP (ref 27–34)
MCHC RBC-ENTMCNC: 31.3 GM/DL — LOW (ref 32–36)
MCV RBC AUTO: 98 FL — SIGNIFICANT CHANGE UP (ref 80–100)
MONOCYTES # BLD AUTO: 0.29 K/UL — SIGNIFICANT CHANGE UP (ref 0–0.9)
MONOCYTES NFR BLD AUTO: 2.5 % — SIGNIFICANT CHANGE UP (ref 2–14)
NEUTROPHILS # BLD AUTO: 10.17 K/UL — HIGH (ref 1.8–7.4)
NEUTROPHILS NFR BLD AUTO: 89 % — HIGH (ref 43–77)
NRBC # BLD: 0 /100 WBCS — SIGNIFICANT CHANGE UP (ref 0–0)
ORGANISM # SPEC MICROSCOPIC CNT: SIGNIFICANT CHANGE UP
ORGANISM # SPEC MICROSCOPIC CNT: SIGNIFICANT CHANGE UP
PHOSPHATE SERPL-MCNC: 3.6 MG/DL — SIGNIFICANT CHANGE UP (ref 2.5–4.5)
PLATELET # BLD AUTO: 201 K/UL — SIGNIFICANT CHANGE UP (ref 150–400)
POTASSIUM SERPL-MCNC: 4.5 MMOL/L — SIGNIFICANT CHANGE UP (ref 3.5–5.3)
POTASSIUM SERPL-SCNC: 4.5 MMOL/L — SIGNIFICANT CHANGE UP (ref 3.5–5.3)
PROCALCITONIN SERPL-MCNC: 0.2 NG/ML — HIGH
PROT SERPL-MCNC: 7.4 G/DL — SIGNIFICANT CHANGE UP (ref 6–8.3)
RBC # BLD: 2.54 M/UL — LOW (ref 4.2–5.8)
RBC # FLD: 18.1 % — HIGH (ref 10.3–14.5)
SODIUM SERPL-SCNC: 141 MMOL/L — SIGNIFICANT CHANGE UP (ref 135–145)
SPECIMEN SOURCE: SIGNIFICANT CHANGE UP
VANCOMYCIN TROUGH SERPL-MCNC: 10.8 UG/ML — SIGNIFICANT CHANGE UP (ref 10–20)
WBC # BLD: 11.44 K/UL — HIGH (ref 3.8–10.5)
WBC # FLD AUTO: 11.44 K/UL — HIGH (ref 3.8–10.5)

## 2023-06-04 PROCEDURE — 71045 X-RAY EXAM CHEST 1 VIEW: CPT | Mod: 26,77

## 2023-06-04 PROCEDURE — 71045 X-RAY EXAM CHEST 1 VIEW: CPT | Mod: 26

## 2023-06-04 RX ORDER — MODAFINIL 200 MG/1
150 TABLET ORAL DAILY
Refills: 0 | Status: DISCONTINUED | OUTPATIENT
Start: 2023-06-04 | End: 2023-06-05

## 2023-06-04 RX ADMIN — INSULIN GLARGINE 10 UNIT(S): 100 INJECTION, SOLUTION SUBCUTANEOUS at 21:09

## 2023-06-04 RX ADMIN — Medication 3 MILLILITER(S): at 15:26

## 2023-06-04 RX ADMIN — BRIVARACETAM 50 MILLIGRAM(S): 25 TABLET, FILM COATED ORAL at 17:15

## 2023-06-04 RX ADMIN — LIDOCAINE 1 APPLICATION(S): 4 CREAM TOPICAL at 05:05

## 2023-06-04 RX ADMIN — MODAFINIL 150 MILLIGRAM(S): 200 TABLET ORAL at 13:29

## 2023-06-04 RX ADMIN — Medication 1 APPLICATION(S): at 12:10

## 2023-06-04 RX ADMIN — CHLORHEXIDINE GLUCONATE 1 APPLICATION(S): 213 SOLUTION TOPICAL at 12:07

## 2023-06-04 RX ADMIN — Medication 2: at 23:32

## 2023-06-04 RX ADMIN — PANTOPRAZOLE SODIUM 40 MILLIGRAM(S): 20 TABLET, DELAYED RELEASE ORAL at 05:04

## 2023-06-04 RX ADMIN — LIDOCAINE 1 APPLICATION(S): 4 CREAM TOPICAL at 17:11

## 2023-06-04 RX ADMIN — Medication 2: at 05:06

## 2023-06-04 RX ADMIN — Medication 3 MILLILITER(S): at 08:10

## 2023-06-04 RX ADMIN — Medication 3 MILLILITER(S): at 04:55

## 2023-06-04 RX ADMIN — PANTOPRAZOLE SODIUM 40 MILLIGRAM(S): 20 TABLET, DELAYED RELEASE ORAL at 17:13

## 2023-06-04 RX ADMIN — BRIVARACETAM 50 MILLIGRAM(S): 25 TABLET, FILM COATED ORAL at 05:04

## 2023-06-04 RX ADMIN — CHLORHEXIDINE GLUCONATE 15 MILLILITER(S): 213 SOLUTION TOPICAL at 17:13

## 2023-06-04 RX ADMIN — SIMVASTATIN 40 MILLIGRAM(S): 20 TABLET, FILM COATED ORAL at 21:09

## 2023-06-04 RX ADMIN — Medication 15 MILLILITER(S): at 12:07

## 2023-06-04 RX ADMIN — Medication 3 MILLILITER(S): at 21:33

## 2023-06-04 RX ADMIN — CHLORHEXIDINE GLUCONATE 15 MILLILITER(S): 213 SOLUTION TOPICAL at 05:04

## 2023-06-04 RX ADMIN — ERYTHROPOIETIN 10000 UNIT(S): 10000 INJECTION, SOLUTION INTRAVENOUS; SUBCUTANEOUS at 12:07

## 2023-06-04 RX ADMIN — Medication 250 MILLIGRAM(S): at 12:06

## 2023-06-04 RX ADMIN — ZINC SULFATE TAB 220 MG (50 MG ZINC EQUIVALENT) 220 MILLIGRAM(S): 220 (50 ZN) TAB at 13:19

## 2023-06-04 NOTE — PROGRESS NOTE ADULT - SUBJECTIVE AND OBJECTIVE BOX
Follow-up Critical Care Progress Note  Chief Complaint : Atrial fibrillation        patient seen and examined   noted continued diarrhea after tube feeds changed  noted patient with his eyes more open and partially tracking  looks towards sounds on left, has right marleen neglect      Allergies :sulfa drugs (Unknown)      PAST MEDICAL & SURGICAL HISTORY:  HTN (hypertension)  HLD (hyperlipidemia)  Diabetes  CAD (coronary artery disease)  History of cholecystectomy        Medications:  MEDICATIONS  (STANDING):  albuterol/ipratropium for Nebulization 3 milliLiter(s) Nebulizer every 6 hours  brivaracetam Oral Solution 50 milliGRAM(s) Oral two times a day  chlorhexidine 0.12% Liquid 15 milliLiter(s) Oral Mucosa every 12 hours  chlorhexidine 2% Cloths 1 Application(s) Topical daily  collagenase Ointment 1 Application(s) Topical daily  dextrose 5%. 1000 milliLiter(s) (100 mL/Hr) IV Continuous <Continuous>  dextrose 5%. 1000 milliLiter(s) (50 mL/Hr) IV Continuous <Continuous>  dextrose 50% Injectable 25 Gram(s) IV Push once  dextrose 50% Injectable 25 Gram(s) IV Push once  dextrose 50% Injectable 12.5 Gram(s) IV Push once  epoetin priyanka-epbx (RETACRIT) Injectable 31176 Unit(s) SubCutaneous every 7 days  insulin glargine Injectable (LANTUS) 10 Unit(s) SubCutaneous at bedtime  insulin lispro (ADMELOG) corrective regimen sliding scale   SubCutaneous every 6 hours  lidocaine 5% Ointment 1 Application(s) Topical every 12 hours  modafinil 200 milliGRAM(s) Oral daily  multivitamin/minerals/iron Oral Solution (CENTRUM) 15 milliLiter(s) Oral daily  pantoprazole   Suspension 40 milliGRAM(s) Oral every 12 hours  simvastatin 40 milliGRAM(s) Oral at bedtime  vancomycin  IVPB 1000 milliGRAM(s) IV Intermittent every 24 hours  vancomycin  IVPB      zinc sulfate 220 milliGRAM(s) Oral daily    MEDICATIONS  (PRN):  acetaminophen   Oral Liquid .. 650 milliGRAM(s) Oral every 6 hours PRN Temp greater or equal to 38C (100.4F)  senna 2 Tablet(s) Oral at bedtime PRN Constipation      Antibiotics History  cefepime   IVPB    , 23 @ 17:36  cefepime   IVPB 500 milliGRAM(s) IV Intermittent once, 23 @ 16:39  cefepime   IVPB 500 milliGRAM(s) IV Intermittent daily, 23 @ 12:00  vancomycin  IVPB 1000 milliGRAM(s) IV Intermittent once, 23 @ 12:32, Stop order after: 1 Doses  vancomycin  IVPB 1000 milliGRAM(s) IV Intermittent every 24 hours, 23 @ 12:32, Stop order after: 42 Days  vancomycin  IVPB    , 23 @ 12:32      Heme Medications       GI Medications  pantoprazole   Suspension 40 milliGRAM(s) Oral every 12 hours, 23 @ 08:37, Routine  senna 2 Tablet(s) Oral at bedtime, 23 @ 08:38,  PRN      COVID  23 @ 19:50  COVID -   NotDetec  22 @ 18:33  COVID -   NotDetec      COVID Biomarkers    23 @ 19:50 ESR --  ---  CRP --  ---  DDimer  351<H>   ---   LDH --   ---   Ferritin --        Trend BNP  23 @ 11:30   -  23763<H>  23 @ 06:05   -  75893<H>  05-10-23 @ 05:10   -  57167<H>  23 @ 09:05   -  24302<H>  23 @ 19:50   -  69585<H>    Procalcitonin Trend  23 @ 06:00   -   0.20<H>  23 @ 23:44   -   0.59<H>  23 @ 22:16   -   0.74<H>  05-15-23 @ 05:45   -   1.12<H>  23 @ 13:42   -   0.11<H>    WBC Trend  23 @ 06:00   -  11.44<H>  23 @ 06:20   -  10.03  23 @ 11:32   -  13.16<H>  23 @ 06:00   -  11.22<H>    H/H Trend  23 @ 06:00   -   7.8<L>/ 24.9<L>  23 @ 06:20   -   6.8<LL>/ 22.1<L>  23 @ 11:32   -   7.3<L>/ 23.9<L>  23 @ 06:00   -   7.0<LL>/ 22.8<L>  23 @ 05:00   -   7.6<L>/ 24.6<L>  23 @ 04:50   -   7.6<L>/ 24.4<L>    Stool Occult Blood  23 @ 08:20   -   Negative  23 @ 18:20   -   Negative  23 @ 17:00   -   Positive<!>    Platelet Trend  23 @ 06:00   -  201  23 @ 06:20   -  201  23 @ 11:32   -  245  23 @ 06:00   -  216    Trend Sodium  23 @ 06:00   -  141  06 @ 06:20   -  145  23 @ 06:00   -  143  23 @ 17:00   -  144    Trend Potassium  23 @ 06:00   -  4.5  23 @ 06:20   -  4.2  23 @ 06:00   -  4.0  23 @ 17:00   -  4.0    Trend Bun/Cr  23 @ 06:00  BUN/CR -  78<H> / 2.17<H>  23 @ 06:20  BUN/CR -  69<H> / 2.23<H>  23 @ 06:00  BUN/CR -  69<H> / 2.19<H>  23 @ 17:00  BUN/CR -  73<H> / 2.21<H>        ABG Trend  23 @ 00:22   - 7.37/43/144<H>/99.8<H>  23 @ 05:38   - 7.41/37/97/98.4<H>  23 @ 23:36   - 7.42/37/97/98.3<H>  23 @ 22:10   - 7.41/41/92/98.5<H>  23 @ 05:59   - 7.35/47/104/98.8<H>  05-15-23 @ 13:45   - 7.36/47/74<L>/96.6       Trend AST/ALT/ALK Phos/Bili  23 @ 06:00   32/167<H>/0.7  23 @ 06:20   /152<H>/0.5  23 @ 06:00   /144<H>/0.4  23 @ 04:50   /136<H>/0.3  23 @ 06:00   /128<H>/0.4  23 @ 05:00   /122<H>/0.4  23 @ 06:15   /129<H>/0.4  23 @ 05:00   98/0.5  23 @ 05:30   98/0.6  23 @ 06:15   14/10/69/0.5  23 @ 07:50   26/7<L>/65/0.6  23 @ 04:08   17/7<L>/63/0.4      Ammonia Trend  23 @ 11:30   -   53  23 @ 05:00   -   16    Albumin Trend  23 @ 06:00   -   1.6<L>  23 @ 06:20   -   1.5<L>  23 @ 06:00   -   1.5<L>  23 @ 04:50   -   1.5<L>  23 @ 06:00   -   1.3<L>  23 @ 05:00   -   1.3<L>      PTT - PT - INR Trend  23 @ 00:44   -   26.5<L> - 13.2 - 1.15  23 @ 23:44   -   31.2 - 13.7<H> - 1.18<H>  23 @ 22:16   -   31.3 - 13.6<H> - 1.18<H>  05-15-23 @ 13:55   -   32.9 - 14.0<H> - 1.19<H>  23 @ 13:45   -   33.6 - -- - --  23 @ 11:30   -   34.1 - 14.9<H> - 1.28<H>    Glucose Trend  23 @ 06:00   -  -- 139<H> --  23 @ 05:01   -  -- -- 153<H>  23 @ 23:02   -  -- -- 174<H>  23 @ 21:06   -  -- -- 147<H>  23 @ 17:21   -  -- -- 177<H>  23 @ 12:39   -  -- -- 173<H>  23 @ 06:20   -  -- 124<H> --  23 @ 05:15   -  -- -- 120<H>  23 @ 23:13   -  -- -- 139<H>  23 @ 21:02   -  -- -- 121<H>    A1C with Estimated Average Glucose Result: 7.7 % *H* [4.0 - 5.6] (23 @ 08:00)      LABS:                        7.8    11.44 )-----------( 201      ( 2023 06:00 )             24.9     06-04    141  |  106  |  78<H>  ----------------------------<  139<H>  4.5   |  30  |  2.17<H>    Ca    7.8<L>      2023 06:00  Phos  3.6     06-04  Mg     1.8     06-04    TPro  7.4  /  Alb  1.6<L>  /  TBili  0.7  /  DBili  x   /  AST  32  /  ALT  19  /  AlkPhos  167<H>  06-04       Urinalysis Basic - ( 2023 20:30 )    Color: Yellow / Appearance: Clear / S.014 / pH: x  Gluc: x / Ketone: Negative mg/dL  / Bili: Negative / Urobili: 2.0 mg/dL   Blood: x / Protein: 300 mg/dL / Nitrite: Negative   Leuk Esterase: Small / RBC: >50 /HPF / WBC 3 /HPF   Sq Epi: x / Non Sq Epi: x / Bacteria: Few /HPF      Procalcitonin, Serum: 0.20 ng/mL (23 @ 06:00)          CULTURES: (if applicable)    Culture - Blood (collected 23 @ 09:45)  Source: .Blood Blood  Preliminary Report (23 @ 17:01):    No growth to date.    Culture - Blood (collected 23 @ 09:10)  Source: .Blood Blood  Gram Stain (23 @ 16:57):    Growth in aerobic bottle: Gram Positive Cocci in Clusters    Growth in anaerobic bottle: Gram Positive Cocci in Clusters  Preliminary Report (23 @ 16:57):    Growth in aerobic bottle: Gram Positive Cocci in Clusters    Growth in anaerobic bottle: Gram Positive Cocci in Clusters    ***Blood Panel PCR results on this specimen are available    approximately 3 hours after the Gram stain result.***    Gram stain, PCR, and/or culture results may not always    correspond due to difference in methodologies.    ************************************************************    This PCR assay was performed by multiplex PCR. This    Assay tests for 66 bacterial and resistance gene targets.    Please refer to the [facility] test directory    at [email or web address] for details.  Organism: Blood Culture PCR (23 @ 14:36)  Organism: Blood Culture PCR (23 @ 14:36)      Method Type: PCR      -  Staphylococcus epidermidis, Methicillin resistant: Detec    Culture - Sputum (collected 23 @ 06:00)  Source: ET Tube ET Tube  Gram Stain (23 @ 13:21):    Few polymorphonuclear leukocytes seen per low power field    Moderate Squamous epithelial cells seen per low power field    Moderate Gram positive cocci in pairs, chains and clusters seen per oil    power field    Few Yeast like cells seen per oil power field    Rare Gram Negative Rods seen per oil power field  Final Report (23 @ 14:09):    Moderate Staphylococcus aureus    Moderate Streptococcus pneumoniae    Normal Respiratory Chelsea present  Organism: Staphylococcus aureus  Streptococcus pneumoniae (23 @ 14:09)  Organism: Streptococcus pneumoniae (23 @ 14:09)      Method Type: ALDO      -  Clindamycin: I 0.5      -  Erythromycin: R >0.5 Predicts results for azithromycin.      -  Levofloxacin: I 4      -  Trimethoprim/Sulfamethoxazole: S <=.25/4.75      -  Vancomycin: S 0.5      -  Ceftriaxone (meningitidis): I 1      -  Ceftriaxone (non-meningitidis): S 1      -  Penicillin (meningitidis): R 2      -  Penicillin (non-meningitidis): S 2      -  Penicillin (oral penicillin V): R 2  Organism: Staphylococcus aureus (23 @ 14:09)      Method Type: ALDO      -  Ampicillin/Sulbactam: S <=8/4      -  Cefazolin: S <=4      -  Clindamycin: S <=0.25      -  Erythromycin: S <=0.25      -  Gentamicin: S <=1 Should not be used as monotherapy      -  Oxacillin: S 0.5 Oxacillin predicts susceptibility for dicloxacillin, methicillin, and nafcillin      -  Penicillin: R >8      -  Rifampin: S <=1 Should not be used as monotherapy      -  Tetracycline: R >8      -  Trimethoprim/Sulfamethoxazole: S <=0.5/9.5      -  Vancomycin: S 2    Culture - Blood (collected 23 @ 15:05)  Source: .Blood Blood  Final Report (23 @ 01:00):    No Growth Final    Culture - Blood (collected 23 @ 14:30)  Source: .Blood Blood  Final Report (23 @ 19:00):    No Growth Final    Culture - Sputum (collected 23 @ 14:14)  Source: .Sputum Sputum  Gram Stain (23 @ 22:38):    Numerous polymorphonuclear leukocytes per low power field    No Squamous epithelial cells per low power field    Moderate Gram Positive Cocci in Clusters per oil power field  Final Report (23 @ 16:39):    Moderate Staphylococcus aureus    Normal Respiratory Chelsea absent  Organism: Staphylococcus aureus (23 @ 16:39)  Organism: Staphylococcus aureus (23 @ 16:39)      Method Type: ALDO      -  Ampicillin/Sulbactam: S <=8/4      -  Cefazolin: S <=4      -  Clindamycin: S <=0.25      -  Erythromycin: S <=0.25      -  Gentamicin: S <=1 Should not be used as monotherapy      -  Oxacillin: S 0.5 Oxacillin predicts susceptibility for dicloxacillin, methicillin, and nafcillin      -  Penicillin: R >8      -  Rifampin: S <=1 Should not be used as monotherapy      -  Tetracycline: R >8      -  Trimethoprim/Sulfamethoxazole: S <=0.5/9.5      -  Vancomycin: S 1            CAPILLARY BLOOD GLUCOSE      POCT Blood Glucose.: 153 mg/dL (2023 05:01)      RADIOLOGY  CXR:      CT:    ECHO:      VITALS:  T(C): 37.4 (23 @ 05:00), Max: 37.6 (23 @ 21:00)  T(F): 99.3 (23 @ 05:00), Max: 99.7 (23 @ 21:00)  HR: 108 (23 @ 08:00) (70 - 108)  BP: 105/63 (23 @ 06:00) (98/62 - 143/98)  BP(mean): 75 (23 @ 06:00) (72 - 102)  ABP: --  ABP(mean): --  RR: 17 (23 @ 06:00) (7 - 27)  SpO2: 99% (23 @ 08:00) (99% - 100%)  CVP(mm Hg): --  CVP(cm H2O): --    Ins and Outs     23 @ 07:01  -  23 @ 07:00  --------------------------------------------------------  IN: 3288 mL / OUT: 550 mL / NET: 2738 mL    23 @ 07:01  -  23 @ 09:08  --------------------------------------------------------  IN: 305 mL / OUT: 0 mL / NET: 305 mL            Device: Avea, Mode: AC/ CMV (Assist Control/ Continuous Mandatory Ventilation), RR (machine): 12, RR (patient): 27, TV (machine): 400, TV (patient): 530, FiO2: 30, PEEP: 5, MAP: 15, PIP: 33    I&O's Detail    2023 07:  -  2023 07:00  --------------------------------------------------------  IN:    Free Water: 1500 mL    Glucerna 1.5: 1210 mL    IV PiggyBack: 250 mL    PRBCs (Packed Red Blood Cells): 328 mL  Total IN: 3288 mL    OUT:    Voided (mL): 550 mL  Total OUT: 550 mL    Total NET: 2738 mL      2023 07:01  -  2023 09:08  --------------------------------------------------------  IN:    Free Water: 250 mL    Glucerna 1.5: 55 mL  Total IN: 305 mL    OUT:  Total OUT: 0 mL    Total NET: 305 mL

## 2023-06-04 NOTE — CONSULT NOTE ADULT - SUBJECTIVE AND OBJECTIVE BOX
HPI:   Patient is a 78y male with    REVIEW OF SYSTEMS:  All other review of systems negative (Comprehensive ROS)    PAST MEDICAL & SURGICAL HISTORY:  HTN (hypertension)      HLD (hyperlipidemia)      Diabetes      CAD (coronary artery disease)      History of cholecystectomy          Allergies    sulfa drugs (Unknown)    Intolerances        Antimicrobials Day #    vancomycin  IVPB 1000 milliGRAM(s) IV Intermittent every 24 hours  vancomycin  IVPB        Other Medications:  acetaminophen   Oral Liquid .. 650 milliGRAM(s) Oral every 6 hours PRN  albuterol/ipratropium for Nebulization 3 milliLiter(s) Nebulizer every 6 hours  brivaracetam Oral Solution 50 milliGRAM(s) Oral two times a day  chlorhexidine 0.12% Liquid 15 milliLiter(s) Oral Mucosa every 12 hours  chlorhexidine 2% Cloths 1 Application(s) Topical daily  collagenase Ointment 1 Application(s) Topical daily  dextrose 5%. 1000 milliLiter(s) IV Continuous <Continuous>  dextrose 5%. 1000 milliLiter(s) IV Continuous <Continuous>  dextrose 50% Injectable 25 Gram(s) IV Push once  dextrose 50% Injectable 25 Gram(s) IV Push once  dextrose 50% Injectable 12.5 Gram(s) IV Push once  epoetin priyanka-epbx (RETACRIT) Injectable 86694 Unit(s) SubCutaneous every 7 days  insulin glargine Injectable (LANTUS) 10 Unit(s) SubCutaneous at bedtime  insulin lispro (ADMELOG) corrective regimen sliding scale   SubCutaneous every 6 hours  lidocaine 5% Ointment 1 Application(s) Topical every 12 hours  modafinil 150 milliGRAM(s) Oral daily  multivitamin/minerals/iron Oral Solution (CENTRUM) 15 milliLiter(s) Oral daily  pantoprazole   Suspension 40 milliGRAM(s) Oral every 12 hours  senna 2 Tablet(s) Oral at bedtime PRN  simvastatin 40 milliGRAM(s) Oral at bedtime  zinc sulfate 220 milliGRAM(s) Oral daily      FAMILY HISTORY:  No pertinent family history in first degree relatives        SOCIAL HISTORY:  Smoking:     ETOH:     Drug Use:     Single     T(F): 99.1 (23 @ 11:00), Max: 99.7 (23 @ 21:00)  HR: 84 (23 @ 11:00)  BP: 144/72 (23 @ 10:00)  RR: 24 (23 @ 11:00)  SpO2: 100% (23 @ 11:00)  Wt(kg): --    PHYSICAL EXAM:  General: alert, no acute distress  Eyes:  anicteric, no conjunctival injection, no discharge  Oropharynx: no lesions or injection 	  Neck: supple, without adenopathy  Lungs: clear to auscultation  Heart: regular rate and rhythm; no murmur, rubs or gallops  Abdomen: soft, nondistended, nontender, without mass or organomegaly  Skin: no lesions  Extremities: no clubbing, cyanosis, or edema  Neurologic: alert, oriented, moves all extremities    LAB RESULTS:                        7.8    11.44 )-----------( 201      ( 2023 06:00 )             24.9     06-    141  |  106  |  78<H>  ----------------------------<  139<H>  4.5   |  30  |  2.17<H>    Ca    7.8<L>      2023 06:00  Phos  3.6     06-  Mg     1.8     -    TPro  7.4  /  Alb  1.6<L>  /  TBili  0.7  /  DBili  x   /  AST  32  /  ALT  19  /  AlkPhos  167<H>  -    LIVER FUNCTIONS - ( 2023 06:00 )  Alb: 1.6 g/dL / Pro: 7.4 g/dL / ALK PHOS: 167 U/L / ALT: 19 U/L / AST: 32 U/L / GGT: x           Urinalysis Basic - ( 2023 20:30 )    Color: Yellow / Appearance: Clear / S.014 / pH: x  Gluc: x / Ketone: Negative mg/dL  / Bili: Negative / Urobili: 2.0 mg/dL   Blood: x / Protein: 300 mg/dL / Nitrite: Negative   Leuk Esterase: Small / RBC: >50 /HPF / WBC 3 /HPF   Sq Epi: x / Non Sq Epi: x / Bacteria: Few /HPF        MICROBIOLOGY REVIEWED:    RADIOLOGY REVIEWED:   HPI:   Patient is a 78y male with hx HTN, Cardiomyopathy, CHF, DM2, Dementia, sent to the ED a month ago by PMD because of increased dyspnea, leg edema. He was admitted to [facility] on  with hypoxic respiratory failure in setting of CHF exacerbation and ANISH with course complicated by acute hypoxic respiratory failure in setting of choking episode causing cardiac arrest, shock, worsening hypoxemic respiratory failure and NSTEMI on . ICU course post cardiac arrest noted for anoxic brain injury and myoclonus. Pt was transferred to [facility] for VEEG which required 48 hours of monitoring as he was noted to still be sedated. While there pt noted to have hematochezia and AC were held at that time. EEG findings consistent with stimulus induced myoclonus and GPDs s/p hypoxic diffuse indicative of severe cerebral dysfunction. He returned to  ICU, remains intubated, unresponsive and has NGT. he had low grade fevers and has been followed by plastics for sacral decub. He had blood cult drawn and found to 2/4 from the same set S epi. He received a dose of Vanc and ID eval requested for further management.    REVIEW OF SYSTEMS:  All other review of systems negative (Comprehensive ROS)    PAST MEDICAL & SURGICAL HISTORY:  HTN (hypertension)      HLD (hyperlipidemia)      Diabetes      CAD (coronary artery disease)      History of cholecystectomy          Allergies    sulfa drugs (Unknown)    Intolerances        Antimicrobials Day #    vancomycin  IVPB 1000 milliGRAM(s) IV Intermittent every 24 hours  vancomycin  IVPB        Other Medications:  acetaminophen   Oral Liquid .. 650 milliGRAM(s) Oral every 6 hours PRN  albuterol/ipratropium for Nebulization 3 milliLiter(s) Nebulizer every 6 hours  brivaracetam Oral Solution 50 milliGRAM(s) Oral two times a day  chlorhexidine 0.12% Liquid 15 milliLiter(s) Oral Mucosa every 12 hours  chlorhexidine 2% Cloths 1 Application(s) Topical daily  collagenase Ointment 1 Application(s) Topical daily  dextrose 5%. 1000 milliLiter(s) IV Continuous <Continuous>  dextrose 5%. 1000 milliLiter(s) IV Continuous <Continuous>  dextrose 50% Injectable 25 Gram(s) IV Push once  dextrose 50% Injectable 25 Gram(s) IV Push once  dextrose 50% Injectable 12.5 Gram(s) IV Push once  epoetin priyanka-epbx (RETACRIT) Injectable 71410 Unit(s) SubCutaneous every 7 days  insulin glargine Injectable (LANTUS) 10 Unit(s) SubCutaneous at bedtime  insulin lispro (ADMELOG) corrective regimen sliding scale   SubCutaneous every 6 hours  lidocaine 5% Ointment 1 Application(s) Topical every 12 hours  modafinil 150 milliGRAM(s) Oral daily  multivitamin/minerals/iron Oral Solution (CENTRUM) 15 milliLiter(s) Oral daily  pantoprazole   Suspension 40 milliGRAM(s) Oral every 12 hours  senna 2 Tablet(s) Oral at bedtime PRN  simvastatin 40 milliGRAM(s) Oral at bedtime  zinc sulfate 220 milliGRAM(s) Oral daily      FAMILY HISTORY:  No pertinent family history in first degree relatives        SOCIAL HISTORY:  Smoking:     ETOH:     Drug Use:     Single     T(F): 99.1 (23 @ 11:00), Max: 99.7 (23 @ 21:00)  HR: 84 (23 @ 11:00)  BP: 144/72 (23 @ 10:00)  RR: 24 (23 @ 11:00)  SpO2: 100% (23 @ 11:00)  Wt(kg): --    PHYSICAL EXAM:  General: intubated and obtunded  Eyes:  anicteric, no conjunctival injection, no discharge  Oropharynx: no lesions or injection 	  Neck: supple, without adenopathy  Lungs: diminished at bases  Heart: regular rate and rhythm; no murmur, rubs or gallops  Abdomen: soft, nondistended, nontender, without mass or organomegaly  Skin: no lesions, sacral decub  Extremities: no clubbing, cyanosis, or edema  Neurologic: obtunded    LAB RESULTS:                        7.8    11.44 )-----------( 201      ( 2023 06:00 )             24.9     06-04    141  |  106  |  78<H>  ----------------------------<  139<H>  4.5   |  30  |  2.17<H>    Ca    7.8<L>      2023 06:00  Phos  3.6     06-04  Mg     1.8     06-04    TPro  7.4  /  Alb  1.6<L>  /  TBili  0.7  /  DBili  x   /  AST  32  /  ALT  19  /  AlkPhos  167<H>  06-04    LIVER FUNCTIONS - ( 2023 06:00 )  Alb: 1.6 g/dL / Pro: 7.4 g/dL / ALK PHOS: 167 U/L / ALT: 19 U/L / AST: 32 U/L / GGT: x           Urinalysis Basic - ( 2023 20:30 )    Color: Yellow / Appearance: Clear / S.014 / pH: x  Gluc: x / Ketone: Negative mg/dL  / Bili: Negative / Urobili: 2.0 mg/dL   Blood: x / Protein: 300 mg/dL / Nitrite: Negative   Leuk Esterase: Small / RBC: >50 /HPF / WBC 3 /HPF   Sq Epi: x / Non Sq Epi: x / Bacteria: Few /HPF        MICROBIOLOGY REVIEWED:      Culture - Blood (23 @ 09:10)   - Staphylococcus epidermidis, Methicillin resistant: Detec  Gram Stain:   Growth in aerobic bottle: Gram Positive Cocci in Clusters   Growth in anaerobic bottle: Gram Positive Cocci in Clusters  Specimen Source: .Blood Blood  Organism: Blood Culture PCR  Culture Results:   Growth in aerobic and anaerobic bottles: Staphylococcus epidermidis   Coagulase Negative Staphylococci isolated from a single blood culture set   may represent contamination.     RADIOLOGY REVIEWED:  < from: Xray Chest 1 View- PORTABLE-Routine (Xray Chest 1 View- PORTABLE-Routine .) (23 @ 11:10) >  IMPRESSION: Successful advancement of NG tube.    Persistent perihilar infiltrates.    --- End of Report ---    < end of copied text >

## 2023-06-04 NOTE — CONSULT NOTE ADULT - ASSESSMENT
78y male with hx HTN, Cardiomyopathy, CHF, DM2, Dementia, sent to the ED a month ago by PMD because of increased dyspnea, leg edema. He was admitted to [facility] on 5/4 with hypoxic respiratory failure in setting of CHF exacerbation and ANISH with course complicated by acute hypoxic respiratory failure in setting of choking episode causing cardiac arrest, shock, worsening hypoxemic respiratory failure and NSTEMI on 5/9. ICU course post cardiac arrest noted for anoxic brain injury and myoclonus. Pt was transferred to [facility] for VEEG which required 48 hours of monitoring as he was noted to still be sedated. While there pt noted to have hematochezia and AC were held at that time. EEG findings consistent with stimulus induced myoclonus and GPDs s/p hypoxic diffuse indicative of severe cerebral dysfunction. He returned to  ICU, remains intubated, unresponsive and has NGT. he had low grade fevers and has been followed by plastics for sacral decub. He had blood cult drawn and found to 2/4 from the same set S epi. He received a dose of Vanc and ID eval requested for further management.      Suggest:  Repeat blood cult, but I suspect Staph epi from the same set represents contaminant  His prolonged ICU hospitalization and intubation put him at risk for multiple nosocomial infections, including HCAP  Will need to monitor O2 requirements and resp secretions  Sacral decub management as per plastics  Awaiting decision regarding PEG/trac, but given poor neuro prognosis, palliative/comfort care would be most reasonable  d/w Dr Polo

## 2023-06-04 NOTE — CHART NOTE - NSCHARTNOTEFT_GEN_A_CORE
Nutrition Follow Up Note  Source: Medical Record [X] Patient [ ] Family [ ]         Diet: NPO with tube feeding    Enteral/Parenteral Nutrition: Vital 1.5 @55 ml/hr x 24 hours (provides 1980 kcal, 89 g protein, 1008 ml water), Banatrol BID  Received verbal consult from attending. Pt noted with diarrhea when tube feeding formula was changed to Glucerna. TF formula changed to Vital 1.5, will monitor tolerance of tube feeding. Banatrol was added by medical team due to diarrhea. Pt is receiving 250 ml free water flush q 4 hours as a nursing order. Pt continues on a MVI and zinc to promote skin integrity due to suspected DTI per nursing flow sheets.       Current Weight: 166.4 lbs (6-4)  166 lbs (6-3)      Pertinent Medications: MEDICATIONS  (STANDING):  albuterol/ipratropium for Nebulization 3 milliLiter(s) Nebulizer every 6 hours  brivaracetam Oral Solution 50 milliGRAM(s) Oral two times a day  chlorhexidine 0.12% Liquid 15 milliLiter(s) Oral Mucosa every 12 hours  chlorhexidine 2% Cloths 1 Application(s) Topical daily  collagenase Ointment 1 Application(s) Topical daily  dextrose 5%. 1000 milliLiter(s) (100 mL/Hr) IV Continuous <Continuous>  dextrose 5%. 1000 milliLiter(s) (50 mL/Hr) IV Continuous <Continuous>  dextrose 50% Injectable 25 Gram(s) IV Push once  dextrose 50% Injectable 25 Gram(s) IV Push once  dextrose 50% Injectable 12.5 Gram(s) IV Push once  epoetin priyanka-epbx (RETACRIT) Injectable 50666 Unit(s) SubCutaneous every 7 days  insulin glargine Injectable (LANTUS) 10 Unit(s) SubCutaneous at bedtime  insulin lispro (ADMELOG) corrective regimen sliding scale   SubCutaneous every 6 hours  lidocaine 5% Ointment 1 Application(s) Topical every 12 hours  modafinil 150 milliGRAM(s) Oral daily  multivitamin/minerals/iron Oral Solution (CENTRUM) 15 milliLiter(s) Oral daily  pantoprazole   Suspension 40 milliGRAM(s) Oral every 12 hours  simvastatin 40 milliGRAM(s) Oral at bedtime  vancomycin  IVPB 1000 milliGRAM(s) IV Intermittent every 24 hours  vancomycin  IVPB      zinc sulfate 220 milliGRAM(s) Oral daily    MEDICATIONS  (PRN):  acetaminophen   Oral Liquid .. 650 milliGRAM(s) Oral every 6 hours PRN Temp greater or equal to 38C (100.4F)  senna 2 Tablet(s) Oral at bedtime PRN Constipation      Pertinent Labs:  06-04 Na141 mmol/L Glu 139 mg/dL<H> K+ 4.5 mmol/L Cr  2.17 mg/dL<H> BUN 78 mg/dL<H> 06-04 Phos 3.6 mg/dL 06-04 Alb 1.6 g/dL<L> 05-07 Chol 172 mg/dL LDL --    HDL 39 mg/dL<L> Trig 87 mg/dL  POCT glucose x 24 hours: 111-177 mg/dl (H)      Skin: suspected DTI sacrum Per nursing flowsheets     Edema: 6-3: 1+ trace edema L arm, R arm, nonpitting edema scrotum, penis per nursing flow sheets    Last BM: on 6/4 diarrhea Per nursing flowsheets     Estimated Needs:   [X] No Change since Previous Assessment  [ ] Recalculated:     Previous Nutrition Diagnosis:   1. Severe malnutrition  2. Increased nutrient needs      Nutrition Diagnosis is [X] Ongoing  - addressed with TF, MVI, Zinc     New Nutrition Diagnosis: [X] Not Applicable  [ ] Inadequate Protein Energy Intake   [ ] Inadequate Oral Intake   [ ] Excessive Energy Intake   [ ] Increased Nutrient Needs   [ ] Obesity   [ ] Altered GI Function   [ ] Unintended Weight Loss   [ ] Food & Nutrition Related Knowledge Deficit  [ ] Limited Adherence to nutrition related recommendations   [ ] Malnutrition      Interventions:   1. Recommend continuing with current TF order, monitor tolerance of tube feeding    Monitoring & Evaluation:   [X] Weights   [X] PO Intake   [X] Follow Up (Per Protocol)  [X] Tolerance to Diet Prescription   [X] Other: Labs    RD Remains Available.  Catarina Adams RD

## 2023-06-04 NOTE — PROGRESS NOTE ADULT - SUBJECTIVE AND OBJECTIVE BOX
Intubated in ICU     Vital Signs Last 24 Hrs  T(C): 37.3 (23 @ 11:00), Max: 37.6 (23 @ 21:00)  T(F): 99.1 (23 @ 11:00), Max: 99.7 (23 @ 21:00)  HR: 85 (23 @ 15:21) (70 - 108)  BP: 113/60 (23 @ 15:00) (98/62 - 144/72)  BP(mean): 76 (23 @ 15:00) (72 - 102)  RR: 16 (23 @ 15:00) (15 - 27)  SpO2: 100% (23 @ 15:21) (99% - 100%)    I&O's Detail    2023 07:01  -  2023 07:00  --------------------------------------------------------  IN:    Free Water: 1500 mL    Glucerna 1.5: 1210 mL    IV PiggyBack: 250 mL    PRBCs (Packed Red Blood Cells): 328 mL  Total IN: 3288 mL    OUT:    Voided (mL): 550 mL  Total OUT: 550 mL    Total NET: 2738 mL    2023 07:01  -  2023 15:39  --------------------------------------------------------  IN:    Free Water: 500 mL    Glucerna 1.5: 330 mL    IV PiggyBack: 250 mL  Total IN: 1080 mL    OUT:  Total OUT: 0 mL    Total NET: 1080 mL    Mode: AC/ CMV (Assist Control/ Continuous Mandatory Ventilation)  RR (machine): 12  TV (machine): 450  FiO2: 30  PEEP: 5  MAP: 12  PIP: 26    s1s2  b/l air entry  soft, ND  tr edema                                                                                          7.8    11.44 )-----------(       ( 2023 06:00 )             24.9     2023 06:00    141    |  106    |  78     ----------------------------<  139    4.5     |  30     |  2.17     Ca    7.8        2023 06:00  Phos  3.6       2023 06:00  Mg     1.8       2023 06:00    TPro  7.4    /  Alb  1.6    /  TBili  0.7    /  DBili  x      /  AST  32     /  ALT  19     /  AlkPhos  167    2023 06:00    LIVER FUNCTIONS - ( 2023 06:00 )  Alb: 1.6 g/dL / Pro: 7.4 g/dL / ALK PHOS: 167 U/L / ALT: 19 U/L / AST: 32 U/L / GGT: x           Urinalysis Basic - ( 2023 20:30 )    Color: Yellow / Appearance: Clear / S.014 / pH: x  Gluc: x / Ketone: Negative mg/dL  / Bili: Negative / Urobili: 2.0 mg/dL   Blood: x / Protein: 300 mg/dL / Nitrite: Negative   Leuk Esterase: Small / RBC: >50 /HPF / WBC 3 /HPF   Sq Epi: x / Non Sq Epi: x / Bacteria: Few /HPF    Culture - Blood (collected 2023 09:45)  Source: .Blood Blood  Preliminary Report (2023 17:01):    No growth to date.    Culture - Blood (collected 2023 09:10)  Source: .Blood Blood  Gram Stain (2023 16:57):    Growth in aerobic bottle: Gram Positive Cocci in Clusters    Growth in anaerobic bottle: Gram Positive Cocci in Clusters  Preliminary Report (2023 13:10):    Growth in aerobic and anaerobic bottles: Staphylococcus epidermidis    Coagulase Negative Staphylococci isolated from a single blood culture set    may represent contamination.    Contact the Microbiology Department at [phone] if susceptibility    testing is clinically indicated.    ***Blood Panel PCR results on this specimen are available    approximately 3 hours after the Gram stain result.***    Gram stain, PCR, and/or culture results may not always    correspond due to difference in methodologies.    ************************************************************    This PCR assay was performed by multiplex PCR. This    Assay tests for 66 bacterial and resistance gene targets.    Please refer to the [facility] test directory    at [email or web address] for details.  Organism: Blood Culture PCR (2023 14:36)  Organism: Blood Culture PCR (2023 14:36)    acetaminophen   Oral Liquid .. 650 milliGRAM(s) Oral every 6 hours PRN  albuterol/ipratropium for Nebulization 3 milliLiter(s) Nebulizer every 6 hours  brivaracetam Oral Solution 50 milliGRAM(s) Oral two times a day  chlorhexidine 0.12% Liquid 15 milliLiter(s) Oral Mucosa every 12 hours  chlorhexidine 2% Cloths 1 Application(s) Topical daily  collagenase Ointment 1 Application(s) Topical daily  dextrose 5%. 1000 milliLiter(s) IV Continuous <Continuous>  dextrose 5%. 1000 milliLiter(s) IV Continuous <Continuous>  dextrose 50% Injectable 25 Gram(s) IV Push once  dextrose 50% Injectable 12.5 Gram(s) IV Push once  dextrose 50% Injectable 25 Gram(s) IV Push once  epoetin priyanka-epbx (RETACRIT) Injectable 52982 Unit(s) SubCutaneous every 7 days  insulin glargine Injectable (LANTUS) 10 Unit(s) SubCutaneous at bedtime  insulin lispro (ADMELOG) corrective regimen sliding scale   SubCutaneous every 6 hours  lidocaine 5% Ointment 1 Application(s) Topical every 12 hours  modafinil 150 milliGRAM(s) Oral daily  multivitamin/minerals/iron Oral Solution (CENTRUM) 15 milliLiter(s) Oral daily  pantoprazole   Suspension 40 milliGRAM(s) Oral every 12 hours  senna 2 Tablet(s) Oral at bedtime PRN  simvastatin 40 milliGRAM(s) Oral at bedtime  vancomycin  IVPB 1000 milliGRAM(s) IV Intermittent every 24 hours  vancomycin  IVPB      zinc sulfate 220 milliGRAM(s) Oral daily    A/P:    Hemodynamic ATN s/p arrest (Cr 1.6 - 23, Cr 1.0 - 4/12/23)  Resp failure, intubated   CM, EF 35 - 40%, dementia  Anoxic encephalopathy   Vent support per ICU team  Avoid nephrotoxins  Lytes are stable  Cr is likely at new baseline   Now w/anemia, bacteremia  Abx, bld tx per ICU team  Epoetin  F/u CBC, BMP, UO     [phone]

## 2023-06-04 NOTE — PROGRESS NOTE ADULT - ASSESSMENT
Physical Examination:  GENERAL:               Eyes opens at times, partially responsive where opens eyes to touch and voice  HEENT:                    Pupils equal, reactive to light.   No JVD, Dry MM  PULM:                     Bilateral air entry, Clear to auscultation bilaterally, + significant sputum production, No Rales, No Rhonchi, No Wheezing  CVS:                         S1, S2,  +Murmur  ABD:                        Soft, nondistended, nontender, normoactive bowel sounds,   EXT:                         No edema, nontender, No Cyanosis or Clubbing   Vascular:                Warm Extremities,   NEURO:                  Eyes open , + episodes of myoclonus, spontaneous movements of UE Left > Right  PSYC:                      Calm, no Insight.      Assessment  78 year old male with a PMH of dementia, HTN, cardiomyopathy, HFrEF, CAD s/p CABG, with known current RCA occlusion, and DM type 2 who was admitted to [facility] on 5/4 with hypoxic respiratory failure in setting of CHF exacerbation and ANISH with course complicated by acute hypoxic respiratory failure in setting of choking episode causing cardiac arrest, shock, worsening hypoxemic respiratory failure, and NSTEMI on 5/9. Pt ICU course noted post cardiac arrest anoxic brain injury and myoclonus. Pt was transferred to [facility] for VEEG which required 48 hours of monitoring as he was noted to still be sedated. While there pt noted to have hematochezia and any ac / antiplatelet agents were held at that time. EEG findings consistent with stimulus induced myoclonus and GPDs s/p hypoxic diffuse indicative of severe cerebral dysfunction.    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     Noted fever, cultures neg to date, wbc 10, will monitor off antibiotics till source identified,   Continue brivaracetam   continue PPI BID, trend cbc daily   TF as tolerated  increased modafinil but more myoclonus noted will taper to 150mg daily   trend cbc qdaily, s/p transfusion     monitor blood glucose levels, + ISS coverage  Sacral decub - wound care called continue local wound care off loading and nutrition support  Venodyne for dvt ppx  noted diarrhea d/w Nutrition will change tube feeds  Ethics consult will f/u with family after Monday  GOC ongoing discussion with family ,   noted RUIZ in sputum d/w ID to f/u suspect contamination     PMD:				                   Notified(Date):  Family Updated: 	Kuldeep [phone]	                                 Date:  6/4/2023  discussed current neuro status  discussed failed cpap, discussed if extubated supplemental o2 will not supply enough support for him to breathe  discussed lab findings  both decided to taper modafanil as increased myoclonus noted      Sedation & Analgesia:	  Diet/Nutrition:		 Diet, NPO with Tube Feed:   Tube Feeding Modality: Nasogastric         GI PPx:			pantoprazole   Suspension 40 milliGRAM(s) Oral daily  DVT Ppx:		Venodynes   Activity:		  bedrest  Head of Bed:               35-45 Deg  Glycemic Control:         Insulin    Lines: piv    Restraints were deemed necessary to prevent removal of life-sustaining devices [  ] YES   [  x  ]  NO    Disposition: ICU Care    Goals of Care: Full code

## 2023-06-05 LAB
ALBUMIN SERPL ELPH-MCNC: 1.5 G/DL — LOW (ref 3.3–5)
ALP SERPL-CCNC: 160 U/L — HIGH (ref 40–120)
ALT FLD-CCNC: 20 U/L — SIGNIFICANT CHANGE UP (ref 10–45)
ANION GAP SERPL CALC-SCNC: 8 MMOL/L — SIGNIFICANT CHANGE UP (ref 5–17)
AST SERPL-CCNC: 30 U/L — SIGNIFICANT CHANGE UP (ref 10–40)
BASOPHILS # BLD AUTO: 0.09 K/UL — SIGNIFICANT CHANGE UP (ref 0–0.2)
BASOPHILS NFR BLD AUTO: 0.9 % — SIGNIFICANT CHANGE UP (ref 0–2)
BILIRUB SERPL-MCNC: 0.6 MG/DL — SIGNIFICANT CHANGE UP (ref 0.2–1.2)
BUN SERPL-MCNC: 77 MG/DL — HIGH (ref 7–23)
CALCIUM SERPL-MCNC: 7.9 MG/DL — LOW (ref 8.4–10.5)
CHLORIDE SERPL-SCNC: 106 MMOL/L — SIGNIFICANT CHANGE UP (ref 96–108)
CO2 SERPL-SCNC: 28 MMOL/L — SIGNIFICANT CHANGE UP (ref 22–31)
CREAT SERPL-MCNC: 2.41 MG/DL — HIGH (ref 0.5–1.3)
EGFR: 26 ML/MIN/1.73M2 — LOW
EOSINOPHIL # BLD AUTO: 0.15 K/UL — SIGNIFICANT CHANGE UP (ref 0–0.5)
EOSINOPHIL NFR BLD AUTO: 1.5 % — SIGNIFICANT CHANGE UP (ref 0–6)
GLUCOSE BLDC GLUCOMTR-MCNC: 143 MG/DL — HIGH (ref 70–99)
GLUCOSE BLDC GLUCOMTR-MCNC: 145 MG/DL — HIGH (ref 70–99)
GLUCOSE BLDC GLUCOMTR-MCNC: 181 MG/DL — HIGH (ref 70–99)
GLUCOSE BLDC GLUCOMTR-MCNC: 195 MG/DL — HIGH (ref 70–99)
GLUCOSE BLDC GLUCOMTR-MCNC: 203 MG/DL — HIGH (ref 70–99)
GLUCOSE SERPL-MCNC: 126 MG/DL — HIGH (ref 70–99)
GRAM STN FLD: SIGNIFICANT CHANGE UP
HCT VFR BLD CALC: 21.9 % — LOW (ref 39–50)
HGB BLD-MCNC: 7.1 G/DL — LOW (ref 13–17)
IMM GRANULOCYTES NFR BLD AUTO: 0.3 % — SIGNIFICANT CHANGE UP (ref 0–0.9)
LYMPHOCYTES # BLD AUTO: 0.5 K/UL — LOW (ref 1–3.3)
LYMPHOCYTES # BLD AUTO: 5 % — LOW (ref 13–44)
MAGNESIUM SERPL-MCNC: 2.2 MG/DL — SIGNIFICANT CHANGE UP (ref 1.6–2.6)
MCHC RBC-ENTMCNC: 31.4 PG — SIGNIFICANT CHANGE UP (ref 27–34)
MCHC RBC-ENTMCNC: 32.4 GM/DL — SIGNIFICANT CHANGE UP (ref 32–36)
MCV RBC AUTO: 96.9 FL — SIGNIFICANT CHANGE UP (ref 80–100)
MONOCYTES # BLD AUTO: 0.36 K/UL — SIGNIFICANT CHANGE UP (ref 0–0.9)
MONOCYTES NFR BLD AUTO: 3.6 % — SIGNIFICANT CHANGE UP (ref 2–14)
NEUTROPHILS # BLD AUTO: 8.88 K/UL — HIGH (ref 1.8–7.4)
NEUTROPHILS NFR BLD AUTO: 88.7 % — HIGH (ref 43–77)
NRBC # BLD: 0 /100 WBCS — SIGNIFICANT CHANGE UP (ref 0–0)
PHOSPHATE SERPL-MCNC: 4.2 MG/DL — SIGNIFICANT CHANGE UP (ref 2.5–4.5)
PLATELET # BLD AUTO: 164 K/UL — SIGNIFICANT CHANGE UP (ref 150–400)
POTASSIUM SERPL-MCNC: 4.2 MMOL/L — SIGNIFICANT CHANGE UP (ref 3.5–5.3)
POTASSIUM SERPL-SCNC: 4.2 MMOL/L — SIGNIFICANT CHANGE UP (ref 3.5–5.3)
PROCALCITONIN SERPL-MCNC: 0.24 NG/ML — HIGH
PROT SERPL-MCNC: 7.2 G/DL — SIGNIFICANT CHANGE UP (ref 6–8.3)
RBC # BLD: 2.26 M/UL — LOW (ref 4.2–5.8)
RBC # FLD: 17.6 % — HIGH (ref 10.3–14.5)
SODIUM SERPL-SCNC: 142 MMOL/L — SIGNIFICANT CHANGE UP (ref 135–145)
SPECIMEN SOURCE: SIGNIFICANT CHANGE UP
VANCOMYCIN TROUGH SERPL-MCNC: 18.4 UG/ML — SIGNIFICANT CHANGE UP (ref 10–20)
WBC # BLD: 10.01 K/UL — SIGNIFICANT CHANGE UP (ref 3.8–10.5)
WBC # FLD AUTO: 10.01 K/UL — SIGNIFICANT CHANGE UP (ref 3.8–10.5)

## 2023-06-05 PROCEDURE — 71045 X-RAY EXAM CHEST 1 VIEW: CPT | Mod: 26,76

## 2023-06-05 RX ORDER — MODAFINIL 200 MG/1
100 TABLET ORAL DAILY
Refills: 0 | Status: DISCONTINUED | OUTPATIENT
Start: 2023-06-06 | End: 2023-06-07

## 2023-06-05 RX ORDER — MIDAZOLAM HYDROCHLORIDE 1 MG/ML
4 INJECTION, SOLUTION INTRAMUSCULAR; INTRAVENOUS ONCE
Refills: 0 | Status: DISCONTINUED | OUTPATIENT
Start: 2023-06-05 | End: 2023-06-05

## 2023-06-05 RX ORDER — CLONAZEPAM 1 MG
0.5 TABLET ORAL EVERY 12 HOURS
Refills: 0 | Status: DISCONTINUED | OUTPATIENT
Start: 2023-06-05 | End: 2023-06-07

## 2023-06-05 RX ORDER — MIDAZOLAM HYDROCHLORIDE 1 MG/ML
0.5 INJECTION, SOLUTION INTRAMUSCULAR; INTRAVENOUS ONCE
Refills: 0 | Status: DISCONTINUED | OUTPATIENT
Start: 2023-06-05 | End: 2023-06-05

## 2023-06-05 RX ORDER — FENTANYL CITRATE 50 UG/ML
25 INJECTION INTRAVENOUS ONCE
Refills: 0 | Status: DISCONTINUED | OUTPATIENT
Start: 2023-06-05 | End: 2023-06-05

## 2023-06-05 RX ADMIN — MODAFINIL 150 MILLIGRAM(S): 200 TABLET ORAL at 11:21

## 2023-06-05 RX ADMIN — Medication 250 MILLIGRAM(S): at 11:15

## 2023-06-05 RX ADMIN — FENTANYL CITRATE 25 MICROGRAM(S): 50 INJECTION INTRAVENOUS at 16:19

## 2023-06-05 RX ADMIN — Medication 3 MILLILITER(S): at 04:07

## 2023-06-05 RX ADMIN — Medication 0: at 05:12

## 2023-06-05 RX ADMIN — CHLORHEXIDINE GLUCONATE 15 MILLILITER(S): 213 SOLUTION TOPICAL at 17:21

## 2023-06-05 RX ADMIN — MIDAZOLAM HYDROCHLORIDE 0.5 MILLIGRAM(S): 1 INJECTION, SOLUTION INTRAMUSCULAR; INTRAVENOUS at 18:33

## 2023-06-05 RX ADMIN — CHLORHEXIDINE GLUCONATE 1 APPLICATION(S): 213 SOLUTION TOPICAL at 11:18

## 2023-06-05 RX ADMIN — Medication 3 MILLILITER(S): at 08:01

## 2023-06-05 RX ADMIN — BRIVARACETAM 50 MILLIGRAM(S): 25 TABLET, FILM COATED ORAL at 05:11

## 2023-06-05 RX ADMIN — Medication 2: at 23:05

## 2023-06-05 RX ADMIN — ZINC SULFATE TAB 220 MG (50 MG ZINC EQUIVALENT) 220 MILLIGRAM(S): 220 (50 ZN) TAB at 11:04

## 2023-06-05 RX ADMIN — Medication 1 APPLICATION(S): at 11:17

## 2023-06-05 RX ADMIN — Medication 3 MILLILITER(S): at 15:54

## 2023-06-05 RX ADMIN — LIDOCAINE 1 APPLICATION(S): 4 CREAM TOPICAL at 05:13

## 2023-06-05 RX ADMIN — FENTANYL CITRATE 25 MICROGRAM(S): 50 INJECTION INTRAVENOUS at 16:32

## 2023-06-05 RX ADMIN — PANTOPRAZOLE SODIUM 40 MILLIGRAM(S): 20 TABLET, DELAYED RELEASE ORAL at 17:21

## 2023-06-05 RX ADMIN — Medication 0.5 MILLIGRAM(S): at 18:27

## 2023-06-05 RX ADMIN — SIMVASTATIN 40 MILLIGRAM(S): 20 TABLET, FILM COATED ORAL at 21:10

## 2023-06-05 RX ADMIN — MIDAZOLAM HYDROCHLORIDE 4 MILLIGRAM(S): 1 INJECTION, SOLUTION INTRAMUSCULAR; INTRAVENOUS at 23:26

## 2023-06-05 RX ADMIN — Medication 3 MILLILITER(S): at 21:32

## 2023-06-05 RX ADMIN — PANTOPRAZOLE SODIUM 40 MILLIGRAM(S): 20 TABLET, DELAYED RELEASE ORAL at 05:11

## 2023-06-05 RX ADMIN — INSULIN GLARGINE 10 UNIT(S): 100 INJECTION, SOLUTION SUBCUTANEOUS at 21:11

## 2023-06-05 RX ADMIN — CHLORHEXIDINE GLUCONATE 15 MILLILITER(S): 213 SOLUTION TOPICAL at 05:11

## 2023-06-05 RX ADMIN — Medication 4: at 17:18

## 2023-06-05 RX ADMIN — BRIVARACETAM 50 MILLIGRAM(S): 25 TABLET, FILM COATED ORAL at 17:21

## 2023-06-05 RX ADMIN — Medication 15 MILLILITER(S): at 11:04

## 2023-06-05 NOTE — PROGRESS NOTE ADULT - SUBJECTIVE AND OBJECTIVE BOX
CC: f/u for    Patient reports    REVIEW OF SYSTEMS: no fevers, no chills, no nausea, no vomiting, no abd pain, no resp symptoms  All other review of systems negative (Comprehensive ROS)    Antimicrobials Day #    vancomycin  IVPB 1000 milliGRAM(s) IV Intermittent every 24 hours  vancomycin  IVPB        Other Medications Reviewed    T(F): 99.5 (06-05-23 @ 07:00), Max: 99.8 (06-04-23 @ 21:00)  HR: 86 (06-05-23 @ 09:00)  BP: 105/52 (06-05-23 @ 09:00)  RR: 16 (06-05-23 @ 09:00)  SpO2: 100% (06-05-23 @ 09:00)    PHYSICAL EXAM:  General: alert, no acute distress  Eyes:  anicteric, no conjunctival injection, no discharge  Oropharynx: no lesions or injection 	  Neck: supple, without adenopathy  Lungs: clear to auscultation  Heart: regular rate and rhythm; no murmur, rubs or gallops  Abdomen: soft, nondistended, nontender, without mass or organomegaly  Skin: no lesions  Extremities: no clubbing, cyanosis, or edema  Neurologic: alert, oriented, moves all extremities    LAB RESULTS:                        7.1    10.01 )-----------( 164      ( 05 Jun 2023 05:30 )             21.9     06-05    142  |  106  |  77<H>  ----------------------------<  126<H>  4.2   |  28  |  2.41<H>    Ca    7.9<L>      05 Jun 2023 05:30  Phos  4.2     06-05  Mg     2.2     06-05    TPro  7.2  /  Alb  1.5<L>  /  TBili  0.6  /  DBili  x   /  AST  30  /  ALT  20  /  AlkPhos  160<H>  06-05    LIVER FUNCTIONS - ( 05 Jun 2023 05:30 )  Alb: 1.5 g/dL / Pro: 7.2 g/dL / ALK PHOS: 160 U/L / ALT: 20 U/L / AST: 30 U/L / GGT: x               MICROBIOLOGY REVIEWED:    RADIOLOGY REVIEWED:     CC: f/u for staph epi bacteremia    Patient remains in ICU, critically ill, on life support    REVIEW OF SYSTEMS: no fevers  All other review of systems negative (Comprehensive ROS)    Antimicrobials Day #    vancomycin  IVPB 1000 milliGRAM(s) IV Intermittent every 24 hours  vancomycin  IVPB        Other Medications Reviewed    T(F): 99.5 (06-05-23 @ 07:00), Max: 99.8 (06-04-23 @ 21:00)  HR: 86 (06-05-23 @ 09:00)  BP: 105/52 (06-05-23 @ 09:00)  RR: 16 (06-05-23 @ 09:00)  SpO2: 100% (06-05-23 @ 09:00)    PHYSICAL EXAM:  General: intubated and sedated  Eyes:  anicteric, no conjunctival injection, no discharge  Oropharynx: no lesions or injection, NGT and ETT 	  Neck: supple, without adenopathy  Lungs: diminished at bases  Heart: regular rate and rhythm; no murmur, rubs or gallops  Abdomen: soft, nondistended, without mass or organomegaly  Skin: no lesions  Extremities: no clubbing, cyanosis, or edema  Neurologic: sedated    LAB RESULTS:                        7.1    10.01 )-----------( 164      ( 05 Jun 2023 05:30 )             21.9     06-05    142  |  106  |  77<H>  ----------------------------<  126<H>  4.2   |  28  |  2.41<H>    Ca    7.9<L>      05 Jun 2023 05:30  Phos  4.2     06-05  Mg     2.2     06-05    TPro  7.2  /  Alb  1.5<L>  /  TBili  0.6  /  DBili  x   /  AST  30  /  ALT  20  /  AlkPhos  160<H>  06-05    LIVER FUNCTIONS - ( 05 Jun 2023 05:30 )  Alb: 1.5 g/dL / Pro: 7.2 g/dL / ALK PHOS: 160 U/L / ALT: 20 U/L / AST: 30 U/L / GGT: x               MICROBIOLOGY REVIEWED:  Culture - Sputum . (06.04.23 @ 21:20)   Gram Stain:   Numerous polymorphonuclear leukocytes per low power field   No Squamous epithelial cells per low power field   Moderate Gram Positive Cocci in Clusters per oil power field  Specimen Source: .Sputum Sputum  Growth in aerobic and anaerobic bottles: Staphylococcus epidermidis   Coagulase Negative Staphylococci isolated from a single blood culture set   may represent contamination.   Contact the Microbiology Department at [phone] if susceptibility     RADIOLOGY REVIEWED:    < from: Xray Chest 1 View- PORTABLE-Routine (Xray Chest 1 View- PORTABLE-Routine .) (06.04.23 @ 11:10) >    IMPRESSION: Successful advancement of NG tube.    Persistent perihilar infiltrates.    --- End of Report ---    < end of copied text >

## 2023-06-05 NOTE — PROGRESS NOTE ADULT - ASSESSMENT
Full note pending 78y male with hx HTN, Cardiomyopathy, CHF, DM2, Dementia, sent to the ED a month ago by PMD because of increased dyspnea, leg edema. He was admitted to [facility] on 5/4 with hypoxic respiratory failure in setting of CHF exacerbation and ANISH with course complicated by acute hypoxic respiratory failure in setting of choking episode causing cardiac arrest, shock, worsening hypoxemic respiratory failure and NSTEMI on 5/9. ICU course post cardiac arrest noted for anoxic brain injury and myoclonus. Pt was transferred to [facility] for VEEG which required 48 hours of monitoring as he was noted to still be sedated. While there pt noted to have hematochezia and AC were held at that time. EEG findings consistent with stimulus induced myoclonus and GPDs s/p hypoxic diffuse indicative of severe cerebral dysfunction. He returned to  ICU, remains intubated, unresponsive and has NGT. he had low grade fevers and has been followed by plastics for sacral decub. He had blood cult drawn and found to 2/4 from the same set S epi. He received a dose of Vanc and ID eval requested for further management.      Suggest:  f/u repeat blood cult, but I suspect Staph epi from the same set represents contaminant  f/u sputum cultures, serial CXR  His prolonged ICU hospitalization and intubation put him at risk for multiple nosocomial infections, including HCAP  Will need to monitor O2 requirements and resp secretions  Sacral decub management as per plastics  Awaiting decision regarding PEG/trach, but given poor neuro prognosis, palliative/comfort care would be most reasonable  d/w Dr Polo

## 2023-06-05 NOTE — PROGRESS NOTE ADULT - ASSESSMENT
Physical Examination:  GENERAL:               Eyes opens  and , partially responsive where opens eyes to touch and voice  HEENT:                    Pupils equal, reactive to light.   No JVD, Dry MM  PULM:                     Bilateral air entry, Clear to auscultation bilaterally, + significant sputum production, No Rales, No Rhonchi, No Wheezing  CVS:                         S1, S2,  +Murmur  ABD:                        Soft, nondistended, nontender, normoactive bowel sounds,   EXT:                         No edema, nontender, No Cyanosis or Clubbing   Vascular:                Warm Extremities,   NEURO:                  Eyes open , + episodes of myoclonus, spontaneous movements of UE Left > Right  PSYC:                      Calm, no Insight.      Assessment  78 year old male with a PMH of dementia, HTN, cardiomyopathy, HFrEF, CAD s/p CABG, with known current RCA occlusion, and DM type 2 who was admitted to [facility] on 5/4 with hypoxic respiratory failure in setting of CHF exacerbation and ANISH with course complicated by acute hypoxic respiratory failure in setting of choking episode causing cardiac arrest, shock, worsening hypoxemic respiratory failure, and NSTEMI on 5/9. Pt ICU course noted post cardiac arrest anoxic brain injury and myoclonus. Pt was transferred to [facility] for VEEG which required 48 hours of monitoring as he was noted to still be sedated. While there pt noted to have hematochezia and any ac / antiplatelet agents were held at that time. EEG findings consistent with stimulus induced myoclonus and GPDs s/p hypoxic diffuse indicative of severe cerebral dysfunction.    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     Noted fever, cultures neg to date, wbc 10, will monitor off antibiotics till source identified,   Continue brivaracetam   continue PPI BID, trend cbc daily   TF as tolerated  increased modafinil but more myoclonus noted will taper to 150mg daily   trend cbc qdaily, s/p transfusion     monitor blood glucose levels, + ISS coverage  Sacral decub - wound care called continue local wound care off loading and nutrition support  Venodyne for dvt ppx  noted diarrhea d/w Nutrition will change tube feeds  Ethics consult will f/u with family after Monday  GOC ongoing discussion with family ,   noted MRSELIANE in sputum d/w ID to f/u suspect contamination     PMD:				                   Notified(Date):  Family Updated: 	Kuldeep [phone]	                                 Date:  6/5/2023  extensive discussion had about current status  discussed complication with current care and need decision  now states wants to wait till thursday as can be off  questions for trach and peg discussed but appear to have few mis understandings from trach  will plan for family meeting this afternoon to discuss plan      Sedation & Analgesia:	  Diet/Nutrition:		 Diet, NPO with Tube Feed:   Tube Feeding Modality: Nasogastric         GI PPx:			pantoprazole   Suspension 40 milliGRAM(s) Oral daily  DVT Ppx:		Venodynes   Activity:		  bedrest  Head of Bed:               35-45 Deg  Glycemic Control:         Insulin    Lines: piv    Restraints were deemed necessary to prevent removal of life-sustaining devices [  ] YES   [  x  ]  NO    Disposition: ICU Care    Goals of Care: Full code

## 2023-06-05 NOTE — PROGRESS NOTE ADULT - SUBJECTIVE AND OBJECTIVE BOX
Follow-up Critical Care Progress Note  Chief Complaint : Atrial fibrillation        patient seen and examined  tracking with eyes   noted myoclonus worsening  NGT pushed in.         Allergies :sulfa drugs (Unknown)      PAST MEDICAL & SURGICAL HISTORY:  HTN (hypertension)    HLD (hyperlipidemia)    Diabetes    CAD (coronary artery disease)    History of cholecystectomy        Medications:  MEDICATIONS  (STANDING):  albuterol/ipratropium for Nebulization 3 milliLiter(s) Nebulizer every 6 hours  brivaracetam Oral Solution 50 milliGRAM(s) Oral two times a day  chlorhexidine 0.12% Liquid 15 milliLiter(s) Oral Mucosa every 12 hours  chlorhexidine 2% Cloths 1 Application(s) Topical daily  collagenase Ointment 1 Application(s) Topical daily  dextrose 5%. 1000 milliLiter(s) (100 mL/Hr) IV Continuous <Continuous>  dextrose 5%. 1000 milliLiter(s) (50 mL/Hr) IV Continuous <Continuous>  dextrose 50% Injectable 25 Gram(s) IV Push once  dextrose 50% Injectable 25 Gram(s) IV Push once  dextrose 50% Injectable 12.5 Gram(s) IV Push once  epoetin priyanka-epbx (RETACRIT) Injectable 60367 Unit(s) SubCutaneous every 7 days  insulin glargine Injectable (LANTUS) 10 Unit(s) SubCutaneous at bedtime  insulin lispro (ADMELOG) corrective regimen sliding scale   SubCutaneous every 6 hours  lidocaine 5% Ointment 1 Application(s) Topical every 12 hours  modafinil 150 milliGRAM(s) Oral daily  multivitamin/minerals/iron Oral Solution (CENTRUM) 15 milliLiter(s) Oral daily  pantoprazole   Suspension 40 milliGRAM(s) Oral every 12 hours  simvastatin 40 milliGRAM(s) Oral at bedtime  vancomycin  IVPB 1000 milliGRAM(s) IV Intermittent every 24 hours  vancomycin  IVPB      zinc sulfate 220 milliGRAM(s) Oral daily    MEDICATIONS  (PRN):  acetaminophen   Oral Liquid .. 650 milliGRAM(s) Oral every 6 hours PRN Temp greater or equal to 38C (100.4F)  senna 2 Tablet(s) Oral at bedtime PRN Constipation      Antibiotics History  cefepime   IVPB 500 milliGRAM(s) IV Intermittent daily, 05-25-23 @ 12:00  cefepime   IVPB    , 05-24-23 @ 17:36  cefepime   IVPB 500 milliGRAM(s) IV Intermittent once, 05-24-23 @ 16:39  vancomycin  IVPB 1000 milliGRAM(s) IV Intermittent every 24 hours, 06-04-23 @ 12:32, Stop order after: 42 Days  vancomycin  IVPB 1000 milliGRAM(s) IV Intermittent once, 06-03-23 @ 12:32, Stop order after: 1 Doses  vancomycin  IVPB    , 06-03-23 @ 12:32       GI Medications  pantoprazole   Suspension 40 milliGRAM(s) Oral every 12 hours, 05-25-23 @ 08:37, Routine  senna 2 Tablet(s) Oral at bedtime, 06-02-23 @ 08:38,  PRN      COVID  05-04-23 @ 19:50  COVID -   NotDetec  07-13-22 @ 18:33  COVID -   NotDetec           Procalcitonin Trend  06-05-23 @ 05:30   -   0.24<H>  06-04-23 @ 06:00   -   0.20<H>  05-17-23 @ 23:44   -   0.59<H>  05-16-23 @ 22:16   -   0.74<H>  05-15-23 @ 05:45   -   1.12<H>  05-09-23 @ 13:42   -   0.11<H>    WBC Trend  06-05-23 @ 05:30   -  10.01  06-04-23 @ 06:00   -  11.44<H>  06-03-23 @ 06:20   -  10.03  06-02-23 @ 11:32   -  13.16<H>    H/H Trend  06-05-23 @ 05:30   -   7.1<L>/ 21.9<L>  06-04-23 @ 06:00   -   7.8<L>/ 24.9<L>  06-03-23 @ 06:20   -   6.8<LL>/ 22.1<L>  06-02-23 @ 11:32   -   7.3<L>/ 23.9<L>  06-02-23 @ 06:00   -   7.0<LL>/ 22.8<L>  06-01-23 @ 05:00   -   7.6<L>/ 24.6<L>    Stool Occult Blood  06-02-23 @ 08:20   -   Negative  05-25-23 @ 18:20   -   Negative  05-16-23 @ 17:00   -   Positive<!>    Platelet Trend  06-05-23 @ 05:30   -  164  06-04-23 @ 06:00   -  201  06-03-23 @ 06:20   -  201  06-02-23 @ 11:32   -  245    Trend Sodium  06-05-23 @ 05:30   -  142  06-04-23 @ 06:00   -  141  06-03-23 @ 06:20   -  145    Trend Potassium  06-05-23 @ 05:30   -  4.2  06-04-23 @ 06:00   -  4.5  06-03-23 @ 06:20   -  4.2    Trend Bun/Cr  06-05-23 @ 05:30  BUN/CR -  77<H> / 2.41<H>  06-04-23 @ 06:00  BUN/CR -  78<H> / 2.17<H>  06-03-23 @ 06:20  BUN/CR -  69<H> / 2.23<H>    Lactic Acid Trend    ABG Trend  05-19-23 @ 00:22   - 7.37/43/144<H>/99.8<H>  05-18-23 @ 05:38   - 7.41/37/97/98.4<H>  05-17-23 @ 23:36   - 7.42/37/97/98.3<H>  05-16-23 @ 22:10   - 7.41/41/92/98.5<H>  05-16-23 @ 05:59   - 7.35/47/104/98.8<H>     Trend AST/ALT/ALK Phos/Bili  06-05-23 @ 05:30   30/20/160<H>/0.6  06-04-23 @ 06:00   32/19/167<H>/0.7  06-03-23 @ 06:20   28/19/152<H>/0.5  06-02-23 @ 06:00   26/14/144<H>/0.4  05-31-23 @ 04:50   29/16/136<H>/0.3  05-29-23 @ 06:00   30/20/128<H>/0.4  05-28-23 @ 05:00   29/18/122<H>/0.4  05-27-23 @ 06:15   31/18/129<H>/0.4  05-25-23 @ 05:00   29/14/98/0.5  05-24-23 @ 05:30   24/12/98/0.6  05-21-23 @ 06:15   14/10/69/0.5  05-20-23 @ 07:50   26/7<L>/65/0.6      Ammonia Trend  05-14-23 @ 11:30   -   53  05-14-23 @ 05:00   -   16      Amylase / Lipase Trend      Albumin Trend  06-05-23 @ 05:30   -   1.5<L>  06-04-23 @ 06:00   -   1.6<L>  06-03-23 @ 06:20   -   1.5<L>  06-02-23 @ 06:00   -   1.5<L>  05-31-23 @ 04:50   -   1.5<L>  05-29-23 @ 06:00   -   1.3<L>      PTT - PT - INR Trend  05-19-23 @ 00:44   -   26.5<L> - 13.2 - 1.15  05-17-23 @ 23:44   -   31.2 - 13.7<H> - 1.18<H>  05-16-23 @ 22:16   -   31.3 - 13.6<H> - 1.18<H>  05-15-23 @ 13:55   -   32.9 - 14.0<H> - 1.19<H>  05-14-23 @ 13:45   -   33.6 - -- - --  05-14-23 @ 11:30   -   34.1 - 14.9<H> - 1.28<H>    Glucose Trend  06-05-23 @ 05:30   -  -- 126<H> --  06-05-23 @ 05:08   -  -- -- 145<H>  06-04-23 @ 23:02   -  -- -- 174<H>  06-04-23 @ 21:05   -  -- -- 158<H>  06-04-23 @ 17:07   -  -- -- 150<H>  06-04-23 @ 11:28   -  -- -- 111<H>  06-04-23 @ 06:00   -  -- 139<H> --  06-04-23 @ 05:01   -  -- -- 153<H>  06-03-23 @ 23:02   -  -- -- 174<H>  06-03-23 @ 21:06   -  -- -- 147<H>    A1C with Estimated Average Glucose Result: 7.7 % *H* [4.0 - 5.6] (05-05-23 @ 08:00)      LABS:                        7.1    10.01 )-----------( 164      ( 05 Jun 2023 05:30 )             21.9     06-05    142  |  106  |  77<H>  ----------------------------<  126<H>  4.2   |  28  |  2.41<H>    Ca    7.9<L>      05 Jun 2023 05:30  Phos  4.2     06-05  Mg     2.2     06-05    TPro  7.2  /  Alb  1.5<L>  /  TBili  0.6  /  DBili  x   /  AST  30  /  ALT  20  /  AlkPhos  160<H>  06-05         CULTURES: (if applicable)    Culture - Sputum (collected 06-04-23 @ 21:20)  Source: .Sputum Sputum  Gram Stain (06-05-23 @ 10:19):    Numerous polymorphonuclear leukocytes per low power field    No Squamous epithelial cells per low power field    Moderate Gram Positive Cocci in Clusters per oil power field    Culture - Blood (collected 06-02-23 @ 09:45)  Source: .Blood Blood  Preliminary Report (06-03-23 @ 17:01):    No growth to date.    Culture - Blood (collected 06-02-23 @ 09:10)  Source: .Blood Blood  Gram Stain (06-03-23 @ 16:57):    Growth in aerobic bottle: Gram Positive Cocci in Clusters    Growth in anaerobic bottle: Gram Positive Cocci in Clusters  Final Report (06-04-23 @ 17:55):    Growth in aerobic and anaerobic bottles: Staphylococcus epidermidis    Coagulase Negative Staphylococci isolated from a single blood culture set    may represent contamination.    Contact the Microbiology Department at [phone] if susceptibility    testing is clinically indicated.    ***Blood Panel PCR results on this specimen are available    approximately 3 hours after the Gram stain result.***    Gram stain, PCR, and/or culture results may not always    correspond due to difference in methodologies.    ************************************************************    This PCR assay was performed by multiplex PCR. This    Assay tests for 66 bacterial and resistance gene targets.    Please refer to the [facility] test directory    at [email or web address] for details.  Organism: Blood Culture PCR (06-04-23 @ 17:55)  Organism: Blood Culture PCR (06-04-23 @ 17:55)      Method Type: PCR      -  Staphylococcus epidermidis, Methicillin resistant: Detec    Culture - Sputum (collected 05-26-23 @ 06:00)  Source: ET Tube ET Tube  Gram Stain (05-26-23 @ 13:21):    Few polymorphonuclear leukocytes seen per low power field    Moderate Squamous epithelial cells seen per low power field    Moderate Gram positive cocci in pairs, chains and clusters seen per oil    power field    Few Yeast like cells seen per oil power field    Rare Gram Negative Rods seen per oil power field  Final Report (05-30-23 @ 14:09):    Moderate Staphylococcus aureus    Moderate Streptococcus pneumoniae    Normal Respiratory Chelsea present  Organism: Staphylococcus aureus  Streptococcus pneumoniae (05-30-23 @ 14:09)  Organism: Streptococcus pneumoniae (05-30-23 @ 14:09)      Method Type: ALDO      -  Clindamycin: I 0.5      -  Erythromycin: R >0.5 Predicts results for azithromycin.      -  Levofloxacin: I 4      -  Trimethoprim/Sulfamethoxazole: S <=.25/4.75      -  Vancomycin: S 0.5      -  Ceftriaxone (meningitidis): I 1      -  Ceftriaxone (non-meningitidis): S 1      -  Penicillin (meningitidis): R 2      -  Penicillin (non-meningitidis): S 2      -  Penicillin (oral penicillin V): R 2  Organism: Staphylococcus aureus (05-30-23 @ 14:09)      Method Type: ALDO      -  Ampicillin/Sulbactam: S <=8/4      -  Cefazolin: S <=4      -  Clindamycin: S <=0.25      -  Erythromycin: S <=0.25      -  Gentamicin: S <=1 Should not be used as monotherapy      -  Oxacillin: S 0.5 Oxacillin predicts susceptibility for dicloxacillin, methicillin, and nafcillin      -  Penicillin: R >8      -  Rifampin: S <=1 Should not be used as monotherapy      -  Tetracycline: R >8      -  Trimethoprim/Sulfamethoxazole: S <=0.5/9.5      -  Vancomycin: S 2    Culture - Blood (collected 05-24-23 @ 15:05)  Source: .Blood Blood  Final Report (05-30-23 @ 01:00):    No Growth Final    Culture - Blood (collected 05-24-23 @ 14:30)  Source: .Blood Blood  Final Report (05-29-23 @ 19:00):    No Growth Final    Culture - Sputum (collected 05-24-23 @ 14:14)  Source: .Sputum Sputum  Gram Stain (05-24-23 @ 22:38):    Numerous polymorphonuclear leukocytes per low power field    No Squamous epithelial cells per low power field    Moderate Gram Positive Cocci in Clusters per oil power field  Final Report (05-26-23 @ 16:39):    Moderate Staphylococcus aureus    Normal Respiratory Chelsea absent  Organism: Staphylococcus aureus (05-26-23 @ 16:39)  Organism: Staphylococcus aureus (05-26-23 @ 16:39)      Method Type: ALDO      -  Ampicillin/Sulbactam: S <=8/4      -  Cefazolin: S <=4      -  Clindamycin: S <=0.25      -  Erythromycin: S <=0.25      -  Gentamicin: S <=1 Should not be used as monotherapy      -  Oxacillin: S 0.5 Oxacillin predicts susceptibility for dicloxacillin, methicillin, and nafcillin      -  Penicillin: R >8      -  Rifampin: S <=1 Should not be used as monotherapy      -  Tetracycline: R >8      -  Trimethoprim/Sulfamethoxazole: S <=0.5/9.5      -  Vancomycin: S 1     RADIOLOGY  CXR:  NGT not in place, advanced in   repeat cxr in place        VITALS:  T(C): 37.5 (06-05-23 @ 07:00), Max: 37.7 (06-04-23 @ 21:00)  T(F): 99.5 (06-05-23 @ 07:00), Max: 99.8 (06-04-23 @ 21:00)  HR: 118 (06-05-23 @ 10:00) (76 - 118)  BP: 101/72 (06-05-23 @ 10:00) (100/60 - 152/86)  BP(mean): 83 (06-05-23 @ 10:00) (70 - 107)  ABP: --  ABP(mean): --  RR: 25 (06-05-23 @ 10:00) (12 - 32)  SpO2: 100% (06-05-23 @ 10:00) (98% - 100%)  CVP(mm Hg): --  CVP(cm H2O): --    Ins and Outs     06-04-23 @ 07:01  -  06-05-23 @ 07:00  --------------------------------------------------------  IN: 2950 mL / OUT: 550 mL / NET: 2400 mL    06-05-23 @ 07:01  -  06-05-23 @ 10:45  --------------------------------------------------------  IN: 90 mL / OUT: 0 mL / NET: 90 mL            Device: Avea, Mode: AC/ CMV (Assist Control/ Continuous Mandatory Ventilation), RR (machine): 12, RR (patient): 27, TV (machine): 450, TV (patient): 450, FiO2: 30, PEEP: 5, ITime: 1, MAP: 12, PIP: 23    I&O's Detail    04 Jun 2023 07:01  -  05 Jun 2023 07:00  --------------------------------------------------------  IN:    Free Water: 1500 mL    Glucerna 1.5: 1155 mL    IV PiggyBack: 250 mL    Vital1.5: 45 mL  Total IN: 2950 mL    OUT:    Stool (mL): 100 mL    Voided (mL): 450 mL  Total OUT: 550 mL    Total NET: 2400 mL      05 Jun 2023 07:01  -  05 Jun 2023 10:45  --------------------------------------------------------  IN:    Vital1.5: 90 mL  Total IN: 90 mL    OUT:  Total OUT: 0 mL    Total NET: 90 mL

## 2023-06-05 NOTE — PROGRESS NOTE ADULT - SUBJECTIVE AND OBJECTIVE BOX
Intubated in ICU     Vital Signs Last 24 Hrs  T(C): 37.4 (06-05-23 @ 15:53), Max: 37.7 (06-04-23 @ 21:00)  T(F): 99.3 (06-05-23 @ 15:53), Max: 99.8 (06-04-23 @ 21:00)  HR: 84 (06-05-23 @ 18:00) (76 - 118)  BP: 123/59 (06-05-23 @ 18:00) (100/60 - 152/86)  BP(mean): 78 (06-05-23 @ 18:00) (70 - 107)  RR: 24 (06-05-23 @ 18:00) (12 - 32)  SpO2: 100% (06-05-23 @ 18:00) (98% - 100%)    I&O's Detail    04 Jun 2023 07:01  -  05 Jun 2023 07:00  --------------------------------------------------------  IN:    Free Water: 1500 mL    Glucerna 1.5: 1155 mL    IV PiggyBack: 250 mL    Vital1.5: 45 mL  Total IN: 2950 mL    OUT:    Stool (mL): 100 mL    Voided (mL): 450 mL  Total OUT: 550 mL    Total NET: 2400 mL    05 Jun 2023 07:01  -  05 Jun 2023 18:55  --------------------------------------------------------  IN:    Free Water: 500 mL    IV PiggyBack: 250 mL    Vital1.5: 575 mL  Total IN: 1325 mL    OUT:    Stool (mL): 150 mL    Voided (mL): 500 mL  Total OUT: 650 mL    Total NET: 675 mL    Mode: AC/ CMV (Assist Control/ Continuous Mandatory Ventilation)  RR (machine): 12  TV (machine): 450  FiO2: 30  PEEP: 5  ITime: 1  MAP: 12  PIP: 25    s1s2  b/l air entry  soft, ND  tr edema                                                                                  7.1    10.01 )-----------( 164      ( 05 Jun 2023 05:30 )             21.9     05 Jun 2023 05:30    142    |  106    |  77     ----------------------------<  126    4.2     |  28     |  2.41     Ca    7.9        05 Jun 2023 05:30  Phos  4.2       05 Jun 2023 05:30  Mg     2.2       05 Jun 2023 05:30    TPro  7.2    /  Alb  1.5    /  TBili  0.6    /  DBili  x      /  AST  30     /  ALT  20     /  AlkPhos  160    05 Jun 2023 05:30    LIVER FUNCTIONS - ( 05 Jun 2023 05:30 )  Alb: 1.5 g/dL / Pro: 7.2 g/dL / ALK PHOS: 160 U/L / ALT: 20 U/L / AST: 30 U/L / GGT: x           Culture - Sputum (collected 04 Jun 2023 21:20)  Source: .Sputum Sputum  Gram Stain (05 Jun 2023 10:19):    Numerous polymorphonuclear leukocytes per low power field    No Squamous epithelial cells per low power field    Moderate Gram Positive Cocci in Clusters per oil power field    Culture - Urine (collected 03 Jun 2023 06:20)  Source: Clean Catch Clean Catch (Midstream)  Preliminary Report (05 Jun 2023 16:09):    >100,000 CFU/ml Enterococcus species    <10,000 CFU/ml Normal Urogenital carrie present    acetaminophen   Oral Liquid .. 650 milliGRAM(s) Oral every 6 hours PRN  albuterol/ipratropium for Nebulization 3 milliLiter(s) Nebulizer every 6 hours  brivaracetam Oral Solution 50 milliGRAM(s) Oral two times a day  chlorhexidine 0.12% Liquid 15 milliLiter(s) Oral Mucosa every 12 hours  chlorhexidine 2% Cloths 1 Application(s) Topical daily  clonazePAM  Tablet 0.5 milliGRAM(s) Oral every 12 hours  collagenase Ointment 1 Application(s) Topical daily  dextrose 5%. 1000 milliLiter(s) IV Continuous <Continuous>  dextrose 5%. 1000 milliLiter(s) IV Continuous <Continuous>  dextrose 50% Injectable 25 Gram(s) IV Push once  dextrose 50% Injectable 12.5 Gram(s) IV Push once  dextrose 50% Injectable 25 Gram(s) IV Push once  epoetin priyanka-epbx (RETACRIT) Injectable 53454 Unit(s) SubCutaneous every 7 days  insulin glargine Injectable (LANTUS) 10 Unit(s) SubCutaneous at bedtime  insulin lispro (ADMELOG) corrective regimen sliding scale   SubCutaneous every 6 hours  lidocaine 5% Ointment 1 Application(s) Topical every 12 hours  modafinil 100 milliGRAM(s) Oral daily  multivitamin/minerals/iron Oral Solution (CENTRUM) 15 milliLiter(s) Oral daily  pantoprazole   Suspension 40 milliGRAM(s) Oral every 12 hours  senna 2 Tablet(s) Oral at bedtime PRN  simvastatin 40 milliGRAM(s) Oral at bedtime  vancomycin  IVPB 1000 milliGRAM(s) IV Intermittent every 24 hours  vancomycin  IVPB      zinc sulfate 220 milliGRAM(s) Oral daily    A/P:    Hemodynamic ATN s/p arrest (Cr 1.6 - 5/9/23, Cr 1.0 - 4/12/23)  Resp failure, intubated   CM, EF 35 - 40%, dementia  Anoxic encephalopathy   Vent support per ICU team  Avoid nephrotoxins  Lytes are stable  Cr is likely near new baseline   Now w/anemia, bacteremia  Abx, bld tx per ICU team  Epoetin  F/u CBC, BMP, UO, Vanco level  Overall prognosis is poor    [phone]

## 2023-06-06 LAB
-  AMPICILLIN: SIGNIFICANT CHANGE UP
-  CIPROFLOXACIN: SIGNIFICANT CHANGE UP
-  LEVOFLOXACIN: SIGNIFICANT CHANGE UP
-  NITROFURANTOIN: SIGNIFICANT CHANGE UP
-  TETRACYCLINE: SIGNIFICANT CHANGE UP
-  VANCOMYCIN: SIGNIFICANT CHANGE UP
ALBUMIN SERPL ELPH-MCNC: 1.6 G/DL — LOW (ref 3.3–5)
ALBUMIN SERPL ELPH-MCNC: 1.6 G/DL — LOW (ref 3.3–5)
ALP SERPL-CCNC: 168 U/L — HIGH (ref 40–120)
ALP SERPL-CCNC: 178 U/L — HIGH (ref 40–120)
ALT FLD-CCNC: 19 U/L — SIGNIFICANT CHANGE UP (ref 10–45)
ALT FLD-CCNC: 21 U/L — SIGNIFICANT CHANGE UP (ref 10–45)
ANION GAP SERPL CALC-SCNC: 7 MMOL/L — SIGNIFICANT CHANGE UP (ref 5–17)
ANION GAP SERPL CALC-SCNC: 9 MMOL/L — SIGNIFICANT CHANGE UP (ref 5–17)
AST SERPL-CCNC: 29 U/L — SIGNIFICANT CHANGE UP (ref 10–40)
AST SERPL-CCNC: 31 U/L — SIGNIFICANT CHANGE UP (ref 10–40)
BILIRUB SERPL-MCNC: 0.6 MG/DL — SIGNIFICANT CHANGE UP (ref 0.2–1.2)
BILIRUB SERPL-MCNC: 0.6 MG/DL — SIGNIFICANT CHANGE UP (ref 0.2–1.2)
BUN SERPL-MCNC: 79 MG/DL — HIGH (ref 7–23)
BUN SERPL-MCNC: 82 MG/DL — HIGH (ref 7–23)
CALCIUM SERPL-MCNC: 8 MG/DL — LOW (ref 8.4–10.5)
CALCIUM SERPL-MCNC: <5 MG/DL — CRITICAL LOW (ref 8.4–10.5)
CHLORIDE SERPL-SCNC: 104 MMOL/L — SIGNIFICANT CHANGE UP (ref 96–108)
CHLORIDE SERPL-SCNC: 104 MMOL/L — SIGNIFICANT CHANGE UP (ref 96–108)
CO2 SERPL-SCNC: 27 MMOL/L — SIGNIFICANT CHANGE UP (ref 22–31)
CO2 SERPL-SCNC: 29 MMOL/L — SIGNIFICANT CHANGE UP (ref 22–31)
CREAT SERPL-MCNC: 2.33 MG/DL — HIGH (ref 0.5–1.3)
CREAT SERPL-MCNC: 2.38 MG/DL — HIGH (ref 0.5–1.3)
CULTURE RESULTS: SIGNIFICANT CHANGE UP
EGFR: 27 ML/MIN/1.73M2 — LOW
EGFR: 28 ML/MIN/1.73M2 — LOW
GLUCOSE BLDC GLUCOMTR-MCNC: 150 MG/DL — HIGH (ref 70–99)
GLUCOSE BLDC GLUCOMTR-MCNC: 175 MG/DL — HIGH (ref 70–99)
GLUCOSE BLDC GLUCOMTR-MCNC: 184 MG/DL — HIGH (ref 70–99)
GLUCOSE BLDC GLUCOMTR-MCNC: 186 MG/DL — HIGH (ref 70–99)
GLUCOSE BLDC GLUCOMTR-MCNC: 192 MG/DL — HIGH (ref 70–99)
GLUCOSE BLDC GLUCOMTR-MCNC: 209 MG/DL — HIGH (ref 70–99)
GLUCOSE SERPL-MCNC: 152 MG/DL — HIGH (ref 70–99)
GLUCOSE SERPL-MCNC: 265 MG/DL — HIGH (ref 70–99)
HCT VFR BLD CALC: 22.6 % — LOW (ref 39–50)
HGB BLD-MCNC: 7.1 G/DL — LOW (ref 13–17)
MAGNESIUM SERPL-MCNC: 2 MG/DL — SIGNIFICANT CHANGE UP (ref 1.6–2.6)
MCHC RBC-ENTMCNC: 30.6 PG — SIGNIFICANT CHANGE UP (ref 27–34)
MCHC RBC-ENTMCNC: 31.4 GM/DL — LOW (ref 32–36)
MCV RBC AUTO: 97.4 FL — SIGNIFICANT CHANGE UP (ref 80–100)
METHOD TYPE: SIGNIFICANT CHANGE UP
NRBC # BLD: 0 /100 WBCS — SIGNIFICANT CHANGE UP (ref 0–0)
ORGANISM # SPEC MICROSCOPIC CNT: SIGNIFICANT CHANGE UP
ORGANISM # SPEC MICROSCOPIC CNT: SIGNIFICANT CHANGE UP
PHOSPHATE SERPL-MCNC: 4.2 MG/DL — SIGNIFICANT CHANGE UP (ref 2.5–4.5)
PLATELET # BLD AUTO: 143 K/UL — LOW (ref 150–400)
POTASSIUM SERPL-MCNC: 4.3 MMOL/L — SIGNIFICANT CHANGE UP (ref 3.5–5.3)
POTASSIUM SERPL-MCNC: 7.1 MMOL/L — CRITICAL HIGH (ref 3.5–5.3)
POTASSIUM SERPL-SCNC: 4.3 MMOL/L — SIGNIFICANT CHANGE UP (ref 3.5–5.3)
POTASSIUM SERPL-SCNC: 7.1 MMOL/L — CRITICAL HIGH (ref 3.5–5.3)
PROT SERPL-MCNC: 7.3 G/DL — SIGNIFICANT CHANGE UP (ref 6–8.3)
PROT SERPL-MCNC: 7.5 G/DL — SIGNIFICANT CHANGE UP (ref 6–8.3)
RBC # BLD: 2.32 M/UL — LOW (ref 4.2–5.8)
RBC # FLD: 17.8 % — HIGH (ref 10.3–14.5)
SODIUM SERPL-SCNC: 140 MMOL/L — SIGNIFICANT CHANGE UP (ref 135–145)
SODIUM SERPL-SCNC: 140 MMOL/L — SIGNIFICANT CHANGE UP (ref 135–145)
SPECIMEN SOURCE: SIGNIFICANT CHANGE UP
VANCOMYCIN FLD-MCNC: 24.2 UG/ML — SIGNIFICANT CHANGE UP
WBC # BLD: 7.25 K/UL — SIGNIFICANT CHANGE UP (ref 3.8–10.5)
WBC # FLD AUTO: 7.25 K/UL — SIGNIFICANT CHANGE UP (ref 3.8–10.5)

## 2023-06-06 RX ORDER — MIDAZOLAM HYDROCHLORIDE 1 MG/ML
0.5 INJECTION, SOLUTION INTRAMUSCULAR; INTRAVENOUS ONCE
Refills: 0 | Status: DISCONTINUED | OUTPATIENT
Start: 2023-06-06 | End: 2023-06-06

## 2023-06-06 RX ORDER — INSULIN HUMAN 100 [IU]/ML
10 INJECTION, SOLUTION SUBCUTANEOUS ONCE
Refills: 0 | Status: COMPLETED | OUTPATIENT
Start: 2023-06-06 | End: 2023-06-06

## 2023-06-06 RX ORDER — LOPERAMIDE HCL 2 MG
2 TABLET ORAL EVERY 6 HOURS
Refills: 0 | Status: DISCONTINUED | OUTPATIENT
Start: 2023-06-06 | End: 2023-06-07

## 2023-06-06 RX ORDER — DEXTROSE 50 % IN WATER 50 %
50 SYRINGE (ML) INTRAVENOUS ONCE
Refills: 0 | Status: COMPLETED | OUTPATIENT
Start: 2023-06-06 | End: 2023-06-06

## 2023-06-06 RX ADMIN — Medication 2 MILLIGRAM(S): at 14:39

## 2023-06-06 RX ADMIN — ZINC SULFATE TAB 220 MG (50 MG ZINC EQUIVALENT) 220 MILLIGRAM(S): 220 (50 ZN) TAB at 11:40

## 2023-06-06 RX ADMIN — Medication 3 MILLILITER(S): at 08:50

## 2023-06-06 RX ADMIN — Medication 0.5 MILLIGRAM(S): at 17:08

## 2023-06-06 RX ADMIN — CHLORHEXIDINE GLUCONATE 15 MILLILITER(S): 213 SOLUTION TOPICAL at 05:08

## 2023-06-06 RX ADMIN — Medication 3 MILLILITER(S): at 15:39

## 2023-06-06 RX ADMIN — BRIVARACETAM 50 MILLIGRAM(S): 25 TABLET, FILM COATED ORAL at 17:08

## 2023-06-06 RX ADMIN — Medication 2: at 17:12

## 2023-06-06 RX ADMIN — Medication 50 MILLILITER(S): at 09:02

## 2023-06-06 RX ADMIN — Medication 15 MILLILITER(S): at 11:40

## 2023-06-06 RX ADMIN — INSULIN HUMAN 10 UNIT(S): 100 INJECTION, SOLUTION SUBCUTANEOUS at 09:02

## 2023-06-06 RX ADMIN — Medication 2: at 23:51

## 2023-06-06 RX ADMIN — SIMVASTATIN 40 MILLIGRAM(S): 20 TABLET, FILM COATED ORAL at 21:12

## 2023-06-06 RX ADMIN — PANTOPRAZOLE SODIUM 40 MILLIGRAM(S): 20 TABLET, DELAYED RELEASE ORAL at 17:08

## 2023-06-06 RX ADMIN — CHLORHEXIDINE GLUCONATE 1 APPLICATION(S): 213 SOLUTION TOPICAL at 11:44

## 2023-06-06 RX ADMIN — PANTOPRAZOLE SODIUM 40 MILLIGRAM(S): 20 TABLET, DELAYED RELEASE ORAL at 05:08

## 2023-06-06 RX ADMIN — Medication 250 MILLIGRAM(S): at 11:40

## 2023-06-06 RX ADMIN — MODAFINIL 100 MILLIGRAM(S): 200 TABLET ORAL at 11:40

## 2023-06-06 RX ADMIN — Medication 2: at 05:09

## 2023-06-06 RX ADMIN — Medication 3 MILLILITER(S): at 21:49

## 2023-06-06 RX ADMIN — INSULIN GLARGINE 10 UNIT(S): 100 INJECTION, SOLUTION SUBCUTANEOUS at 21:12

## 2023-06-06 RX ADMIN — Medication 3 MILLILITER(S): at 04:56

## 2023-06-06 RX ADMIN — BRIVARACETAM 50 MILLIGRAM(S): 25 TABLET, FILM COATED ORAL at 05:08

## 2023-06-06 RX ADMIN — Medication 1 APPLICATION(S): at 11:41

## 2023-06-06 RX ADMIN — Medication 0.5 MILLIGRAM(S): at 05:11

## 2023-06-06 RX ADMIN — LIDOCAINE 1 APPLICATION(S): 4 CREAM TOPICAL at 05:11

## 2023-06-06 RX ADMIN — CHLORHEXIDINE GLUCONATE 15 MILLILITER(S): 213 SOLUTION TOPICAL at 17:08

## 2023-06-06 NOTE — PROGRESS NOTE ADULT - SUBJECTIVE AND OBJECTIVE BOX
Follow-up Critical Care Progress Note  Chief Complaint : Atrial fibrillation    patient having myoclonus and have active myclonus  pt appears alert, tracking witheyes  does not follow commands  cpap done, able to do few breaths with good TV but after min of cpap 5 pt rr >30 TV < 250  placed back on full control  versed given for myclonus      Allergies :sulfa drugs (Unknown)      PAST MEDICAL & SURGICAL HISTORY:  HTN (hypertension)    HLD (hyperlipidemia)    Diabetes    CAD (coronary artery disease)    History of cholecystectomy        Medications:  MEDICATIONS  (STANDING):  albuterol/ipratropium for Nebulization 3 milliLiter(s) Nebulizer every 6 hours  brivaracetam Oral Solution 50 milliGRAM(s) Oral two times a day  chlorhexidine 0.12% Liquid 15 milliLiter(s) Oral Mucosa every 12 hours  chlorhexidine 2% Cloths 1 Application(s) Topical daily  clonazePAM  Tablet 0.5 milliGRAM(s) Oral every 12 hours  collagenase Ointment 1 Application(s) Topical daily  dextrose 5%. 1000 milliLiter(s) (100 mL/Hr) IV Continuous <Continuous>  dextrose 5%. 1000 milliLiter(s) (50 mL/Hr) IV Continuous <Continuous>  dextrose 50% Injectable 25 Gram(s) IV Push once  dextrose 50% Injectable 12.5 Gram(s) IV Push once  dextrose 50% Injectable 25 Gram(s) IV Push once  epoetin priyanka-epbx (RETACRIT) Injectable 60689 Unit(s) SubCutaneous every 7 days  insulin glargine Injectable (LANTUS) 10 Unit(s) SubCutaneous at bedtime  insulin lispro (ADMELOG) corrective regimen sliding scale   SubCutaneous every 6 hours  lidocaine 5% Ointment 1 Application(s) Topical every 12 hours  midazolam Injectable 0.5 milliGRAM(s) IV Push once  modafinil 100 milliGRAM(s) Oral daily  multivitamin/minerals/iron Oral Solution (CENTRUM) 15 milliLiter(s) Oral daily  pantoprazole   Suspension 40 milliGRAM(s) Oral every 12 hours  simvastatin 40 milliGRAM(s) Oral at bedtime  vancomycin  IVPB 1000 milliGRAM(s) IV Intermittent every 24 hours  vancomycin  IVPB      zinc sulfate 220 milliGRAM(s) Oral daily    MEDICATIONS  (PRN):  acetaminophen   Oral Liquid .. 650 milliGRAM(s) Oral every 6 hours PRN Temp greater or equal to 38C (100.4F)  senna 2 Tablet(s) Oral at bedtime PRN Constipation      Antibiotics History  cefepime   IVPB    , 05-24-23 @ 17:36  cefepime   IVPB 500 milliGRAM(s) IV Intermittent once, 05-24-23 @ 16:39  cefepime   IVPB 500 milliGRAM(s) IV Intermittent daily, 05-25-23 @ 12:00  vancomycin  IVPB 1000 milliGRAM(s) IV Intermittent every 24 hours, 06-04-23 @ 12:32, Stop order after: 42 Days  vancomycin  IVPB    , 06-03-23 @ 12:32  vancomycin  IVPB 1000 milliGRAM(s) IV Intermittent once, 06-03-23 @ 12:32, Stop order after: 1 Doses      Heme Medications       GI Medications  pantoprazole   Suspension 40 milliGRAM(s) Oral every 12 hours, 05-25-23 @ 08:37, Routine  senna 2 Tablet(s) Oral at bedtime, 06-02-23 @ 08:38,  PRN      COVID  05-04-23 @ 19:50  COVID -   NotDetec  07-13-22 @ 18:33  COVID -   NotDetec       Trend BNP  05-14-23 @ 11:30   -  91914<H>  05-11-23 @ 06:05   -  14066<H>  05-10-23 @ 05:10   -  01776<H>  05-08-23 @ 09:05   -  00676<H>  05-04-23 @ 19:50   -  07427<H>    Procalcitonin Trend  06-05-23 @ 05:30   -   0.24<H>  06-04-23 @ 06:00   -   0.20<H>  05-17-23 @ 23:44   -   0.59<H>  05-16-23 @ 22:16   -   0.74<H>  05-15-23 @ 05:45   -   1.12<H>  05-09-23 @ 13:42   -   0.11<H>    WBC Trend  06-06-23 @ 05:30   -  7.25  06-05-23 @ 05:30   -  10.01  06-04-23 @ 06:00   -  11.44<H>    H/H Trend  06-06-23 @ 05:30   -   7.1<L>/ 22.6<L>  06-05-23 @ 05:30   -   7.1<L>/ 21.9<L>  06-04-23 @ 06:00   -   7.8<L>/ 24.9<L>  06-03-23 @ 06:20   -   6.8<LL>/ 22.1<L>  06-02-23 @ 11:32   -   7.3<L>/ 23.9<L>  06-02-23 @ 06:00   -   7.0<LL>/ 22.8<L>    Stool Occult Blood  06-02-23 @ 08:20   -   Negative  05-25-23 @ 18:20   -   Negative  05-16-23 @ 17:00   -   Positive<!>    Platelet Trend  06-06-23 @ 05:30   -  143<L>  06-05-23 @ 05:30   -  164  06-04-23 @ 06:00   -  201    Trend Sodium  06-06-23 @ 09:15   -  140  06-06-23 @ 05:30   -  140  06-05-23 @ 05:30   -  142  06-04-23 @ 06:00   -  141    Trend Potassium  06-06-23 @ 09:15   -  4.3  06-06-23 @ 05:30   -  7.1<HH>  06-05-23 @ 05:30   -  4.2  06-04-23 @ 06:00   -  4.5    Trend Bun/Cr  06-06-23 @ 09:15  BUN/CR -  82<H> / 2.38<H>  06-06-23 @ 05:30  BUN/CR -  79<H> / 2.33<H>  06-05-23 @ 05:30  BUN/CR -  77<H> / 2.41<H>  06-04-23 @ 06:00  BUN/CR -  78<H> / 2.17<H>     ABG Trend  05-19-23 @ 00:22   - 7.37/43/144<H>/99.8<H>  05-18-23 @ 05:38   - 7.41/37/97/98.4<H>  05-17-23 @ 23:36   - 7.42/37/97/98.3<H>  05-16-23 @ 22:10   - 7.41/41/92/98.5<H>  05-16-23 @ 05:59   - 7.35/47/104/98.8<H>     Trend AST/ALT/ALK Phos/Bili  06-06-23 @ 09:15   29/19/178<H>/0.6  06-06-23 @ 05:30   31/21/168<H>/0.6  06-05-23 @ 05:30   30/20/160<H>/0.6  06-04-23 @ 06:00   32/19/167<H>/0.7  06-03-23 @ 06:20   28/19/152<H>/0.5  06-02-23 @ 06:00   26/14/144<H>/0.4  05-31-23 @ 04:50   29/16/136<H>/0.3  05-29-23 @ 06:00   30/20/128<H>/0.4  05-28-23 @ 05:00   29/18/122<H>/0.4  05-27-23 @ 06:15   31/18/129<H>/0.4  05-25-23 @ 05:00   29/14/98/0.5  05-24-23 @ 05:30   24/12/98/0.6      Ammonia Trend  05-14-23 @ 11:30   -   53  05-14-23 @ 05:00   -   16       Albumin Trend  06-06-23 @ 09:15   -   1.6<L>  06-06-23 @ 05:30   -   1.6<L>  06-05-23 @ 05:30   -   1.5<L>  06-04-23 @ 06:00   -   1.6<L>  06-03-23 @ 06:20   -   1.5<L>  06-02-23 @ 06:00   -   1.5<L>      PTT - PT - INR Trend  05-19-23 @ 00:44   -   26.5<L> - 13.2 - 1.15  05-17-23 @ 23:44   -   31.2 - 13.7<H> - 1.18<H>  05-16-23 @ 22:16   -   31.3 - 13.6<H> - 1.18<H>  05-15-23 @ 13:55   -   32.9 - 14.0<H> - 1.19<H>  05-14-23 @ 13:45   -   33.6 - -- - --  05-14-23 @ 11:30   -   34.1 - 14.9<H> - 1.28<H>    Glucose Trend  06-06-23 @ 09:15   -  -- 265<H> --  06-06-23 @ 09:00   -  -- -- 186<H>  06-06-23 @ 05:30   -  -- 152<H> --  06-06-23 @ 05:06   -  -- -- 175<H>  06-05-23 @ 23:02   -  -- -- 195<H>  06-05-23 @ 21:06   -  -- -- 181<H>  06-05-23 @ 17:15   -  -- -- 203<H>  06-05-23 @ 11:01   -  -- -- 143<H>  06-05-23 @ 05:30   -  -- 126<H> --  06-05-23 @ 05:08   -  -- -- 145<H>    A1C with Estimated Average Glucose Result: 7.7 % *H* [4.0 - 5.6] (05-05-23 @ 08:00)      LABS:                        7.1    7.25  )-----------( 143      ( 06 Jun 2023 05:30 )             22.6     06-06    140  |  104  |  82<H>  ----------------------------<  265<H>  4.3   |  29  |  2.38<H>    Ca    8.0<L>      06 Jun 2023 09:15  Phos  4.2     06-06  Mg     2.0     06-06    TPro  7.5  /  Alb  1.6<L>  /  TBili  0.6  /  DBili  x   /  AST  29  /  ALT  19  /  AlkPhos  178<H>  06-06    CULTURES: (if applicable)    Culture - Sputum (collected 06-04-23 @ 21:20)  Source: .Sputum Sputum  Gram Stain (06-05-23 @ 10:19):    Numerous polymorphonuclear leukocytes per low power field    No Squamous epithelial cells per low power field    Moderate Gram Positive Cocci in Clusters per oil power field  Preliminary Report (06-06-23 @ 09:07):    Moderate Staphylococcus aureus    Culture - Urine (collected 06-03-23 @ 06:20)  Source: Clean Catch Clean Catch (Midstream)  Preliminary Report (06-05-23 @ 22:30):    >100,000 CFU/ml Enterococcus faecium    <10,000 CFU/ml Normal Urogenital chelsea present    Culture - Blood (collected 06-02-23 @ 09:45)  Source: .Blood Blood  Preliminary Report (06-03-23 @ 17:01):    No growth to date.    Culture - Blood (collected 06-02-23 @ 09:10)  Source: .Blood Blood  Gram Stain (06-03-23 @ 16:57):    Growth in aerobic bottle: Gram Positive Cocci in Clusters    Growth in anaerobic bottle: Gram Positive Cocci in Clusters  Final Report (06-04-23 @ 17:55):    Growth in aerobic and anaerobic bottles: Staphylococcus epidermidis    Coagulase Negative Staphylococci isolated from a single blood culture set    may represent contamination.    Contact the Microbiology Department at [phone] if susceptibility    testing is clinically indicated.    ***Blood Panel PCR results on this specimen are available    approximately 3 hours after the Gram stain result.***    Gram stain, PCR, and/or culture results may not always    correspond due to difference in methodologies.    ************************************************************    This PCR assay was performed by multiplex PCR. This    Assay tests for 66 bacterial and resistance gene targets.    Please refer to the [facility] test directory    at [email or web address] for details.  Organism: Blood Culture PCR (06-04-23 @ 17:55)  Organism: Blood Culture PCR (06-04-23 @ 17:55)      Method Type: PCR      -  Staphylococcus epidermidis, Methicillin resistant: Detec    Culture - Sputum (collected 05-26-23 @ 06:00)  Source: ET Tube ET Tube  Gram Stain (05-26-23 @ 13:21):    Few polymorphonuclear leukocytes seen per low power field    Moderate Squamous epithelial cells seen per low power field    Moderate Gram positive cocci in pairs, chains and clusters seen per oil    power field    Few Yeast like cells seen per oil power field    Rare Gram Negative Rods seen per oil power field  Final Report (05-30-23 @ 14:09):    Moderate Staphylococcus aureus    Moderate Streptococcus pneumoniae    Normal Respiratory Chelsea present  Organism: Staphylococcus aureus  Streptococcus pneumoniae (05-30-23 @ 14:09)  Organism: Streptococcus pneumoniae (05-30-23 @ 14:09)      Method Type: ALDO      -  Clindamycin: I 0.5      -  Erythromycin: R >0.5 Predicts results for azithromycin.      -  Levofloxacin: I 4      -  Trimethoprim/Sulfamethoxazole: S <=.25/4.75      -  Vancomycin: S 0.5      -  Ceftriaxone (meningitidis): I 1      -  Ceftriaxone (non-meningitidis): S 1      -  Penicillin (meningitidis): R 2      -  Penicillin (non-meningitidis): S 2      -  Penicillin (oral penicillin V): R 2  Organism: Staphylococcus aureus (05-30-23 @ 14:09)      Method Type: ALDO      -  Ampicillin/Sulbactam: S <=8/4      -  Cefazolin: S <=4      -  Clindamycin: S <=0.25      -  Erythromycin: S <=0.25      -  Gentamicin: S <=1 Should not be used as monotherapy      -  Oxacillin: S 0.5 Oxacillin predicts susceptibility for dicloxacillin, methicillin, and nafcillin      -  Penicillin: R >8      -  Rifampin: S <=1 Should not be used as monotherapy      -  Tetracycline: R >8      -  Trimethoprim/Sulfamethoxazole: S <=0.5/9.5      -  Vancomycin: S 2    Culture - Blood (collected 05-24-23 @ 15:05)  Source: .Blood Blood  Final Report (05-30-23 @ 01:00):    No Growth Final    Culture - Blood (collected 05-24-23 @ 14:30)  Source: .Blood Blood  Final Report (05-29-23 @ 19:00):    No Growth Final    Culture - Sputum (collected 05-24-23 @ 14:14)  Source: .Sputum Sputum  Gram Stain (05-24-23 @ 22:38):    Numerous polymorphonuclear leukocytes per low power field    No Squamous epithelial cells per low power field    Moderate Gram Positive Cocci in Clusters per oil power field  Final Report (05-26-23 @ 16:39):    Moderate Staphylococcus aureus    Normal Respiratory Chelsea absent  Organism: Staphylococcus aureus (05-26-23 @ 16:39)  Organism: Staphylococcus aureus (05-26-23 @ 16:39)      Method Type: ALDO      -  Ampicillin/Sulbactam: S <=8/4      -  Cefazolin: S <=4      -  Clindamycin: S <=0.25      -  Erythromycin: S <=0.25      -  Gentamicin: S <=1 Should not be used as monotherapy      -  Oxacillin: S 0.5 Oxacillin predicts susceptibility for dicloxacillin, methicillin, and nafcillin      -  Penicillin: R >8      -  Rifampin: S <=1 Should not be used as monotherapy      -  Tetracycline: R >8      -  Trimethoprim/Sulfamethoxazole: S <=0.5/9.5      -  Vancomycin: S 1             VITALS:  T(C): 37.5 (06-06-23 @ 09:00), Max: 37.5 (06-06-23 @ 01:00)  T(F): 99.5 (06-06-23 @ 09:00), Max: 99.5 (06-06-23 @ 01:00)  HR: 85 (06-06-23 @ 09:00) (78 - 100)  BP: 116/82 (06-06-23 @ 09:00) (107/59 - 136/77)  BP(mean): 94 (06-06-23 @ 09:00) (72 - 96)  ABP: --  ABP(mean): --  RR: 18 (06-06-23 @ 09:00) (13 - 26)  SpO2: 100% (06-06-23 @ 09:00) (98% - 100%)  CVP(mm Hg): --  CVP(cm H2O): --    Ins and Outs     06-05-23 @ 07:01  -  06-06-23 @ 07:00  --------------------------------------------------------  IN: 2735 mL / OUT: 900 mL / NET: 1835 mL    06-06-23 @ 07:01  -  06-06-23 @ 10:50  --------------------------------------------------------  IN: 360 mL / OUT: 250 mL / NET: 110 mL            Device: Avea, Mode: AC/ CMV (Assist Control/ Continuous Mandatory Ventilation), RR (machine): 12, RR (patient): 27, TV (machine): 450, TV (patient): 450, FiO2: 30, PEEP: 5, MAP: 12, PIP: 26    I&O's Detail    05 Jun 2023 07:01  -  06 Jun 2023 07:00  --------------------------------------------------------  IN:    Free Water: 1250 mL    IV PiggyBack: 250 mL    Vital1.5: 1235 mL  Total IN: 2735 mL    OUT:    Stool (mL): 250 mL    Voided (mL): 650 mL  Total OUT: 900 mL    Total NET: 1835 mL      06 Jun 2023 07:01  -  06 Jun 2023 10:50  --------------------------------------------------------  IN:    Free Water: 250 mL    Vital1.5: 110 mL  Total IN: 360 mL    OUT:    Stool (mL): 150 mL    Voided (mL): 100 mL  Total OUT: 250 mL    Total NET: 110 mL

## 2023-06-06 NOTE — PROGRESS NOTE ADULT - SUBJECTIVE AND OBJECTIVE BOX
Intubated in ICU     Vital Signs Last 24 Hrs  T(C): 37.3 (06-06-23 @ 16:00), Max: 37.7 (06-06-23 @ 14:00)  T(F): 99.2 (06-06-23 @ 16:00), Max: 99.8 (06-06-23 @ 14:00)  HR: 92 (06-06-23 @ 18:00) (78 - 102)  BP: 113/71 (06-06-23 @ 18:00) (107/59 - 145/103)  BP(mean): 85 (06-06-23 @ 18:00) (71 - 115)  RR: 17 (06-06-23 @ 18:00) (13 - 32)  SpO2: 99% (06-06-23 @ 18:00) (98% - 100%)    I&O's Detail    05 Jun 2023 07:01  -  06 Jun 2023 07:00  --------------------------------------------------------  IN:    Free Water: 1250 mL    IV PiggyBack: 250 mL    Vital1.5: 1235 mL  Total IN: 2735 mL    OUT:    Stool (mL): 250 mL    Voided (mL): 650 mL  Total OUT: 900 mL    Total NET: 1835 mL    06 Jun 2023 07:01  -  06 Jun 2023 18:37  --------------------------------------------------------  IN:    Enteral Tube Flush: 120 mL    Free Water: 850 mL    IV PiggyBack: 250 mL    Vital1.5: 605 mL  Total IN: 1825 mL    OUT:    Stool (mL): 450 mL    Voided (mL): 700 mL  Total OUT: 1150 mL    Total NET: 675 mL    Mode: AC/ CMV (Assist Control/ Continuous Mandatory Ventilation)  RR (machine): 12  TV (machine): 450  FiO2: 30  PEEP: 5  MAP: 15  PIP: 33    s1s2  b/l air entry  soft, ND  tr edema                                                                                   7.1    7.25  )-----------( 143      ( 06 Jun 2023 05:30 )             22.6     06 Jun 2023 09:15    140    |  104    |  82     ----------------------------<  265    4.3     |  29     |  2.38     Ca    8.0        06 Jun 2023 09:15  Phos  4.2       06 Jun 2023 05:30  Mg     2.0       06 Jun 2023 05:30    TPro  7.5    /  Alb  1.6    /  TBili  0.6    /  DBili  x      /  AST  29     /  ALT  19     /  AlkPhos  178    06 Jun 2023 09:15    LIVER FUNCTIONS - ( 06 Jun 2023 09:15 )  Alb: 1.6 g/dL / Pro: 7.5 g/dL / ALK PHOS: 178 U/L / ALT: 19 U/L / AST: 29 U/L / GGT: x           Culture - Sputum (collected 04 Jun 2023 21:20)  Source: .Sputum Sputum  Gram Stain (05 Jun 2023 10:19):    Numerous polymorphonuclear leukocytes per low power field    No Squamous epithelial cells per low power field    Moderate Gram Positive Cocci in Clusters per oil power field  Preliminary Report (06 Jun 2023 09:07):    Moderate Staphylococcus aureus    acetaminophen   Oral Liquid .. 650 milliGRAM(s) Oral every 6 hours PRN  albuterol/ipratropium for Nebulization 3 milliLiter(s) Nebulizer every 6 hours  brivaracetam Oral Solution 50 milliGRAM(s) Oral two times a day  chlorhexidine 0.12% Liquid 15 milliLiter(s) Oral Mucosa every 12 hours  chlorhexidine 2% Cloths 1 Application(s) Topical daily  clonazePAM  Tablet 0.5 milliGRAM(s) Oral every 12 hours  collagenase Ointment 1 Application(s) Topical daily  dextrose 5%. 1000 milliLiter(s) IV Continuous <Continuous>  dextrose 5%. 1000 milliLiter(s) IV Continuous <Continuous>  dextrose 50% Injectable 25 Gram(s) IV Push once  dextrose 50% Injectable 25 Gram(s) IV Push once  dextrose 50% Injectable 12.5 Gram(s) IV Push once  epoetin priyanka-epbx (RETACRIT) Injectable 30198 Unit(s) SubCutaneous every 7 days  insulin glargine Injectable (LANTUS) 10 Unit(s) SubCutaneous at bedtime  insulin lispro (ADMELOG) corrective regimen sliding scale   SubCutaneous every 6 hours  lidocaine 5% Ointment 1 Application(s) Topical every 12 hours  loperamide 2 milliGRAM(s) Oral every 6 hours PRN  modafinil 100 milliGRAM(s) Oral daily  multivitamin/minerals/iron Oral Solution (CENTRUM) 15 milliLiter(s) Oral daily  pantoprazole   Suspension 40 milliGRAM(s) Oral every 12 hours  senna 2 Tablet(s) Oral at bedtime PRN  simvastatin 40 milliGRAM(s) Oral at bedtime  vancomycin  IVPB 1000 milliGRAM(s) IV Intermittent every 24 hours  vancomycin  IVPB      zinc sulfate 220 milliGRAM(s) Oral daily    A/P:    Hemodynamic ATN s/p arrest (Cr 1.6 - 5/9/23, Cr 1.0 - 4/12/23)  Resp failure, intubated   CM, EF 35 - 40%, dementia  Anoxic encephalopathy   Vent support per ICU team  Avoid nephrotoxins  Lytes are stable  Cr is likely near new baseline   Now w/anemia, bacteremia  Abx, bld tx per ICU team  Epoetin  F/u CBC, BMP, UO, Vanco level  Dose Vanco by levels  Overall prognosis is poor    [phone]

## 2023-06-06 NOTE — PROGRESS NOTE ADULT - ASSESSMENT
Physical Examination:  GENERAL:               Eyes opens  and tracking  HEENT:                    Pupils equal, reactive to light.   No JVD, Dry MM  PULM:                     Bilateral air entry, Clear to auscultation bilaterally, + significant sputum production, No Rales, No Rhonchi, No Wheezing  CVS:                         S1, S2,  +Murmur  ABD:                        Soft, nondistended, nontender, normoactive bowel sounds,   EXT:                         No edema, nontender, No Cyanosis or Clubbing   Vascular:                Warm Extremities,   NEURO:                  Eyes open , increased + episodes of myoclonus, spontaneous movements of UE Left > Right  PSYC:                      Calm, no Insight.      Assessment  78 year old male with a PMH of dementia, HTN, cardiomyopathy, HFrEF, CAD s/p CABG, with known current RCA occlusion, and DM type 2 who was admitted to [facility] on 5/4 with hypoxic respiratory failure in setting of CHF exacerbation and ANISH with course complicated by acute hypoxic respiratory failure in setting of choking episode causing cardiac arrest, shock, worsening hypoxemic respiratory failure, and NSTEMI on 5/9. Pt ICU course noted post cardiac arrest anoxic brain injury and myoclonus. Pt was transferred to [facility] for VEEG which required 48 hours of monitoring as he was noted to still be sedated. While there pt noted to have hematochezia and any ac / antiplatelet agents were held at that time. EEG findings consistent with stimulus induced myoclonus and GPDs s/p hypoxic diffuse indicative of severe cerebral dysfunction.    Patient returned to [facility] ICU on 5/19 and continues tx / supportive care for management of:    Problem list  Post cardiac arrest 2/2 choking episode / respiratory failure  Acute respiratory failure   Severe Anoxic Encephalopathy with secondary cortical myoclonus  Acute renal failure w Uremia post cardiac arrest improving  Hemtochezia episode, h/h stable no further episodes noted  Heart failure with reduced EF  NSTEMI post arrest       Underlying PMH of dementia, HTN, cardiomyopathy, HFrEF, CAD s/p CABG, with known current RCA occlusion, and DM type 2      Plan:    Continue mechanical ventilation - as has poor mental status to extubate, unable to protect airway  CPAP trial done by me with ps 5, as above    Noted fever, cultures neg to date, wbc 10, will monitor off antibiotics till source identified,   Continue brivaracetam   continue PPI BID, trend cbc daily   TF as tolerated  tapered modafinil due to increased myoclonus       monitor blood glucose levels, + ISS coverage  Sacral decub - wound care called continue local wound care off loading and nutrition support  Venodyne for dvt ppx  noted diarrhea d/w Nutrition will change tube feeds     GOC ongoing discussion with family ,   Staph in sputum on vanco     PMD:				                   Notified(Date):  Family Updated: 	Kuldeep [phone]	                                 Date:  6/6/2023  updated on clinical status  plan for palliative wean tom or Thursday        Sedation & Analgesia:	  Diet/Nutrition:		 Diet, NPO with Tube Feed:   Tube Feeding Modality: Nasogastric         GI PPx:			pantoprazole   Suspension 40 milliGRAM(s) Oral daily  DVT Ppx:		Venodynes   Activity:		  bedrest  Head of Bed:               35-45 Deg  Glycemic Control:         Insulin    Lines: piv    Restraints were deemed necessary to prevent removal of life-sustaining devices [  ] YES   [  x  ]  NO    Disposition: ICU Care    Goals of Care: Full code

## 2023-06-07 LAB
-  AMPICILLIN/SULBACTAM: SIGNIFICANT CHANGE UP
-  CEFAZOLIN: SIGNIFICANT CHANGE UP
-  CLINDAMYCIN: SIGNIFICANT CHANGE UP
-  ERYTHROMYCIN: SIGNIFICANT CHANGE UP
-  GENTAMICIN: SIGNIFICANT CHANGE UP
-  OXACILLIN: SIGNIFICANT CHANGE UP
-  PENICILLIN: SIGNIFICANT CHANGE UP
-  RIFAMPIN: SIGNIFICANT CHANGE UP
-  TETRACYCLINE: SIGNIFICANT CHANGE UP
-  TRIMETHOPRIM/SULFAMETHOXAZOLE: SIGNIFICANT CHANGE UP
-  VANCOMYCIN: SIGNIFICANT CHANGE UP
ANION GAP SERPL CALC-SCNC: 6 MMOL/L — SIGNIFICANT CHANGE UP (ref 5–17)
BUN SERPL-MCNC: 77 MG/DL — HIGH (ref 7–23)
CALCIUM SERPL-MCNC: 8.1 MG/DL — LOW (ref 8.4–10.5)
CHLORIDE SERPL-SCNC: 103 MMOL/L — SIGNIFICANT CHANGE UP (ref 96–108)
CO2 SERPL-SCNC: 30 MMOL/L — SIGNIFICANT CHANGE UP (ref 22–31)
CREAT SERPL-MCNC: 2.36 MG/DL — HIGH (ref 0.5–1.3)
CULTURE RESULTS: SIGNIFICANT CHANGE UP
CULTURE RESULTS: SIGNIFICANT CHANGE UP
EGFR: 27 ML/MIN/1.73M2 — LOW
GLUCOSE BLDC GLUCOMTR-MCNC: 180 MG/DL — HIGH (ref 70–99)
GLUCOSE SERPL-MCNC: 160 MG/DL — HIGH (ref 70–99)
HCT VFR BLD CALC: 24.1 % — LOW (ref 39–50)
HGB BLD-MCNC: 7.4 G/DL — LOW (ref 13–17)
MAGNESIUM SERPL-MCNC: 2.1 MG/DL — SIGNIFICANT CHANGE UP (ref 1.6–2.6)
MCHC RBC-ENTMCNC: 30.5 PG — SIGNIFICANT CHANGE UP (ref 27–34)
MCHC RBC-ENTMCNC: 30.7 GM/DL — LOW (ref 32–36)
MCV RBC AUTO: 99.2 FL — SIGNIFICANT CHANGE UP (ref 80–100)
METHOD TYPE: SIGNIFICANT CHANGE UP
NRBC # BLD: 0 /100 WBCS — SIGNIFICANT CHANGE UP (ref 0–0)
ORGANISM # SPEC MICROSCOPIC CNT: SIGNIFICANT CHANGE UP
ORGANISM # SPEC MICROSCOPIC CNT: SIGNIFICANT CHANGE UP
PHOSPHATE SERPL-MCNC: 4 MG/DL — SIGNIFICANT CHANGE UP (ref 2.5–4.5)
PLATELET # BLD AUTO: 138 K/UL — LOW (ref 150–400)
POTASSIUM SERPL-MCNC: 4.2 MMOL/L — SIGNIFICANT CHANGE UP (ref 3.5–5.3)
POTASSIUM SERPL-SCNC: 4.2 MMOL/L — SIGNIFICANT CHANGE UP (ref 3.5–5.3)
RBC # BLD: 2.43 M/UL — LOW (ref 4.2–5.8)
RBC # FLD: 17.4 % — HIGH (ref 10.3–14.5)
SODIUM SERPL-SCNC: 139 MMOL/L — SIGNIFICANT CHANGE UP (ref 135–145)
SPECIMEN SOURCE: SIGNIFICANT CHANGE UP
SPECIMEN SOURCE: SIGNIFICANT CHANGE UP
VANCOMYCIN FLD-MCNC: 29.6 UG/ML
WBC # BLD: 7.59 K/UL — SIGNIFICANT CHANGE UP (ref 3.8–10.5)
WBC # FLD AUTO: 7.59 K/UL — SIGNIFICANT CHANGE UP (ref 3.8–10.5)

## 2023-06-07 PROCEDURE — 99497 ADVNCD CARE PLAN 30 MIN: CPT

## 2023-06-07 PROCEDURE — 71045 X-RAY EXAM CHEST 1 VIEW: CPT | Mod: 26

## 2023-06-07 RX ORDER — MORPHINE SULFATE 50 MG/1
2 CAPSULE, EXTENDED RELEASE ORAL ONCE
Refills: 0 | Status: DISCONTINUED | OUTPATIENT
Start: 2023-06-07 | End: 2023-06-09

## 2023-06-07 RX ORDER — MIDAZOLAM HYDROCHLORIDE 1 MG/ML
0.5 INJECTION, SOLUTION INTRAMUSCULAR; INTRAVENOUS ONCE
Refills: 0 | Status: DISCONTINUED | OUTPATIENT
Start: 2023-06-07 | End: 2023-06-07

## 2023-06-07 RX ORDER — MORPHINE SULFATE 50 MG/1
1 CAPSULE, EXTENDED RELEASE ORAL ONCE
Refills: 0 | Status: DISCONTINUED | OUTPATIENT
Start: 2023-06-07 | End: 2023-06-09

## 2023-06-07 RX ORDER — MORPHINE SULFATE 50 MG/1
1 CAPSULE, EXTENDED RELEASE ORAL
Qty: 100 | Refills: 0 | Status: DISCONTINUED | OUTPATIENT
Start: 2023-06-07 | End: 2023-06-08

## 2023-06-07 RX ADMIN — Medication 3 MILLILITER(S): at 04:02

## 2023-06-07 RX ADMIN — PANTOPRAZOLE SODIUM 40 MILLIGRAM(S): 20 TABLET, DELAYED RELEASE ORAL at 05:37

## 2023-06-07 RX ADMIN — Medication 3 MILLILITER(S): at 08:26

## 2023-06-07 RX ADMIN — Medication 2: at 05:38

## 2023-06-07 RX ADMIN — MIDAZOLAM HYDROCHLORIDE 0.5 MILLIGRAM(S): 1 INJECTION, SOLUTION INTRAMUSCULAR; INTRAVENOUS at 00:59

## 2023-06-07 RX ADMIN — Medication 0.5 MILLIGRAM(S): at 05:37

## 2023-06-07 RX ADMIN — Medication 1 MILLIGRAM(S): at 13:05

## 2023-06-07 RX ADMIN — CHLORHEXIDINE GLUCONATE 15 MILLILITER(S): 213 SOLUTION TOPICAL at 05:37

## 2023-06-07 RX ADMIN — BRIVARACETAM 50 MILLIGRAM(S): 25 TABLET, FILM COATED ORAL at 05:38

## 2023-06-07 RX ADMIN — MIDAZOLAM HYDROCHLORIDE 0.5 MILLIGRAM(S): 1 INJECTION, SOLUTION INTRAMUSCULAR; INTRAVENOUS at 03:11

## 2023-06-07 NOTE — PROVIDER CONTACT NOTE (CRITICAL VALUE NOTIFICATION) - PERSON GIVING RESULT:
Justus Mauricio 
Tomás SCANLON from the lab
Tiffany
Tomás Moreno, Lab
casper anderson
Tomás SCANLON from the lab
Tres - lab
abdullahi santos

## 2023-06-07 NOTE — PROVIDER CONTACT NOTE (CRITICAL VALUE NOTIFICATION) - NAME OF MD/NP/PA/DO NOTIFIED:
Dr. alanis SÁNCHEZ
Jim PICKERING
MD Holt
dr thapa
ENMA Alexandra NP
LADAN Ward
Praneeth Kaufman,PA
ENMA Alexandra NP.
Dr. Christ SÁNCHEZ

## 2023-06-07 NOTE — AIRWAY REMOVAL NOTE  ADULT & PEDS - ARTIFICAL AIRWAY REMOVAL COMMENTS
Pallative wean done. Pt extubated and placed on 100% NRB as per MD's order. No resp distress noted following extubation.

## 2023-06-07 NOTE — CHART NOTE - NSCHARTNOTEFT_GEN_A_CORE
Nutrition Follow Up Note  Hospital Course (Per Electronic Medical Record):   Source: Medical Record [X] MD[X]  Nursing Staff [X]     Diet: Vital 1.5 @ 55ml/hr x 24 hrs via NGT , Bannatrol x 2 with free water flush 200ml q 6 hrs     Patient     Current Weight:    Pertinent Medications: MEDICATIONS  (STANDING):  albuterol/ipratropium for Nebulization 3 milliLiter(s) Nebulizer every 6 hours  brivaracetam Oral Solution 50 milliGRAM(s) Oral two times a day  chlorhexidine 0.12% Liquid 15 milliLiter(s) Oral Mucosa every 12 hours  chlorhexidine 2% Cloths 1 Application(s) Topical daily  clonazePAM  Tablet 0.5 milliGRAM(s) Oral every 12 hours  collagenase Ointment 1 Application(s) Topical daily  dextrose 5%. 1000 milliLiter(s) (100 mL/Hr) IV Continuous <Continuous>  dextrose 5%. 1000 milliLiter(s) (50 mL/Hr) IV Continuous <Continuous>  dextrose 50% Injectable 25 Gram(s) IV Push once  dextrose 50% Injectable 12.5 Gram(s) IV Push once  dextrose 50% Injectable 25 Gram(s) IV Push once  epoetin priyanka-epbx (RETACRIT) Injectable 18484 Unit(s) SubCutaneous every 7 days  insulin glargine Injectable (LANTUS) 10 Unit(s) SubCutaneous at bedtime  insulin lispro (ADMELOG) corrective regimen sliding scale   SubCutaneous every 6 hours  modafinil 100 milliGRAM(s) Oral daily  multivitamin/minerals/iron Oral Solution (CENTRUM) 15 milliLiter(s) Oral daily  pantoprazole   Suspension 40 milliGRAM(s) Oral every 12 hours  simvastatin 40 milliGRAM(s) Oral at bedtime  zinc sulfate 220 milliGRAM(s) Oral daily    MEDICATIONS  (PRN):  acetaminophen   Oral Liquid .. 650 milliGRAM(s) Oral every 6 hours PRN Temp greater or equal to 38C (100.4F)  loperamide 2 milliGRAM(s) Oral every 6 hours PRN Diarrhea  senna 2 Tablet(s) Oral at bedtime PRN Constipation      Pertinent Labs:  06-07 Na139 mmol/L Glu 160 mg/dL<H> K+ 4.2 mmol/L Cr  2.36 mg/dL<H> BUN 77 mg/dL<H> 06-07 Phos 4.0 mg/dL 06-06 Alb 1.6 g/dL<L>        Skin:     Edema:    Last BM: (6/7) ,     Estimated Needs:   [X] No Change since Previous Assessment  [X] Recalculated:     Previous Nutrition Diagnosis:     Nutrition Diagnosis is [X] Ongoing    [X] Resolved -     New Nutrition Diagnosis: [X] Not Applicable  [X] Chewing Difficulties    [X] Swallowing Difficulties    [X] Predicted Suboptimal Energy Intake  [X] Obesity   [X] Inadequate Oral Intake   [X] Increased Nutrient Needs   [X] Excessive Energy Intake   [X] Altered GI Function   [X] Unintended Weight Loss   [X] Food & Nutrition Related Knowledge Deficit  [X] Limited Adherence to nutrition related recommendations   [X] Mild Moderate Severe Malnutrition      Interventions:   1. Recommend  2.     Monitoring & Evaluation: will monitor:  [X] Weights   [X] PO Intake   [X] Follow Up (Per Protocol)  [X] Tolerance to Diet Prescription       RD to follow as per Nutrition protocol  Preethi Carnes RDN Nutrition Follow Up Note  Hospital Course (Per Electronic Medical Record):   Source: Medical Record [X] MD[X]  Nursing Staff [X]     Diet: Vital 1.5 @ 55ml/hr x 24 hrs, , Banatrol x 2 with free water flush 200ml q 6 hrs via NGT    Patient remains intubated , NGT feeds infusing @ 55ml/hr , Patient with loose stool , EN formula changed & Banatrol added, Imodium added (6/6) ,Labs reviewed , as per renal Cr is likely near baseline , POCT reviewed , insulin regimen , patient noted with prolonged NGT feeds , suggest PEG placement . awaiting family decision regarding possible PEG / Trach ,       Current Weight: (6/7) 167.2/76.2kg                          (6/6) 168.6/76.5kg                         (6/5) 175.2/79.5kg    Pertinent Medications: MEDICATIONS  (STANDING):  albuterol/ipratropium for Nebulization 3 milliLiter(s) Nebulizer every 6 hours  brivaracetam Oral Solution 50 milliGRAM(s) Oral two times a day  chlorhexidine 0.12% Liquid 15 milliLiter(s) Oral Mucosa every 12 hours  chlorhexidine 2% Cloths 1 Application(s) Topical daily  clonazePAM  Tablet 0.5 milliGRAM(s) Oral every 12 hours  collagenase Ointment 1 Application(s) Topical daily  dextrose 5%. 1000 milliLiter(s) (100 mL/Hr) IV Continuous <Continuous>  dextrose 5%. 1000 milliLiter(s) (50 mL/Hr) IV Continuous <Continuous>  dextrose 50% Injectable 25 Gram(s) IV Push once  dextrose 50% Injectable 12.5 Gram(s) IV Push once  dextrose 50% Injectable 25 Gram(s) IV Push once  epoetin priyanka-epbx (RETACRIT) Injectable 26557 Unit(s) SubCutaneous every 7 days  insulin glargine Injectable (LANTUS) 10 Unit(s) SubCutaneous at bedtime  insulin lispro (ADMELOG) corrective regimen sliding scale   SubCutaneous every 6 hours  modafinil 100 milliGRAM(s) Oral daily  multivitamin/minerals/iron Oral Solution (CENTRUM) 15 milliLiter(s) Oral daily  pantoprazole   Suspension 40 milliGRAM(s) Oral every 12 hours  simvastatin 40 milliGRAM(s) Oral at bedtime  zinc sulfate 220 milliGRAM(s) Oral daily    MEDICATIONS  (PRN):  acetaminophen   Oral Liquid .. 650 milliGRAM(s) Oral every 6 hours PRN Temp greater or equal to 38C (100.4F)  loperamide 2 milliGRAM(s) Oral every 6 hours PRN Diarrhea  senna 2 Tablet(s) Oral at bedtime PRN Constipation      Pertinent Labs:  06-07 Na139 mmol/L Glu 160 mg/dL<H> K+ 4.2 mmol/L Cr  2.36 mg/dL<H> BUN 77 mg/dL<H> 06-07 Phos 4.0 mg/dL 06-06 Alb 1.6 g/dL<L>  Hgb 7.4 g/dl<L>, Hct 24.1%<L> ,   POCT 180,184,209,192    Skin: sacral DTI , MVI , Vit C & Zinc supplementation provided     Edema: (+1) generalized edema , Albumin 1.6g/dl<L>     Last BM: (6/7) ,     Estimated Needs:   [X] No Change since Previous Assessment      Previous Nutrition Diagnosis: Severe Protein Calorie Malnutrition & increased nutrient needs     Nutrition Diagnosis is [X] Ongoing & addressed        New Nutrition Diagnosis: [X] Not Applicable      Interventions:   1. continue current nutrition regimen       Monitoring & Evaluation: will monitor:  [X] Weights   [X] tolerance to EN feeds    [X] Follow Up (Per Protocol)  [X] Tolerance to Diet Prescription       RD to follow as per Nutrition protocol  Preethi Carnes RDN

## 2023-06-07 NOTE — PROGRESS NOTE ADULT - ASSESSMENT
Physical Examination:  GENERAL:               Eyes opens  and tracking  HEENT:                    Pupils equal, reactive to light.   No JVD, Dry MM  PULM:                     Bilateral air entry, Clear to auscultation bilaterally, no significant sputum production, trac Rales, No Rhonchi, No Wheezing  CVS:                         S1, S2,  +Murmur  ABD:                        Soft, nondistended, nontender, normoactive bowel sounds,   EXT:                         No edema, nontender, No Cyanosis or Clubbing   Vascular:                Warm Extremities,   NEURO:                  Eyes open , increased + episodes of myoclonus, spontaneous movements of UE Left > Right  PSYC:                      Calm, no Insight.      Assessment  78 year old male with a PMH of dementia, HTN, cardiomyopathy, HFrEF, CAD s/p CABG, with known current RCA occlusion, and DM type 2 who was admitted to [facility] on 5/4 with hypoxic respiratory failure in setting of CHF exacerbation and ANISH with course complicated by acute hypoxic respiratory failure in setting of choking episode causing cardiac arrest, shock, worsening hypoxemic respiratory failure, and NSTEMI on 5/9. Pt ICU course noted post cardiac arrest anoxic brain injury and myoclonus. Pt was transferred to [facility] for VEEG which required 48 hours of monitoring as he was noted to still be sedated. While there pt noted to have hematochezia and any ac / antiplatelet agents were held at that time. EEG findings consistent with stimulus induced myoclonus and GPDs s/p hypoxic diffuse indicative of severe cerebral dysfunction.    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  wbc improving and noted elevated vanco level, will d/c vanco for UTI    Continue brivaracetam   continue PPI BID,   TF as tolerated  tapered modafinil due to increased myoclonus  Klonopin for comfort       monitor blood glucose levels, + ISS coverage  Sacral decub - wound care called continue local wound care off loading and nutrition support  Venodyne for dvt ppx  less diarrhea with current tube feeds, but decreasing      GOC ongoing discussion with family ,     PMD:				                   Notified(Date):  Family Updated: 	Kuldeep [phone]	                                 Date:  6/7/2023  as above      Sedation & Analgesia:	  Diet/Nutrition:		 Diet, NPO with Tube Feed:   Tube Feeding Modality: Nasogastric         GI PPx:			pantoprazole   Suspension 40 milliGRAM(s) Oral daily  DVT Ppx:		Venodynes   Activity:		  bedrest  Head of Bed:               35-45 Deg  Glycemic Control:         Insulin    Lines: piv    Restraints were deemed necessary to prevent removal of life-sustaining devices [  ] YES   [  x  ]  NO    Disposition: ICU Care    Goals of Care: Full code

## 2023-06-07 NOTE — PROVIDER CONTACT NOTE (CRITICAL VALUE NOTIFICATION) - TEST AND RESULT REPORTED:
Lactate 2.1
Troponin 131.9
potassium 7.1
hgb 7
pH 7.1, pCO2 83
vancomycin 29.6
H/Hn 6.8/22.1
Lactate 12.6, troponin 504
Troponin 2243.1

## 2023-06-07 NOTE — PROGRESS NOTE ADULT - SUBJECTIVE AND OBJECTIVE BOX
Follow-up Critical Care Progress Note  Chief Complaint : Atrial fibrillation    pateint seen and examined   eyes open at tymes  myoclonus less        Allergies :sulfa drugs (Unknown)      PAST MEDICAL & SURGICAL HISTORY:  HTN (hypertension)    HLD (hyperlipidemia)    Diabetes    CAD (coronary artery disease)    History of cholecystectomy        Medications:  MEDICATIONS  (STANDING):  albuterol/ipratropium for Nebulization 3 milliLiter(s) Nebulizer every 6 hours  brivaracetam Oral Solution 50 milliGRAM(s) Oral two times a day  chlorhexidine 0.12% Liquid 15 milliLiter(s) Oral Mucosa every 12 hours  chlorhexidine 2% Cloths 1 Application(s) Topical daily  clonazePAM  Tablet 0.5 milliGRAM(s) Oral every 12 hours  collagenase Ointment 1 Application(s) Topical daily  dextrose 5%. 1000 milliLiter(s) (100 mL/Hr) IV Continuous <Continuous>  dextrose 5%. 1000 milliLiter(s) (50 mL/Hr) IV Continuous <Continuous>  dextrose 50% Injectable 25 Gram(s) IV Push once  dextrose 50% Injectable 25 Gram(s) IV Push once  dextrose 50% Injectable 12.5 Gram(s) IV Push once  epoetin priyanka-epbx (RETACRIT) Injectable 47785 Unit(s) SubCutaneous every 7 days  insulin glargine Injectable (LANTUS) 10 Unit(s) SubCutaneous at bedtime  insulin lispro (ADMELOG) corrective regimen sliding scale   SubCutaneous every 6 hours  modafinil 100 milliGRAM(s) Oral daily  multivitamin/minerals/iron Oral Solution (CENTRUM) 15 milliLiter(s) Oral daily  pantoprazole   Suspension 40 milliGRAM(s) Oral every 12 hours  simvastatin 40 milliGRAM(s) Oral at bedtime  zinc sulfate 220 milliGRAM(s) Oral daily    MEDICATIONS  (PRN):  acetaminophen   Oral Liquid .. 650 milliGRAM(s) Oral every 6 hours PRN Temp greater or equal to 38C (100.4F)  loperamide 2 milliGRAM(s) Oral every 6 hours PRN Diarrhea  senna 2 Tablet(s) Oral at bedtime PRN Constipation      Antibiotics History  cefepime   IVPB    , 05-24-23 @ 17:36  cefepime   IVPB 500 milliGRAM(s) IV Intermittent once, 05-24-23 @ 16:39  cefepime   IVPB 500 milliGRAM(s) IV Intermittent daily, 05-25-23 @ 12:00  vancomycin  IVPB 1000 milliGRAM(s) IV Intermittent once, 06-03-23 @ 12:32, Stop order after: 1 Doses  vancomycin  IVPB    , 06-03-23 @ 12:32  vancomycin  IVPB 1000 milliGRAM(s) IV Intermittent every 24 hours, 06-04-23 @ 12:32, Stop order after: 42 Days      Heme Medications       GI Medications  loperamide 2 milliGRAM(s) Oral every 6 hours, 06-06-23 @ 11:04, Routine PRN  pantoprazole   Suspension 40 milliGRAM(s) Oral every 12 hours, 05-25-23 @ 08:37, Routine  senna 2 Tablet(s) Oral at bedtime, 06-02-23 @ 08:38,  PRN      COVID  05-04-23 @ 19:50  COVID -   NotDetec  07-13-22 @ 18:33  COVID -   NotDetec      COVID Biomarkers    05-04-23 @ 19:50 ESR --  ---  CRP --  ---  DDimer  351<H>   ---   LDH --   ---   Ferritin --            Trend Cardiac Enzymes    Trend BNP  05-14-23 @ 11:30   -  61485<H>  05-11-23 @ 06:05   -  65391<H>  05-10-23 @ 05:10   -  07161<H>  05-08-23 @ 09:05   -  27911<H>  05-04-23 @ 19:50   -  73390<H>    Procalcitonin Trend  06-05-23 @ 05:30   -   0.24<H>  06-04-23 @ 06:00   -   0.20<H>  05-17-23 @ 23:44   -   0.59<H>  05-16-23 @ 22:16   -   0.74<H>  05-15-23 @ 05:45   -   1.12<H>  05-09-23 @ 13:42   -   0.11<H>    WBC Trend  06-07-23 @ 05:10   -  7.59  06-06-23 @ 05:30   -  7.25  06-05-23 @ 05:30   -  10.01    H/H Trend  06-07-23 @ 05:10   -   7.4<L>/ 24.1<L>  06-06-23 @ 05:30   -   7.1<L>/ 22.6<L>  06-05-23 @ 05:30   -   7.1<L>/ 21.9<L>  06-04-23 @ 06:00   -   7.8<L>/ 24.9<L>  06-03-23 @ 06:20   -   6.8<LL>/ 22.1<L>  06-02-23 @ 11:32   -   7.3<L>/ 23.9<L>    Stool Occult Blood  06-02-23 @ 08:20   -   Negative  05-25-23 @ 18:20   -   Negative  05-16-23 @ 17:00   -   Positive<!>    Platelet Trend  06-07-23 @ 05:10   -  138<L>  06-06-23 @ 05:30   -  143<L>  06-05-23 @ 05:30   -  164    Trend Sodium  06-07-23 @ 05:10   -  139  06-06-23 @ 09:15   -  140  06-06-23 @ 05:30   -  140  06-05-23 @ 05:30   -  142    Trend Potassium  06-07-23 @ 05:10   -  4.2  06-06-23 @ 09:15   -  4.3  06-06-23 @ 05:30   -  7.1<HH>  06-05-23 @ 05:30   -  4.2    Trend Bun/Cr  06-07-23 @ 05:10  BUN/CR -  77<H> / 2.36<H>  06-06-23 @ 09:15  BUN/CR -  82<H> / 2.38<H>  06-06-23 @ 05:30  BUN/CR -  79<H> / 2.33<H>  06-05-23 @ 05:30  BUN/CR -  77<H> / 2.41<H>    Lactic Acid Trend    ABG Trend  05-19-23 @ 00:22   - 7.37/43/144<H>/99.8<H>  05-18-23 @ 05:38   - 7.41/37/97/98.4<H>  05-17-23 @ 23:36   - 7.42/37/97/98.3<H>  05-16-23 @ 22:10   - 7.41/41/92/98.5<H>  05-16-23 @ 05:59   - 7.35/47/104/98.8<H>  05-15-23 @ 13:45   - 7.36/47/74<L>/96.6  05-15-23 @ 04:45   - 7.33<L>/48/70<L>/95.7  05-14-23 @ 05:20   - 7.40/46/72<L>/96.5  05-13-23 @ 06:00   - 7.34<L>/46/93/98.7<H>  05-13-23 @ 03:35   - 7.31<L>/46/108/99.6<H>  05-12-23 @ 23:15   - 7.31<L>/49<H>/79<L>/97.1  05-11-23 @ 07:55   - 7.41/42/152<H>/99.2<H>    Trend AST/ALT/ALK Phos/Bili  06-06-23 @ 09:15   29/19/178<H>/0.6  06-06-23 @ 05:30   31/21/168<H>/0.6  06-05-23 @ 05:30   30/20/160<H>/0.6  06-04-23 @ 06:00   32/19/167<H>/0.7  06-03-23 @ 06:20   28/19/152<H>/0.5  06-02-23 @ 06:00   26/14/144<H>/0.4  05-31-23 @ 04:50   29/16/136<H>/0.3  05-29-23 @ 06:00   30/20/128<H>/0.4  05-28-23 @ 05:00   29/18/122<H>/0.4  05-27-23 @ 06:15   31/18/129<H>/0.4  05-25-23 @ 05:00   29/14/98/0.5  05-24-23 @ 05:30   24/12/98/0.6      Ammonia Trend  05-14-23 @ 11:30   -   53  05-14-23 @ 05:00   -   16      Amylase / Lipase Trend      Albumin Trend  06-06-23 @ 09:15   -   1.6<L>  06-06-23 @ 05:30   -   1.6<L>  06-05-23 @ 05:30   -   1.5<L>  06-04-23 @ 06:00   -   1.6<L>  06-03-23 @ 06:20   -   1.5<L>  06-02-23 @ 06:00   -   1.5<L>      PTT - PT - INR Trend  05-19-23 @ 00:44   -   26.5<L> - 13.2 - 1.15  05-17-23 @ 23:44   -   31.2 - 13.7<H> - 1.18<H>  05-16-23 @ 22:16   -   31.3 - 13.6<H> - 1.18<H>  05-15-23 @ 13:55   -   32.9 - 14.0<H> - 1.19<H>  05-14-23 @ 13:45   -   33.6 - -- - --  05-14-23 @ 11:30   -   34.1 - 14.9<H> - 1.28<H>    Glucose Trend  06-07-23 @ 05:10   -  -- 160<H> --  06-07-23 @ 04:53   -  -- -- 180<H>  06-06-23 @ 23:30   -  -- -- 184<H>  06-06-23 @ 20:20   -  -- -- 209<H>  06-06-23 @ 17:11   -  -- -- 192<H>  06-06-23 @ 11:32   -  -- -- 150<H>  06-06-23 @ 09:15   -  -- 265<H> --  06-06-23 @ 09:00   -  -- -- 186<H>  06-06-23 @ 05:30   -  -- 152<H> --  06-06-23 @ 05:06   -  -- -- 175<H>    A1C with Estimated Average Glucose Result: 7.7 % *H* [4.0 - 5.6] (05-05-23 @ 08:00)      LABS:                        7.4    7.59  )-----------( 138      ( 07 Jun 2023 05:10 )             24.1     06-07    139  |  103  |  77<H>  ----------------------------<  160<H>  4.2   |  30  |  2.36<H>    Ca    8.1<L>      07 Jun 2023 05:10  Phos  4.0     06-07  Mg     2.1     06-07    TPro  7.5  /  Alb  1.6<L>  /  TBili  0.6  /  DBili  x   /  AST  29  /  ALT  19  /  AlkPhos  178<H>  06-06                Procalcitonin, Serum: 0.24 ng/mL (06-05-23 @ 05:30)          CULTURES: (if applicable)    Culture - Sputum (collected 06-04-23 @ 21:20)  Source: .Sputum Sputum  Gram Stain (06-05-23 @ 10:19):    Numerous polymorphonuclear leukocytes per low power field    No Squamous epithelial cells per low power field    Moderate Gram Positive Cocci in Clusters per oil power field  Final Report (06-07-23 @ 10:05):    Numerous Staphylococcus aureus    Normal Respiratory Carrie absent  Organism: Staphylococcus aureus (06-07-23 @ 10:05)  Organism: Staphylococcus aureus (06-07-23 @ 10:05)      Method Type: ALDO      -  Ampicillin/Sulbactam: S <=8/4      -  Cefazolin: S <=4      -  Clindamycin: S <=0.25      -  Erythromycin: S <=0.25      -  Gentamicin: S <=1 Should not be used as monotherapy      -  Oxacillin: S 1 Oxacillin predicts susceptibility for dicloxacillin, methicillin, and nafcillin      -  Penicillin: R >8      -  Rifampin: S <=1 Should not be used as monotherapy      -  Tetracycline: R >8      -  Trimethoprim/Sulfamethoxazole: S <=0.5/9.5      -  Vancomycin: S 2    Culture - Urine (collected 06-03-23 @ 06:20)  Source: Clean Catch Clean Catch (Midstream)  Final Report (06-06-23 @ 22:34):    >100,000 CFU/ml Enterococcus faecium    <10,000 CFU/ml Normal Urogenital carrie present  Organism: Enterococcus faecium (06-06-23 @ 22:34)  Organism: Enterococcus faecium (06-06-23 @ 22:34)      Method Type: ALDO      -  Ampicillin: R >8 Predicts results to ampicillin/sulbactam, amoxacillin-clavulanate and  piperacillin-tazobactam.      -  Ciprofloxacin: R >2      -  Levofloxacin: R >4      -  Nitrofurantoin: S <=32 Should not be used to treat pyelonephritis.      -  Tetracycline: R >8      -  Vancomycin: S 0.5    Culture - Blood (collected 06-02-23 @ 09:45)  Source: .Blood Blood  Preliminary Report (06-03-23 @ 17:01):    No growth to date.    Culture - Blood (collected 06-02-23 @ 09:10)  Source: .Blood Blood  Gram Stain (06-03-23 @ 16:57):    Growth in aerobic bottle: Gram Positive Cocci in Clusters    Growth in anaerobic bottle: Gram Positive Cocci in Clusters  Final Report (06-04-23 @ 17:55):    Growth in aerobic and anaerobic bottles: Staphylococcus epidermidis    Coagulase Negative Staphylococci isolated from a single blood culture set    may represent contamination.    Contact the Microbiology Department at [phone] if susceptibility    testing is clinically indicated.    ***Blood Panel PCR results on this specimen are available    approximately 3 hours after the Gram stain result.***    Gram stain, PCR, and/or culture results may not always    correspond due to difference in methodologies.    ************************************************************    This PCR assay was performed by multiplex PCR. This    Assay tests for 66 bacterial and resistance gene targets.    Please refer to the [facility] test directory    at [email or web address] for details.  Organism: Blood Culture PCR (06-04-23 @ 17:55)  Organism: Blood Culture PCR (06-04-23 @ 17:55)      Method Type: PCR      -  Staphylococcus epidermidis, Methicillin resistant: Detec    Culture - Sputum (collected 05-26-23 @ 06:00)  Source: ET Tube ET Tube  Gram Stain (05-26-23 @ 13:21):    Few polymorphonuclear leukocytes seen per low power field    Moderate Squamous epithelial cells seen per low power field    Moderate Gram positive cocci in pairs, chains and clusters seen per oil    power field    Few Yeast like cells seen per oil power field    Rare Gram Negative Rods seen per oil power field  Final Report (05-30-23 @ 14:09):    Moderate Staphylococcus aureus    Moderate Streptococcus pneumoniae    Normal Respiratory Carrie present  Organism: Staphylococcus aureus  Streptococcus pneumoniae (05-30-23 @ 14:09)  Organism: Streptococcus pneumoniae (05-30-23 @ 14:09)      Method Type: ALDO      -  Clindamycin: I 0.5      -  Erythromycin: R >0.5 Predicts results for azithromycin.      -  Levofloxacin: I 4      -  Trimethoprim/Sulfamethoxazole: S <=.25/4.75      -  Vancomycin: S 0.5      -  Ceftriaxone (meningitidis): I 1      -  Ceftriaxone (non-meningitidis): S 1      -  Penicillin (meningitidis): R 2      -  Penicillin (non-meningitidis): S 2      -  Penicillin (oral penicillin V): R 2  Organism: Staphylococcus aureus (05-30-23 @ 14:09)      Method Type: ALDO      -  Ampicillin/Sulbactam: S <=8/4      -  Cefazolin: S <=4      -  Clindamycin: S <=0.25      -  Erythromycin: S <=0.25      -  Gentamicin: S <=1 Should not be used as monotherapy      -  Oxacillin: S 0.5 Oxacillin predicts susceptibility for dicloxacillin, methicillin, and nafcillin      -  Penicillin: R >8      -  Rifampin: S <=1 Should not be used as monotherapy      -  Tetracycline: R >8      -  Trimethoprim/Sulfamethoxazole: S <=0.5/9.5      -  Vancomycin: S 2    Culture - Blood (collected 05-24-23 @ 15:05)  Source: .Blood Blood  Final Report (05-30-23 @ 01:00):    No Growth Final    Culture - Blood (collected 05-24-23 @ 14:30)  Source: .Blood Blood  Final Report (05-29-23 @ 19:00):    No Growth Final    Culture - Sputum (collected 05-24-23 @ 14:14)  Source: .Sputum Sputum  Gram Stain (05-24-23 @ 22:38):    Numerous polymorphonuclear leukocytes per low power field    No Squamous epithelial cells per low power field    Moderate Gram Positive Cocci in Clusters per oil power field  Final Report (05-26-23 @ 16:39):    Moderate Staphylococcus aureus    Normal Respiratory Carrie absent  Organism: Staphylococcus aureus (05-26-23 @ 16:39)  Organism: Staphylococcus aureus (05-26-23 @ 16:39)      Method Type: ALDO      -  Ampicillin/Sulbactam: S <=8/4      -  Cefazolin: S <=4      -  Clindamycin: S <=0.25      -  Erythromycin: S <=0.25      -  Gentamicin: S <=1 Should not be used as monotherapy      -  Oxacillin: S 0.5 Oxacillin predicts susceptibility for dicloxacillin, methicillin, and nafcillin      -  Penicillin: R >8      -  Rifampin: S <=1 Should not be used as monotherapy      -  Tetracycline: R >8      -  Trimethoprim/Sulfamethoxazole: S <=0.5/9.5      -  Vancomycin: S 1            CAPILLARY BLOOD GLUCOSE      POCT Blood Glucose.: 180 mg/dL (07 Jun 2023 04:53)      RADIOLOGY  CXR:      CT:    ECHO:      VITALS:  T(C): 37.3 (06-07-23 @ 05:00), Max: 37.7 (06-06-23 @ 14:00)  T(F): 99.1 (06-07-23 @ 05:00), Max: 99.8 (06-06-23 @ 14:00)  HR: 82 (06-07-23 @ 10:00) (71 - 102)  BP: 125/66 (06-07-23 @ 10:00) (94/64 - 145/103)  BP(mean): 83 (06-07-23 @ 10:00) (68 - 115)  ABP: --  ABP(mean): --  RR: 18 (06-07-23 @ 10:00) (13 - 32)  SpO2: 100% (06-07-23 @ 10:00) (96% - 100%)  CVP(mm Hg): --  CVP(cm H2O): --    Ins and Outs     06-06-23 @ 07:01  -  06-07-23 @ 07:00  --------------------------------------------------------  IN: 3290 mL / OUT: 1860 mL / NET: 1430 mL            Device: Avea, Mode: AC/ CMV (Assist Control/ Continuous Mandatory Ventilation), RR (machine): 12, RR (patient): 20, TV (machine): 450, TV (patient): 450, FiO2: 30, PEEP: 5, MAP: 10, PIP: 27    I&O's Detail    06 Jun 2023 07:01  -  07 Jun 2023 07:00  --------------------------------------------------------  IN:    Enteral Tube Flush: 120 mL    Free Water: 1600 mL    IV PiggyBack: 250 mL    Vital1.5: 1320 mL  Total IN: 3290 mL    OUT:    Stool (mL): 600 mL    Voided (mL): 1260 mL  Total OUT: 1860 mL    Total NET: 1430 mL      Follow-up Critical Care Progress Note  Chief Complaint : Atrial fibrillation    patient seen and examined   eyes open at times  myoclonus less  unable to follow commands  failed cpap today        Allergies :sulfa drugs (Unknown)      PAST MEDICAL & SURGICAL HISTORY:  HTN (hypertension)    HLD (hyperlipidemia)    Diabetes    CAD (coronary artery disease)    History of cholecystectomy        Medications:  MEDICATIONS  (STANDING):  albuterol/ipratropium for Nebulization 3 milliLiter(s) Nebulizer every 6 hours  brivaracetam Oral Solution 50 milliGRAM(s) Oral two times a day  chlorhexidine 0.12% Liquid 15 milliLiter(s) Oral Mucosa every 12 hours  chlorhexidine 2% Cloths 1 Application(s) Topical daily  clonazePAM  Tablet 0.5 milliGRAM(s) Oral every 12 hours  collagenase Ointment 1 Application(s) Topical daily  dextrose 5%. 1000 milliLiter(s) (100 mL/Hr) IV Continuous <Continuous>  dextrose 5%. 1000 milliLiter(s) (50 mL/Hr) IV Continuous <Continuous>  dextrose 50% Injectable 25 Gram(s) IV Push once  dextrose 50% Injectable 25 Gram(s) IV Push once  dextrose 50% Injectable 12.5 Gram(s) IV Push once  epoetin priyanka-epbx (RETACRIT) Injectable 29219 Unit(s) SubCutaneous every 7 days  insulin glargine Injectable (LANTUS) 10 Unit(s) SubCutaneous at bedtime  insulin lispro (ADMELOG) corrective regimen sliding scale   SubCutaneous every 6 hours  modafinil 100 milliGRAM(s) Oral daily  multivitamin/minerals/iron Oral Solution (CENTRUM) 15 milliLiter(s) Oral daily  pantoprazole   Suspension 40 milliGRAM(s) Oral every 12 hours  simvastatin 40 milliGRAM(s) Oral at bedtime  zinc sulfate 220 milliGRAM(s) Oral daily    MEDICATIONS  (PRN):  acetaminophen   Oral Liquid .. 650 milliGRAM(s) Oral every 6 hours PRN Temp greater or equal to 38C (100.4F)  loperamide 2 milliGRAM(s) Oral every 6 hours PRN Diarrhea  senna 2 Tablet(s) Oral at bedtime PRN Constipation      Antibiotics History  cefepime   IVPB    , 05-24-23 @ 17:36  cefepime   IVPB 500 milliGRAM(s) IV Intermittent once, 05-24-23 @ 16:39  cefepime   IVPB 500 milliGRAM(s) IV Intermittent daily, 05-25-23 @ 12:00  vancomycin  IVPB 1000 milliGRAM(s) IV Intermittent once, 06-03-23 @ 12:32, Stop order after: 1 Doses  vancomycin  IVPB    , 06-03-23 @ 12:32  vancomycin  IVPB 1000 milliGRAM(s) IV Intermittent every 24 hours, 06-04-23 @ 12:32, Stop order after: 42 Days      Heme Medications       GI Medications  loperamide 2 milliGRAM(s) Oral every 6 hours, 06-06-23 @ 11:04, Routine PRN  pantoprazole   Suspension 40 milliGRAM(s) Oral every 12 hours, 05-25-23 @ 08:37, Routine  senna 2 Tablet(s) Oral at bedtime, 06-02-23 @ 08:38,  PRN      COVID  05-04-23 @ 19:50  COVID -   NotDetec  07-13-22 @ 18:33  COVID -   NotDete      COVID Biomarkers    05-04-23 @ 19:50 ESR --  ---  CRP --  ---  DDimer  351<H>   ---   LDH --   ---   Ferritin --            Trend Cardiac Enzymes    Trend BNP  05-14-23 @ 11:30   -  36336<H>  05-11-23 @ 06:05   -  84547<H>  05-10-23 @ 05:10   -  34800<H>  05-08-23 @ 09:05   -  64801<H>  05-04-23 @ 19:50   -  70038<H>    Procalcitonin Trend  06-05-23 @ 05:30   -   0.24<H>  06-04-23 @ 06:00   -   0.20<H>  05-17-23 @ 23:44   -   0.59<H>  05-16-23 @ 22:16   -   0.74<H>  05-15-23 @ 05:45   -   1.12<H>  05-09-23 @ 13:42   -   0.11<H>    WBC Trend  06-07-23 @ 05:10   -  7.59  06-06-23 @ 05:30   -  7.25  06-05-23 @ 05:30   -  10.01    H/H Trend  06-07-23 @ 05:10   -   7.4<L>/ 24.1<L>  06-06-23 @ 05:30   -   7.1<L>/ 22.6<L>  06-05-23 @ 05:30   -   7.1<L>/ 21.9<L>  06-04-23 @ 06:00   -   7.8<L>/ 24.9<L>  06-03-23 @ 06:20   -   6.8<LL>/ 22.1<L>  06-02-23 @ 11:32   -   7.3<L>/ 23.9<L>    Stool Occult Blood  06-02-23 @ 08:20   -   Negative  05-25-23 @ 18:20   -   Negative  05-16-23 @ 17:00   -   Positive<!>    Platelet Trend  06-07-23 @ 05:10   -  138<L>  06-06-23 @ 05:30   -  143<L>  06-05-23 @ 05:30   -  164    Trend Sodium  06-07-23 @ 05:10   -  139  06-06-23 @ 09:15   -  140  06-06-23 @ 05:30   -  140  06-05-23 @ 05:30   -  142    Trend Potassium  06-07-23 @ 05:10   -  4.2  06-06-23 @ 09:15   -  4.3  06-06-23 @ 05:30   -  7.1<HH>  06-05-23 @ 05:30   -  4.2    Trend Bun/Cr  06-07-23 @ 05:10  BUN/CR -  77<H> / 2.36<H>  06-06-23 @ 09:15  BUN/CR -  82<H> / 2.38<H>  06-06-23 @ 05:30  BUN/CR -  79<H> / 2.33<H>  06-05-23 @ 05:30  BUN/CR -  77<H> / 2.41<H>    Lactic Acid Trend    ABG Trend  05-19-23 @ 00:22   - 7.37/43/144<H>/99.8<H>  05-18-23 @ 05:38   - 7.41/37/97/98.4<H>  05-17-23 @ 23:36   - 7.42/37/97/98.3<H>  05-16-23 @ 22:10   - 7.41/41/92/98.5<H>  05-16-23 @ 05:59   - 7.35/47/104/98.8<H>  05-15-23 @ 13:45   - 7.36/47/74<L>/96.6  05-15-23 @ 04:45   - 7.33<L>/48/70<L>/95.7     Trend AST/ALT/ALK Phos/Bili  06-06-23 @ 09:15   29/19/178<H>/0.6  06-06-23 @ 05:30   31/21/168<H>/0.6  06-05-23 @ 05:30   30/20/160<H>/0.6  06-04-23 @ 06:00   32/19/167<H>/0.7  06-03-23 @ 06:20   28/19/152<H>/0.5  06-02-23 @ 06:00   26/14/144<H>/0.4  05-31-23 @ 04:50   29/16/136<H>/0.3  05-29-23 @ 06:00   30/20/128<H>/0.4  05-28-23 @ 05:00   29/18/122<H>/0.4  05-27-23 @ 06:15   31/18/129<H>/0.4  05-25-23 @ 05:00   29/14/98/0.5  05-24-23 @ 05:30   24/12/98/0.6      Ammonia Trend  05-14-23 @ 11:30   -   53  05-14-23 @ 05:00   -   16      Amylase / Lipase Trend      Albumin Trend  06-06-23 @ 09:15   -   1.6<L>  06-06-23 @ 05:30   -   1.6<L>  06-05-23 @ 05:30   -   1.5<L>  06-04-23 @ 06:00   -   1.6<L>  06-03-23 @ 06:20   -   1.5<L>  06-02-23 @ 06:00   -   1.5<L>      PTT - PT - INR Trend  05-19-23 @ 00:44   -   26.5<L> - 13.2 - 1.15  05-17-23 @ 23:44   -   31.2 - 13.7<H> - 1.18<H>  05-16-23 @ 22:16   -   31.3 - 13.6<H> - 1.18<H>  05-15-23 @ 13:55   -   32.9 - 14.0<H> - 1.19<H>  05-14-23 @ 13:45   -   33.6 - -- - --  05-14-23 @ 11:30   -   34.1 - 14.9<H> - 1.28<H>    Glucose Trend  06-07-23 @ 05:10   -  -- 160<H> --  06-07-23 @ 04:53   -  -- -- 180<H>  06-06-23 @ 23:30   -  -- -- 184<H>  06-06-23 @ 20:20   -  -- -- 209<H>  06-06-23 @ 17:11   -  -- -- 192<H>  06-06-23 @ 11:32   -  -- -- 150<H>  06-06-23 @ 09:15   -  -- 265<H> --  06-06-23 @ 09:00   -  -- -- 186<H>  06-06-23 @ 05:30   -  -- 152<H> --  06-06-23 @ 05:06   -  -- -- 175<H>    A1C with Estimated Average Glucose Result: 7.7 % *H* [4.0 - 5.6] (05-05-23 @ 08:00)      LABS:                        7.4    7.59  )-----------( 138      ( 07 Jun 2023 05:10 )             24.1     06-07    139  |  103  |  77<H>  ----------------------------<  160<H>  4.2   |  30  |  2.36<H>    Ca    8.1<L>      07 Jun 2023 05:10  Phos  4.0     06-07  Mg     2.1     06-07    TPro  7.5  /  Alb  1.6<L>  /  TBili  0.6  /  DBili  x   /  AST  29  /  ALT  19  /  AlkPhos  178<H>  06-06         CULTURES: (if applicable)    Culture - Sputum (collected 06-04-23 @ 21:20)  Source: .Sputum Sputum  Gram Stain (06-05-23 @ 10:19):    Numerous polymorphonuclear leukocytes per low power field    No Squamous epithelial cells per low power field    Moderate Gram Positive Cocci in Clusters per oil power field  Final Report (06-07-23 @ 10:05):    Numerous Staphylococcus aureus    Normal Respiratory Carrie absent  Organism: Staphylococcus aureus (06-07-23 @ 10:05)  Organism: Staphylococcus aureus (06-07-23 @ 10:05)      Method Type: ALDO      -  Ampicillin/Sulbactam: S <=8/4      -  Cefazolin: S <=4      -  Clindamycin: S <=0.25      -  Erythromycin: S <=0.25      -  Gentamicin: S <=1 Should not be used as monotherapy      -  Oxacillin: S 1 Oxacillin predicts susceptibility for dicloxacillin, methicillin, and nafcillin      -  Penicillin: R >8      -  Rifampin: S <=1 Should not be used as monotherapy      -  Tetracycline: R >8      -  Trimethoprim/Sulfamethoxazole: S <=0.5/9.5      -  Vancomycin: S 2    Culture - Urine (collected 06-03-23 @ 06:20)  Source: Clean Catch Clean Catch (Midstream)  Final Report (06-06-23 @ 22:34):    >100,000 CFU/ml Enterococcus faecium    <10,000 CFU/ml Normal Urogenital carrie present  Organism: Enterococcus faecium (06-06-23 @ 22:34)  Organism: Enterococcus faecium (06-06-23 @ 22:34)      Method Type: ALDO      -  Ampicillin: R >8 Predicts results to ampicillin/sulbactam, amoxacillin-clavulanate and  piperacillin-tazobactam.      -  Ciprofloxacin: R >2      -  Levofloxacin: R >4      -  Nitrofurantoin: S <=32 Should not be used to treat pyelonephritis.      -  Tetracycline: R >8      -  Vancomycin: S 0.5    Culture - Blood (collected 06-02-23 @ 09:45)  Source: .Blood Blood  Preliminary Report (06-03-23 @ 17:01):    No growth to date.    Culture - Blood (collected 06-02-23 @ 09:10)  Source: .Blood Blood  Gram Stain (06-03-23 @ 16:57):    Growth in aerobic bottle: Gram Positive Cocci in Clusters    Growth in anaerobic bottle: Gram Positive Cocci in Clusters  Final Report (06-04-23 @ 17:55):    Growth in aerobic and anaerobic bottles: Staphylococcus epidermidis    Coagulase Negative Staphylococci isolated from a single blood culture set    may represent contamination.    Contact the Microbiology Department at [phone] if susceptibility    testing is clinically indicated.    ***Blood Panel PCR results on this specimen are available    approximately 3 hours after the Gram stain result.***    Gram stain, PCR, and/or culture results may not always    correspond due to difference in methodologies.    ************************************************************    This PCR assay was performed by multiplex PCR. This    Assay tests for 66 bacterial and resistance gene targets.    Please refer to the [facility] test directory    at [email or web address] for details.  Organism: Blood Culture PCR (06-04-23 @ 17:55)  Organism: Blood Culture PCR (06-04-23 @ 17:55)      Method Type: PCR      -  Staphylococcus epidermidis, Methicillin resistant: Detec    Culture - Sputum (collected 05-26-23 @ 06:00)  Source: ET Tube ET Tube  Gram Stain (05-26-23 @ 13:21):    Few polymorphonuclear leukocytes seen per low power field    Moderate Squamous epithelial cells seen per low power field    Moderate Gram positive cocci in pairs, chains and clusters seen per oil    power field    Few Yeast like cells seen per oil power field    Rare Gram Negative Rods seen per oil power field  Final Report (05-30-23 @ 14:09):    Moderate Staphylococcus aureus    Moderate Streptococcus pneumoniae    Normal Respiratory Carrie present  Organism: Staphylococcus aureus  Streptococcus pneumoniae (05-30-23 @ 14:09)  Organism: Streptococcus pneumoniae (05-30-23 @ 14:09)      Method Type: ALDO      -  Clindamycin: I 0.5      -  Erythromycin: R >0.5 Predicts results for azithromycin.      -  Levofloxacin: I 4      -  Trimethoprim/Sulfamethoxazole: S <=.25/4.75      -  Vancomycin: S 0.5      -  Ceftriaxone (meningitidis): I 1      -  Ceftriaxone (non-meningitidis): S 1      -  Penicillin (meningitidis): R 2      -  Penicillin (non-meningitidis): S 2      -  Penicillin (oral penicillin V): R 2  Organism: Staphylococcus aureus (05-30-23 @ 14:09)      Method Type: ALDO      -  Ampicillin/Sulbactam: S <=8/4      -  Cefazolin: S <=4      -  Clindamycin: S <=0.25      -  Erythromycin: S <=0.25      -  Gentamicin: S <=1 Should not be used as monotherapy      -  Oxacillin: S 0.5 Oxacillin predicts susceptibility for dicloxacillin, methicillin, and nafcillin      -  Penicillin: R >8      -  Rifampin: S <=1 Should not be used as monotherapy      -  Tetracycline: R >8      -  Trimethoprim/Sulfamethoxazole: S <=0.5/9.5      -  Vancomycin: S 2    Culture - Blood (collected 05-24-23 @ 15:05)  Source: .Blood Blood  Final Report (05-30-23 @ 01:00):    No Growth Final    Culture - Blood (collected 05-24-23 @ 14:30)  Source: .Blood Blood  Final Report (05-29-23 @ 19:00):    No Growth Final    Culture - Sputum (collected 05-24-23 @ 14:14)  Source: .Sputum Sputum  Gram Stain (05-24-23 @ 22:38):    Numerous polymorphonuclear leukocytes per low power field    No Squamous epithelial cells per low power field    Moderate Gram Positive Cocci in Clusters per oil power field  Final Report (05-26-23 @ 16:39):    Moderate Staphylococcus aureus    Normal Respiratory Carrie absent  Organism: Staphylococcus aureus (05-26-23 @ 16:39)  Organism: Staphylococcus aureus (05-26-23 @ 16:39)      Method Type: ALDO      -  Ampicillin/Sulbactam: S <=8/4      -  Cefazolin: S <=4      -  Clindamycin: S <=0.25      -  Erythromycin: S <=0.25      -  Gentamicin: S <=1 Should not be used as monotherapy      -  Oxacillin: S 0.5 Oxacillin predicts susceptibility for dicloxacillin, methicillin, and nafcillin      -  Penicillin: R >8      -  Rifampin: S <=1 Should not be used as monotherapy      -  Tetracycline: R >8      -  Trimethoprim/Sulfamethoxazole: S <=0.5/9.5      -  Vancomycin: S 1       RADIOLOGY  CXR:  mild vasc congestion, tubes in place      VITALS:  T(C): 37.3 (06-07-23 @ 05:00), Max: 37.7 (06-06-23 @ 14:00)  T(F): 99.1 (06-07-23 @ 05:00), Max: 99.8 (06-06-23 @ 14:00)  HR: 82 (06-07-23 @ 10:00) (71 - 102)  BP: 125/66 (06-07-23 @ 10:00) (94/64 - 145/103)  BP(mean): 83 (06-07-23 @ 10:00) (68 - 115)  ABP: --  ABP(mean): --  RR: 18 (06-07-23 @ 10:00) (13 - 32)  SpO2: 100% (06-07-23 @ 10:00) (96% - 100%)  CVP(mm Hg): --  CVP(cm H2O): --    Ins and Outs     06-06-23 @ 07:01  -  06-07-23 @ 07:00  --------------------------------------------------------  IN: 3290 mL / OUT: 1860 mL / NET: 1430 mL            Device: Avea, Mode: AC/ CMV (Assist Control/ Continuous Mandatory Ventilation), RR (machine): 12, RR (patient): 20, TV (machine): 450, TV (patient): 450, FiO2: 30, PEEP: 5, MAP: 10, PIP: 27    I&O's Detail    06 Jun 2023 07:01  -  07 Jun 2023 07:00  --------------------------------------------------------  IN:    Enteral Tube Flush: 120 mL    Free Water: 1600 mL    IV PiggyBack: 250 mL    Vital1.5: 1320 mL  Total IN: 3290 mL    OUT:    Stool (mL): 600 mL    Voided (mL): 1260 mL  Total OUT: 1860 mL    Total NET: 1430 mL

## 2023-06-07 NOTE — PROVIDER CONTACT NOTE (CRITICAL VALUE NOTIFICATION) - SITUATION
potassium 7.1
vancomycin 29.6
H/H 6.8/22.1 PT don't have active bleeding
Trop 2243.1
hgb 7
Lactate 2.1
Critical lab notification
Patient came for abnormal EKG
Critical lab notification

## 2023-06-07 NOTE — PROVIDER CONTACT NOTE (CRITICAL VALUE NOTIFICATION) - ACTION/TREATMENT ORDERED:
Continue present care.
MD made aware.
awaiting for the decision
Patient started on zosyn.  Repeat lactate ordered.
hold vancomycin dose.
aware - no orders at this time
treat hyperkalemia with Dextose 50 IVP and 10 units of insulin regular IVP and repeat BMP.

## 2023-06-07 NOTE — PROVIDER CONTACT NOTE (CRITICAL VALUE NOTIFICATION) - BACKGROUND
Post arrest.  On ventilator.
s/p cardiac Arrest
Previous Lactate 3.4
ARF, intubated and on tube feedings.
ARF, sepsis, s/p cardiac arrest.
Post arrest

## 2023-06-08 LAB
ALBUMIN SERPL ELPH-MCNC: 1.8 G/DL — LOW (ref 3.3–5)
ALP SERPL-CCNC: 198 U/L — HIGH (ref 40–120)
ALT FLD-CCNC: 22 U/L — SIGNIFICANT CHANGE UP (ref 10–45)
ANION GAP SERPL CALC-SCNC: 8 MMOL/L — SIGNIFICANT CHANGE UP (ref 5–17)
AST SERPL-CCNC: 25 U/L — SIGNIFICANT CHANGE UP (ref 10–40)
BILIRUB SERPL-MCNC: 0.8 MG/DL — SIGNIFICANT CHANGE UP (ref 0.2–1.2)
BUN SERPL-MCNC: 77 MG/DL — HIGH (ref 7–23)
CALCIUM SERPL-MCNC: 8.6 MG/DL — SIGNIFICANT CHANGE UP (ref 8.4–10.5)
CHLORIDE SERPL-SCNC: 107 MMOL/L — SIGNIFICANT CHANGE UP (ref 96–108)
CO2 SERPL-SCNC: 28 MMOL/L — SIGNIFICANT CHANGE UP (ref 22–31)
CREAT SERPL-MCNC: 2.3 MG/DL — HIGH (ref 0.5–1.3)
EGFR: 28 ML/MIN/1.73M2 — LOW
GLUCOSE BLDC GLUCOMTR-MCNC: 169 MG/DL — HIGH (ref 70–99)
GLUCOSE SERPL-MCNC: 115 MG/DL — HIGH (ref 70–99)
HCT VFR BLD CALC: 28.4 % — LOW (ref 39–50)
HGB BLD-MCNC: 8.7 G/DL — LOW (ref 13–17)
MCHC RBC-ENTMCNC: 30.6 GM/DL — LOW (ref 32–36)
MCHC RBC-ENTMCNC: 31 PG — SIGNIFICANT CHANGE UP (ref 27–34)
MCV RBC AUTO: 101.1 FL — HIGH (ref 80–100)
NRBC # BLD: 0 /100 WBCS — SIGNIFICANT CHANGE UP (ref 0–0)
PLATELET # BLD AUTO: 160 K/UL — SIGNIFICANT CHANGE UP (ref 150–400)
POTASSIUM SERPL-MCNC: 4.1 MMOL/L — SIGNIFICANT CHANGE UP (ref 3.5–5.3)
POTASSIUM SERPL-SCNC: 4.1 MMOL/L — SIGNIFICANT CHANGE UP (ref 3.5–5.3)
PROT SERPL-MCNC: 8.3 G/DL — SIGNIFICANT CHANGE UP (ref 6–8.3)
RBC # BLD: 2.81 M/UL — LOW (ref 4.2–5.8)
RBC # FLD: 17.6 % — HIGH (ref 10.3–14.5)
SODIUM SERPL-SCNC: 143 MMOL/L — SIGNIFICANT CHANGE UP (ref 135–145)
WBC # BLD: 7.51 K/UL — SIGNIFICANT CHANGE UP (ref 3.8–10.5)
WBC # FLD AUTO: 7.51 K/UL — SIGNIFICANT CHANGE UP (ref 3.8–10.5)

## 2023-06-08 PROCEDURE — 93010 ELECTROCARDIOGRAM REPORT: CPT

## 2023-06-08 PROCEDURE — 99232 SBSQ HOSP IP/OBS MODERATE 35: CPT

## 2023-06-08 RX ORDER — GLUCAGON INJECTION, SOLUTION 0.5 MG/.1ML
1 INJECTION, SOLUTION SUBCUTANEOUS ONCE
Refills: 0 | Status: DISCONTINUED | OUTPATIENT
Start: 2023-06-08 | End: 2023-06-09

## 2023-06-08 RX ORDER — SODIUM CHLORIDE 9 MG/ML
1000 INJECTION, SOLUTION INTRAVENOUS
Refills: 0 | Status: DISCONTINUED | OUTPATIENT
Start: 2023-06-08 | End: 2023-06-08

## 2023-06-08 RX ORDER — PANTOPRAZOLE SODIUM 20 MG/1
40 TABLET, DELAYED RELEASE ORAL DAILY
Refills: 0 | Status: DISCONTINUED | OUTPATIENT
Start: 2023-06-08 | End: 2023-06-12

## 2023-06-08 RX ORDER — LEVETIRACETAM 250 MG/1
250 TABLET, FILM COATED ORAL EVERY 12 HOURS
Refills: 0 | Status: DISCONTINUED | OUTPATIENT
Start: 2023-06-08 | End: 2023-06-09

## 2023-06-08 RX ORDER — ERYTHROPOIETIN 10000 [IU]/ML
10000 INJECTION, SOLUTION INTRAVENOUS; SUBCUTANEOUS
Refills: 0 | Status: DISCONTINUED | OUTPATIENT
Start: 2023-06-08 | End: 2023-07-04

## 2023-06-08 RX ORDER — IPRATROPIUM/ALBUTEROL SULFATE 18-103MCG
3 AEROSOL WITH ADAPTER (GRAM) INHALATION EVERY 6 HOURS
Refills: 0 | Status: DISCONTINUED | OUTPATIENT
Start: 2023-06-08 | End: 2023-06-12

## 2023-06-08 RX ORDER — LEVETIRACETAM 250 MG/1
250 TABLET, FILM COATED ORAL EVERY 12 HOURS
Refills: 0 | Status: DISCONTINUED | OUTPATIENT
Start: 2023-06-08 | End: 2023-06-08

## 2023-06-08 RX ORDER — METOPROLOL TARTRATE 50 MG
5 TABLET ORAL ONCE
Refills: 0 | Status: COMPLETED | OUTPATIENT
Start: 2023-06-08 | End: 2023-06-08

## 2023-06-08 RX ORDER — DEXTROSE 50 % IN WATER 50 %
15 SYRINGE (ML) INTRAVENOUS ONCE
Refills: 0 | Status: DISCONTINUED | OUTPATIENT
Start: 2023-06-08 | End: 2023-06-09

## 2023-06-08 RX ORDER — VALPROIC ACID (AS SODIUM SALT) 250 MG/5ML
500 SOLUTION, ORAL ORAL EVERY 8 HOURS
Refills: 0 | Status: DISCONTINUED | OUTPATIENT
Start: 2023-06-08 | End: 2023-06-08

## 2023-06-08 RX ORDER — INSULIN LISPRO 100/ML
VIAL (ML) SUBCUTANEOUS EVERY 6 HOURS
Refills: 0 | Status: DISCONTINUED | OUTPATIENT
Start: 2023-06-08 | End: 2023-07-07

## 2023-06-08 RX ORDER — SODIUM CHLORIDE 9 MG/ML
1000 INJECTION, SOLUTION INTRAVENOUS
Refills: 0 | Status: DISCONTINUED | OUTPATIENT
Start: 2023-06-08 | End: 2023-06-09

## 2023-06-08 RX ORDER — BRIVARACETAM 25 MG/1
50 TABLET, FILM COATED ORAL EVERY 12 HOURS
Refills: 0 | Status: DISCONTINUED | OUTPATIENT
Start: 2023-06-08 | End: 2023-06-08

## 2023-06-08 RX ADMIN — Medication 3 MILLILITER(S): at 21:03

## 2023-06-08 RX ADMIN — Medication 1 MILLIGRAM(S): at 23:58

## 2023-06-08 RX ADMIN — PANTOPRAZOLE SODIUM 40 MILLIGRAM(S): 20 TABLET, DELAYED RELEASE ORAL at 13:46

## 2023-06-08 RX ADMIN — LEVETIRACETAM 400 MILLIGRAM(S): 250 TABLET, FILM COATED ORAL at 11:55

## 2023-06-08 RX ADMIN — LEVETIRACETAM 400 MILLIGRAM(S): 250 TABLET, FILM COATED ORAL at 22:27

## 2023-06-08 RX ADMIN — Medication 1: at 23:33

## 2023-06-08 RX ADMIN — CHLORHEXIDINE GLUCONATE 1 APPLICATION(S): 213 SOLUTION TOPICAL at 11:55

## 2023-06-08 RX ADMIN — Medication 5 MILLIGRAM(S): at 14:08

## 2023-06-08 RX ADMIN — ERYTHROPOIETIN 10000 UNIT(S): 10000 INJECTION, SOLUTION INTRAVENOUS; SUBCUTANEOUS at 16:17

## 2023-06-08 NOTE — OCCUPATIONAL THERAPY INITIAL EVALUATION ADULT - RANGE OF MOTION EXAMINATION, UPPER EXTREMITY
To be further assessed as cognition improves/Left UE Passive ROM was WFL  (within functional limits)/Right UE Passive ROM was WFL  (within functional limits)

## 2023-06-08 NOTE — PROGRESS NOTE ADULT - SUBJECTIVE AND OBJECTIVE BOX
SUBJ:Asked by Dr Bui at the behest of  "gi" to clear patient for PEG placement. on my arrival at the bedside i was informed that the patient had just decompensated from the respiratory perspective and was back on bipap  PMH  HTN (hypertension)    HLD (hyperlipidemia)    Diabetes    CAD (coronary artery disease)        MEDICATIONS  (STANDING):  brivaracetam  IVPB 50 milliGRAM(s) IV Intermittent every 12 hours  chlorhexidine 2% Cloths 1 Application(s) Topical daily  dextrose 5% + sodium chloride 0.45%. 1000 milliLiter(s) (50 mL/Hr) IV Continuous <Continuous>  dextrose 5%. 1000 milliLiter(s) (100 mL/Hr) IV Continuous <Continuous>  dextrose 5%. 1000 milliLiter(s) (50 mL/Hr) IV Continuous <Continuous>  dextrose 50% Injectable 25 Gram(s) IV Push once  dextrose 50% Injectable 25 Gram(s) IV Push once  dextrose 50% Injectable 12.5 Gram(s) IV Push once  epoetin priyanka-epbx (RETACRIT) Injectable 26063 Unit(s) SubCutaneous every 7 days  morphine  - Injectable 1 milliGRAM(s) IV Push once  pantoprazole  Injectable 40 milliGRAM(s) IV Push daily    MEDICATIONS  (PRN):  LORazepam   Injectable 1 milliGRAM(s) IV Push every 4 hours PRN Agitation restlessness  morphine  - Injectable 2 milliGRAM(s) IV Push once PRN as needed sob/respiratory distress        PHYSICAL EXAM:  Vital Signs Last 24 Hrs  T(C): 36.3 (08 Jun 2023 12:00), Max: 36.3 (08 Jun 2023 12:00)  T(F): 97.3 (08 Jun 2023 12:00), Max: 97.3 (08 Jun 2023 12:00)  HR: 90 (08 Jun 2023 15:00) (80 - 134)  BP: 145/77 (08 Jun 2023 15:00) (110/81 - 170/93)  BP(mean): 95 (08 Jun 2023 15:00) (75 - 118)  RR: 34 (08 Jun 2023 15:00) (10 - 56)  SpO2: 100% (08 Jun 2023 15:00) (90% - 100%)    Parameters below as of 08 Jun 2023 08:00  Patient On (Oxygen Delivery Method): nasal cannula  O2 Flow (L/min): 4      GENERAL: NAD, well-groomed, well-developed  HEAD:  Atraumatic, Normocephalic  EYES: EOMI, PERRLA, conjunctiva and sclera clear  ENT: Moist mucous membranes,  NECK: Supple, No JVD, no bruits  CHEST/LUNG:scatered  rhonchi  HEART: irregularly irregular  ABDOMEN: Soft, Nontender, Nondistended; Bowel sounds present  EXTREMITIES:  2+ Peripheral Pulses, No clubbing, cyanosis, or edema  SKIN: No rashes or lesions  NERVOUS SYSTEM:  Alert & Oriented X3, Good concentration; Motor Strength 5/5 B/L upper and lower extremities; DTRs 2+ intact and symmetric      TELEMETRY: af with moderate ventricular response        LABS:                        8.7    7.51  )-----------( 160      ( 08 Jun 2023 08:30 )             28.4     06-08    143  |  107  |  77<H>  ----------------------------<  115<H>  4.1   |  28  |  2.30<H>    Ca    8.6      08 Jun 2023 10:20  Phos  4.0     06-07  Mg     2.1     06-07    TPro  8.3  /  Alb  1.8<L>  /  TBili  0.8  /  DBili  x   /  AST  25  /  ALT  22  /  AlkPhos  198<H>  06-08            I&O's Summary    07 Jun 2023 07:01  -  08 Jun 2023 07:00  --------------------------------------------------------  IN: 165 mL / OUT: 1300 mL / NET: -1135 mL    08 Jun 2023 07:01  -  08 Jun 2023 15:45  --------------------------------------------------------  IN: 250 mL / OUT: 0 mL / NET: 250 mL      BNP    RADIOLOGY & ADDITIONAL STUDIES:    ECHO:

## 2023-06-08 NOTE — PROGRESS NOTE ADULT - SUBJECTIVE AND OBJECTIVE BOX
General Neurology  Consult Progress Note    HPI:  78 year old male with a PMH of dementia, HTN, cardiomyopathy, HFrEF, CAD s/p CABG, with known current RCA occlusion, and DM type 2 who was admitted to [facility] on 5/4 with hypoxic respiratory failure in setting of CHF exacerbation and ANISH with course complicated by acute hypoxic respiratory failure in setting of choking episode causing cardiac arrest, shock, worsening hypoxemic respiratory failure, and NSTEMI on 5/9. Pt ICU course noted post cardiac arrest anoxic brain injury and myoclonus. Pt was transferred to [facility] for VEEG which required 48 hours of monitoring as he was noted to still be sedated. While there pt noted to have hematochezia and any ac / antiplatelet agents were held at that time. EEG findings consistent with stimulus induced myoclonus and GPDs indicative of severe cerebral dysfunction.    Patient returned to [facility] ICU on 5/19 and continues tx / supportive care. He was planned for palliative extubation, but after extubation his mental status significant improved. He is now awake, later and interactive. Neurology was consulted for further management of myoclonus as he is currently not tolerating PO due to not participating effectively with SLP evaluation this morning.     The patient was observed during SLP evaluation and he was refusing to open his mouth to participate. He says simple phrases intermittently.

## 2023-06-08 NOTE — OCCUPATIONAL THERAPY INITIAL EVALUATION ADULT - RANGE OF MOTION EXAMINATION
Difficulty with AROM testing 2/2 myoclonus, poor direction following, decreased attention. Pt with spontaneous movement in right UE in all joints however, AROM impaired. Left UE AROM grossly WFL with observation with ~50% purposeful movements

## 2023-06-08 NOTE — OCCUPATIONAL THERAPY INITIAL EVALUATION ADULT - SITTING BALANCE: STATIC
to be further assessed however, severe retropulsion noted leaning pt forward in bedside chair/poor balance

## 2023-06-08 NOTE — OCCUPATIONAL THERAPY INITIAL EVALUATION ADULT - BED MOBILITY TRAINING, PT EVAL
[facility] Dermatology Note      Dermatology Problem List:  1. BCC, left cheek  -s/p Mohs 8/18/2016  2. 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 Actinic keratosis  -s/p cryotherapy    Encounter Date: Jackson 15, 2017    CC:  Chief Complaint   Patient presents with     Derm Problem     skin check,         History of Present Illness:  Ms. Michaela Dorman is a 65 year old female who presents as a follow up for history of NMSC and AK. The patient was last seen 8/18/2016 by Dr. Varela when a BCC on the left cheek was treated with Mohs. Today, the patient reports a lesion on the right hairline and forehead she would like evaluated. Notes a rough, scaly lesion on the chin that she scratched last week. Reports the lesion on the right upper chest comes and goes. The patient reports no other lesions of concern.    Past Medical History:   Patient Active Problem List   Diagnosis     Enthesopathy of hip region     Family history of stroke (cerebrovascular)     Trochanteric bursitis     Advanced directives, counseling/discussion     Health Care Home     Baker's cyst of knee     Patella-femoral syndrome     Adjustment disorder with depressed mood     Essential hypertension     Malignant neoplasm of upper-outer quadrant of left female breast (H)     Encounter for antineoplastic chemotherapy     S/P bilateral mastectomy     Anxiety     Hyperlipidemia LDL goal <130     S/p reverse total shoulder arthroplasty, right     Prophylactic antibiotic for dental procedure indicated due to prior joint replacement     Past Medical History:   Diagnosis Date     Depressive disorder, not elsewhere classified      Esophageal reflux      ETOH abuse     in remission     Prophylactic antibiotic for dental procedure indicated due to prior joint replacement 6/5/2017     S/p reverse total shoulder arthroplasty, right 6/5/2017     Subdural hematoma (H)      Past Surgical History:    Procedure Laterality Date     ARTHROPLASTY SHOULDER Right 11/17/2015     ARTHROSCOPY KNEE  6/7/2012    Procedure:ARTHROSCOPY KNEE; right knee arthroscopy with partial lateral menisectomy; Surgeon:DAMIÁN MCALLISTER; Location:[facility] OR     C LIGATE FALLOPIAN TUBE       C NONSPECIFIC PROCEDURE  1956    ankle fracture surgery     EXCISE MASS AXILLA Left 9/16/2016    Procedure: EXCISE MASS AXILLA;  Surgeon: Keyon Blanco MD;  Location: [facility] OR     HC REMV CATARACT EXTRACAP,INSERT LENS  6/25/2009    Right eye     INSERT PORT VASCULAR ACCESS Right 10/7/2016    Procedure: INSERT PORT VASCULAR ACCESS;  Surgeon: Keyon Blanco MD;  Location: [facility] OR     MASTECTOMY SIMPLE BILATERAL Bilateral 2/8/2017    Procedure: MASTECTOMY SIMPLE BILATERAL;  Surgeon: Keyon Blanco MD;  Location: [facility] OR     OPEN REDUCTION INTERNAL FIXATION FOOT Right 3/20/2015    Procedure: OPEN REDUCTION INTERNAL FIXATION FOOT;  Surgeon: Javi Herrmann DPM;  Location: [facility] OR     SHOULDER SURGERY Right 11/2015     Social History:  The patient is retired. The patient currently trains horses The patient denies use of tanning beds. The patient has 3-6 alcoholic drinks per week, does not smoke or use tobacco.     Family History:  There is a family history of skin cancer, unknown type.     Medications:  Current Outpatient Prescriptions   Medication Sig Dispense Refill     FLUoxetine (PROZAC) 20 MG capsule TAKE THREE CAPSULES BY MOUTH EVERY DAY TITRATE AS DIRECTED 270 capsule 0     hydrochlorothiazide (HYDRODIURIL) 25 MG tablet Take 1 tablet (25 mg) by mouth daily 90 tablet 1     nitrofurantoin, macrocrystal-monohydrate, (MACROBID) 100 MG capsule Take 1 capsule (100 mg) by mouth 2 times daily 14 capsule 0     venlafaxine (EFFEXOR XR) 37.5 MG 24 hr capsule Take 1 capsule (37.5 mg) by mouth daily 90 capsule 1     oxybutynin (DITROPAN) 5 MG tablet Take 0.5 tablets (2.5 mg) by mouth 2 times daily 60 tablet 0     LORazepam (ATIVAN) 0.5 MG tablet Take 1 tablet  (0.5 mg) by mouth every 4 hours as needed (Anxiety, Nausea/Vomiting or Sleep) 15 tablet 0     FLUoxetine (PROZAC) 20 MG capsule TAKE THREE CAPSULES BY MOUTH EVERY DAY TITRATE AS DIRECTED 270 capsule 0     simvastatin (ZOCOR) 20 MG tablet Take 1 tablet (20 mg) by mouth At Bedtime 90 tablet 1     acetaminophen (TYLENOL EX ST ARTHRITIS PAIN) 500 MG tablet Take 500-1,000 mg by mouth every 6 hours as needed for mild pain       omeprazole (PRILOSEC) 20 MG capsule Take by mouth daily       Allergies   Allergen Reactions     No Known Drug Allergies      Review of Systems:  -Onc: She has had breast cancer since her last visit.   -Skin: As above in HPI. No additional skin concerns.    Physical exam:  Vitals: There were no vitals taken for this visit.  GEN: This is a well developed, well-nourished female in no acute distress, in a pleasant mood.    SKIN: Total skin excluding the undergarment areas was performed. The exam included the head/face, neck, both arms, chest, back, abdomen, both legs, digits and/or nails. Declines genitals exam.   -There is a well healed surgical scar without erythema, nodularity or telangiectasias on the left cheek.   -There are erythematous macules with overyling adherent scale on the left brow, right hairline and right dorsal nose.   -Excoriation on the chin.  -6mm pearly papule on the right upper chest.   -No other lesions of concern on areas examined.     Impression/Plan:  1. History of nonmelanoma skin cancer, no clincial evidence of recurrence:  2. Actinic keratosis, s/p biopsy, right anterior shoulder 8/9/2016-resolved, similar on the left brow, right hairline, right dorsal nose    Cryotherapy procedure note: After verbal consent and discussion of risks and benefits including but no limited to dyspigmentation/scar, blister, and pain, 4 were treated with 1-2mm freeze border for 1 cycle with liquid nitrogen. Post cryotherapy instructions were provided.  3. Erosion, chin. Patient reports recently  scratching lesion.     Recommend Vaseline to the area for 2 weeks. Instructed patient to contact clinic if not resolved after 2 weeks.   4. 6mm pearly papule, right upper chest Neoplasm of uncertain behavior. Differential diagnosis includes pigmented BCC versus other    Shave biopsy:  After discussion of benefits and risks including but not limited to bleeding/bruising, pain/swelling, infection, scar, incomplete removal, nerve damage/numbness, recurrence, and non-diagnostic biopsy, written consent, verbal consent and photographs were obtained. Time-out was performed. The area was cleaned with isopropyl alcohol. 0.5 mL of 1% lidocaine with epinephrine was injected to obtain adequate anesthesia of the lesion on the right upper chest. A shave biopsy was performed. Hemostasis was achieved with aluminium chloride. Vaseline and a sterile dressing were applied. The patient tolerated the procedure and no complications were noted. The patient was provided with verbal and written post care instructions.     Follow up in 2-4 week and 6 months, earlier for new or changing lesions.     Staff Involved:  Scribe/Staff    Scribe Disclosure:   I, Federica Ryder, am serving as a scribe to document services personally performed by Dr. Loren Felipe, based on data collection and the provider's statements to me.    Provider Disclosure:   The documentation recorded by the scribe accurately reflects the services I personally performed and the decisions made by me.    Loren Felipe MD    Department of Dermatology  [facility]: Phone: [phone], Fax:[phone]  [facility]: Phone: [phone], Fax: [phone]       Pt to roll left/right with moderate assist in 4 weeks to decrease caregiver burden during toileting tasks

## 2023-06-08 NOTE — PROGRESS NOTE ADULT - ASSESSMENT
Suggest    clearly any procedure that is in any way elective would be and prohibitive risk and i cannot clear from a cardiac perspective    would suggest repeat ekg and cxr as these have not been done in some time    d/w Sam PICKERING as surrogate for Dr Cruz

## 2023-06-08 NOTE — OCCUPATIONAL THERAPY INITIAL EVALUATION ADULT - PLANNED THERAPY INTERVENTIONS, OT EVAL
ADL retraining/balance training/bed mobility training/cognitive, visual perceptual/fine motor coordination training/joint mobilization/massage/motor coordination training/neuromuscular re-education/orthotic fitting/training/parent/caregiver training.../ROM/strengthening/stretching/transfer training

## 2023-06-08 NOTE — SWALLOW BEDSIDE ASSESSMENT ADULT - SWALLOW EVAL: DIAGNOSIS
Patient presents with functional oropharyngeal dysphagia Patient presents with functional oropharyngeal dysphagia. Provided pt with oral care with suction toothbrush prior to PO trials. Patient accepted ice chips only during evaluation. Unable to fully assess oromotor function due to reduced command following. Patient demonstrated with reduced oral opening and acceptance with adequate labial seal. Pt noted with mastication of ice chip in timely manner however suspected prolonged AP transit and multiple swallows per bolus noted. Hyolaryngeal rise appreciated upon digital palpation however pt with wet cough s/p intake of 2nd ice chip. Patient O2 noted to decrease from 99 to 95. Patient not accepting of further trials. Recommended continue NPO at this time.

## 2023-06-08 NOTE — PROGRESS NOTE ADULT - SUBJECTIVE AND OBJECTIVE BOX
Follow-up Critical Care Progress Note  Chief Complaint : Atrial fibrillation      Patient s/p palliative extubation 6/7/23  Today patient alert, verbal, responsive in Estonian  at times following command        Allergies :sulfa drugs (Unknown)      PAST MEDICAL & SURGICAL HISTORY:  HTN (hypertension)  HLD (hyperlipidemia)  Diabetes  CAD (coronary artery disease)  History of cholecystectomy      Medications:  MEDICATIONS  (STANDING):  chlorhexidine 2% Cloths 1 Application(s) Topical daily  dextrose 5%. 1000 milliLiter(s) (100 mL/Hr) IV Continuous <Continuous>  dextrose 5%. 1000 milliLiter(s) (50 mL/Hr) IV Continuous <Continuous>  dextrose 50% Injectable 25 Gram(s) IV Push once  dextrose 50% Injectable 25 Gram(s) IV Push once  dextrose 50% Injectable 12.5 Gram(s) IV Push once  morphine  - Injectable 1 milliGRAM(s) IV Push once    MEDICATIONS  (PRN):  LORazepam   Injectable 1 milliGRAM(s) IV Push every 4 hours PRN Agitation restlessness  morphine  - Injectable 2 milliGRAM(s) IV Push once PRN as needed sob/respiratory distress      Antibiotics History  cefepime   IVPB    , 05-24-23 @ 17:36  cefepime   IVPB 500 milliGRAM(s) IV Intermittent once, 05-24-23 @ 16:39  cefepime   IVPB 500 milliGRAM(s) IV Intermittent daily, 05-25-23 @ 12:00  vancomycin  IVPB 1000 milliGRAM(s) IV Intermittent every 24 hours, 06-04-23 @ 12:32, Stop order after: 42 Days  vancomycin  IVPB 1000 milliGRAM(s) IV Intermittent once, 06-03-23 @ 12:32, Stop order after: 1 Doses  vancomycin  IVPB    , 06-03-23 @ 12:32         COVID  05-04-23 @ 19:50  COVID -   NotDetec  07-13-22 @ 18:33  COVID -   NotDetec      COVID Biomarkers    05-04-23 @ 19:50 ESR --  ---  CRP --  ---  DDimer  351<H>   ---   LDH --   ---   Ferritin --           Trend BNP  05-14-23 @ 11:30   -  21147<H>  05-11-23 @ 06:05   -  89341<H>  05-10-23 @ 05:10   -  43768<H>  05-08-23 @ 09:05   -  65897<H>  05-04-23 @ 19:50   -  50373<H>    Procalcitonin Trend  06-05-23 @ 05:30   -   0.24<H>  06-04-23 @ 06:00   -   0.20<H>  05-17-23 @ 23:44   -   0.59<H>  05-16-23 @ 22:16   -   0.74<H>  05-15-23 @ 05:45   -   1.12<H>  05-09-23 @ 13:42   -   0.11<H>    WBC Trend  06-08-23 @ 08:30   -  7.51  06-07-23 @ 05:10   -  7.59  06-06-23 @ 05:30   -  7.25    H/H Trend  06-08-23 @ 08:30   -   8.7<L>/ 28.4<L>  06-07-23 @ 05:10   -   7.4<L>/ 24.1<L>  06-06-23 @ 05:30   -   7.1<L>/ 22.6<L>  06-05-23 @ 05:30   -   7.1<L>/ 21.9<L>  06-04-23 @ 06:00   -   7.8<L>/ 24.9<L>  06-03-23 @ 06:20   -   6.8<LL>/ 22.1<L>    Stool Occult Blood  06-02-23 @ 08:20   -   Negative  05-25-23 @ 18:20   -   Negative  05-16-23 @ 17:00   -   Positive<!>    Platelet Trend  06-08-23 @ 08:30   -  160  06-07-23 @ 05:10   -  138<L>  06-06-23 @ 05:30   -  143<L>    Trend Sodium  06-07-23 @ 05:10   -  139  06-06-23 @ 09:15   -  140  06-06-23 @ 05:30   -  140    Trend Potassium  06-07-23 @ 05:10   -  4.2  06-06-23 @ 09:15   -  4.3  06-06-23 @ 05:30   -  7.1<HH>    Trend Bun/Cr  06-07-23 @ 05:10  BUN/CR -  77<H> / 2.36<H>  06-06-23 @ 09:15  BUN/CR -  82<H> / 2.38<H>  06-06-23 @ 05:30  BUN/CR -  79<H> / 2.33<H>    Lactic Acid Trend    ABG Trend  05-19-23 @ 00:22   - 7.37/43/144<H>/99.8<H>  05-18-23 @ 05:38   - 7.41/37/97/98.4<H>  05-17-23 @ 23:36   - 7.42/37/97/98.3<H>  05-16-23 @ 22:10   - 7.41/41/92/98.5<H>  05-16-23 @ 05:59   - 7.35/47/104/98.8<H>  05-15-23 @ 13:45   - 7.36/47/74<L>/96.6  05-15-23 @ 04:45   - 7.33<L>/48/70<L>/95.7  05-14-23 @ 05:20   - 7.40/46/72<L>/96.5      Trend AST/ALT/ALK Phos/Bili  06-06-23 @ 09:15   29/19/178<H>/0.6  06-06-23 @ 05:30   31/21/168<H>/0.6  06-05-23 @ 05:30   30/20/160<H>/0.6  06-04-23 @ 06:00   32/19/167<H>/0.7  06-03-23 @ 06:20   28/19/152<H>/0.5  06-02-23 @ 06:00   26/14/144<H>/0.4  05-31-23 @ 04:50   29/16/136<H>/0.3  05-29-23 @ 06:00   30/20/128<H>/0.4  05-28-23 @ 05:00   29/18/122<H>/0.4  05-27-23 @ 06:15   31/18/129<H>/0.4  05-25-23 @ 05:00   29/14/98/0.5  05-24-23 @ 05:30   24/12/98/0.6      Ammonia Trend  05-14-23 @ 11:30   -   53  05-14-23 @ 05:00   -   16       Albumin Trend  06-06-23 @ 09:15   -   1.6<L>  06-06-23 @ 05:30   -   1.6<L>  06-05-23 @ 05:30   -   1.5<L>  06-04-23 @ 06:00   -   1.6<L>  06-03-23 @ 06:20   -   1.5<L>  06-02-23 @ 06:00   -   1.5<L>      PTT - PT - INR Trend  05-19-23 @ 00:44   -   26.5<L> - 13.2 - 1.15  05-17-23 @ 23:44   -   31.2 - 13.7<H> - 1.18<H>  05-16-23 @ 22:16   -   31.3 - 13.6<H> - 1.18<H>  05-15-23 @ 13:55   -   32.9 - 14.0<H> - 1.19<H>  05-14-23 @ 13:45   -   33.6 - -- - --  05-14-23 @ 11:30   -   34.1 - 14.9<H> - 1.28<H>    Glucose Trend  06-07-23 @ 05:10   -  -- 160<H> --  06-07-23 @ 04:53   -  -- -- 180<H>  06-06-23 @ 23:30   -  -- -- 184<H>  06-06-23 @ 20:20   -  -- -- 209<H>  06-06-23 @ 17:11   -  -- -- 192<H>  06-06-23 @ 11:32   -  -- -- 150<H>  06-06-23 @ 09:15   -  -- 265<H> --  06-06-23 @ 09:00   -  -- -- 186<H>  06-06-23 @ 05:30   -  -- 152<H> --  06-06-23 @ 05:06   -  -- -- 175<H>    A1C with Estimated Average Glucose Result: 7.7 % *H* [4.0 - 5.6] (05-05-23 @ 08:00)      LABS:                        8.7    7.51  )-----------( 160      ( 08 Jun 2023 08:30 )             28.4     06-07    139  |  103  |  77<H>  ----------------------------<  160<H>  4.2   |  30  |  2.36<H>    Ca    8.1<L>      07 Jun 2023 05:10  Phos  4.0     06-07  Mg     2.1     06-07      CULTURES: (if applicable)    Culture - Sputum (collected 06-04-23 @ 21:20)  Source: .Sputum Sputum  Gram Stain (06-05-23 @ 10:19):    Numerous polymorphonuclear leukocytes per low power field    No Squamous epithelial cells per low power field    Moderate Gram Positive Cocci in Clusters per oil power field  Final Report (06-07-23 @ 10:05):    Numerous Staphylococcus aureus    Normal Respiratory Carrie absent  Organism: Staphylococcus aureus (06-07-23 @ 10:05)  Organism: Staphylococcus aureus (06-07-23 @ 10:05)      Method Type: ALDO      -  Ampicillin/Sulbactam: S <=8/4      -  Cefazolin: S <=4      -  Clindamycin: S <=0.25      -  Erythromycin: S <=0.25      -  Gentamicin: S <=1 Should not be used as monotherapy      -  Oxacillin: S 1 Oxacillin predicts susceptibility for dicloxacillin, methicillin, and nafcillin      -  Penicillin: R >8      -  Rifampin: S <=1 Should not be used as monotherapy      -  Tetracycline: R >8      -  Trimethoprim/Sulfamethoxazole: S <=0.5/9.5      -  Vancomycin: S 2    Culture - Urine (collected 06-03-23 @ 06:20)  Source: Clean Catch Clean Catch (Midstream)  Final Report (06-06-23 @ 22:34):    >100,000 CFU/ml Enterococcus faecium    <10,000 CFU/ml Normal Urogenital carrie present  Organism: Enterococcus faecium (06-06-23 @ 22:34)  Organism: Enterococcus faecium (06-06-23 @ 22:34)      Method Type: ALDO      -  Ampicillin: R >8 Predicts results to ampicillin/sulbactam, amoxacillin-clavulanate and  piperacillin-tazobactam.      -  Ciprofloxacin: R >2      -  Levofloxacin: R >4      -  Nitrofurantoin: S <=32 Should not be used to treat pyelonephritis.      -  Tetracycline: R >8      -  Vancomycin: S 0.5    Culture - Blood (collected 06-02-23 @ 09:45)  Source: .Blood Blood  Final Report (06-07-23 @ 17:01):    No Growth Final    Culture - Blood (collected 06-02-23 @ 09:10)  Source: .Blood Blood  Gram Stain (06-03-23 @ 16:57):    Growth in aerobic bottle: Gram Positive Cocci in Clusters    Growth in anaerobic bottle: Gram Positive Cocci in Clusters  Final Report (06-04-23 @ 17:55):    Growth in aerobic and anaerobic bottles: Staphylococcus epidermidis    Coagulase Negative Staphylococci isolated from a single blood culture set    may represent contamination.    Contact the Microbiology Department at [phone] if susceptibility    testing is clinically indicated.    ***Blood Panel PCR results on this specimen are available    approximately 3 hours after the Gram stain result.***    Gram stain, PCR, and/or culture results may not always    correspond due to difference in methodologies.    ************************************************************    This PCR assay was performed by multiplex PCR. This    Assay tests for 66 bacterial and resistance gene targets.    Please refer to the [facility] test directory    at [email or web address] for details.  Organism: Blood Culture PCR (06-04-23 @ 17:55)  Organism: Blood Culture PCR (06-04-23 @ 17:55)      Method Type: PCR      -  Staphylococcus epidermidis, Methicillin resistant: Detec    Culture - Sputum (collected 05-26-23 @ 06:00)  Source: ET Tube ET Tube  Gram Stain (05-26-23 @ 13:21):    Few polymorphonuclear leukocytes seen per low power field    Moderate Squamous epithelial cells seen per low power field    Moderate Gram positive cocci in pairs, chains and clusters seen per oil    power field    Few Yeast like cells seen per oil power field    Rare Gram Negative Rods seen per oil power field  Final Report (05-30-23 @ 14:09):    Moderate Staphylococcus aureus    Moderate Streptococcus pneumoniae    Normal Respiratory Carrie present  Organism: Staphylococcus aureus  Streptococcus pneumoniae (05-30-23 @ 14:09)  Organism: Streptococcus pneumoniae (05-30-23 @ 14:09)      Method Type: ALDO      -  Clindamycin: I 0.5      -  Erythromycin: R >0.5 Predicts results for azithromycin.      -  Levofloxacin: I 4      -  Trimethoprim/Sulfamethoxazole: S <=.25/4.75      -  Vancomycin: S 0.5      -  Ceftriaxone (meningitidis): I 1      -  Ceftriaxone (non-meningitidis): S 1      -  Penicillin (meningitidis): R 2      -  Penicillin (non-meningitidis): S 2      -  Penicillin (oral penicillin V): R 2  Organism: Staphylococcus aureus (05-30-23 @ 14:09)      Method Type: ALDO      -  Ampicillin/Sulbactam: S <=8/4      -  Cefazolin: S <=4      -  Clindamycin: S <=0.25      -  Erythromycin: S <=0.25      -  Gentamicin: S <=1 Should not be used as monotherapy      -  Oxacillin: S 0.5 Oxacillin predicts susceptibility for dicloxacillin, methicillin, and nafcillin      -  Penicillin: R >8      -  Rifampin: S <=1 Should not be used as monotherapy      -  Tetracycline: R >8      -  Trimethoprim/Sulfamethoxazole: S <=0.5/9.5      -  Vancomycin: S 2         VITALS:  T(C): 36.2 (06-08-23 @ 08:00), Max: 37.3 (06-07-23 @ 15:00)  T(F): 97.2 (06-08-23 @ 08:00), Max: 99.1 (06-07-23 @ 15:00)  HR: 105 (06-08-23 @ 09:00) (80 - 105)  BP: 155/85 (06-08-23 @ 09:00) (111/63 - 155/85)  BP(mean): 105 (06-08-23 @ 09:00) (75 - 108)  ABP: --  ABP(mean): --  RR: 34 (06-08-23 @ 09:00) (10 - 41)  SpO2: 90% (06-08-23 @ 09:00) (90% - 100%)  CVP(mm Hg): --  CVP(cm H2O): --    Ins and Outs     06-07-23 @ 07:01  -  06-08-23 @ 07:00  --------------------------------------------------------  IN: 165 mL / OUT: 1300 mL / NET: -1135 mL            Device: Avea, Mode: AC/ CMV (Assist Control/ Continuous Mandatory Ventilation), RR (machine): 12, RR (patient): 19, TV (machine): 450, TV (patient): 440, FiO2: 30, PEEP: 5, MAP: 10, PIP: 28    I&O's Detail    07 Jun 2023 07:01  -  08 Jun 2023 07:00  --------------------------------------------------------  IN:    Vital1.5: 165 mL  Total IN: 165 mL    OUT:    Voided (mL): 1300 mL  Total OUT: 1300 mL    Total NET: -1135 mL

## 2023-06-08 NOTE — PROGRESS NOTE ADULT - MOTOR
No spontaneous movements. Limbs are edematous  Intermittent myoclonus activity particularly in the neck, no limb myoclonus noted today
strength is 4/5 on the left, no antigravity movement in the right upper extremity, with 3/5 movement in the bilateral lower extremities  Severe action induced myoclonus in all extremities

## 2023-06-08 NOTE — OCCUPATIONAL THERAPY INITIAL EVALUATION ADULT - GENERAL OBSERVATIONS, REHAB EVAL
Pt received seated in bedside chair +rectal tube, PIV, 2 LPM O2 via NC, and cardiac monitor. Alert on this date however, following simple commands with less than 25% accuracy. Myoclonus throughout evaluation. Skin tear with unknown origin noted during evaluation and RN made aware. Pt left seated in bedside chair with right UE elevated, RN present for skin teat, vitals stable, and lines intact.

## 2023-06-08 NOTE — OCCUPATIONAL THERAPY INITIAL EVALUATION ADULT - PERTINENT HX OF CURRENT PROBLEM, REHAB EVAL
78 year old male with a PMH of dementia, HTN, cardiomyopathy, HFrEF, CAD s/p CABG, with known current RCA occlusion, and DM type 2 who was admitted to [facility] on 5/4 with hypoxic respiratory failure in setting of CHF exacerbation and ANISH with course complicated by acute hypoxic respiratory failure in setting of choking episode causing cardiac arrest, shock, worsening hypoxemic respiratory failure, and NSTEMI on 5/9. Pt ICU course noted post cardiac arrest anoxic brain injury and myoclonus. Pt was transferred to [facility] for VEEG which required 48 hours of monitoring as he was noted to still be sedated. While there pt noted to have hematochezia and any ac / antiplatelet agents were held at that time. EEG findings consistent with stimulus induced myoclonus and GPDs s/p hypoxic diffuse indicative of severe cerebral dysfunction. Underlying PMH of dementia, HTN, cardiomyopathy, HFrEF, CAD s/p CABG, with known current RCA occlusion, and DM type 2

## 2023-06-08 NOTE — PROGRESS NOTE ADULT - SUBJECTIVE AND OBJECTIVE BOX
Notified by RN of tachypnea, NT suctioned for copious amounts of blood tinged mucous.    Vital Signs Last 24 Hrs  T(C): 36.3 (08 Jun 2023 12:00), Max: 36.3 (08 Jun 2023 12:00)  T(F): 97.3 (08 Jun 2023 12:00), Max: 97.3 (08 Jun 2023 12:00)  HR: 90 (08 Jun 2023 15:00) (80 - 134)  BP: 145/77 (08 Jun 2023 15:00) (110/81 - 170/93)  BP(mean): 95 (08 Jun 2023 15:00) (75 - 118)  RR: 34 (08 Jun 2023 15:00) (10 - 56)  SpO2: 100% 4L NC (08 Jun 2023 15:00) (90% - 100%)    Physical Exam  Gen:  WN/WD Male resting in bed, NAD  ENT:  NC/AT, no JVD noted  Thorax:  Symmetric, supraclavicular retractions noted  Lung:  Course rhonchi throughout bilaterally  CV:  S1, S2. RRR  Abd:  Soft, NT/ND.  BS normoactive, no masses to palp  Extrem:  Trace edema throughout, DP/radial pulses +2    Assessment  78 year old male with PMH HTN, dyslipidemia, type 2 DM, who was admitted to [facility] on 5/4 with hypoxic respiratory failure in setting of CHF exacerbation and, course complicated by acute hypoxic respiratory failure in setting of choking episode.  Extubated yesterday, now with acute episode of increased work of breathing.    Plan  Suctioned for copious amount of secretions, now appears to be cleared  Start AVAPS for respiratory support, start bronchodilators  Aggressive chest PT, pulmonary toilet  HOB > 30 degrees   Strict NPO, may be subclinically aspirating  CXR pending  Currently clinically improving, work of breathing improving, daughter at bedside  Discussed with Dr. Polo     Notified by RN of tachypnea, NT suctioned for copious amounts of blood tinged mucous.    Vital Signs Last 24 Hrs  T(C): 36.3 (08 Jun 2023 12:00), Max: 36.3 (08 Jun 2023 12:00)  T(F): 97.3 (08 Jun 2023 12:00), Max: 97.3 (08 Jun 2023 12:00)  HR: 90 (08 Jun 2023 15:00) (80 - 134)  BP: 145/77 (08 Jun 2023 15:00) (110/81 - 170/93)  BP(mean): 95 (08 Jun 2023 15:00) (75 - 118)  RR: 34 (08 Jun 2023 15:00) (10 - 56)  SpO2: 100% 4L NC (08 Jun 2023 15:00) (90% - 100%)    Physical Exam  Gen:  WN/WD Male resting in bed, NAD  ENT:  NC/AT, no JVD noted  Thorax:  Symmetric, supraclavicular retractions noted  Lung:  Course rhonchi throughout bilaterally  CV:  S1, S2, irregularly irregular  Abd:  Soft, NT/ND.  BS normoactive, no masses to palp  Extrem:  Trace edema throughout, DP/radial pulses +2    Assessment  78 year old male with PMH HTN, dyslipidemia, type 2 DM, who was admitted to [facility] on 5/4 with hypoxic respiratory failure in setting of CHF exacerbation and, course complicated by acute hypoxic respiratory failure in setting of choking episode.  Extubated yesterday, now with acute episode of increased work of breathing.    Plan  Suctioned for copious amount of secretions, now appears to be cleared  Start AVAPS for respiratory support, start bronchodilators  Aggressive chest PT, pulmonary toilet  HOB > 30 degrees   Strict NPO, may be subclinically aspirating  CXR pending  Currently clinically improving, work of breathing improving, daughter at bedside  Discussed with Dr. Polo

## 2023-06-08 NOTE — PROGRESS NOTE ADULT - ASSESSMENT
Physical Examination:  GENERAL:               Eyes opens  and tracking, verbal  HEENT:                    No JVD, Dry MM  PULM:                     Bilateral air entry, Clear to auscultation bilaterally, no significant sputum production, trac Rales, No Rhonchi, No Wheezing  CVS:                         S1, S2,  +Murmur  ABD:                        Soft, nondistended, nontender, normoactive bowel sounds,   EXT:                         No edema, nontender, No Cyanosis or Clubbing   Vascular:                Warm Extremities,   NEURO:                 Alert, verbal, moving all 4 extremeties, weaker on Right Upper Extremity   PSYC:                      Calm, limited Insight.      Assessment  78 year old male with a PMH of dementia, HTN, cardiomyopathy, HFrEF, CAD s/p CABG, with known current RCA occlusion, and DM type 2 who was admitted to [facility] on 5/4 with hypoxic respiratory failure in setting of CHF exacerbation and ANISH with course complicated by acute hypoxic respiratory failure in setting of choking episode causing cardiac arrest, shock, worsening hypoxemic respiratory failure, and NSTEMI on 5/9. Pt ICU course noted post cardiac arrest anoxic brain injury and myoclonus. Pt was transferred to [facility] for VEEG which required 48 hours of monitoring as he was noted to still be sedated. While there pt noted to have hematochezia and any ac / antiplatelet agents were held at that time. EEG findings consistent with stimulus induced myoclonus and GPDs s/p hypoxic diffuse indicative of severe cerebral dysfunction.    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  Acute renal failure w Uremia post cardiac arrest improving  Hemtochezia episode, h/h stable no further episodes noted  Heart failure with reduced EF  NSTEMI post arrest       Underlying PMH of dementia, HTN, cardiomyopathy, HFrEF, CAD s/p CABG, with known current RCA occlusion, and DM type 2      Plan:    Mental status signficantly better than when intubated and off sedation    he is currently protecting airway    comfortable on n/c o2   OOB to chair, PT, OT  S&S eval to advance diet  pt will awake enough to remove NGT if placed  genyl hydration to be started pending diet  Keppra IV for now - restart oral brivaracetam  when able to take oral   may need considered modafinil oral when more alert      monitor blood glucose levels, + ISS coverage  Sacral decub - wound care called continue local wound care off loading and nutrition support  Venodyne for dvt ppx   GOC ongoing discussion with family ,     PMD:				                   Notified(Date):  Family Updated: 	Kuldeep [phone]	                                 Date:  6/8/2023  as above      Sedation & Analgesia:	  Diet/Nutrition:		 as above  GI PPx:			Protonix  DVT Ppx:		Venodynes   Activity:		  bedrest  Head of Bed:               35-45 Deg  Glycemic Control:         Insulin    Lines: piv  Restraints were deemed necessary to prevent removal of life-sustaining devices [  ] YES   [  x  ]  NO  Disposition: ICU Care  Goals of Care: Full code

## 2023-06-08 NOTE — OCCUPATIONAL THERAPY INITIAL EVALUATION ADULT - ADDITIONAL COMMENTS
Pt unable to report social history and PLOF. Information obtained from pt's daughter. Pt resides in [facility] level with 6-7 steps to enter 2 HR and an additional 5 steps to bedroom however, pt can enter through garage with 1 SUSANNE and family reports can be converted to bedroom if needed. Pt was independent prior with BADLs however, had HHA ~4 hours a day to "take him for a walk and play music". Pt lives with his son and does not own any DME per daughter

## 2023-06-08 NOTE — PROGRESS NOTE ADULT - SUBJECTIVE AND OBJECTIVE BOX
(x  ) No complaints (  ) Complains of:     T(F): 97.3 (06-08-23 @ 12:00), Max: 99.1 (06-07-23 @ 15:00)  HR: 115 (06-08-23 @ 12:00) (80 - 115)  BP: 170/93 (06-08-23 @ 12:00) (111/63 - 170/93)  RR: 26 (06-08-23 @ 12:00) (10 - 41)  SpO2: 96% (06-08-23 @ 12:00) (90% - 100%)        Wound Location 1:  unstageable necrotic sacrococcyx wound                 8.7    7.51  )-----------( 160      ( 08 Jun 2023 08:30 )             28.4     CBC Full  -  ( 08 Jun 2023 08:30 )  WBC Count : 7.51 K/uL  RBC Count : 2.81 M/uL  Hemoglobin : 8.7 g/dL  Hematocrit : 28.4 %  Platelet Count - Automated : 160 K/uL  Mean Cell Volume : 101.1 fl  Mean Cell Hemoglobin : 31.0 pg  Mean Cell Hemoglobin Concentration : 30.6 gm/dL  Auto Neutrophil # : x  Auto Lymphocyte # : x  Auto Monocyte # : x  Auto Eosinophil # : x  Auto Basophil # : x  Auto Neutrophil % : x  Auto Lymphocyte % : x  Auto Monocyte % : x  Auto Eosinophil % : x  Auto Basophil % : x    143|107|77<115  4.1|28|2.30  8.6,--,--  06-08 @ 10:20                  Procedure Performed:  (  )Yes     ( x )No  Name of Procedure:  (  )debridement    (  )I&D    (  )Other:  (  )partial thickness     (  )full thickness     (  )subcutaneous     (  )muscle/tendon     (  )bone  (  )sharp     (  )surgical

## 2023-06-08 NOTE — OCCUPATIONAL THERAPY INITIAL EVALUATION ADULT - NSACTIVITYREC_GEN_A_OT
Pt to continue OT on acute medical floor and update recommendations as pt progresses since sedation. Ot recommending use of mechanical lift at this time

## 2023-06-08 NOTE — OCCUPATIONAL THERAPY INITIAL EVALUATION ADULT - MANUAL MUSCLE TESTING RESULTS, REHAB EVAL
left UE atleast 3/5, right UE contraction impaired by myoclonus however, appears to have atleast trace in shoulder to wrist, digits to be further assessed

## 2023-06-08 NOTE — PROGRESS NOTE ADULT - CRANIAL NERVE
CN II-XII grossly intact, cough reflex is present, tracks around the room, no facial droop  Closes his eyes when trying to examine pupils
Mildly reactive left pupil, no clear right pupillary reaction  No gag reflex  Weak corneal reflexes bilaterally  No Doll's eye response

## 2023-06-08 NOTE — OCCUPATIONAL THERAPY INITIAL EVALUATION ADULT - PRECAUTIONS/LIMITATIONS, REHAB EVAL
NPO/aspiration precautions/cardiac precautions/fall precautions/oxygen therapy device and L/min/seizure precautions

## 2023-06-08 NOTE — SWALLOW BEDSIDE ASSESSMENT ADULT - ORAL PREPARATORY PHASE
Reduced oral grading Pt refuses to accept further trials of different consistencies/Refuses to accept bolus into oral cavity

## 2023-06-08 NOTE — SWALLOW BEDSIDE ASSESSMENT ADULT - PHARYNGEAL PHASE
Decreased laryngeal elevation/Wet vocal quality post oral intake/Cough post oral intake/Multiple swallows

## 2023-06-08 NOTE — SWALLOW BEDSIDE ASSESSMENT ADULT - COMMENTS
CXR: 6/7 Lower left infiltrates are somewhat increased  WBC: 7.51  CTH: 5/29 There is redemonstration of a small subacute infarct in the left parietal   convexity, which has slightly evolved since 05/09/2023 when it was acute.    A small infarct seen in the left frontal white matter on MRI 05/15/2023   is not well visualized by CT technique.

## 2023-06-08 NOTE — PROGRESS NOTE ADULT - SUBJECTIVE AND OBJECTIVE BOX
Seen in ICU, extubated, doing well on NC O2    Vital Signs Last 24 Hrs  T(C): 36.6 (06-08-23 @ 16:00), Max: 36.6 (06-08-23 @ 16:00)  T(F): 97.8 (06-08-23 @ 16:00), Max: 97.8 (06-08-23 @ 16:00)  HR: 107 (06-08-23 @ 18:20) (80 - 134)  BP: 168/96 (06-08-23 @ 18:00) (110/81 - 170/93)  BP(mean): 116 (06-08-23 @ 18:00) (75 - 118)  RR: 42 (06-08-23 @ 18:00) (10 - 56)  SpO2: 100% (06-08-23 @ 18:20) (90% - 100%)    I&O's Detail    07 Jun 2023 07:01  -  08 Jun 2023 07:00  --------------------------------------------------------  OUT:    Voided (mL): 1300 mL  Total OUT: 1300 mL    s1s2  b/l air entry  soft, ND  tr edema                                                                                           8.7    7.51  )-----------( 160      ( 08 Jun 2023 08:30 )             28.4     08 Jun 2023 10:20    143    |  107    |  77     ----------------------------<  115    4.1     |  28     |  2.30     Ca    8.6        08 Jun 2023 10:20  Phos  4.0       07 Jun 2023 05:10  Mg     2.1       07 Jun 2023 05:10    TPro  8.3    /  Alb  1.8    /  TBili  0.8    /  DBili  x      /  AST  25     /  ALT  22     /  AlkPhos  198    08 Jun 2023 10:20    LIVER FUNCTIONS - ( 08 Jun 2023 10:20 )  Alb: 1.8 g/dL / Pro: 8.3 g/dL / ALK PHOS: 198 U/L / ALT: 22 U/L / AST: 25 U/L / GGT: x           albuterol/ipratropium for Nebulization 3 milliLiter(s) Nebulizer every 6 hours  chlorhexidine 2% Cloths 1 Application(s) Topical daily  dextrose 5% + sodium chloride 0.45%. 1000 milliLiter(s) IV Continuous <Continuous>  dextrose 5%. 1000 milliLiter(s) IV Continuous <Continuous>  dextrose 5%. 1000 milliLiter(s) IV Continuous <Continuous>  dextrose 50% Injectable 25 Gram(s) IV Push once  dextrose 50% Injectable 25 Gram(s) IV Push once  dextrose 50% Injectable 12.5 Gram(s) IV Push once  epoetin priyanka-epbx (RETACRIT) Injectable 36783 Unit(s) SubCutaneous every 7 days  levETIRAcetam  IVPB 250 milliGRAM(s) IV Intermittent every 12 hours  LORazepam   Injectable 1 milliGRAM(s) IV Push every 4 hours PRN  morphine  - Injectable 2 milliGRAM(s) IV Push once PRN  morphine  - Injectable 1 milliGRAM(s) IV Push once  pantoprazole  Injectable 40 milliGRAM(s) IV Push daily    A/P:    Hemodynamic ATN s/p arrest (Cr 1.6 - 5/9/23, Cr 1.0 - 4/12/23)  Resp failure, intubated   CM, EF 35 - 40%, dementia  Extubated  ANISH improved   Avoid nephrotoxins  Lytes are stable  Cr is likely near new baseline   Gentle IVF while NPO  Sp&sw eval  Abx per ICU team  Epoetin  F/u CBC, BMP, UO, Vanco level  D/w family at bedside     [phone]

## 2023-06-08 NOTE — OCCUPATIONAL THERAPY INITIAL EVALUATION ADULT - VISUAL ASSESSMENT: TRACKING
Pt with head turn towards verbalization however, no active tracking in any quadrant present, No corneal reflex present/absent

## 2023-06-08 NOTE — OCCUPATIONAL THERAPY INITIAL EVALUATION ADULT - ORIENTATION, REHAB EVAL
pt unable to verbally or non-verbally answer orientation questions however, head turn toward name ~50% of time

## 2023-06-08 NOTE — SWALLOW BEDSIDE ASSESSMENT ADULT - SLP PERTINENT HISTORY OF CURRENT PROBLEM
78 year old male with a PMH of dementia, HTN, cardiomyopathy, HFrEF, CAD s/p CABG, with known current RCA occlusion, and DM type 2 who was admitted to [facility] on 5/4 with hypoxic respiratory failure in setting of CHF exacerbation and ANISH with course complicated by acute hypoxic respiratory failure in setting of choking episode causing cardiac arrest, shock, worsening hypoxemic respiratory failure, and NSTEMI on 5/9. Pt ICU course noted post cardiac arrest anoxic brain injury and myoclonus. Pt was transferred to [facility] for VEEG which required 48 hours of monitoring as he was noted to still be sedated. While there pt noted to have hematochezia and any ac / antiplatelet agents were held at that time. EEG findings consistent with stimulus induced myoclonus and GPDs s/p hypoxic diffuse indicative of severe cerebral dysfunction. Underlying PMH of dementia, HTN, cardiomyopathy, HFrEF, CAD s/p CABG, with known current RCA occlusion, and DM type 2

## 2023-06-08 NOTE — PROGRESS NOTE ADULT - ASSESSMENT
78 year old male with a PMH of dementia, HTN, cardiomyopathy, HFrEF, CAD s/p CABG, with known current RCA occlusion, and DM type 2 who was admitted to [facility] on 5/4 with hypoxic respiratory failure in setting of CHF exacerbation and ANISH with course complicated by acute hypoxic respiratory failure in setting of choking episode causing cardiac arrest, shock, worsening hypoxemic respiratory failure, and NSTEMI on 5/9. Pt ICU course noted post cardiac arrest anoxic brain injury and myoclonus. Pt was transferred to [facility] for VEEG which required 48 hours of monitoring as he was noted to still be sedated. While there pt noted to have hematochezia and any ac / antiplatelet agents were held at that time. EEG findings consistent with stimulus induced myoclonus and GPDs indicative of severe cerebral dysfunction.    Patient returned to [facility] ICU on 5/19 and continues tx / supportive care. He was planned for palliative extubation, but after extubation his mental status significant improved. He is now awake, later and interactive. Neurology was consulted for further management of myoclonus as he is currently not tolerating PO due to not participating effectively with SLP evaluation this morning.     The patient was observed during SLP evaluation and he was refusing to open his mouth to participate. He says simple phrases intermittently.   Neurologically, the patient's mental status has significantly improved.   Previously on Keppra, Fycompa, Depakote, Lacosamide.    Recommendations:  - PT/OT/SLP evals - discussed with his daughter at bedside that his participation in therapy may be limited if he does not participate actively and follow commands in therapy  - Patient was on Briviact 50mg BID, can switch to IV form for now until tolerating PO  - Zonisamide 50mg BID can be added for myoclonus control once he is tolerating PO  - Depakote has been tried previously, unclear if this will help further, but 500mg TID is an option  - Instead of Ativan, Clonazepam is preferred and can be started at 0.25mg BID    Case discussed with primary team and daughter at bedside.

## 2023-06-08 NOTE — PROGRESS NOTE ADULT - ASSESSMENT
Unstageable large sacral PU.  Pt now extubated. Trach  on hold.  Daughter who was present bedside consents to limited bedside excision of sacral necrotic wound with local anesthesia as needed.  She does not want debridement in OR.  -attempt bedside debridement tomorrow  -CCU team updated  -cont off loading, q 2 hour turn and position, local wound care and nutritional support

## 2023-06-09 DIAGNOSIS — R13.10 DYSPHAGIA, UNSPECIFIED: ICD-10-CM

## 2023-06-09 LAB
ALBUMIN SERPL ELPH-MCNC: 1.8 G/DL — LOW (ref 3.3–5)
ALP SERPL-CCNC: 177 U/L — HIGH (ref 40–120)
ALT FLD-CCNC: 15 U/L — SIGNIFICANT CHANGE UP (ref 10–45)
ANION GAP SERPL CALC-SCNC: 10 MMOL/L — SIGNIFICANT CHANGE UP (ref 5–17)
AST SERPL-CCNC: 20 U/L — SIGNIFICANT CHANGE UP (ref 10–40)
BASE EXCESS BLDA CALC-SCNC: -0.8 MMOL/L — SIGNIFICANT CHANGE UP (ref -2–3)
BILIRUB SERPL-MCNC: 0.8 MG/DL — SIGNIFICANT CHANGE UP (ref 0.2–1.2)
BUN SERPL-MCNC: 83 MG/DL — HIGH (ref 7–23)
CALCIUM SERPL-MCNC: 8.8 MG/DL — SIGNIFICANT CHANGE UP (ref 8.4–10.5)
CHLORIDE SERPL-SCNC: 107 MMOL/L — SIGNIFICANT CHANGE UP (ref 96–108)
CO2 BLDA-SCNC: 25 MMOL/L — HIGH (ref 19–24)
CO2 SERPL-SCNC: 27 MMOL/L — SIGNIFICANT CHANGE UP (ref 22–31)
CREAT SERPL-MCNC: 2.56 MG/DL — HIGH (ref 0.5–1.3)
EGFR: 25 ML/MIN/1.73M2 — LOW
GAS PNL BLDA: SIGNIFICANT CHANGE UP
GLUCOSE BLDC GLUCOMTR-MCNC: 138 MG/DL — HIGH (ref 70–99)
GLUCOSE BLDC GLUCOMTR-MCNC: 186 MG/DL — HIGH (ref 70–99)
GLUCOSE BLDC GLUCOMTR-MCNC: 227 MG/DL — HIGH (ref 70–99)
GLUCOSE SERPL-MCNC: 136 MG/DL — HIGH (ref 70–99)
HCO3 BLDA-SCNC: 24 MMOL/L — SIGNIFICANT CHANGE UP (ref 21–28)
HCT VFR BLD CALC: 28.9 % — LOW (ref 39–50)
HGB BLD-MCNC: 8.9 G/DL — LOW (ref 13–17)
HOROWITZ INDEX BLDA+IHG-RTO: 32 — SIGNIFICANT CHANGE UP
MAGNESIUM SERPL-MCNC: 2.2 MG/DL — SIGNIFICANT CHANGE UP (ref 1.6–2.6)
MCHC RBC-ENTMCNC: 30.8 GM/DL — LOW (ref 32–36)
MCHC RBC-ENTMCNC: 31.2 PG — SIGNIFICANT CHANGE UP (ref 27–34)
MCV RBC AUTO: 101.4 FL — HIGH (ref 80–100)
NRBC # BLD: 0 /100 WBCS — SIGNIFICANT CHANGE UP (ref 0–0)
PCO2 BLDA: 40 MMHG — SIGNIFICANT CHANGE UP (ref 35–48)
PH BLDA: 7.39 — SIGNIFICANT CHANGE UP (ref 7.35–7.45)
PHOSPHATE SERPL-MCNC: 6.2 MG/DL — HIGH (ref 2.5–4.5)
PLATELET # BLD AUTO: 187 K/UL — SIGNIFICANT CHANGE UP (ref 150–400)
PO2 BLDA: 86 MMHG — SIGNIFICANT CHANGE UP (ref 83–108)
POTASSIUM SERPL-MCNC: 4.4 MMOL/L — SIGNIFICANT CHANGE UP (ref 3.5–5.3)
POTASSIUM SERPL-SCNC: 4.4 MMOL/L — SIGNIFICANT CHANGE UP (ref 3.5–5.3)
PROT SERPL-MCNC: 7.9 G/DL — SIGNIFICANT CHANGE UP (ref 6–8.3)
RBC # BLD: 2.85 M/UL — LOW (ref 4.2–5.8)
RBC # FLD: 17.5 % — HIGH (ref 10.3–14.5)
SAO2 % BLDA: 98.9 % — HIGH (ref 94–98)
SODIUM SERPL-SCNC: 144 MMOL/L — SIGNIFICANT CHANGE UP (ref 135–145)
VANCOMYCIN FLD-MCNC: 19.9 UG/ML — SIGNIFICANT CHANGE UP
WBC # BLD: 8.54 K/UL — SIGNIFICANT CHANGE UP (ref 3.8–10.5)
WBC # FLD AUTO: 8.54 K/UL — SIGNIFICANT CHANGE UP (ref 3.8–10.5)

## 2023-06-09 PROCEDURE — 71045 X-RAY EXAM CHEST 1 VIEW: CPT | Mod: 26

## 2023-06-09 PROCEDURE — 99223 1ST HOSP IP/OBS HIGH 75: CPT

## 2023-06-09 RX ORDER — ACETYLCYSTEINE 200 MG/ML
4 VIAL (ML) MISCELLANEOUS EVERY 6 HOURS
Refills: 0 | Status: COMPLETED | OUTPATIENT
Start: 2023-06-09 | End: 2023-06-10

## 2023-06-09 RX ORDER — MODAFINIL 200 MG/1
150 TABLET ORAL EVERY 24 HOURS
Refills: 0 | Status: DISCONTINUED | OUTPATIENT
Start: 2023-06-09 | End: 2023-06-16

## 2023-06-09 RX ORDER — METOPROLOL TARTRATE 50 MG
5 TABLET ORAL EVERY 8 HOURS
Refills: 0 | Status: DISCONTINUED | OUTPATIENT
Start: 2023-06-09 | End: 2023-06-10

## 2023-06-09 RX ORDER — MODAFINIL 200 MG/1
150 TABLET ORAL EVERY 24 HOURS
Refills: 0 | Status: DISCONTINUED | OUTPATIENT
Start: 2023-06-09 | End: 2023-06-09

## 2023-06-09 RX ORDER — SODIUM CHLORIDE 9 MG/ML
1000 INJECTION, SOLUTION INTRAVENOUS
Refills: 0 | Status: DISCONTINUED | OUTPATIENT
Start: 2023-06-09 | End: 2023-06-10

## 2023-06-09 RX ORDER — MULTIVIT-MIN/FERROUS GLUCONATE 9 MG/15 ML
15 LIQUID (ML) ORAL DAILY
Refills: 0 | Status: DISCONTINUED | OUTPATIENT
Start: 2023-06-09 | End: 2023-07-07

## 2023-06-09 RX ORDER — BRIVARACETAM 25 MG/1
50 TABLET, FILM COATED ORAL
Refills: 0 | Status: DISCONTINUED | OUTPATIENT
Start: 2023-06-09 | End: 2023-06-10

## 2023-06-09 RX ORDER — ASCORBIC ACID 60 MG
500 TABLET,CHEWABLE ORAL EVERY 12 HOURS
Refills: 0 | Status: DISCONTINUED | OUTPATIENT
Start: 2023-06-09 | End: 2023-06-09

## 2023-06-09 RX ORDER — SODIUM CHLORIDE 9 MG/ML
1000 INJECTION, SOLUTION INTRAVENOUS
Refills: 0 | Status: DISCONTINUED | OUTPATIENT
Start: 2023-06-09 | End: 2023-06-09

## 2023-06-09 RX ADMIN — MODAFINIL 150 MILLIGRAM(S): 200 TABLET ORAL at 12:23

## 2023-06-09 RX ADMIN — SODIUM CHLORIDE 60 MILLILITER(S): 9 INJECTION, SOLUTION INTRAVENOUS at 10:49

## 2023-06-09 RX ADMIN — Medication 1: at 11:25

## 2023-06-09 RX ADMIN — Medication 4 MILLILITER(S): at 21:41

## 2023-06-09 RX ADMIN — Medication 3 MILLILITER(S): at 21:40

## 2023-06-09 RX ADMIN — Medication 5 MILLIGRAM(S): at 09:36

## 2023-06-09 RX ADMIN — Medication 15 MILLILITER(S): at 12:11

## 2023-06-09 RX ADMIN — CHLORHEXIDINE GLUCONATE 1 APPLICATION(S): 213 SOLUTION TOPICAL at 11:26

## 2023-06-09 RX ADMIN — Medication 2: at 18:04

## 2023-06-09 RX ADMIN — Medication 4 MILLILITER(S): at 15:46

## 2023-06-09 RX ADMIN — Medication 3 MILLILITER(S): at 08:22

## 2023-06-09 RX ADMIN — BRIVARACETAM 50 MILLIGRAM(S): 25 TABLET, FILM COATED ORAL at 18:09

## 2023-06-09 RX ADMIN — Medication 3 MILLILITER(S): at 03:59

## 2023-06-09 RX ADMIN — Medication 5 MILLIGRAM(S): at 18:07

## 2023-06-09 RX ADMIN — PANTOPRAZOLE SODIUM 40 MILLIGRAM(S): 20 TABLET, DELAYED RELEASE ORAL at 11:25

## 2023-06-09 RX ADMIN — SODIUM CHLORIDE 60 MILLILITER(S): 9 INJECTION, SOLUTION INTRAVENOUS at 11:24

## 2023-06-09 RX ADMIN — Medication 3 MILLILITER(S): at 15:45

## 2023-06-09 NOTE — PROGRESS NOTE ADULT - ASSESSMENT
Physical Examination:  GENERAL:               Eyes Closed and in mild-mod distress  HEENT:                    No JVD, Dry MM  PULM:                     Bilateral air entry, course to auscultation bilaterally, no significant sputum production, trac Rales, No Rhonchi, No Wheezing  CVS:                         S1, S2,  +Murmur  ABD:                        Soft, nondistended, nontender, normoactive bowel sounds,   EXT:                         No edema, nontender, No Cyanosis or Clubbing   Vascular:                Warm Extremities,   NEURO:                 lethargic not open eyes to verbal / tachtile stimuli   PSYC:                      Calm, No Insight.      Assessment  78 year old male with a PMH of dementia, HTN, cardiomyopathy, HFrEF, CAD s/p CABG, with known current RCA occlusion, and DM type 2 who was admitted to [facility] on 5/4 with hypoxic respiratory failure in setting of CHF exacerbation and ANISH with course complicated by acute hypoxic respiratory failure in setting of choking episode causing cardiac arrest, shock, worsening hypoxemic respiratory failure, and NSTEMI on 5/9. Pt ICU course noted post cardiac arrest anoxic brain injury and myoclonus. Pt was transferred to [facility] for VEEG which required 48 hours of monitoring as he was noted to still be sedated. While there pt noted to have hematochezia and any ac / antiplatelet agents were held at that time. EEG findings consistent with stimulus induced myoclonus and GPDs s/p hypoxic diffuse indicative of severe cerebral dysfunction.    Patient returned to [facility] ICU on 5/19 and continues tx / supportive care for management of:    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  Acute renal failure w Uremia post cardiac arrest improving  Hemtochezia episode, h/h stable no further episodes noted  Heart failure with reduced EF  NSTEMI post arrest       Underlying PMH of dementia, HTN, cardiomyopathy, HFrEF, CAD s/p CABG, with known current RCA occlusion, and DM type 2      Plan:    Mental status worsened this am, will check abg to r/o Hypercarbia as cause  requires frequent suctioning for deep secretions  NIV PRN  Will need NGT placement for tube  feeding if not get NIV    When more awake OOB to chair, PT, OT  S&S eval to advance diet - but patietn too lethargic today to pass  benzo as needed  d/w renal increase IVF     Keppra IV for now - restart oral brivaracetam  when able to take oral   may need considered modafinil oral when more alert      monitor blood glucose levels, + ISS coverage  Sacral decub - wound care called continue local wound care off loading and nutrition support  Venodyne for dvt ppx   GOC ongoing discussion with family , but want full code today     PMD:				                   Notified(Date):  Family Updated: 	Kuldeep [phone]	                                 Date:  6/9/2023  discussed if resp status worsens and need intubation will need trach/peg earlier      Sedation & Analgesia:	  Diet/Nutrition:		 as above  GI PPx:			Protonix  DVT Ppx:		Venodynes   Activity:		  bedrest  Head of Bed:               35-45 Deg  Glycemic Control:         Insulin    Lines: piv  Restraints were deemed necessary to prevent removal of life-sustaining devices [  ] YES   [  x  ]  NO  Disposition: ICU Care  Goals of Care: Full code

## 2023-06-09 NOTE — PROGRESS NOTE ADULT - SUBJECTIVE AND OBJECTIVE BOX
Seen in ICU, less alert today    Vital Signs Last 24 Hrs  T(C): 37 (06-09-23 @ 14:31), Max: 37.2 (06-08-23 @ 20:00)  T(F): 98.6 (06-09-23 @ 14:31), Max: 98.9 (06-08-23 @ 20:00)  HR: 103 (06-09-23 @ 16:00) (74 - 122)  BP: 133/98 (06-09-23 @ 16:00) (117/85 - 168/96)  BP(mean): 111 (06-09-23 @ 16:00) (90 - 116)  RR: 39 (06-09-23 @ 16:00) (5 - 42)  SpO2: 100% (06-09-23 @ 16:00) (96% - 100%)    I&O's Detail    08 Jun 2023 07:01  -  09 Jun 2023 07:00  --------------------------------------------------------  IN:    dextrose 5% + sodium chloride 0.45%: 950 mL    IV PiggyBack: 100 mL  Total IN: 1050 mL    OUT:    Stool (mL): 200 mL    Voided (mL): 400 mL  Total OUT: 600 mL    Total NET: 450 mL    s1s2  b/l air entry  soft, ND  tr edema                                                                                  8.9    8.54  )-----------( 187      ( 09 Jun 2023 06:10 )             28.9     09 Jun 2023 06:10    144    |  107    |  83     ----------------------------<  136    4.4     |  27     |  2.56     Ca    8.8        09 Jun 2023 06:10  Phos  6.2       09 Jun 2023 06:10  Mg     2.2       09 Jun 2023 06:10    TPro  7.9    /  Alb  1.8    /  TBili  0.8    /  DBili  x      /  AST  20     /  ALT  15     /  AlkPhos  177    09 Jun 2023 06:10    LIVER FUNCTIONS - ( 09 Jun 2023 06:10 )  Alb: 1.8 g/dL / Pro: 7.9 g/dL / ALK PHOS: 177 U/L / ALT: 15 U/L / AST: 20 U/L / GGT: x           acetylcysteine 20%  Inhalation 4 milliLiter(s) Inhalation every 6 hours  albuterol/ipratropium for Nebulization 3 milliLiter(s) Nebulizer every 6 hours  ascorbic acid 500 milliGRAM(s) Oral every 12 hours  brivaracetam Oral Solution 50 milliGRAM(s) Oral two times a day  chlorhexidine 2% Cloths 1 Application(s) Topical daily  dextrose 5% + sodium chloride 0.45%. 1000 milliLiter(s) IV Continuous <Continuous>  dextrose 5%. 1000 milliLiter(s) IV Continuous <Continuous>  dextrose 5%. 1000 milliLiter(s) IV Continuous <Continuous>  dextrose 50% Injectable 12.5 Gram(s) IV Push once  dextrose 50% Injectable 25 Gram(s) IV Push once  dextrose 50% Injectable 25 Gram(s) IV Push once  epoetin priyanka-epbx (RETACRIT) Injectable 06274 Unit(s) SubCutaneous every 7 days  insulin lispro (ADMELOG) corrective regimen sliding scale   SubCutaneous every 6 hours  metoprolol tartrate Injectable 5 milliGRAM(s) IV Push every 8 hours  modafinil 150 milliGRAM(s) Oral every 24 hours  multivitamin/minerals/iron Oral Solution (CENTRUM) 15 milliLiter(s) Oral daily  pantoprazole  Injectable 40 milliGRAM(s) IV Push daily    A/P:    Hemodynamic ATN s/p arrest (Cr 1.6 - 5/9/23, Cr 1.0 - 4/12/23)  Resp failure, intubated, now extubated  CM, EF 35 - 40%, dementia  ANISH improved, but higher Cr today noted  Possibly Vanco related  Off Vanco now  Avoid nephrotoxins  Lytes are stable  Gentle IVF while NPO  S&Sw f/u  Epoetin  F/u CBC, BMP, UO  D/w family at bedside   D/w ICU team    [phone]

## 2023-06-09 NOTE — PROGRESS NOTE ADULT - SUBJECTIVE AND OBJECTIVE BOX
( x ) No complaints (  ) Complains of:   Daughter at bedside  T(F): 98.6 (06-09-23 @ 14:31), Max: 98.9 (06-08-23 @ 20:00)  HR: 103 (06-09-23 @ 16:00) (74 - 122)  BP: 133/98 (06-09-23 @ 16:00) (117/85 - 168/96)  RR: 39 (06-09-23 @ 16:00) (5 - 42)  SpO2: 100% (06-09-23 @ 16:00) (96% - 100%)  Height (cm): 160 (06-09-23 @ 07:47)  Weight (kg): 77.6 (06-09-23 @ 07:34)  BMI (kg/m2): 30.3 (06-09-23 @ 07:47)  BSA (m2): 1.81 (06-09-23 @ 07:47)      Wound Location 1:  pt with depressed mental status  and mild to mod. resp. distress.  Not stable for turning to evaluate sacral wound                          8.9    8.54  )-----------( 187      ( 09 Jun 2023 06:10 )             28.9     CBC Full  -  ( 09 Jun 2023 06:10 )  WBC Count : 8.54 K/uL  RBC Count : 2.85 M/uL  Hemoglobin : 8.9 g/dL  Hematocrit : 28.9 %  Platelet Count - Automated : 187 K/uL  Mean Cell Volume : 101.4 fl  Mean Cell Hemoglobin : 31.2 pg  Mean Cell Hemoglobin Concentration : 30.8 gm/dL  Auto Neutrophil # : x  Auto Lymphocyte # : x  Auto Monocyte # : x  Auto Eosinophil # : x  Auto Basophil # : x  Auto Neutrophil % : x  Auto Lymphocyte % : x  Auto Monocyte % : x  Auto Eosinophil % : x  Auto Basophil % : x    144|107|83<136  4.4|27|2.56  8.8,2.2,6.2  06-09 @ 06:10                  Procedure Performed:  (  )Yes     ( x )No  Name of Procedure:  (  )debridement    (  )I&D    (  )Other:  (  )partial thickness     (  )full thickness     (  )subcutaneous     (  )muscle/tendon     (  )bone  (  )sharp     (  )surgical

## 2023-06-09 NOTE — PROGRESS NOTE ADULT - ASSESSMENT
unstageable sacral wound, planned bedside debridement on hold because pt somewhat unstable.  Medical management ongoing  -bedside debridement to be considered when pt is more stable.  Discussed with daughter and she understands  -continue local wound care, off loading, q 2 hour turn and position, nutrition support

## 2023-06-09 NOTE — CONSULT NOTE ADULT - NS ATTEND AMEND GEN_ALL_CORE FT
IMP: This is an 78 year old man  with  HTN, HLD, Cardiomyopathy, chronic systolic CHF, CAD s/p CABG, "known occluded RCA" and DM type 2 uncontrol  who was admitted to [facility] on 5/4 for tx of hypoxic respiratory failure, acute on chronic HF exacerbation (EF 40-45% this stay, prior 50-55%) and ?ANISH. Pt hospital tx consisted of supplemental O2, diuresis, bb and aspirin. ?new afib noted from ED, however, cardiology feels its likely atrial tachycardia.      Today 5/9  pt intubated s/p cardiac arrest 2/2 choking event with breakfast  CPR x 8 min . Pt admitted to ICU for tx of:       ASSESSMENT   - Cardiac arrest   - Acute Hypoxemic / Hypercarbic respiratory failure  - Lactic Acidosis  - Elevated troponin  - Aspiration Event  - Heart failure  - Bilateral pleural effusions  - CAD s/p CABG with known RCA occlusion  - ANISH on ?CKD    Underlying PMH of Dementia, Heart failure, cardiomyopathy, HTN, HLD, CAD s/p CABG, type 2 DM- uncontrol       Plan:  -  Admit pt to ICU  - Fentanyl and propofol for sedation pain and vent synchrony  - Modified hypothermia therapy   - Adjust vent as per ABG  - Hemodynamic support   - Vasopressors titrated to maintain MAP>65   - 2 DECHO   - Cards f/u   - Trend cardiac enzymes   - NGT placement for meds and feed  - Mayra   - IV antibx to cover asp pna   - Guillen's cath placement  - DVT GI prophy   - Full code     spoke with daughter Eliana at bedside
Patient seen and examined at the bedside. Agree with the assessment and plan of NP Pedayneolla.  Current worsening pulmonary status.  Cardiology input appreciated.  Recommend NG feeds at present and supportive management.  Please reconsult GI if/when PEG reconsidered. Thank you.

## 2023-06-09 NOTE — AIRWAY REMOVAL NOTE  ADULT & PEDS - POSITIVE LEAK TEST
ACUPUNCTURE ASSESSMENT INTAKE FORM     6/9/2023     Nelly Natarajan  1991        Major Complaints (in order of priority): Female Infertility       Subjective:   Nelly states that this time Dr. Dey will be come from a different approach.  She will start the estradiol on cycle day one.  After that she will start the stem.  She has no issues to report at this time.         We will see Nelly 1-2x through any egg retrievals and then again 1-2x a week up to her FET. Nelly is self pay.     Initial Intake with Judi Dumont on 1/17/23:  Nelly is a 31 year old female who has been kindly referred by Dr Dey for a slightly elevated FSH when starting into IVF. Nelly had her baseline blood work and her FSH was a 15.7. She was told to start COQ10 and acupuncture and call with her next CD1.     Nelly has 28 day cycles with 7 days of bleeding. She reports that she normally gets a positive OPK around CT06-65--mz far this month she has not gotten a positive OPK and is on CD20. Nelly has a 4 year old daughter that she concieved after about 1 year TTC. She started TTC again in July of 2020, and was connected with Dr Dey about a year ago. She had 4 failed IUI's in the past year, and is now proceeding into IVF. She reports that she has lost about 35 lbs in the past year. She says she has hypothyroidism.      Nelly reports that sleep is problematic--it takes about 30-45 min to fall asleep. She tosses and turns throughout the night, mind races. She gets about 6-8 hours of sleep a night. She reports that her energy levels lower about 5 pm. Nelly reports that she has bloating with carb filled meals, but she has worked a lot in the past year to be more mindful with her eating. She reports that she has a reactivity to the milk protein, and avoids dairy, but will have occasional cheese. She has been working to increase her water intake.     Objective:Alert and Oriented X3.     Assessment: KD Def, LV and SP  Disharmony     Range of Motion/Joint Limitations: N/A     Tongue      • Not observed due to covid     Pulses: Left - wiry and tense        Right - wiry and tense      Traditional Chinese Medicine (TCM) Diagnosis:               KI Def, LV and SP Disharmony               TREATMENT PLAN     Acupuncture:   DU4, DU14  ANMIAN  UB15, UB17, UB18, UB20, UB21, UB23, UB52, UB31, UB32      # of needles used: 22                                  # of needles out: 22     EAR: No     TDP Lamp: Yes - low back     Guasha/Cupping:  No     Tui Na:  No    Ear Seeds:  NO - Left: Uterus C, Ferrara Men, External Genitals C, External Genitals E, Uterus E, Ovary E, Abdomin, Kidney C, Point Zero, Brain, Ovary C, Endocrine, Adrenal Gland C     [06/09/2023]: Ear Seeds/Pallets are place today     Patient is instructed to leave the ear seeds/pallets in for up to 5 days.  If at any point of time, the ear seed/pallet become bothersome; remove that one and leave the rest in.  On the fifth day, all ear seeds/pallets MUST be removed.    Patient acknowledged the instruction and understood what to do on the fifth day.      Moxibustion:  No     Herbs: No     Trey Phelps LAc  6/9/2023   done

## 2023-06-09 NOTE — PROGRESS NOTE ADULT - ASSESSMENT
Necrotic stage 4 large pressure ulcers sacrococcyx and bilat buttocks, continue local wound care  Likely superficial wound crease ventral penile gland and scrotum may be related to MASD, suggest  attention to local hygeine with daily application of triad creaam

## 2023-06-09 NOTE — PROGRESS NOTE ADULT - SUBJECTIVE AND OBJECTIVE BOX
Follow-up Critical Care Progress Note  Chief Complaint : Atrial fibrillation    patient lethargic today not arousable  not following commands not opening eyes  patient with increased upper airway secretions requiring suctioning    overnight had increased secretions requiring frequent suction and bipap  currently off  NIV        Allergies :sulfa drugs (Unknown)      PAST MEDICAL & SURGICAL HISTORY:  HTN (hypertension)    HLD (hyperlipidemia)    Diabetes    CAD (coronary artery disease)    History of cholecystectomy        Medications:  MEDICATIONS  (STANDING):  acetylcysteine 20%  Inhalation 4 milliLiter(s) Inhalation every 6 hours  albuterol/ipratropium for Nebulization 3 milliLiter(s) Nebulizer every 6 hours  chlorhexidine 2% Cloths 1 Application(s) Topical daily  dextrose 5% + sodium chloride 0.45%. 1000 milliLiter(s) (60 mL/Hr) IV Continuous <Continuous>  dextrose 5%. 1000 milliLiter(s) (50 mL/Hr) IV Continuous <Continuous>  dextrose 5%. 1000 milliLiter(s) (100 mL/Hr) IV Continuous <Continuous>  dextrose 50% Injectable 12.5 Gram(s) IV Push once  dextrose 50% Injectable 25 Gram(s) IV Push once  dextrose 50% Injectable 25 Gram(s) IV Push once  epoetin priyanka-epbx (RETACRIT) Injectable 55695 Unit(s) SubCutaneous every 7 days  insulin lispro (ADMELOG) corrective regimen sliding scale   SubCutaneous every 6 hours  levETIRAcetam  IVPB 250 milliGRAM(s) IV Intermittent every 12 hours  metoprolol tartrate Injectable 5 milliGRAM(s) IV Push every 8 hours  morphine  - Injectable 1 milliGRAM(s) IV Push once  pantoprazole  Injectable 40 milliGRAM(s) IV Push daily    MEDICATIONS  (PRN):  LORazepam   Injectable 1 milliGRAM(s) IV Push every 4 hours PRN Agitation restlessness  morphine  - Injectable 2 milliGRAM(s) IV Push once PRN as needed sob/respiratory distress      Antibiotics History  cefepime   IVPB    , 05-24-23 @ 17:36  cefepime   IVPB 500 milliGRAM(s) IV Intermittent once, 05-24-23 @ 16:39  cefepime   IVPB 500 milliGRAM(s) IV Intermittent daily, 05-25-23 @ 12:00  vancomycin  IVPB 1000 milliGRAM(s) IV Intermittent every 24 hours, 06-04-23 @ 12:32, Stop order after: 42 Days  vancomycin  IVPB 1000 milliGRAM(s) IV Intermittent once, 06-03-23 @ 12:32, Stop order after: 1 Doses  vancomycin  IVPB    , 06-03-23 @ 12:32     GI Medications  pantoprazole  Injectable 40 milliGRAM(s) IV Push daily, 06-08-23 @ 10:41, Routine      COVID  05-04-23 @ 19:50  COVID -   NotDetec  07-13-22 @ 18:33  COVID -   NotDetec      COVID Biomarkers    05-04-23 @ 19:50 ESR --  ---  CRP --  ---  DDimer  351<H>   ---   LDH --   ---   Ferritin --       Trend BNP  05-14-23 @ 11:30   -  20811<H>  05-11-23 @ 06:05   -  80196<H>  05-10-23 @ 05:10   -  12490<H>  05-08-23 @ 09:05   -  26043<H>  05-04-23 @ 19:50   -  32705<H>    Procalcitonin Trend  06-05-23 @ 05:30   -   0.24<H>  06-04-23 @ 06:00   -   0.20<H>  05-17-23 @ 23:44   -   0.59<H>  05-16-23 @ 22:16   -   0.74<H>  05-15-23 @ 05:45   -   1.12<H>  05-09-23 @ 13:42   -   0.11<H>    WBC Trend  06-09-23 @ 06:10   -  8.54  06-08-23 @ 08:30   -  7.51  06-07-23 @ 05:10   -  7.59    H/H Trend  06-09-23 @ 06:10   -   8.9<L>/ 28.9<L>  06-08-23 @ 08:30   -   8.7<L>/ 28.4<L>  06-07-23 @ 05:10   -   7.4<L>/ 24.1<L>  06-06-23 @ 05:30   -   7.1<L>/ 22.6<L>  06-05-23 @ 05:30   -   7.1<L>/ 21.9<L>  06-04-23 @ 06:00   -   7.8<L>/ 24.9<L>    Stool Occult Blood  06-02-23 @ 08:20   -   Negative  05-25-23 @ 18:20   -   Negative  05-16-23 @ 17:00   -   Positive<!>    Platelet Trend  06-09-23 @ 06:10   -  187  06-08-23 @ 08:30   -  160  06-07-23 @ 05:10   -  138<L>    Trend Sodium  06-09-23 @ 06:10   -  144  06-08-23 @ 10:20   -  143  06-07-23 @ 05:10   -  139    Trend Potassium  06-09-23 @ 06:10   -  4.4  06-08-23 @ 10:20   -  4.1  06-07-23 @ 05:10   -  4.2    Trend Bun/Cr  06-09-23 @ 06:10  BUN/CR -  83<H> / 2.56<H>  06-08-23 @ 10:20  BUN/CR -  77<H> / 2.30<H>  06-07-23 @ 05:10  BUN/CR -  77<H> / 2.36<H>     ABG Trend  05-19-23 @ 00:22   - 7.37/43/144<H>/99.8<H>  05-18-23 @ 05:38   - 7.41/37/97/98.4<H>  05-17-23 @ 23:36   - 7.42/37/97/98.3<H>  05-16-23 @ 22:10   - 7.41/41/92/98.5<H>  05-16-23 @ 05:59   - 7.35/47/104/98.8<H>  05-15-23 @ 13:45   - 7.36/47/74<L>/96.6  05-15-23 @ 04:45   - 7.33<L>/48/70<L>/95.7  05-14-23 @ 05:20   - 7.40/46/72<L>/96.5  05-13-23 @ 06:00   - 7.34<L>/46/93/98.7<H>  05-13-23 @ 03:35   - 7.31<L>/46/108/99.6<H>  05-12-23 @ 23:15   - 7.31<L>/49<H>/79<L>/97.1  05-11-23 @ 07:55   - 7.41/42/152<H>/99.2<H>    Trend AST/ALT/ALK Phos/Bili  06-09-23 @ 06:10   20/15/177<H>/0.8  06-08-23 @ 10:20   25/22/198<H>/0.8  06-06-23 @ 09:15   29/19/178<H>/0.6  06-06-23 @ 05:30   31/21/168<H>/0.6  06-05-23 @ 05:30   30/20/160<H>/0.6  06-04-23 @ 06:00   32/19/167<H>/0.7       Ammonia Trend  05-14-23 @ 11:30   -   53  05-14-23 @ 05:00   -   16         Albumin Trend  06-09-23 @ 06:10   -   1.8<L>  06-08-23 @ 10:20   -   1.8<L>  06-06-23 @ 09:15   -   1.6<L>  06-06-23 @ 05:30   -   1.6<L>  06-05-23 @ 05:30   -   1.5<L>  06-04-23 @ 06:00   -   1.6<L>      PTT - PT - INR Trend  05-19-23 @ 00:44   -   26.5<L> - 13.2 - 1.15  05-17-23 @ 23:44   -   31.2 - 13.7<H> - 1.18<H>  05-16-23 @ 22:16   -   31.3 - 13.6<H> - 1.18<H>  05-15-23 @ 13:55   -   32.9 - 14.0<H> - 1.19<H>  05-14-23 @ 13:45   -   33.6 - -- - --  05-14-23 @ 11:30   -   34.1 - 14.9<H> - 1.28<H>    Glucose Trend  06-09-23 @ 06:10   -  -- 136<H> --  06-09-23 @ 05:40   -  -- -- 138<H>  06-08-23 @ 23:12   -  -- -- 169<H>  06-08-23 @ 10:20   -  -- 115<H> --  06-07-23 @ 05:10   -  -- 160<H> --  06-07-23 @ 04:53   -  -- -- 180<H>  06-06-23 @ 23:30   -  -- -- 184<H>  06-06-23 @ 20:20   -  -- -- 209<H>  06-06-23 @ 17:11   -  -- -- 192<H>  06-06-23 @ 11:32   -  -- -- 150<H>    A1C with Estimated Average Glucose Result: 7.7 % *H* [4.0 - 5.6] (05-05-23 @ 08:00)      LABS:                        8.9    8.54  )-----------( 187      ( 09 Jun 2023 06:10 )             28.9     06-09    144  |  107  |  83<H>  ----------------------------<  136<H>  4.4   |  27  |  2.56<H>    Ca    8.8      09 Jun 2023 06:10  Phos  6.2     06-09  Mg     2.2     06-09    TPro  7.9  /  Alb  1.8<L>  /  TBili  0.8  /  DBili  x   /  AST  20  /  ALT  15  /  AlkPhos  177<H>  06-09       CULTURES: (if applicable)    Culture - Sputum (collected 06-04-23 @ 21:20)  Source: .Sputum Sputum  Gram Stain (06-05-23 @ 10:19):    Numerous polymorphonuclear leukocytes per low power field    No Squamous epithelial cells per low power field    Moderate Gram Positive Cocci in Clusters per oil power field  Final Report (06-07-23 @ 10:05):    Numerous Staphylococcus aureus    Normal Respiratory Carrie absent  Organism: Staphylococcus aureus (06-07-23 @ 10:05)  Organism: Staphylococcus aureus (06-07-23 @ 10:05)      Method Type: ALDO      -  Ampicillin/Sulbactam: S <=8/4      -  Cefazolin: S <=4      -  Clindamycin: S <=0.25      -  Erythromycin: S <=0.25      -  Gentamicin: S <=1 Should not be used as monotherapy      -  Oxacillin: S 1 Oxacillin predicts susceptibility for dicloxacillin, methicillin, and nafcillin      -  Penicillin: R >8      -  Rifampin: S <=1 Should not be used as monotherapy      -  Tetracycline: R >8      -  Trimethoprim/Sulfamethoxazole: S <=0.5/9.5      -  Vancomycin: S 2    Culture - Urine (collected 06-03-23 @ 06:20)  Source: Clean Catch Clean Catch (Midstream)  Final Report (06-06-23 @ 22:34):    >100,000 CFU/ml Enterococcus faecium    <10,000 CFU/ml Normal Urogenital carrie present  Organism: Enterococcus faecium (06-06-23 @ 22:34)  Organism: Enterococcus faecium (06-06-23 @ 22:34)      Method Type: ALDO      -  Ampicillin: R >8 Predicts results to ampicillin/sulbactam, amoxacillin-clavulanate and  piperacillin-tazobactam.      -  Ciprofloxacin: R >2      -  Levofloxacin: R >4      -  Nitrofurantoin: S <=32 Should not be used to treat pyelonephritis.      -  Tetracycline: R >8      -  Vancomycin: S 0.5    Culture - Blood (collected 06-02-23 @ 09:45)  Source: .Blood Blood  Final Report (06-07-23 @ 17:01):    No Growth Final    Culture - Blood (collected 06-02-23 @ 09:10)  Source: .Blood Blood  Gram Stain (06-03-23 @ 16:57):    Growth in aerobic bottle: Gram Positive Cocci in Clusters    Growth in anaerobic bottle: Gram Positive Cocci in Clusters  Final Report (06-04-23 @ 17:55):    Growth in aerobic and anaerobic bottles: Staphylococcus epidermidis    Coagulase Negative Staphylococci isolated from a single blood culture set    may represent contamination.    Contact the Microbiology Department at [phone] if susceptibility    testing is clinically indicated.    ***Blood Panel PCR results on this specimen are available    approximately 3 hours after the Gram stain result.***    Gram stain, PCR, and/or culture results may not always    correspond due to difference in methodologies.    ************************************************************    This PCR assay was performed by multiplex PCR. This    Assay tests for 66 bacterial and resistance gene targets.    Please refer to the [facility] test directory    at [email or web address] for details.  Organism: Blood Culture PCR (06-04-23 @ 17:55)  Organism: Blood Culture PCR (06-04-23 @ 17:55)      Method Type: PCR      -  Staphylococcus epidermidis, Methicillin resistant: Detec       RADIOLOGY  CXR:  increase b/l  infiltrates       VITALS:  T(C): 37.1 (06-09-23 @ 07:34), Max: 37.2 (06-08-23 @ 20:00)  T(F): 98.8 (06-09-23 @ 07:34), Max: 98.9 (06-08-23 @ 20:00)  HR: 115 (06-09-23 @ 09:00) (90 - 134)  BP: 136/75 (06-09-23 @ 09:00) (110/81 - 170/93)  BP(mean): 91 (06-09-23 @ 09:00) (89 - 118)  ABP: --  ABP(mean): --  RR: 31 (06-09-23 @ 09:00) (5 - 56)  SpO2: 100% (06-09-23 @ 09:00) (94% - 100%)  CVP(mm Hg): --  CVP(cm H2O): --    Ins and Outs     06-08-23 @ 07:01  -  06-09-23 @ 07:00  --------------------------------------------------------  IN: 1050 mL / OUT: 600 mL / NET: 450 mL    06-09-23 @ 07:01  -  06-09-23 @ 10:38  --------------------------------------------------------  IN: 150 mL / OUT: 0 mL / NET: 150 mL        Height (cm): 160 (06-09-23 @ 07:47)  Weight (kg): 77.6 (06-09-23 @ 07:34)  BMI (kg/m2): 30.3 (06-09-23 @ 07:47)        I&O's Detail    08 Jun 2023 07:01  -  09 Jun 2023 07:00  --------------------------------------------------------  IN:    dextrose 5% + sodium chloride 0.45%: 950 mL    IV PiggyBack: 100 mL  Total IN: 1050 mL    OUT:    Stool (mL): 200 mL    Voided (mL): 400 mL  Total OUT: 600 mL    Total NET: 450 mL      09 Jun 2023 07:01  -  09 Jun 2023 10:38  --------------------------------------------------------  IN:    dextrose 5% + sodium chloride 0.45%: 150 mL  Total IN: 150 mL    OUT:  Total OUT: 0 mL    Total NET: 150 mL

## 2023-06-09 NOTE — PROGRESS NOTE ADULT - SUBJECTIVE AND OBJECTIVE BOX
(x  ) No complaints (  ) Complains of:     T(F): 98.6 (06-09-23 @ 14:31), Max: 98.9 (06-08-23 @ 20:00)  HR: 109 (06-09-23 @ 15:48) (74 - 122)  BP: 123/88 (06-09-23 @ 14:00) (119/104 - 168/96)  RR: 34 (06-09-23 @ 14:00) (5 - 42)  SpO2: 100% (06-09-23 @ 15:48) (96% - 100%)  Height (cm): 160 (06-09-23 @ 07:47)  Weight (kg): 77.6 (06-09-23 @ 07:34)  BMI (kg/m2): 30.3 (06-09-23 @ 07:47)  BSA (m2): 1.81 (06-09-23 @ 07:47)      Wound Location 1:  edema glans penis and scrotum, nickel size superficial ulcer of the ventral glans near scrotum, which does not appear to extend to urethra, some exudate in fold may be related to topical treatment       Wound location 2: necrotic wounds of sacrum and bilat. buttocks                     8.9    8.54  )-----------( 187      ( 09 Jun 2023 06:10 )             28.9     CBC Full  -  ( 09 Jun 2023 06:10 )  WBC Count : 8.54 K/uL  RBC Count : 2.85 M/uL  Hemoglobin : 8.9 g/dL  Hematocrit : 28.9 %  Platelet Count - Automated : 187 K/uL  Mean Cell Volume : 101.4 fl  Mean Cell Hemoglobin : 31.2 pg  Mean Cell Hemoglobin Concentration : 30.8 gm/dL  Auto Neutrophil # : x  Auto Lymphocyte # : x  Auto Monocyte # : x  Auto Eosinophil # : x  Auto Basophil # : x  Auto Neutrophil % : x  Auto Lymphocyte % : x  Auto Monocyte % : x  Auto Eosinophil % : x  Auto Basophil % : x    144|107|83<136  4.4|27|2.56  8.8,2.2,6.2  06-09 @ 06:10                  Procedure Performed:  (  )Yes     ( x )No  Name of Procedure:  (  )debridement    (  )I&D    (  )Other:  (  )partial thickness     (  )full thickness     (  )subcutaneous     (  )muscle/tendon     (  )bone  (  )sharp     (  )surgical

## 2023-06-09 NOTE — CONSULT NOTE ADULT - ASSESSMENT
Unable to obtain HPI from patient so obtained from records and staff. GI consulted for peg tube placement. Patient with recent respiratory arrest due to aspiration.  78 year old man PMHx dementia, HTN, cardiomyopathy, HFrEF, CAD s/p CABG, with known RCA occlusion, and DM type 2 who was admitted to [facility] on 5/4 with hypoxic respiratory failure in setting of CHF exacerbation and ANISH with course complicated by acute hypoxic respiratory failure in setting of choking episode causing cardiac arrest, shock, worsening hypoxemic respiratory failure, and NSTEMI on 5/9. Pt ICU course noted post cardiac arrest anoxic brain injury and myoclonus. Pt was transferred to [facility] for VEEG which required 48 hours of monitoring as he was noted to still be sedated. While there pt noted to have hematochezia and any ac / antiplatelet agents were held at that time. EEG findings consistent with stimulus induced myoclonus and GPDs s/p hypoxic diffuse indicative of severe cerebral dysfunction.   GI consulted for peg tube placement. Patient with recent respiratory arrest due to aspiration.

## 2023-06-09 NOTE — CONSULT NOTE ADULT - PROBLEM SELECTOR RECOMMENDATION 9
NPO  NGT with tube feeds  Aspiration precautions  Would consider peg placement once clinically improved  Cardiac clearance requested NPO  NGT with tube feeds  Aspiration precautions  Would consider PEG placement once clinically improved  Cardiology note appreciated.

## 2023-06-10 LAB
ALBUMIN SERPL ELPH-MCNC: 1.8 G/DL — LOW (ref 3.3–5)
ALP SERPL-CCNC: 153 U/L — HIGH (ref 40–120)
ALT FLD-CCNC: 15 U/L — SIGNIFICANT CHANGE UP (ref 10–45)
ANION GAP SERPL CALC-SCNC: 9 MMOL/L — SIGNIFICANT CHANGE UP (ref 5–17)
AST SERPL-CCNC: 16 U/L — SIGNIFICANT CHANGE UP (ref 10–40)
BASE EXCESS BLDA CALC-SCNC: 2.1 MMOL/L — SIGNIFICANT CHANGE UP (ref -2–3)
BILIRUB SERPL-MCNC: 0.6 MG/DL — SIGNIFICANT CHANGE UP (ref 0.2–1.2)
BLOOD GAS COMMENTS ARTERIAL: SIGNIFICANT CHANGE UP
BUN SERPL-MCNC: 88 MG/DL — HIGH (ref 7–23)
CALCIUM SERPL-MCNC: 8.6 MG/DL — SIGNIFICANT CHANGE UP (ref 8.4–10.5)
CHLORIDE SERPL-SCNC: 108 MMOL/L — SIGNIFICANT CHANGE UP (ref 96–108)
CO2 BLDA-SCNC: 28 MMOL/L — HIGH (ref 19–24)
CO2 SERPL-SCNC: 28 MMOL/L — SIGNIFICANT CHANGE UP (ref 22–31)
CREAT SERPL-MCNC: 2.83 MG/DL — HIGH (ref 0.5–1.3)
EGFR: 22 ML/MIN/1.73M2 — LOW
GAS PNL BLDA: SIGNIFICANT CHANGE UP
GLUCOSE BLDC GLUCOMTR-MCNC: 166 MG/DL — HIGH (ref 70–99)
GLUCOSE BLDC GLUCOMTR-MCNC: 173 MG/DL — HIGH (ref 70–99)
GLUCOSE BLDC GLUCOMTR-MCNC: 197 MG/DL — HIGH (ref 70–99)
GLUCOSE BLDC GLUCOMTR-MCNC: 215 MG/DL — HIGH (ref 70–99)
GLUCOSE BLDC GLUCOMTR-MCNC: 231 MG/DL — HIGH (ref 70–99)
GLUCOSE SERPL-MCNC: 159 MG/DL — HIGH (ref 70–99)
HCO3 BLDA-SCNC: 27 MMOL/L — SIGNIFICANT CHANGE UP (ref 21–28)
HCT VFR BLD CALC: 27.1 % — LOW (ref 39–50)
HGB BLD-MCNC: 8.4 G/DL — LOW (ref 13–17)
HOROWITZ INDEX BLDA+IHG-RTO: 30 — SIGNIFICANT CHANGE UP
MAGNESIUM SERPL-MCNC: 2.1 MG/DL — SIGNIFICANT CHANGE UP (ref 1.6–2.6)
MCHC RBC-ENTMCNC: 31 GM/DL — LOW (ref 32–36)
MCHC RBC-ENTMCNC: 31.2 PG — SIGNIFICANT CHANGE UP (ref 27–34)
MCV RBC AUTO: 100.7 FL — HIGH (ref 80–100)
NRBC # BLD: 0 /100 WBCS — SIGNIFICANT CHANGE UP (ref 0–0)
PCO2 BLDA: 41 MMHG — SIGNIFICANT CHANGE UP (ref 35–48)
PH BLDA: 7.42 — SIGNIFICANT CHANGE UP (ref 7.35–7.45)
PHOSPHATE SERPL-MCNC: 5.2 MG/DL — HIGH (ref 2.5–4.5)
PLATELET # BLD AUTO: 149 K/UL — LOW (ref 150–400)
PO2 BLDA: 78 MMHG — LOW (ref 83–108)
POTASSIUM SERPL-MCNC: 3.7 MMOL/L — SIGNIFICANT CHANGE UP (ref 3.5–5.3)
POTASSIUM SERPL-SCNC: 3.7 MMOL/L — SIGNIFICANT CHANGE UP (ref 3.5–5.3)
PROT SERPL-MCNC: 7.6 G/DL — SIGNIFICANT CHANGE UP (ref 6–8.3)
RBC # BLD: 2.69 M/UL — LOW (ref 4.2–5.8)
RBC # FLD: 18.1 % — HIGH (ref 10.3–14.5)
SAO2 % BLDA: 96.6 % — SIGNIFICANT CHANGE UP (ref 94–98)
SODIUM SERPL-SCNC: 145 MMOL/L — SIGNIFICANT CHANGE UP (ref 135–145)
WBC # BLD: 7.88 K/UL — SIGNIFICANT CHANGE UP (ref 3.8–10.5)
WBC # FLD AUTO: 7.88 K/UL — SIGNIFICANT CHANGE UP (ref 3.8–10.5)

## 2023-06-10 PROCEDURE — 71045 X-RAY EXAM CHEST 1 VIEW: CPT | Mod: 26

## 2023-06-10 RX ORDER — METOPROLOL TARTRATE 50 MG
12.5 TABLET ORAL EVERY 8 HOURS
Refills: 0 | Status: DISCONTINUED | OUTPATIENT
Start: 2023-06-10 | End: 2023-06-11

## 2023-06-10 RX ORDER — FUROSEMIDE 40 MG
40 TABLET ORAL ONCE
Refills: 0 | Status: COMPLETED | OUTPATIENT
Start: 2023-06-10 | End: 2023-06-10

## 2023-06-10 RX ORDER — FUROSEMIDE 40 MG
20 TABLET ORAL ONCE
Refills: 0 | Status: COMPLETED | OUTPATIENT
Start: 2023-06-10 | End: 2023-06-10

## 2023-06-10 RX ORDER — BRIVARACETAM 25 MG/1
25 TABLET, FILM COATED ORAL
Refills: 0 | Status: DISCONTINUED | OUTPATIENT
Start: 2023-06-10 | End: 2023-06-19

## 2023-06-10 RX ADMIN — Medication 3 MILLILITER(S): at 04:25

## 2023-06-10 RX ADMIN — PANTOPRAZOLE SODIUM 40 MILLIGRAM(S): 20 TABLET, DELAYED RELEASE ORAL at 11:38

## 2023-06-10 RX ADMIN — BRIVARACETAM 50 MILLIGRAM(S): 25 TABLET, FILM COATED ORAL at 06:16

## 2023-06-10 RX ADMIN — Medication 1: at 00:25

## 2023-06-10 RX ADMIN — Medication 15 MILLILITER(S): at 11:39

## 2023-06-10 RX ADMIN — Medication 3 MILLILITER(S): at 21:03

## 2023-06-10 RX ADMIN — Medication 4 MILLILITER(S): at 15:29

## 2023-06-10 RX ADMIN — Medication 40 MILLIGRAM(S): at 16:44

## 2023-06-10 RX ADMIN — Medication 12.5 MILLIGRAM(S): at 09:58

## 2023-06-10 RX ADMIN — Medication 4 MILLILITER(S): at 08:51

## 2023-06-10 RX ADMIN — Medication 12.5 MILLIGRAM(S): at 14:54

## 2023-06-10 RX ADMIN — Medication 1: at 06:15

## 2023-06-10 RX ADMIN — Medication 1: at 11:39

## 2023-06-10 RX ADMIN — CHLORHEXIDINE GLUCONATE 1 APPLICATION(S): 213 SOLUTION TOPICAL at 14:54

## 2023-06-10 RX ADMIN — Medication 12.5 MILLIGRAM(S): at 20:27

## 2023-06-10 RX ADMIN — MODAFINIL 150 MILLIGRAM(S): 200 TABLET ORAL at 11:38

## 2023-06-10 RX ADMIN — Medication 5 MILLIGRAM(S): at 09:19

## 2023-06-10 RX ADMIN — Medication 20 MILLIGRAM(S): at 09:58

## 2023-06-10 RX ADMIN — Medication 2: at 23:50

## 2023-06-10 RX ADMIN — SODIUM CHLORIDE 60 MILLILITER(S): 9 INJECTION, SOLUTION INTRAVENOUS at 06:22

## 2023-06-10 RX ADMIN — Medication 2: at 17:34

## 2023-06-10 RX ADMIN — Medication 3 MILLILITER(S): at 08:51

## 2023-06-10 RX ADMIN — Medication 3 MILLILITER(S): at 15:30

## 2023-06-10 RX ADMIN — BRIVARACETAM 25 MILLIGRAM(S): 25 TABLET, FILM COATED ORAL at 17:41

## 2023-06-10 RX ADMIN — Medication 5 MILLIGRAM(S): at 00:28

## 2023-06-10 RX ADMIN — Medication 4 MILLILITER(S): at 04:25

## 2023-06-10 NOTE — PROGRESS NOTE ADULT - SUBJECTIVE AND OBJECTIVE BOX
Follow-up Critical Care Progress Note  Chief Complaint : Atrial fibrillation    pateint was on NIV overnight  restarted on tube feeds this am  pt with thick secretions suctioned   pt arousable, opens eyes  remains tachypnic.       Allergies :sulfa drugs (Unknown)      PAST MEDICAL & SURGICAL HISTORY:  HTN (hypertension)    HLD (hyperlipidemia)    Diabetes    CAD (coronary artery disease)    History of cholecystectomy        Medications:  MEDICATIONS  (STANDING):  acetylcysteine 20%  Inhalation 4 milliLiter(s) Inhalation every 6 hours  albuterol/ipratropium for Nebulization 3 milliLiter(s) Nebulizer every 6 hours  brivaracetam Oral Solution 50 milliGRAM(s) Oral two times a day  chlorhexidine 2% Cloths 1 Application(s) Topical daily  dextrose 5%. 1000 milliLiter(s) (50 mL/Hr) IV Continuous <Continuous>  dextrose 5%. 1000 milliLiter(s) (100 mL/Hr) IV Continuous <Continuous>  dextrose 50% Injectable 25 Gram(s) IV Push once  dextrose 50% Injectable 12.5 Gram(s) IV Push once  dextrose 50% Injectable 25 Gram(s) IV Push once  epoetin priyanka-epbx (RETACRIT) Injectable 55253 Unit(s) SubCutaneous every 7 days  insulin lispro (ADMELOG) corrective regimen sliding scale   SubCutaneous every 6 hours  metoprolol tartrate 12.5 milliGRAM(s) Oral every 8 hours  modafinil 150 milliGRAM(s) Oral every 24 hours  multivitamin/minerals/iron Oral Solution (CENTRUM) 15 milliLiter(s) Oral daily  pantoprazole  Injectable 40 milliGRAM(s) IV Push daily    MEDICATIONS  (PRN):      Antibiotics History  cefepime   IVPB 500 milliGRAM(s) IV Intermittent daily, 05-25-23 @ 12:00  cefepime   IVPB    , 05-24-23 @ 17:36  cefepime   IVPB 500 milliGRAM(s) IV Intermittent once, 05-24-23 @ 16:39  vancomycin  IVPB 1000 milliGRAM(s) IV Intermittent every 24 hours, 06-04-23 @ 12:32, Stop order after: 42 Days  vancomycin  IVPB    , 06-03-23 @ 12:32  vancomycin  IVPB 1000 milliGRAM(s) IV Intermittent once, 06-03-23 @ 12:32, Stop order after: 1 Doses       GI Medications  pantoprazole  Injectable 40 milliGRAM(s) IV Push daily, 06-08-23 @ 10:41, Routine      COVID  05-04-23 @ 19:50  COVID -   NotDetec  07-13-22 @ 18:33  COVID -   NotDetec      COVID Biomarkers    05-04-23 @ 19:50 ESR --  ---  CRP --  ---  DDimer  351<H>   ---   LDH --   ---   Ferritin --         Trend BNP  05-14-23 @ 11:30   -  80819<H>  05-11-23 @ 06:05   -  90340<H>  05-10-23 @ 05:10   -  63289<H>  05-08-23 @ 09:05   -  18656<H>  05-04-23 @ 19:50   -  43062<H>    Procalcitonin Trend  06-05-23 @ 05:30   -   0.24<H>  06-04-23 @ 06:00   -   0.20<H>  05-17-23 @ 23:44   -   0.59<H>  05-16-23 @ 22:16   -   0.74<H>  05-15-23 @ 05:45   -   1.12<H>  05-09-23 @ 13:42   -   0.11<H>    WBC Trend  06-10-23 @ 06:00   -  7.88  06-09-23 @ 06:10   -  8.54  06-08-23 @ 08:30   -  7.51    H/H Trend  06-10-23 @ 06:00   -   8.4<L>/ 27.1<L>  06-09-23 @ 06:10   -   8.9<L>/ 28.9<L>  06-08-23 @ 08:30   -   8.7<L>/ 28.4<L>  06-07-23 @ 05:10   -   7.4<L>/ 24.1<L>  06-06-23 @ 05:30   -   7.1<L>/ 22.6<L>  06-05-23 @ 05:30   -   7.1<L>/ 21.9<L>    Stool Occult Blood  06-02-23 @ 08:20   -   Negative  05-25-23 @ 18:20   -   Negative  05-16-23 @ 17:00   -   Positive<!>    Platelet Trend  06-10-23 @ 06:00   -  149<L>  06-09-23 @ 06:10   -  187  06-08-23 @ 08:30   -  160    Trend Sodium  06-10-23 @ 06:00   -  145  06-09-23 @ 06:10   -  144  06-08-23 @ 10:20   -  143    Trend Potassium  06-10-23 @ 06:00   -  3.7  06-09-23 @ 06:10   -  4.4  06-08-23 @ 10:20   -  4.1    Trend Bun/Cr  06-10-23 @ 06:00  BUN/CR -  88<H> / 2.83<H>  06-09-23 @ 06:10  BUN/CR -  83<H> / 2.56<H>  06-08-23 @ 10:20  BUN/CR -  77<H> / 2.30<H>       ABG Trend  06-10-23 @ 04:45   - 7.42/41/78<L>/96.6  06-09-23 @ 10:50   - 7.39/40/86/98.9<H>  05-19-23 @ 00:22   - 7.37/43/144<H>/99.8<H>  05-18-23 @ 05:38   - 7.41/37/97/98.4<H>  05-17-23 @ 23:36   - 7.42/37/97/98.3<H>  05-16-23 @ 22:10   - 7.41/41/92/98.5<H>  05-16-23 @ 05:59   - 7.35/47/104/98.8<H>  05-15-23 @ 13:45   - 7.36/47/74<L>/96.6  05-15-23 @ 04:45   - 7.33<L>/48/70<L>/95.7  05-14-23 @ 05:20   - 7.40/46/72<L>/96.5  05-13-23 @ 06:00   - 7.34<L>/46/93/98.7<H>  05-13-23 @ 03:35   - 7.31<L>/46/108/99.6<H>       Albumin Trend  06-10-23 @ 06:00   -   1.8<L>  06-09-23 @ 06:10   -   1.8<L>  06-08-23 @ 10:20   -   1.8<L>  06-06-23 @ 09:15   -   1.6<L>  06-06-23 @ 05:30   -   1.6<L>  06-05-23 @ 05:30   -   1.5<L>      PTT - PT - INR Trend  05-19-23 @ 00:44   -   26.5<L> - 13.2 - 1.15  05-17-23 @ 23:44   -   31.2 - 13.7<H> - 1.18<H>  05-16-23 @ 22:16   -   31.3 - 13.6<H> - 1.18<H>  05-15-23 @ 13:55   -   32.9 - 14.0<H> - 1.19<H>  05-14-23 @ 13:45   -   33.6 - -- - --  05-14-23 @ 11:30   -   34.1 - 14.9<H> - 1.28<H>    Glucose Trend  06-10-23 @ 06:00   -  -- 159<H> --  06-10-23 @ 05:59   -  -- -- 166<H>  06-10-23 @ 00:07   -  -- -- 173<H>  06-09-23 @ 17:10   -  -- -- 227<H>  06-09-23 @ 11:21   -  -- -- 186<H>  06-09-23 @ 06:10   -  -- 136<H> --  06-09-23 @ 05:40   -  -- -- 138<H>  06-08-23 @ 23:12   -  -- -- 169<H>  06-08-23 @ 10:20   -  -- 115<H> --    A1C with Estimated Average Glucose Result: 7.7 % *H* [4.0 - 5.6] (05-05-23 @ 08:00)      LABS:                        8.4    7.88  )-----------( 149      ( 10 Hermes 2023 06:00 )             27.1     06-10    145  |  108  |  88<H>  ----------------------------<  159<H>  3.7   |  28  |  2.83<H>    Ca    8.6      10 Hermes 2023 06:00  Phos  5.2     06-10  Mg     2.1     06-10    TPro  7.6  /  Alb  1.8<L>  /  TBili  0.6  /  DBili  x   /  AST  16  /  ALT  15  /  AlkPhos  153<H>  06-10       CULTURES: (if applicable)    Culture - Sputum (collected 06-04-23 @ 21:20)  Source: .Sputum Sputum  Gram Stain (06-05-23 @ 10:19):    Numerous polymorphonuclear leukocytes per low power field    No Squamous epithelial cells per low power field    Moderate Gram Positive Cocci in Clusters per oil power field  Final Report (06-07-23 @ 10:05):  Organism: Staphylococcus aureus (06-07-23 @ 10:05)  Organism: Staphylococcus aureus (06-07-23 @ 10:05)      -  Clindamycin: S <=0.25      -  Oxacillin: S 1 Oxacillin predicts susceptibility for dicloxacillin, methicillin, and nafcillin      -  Gentamicin: S <=1 Should not be used as monotherapy      -  Cefazolin: S <=4      -  Vancomycin: S 2      -  Tetracycline: R >8      Method Type: ALDO      -  Ampicillin/Sulbactam: S <=8/4      -  Penicillin: R >8      -  Rifampin: S <=1 Should not be used as monotherapy      -  Erythromycin: S <=0.25      -  Trimethoprim/Sulfamethoxazole: S <=0.5/9.5    Culture - Urine (collected 06-03-23 @ 06:20)  Source: Clean Catch Clean Catch (Midstream)  Final Report (06-06-23 @ 22:34):    >100,000 CFU/ml Enterococcus faecium    <10,000 CFU/ml Normal Urogenital carrie present  Organism: Enterococcus faecium (06-06-23 @ 22:34)  Organism: Enterococcus faecium (06-06-23 @ 22:34)      -  Levofloxacin: R >4      -  Nitrofurantoin: S <=32 Should not be used to treat pyelonephritis.      -  Vancomycin: S 0.5      -  Ciprofloxacin: R >2      -  Ampicillin: R >8 Predicts results to ampicillin/sulbactam, amoxacillin-clavulanate and  piperacillin-tazobactam.      -  Tetracycline: R >8      Method Type: ALDO    Culture - Blood (collected 06-02-23 @ 09:45)  Source: .Blood Blood  Final Report (06-07-23 @ 17:01):    No Growth Final    Culture - Blood (collected 06-02-23 @ 09:10)  Source: .Blood Blood  Gram Stain (06-03-23 @ 16:57):    Growth in aerobic bottle: Gram Positive Cocci in Clusters    Growth in anaerobic bottle: Gram Positive Cocci in Clusters  Final Report (06-04-23 @ 17:55):    Growth in aerobic and anaerobic bottles: Staphylococcus epidermidis    Coagulase Negative Staphylococci isolated from a single blood culture set    may represent contamination.    Contact the Microbiology Department at [phone] if susceptibility    testing is clinically indicated.    ***Blood Panel PCR results on this specimen are available    approximately 3 hours after the Gram stain result.***    Gram stain, PCR, and/or culture results may not always    correspond due to difference in methodologies.    ************************************************************    This PCR assay was performed by multiplex PCR. This    Assay tests for 66 bacterial and resistance gene targets.    Please refer to the [facility] test directory    at [email or web address] for details.  Organism: Blood Culture PCR (06-04-23 @ 17:55)  Organism: Blood Culture PCR (06-04-23 @ 17:55)      -  Staphylococcus epidermidis, Methicillin resistant: Detec      Method Type: PCR       RADIOLOGY  CXR:  increased vascuular congestion with b/l pl effusins      VITALS:  T(C): 37.1 (06-10-23 @ 08:00), Max: 37.1 (06-09-23 @ 21:00)  T(F): 98.7 (06-10-23 @ 08:00), Max: 98.7 (06-09-23 @ 21:00)  HR: 116 (06-10-23 @ 09:00) (74 - 129)  BP: 154/97 (06-10-23 @ 09:00) (117/85 - 154/97)  BP(mean): 113 (06-10-23 @ 09:00) (91 - 118)  ABP: --  ABP(mean): --  RR: 41 (06-10-23 @ 09:00) (13 - 42)  SpO2: 100% (06-10-23 @ 09:00) (93% - 100%)  CVP(mm Hg): --  CVP(cm H2O): --    Ins and Outs     06-09-23 @ 07:01  -  06-10-23 @ 07:00  --------------------------------------------------------  IN: 1675 mL / OUT: 425 mL / NET: 1250 mL        Height (cm): 160 (06-09-23 @ 07:47)  Weight (kg): 77.6 (06-09-23 @ 07:34)  BMI (kg/m2): 30.3 (06-09-23 @ 07:47)        I&O's Detail    09 Jun 2023 07:01  -  10 Hermes 2023 07:00  --------------------------------------------------------  IN:    dextrose 5% + sodium chloride 0.45%: 150 mL    dextrose 5% + sodium chloride 0.45%: 1200 mL    Free Water: 120 mL    Nepro with Carb Steady: 205 mL  Total IN: 1675 mL    OUT:    Stool (mL): 50 mL    Voided (mL): 375 mL  Total OUT: 425 mL    Total NET: 1250 mL

## 2023-06-10 NOTE — PROGRESS NOTE ADULT - ASSESSMENT
Physical Examination:  GENERAL:               Eyes Closed and in mild-mod distress, arousable  HEENT:                    No JVD, Dry MM  PULM:                     Bilateral air entry, course to auscultation bilaterally, no significant sputum production, + Rales, No Rhonchi, No Wheezing  CVS:                         S1, S2,  +Murmur  ABD:                        Soft, nondistended, nontender, normoactive bowel sounds,   EXT:                         + edema, nontender, No Cyanosis or Clubbing   Vascular:                Warm Extremities,   NEURO:                 lethargic not open eyes to verbal / tachtile stimuli   PSYC:                      Calm, No Insight.      Assessment  78 year old male with a PMH of dementia, HTN, cardiomyopathy, HFrEF, CAD s/p CABG, with known current RCA occlusion, and DM type 2 who was admitted to [facility] on 5/4 with hypoxic respiratory failure in setting of CHF exacerbation and ANISH with course complicated by acute hypoxic respiratory failure in setting of choking episode causing cardiac arrest, shock, worsening hypoxemic respiratory failure, and NSTEMI on 5/9. Pt ICU course noted post cardiac arrest anoxic brain injury and myoclonus. Pt was transferred to [facility] for VEEG which required 48 hours of monitoring as he was noted to still be sedated. While there pt noted to have hematochezia and any ac / antiplatelet agents were held at that time. EEG findings consistent with stimulus induced myoclonus and GPDs s/p hypoxic diffuse indicative of severe cerebral dysfunction.    Patient returned to [facility] ICU on 5/19 and continues tx / supportive care for management of:    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  Acute renal failure w Uremia post cardiac arrest improving  Hemtochezia episode, h/h stable no further episodes noted  Heart failure with reduced EF  NSTEMI post arrest       Underlying PMH of dementia, HTN, cardiomyopathy, HFrEF, CAD s/p CABG, with known current RCA occlusion, and DM type 2      Plan:    Mental status poor this am,   d/c ivf  give lasix, bedside US shows diffuse b lines    requires frequent suctioning for deep secretions  NIV PRN/QhS  NGT for diet      S&S eval to advance diet - but patient too lethargic today to pass today  benzo as needed      Keppra IV for now - restart oral brivaracetam  when able to take oral   may need considered modafinil oral when more alert      monitor blood glucose levels, + ISS coverage  Sacral decub - wound care called continue local wound care off loading and nutrition support  Venodyne for dvt ppx   GOC ongoing discussion with family , but want full code     PMD:				                   Notified(Date):  Family Updated: 	Kuldeep [phone]	                                 Date:  6/10/2023   updated on status      Sedation & Analgesia:	  Diet/Nutrition:		 as above  GI PPx:			Protonix  DVT Ppx:		Venodynes   Activity:		  bedrest  Head of Bed:               35-45 Deg  Glycemic Control:         Insulin    Lines: piv  Restraints were deemed necessary to prevent removal of life-sustaining devices [  ] YES   [  x  ]  NO  Disposition: ICU Care  Goals of Care: Full code

## 2023-06-10 NOTE — PROGRESS NOTE ADULT - SUBJECTIVE AND OBJECTIVE BOX
[facility] NEPHROLOGY SERVICES, [facility]  NEPHROLOGY AND HYPERTENSION  [address]  [address]  [place] [address]  [phone]    MD ALEX NORMAN, MD JESSE MORILLO MD CHRISTOPHER CAPUTO, MD EDWARD BOVER, MD          Patient events noted    MEDICATIONS  (STANDING):  albuterol/ipratropium for Nebulization 3 milliLiter(s) Nebulizer every 6 hours  brivaracetam Oral Solution 25 milliGRAM(s) Oral two times a day  chlorhexidine 2% Cloths 1 Application(s) Topical daily  dextrose 5%. 1000 milliLiter(s) (100 mL/Hr) IV Continuous <Continuous>  dextrose 5%. 1000 milliLiter(s) (50 mL/Hr) IV Continuous <Continuous>  dextrose 50% Injectable 12.5 Gram(s) IV Push once  dextrose 50% Injectable 25 Gram(s) IV Push once  dextrose 50% Injectable 25 Gram(s) IV Push once  epoetin priyanka-epbx (RETACRIT) Injectable 04511 Unit(s) SubCutaneous every 7 days  insulin lispro (ADMELOG) corrective regimen sliding scale   SubCutaneous every 6 hours  metoprolol tartrate 12.5 milliGRAM(s) Oral every 8 hours  modafinil 150 milliGRAM(s) Oral every 24 hours  multivitamin/minerals/iron Oral Solution (CENTRUM) 15 milliLiter(s) Oral daily  pantoprazole  Injectable 40 milliGRAM(s) IV Push daily    MEDICATIONS  (PRN):      06-09-23 @ 07:01  -  06-10-23 @ 07:00  --------------------------------------------------------  IN: 1675 mL / OUT: 425 mL / NET: 1250 mL    06-10-23 @ 07:01  -  06-10-23 @ 20:39  --------------------------------------------------------  IN: 395 mL / OUT: 250 mL / NET: 145 mL      PHYSICAL EXAM:      T(C): 36.8 (06-10-23 @ 16:00), Max: 37.1 (06-09-23 @ 21:00)  HR: 112 (06-10-23 @ 20:00) (88 - 122)  BP: 156/103 (06-10-23 @ 20:00) (121/86 - 160/93)  RR: 36 (06-10-23 @ 20:00) (13 - 42)  SpO2: 95% (06-10-23 @ 20:00) (90% - 100%)  Wt(kg): --  Lungs clear  Heart S1S2  Abd soft NT ND  Extremities:   tr edema                                    8.4    7.88  )-----------( 149      ( 10 Hermes 2023 06:00 )             27.1     06-10    145  |  108  |  88<H>  ----------------------------<  159<H>  3.7   |  28  |  2.83<H>    Ca    8.6      10 Hermes 2023 06:00  Phos  5.2     06-10  Mg     2.1     06-10    TPro  7.6  /  Alb  1.8<L>  /  TBili  0.6  /  DBili  x   /  AST  16  /  ALT  15  /  AlkPhos  153<H>  06-10    ABG - ( 10 Hermes 2023 04:45 )  pH, Arterial: 7.42  pH, Blood: x     /  pCO2: 41    /  pO2: 78    / HCO3: 27    / Base Excess: 2.1   /  SaO2: 96.6              LIVER FUNCTIONS - ( 10 Hermes 2023 06:00 )  Alb: 1.8 g/dL / Pro: 7.6 g/dL / ALK PHOS: 153 U/L / ALT: 15 U/L / AST: 16 U/L / GGT: x           Creatinine Trend: 2.83<--, 2.56<--, 2.30<--, 2.36<--, 2.38<--, 2.33<--      A/P:    Hemodynamic ATN s/p arrest (Cr 1.6 - 5/9/23, Cr 1.0 - 4/12/23)  Resp failure, intubated, now extubated  CM, EF 35 - 40%, dementia  ANISH improved, but higher Cr today noted  Possibly Vanco related  Off Vanco now  Avoid nephrotoxins  Lytes are stable  Gentle IVF while NPO  S&Sw f/u  Epoetin  F/u CBC, BMP, UO  D/w family at bedside   D/w ICU team    Avila Ross MD

## 2023-06-11 LAB
ALBUMIN SERPL ELPH-MCNC: 1.9 G/DL — LOW (ref 3.3–5)
ALP SERPL-CCNC: 196 U/L — HIGH (ref 40–120)
ALT FLD-CCNC: 13 U/L — SIGNIFICANT CHANGE UP (ref 10–45)
ANION GAP SERPL CALC-SCNC: 13 MMOL/L — SIGNIFICANT CHANGE UP (ref 5–17)
AST SERPL-CCNC: 18 U/L — SIGNIFICANT CHANGE UP (ref 10–40)
BILIRUB SERPL-MCNC: 0.8 MG/DL — SIGNIFICANT CHANGE UP (ref 0.2–1.2)
BUN SERPL-MCNC: 90 MG/DL — HIGH (ref 7–23)
CALCIUM SERPL-MCNC: 8.9 MG/DL — SIGNIFICANT CHANGE UP (ref 8.4–10.5)
CHLORIDE SERPL-SCNC: 107 MMOL/L — SIGNIFICANT CHANGE UP (ref 96–108)
CO2 SERPL-SCNC: 25 MMOL/L — SIGNIFICANT CHANGE UP (ref 22–31)
CREAT SERPL-MCNC: 3 MG/DL — HIGH (ref 0.5–1.3)
EGFR: 20 ML/MIN/1.73M2 — LOW
GLUCOSE BLDC GLUCOMTR-MCNC: 209 MG/DL — HIGH (ref 70–99)
GLUCOSE BLDC GLUCOMTR-MCNC: 229 MG/DL — HIGH (ref 70–99)
GLUCOSE BLDC GLUCOMTR-MCNC: 242 MG/DL — HIGH (ref 70–99)
GLUCOSE BLDC GLUCOMTR-MCNC: 262 MG/DL — HIGH (ref 70–99)
GLUCOSE SERPL-MCNC: 247 MG/DL — HIGH (ref 70–99)
GRAM STN FLD: SIGNIFICANT CHANGE UP
HCT VFR BLD CALC: 31.9 % — LOW (ref 39–50)
HGB BLD-MCNC: 9.7 G/DL — LOW (ref 13–17)
MAGNESIUM SERPL-MCNC: 2.1 MG/DL — SIGNIFICANT CHANGE UP (ref 1.6–2.6)
MCHC RBC-ENTMCNC: 30.4 GM/DL — LOW (ref 32–36)
MCHC RBC-ENTMCNC: 31.8 PG — SIGNIFICANT CHANGE UP (ref 27–34)
MCV RBC AUTO: 104.6 FL — HIGH (ref 80–100)
NRBC # BLD: 0 /100 WBCS — SIGNIFICANT CHANGE UP (ref 0–0)
NT-PROBNP SERPL-SCNC: HIGH PG/ML (ref 0–300)
PH FLD: 7.5 — SIGNIFICANT CHANGE UP
PHOSPHATE SERPL-MCNC: 5.8 MG/DL — HIGH (ref 2.5–4.5)
PLATELET # BLD AUTO: 209 K/UL — SIGNIFICANT CHANGE UP (ref 150–400)
POTASSIUM SERPL-MCNC: 3.9 MMOL/L — SIGNIFICANT CHANGE UP (ref 3.5–5.3)
POTASSIUM SERPL-SCNC: 3.9 MMOL/L — SIGNIFICANT CHANGE UP (ref 3.5–5.3)
PROT SERPL-MCNC: 8.6 G/DL — HIGH (ref 6–8.3)
RBC # BLD: 3.05 M/UL — LOW (ref 4.2–5.8)
RBC # FLD: 18.6 % — HIGH (ref 10.3–14.5)
SARS-COV-2 RNA SPEC QL NAA+PROBE: SIGNIFICANT CHANGE UP
SODIUM SERPL-SCNC: 145 MMOL/L — SIGNIFICANT CHANGE UP (ref 135–145)
SPECIMEN SOURCE: SIGNIFICANT CHANGE UP
WBC # BLD: 9.23 K/UL — SIGNIFICANT CHANGE UP (ref 3.8–10.5)
WBC # FLD AUTO: 9.23 K/UL — SIGNIFICANT CHANGE UP (ref 3.8–10.5)

## 2023-06-11 PROCEDURE — 93308 TTE F-UP OR LMTD: CPT | Mod: 26

## 2023-06-11 PROCEDURE — 76775 US EXAM ABDO BACK WALL LIM: CPT | Mod: 26

## 2023-06-11 PROCEDURE — 71045 X-RAY EXAM CHEST 1 VIEW: CPT | Mod: 26

## 2023-06-11 RX ORDER — ISOSORBIDE DINITRATE 5 MG/1
10 TABLET ORAL THREE TIMES A DAY
Refills: 0 | Status: DISCONTINUED | OUTPATIENT
Start: 2023-06-11 | End: 2023-06-12

## 2023-06-11 RX ORDER — DILTIAZEM HCL 120 MG
10 CAPSULE, EXT RELEASE 24 HR ORAL ONCE
Refills: 0 | Status: COMPLETED | OUTPATIENT
Start: 2023-06-11 | End: 2023-06-11

## 2023-06-11 RX ORDER — METOPROLOL TARTRATE 50 MG
25 TABLET ORAL EVERY 8 HOURS
Refills: 0 | Status: DISCONTINUED | OUTPATIENT
Start: 2023-06-11 | End: 2023-06-12

## 2023-06-11 RX ORDER — BUMETANIDE 0.25 MG/ML
2 INJECTION INTRAMUSCULAR; INTRAVENOUS EVERY 12 HOURS
Refills: 0 | Status: DISCONTINUED | OUTPATIENT
Start: 2023-06-11 | End: 2023-06-12

## 2023-06-11 RX ADMIN — Medication 3: at 06:13

## 2023-06-11 RX ADMIN — BRIVARACETAM 25 MILLIGRAM(S): 25 TABLET, FILM COATED ORAL at 06:13

## 2023-06-11 RX ADMIN — Medication 3 MILLILITER(S): at 08:16

## 2023-06-11 RX ADMIN — Medication 3 MILLILITER(S): at 05:40

## 2023-06-11 RX ADMIN — ISOSORBIDE DINITRATE 10 MILLIGRAM(S): 5 TABLET ORAL at 11:20

## 2023-06-11 RX ADMIN — Medication 3 MILLILITER(S): at 16:00

## 2023-06-11 RX ADMIN — Medication 25 MILLIGRAM(S): at 21:06

## 2023-06-11 RX ADMIN — Medication 3 MILLILITER(S): at 21:26

## 2023-06-11 RX ADMIN — Medication 15 MILLILITER(S): at 11:20

## 2023-06-11 RX ADMIN — BRIVARACETAM 25 MILLIGRAM(S): 25 TABLET, FILM COATED ORAL at 17:05

## 2023-06-11 RX ADMIN — PANTOPRAZOLE SODIUM 40 MILLIGRAM(S): 20 TABLET, DELAYED RELEASE ORAL at 11:20

## 2023-06-11 RX ADMIN — Medication 25 MILLIGRAM(S): at 13:44

## 2023-06-11 RX ADMIN — Medication 10 MILLIGRAM(S): at 05:00

## 2023-06-11 RX ADMIN — Medication 2: at 11:20

## 2023-06-11 RX ADMIN — BUMETANIDE 2 MILLIGRAM(S): 0.25 INJECTION INTRAMUSCULAR; INTRAVENOUS at 17:05

## 2023-06-11 RX ADMIN — MODAFINIL 150 MILLIGRAM(S): 200 TABLET ORAL at 11:24

## 2023-06-11 RX ADMIN — Medication 2: at 17:05

## 2023-06-11 RX ADMIN — ISOSORBIDE DINITRATE 10 MILLIGRAM(S): 5 TABLET ORAL at 16:58

## 2023-06-11 RX ADMIN — Medication 12.5 MILLIGRAM(S): at 06:13

## 2023-06-11 RX ADMIN — CHLORHEXIDINE GLUCONATE 1 APPLICATION(S): 213 SOLUTION TOPICAL at 14:13

## 2023-06-11 NOTE — PROGRESS NOTE ADULT - SUBJECTIVE AND OBJECTIVE BOX
[facility] NEPHROLOGY SERVICES, [facility]  NEPHROLOGY AND HYPERTENSION  [address]  [address]  [place] [address]  [phone]    MD ALEX NORMAN, MD JESSE MORILLO MD CHRISTOPHER CAPUTO, MD EDWARD BOVER, MD          Patient events noted    MEDICATIONS  (STANDING):  albuterol/ipratropium for Nebulization 3 milliLiter(s) Nebulizer every 6 hours  brivaracetam Oral Solution 25 milliGRAM(s) Oral two times a day  buMETAnide 2 milliGRAM(s) Oral every 12 hours  chlorhexidine 2% Cloths 1 Application(s) Topical daily  dextrose 5%. 1000 milliLiter(s) (100 mL/Hr) IV Continuous <Continuous>  dextrose 5%. 1000 milliLiter(s) (50 mL/Hr) IV Continuous <Continuous>  dextrose 50% Injectable 25 Gram(s) IV Push once  dextrose 50% Injectable 12.5 Gram(s) IV Push once  dextrose 50% Injectable 25 Gram(s) IV Push once  epoetin priyanka-epbx (RETACRIT) Injectable 47525 Unit(s) SubCutaneous every 7 days  insulin lispro (ADMELOG) corrective regimen sliding scale   SubCutaneous every 6 hours  isosorbide   dinitrate Tablet (ISORDIL) 10 milliGRAM(s) Oral three times a day  metoprolol tartrate 25 milliGRAM(s) Oral every 8 hours  modafinil 150 milliGRAM(s) Oral every 24 hours  multivitamin/minerals/iron Oral Solution (CENTRUM) 15 milliLiter(s) Oral daily  pantoprazole  Injectable 40 milliGRAM(s) IV Push daily    MEDICATIONS  (PRN):      06-10-23 @ 07:01  -  06-11-23 @ 07:00  --------------------------------------------------------  IN: 1235 mL / OUT: 800 mL / NET: 435 mL    06-11-23 @ 07:01  - 06-11-23 @ 21:44  --------------------------------------------------------  IN: 480 mL / OUT: 1750 mL / NET: -1270 mL      PHYSICAL EXAM:      T(C): 36.7 (06-11-23 @ 16:00), Max: 37.1 (06-11-23 @ 05:00)  HR: 109 (06-11-23 @ 21:28) (91 - 144)  BP: 146/98 (06-11-23 @ 20:00) (128/95 - 173/109)  RR: 37 (06-11-23 @ 20:00) (31 - 43)  SpO2: 97% (06-11-23 @ 21:28) (90% - 100%)  Wt(kg): --  Lungs clear  Heart S1S2  Abd soft NT ND  Extremities:   tr edema                                    9.7    9.23  )-----------( 209      ( 11 Jun 2023 06:15 )             31.9     06-11    145  |  107  |  90<H>  ----------------------------<  247<H>  3.9   |  25  |  3.00<H>    Ca    8.9      11 Jun 2023 06:15  Phos  5.8     06-11  Mg     2.1     06-11    TPro  8.6<H>  /  Alb  1.9<L>  /  TBili  0.8  /  DBili  x   /  AST  18  /  ALT  13  /  AlkPhos  196<H>  06-11    ABG - ( 10 Hermes 2023 04:45 )  pH, Arterial: 7.42  pH, Blood: x     /  pCO2: 41    /  pO2: 78    / HCO3: 27    / Base Excess: 2.1   /  SaO2: 96.6              LIVER FUNCTIONS - ( 11 Jun 2023 06:15 )  Alb: 1.9 g/dL / Pro: 8.6 g/dL / ALK PHOS: 196 U/L / ALT: 13 U/L / AST: 18 U/L / GGT: x           Creatinine Trend: 3.00<--, 2.83<--, 2.56<--, 2.30<--, 2.36<--, 2.38<--    A/P:    Hemodynamic ATN s/p arrest (Cr 1.6 - 5/9/23, Cr 1.0 - 4/12/23)  Resp failure, intubated, now extubated  CM, EF 35 - 40%, dementia  ANISH worsening trend  Possibly Vanco related  Off Vanco now  Avoid nephrotoxins  Lytes are stable  Post left chest tube   Agree with diuretic trial   S&Sw f/u  Epoetin  F/u CBC, BMP, UO  D/w ICU team        Avila Ross MD

## 2023-06-11 NOTE — PROGRESS NOTE ADULT - ASSESSMENT
Physical Examination:  GENERAL:               Eyes open and in mild-mod distress,    HEENT:                    No JVD, Dry MM  PULM:                     Bilateral air entry, course and dimminsished  to auscultation bilaterally, + sputum production, + Rales, No Rhonchi, No Wheezing  CVS:                         S1, S2,  +Murmur  ABD:                        Soft, nondistended, nontender, normoactive bowel sounds,   EXT:                         no  edema, nontender, No Cyanosis or Clubbing   Vascular:                Warm Extremities,   NEURO:                 lethargic open eyes to verbal / tachtile stimuli   PSYC:                      Calm, No Insight.      Assessment  78 year old male with a PMH of dementia, HTN, cardiomyopathy, HFrEF, CAD s/p CABG, with known current RCA occlusion, and DM type 2 who was admitted to [facility] on 5/4 with hypoxic respiratory failure in setting of CHF exacerbation and ANISH with course complicated by acute hypoxic respiratory failure in setting of choking episode causing cardiac arrest, shock, worsening hypoxemic respiratory failure, and NSTEMI on 5/9. Pt ICU course noted post cardiac arrest anoxic brain injury and myoclonus. Pt was transferred to [facility] for VEEG which required 48 hours of monitoring as he was noted to still be sedated. While there pt noted to have hematochezia and any ac / antiplatelet agents were held at that time. EEG findings consistent with stimulus induced myoclonus and GPDs s/p hypoxic diffuse indicative of severe cerebral dysfunction.    Patient returned to [facility] ICU on 5/19 and continues tx / supportive care for management of:    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  Acute renal failure w Uremia post cardiac arrest improving  Hemtochezia episode, h/h stable no further episodes noted  Heart failure with reduced EF  NSTEMI post arrest       Underlying PMH of dementia, HTN, cardiomyopathy, HFrEF, CAD s/p CABG, with known current RCA occlusion, and DM type 2      Plan:    Mental status poor this am, but slightly better than yesterday  Given lasix but cr worsening, and pt with increasing pulmonary edema on CXR    will need to discuss drainage with pigtail chest tube.   requires frequent suctioning for deep secretions  NIV PRN/QhS  NGT for diet      S&S eval to advance diet - but patient too lethargic today to pass today  benzo as needed   restarted oral brivaracetam  but at lower dose due to worsening renal function  Restarted on modafanil     monitor blood glucose levels, + ISS coverage  Sacral decub - wound care called continue local wound care off loading and nutrition support  Venodyne for dvt ppx   GOC ongoing discussion with family , but want full code at this time    PMD:				                   Notified(Date):  Family Updated: 	Kuldeep [phone]	                                 Date:  6/11/2023  called msg left for call back  will need pigtail chest tube placement       Sedation & Analgesia:	  Diet/Nutrition:		 as above  GI PPx:			Protonix  DVT Ppx:		Venodynes   Activity:		  bedrest  Head of Bed:               35-45 Deg  Glycemic Control:         Insulin  Lines: piv  Restraints may be deemed necessary to prevent removal of life-sustaining devices [ x ] YES   [   ]  NO  Disposition: ICU Care  Goals of Care: Full code

## 2023-06-11 NOTE — CHART NOTE - NSCHARTNOTEFT_GEN_A_CORE
NUTRITION FOLLOW UP    SOURCE: Patient [ )   Family [ ]    Medical Record (X), MD, RN    Diet, NPO with Tube Feed:   Tube Feeding Modality: Nasogastric  Nepro with Carb Steady  Total Volume for 24 Hours (mL): 1040  Continuous  Starting Tube Feed Rate {mL per Hour}: 45  Increase Tube Feed Rate by (mL): 10     Every 4 hours  Until Goal Tube Feed Rate (mL per Hour): 65  Tube Feed Duration (in Hours): 16  Tube Feed Start Time: 08:00  Banatrol TF     Qty per Day:  BID (06-11-23 @ 11:20) [Pending Verification By Attending]    SLP evaluation 6/8 rec continue NPO, MBS as able to assess for PO feasibility. GI following for PEG placement, however pt not medically stable for PEG tomorrow. +chest tube. Resume EN feeds via NGT: MD requesting 16hr regimen. Recommend Nepro (given worsening kidney fxn) with gradual increase to goal of 65cc/hr x 16 hrs via NGT. Goal rate will provide 1,872kcals, 84g protein, 760ml H2O. Continue banatrol BID in setting of loose BM's.          CURRENT WEIGHT:  172.8lbs (6/10)   (6/7) 167.2/76.2kg    (6/6) 168.6/76.5kg  (6/5) 175.2/79.5kg    PERTINENT MEDS:   Pertinent Medications: MEDICATIONS  (STANDING):  albuterol/ipratropium for Nebulization 3 milliLiter(s) Nebulizer every 6 hours  brivaracetam Oral Solution 25 milliGRAM(s) Oral two times a day  buMETAnide 2 milliGRAM(s) Oral every 12 hours  chlorhexidine 2% Cloths 1 Application(s) Topical daily  dextrose 5%. 1000 milliLiter(s) (100 mL/Hr) IV Continuous <Continuous>  dextrose 5%. 1000 milliLiter(s) (50 mL/Hr) IV Continuous <Continuous>  dextrose 50% Injectable 12.5 Gram(s) IV Push once  dextrose 50% Injectable 25 Gram(s) IV Push once  dextrose 50% Injectable 25 Gram(s) IV Push once  epoetin priyanka-epbx (RETACRIT) Injectable 01748 Unit(s) SubCutaneous every 7 days  insulin lispro (ADMELOG) corrective regimen sliding scale   SubCutaneous every 6 hours  isosorbide   dinitrate Tablet (ISORDIL) 10 milliGRAM(s) Oral three times a day  metoprolol tartrate 25 milliGRAM(s) Oral every 8 hours  modafinil 150 milliGRAM(s) Oral every 24 hours  multivitamin/minerals/iron Oral Solution (CENTRUM) 15 milliLiter(s) Oral daily  pantoprazole  Injectable 40 milliGRAM(s) IV Push daily    MEDICATIONS  (PRN):      PERTINENT LABS:  06-11 Na145 mmol/L Glu 247 mg/dL<H> K+ 3.9 mmol/L Cr  3.00 mg/dL<H> BUN 90 mg/dL<H> 06-11 Phos 5.8 mg/dL<H> 06-11 Alb 1.9 g/dL<L>    Skin: sacral DTI , MVI , Vit C & Zinc supplementation provided     Edema: +2 bilateral hands    Last BM: (6/11) ,     ESTIMATED NEEDS:   [X] no change since previous assessment  [ ] recalculated:     PREVIOUS NUTRITION DIAGNOSIS:    1. Severe malnutrition    NUTRITION DIAGNOSIS is :  (x)  Ongoing         NEW NUTRITION DIAGNOSIS: N/A    NUTRITION RECOMMENDATIONS:   1. Nepro @65cc/hr x 16 hrs via NGT  2. PEG placement pending clinical course- GI following  3. Ongoing GOC  4. MVI, Vitamin C    MONITORING AND EVALUATION:   1. Tolerance to diet prescription   2. PO intake  3. Weights  4. Labs  5. Follow Up per protocol     RD to remain available   Stella Solis RDN   [phone] or TEAMS

## 2023-06-11 NOTE — PROGRESS NOTE ADULT - PROBLEM SELECTOR PLAN 1
NGT with tube feeds   Aspiration precautions  Discussion with Dr. Polo patient not clinically stable for PEG placement tomorrow once condition stable, will revisit

## 2023-06-11 NOTE — PROGRESS NOTE ADULT - SUBJECTIVE AND OBJECTIVE BOX
CC: f/u for  bacteremia  Patient reports nothing    REVIEW OF SYSTEMS:  All other review of systems negative (Comprehensive ROS)cannot get    Antimicrobials Day #  :    Other Medications Reviewed    T(F): 98 (06-11-23 @ 16:00), Max: 98.7 (06-11-23 @ 05:00)  HR: 108 (06-11-23 @ 18:00)  BP: 128/95 (06-11-23 @ 18:00)  RR: 35 (06-11-23 @ 18:00)  SpO2: 97% (06-11-23 @ 18:00)  Wt(kg): --    PHYSICAL EXAM:  General:  no acute distress  Eyes:  anicteric, no conjunctival injection, no discharge  Oropharynx: no lesions or injection 	  Neck: supple, without adenopathy  Lungs: poor effort to auscultation  Heart: regular rate and rhythm; no murmur, rubs or gallops  Abdomen: soft, nondistended, nontender, without mass or organomegaly  Skin: no lesions  Extremities: no clubbing, cyanosis, or edema  Neurologic: lethargic, myoclonic jerks  left ct in place  LAB RESULTS:                        9.7    9.23  )-----------( 209      ( 11 Jun 2023 06:15 )             31.9     06-11    145  |  107  |  90<H>  ----------------------------<  247<H>  3.9   |  25  |  3.00<H>    Ca    8.9      11 Jun 2023 06:15  Phos  5.8     06-11  Mg     2.1     06-11    TPro  8.6<H>  /  Alb  1.9<L>  /  TBili  0.8  /  DBili  x   /  AST  18  /  ALT  13  /  AlkPhos  196<H>  06-11    LIVER FUNCTIONS - ( 11 Jun 2023 06:15 )  Alb: 1.9 g/dL / Pro: 8.6 g/dL / ALK PHOS: 196 U/L / ALT: 13 U/L / AST: 18 U/L / GGT: x             MICROBIOLOGY:  RECENT CULTURES:      RADIOLOGY REVIEWED:    < from: Xray Chest 1 View- PORTABLE-Routine (Xray Chest 1 View- PORTABLE-Routine .) (06.11.23 @ 11:14) >  ACC: [id number] EXAM:  XR CHEST PORTABLE ROUTINE 1V   ORDERED BY: JC CARD     ACC: [id number] EXAM:  XR CHEST PORTABLE ROUTINE 1V   ORDERED BY: SAAD OLSEN     PROCEDURE DATE:  06/11/2023          INTERPRETATION:  TIME OF EXAM: June 11,2023 at 8:35 AM.    CLINICAL INFORMATION: Hypoxia.    COMPARISON:  [facility]eyton 10, 2023.    TECHNIQUE:   AP Portable chest x-ray.    INTERPRETATION:    Heart size and the mediastinum cannot be accurately evaluated on this   projection. Median sternotomy sutures (some of which are again   discontinuous) and surgical clips are seen.  Enteric tube extends into the left hemiabdomen. Tip not included on image.  There are worsening bilateral airspace opacities.  A small to moderate size right pleural effusionwith likely associated   passive atelectasis may be slightly larger or redistributed due to   different patient position. A small left pleural effusion is not   significantly changed.  No pneumothorax is seen.  There is osteoarthritic degenerative change of the spine. Bony deformity   of the left fifth and sixth ribs is again seen.    AP portable chest x-ray from June 11, 2023 at 10:52 AM:    CLINICAL INFORMATION: Post chest tube.    INTERPRETATION:    Right lung opacities are not significantly changed. Left lung airspace   opacities are improved.  Small to moderate right pleural effusion with likely associated passive   atelectasis, not significantly changed.  New left basilar pleural pigtail catheter. Previous left pleural effusion   not seen.  No pneumothorax.      < end of copied text >            Assessment:  Elderly man dm, dementia  admitted over a month ago for sob, course complicated by hypoxic event due to choking, niki, required intubation, has anoxic brain with ongoing myoclonic jerks. He was on vent and family opted for compassionate wean and comfort as goal. He survived so now they want full heroic measures. Today a chest tube was placed for left effusion. No infection is apparent. Had positive bc for staph epi over a week ago, had a few days of vanco, remains afebrile off  Plan:  monitor off abx  call if further input is needed

## 2023-06-11 NOTE — PROGRESS NOTE ADULT - ASSESSMENT
78 year old M w/HTN, Cardiomyopathy, chronic systolic CHF, DM2, Dementia,  Seen and examined. tolerating NGT feedings.  As discussed with Dr. Polo, patient not clinically stable at this time for PEG placement tomorrow

## 2023-06-11 NOTE — PROGRESS NOTE ADULT - SUBJECTIVE AND OBJECTIVE BOX
INTERVAL HPI/OVERNIGHT EVENTS:  HPI:  78 year old M w/HTN, Cardiomyopathy, chronic systolic CHF, DM2, Dementia, sent to the ED by PMD because of increased dyspnea, leg edema x past 2 days.  Per ED, daughter states that PMD noted pt to have new afib on EKG.  Pt is confused, demented (baseline) and unable to provide a reliable history.  There was no report of  chest pain, cough, fever chills, vomiting, diarrhea.   1st trop is 131.  EKG showed HR of 86, SR w/ APCs. Cxray c/w CHF (venous congestion, bilat pl effusions)  Pt's last echo was from 07/2022 w/c reported LVEF of  40 to 45%.. Increased LV wall thickness.. Moderately reduced RV systolic function. Moderately enlarged left atrium.. Moderate mitral valve regurgitation.Mild tricuspid regurgitation. (04 May 2023 21:17)    GI Note: Patient seen and examined.       MEDICATIONS  (STANDING):  albuterol/ipratropium for Nebulization 3 milliLiter(s) Nebulizer every 6 hours  brivaracetam Oral Solution 25 milliGRAM(s) Oral two times a day  buMETAnide 2 milliGRAM(s) Oral every 12 hours  chlorhexidine 2% Cloths 1 Application(s) Topical daily  dextrose 5%. 1000 milliLiter(s) (100 mL/Hr) IV Continuous <Continuous>  dextrose 5%. 1000 milliLiter(s) (50 mL/Hr) IV Continuous <Continuous>  dextrose 50% Injectable 25 Gram(s) IV Push once  dextrose 50% Injectable 12.5 Gram(s) IV Push once  dextrose 50% Injectable 25 Gram(s) IV Push once  epoetin priyanka-epbx (RETACRIT) Injectable 21601 Unit(s) SubCutaneous every 7 days  insulin lispro (ADMELOG) corrective regimen sliding scale   SubCutaneous every 6 hours  isosorbide   dinitrate Tablet (ISORDIL) 10 milliGRAM(s) Oral three times a day  metoprolol tartrate 25 milliGRAM(s) Oral every 8 hours  modafinil 150 milliGRAM(s) Oral every 24 hours  multivitamin/minerals/iron Oral Solution (CENTRUM) 15 milliLiter(s) Oral daily  pantoprazole  Injectable 40 milliGRAM(s) IV Push daily    MEDICATIONS  (PRN):      Allergies    sulfa drugs (Unknown)    Intolerances        PAST MEDICAL & SURGICAL HISTORY:  HTN (hypertension)      HLD (hyperlipidemia)      Diabetes      CAD (coronary artery disease)      History of cholecystectomy          REVIEW OF SYSTEMS    Negative unless indicated in HPI    PHYSICAL EXAM:   Vital Signs:  Vital Signs Last 24 Hrs  T(C): 36.7 (11 Jun 2023 08:00), Max: 37.1 (11 Jun 2023 05:00)  T(F): 98 (11 Jun 2023 08:00), Max: 98.7 (11 Jun 2023 05:00)  HR: 113 (11 Jun 2023 11:00) (91 - 144)  BP: 154/104 (11 Jun 2023 11:00) (121/86 - 173/109)  BP(mean): 120 (11 Jun 2023 11:00) (93 - 125)  RR: 35 (11 Jun 2023 11:00) (20 - 41)  SpO2: 98% (11 Jun 2023 11:00) (90% - 100%)    Parameters below as of 11 Jun 2023 11:00  Patient On (Oxygen Delivery Method): nasal cannula  O2 Flow (L/min): 4    Daily     Daily I&O's Summary    10 Hermes 2023 07:01  -  11 Jun 2023 07:00  --------------------------------------------------------  IN: 1235 mL / OUT: 800 mL / NET: 435 mL    11 Jun 2023 07:01  -  11 Jun 2023 11:52  --------------------------------------------------------  IN: 210 mL / OUT: 200 mL / NET: 10 mL      GENERAL: NAD  HEENT:  NC/AT,  conjunctivae clear and pink, NGT in place  CHEST:  Full & symmetric excursion, no increased effort, breath sounds clear  HEART:  Regular rhythm, S1, S2, no murmur  ABDOMEN:  Soft, non-tender, non-distended, normoactive bowel sounds  EXTREMITIES:  no edema  SKIN:  No rash/warm/dry  NEURO:  Awake        LABS:                        9.7    9.23  )-----------( 209      ( 11 Jun 2023 06:15 )             31.9     06-11    145  |  107  |  90<H>  ----------------------------<  247<H>  3.9   |  25  |  3.00<H>    Ca    8.9      11 Jun 2023 06:15  Phos  5.8     06-11  Mg     2.1     06-11    TPro  8.6<H>  /  Alb  1.9<L>  /  TBili  0.8  /  DBili  x   /  AST  18  /  ALT  13  /  AlkPhos  196<H>  06-11        RADIOLOGY & ADDITIONAL TESTS:

## 2023-06-11 NOTE — PROVIDER CONTACT NOTE (EICU) - DATE AND TIME:
08-Jun-2023 23:07
09-May-2023 22:09
12-May-2023 20:14
23-May-2023 20:11
11-Jun-2023 04:42
13-May-2023 02:11
09-May-2023 10:17
10-May-2023 02:21

## 2023-06-11 NOTE — PROCEDURE NOTE - ADDITIONAL PROCEDURE DETAILS
Time spent on procedure independent of Critical Care time spent at the bedside  Indication for procedure: Cardiac Arresst, ASpiration  Dx Code: I46, J69.0
CXR pending

## 2023-06-12 LAB
ALBUMIN FLD-MCNC: 0.9 G/DL — SIGNIFICANT CHANGE UP
ALBUMIN SERPL ELPH-MCNC: 1.8 G/DL — LOW (ref 3.3–5)
ALP SERPL-CCNC: 158 U/L — HIGH (ref 40–120)
ALT FLD-CCNC: 13 U/L — SIGNIFICANT CHANGE UP (ref 10–45)
ANION GAP SERPL CALC-SCNC: 10 MMOL/L — SIGNIFICANT CHANGE UP (ref 5–17)
APTT BLD: 28.2 SEC — SIGNIFICANT CHANGE UP (ref 27.5–35.5)
AST SERPL-CCNC: 17 U/L — SIGNIFICANT CHANGE UP (ref 10–40)
B PERT IGG+IGM PNL SER: ABNORMAL
BASE EXCESS BLDA CALC-SCNC: 2.5 MMOL/L — SIGNIFICANT CHANGE UP (ref -2–3)
BASOPHILS # BLD AUTO: 0.08 K/UL — SIGNIFICANT CHANGE UP (ref 0–0.2)
BASOPHILS NFR BLD AUTO: 0.9 % — SIGNIFICANT CHANGE UP (ref 0–2)
BILIRUB SERPL-MCNC: 0.7 MG/DL — SIGNIFICANT CHANGE UP (ref 0.2–1.2)
BUN SERPL-MCNC: 99 MG/DL — HIGH (ref 7–23)
CALCIUM SERPL-MCNC: 8.8 MG/DL — SIGNIFICANT CHANGE UP (ref 8.4–10.5)
CHLORIDE SERPL-SCNC: 110 MMOL/L — HIGH (ref 96–108)
CO2 BLDA-SCNC: 27 MMOL/L — HIGH (ref 19–24)
CO2 SERPL-SCNC: 27 MMOL/L — SIGNIFICANT CHANGE UP (ref 22–31)
COLOR FLD: SIGNIFICANT CHANGE UP
CREAT SERPL-MCNC: 3.12 MG/DL — HIGH (ref 0.5–1.3)
EGFR: 19 ML/MIN/1.73M2 — LOW
EOSINOPHIL # BLD AUTO: 0.1 K/UL — SIGNIFICANT CHANGE UP (ref 0–0.5)
EOSINOPHIL # FLD: 0 % — SIGNIFICANT CHANGE UP
EOSINOPHIL NFR BLD AUTO: 1.2 % — SIGNIFICANT CHANGE UP (ref 0–6)
FLUID INTAKE SUBSTANCE CLASS: SIGNIFICANT CHANGE UP
FOLATE+VIT B12 SERBLD-IMP: 0 % — SIGNIFICANT CHANGE UP
GAS PNL BLDA: SIGNIFICANT CHANGE UP
GLUCOSE BLDC GLUCOMTR-MCNC: 163 MG/DL — HIGH (ref 70–99)
GLUCOSE BLDC GLUCOMTR-MCNC: 180 MG/DL — HIGH (ref 70–99)
GLUCOSE BLDC GLUCOMTR-MCNC: 185 MG/DL — HIGH (ref 70–99)
GLUCOSE BLDC GLUCOMTR-MCNC: 247 MG/DL — HIGH (ref 70–99)
GLUCOSE FLD-MCNC: 232 MG/DL — SIGNIFICANT CHANGE UP
GLUCOSE SERPL-MCNC: 160 MG/DL — HIGH (ref 70–99)
HCO3 BLDA-SCNC: 26 MMOL/L — SIGNIFICANT CHANGE UP (ref 21–28)
HCT VFR BLD CALC: 29.9 % — LOW (ref 39–50)
HGB BLD-MCNC: 9.1 G/DL — LOW (ref 13–17)
HOROWITZ INDEX BLDA+IHG-RTO: 40 — SIGNIFICANT CHANGE UP
IMM GRANULOCYTES NFR BLD AUTO: 0.2 % — SIGNIFICANT CHANGE UP (ref 0–0.9)
INR BLD: 1.36 RATIO — HIGH (ref 0.88–1.16)
LACTATE SERPL-SCNC: 1.8 MMOL/L — SIGNIFICANT CHANGE UP (ref 0.7–2)
LDH SERPL L TO P-CCNC: 114 U/L — SIGNIFICANT CHANGE UP
LDH SERPL L TO P-CCNC: 207 U/L — SIGNIFICANT CHANGE UP (ref 50–242)
LYMPHOCYTES # BLD AUTO: 0.76 K/UL — LOW (ref 1–3.3)
LYMPHOCYTES # BLD AUTO: 8.9 % — LOW (ref 13–44)
LYMPHOCYTES # FLD: 17 % — SIGNIFICANT CHANGE UP
MAGNESIUM SERPL-MCNC: 2.2 MG/DL — SIGNIFICANT CHANGE UP (ref 1.6–2.6)
MCHC RBC-ENTMCNC: 30.4 GM/DL — LOW (ref 32–36)
MCHC RBC-ENTMCNC: 31 PG — SIGNIFICANT CHANGE UP (ref 27–34)
MCV RBC AUTO: 101.7 FL — HIGH (ref 80–100)
MESOTHL CELL # FLD: 2 % — SIGNIFICANT CHANGE UP
MONOCYTES # BLD AUTO: 0.54 K/UL — SIGNIFICANT CHANGE UP (ref 0–0.9)
MONOCYTES NFR BLD AUTO: 6.3 % — SIGNIFICANT CHANGE UP (ref 2–14)
MONOS+MACROS # FLD: 26 % — SIGNIFICANT CHANGE UP
NEUTROPHILS # BLD AUTO: 7.04 K/UL — SIGNIFICANT CHANGE UP (ref 1.8–7.4)
NEUTROPHILS NFR BLD AUTO: 82.5 % — HIGH (ref 43–77)
NEUTROPHILS-BODY FLUID: 55 % — SIGNIFICANT CHANGE UP
NRBC # BLD: 0 /100 WBCS — SIGNIFICANT CHANGE UP (ref 0–0)
NRBC # FLD: 0 % — SIGNIFICANT CHANGE UP
NT-PROBNP SERPL-SCNC: HIGH PG/ML (ref 0–300)
OTHER CELLS FLD MANUAL: 0 % — SIGNIFICANT CHANGE UP
PCO2 BLDA: 37 MMHG — SIGNIFICANT CHANGE UP (ref 35–48)
PH BLDA: 7.46 — HIGH (ref 7.35–7.45)
PHOSPHATE SERPL-MCNC: 4.2 MG/DL — SIGNIFICANT CHANGE UP (ref 2.5–4.5)
PLATELET # BLD AUTO: 143 K/UL — LOW (ref 150–400)
PO2 BLDA: 115 MMHG — HIGH (ref 83–108)
POTASSIUM SERPL-MCNC: 3.6 MMOL/L — SIGNIFICANT CHANGE UP (ref 3.5–5.3)
POTASSIUM SERPL-SCNC: 3.6 MMOL/L — SIGNIFICANT CHANGE UP (ref 3.5–5.3)
PROT FLD-MCNC: 2.5 G/DL — SIGNIFICANT CHANGE UP
PROT SERPL-MCNC: 7.8 G/DL — SIGNIFICANT CHANGE UP (ref 6–8.3)
PROTHROM AB SERPL-ACNC: 15.8 SEC — HIGH (ref 10.5–13.4)
RBC # BLD: 2.94 M/UL — LOW (ref 4.2–5.8)
RBC # FLD: 18.6 % — HIGH (ref 10.3–14.5)
RCV VOL RI: HIGH /UL (ref 0–0)
SAO2 % BLDA: 99.3 % — HIGH (ref 94–98)
SODIUM SERPL-SCNC: 147 MMOL/L — HIGH (ref 135–145)
TOTAL NUCLEATED CELL COUNT, BODY FLUID: 73 /UL — SIGNIFICANT CHANGE UP
TUBE TYPE: SIGNIFICANT CHANGE UP
WBC # BLD: 8.54 K/UL — SIGNIFICANT CHANGE UP (ref 3.8–10.5)
WBC # FLD AUTO: 8.54 K/UL — SIGNIFICANT CHANGE UP (ref 3.8–10.5)

## 2023-06-12 PROCEDURE — 71045 X-RAY EXAM CHEST 1 VIEW: CPT | Mod: 26

## 2023-06-12 RX ORDER — PANTOPRAZOLE SODIUM 20 MG/1
40 TABLET, DELAYED RELEASE ORAL DAILY
Refills: 0 | Status: DISCONTINUED | OUTPATIENT
Start: 2023-06-12 | End: 2023-06-18

## 2023-06-12 RX ORDER — SODIUM HYPOCHLORITE 0.125 %
1 SOLUTION, NON-ORAL MISCELLANEOUS DAILY
Refills: 0 | Status: DISCONTINUED | OUTPATIENT
Start: 2023-06-12 | End: 2023-07-07

## 2023-06-12 RX ORDER — ISOSORBIDE DINITRATE 5 MG/1
20 TABLET ORAL THREE TIMES A DAY
Refills: 0 | Status: DISCONTINUED | OUTPATIENT
Start: 2023-06-12 | End: 2023-06-17

## 2023-06-12 RX ORDER — METOPROLOL TARTRATE 50 MG
25 TABLET ORAL EVERY 6 HOURS
Refills: 0 | Status: DISCONTINUED | OUTPATIENT
Start: 2023-06-12 | End: 2023-06-15

## 2023-06-12 RX ORDER — APIXABAN 2.5 MG/1
2.5 TABLET, FILM COATED ORAL EVERY 12 HOURS
Refills: 0 | Status: DISCONTINUED | OUTPATIENT
Start: 2023-06-12 | End: 2023-06-19

## 2023-06-12 RX ORDER — HYDRALAZINE HCL 50 MG
10 TABLET ORAL EVERY 12 HOURS
Refills: 0 | Status: DISCONTINUED | OUTPATIENT
Start: 2023-06-12 | End: 2023-06-14

## 2023-06-12 RX ADMIN — Medication 10 MILLIGRAM(S): at 17:57

## 2023-06-12 RX ADMIN — Medication 15 MILLILITER(S): at 11:40

## 2023-06-12 RX ADMIN — Medication 1: at 06:57

## 2023-06-12 RX ADMIN — Medication 1 APPLICATION(S): at 11:42

## 2023-06-12 RX ADMIN — BRIVARACETAM 25 MILLIGRAM(S): 25 TABLET, FILM COATED ORAL at 06:27

## 2023-06-12 RX ADMIN — Medication 2: at 18:00

## 2023-06-12 RX ADMIN — PANTOPRAZOLE SODIUM 40 MILLIGRAM(S): 20 TABLET, DELAYED RELEASE ORAL at 11:45

## 2023-06-12 RX ADMIN — BRIVARACETAM 25 MILLIGRAM(S): 25 TABLET, FILM COATED ORAL at 17:57

## 2023-06-12 RX ADMIN — Medication 25 MILLIGRAM(S): at 11:40

## 2023-06-12 RX ADMIN — ISOSORBIDE DINITRATE 10 MILLIGRAM(S): 5 TABLET ORAL at 06:28

## 2023-06-12 RX ADMIN — Medication 25 MILLIGRAM(S): at 06:29

## 2023-06-12 RX ADMIN — APIXABAN 2.5 MILLIGRAM(S): 2.5 TABLET, FILM COATED ORAL at 17:57

## 2023-06-12 RX ADMIN — CHLORHEXIDINE GLUCONATE 1 APPLICATION(S): 213 SOLUTION TOPICAL at 11:44

## 2023-06-12 RX ADMIN — ISOSORBIDE DINITRATE 20 MILLIGRAM(S): 5 TABLET ORAL at 17:57

## 2023-06-12 RX ADMIN — MODAFINIL 150 MILLIGRAM(S): 200 TABLET ORAL at 13:21

## 2023-06-12 RX ADMIN — ISOSORBIDE DINITRATE 20 MILLIGRAM(S): 5 TABLET ORAL at 11:40

## 2023-06-12 RX ADMIN — BUMETANIDE 2 MILLIGRAM(S): 0.25 INJECTION INTRAMUSCULAR; INTRAVENOUS at 06:28

## 2023-06-12 RX ADMIN — Medication 3 MILLILITER(S): at 04:36

## 2023-06-12 RX ADMIN — Medication 25 MILLIGRAM(S): at 17:57

## 2023-06-12 RX ADMIN — Medication 1: at 11:58

## 2023-06-12 RX ADMIN — Medication 2: at 00:08

## 2023-06-12 RX ADMIN — Medication 3 MILLILITER(S): at 08:23

## 2023-06-12 NOTE — PROGRESS NOTE ADULT - SUBJECTIVE AND OBJECTIVE BOX
Follow-up Critical Care Progress Note  Chief Complaint : Atrial fibrillation    patient seen and examined  comfortable on n/c      Allergies :sulfa drugs (Unknown)      PAST MEDICAL & SURGICAL HISTORY:  HTN (hypertension)    HLD (hyperlipidemia)    Diabetes    CAD (coronary artery disease)    History of cholecystectomy        Medications:  MEDICATIONS  (STANDING):  albuterol/ipratropium for Nebulization 3 milliLiter(s) Nebulizer every 6 hours  brivaracetam Oral Solution 25 milliGRAM(s) Oral two times a day  chlorhexidine 2% Cloths 1 Application(s) Topical daily  Dakins Solution - 1/2 Strength 1 Application(s) Topical daily  dextrose 5%. 1000 milliLiter(s) (100 mL/Hr) IV Continuous <Continuous>  dextrose 5%. 1000 milliLiter(s) (50 mL/Hr) IV Continuous <Continuous>  dextrose 50% Injectable 25 Gram(s) IV Push once  dextrose 50% Injectable 12.5 Gram(s) IV Push once  dextrose 50% Injectable 25 Gram(s) IV Push once  epoetin priyanka-epbx (RETACRIT) Injectable 73133 Unit(s) SubCutaneous every 7 days  hydrALAZINE 10 milliGRAM(s) Oral every 12 hours  insulin lispro (ADMELOG) corrective regimen sliding scale   SubCutaneous every 6 hours  isosorbide   dinitrate Tablet (ISORDIL) 20 milliGRAM(s) Oral three times a day  metoprolol tartrate 25 milliGRAM(s) Oral every 6 hours  modafinil 150 milliGRAM(s) Oral every 24 hours  multivitamin/minerals/iron Oral Solution (CENTRUM) 15 milliLiter(s) Oral daily  pantoprazole   Suspension 40 milliGRAM(s) Oral daily    MEDICATIONS  (PRN):    Home Medications:  furosemide 20 mg oral tablet: 1 tab(s) orally once a day (12 Jun 2023 10:55)  hydroCHLOROthiazide 12.5 mg oral capsule: 1 cap(s) orally once a day (12 Jun 2023 10:55)  isosorbide mononitrate 60 mg oral tablet, extended release: 1 tab(s) orally once a day (12 Jun 2023 10:55)  metoprolol succinate 25 mg oral tablet, extended release: 1 tab(s) orally once a day (12 Jun 2023 10:55)  mirtazapine 15 mg oral tablet: 1 tab(s) orally once a day (12 Jun 2023 10:55)  ramipril 5 mg oral capsule: 1 cap(s) orally once a day (12 Jun 2023 10:55)  sertraline 25 mg oral tablet: 1 tab(s) orally once a day (12 Jun 2023 10:55)    Antibiotics History  ceFAZolin   IVPB 2000 milliGRAM(s) IV Intermittent once, 06-12-23 @ 07:00, Stop order after: 1 Doses  cefepime   IVPB 500 milliGRAM(s) IV Intermittent daily, 05-25-23 @ 12:00  cefepime   IVPB    , 05-24-23 @ 17:36  cefepime   IVPB 500 milliGRAM(s) IV Intermittent once, 05-24-23 @ 16:39  vancomycin  IVPB 1000 milliGRAM(s) IV Intermittent every 24 hours, 06-04-23 @ 12:32, Stop order after: 42 Days  vancomycin  IVPB 1000 milliGRAM(s) IV Intermittent once, 06-03-23 @ 12:32, Stop order after: 1 Doses  vancomycin  IVPB    , 06-03-23 @ 12:32      Heme Medications       GI Medications  pantoprazole   Suspension 40 milliGRAM(s) Oral daily, 06-12-23 @ 08:38, Routine      COVID  06-11-23 @ 12:25  COVID -   NotDetec  05-04-23 @ 19:50  COVID -   NotDetec  07-13-22 @ 18:33  COVID -   NotDete      COVID Biomarkers    06-12-23 @ 05:00 ESR --  ---  CRP --  ---  DDimer  --   ---      ---   Ferritin --    05-04-23 @ 19:50 ESR --  ---  CRP --  ---  DDimer  351<H>   ---   LDH --   ---   Ferritin --       Trend BNP  06-12-23 @ 05:00   -  09605<H>  06-11-23 @ 06:15   -  >17560<H>  05-14-23 @ 11:30   -  98129<H>  05-11-23 @ 06:05   -  93109<H>  05-10-23 @ 05:10   -  24244<H>  05-08-23 @ 09:05   -  47425<H>    Procalcitonin Trend  06-05-23 @ 05:30   -   0.24<H>  06-04-23 @ 06:00   -   0.20<H>  05-17-23 @ 23:44   -   0.59<H>  05-16-23 @ 22:16   -   0.74<H>  05-15-23 @ 05:45   -   1.12<H>  05-09-23 @ 13:42   -   0.11<H>    WBC Trend  06-12-23 @ 12:04   -  8.54  06-11-23 @ 06:15   -  9.23  06-10-23 @ 06:00   -  7.88    H/H Trend  06-12-23 @ 12:04   -   9.1<L>/ 29.9<L>  06-11-23 @ 06:15   -   9.7<L>/ 31.9<L>  06-10-23 @ 06:00   -   8.4<L>/ 27.1<L>  06-09-23 @ 06:10   -   8.9<L>/ 28.9<L>  06-08-23 @ 08:30   -   8.7<L>/ 28.4<L>  06-07-23 @ 05:10   -   7.4<L>/ 24.1<L>    Stool Occult Blood  06-02-23 @ 08:20   -   Negative  05-25-23 @ 18:20   -   Negative  05-16-23 @ 17:00   -   Positive<!>    Platelet Trend  06-12-23 @ 12:04   -  143<L>  06-11-23 @ 06:15   -  209  06-10-23 @ 06:00   -  149<L>    Trend Sodium  06-12-23 @ 05:00   -  147<H>  06-11-23 @ 06:15   -  145  06-10-23 @ 06:00   -  145    Trend Potassium  06-12-23 @ 05:00   -  3.6  06-11-23 @ 06:15   -  3.9  06-10-23 @ 06:00   -  3.7    Trend Bun/Cr  06-12-23 @ 05:00  BUN/CR -  99<H> / 3.12<H>  06-11-23 @ 06:15  BUN/CR -  90<H> / 3.00<H>  06-10-23 @ 06:00  BUN/CR -  88<H> / 2.83<H>    Lactic Acid Trend  06-12-23 @ 05:00   -   1.8    ABG Trend  06-12-23 @ 05:07   - 7.46<H>/37/115<H>/99.3<H>  06-10-23 @ 04:45   - 7.42/41/78<L>/96.6  06-09-23 @ 10:50   - 7.39/40/86/98.9<H>  05-19-23 @ 00:22   - 7.37/43/144<H>/99.8<H>  05-18-23 @ 05:38   - 7.41/37/97/98.4<H>  05-17-23 @ 23:36   - 7.42/37/97/98.3<H>  05-16-23 @ 22:10   - 7.41/41/92/98.5<H>  05-16-23 @ 05:59   - 7.35/47/104/98.8<H>  05-15-23 @ 13:45   - 7.36/47/74<L>/96.6  05-15-23 @ 04:45   - 7.33<L>/48/70<L>/95.7  05-14-23 @ 05:20   - 7.40/46/72<L>/96.5  05-13-23 @ 06:00   - 7.34<L>/46/93/98.7<H>    Trend AST/ALT/ALK Phos/Bili  06-12-23 @ 05:00   17/13/158<H>/0.7  06-11-23 @ 06:15   18/13/196<H>/0.8  06-10-23 @ 06:00   16/15/153<H>/0.6  06-09-23 @ 06:10   20/15/177<H>/0.8  06-08-23 @ 10:20   25/22/198<H>/0.8  06-06-23 @ 09:15   29/19/178<H>/0.6  06-06-23 @ 05:30   31/21/168<H>/0.6  06-05-23 @ 05:30   30/20/160<H>/0.6  06-04-23 @ 06:00   32/19/167<H>/0.7  06-03-23 @ 06:20   28/19/152<H>/0.5  06-02-23 @ 06:00   26/14/144<H>/0.4  05-31-23 @ 04:50   29/16/136<H>/0.3      Ammonia Trend  05-14-23 @ 11:30   -   53  05-14-23 @ 05:00   -   16       Albumin Trend  06-12-23 @ 05:00   -   1.8<L>  06-11-23 @ 06:15   -   1.9<L>  06-10-23 @ 06:00   -   1.8<L>  06-09-23 @ 06:10   -   1.8<L>  06-08-23 @ 10:20   -   1.8<L>  06-06-23 @ 09:15   -   1.6<L>      PTT - PT - INR Trend  06-12-23 @ 05:00   -   28.2 - 15.8<H> - 1.36<H>  05-19-23 @ 00:44   -   26.5<L> - 13.2 - 1.15  05-17-23 @ 23:44   -   31.2 - 13.7<H> - 1.18<H>  05-16-23 @ 22:16   -   31.3 - 13.6<H> - 1.18<H>  05-15-23 @ 13:55   -   32.9 - 14.0<H> - 1.19<H>  05-14-23 @ 13:45   -   33.6 - -- - --    Glucose Trend  06-12-23 @ 11:49   -  -- -- 185<H>  06-12-23 @ 06:31   -  -- -- 180<H>  06-12-23 @ 05:24   -  -- -- 163<H>  06-12-23 @ 05:00   -  -- 160<H> --  06-11-23 @ 23:53   -  -- -- 229<H>  06-11-23 @ 17:03   -  -- -- 242<H>  06-11-23 @ 11:17   -  -- -- 209<H>  06-11-23 @ 06:15   -  -- 247<H> --  06-11-23 @ 06:00   -  -- -- 262<H>  06-10-23 @ 23:44   -  -- -- 231<H>    A1C with Estimated Average Glucose Result: 7.7 % *H* [4.0 - 5.6] (05-05-23 @ 08:00)      LABS:                        9.1    8.54  )-----------( 143      ( 12 Jun 2023 12:04 )             29.9     06-12    147<H>  |  110<H>  |  99<H>  ----------------------------<  160<H>  3.6   |  27  |  3.12<H>    Ca    8.8      12 Jun 2023 05:00  Phos  4.2     06-12  Mg     2.2     06-12    TPro  7.8  /  Alb  1.8<L>  /  TBili  0.7  /  DBili  x   /  AST  17  /  ALT  13  /  AlkPhos  158<H>  06-12    Fluid characteristics  -- 06-11 @ 09:34  pH 7.5    tprot 2.5    Cell count  Appearance Slightly Bloody  Fluid type --  BF lymph 17  color Pink  eosinophil 0  PMN --  Mesothelial 2  Monocyte 26  Other body cells 0     Lights Criteria :    Trend LDH- Fluid  06-11-23 @ 09:34  114 -- --      Trend LDH - Serum  06-12-23 @ 05:00  207 [50 - 242]    _____________________  Trend Protein - Fluid   06-11-23 @ 09:34  2.5  --  --      Trend Protein - Serum  06-12-23 @ 05:00  7.8 [6.0 - 8.3]  06-11-23 @ 06:15  8.6<H> [6.0 - 8.3]  06-10-23 @ 06:00  7.6 [6.0 - 8.3]  06-09-23 @ 06:10  7.9 [6.0 - 8.3]  06-08-23 @ 10:20  8.3 [6.0 - 8.3]       _____________________   The effusion is exudative if one of the Light’s criteria has been met:   1)  Pleural fluid protein/serum protein ratio > 0.5.   2)  Pleural fluid LDH/serum LDH ratio greater than > 0.6.   3) Pleural fluid LDH level greater than 2/3 of upper limit of normal LDH level in serum.  _____________________        CULTURES: (if applicable)    Culture - Body Fluid with Gram Stain (collected 06-11-23 @ 09:34)  Source: Pleural Fl Pleural Fluid  Gram Stain (06-11-23 @ 23:31):    polymorphonuclear leukocytes seen    No organisms seen    by cytocentrifuge    Culture - Sputum (collected 06-04-23 @ 21:20)  Source: .Sputum Sputum  Gram Stain (06-05-23 @ 10:19):    Numerous polymorphonuclear leukocytes per low power field    No Squamous epithelial cells per low power field    Moderate Gram Positive Cocci in Clusters per oil power field  Final Report (06-07-23 @ 10:05):    Numerous Staphylococcus aureus    Normal Respiratory Carrie absent  Organism: Staphylococcus aureus (06-07-23 @ 10:05)  Organism: Staphylococcus aureus (06-07-23 @ 10:05)      Method Type: ALDO      -  Ampicillin/Sulbactam: S <=8/4      -  Cefazolin: S <=4      -  Clindamycin: S <=0.25      -  Erythromycin: S <=0.25      -  Gentamicin: S <=1 Should not be used as monotherapy      -  Oxacillin: S 1 Oxacillin predicts susceptibility for dicloxacillin, methicillin, and nafcillin      -  Penicillin: R >8      -  Rifampin: S <=1 Should not be used as monotherapy      -  Tetracycline: R >8      -  Trimethoprim/Sulfamethoxazole: S <=0.5/9.5      -  Vancomycin: S 2    Culture - Urine (collected 06-03-23 @ 06:20)  Source: Clean Catch Clean Catch (Midstream)  Final Report (06-06-23 @ 22:34):    >100,000 CFU/ml Enterococcus faecium    <10,000 CFU/ml Normal Urogenital carrie present  Organism: Enterococcus faecium (06-06-23 @ 22:34)  Organism: Enterococcus faecium (06-06-23 @ 22:34)      Method Type: ALDO      -  Ampicillin: R >8 Predicts results to ampicillin/sulbactam, amoxacillin-clavulanate and  piperacillin-tazobactam.      -  Ciprofloxacin: R >2      -  Levofloxacin: R >4      -  Nitrofurantoin: S <=32 Should not be used to treat pyelonephritis.      -  Tetracycline: R >8      -  Vancomycin: S 0.5    Culture - Blood (collected 06-02-23 @ 09:45)  Source: .Blood Blood  Final Report (06-07-23 @ 17:01):    No Growth Final    Culture - Blood (collected 06-02-23 @ 09:10)  Source: .Blood Blood  Gram Stain (06-03-23 @ 16:57):    Growth in aerobic bottle: Gram Positive Cocci in Clusters    Growth in anaerobic bottle: Gram Positive Cocci in Clusters  Final Report (06-04-23 @ 17:55):    Growth in aerobic and anaerobic bottles: Staphylococcus epidermidis    Coagulase Negative Staphylococci isolated from a single blood culture set    may represent contamination.    Contact the Microbiology Department at [phone] if susceptibility    testing is clinically indicated.    ***Blood Panel PCR results on this specimen are available    approximately 3 hours after the Gram stain result.***    Gram stain, PCR, and/or culture results may not always    correspond due to difference in methodologies.    ************************************************************    This PCR assay was performed by multiplex PCR. This    Assay tests for 66 bacterial and resistance gene targets.    Please refer to the [facility] test directory    at [email or web address] for details.  Organism: Blood Culture PCR (06-04-23 @ 17:55)  Organism: Blood Culture PCR (06-04-23 @ 17:55)      Method Type: PCR      -  Staphylococcus epidermidis, Methicillin resistant: Detec             RADIOLOGY  CXR:   Right sided haziness, left lung clear    Bedside us done  noted mild- mod pl effusion no good window for Chest tube noted.       VITALS:  T(C): 37.2 (06-12-23 @ 09:00), Max: 37.3 (06-12-23 @ 05:00)  T(F): 99 (06-12-23 @ 09:00), Max: 99.1 (06-12-23 @ 05:00)  HR: 110 (06-12-23 @ 09:00) (92 - 117)  BP: 153/96 (06-12-23 @ 09:00) (128/95 - 156/105)  BP(mean): 115 (06-12-23 @ 09:00) (96 - 119)  ABP: --  ABP(mean): --  RR: 34 (06-12-23 @ 09:00) (16 - 43)  SpO2: 99% (06-12-23 @ 09:00) (92% - 100%)  CVP(mm Hg): --  CVP(cm H2O): --    Ins and Outs     06-11-23 @ 07:01  -  06-12-23 @ 07:00  --------------------------------------------------------  IN: 535 mL / OUT: 2250 mL / NET: -1715 mL                I&O's Detail    11 Jun 2023 07:01  -  12 Jun 2023 07:00  --------------------------------------------------------  IN:    Nepro with Carb Steady: 535 mL  Total IN: 535 mL    OUT:    Chest Tube (mL): 1500 mL    Voided (mL): 750 mL  Total OUT: 2250 mL    Total NET: -1715 mL      Follow-up Critical Care Progress Note  Chief Complaint : Atrial fibrillation    patient seen and examined  comfortable on n/c      Allergies :sulfa drugs (Unknown)      PAST MEDICAL & SURGICAL HISTORY:  HTN (hypertension)    HLD (hyperlipidemia)    Diabetes    CAD (coronary artery disease)    History of cholecystectomy        Medications:  MEDICATIONS  (STANDING):  albuterol/ipratropium for Nebulization 3 milliLiter(s) Nebulizer every 6 hours  brivaracetam Oral Solution 25 milliGRAM(s) Oral two times a day  chlorhexidine 2% Cloths 1 Application(s) Topical daily  Dakins Solution - 1/2 Strength 1 Application(s) Topical daily  dextrose 5%. 1000 milliLiter(s) (100 mL/Hr) IV Continuous <Continuous>  dextrose 5%. 1000 milliLiter(s) (50 mL/Hr) IV Continuous <Continuous>  dextrose 50% Injectable 25 Gram(s) IV Push once  dextrose 50% Injectable 12.5 Gram(s) IV Push once  dextrose 50% Injectable 25 Gram(s) IV Push once  epoetin priyanka-epbx (RETACRIT) Injectable 04398 Unit(s) SubCutaneous every 7 days  hydrALAZINE 10 milliGRAM(s) Oral every 12 hours  insulin lispro (ADMELOG) corrective regimen sliding scale   SubCutaneous every 6 hours  isosorbide   dinitrate Tablet (ISORDIL) 20 milliGRAM(s) Oral three times a day  metoprolol tartrate 25 milliGRAM(s) Oral every 6 hours  modafinil 150 milliGRAM(s) Oral every 24 hours  multivitamin/minerals/iron Oral Solution (CENTRUM) 15 milliLiter(s) Oral daily  pantoprazole   Suspension 40 milliGRAM(s) Oral daily    MEDICATIONS  (PRN):    Home Medications:  furosemide 20 mg oral tablet: 1 tab(s) orally once a day (12 Jun 2023 10:55)  hydroCHLOROthiazide 12.5 mg oral capsule: 1 cap(s) orally once a day (12 Jun 2023 10:55)  isosorbide mononitrate 60 mg oral tablet, extended release: 1 tab(s) orally once a day (12 Jun 2023 10:55)  metoprolol succinate 25 mg oral tablet, extended release: 1 tab(s) orally once a day (12 Jun 2023 10:55)  mirtazapine 15 mg oral tablet: 1 tab(s) orally once a day (12 Jun 2023 10:55)  ramipril 5 mg oral capsule: 1 cap(s) orally once a day (12 Jun 2023 10:55)  sertraline 25 mg oral tablet: 1 tab(s) orally once a day (12 Jun 2023 10:55)    Antibiotics History  ceFAZolin   IVPB 2000 milliGRAM(s) IV Intermittent once, 06-12-23 @ 07:00, Stop order after: 1 Doses  cefepime   IVPB 500 milliGRAM(s) IV Intermittent daily, 05-25-23 @ 12:00  cefepime   IVPB    , 05-24-23 @ 17:36  cefepime   IVPB 500 milliGRAM(s) IV Intermittent once, 05-24-23 @ 16:39  vancomycin  IVPB 1000 milliGRAM(s) IV Intermittent every 24 hours, 06-04-23 @ 12:32, Stop order after: 42 Days  vancomycin  IVPB 1000 milliGRAM(s) IV Intermittent once, 06-03-23 @ 12:32, Stop order after: 1 Doses  vancomycin  IVPB    , 06-03-23 @ 12:32      Heme Medications       GI Medications  pantoprazole   Suspension 40 milliGRAM(s) Oral daily, 06-12-23 @ 08:38, Routine      COVID  06-11-23 @ 12:25  COVID -   NotDetec  05-04-23 @ 19:50  COVID -   NotDetec  07-13-22 @ 18:33  COVID -   NotDete      COVID Biomarkers    06-12-23 @ 05:00 ESR --  ---  CRP --  ---  DDimer  --   ---      ---   Ferritin --    05-04-23 @ 19:50 ESR --  ---  CRP --  ---  DDimer  351<H>   ---   LDH --   ---   Ferritin --       Trend BNP  06-12-23 @ 05:00   -  56581<H>  06-11-23 @ 06:15   -  >79760<H>  05-14-23 @ 11:30   -  83069<H>  05-11-23 @ 06:05   -  54069<H>  05-10-23 @ 05:10   -  66058<H>  05-08-23 @ 09:05   -  79763<H>    Procalcitonin Trend  06-05-23 @ 05:30   -   0.24<H>  06-04-23 @ 06:00   -   0.20<H>  05-17-23 @ 23:44   -   0.59<H>  05-16-23 @ 22:16   -   0.74<H>  05-15-23 @ 05:45   -   1.12<H>  05-09-23 @ 13:42   -   0.11<H>    WBC Trend  06-12-23 @ 12:04   -  8.54  06-11-23 @ 06:15   -  9.23  06-10-23 @ 06:00   -  7.88    H/H Trend  06-12-23 @ 12:04   -   9.1<L>/ 29.9<L>  06-11-23 @ 06:15   -   9.7<L>/ 31.9<L>  06-10-23 @ 06:00   -   8.4<L>/ 27.1<L>  06-09-23 @ 06:10   -   8.9<L>/ 28.9<L>  06-08-23 @ 08:30   -   8.7<L>/ 28.4<L>  06-07-23 @ 05:10   -   7.4<L>/ 24.1<L>    Stool Occult Blood  06-02-23 @ 08:20   -   Negative  05-25-23 @ 18:20   -   Negative  05-16-23 @ 17:00   -   Positive<!>    Platelet Trend  06-12-23 @ 12:04   -  143<L>  06-11-23 @ 06:15   -  209  06-10-23 @ 06:00   -  149<L>    Trend Sodium  06-12-23 @ 05:00   -  147<H>  06-11-23 @ 06:15   -  145  06-10-23 @ 06:00   -  145    Trend Potassium  06-12-23 @ 05:00   -  3.6  06-11-23 @ 06:15   -  3.9  06-10-23 @ 06:00   -  3.7    Trend Bun/Cr  06-12-23 @ 05:00  BUN/CR -  99<H> / 3.12<H>  06-11-23 @ 06:15  BUN/CR -  90<H> / 3.00<H>  06-10-23 @ 06:00  BUN/CR -  88<H> / 2.83<H>    Lactic Acid Trend  06-12-23 @ 05:00   -   1.8    ABG Trend  06-12-23 @ 05:07   - 7.46<H>/37/115<H>/99.3<H>  06-10-23 @ 04:45   - 7.42/41/78<L>/96.6  06-09-23 @ 10:50   - 7.39/40/86/98.9<H>  05-19-23 @ 00:22   - 7.37/43/144<H>/99.8<H>  05-18-23 @ 05:38   - 7.41/37/97/98.4<H>     Trend AST/ALT/ALK Phos/Bili  06-12-23 @ 05:00   17/13/158<H>/0.7  06-11-23 @ 06:15   18/13/196<H>/0.8  06-10-23 @ 06:00   16/15/153<H>/0.6  06-09-23 @ 06:10   20/15/177<H>/0.8  06-08-23 @ 10:20   25/22/198<H>/0.8       Ammonia Trend  05-14-23 @ 11:30   -   53  05-14-23 @ 05:00   -   16       Albumin Trend  06-12-23 @ 05:00   -   1.8<L>  06-11-23 @ 06:15   -   1.9<L>  06-10-23 @ 06:00   -   1.8<L>  06-09-23 @ 06:10   -   1.8<L>  06-08-23 @ 10:20   -   1.8<L>  06-06-23 @ 09:15   -   1.6<L>      PTT - PT - INR Trend  06-12-23 @ 05:00   -   28.2 - 15.8<H> - 1.36<H>  05-19-23 @ 00:44   -   26.5<L> - 13.2 - 1.15  05-17-23 @ 23:44   -   31.2 - 13.7<H> - 1.18<H>  05-16-23 @ 22:16   -   31.3 - 13.6<H> - 1.18<H>  05-15-23 @ 13:55   -   32.9 - 14.0<H> - 1.19<H>  05-14-23 @ 13:45   -   33.6 - -- - --    Glucose Trend  06-12-23 @ 11:49   -  -- -- 185<H>  06-12-23 @ 06:31   -  -- -- 180<H>  06-12-23 @ 05:24   -  -- -- 163<H>  06-12-23 @ 05:00   -  -- 160<H> --  06-11-23 @ 23:53   -  -- -- 229<H>  06-11-23 @ 17:03   -  -- -- 242<H>  06-11-23 @ 11:17   -  -- -- 209<H>  06-11-23 @ 06:15   -  -- 247<H> --  06-11-23 @ 06:00   -  -- -- 262<H>  06-10-23 @ 23:44   -  -- -- 231<H>    A1C with Estimated Average Glucose Result: 7.7 % *H* [4.0 - 5.6] (05-05-23 @ 08:00)      LABS:                        9.1    8.54  )-----------( 143      ( 12 Jun 2023 12:04 )             29.9     06-12    147<H>  |  110<H>  |  99<H>  ----------------------------<  160<H>  3.6   |  27  |  3.12<H>    Ca    8.8      12 Jun 2023 05:00  Phos  4.2     06-12  Mg     2.2     06-12    TPro  7.8  /  Alb  1.8<L>  /  TBili  0.7  /  DBili  x   /  AST  17  /  ALT  13  /  AlkPhos  158<H>  06-12    Fluid characteristics  -- 06-11 @ 09:34  pH 7.5    tprot 2.5    Cell count  Appearance Slightly Bloody  Fluid type --  BF lymph 17  color Pink  eosinophil 0  PMN --  Mesothelial 2  Monocyte 26  Other body cells 0     Lights Criteria :    Trend LDH- Fluid  06-11-23 @ 09:34  114 -- --      Trend LDH - Serum  06-12-23 @ 05:00  207 [50 - 242]    _____________________  Trend Protein - Fluid   06-11-23 @ 09:34  2.5  --  --      Trend Protein - Serum  06-12-23 @ 05:00  7.8 [6.0 - 8.3]  06-11-23 @ 06:15  8.6<H> [6.0 - 8.3]  06-10-23 @ 06:00  7.6 [6.0 - 8.3]  06-09-23 @ 06:10  7.9 [6.0 - 8.3]  06-08-23 @ 10:20  8.3 [6.0 - 8.3]       _____________________   The effusion is exudative if one of the Light’s criteria has been met:   1)  Pleural fluid protein/serum protein ratio > 0.5.   2)  Pleural fluid LDH/serum LDH ratio greater than > 0.6.   3) Pleural fluid LDH level greater than 2/3 of upper limit of normal LDH level in serum.  _____________________        CULTURES: (if applicable)    Culture - Body Fluid with Gram Stain (collected 06-11-23 @ 09:34)  Source: Pleural Fl Pleural Fluid  Gram Stain (06-11-23 @ 23:31):    polymorphonuclear leukocytes seen    No organisms seen    by cytocentrifuge    Culture - Sputum (collected 06-04-23 @ 21:20)  Source: .Sputum Sputum  Gram Stain (06-05-23 @ 10:19):    Numerous polymorphonuclear leukocytes per low power field    No Squamous epithelial cells per low power field    Moderate Gram Positive Cocci in Clusters per oil power field  Final Report (06-07-23 @ 10:05):    Numerous Staphylococcus aureus    Normal Respiratory Carrie absent  Organism: Staphylococcus aureus (06-07-23 @ 10:05)  Organism: Staphylococcus aureus (06-07-23 @ 10:05)      Method Type: ALDO      -  Ampicillin/Sulbactam: S <=8/4      -  Cefazolin: S <=4      -  Clindamycin: S <=0.25      -  Erythromycin: S <=0.25      -  Gentamicin: S <=1 Should not be used as monotherapy      -  Oxacillin: S 1 Oxacillin predicts susceptibility for dicloxacillin, methicillin, and nafcillin      -  Penicillin: R >8      -  Rifampin: S <=1 Should not be used as monotherapy      -  Tetracycline: R >8      -  Trimethoprim/Sulfamethoxazole: S <=0.5/9.5      -  Vancomycin: S 2    Culture - Urine (collected 06-03-23 @ 06:20)  Source: Clean Catch Clean Catch (Midstream)  Final Report (06-06-23 @ 22:34):    >100,000 CFU/ml Enterococcus faecium    <10,000 CFU/ml Normal Urogenital carrie present  Organism: Enterococcus faecium (06-06-23 @ 22:34)  Organism: Enterococcus faecium (06-06-23 @ 22:34)      Method Type: ALDO      -  Ampicillin: R >8 Predicts results to ampicillin/sulbactam, amoxacillin-clavulanate and  piperacillin-tazobactam.      -  Ciprofloxacin: R >2      -  Levofloxacin: R >4      -  Nitrofurantoin: S <=32 Should not be used to treat pyelonephritis.      -  Tetracycline: R >8      -  Vancomycin: S 0.5    Culture - Blood (collected 06-02-23 @ 09:45)  Source: .Blood Blood  Final Report (06-07-23 @ 17:01):    No Growth Final    Culture - Blood (collected 06-02-23 @ 09:10)  Source: .Blood Blood  Gram Stain (06-03-23 @ 16:57):    Growth in aerobic bottle: Gram Positive Cocci in Clusters    Growth in anaerobic bottle: Gram Positive Cocci in Clusters  Final Report (06-04-23 @ 17:55):    Growth in aerobic and anaerobic bottles: Staphylococcus epidermidis    Coagulase Negative Staphylococci isolated from a single blood culture set    may represent contamination.    Contact the Microbiology Department at [phone] if susceptibility    testing is clinically indicated.    ***Blood Panel PCR results on this specimen are available    approximately 3 hours after the Gram stain result.***    Gram stain, PCR, and/or culture results may not always    correspond due to difference in methodologies.    ************************************************************    This PCR assay was performed by multiplex PCR. This    Assay tests for 66 bacterial and resistance gene targets.    Please refer to the [facility] test directory    at [email or web address] for details.  Organism: Blood Culture PCR (06-04-23 @ 17:55)  Organism: Blood Culture PCR (06-04-23 @ 17:55)      Method Type: PCR      -  Staphylococcus epidermidis, Methicillin resistant: Detec             RADIOLOGY  CXR:   Right sided haziness, left lung clear    Bedside us done  noted mild- mod pl effusion no good window for Chest tube noted.       VITALS:  T(C): 37.2 (06-12-23 @ 09:00), Max: 37.3 (06-12-23 @ 05:00)  T(F): 99 (06-12-23 @ 09:00), Max: 99.1 (06-12-23 @ 05:00)  HR: 110 (06-12-23 @ 09:00) (92 - 117)  BP: 153/96 (06-12-23 @ 09:00) (128/95 - 156/105)  BP(mean): 115 (06-12-23 @ 09:00) (96 - 119)  ABP: --  ABP(mean): --  RR: 34 (06-12-23 @ 09:00) (16 - 43)  SpO2: 99% (06-12-23 @ 09:00) (92% - 100%)  CVP(mm Hg): --  CVP(cm H2O): --    Ins and Outs     06-11-23 @ 07:01  -  06-12-23 @ 07:00  --------------------------------------------------------  IN: 535 mL / OUT: 2250 mL / NET: -1715 mL    I&O's Detail    11 Jun 2023 07:01  -  12 Jun 2023 07:00  --------------------------------------------------------  IN:    Nepro with Carb Steady: 535 mL  Total IN: 535 mL    OUT:    Chest Tube (mL): 1500 mL    Voided (mL): 750 mL  Total OUT: 2250 mL    Total NET: -1715 mL

## 2023-06-12 NOTE — PROGRESS NOTE ADULT - SUBJECTIVE AND OBJECTIVE BOX
NEPHROLOGY PROGRESS NOTE    CHIEF COMPLAINT:  ANISH/CKD    HPI:  Renal function slowly worse.    mL   CT 1500 mL.  Tachycardic.  Hypertensive.  Low O2 requirement with good sats.    EXAM:  T(F): 99.1 (06-12-23 @ 05:00)  HR: 108 (06-12-23 @ 06:00)  BP: 152/97 (06-12-23 @ 06:00)  RR: 34 (06-12-23 @ 06:00)  SpO2: 98% (06-12-23 @ 08:42)    Awake, alert, NAD, non conversational  Normal respiratory effort, lungs clear bilaterally  Heart RRR with no murmur, mild presacral edema         LABS                             9.7    9.23  )-----------( 209      ( 11 Jun 2023 06:15 )             31.9          06-12    147<H>  |  110<H>  |  99<H>  ----------------------------<  160<H>  3.6   |  27  |  3.12<H>    Ca    8.8      12 Jun 2023 05:00  Phos  4.2     06-12  Mg     2.2     06-12    TPro  7.8  /  Alb  1.8<L>  /  TBili  0.7  /  DBili  x   /  AST  17  /  ALT  13  /  AlkPhos  158<H>  06-12           RADIOLOGY  Chest X-ray personally reviewed and shows continued improvement in aeration of left lung and no change in R lung findings      A/P:  Hemodynamic ATN s/p arrest (Cr 1.6 - 5/9/23, Cr 1.0 - 4/12/23)  ANISH slow worsening trend, possibly vanco or diuresis related  Mild hypernatremia, worsening  Resp failure, intubated, now extubated, post left chest tube on low FiO2  CM, EF 35 - 40%, dementia  Avoid more nephrotoxins  Electrolyte free water as ordered x 24 hrs

## 2023-06-12 NOTE — PROGRESS NOTE ADULT - ASSESSMENT
Unstageable now stage 4 sacral wound s/p limited bedside excision of necrotic skin and subcutaneous tissues.  -short course of 1/2 strength dakins wet to dry, then begin santyl (pt allergic to sulpha, cannot give ssd 1% cream)  -cont off loading and nutrition support.

## 2023-06-12 NOTE — PROGRESS NOTE ADULT - ASSESSMENT
Physical Examination:  GENERAL:               Eyes open and in mild  distress,    HEENT:                    No JVD, Dry MM  PULM:                     Bilateral air entry, course and dimminsished  to auscultation bilaterally, no  sputum production, + Rales, No Rhonchi, No Wheezing  CVS:                         S1, S2,  +Murmur  ABD:                        Soft, nondistended, nontender, normoactive bowel sounds,   EXT:                         no  edema, nontender, No Cyanosis or Clubbing   Vascular:                Warm Extremities,   NEURO:                 lethargic open eyes to verbal / tachtile stimuli   PSYC:                      Calm, No Insight.      Assessment  78 year old male with a PMH of dementia, HTN, cardiomyopathy, HFrEF, CAD s/p CABG, with known current RCA occlusion, and DM type 2 who was admitted to [facility] on 5/4 with hypoxic respiratory failure in setting of CHF exacerbation and ANISH with course complicated by acute hypoxic respiratory failure in setting of choking episode causing cardiac arrest, shock, worsening hypoxemic respiratory failure, and NSTEMI on 5/9. Pt ICU course noted post cardiac arrest anoxic brain injury and myoclonus. Pt was transferred to [facility] for VEEG which required 48 hours of monitoring as he was noted to still be sedated. While there pt noted to have hematochezia and any ac / antiplatelet agents were held at that time. EEG findings consistent with stimulus induced myoclonus and GPDs s/p hypoxic diffuse indicative of severe cerebral dysfunction.    Patient returned to 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  Acute renal failure w Uremia post cardiac arrest improving  Hemtochezia episode, h/h stable no further episodes noted  Heart failure with reduced EF  NSTEMI post arrest       Underlying PMH of dementia, HTN, cardiomyopathy, HFrEF, CAD s/p CABG, with known current RCA occlusion, and DM type 2      Plan:    Mental status slightly better this am,   Diuretics not helping renal function, will hold further diuresis  Will add hydralazine and to isosorbide   Continue BB, stop albuterol for afib  with renal function concern of bleeding a/c with    suction as needed   NIV PRN/QHS   NGT for diet , will need peg when more stable      S&S eval to advance diet - but patient too lethargic today to pass today  benzo as needed   restarted oral brivaracetam  but at lower dose due to worsening renal function  Restarted on modafanil     monitor blood glucose levels, + ISS coverage  Sacral decub - wound care called continue local wound care off loading and nutrition support  Venodyne for dvt ppx   GOC ongoing discussion with family , but want full code at this time    PMD:				                   Notified(Date):  Family Updated: 	Kuldeep [phone]	                                 Date:  6/11/2023  called msg left for call back  will need pigtail chest tube placement       Sedation & Analgesia:	  Diet/Nutrition:		 as above  GI PPx:			Protonix  DVT Ppx:		Venodynes   Activity:		  bedrest  Head of Bed:               35-45 Deg  Glycemic Control:         Insulin  Lines: piv  Restraints may be deemed necessary to prevent removal of life-sustaining devices [ x ] YES   [   ]  NO  Disposition: ICU Care  Goals of Care: Full code Physical Examination:  GENERAL:               Eyes open and in mild  distress,    HEENT:                    No JVD, Dry MM  PULM:                     Bilateral air entry, course and diminished  to auscultation bilaterally, no  sputum production, + Rales, No Rhonchi, No Wheezing  CVS:                         S1, S2,  +Murmur  ABD:                        Soft, nondistended, nontender, normoactive bowel sounds,   EXT:                         no  edema, nontender, No Cyanosis or Clubbing   Vascular:                Warm Extremities,   NEURO:                 lethargic open eyes to verbal / tachtile stimuli   PSYC:                      Calm, No Insight.      Assessment  78 year old male with a PMH of dementia, HTN, cardiomyopathy, HFrEF, CAD s/p CABG, with known current RCA occlusion, and DM type 2 who was admitted to [facility] on 5/4 with hypoxic respiratory failure in setting of CHF exacerbation and ANISH with course complicated by acute hypoxic respiratory failure in setting of choking episode causing cardiac arrest, shock, worsening hypoxemic respiratory failure, and NSTEMI on 5/9. Pt ICU course noted post cardiac arrest anoxic brain injury and myoclonus. Pt was transferred to [facility] for VEEG which required 48 hours of monitoring as he was noted to still be sedated. While there pt noted to have hematochezia and any ac / antiplatelet agents were held at that time. EEG findings consistent with stimulus induced myoclonus and GPDs s/p hypoxic diffuse indicative of severe cerebral dysfunction.    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  Acute renal failure w Uremia post cardiac arrest improving  Hemtochezia episode, h/h stable no further episodes noted  Heart failure with reduced EF  NSTEMI post arrest       Underlying PMH of dementia, HTN, cardiomyopathy, HFrEF, CAD s/p CABG, with known current RCA occlusion, and DM type 2      Plan:    Mental status slightly better this am,   Diuretics not helping renal function, will hold further diuresis  Will add hydralazine and to isosorbide   Continue BB, stop albuterol for afib  anemia improving , stool occult neg will start low dose eliquis    suction as needed   NIV PRN/QHS   NGT for diet , will need peg when more stable      S&S eval to advance diet - but patient too lethargic today to pass today  benzo as needed   restarted oral brivaracetam  but at lower dose due to worsening renal function  Restarted on Modafinil     monitor blood glucose levels, + ISS coverage  Sacral decub - wound care called continue local wound care off loading and nutrition support  Venodyne for dvt ppx   GOC ongoing discussion with family , but want full code at this time    PMD:				                   Notified(Date):  Family Updated: 	Kuldeep [phone]	                                 Date:  6/12/2023  called msg left for call back  will need pigtail chest tube placement       Sedation & Analgesia:	  Diet/Nutrition:		 as above  GI PPx:			Protonix  DVT Ppx:		Venodynes   Activity:		  bedrest  Head of Bed:               35-45 Deg  Glycemic Control:         Insulin  Lines: piv  Restraints may be deemed necessary to prevent removal of life-sustaining devices [ x ] YES   [   ]  NO  Disposition: ICU Care  Goals of Care: Full code

## 2023-06-12 NOTE — PROGRESS NOTE ADULT - SUBJECTIVE AND OBJECTIVE BOX
(x  ) No complaints (  ) Complains of:   remains extubated, now has left chest tube    T(F): 99 (06-12-23 @ 09:00), Max: 99.1 (06-12-23 @ 05:00)  HR: 110 (06-12-23 @ 09:00) (92 - 117)  BP: 153/96 (06-12-23 @ 09:00) (128/95 - 156/105)  RR: 34 (06-12-23 @ 09:00) (16 - 43)  SpO2: 99% (06-12-23 @ 09:00) (92% - 100%)        Wound Location 1sacrum, necrotic and sloughing tissues (skin and subcutaneous) excised. post debridement wound size 5.5 cm x 5 cm x .5 cm                            9.7    9.23  )-----------( 209      ( 11 Jun 2023 06:15 )             31.9     CBC Full  -  ( 11 Jun 2023 06:15 )  WBC Count : 9.23 K/uL  RBC Count : 3.05 M/uL  Hemoglobin : 9.7 g/dL  Hematocrit : 31.9 %  Platelet Count - Automated : 209 K/uL  Mean Cell Volume : 104.6 fl  Mean Cell Hemoglobin : 31.8 pg  Mean Cell Hemoglobin Concentration : 30.4 gm/dL  Auto Neutrophil # : x  Auto Lymphocyte # : x  Auto Monocyte # : x  Auto Eosinophil # : x  Auto Basophil # : x  Auto Neutrophil % : x  Auto Lymphocyte % : x  Auto Monocyte % : x  Auto Eosinophil % : x  Auto Basophil % : x    147|110|99<160  3.6|27|3.12  8.8,2.2,4.2  06-12 @ 05:00    PT/INR - ( 12 Jun 2023 05:00 )   PT: 15.8 sec;   INR: 1.36 ratio         PTT - ( 12 Jun 2023 05:00 )  PTT:28.2 sec              Procedure Performed:  ( x )Yes     (  )No  Name of Procedure:  (x  )debridement, excisional    (  )I&D    (  )Other:  (  )partial thickness     (x  )full thickness     (x  )subcutaneous     (  )muscle/tendon     (  )bone  (  )sharp     (  )surgical

## 2023-06-13 LAB
ALBUMIN SERPL ELPH-MCNC: 1.9 G/DL — LOW (ref 3.3–5)
ALP SERPL-CCNC: 160 U/L — HIGH (ref 40–120)
ALT FLD-CCNC: 13 U/L — SIGNIFICANT CHANGE UP (ref 10–45)
ANION GAP SERPL CALC-SCNC: 11 MMOL/L — SIGNIFICANT CHANGE UP (ref 5–17)
AST SERPL-CCNC: 16 U/L — SIGNIFICANT CHANGE UP (ref 10–40)
BASOPHILS # BLD AUTO: 0.05 K/UL — SIGNIFICANT CHANGE UP (ref 0–0.2)
BASOPHILS NFR BLD AUTO: 0.6 % — SIGNIFICANT CHANGE UP (ref 0–2)
BILIRUB SERPL-MCNC: 0.7 MG/DL — SIGNIFICANT CHANGE UP (ref 0.2–1.2)
BUN SERPL-MCNC: 110 MG/DL — HIGH (ref 7–23)
CALCIUM SERPL-MCNC: 8.9 MG/DL — SIGNIFICANT CHANGE UP (ref 8.4–10.5)
CHLORIDE SERPL-SCNC: 110 MMOL/L — HIGH (ref 96–108)
CO2 SERPL-SCNC: 28 MMOL/L — SIGNIFICANT CHANGE UP (ref 22–31)
CREAT SERPL-MCNC: 3.39 MG/DL — HIGH (ref 0.5–1.3)
EGFR: 18 ML/MIN/1.73M2 — LOW
EOSINOPHIL # BLD AUTO: 0.13 K/UL — SIGNIFICANT CHANGE UP (ref 0–0.5)
EOSINOPHIL NFR BLD AUTO: 1.6 % — SIGNIFICANT CHANGE UP (ref 0–6)
GLUCOSE BLDC GLUCOMTR-MCNC: 191 MG/DL — HIGH (ref 70–99)
GLUCOSE BLDC GLUCOMTR-MCNC: 194 MG/DL — HIGH (ref 70–99)
GLUCOSE BLDC GLUCOMTR-MCNC: 208 MG/DL — HIGH (ref 70–99)
GLUCOSE BLDC GLUCOMTR-MCNC: 216 MG/DL — HIGH (ref 70–99)
GLUCOSE BLDC GLUCOMTR-MCNC: 294 MG/DL — HIGH (ref 70–99)
GLUCOSE SERPL-MCNC: 188 MG/DL — HIGH (ref 70–99)
HCT VFR BLD CALC: 32.5 % — LOW (ref 39–50)
HGB BLD-MCNC: 9.9 G/DL — LOW (ref 13–17)
IMM GRANULOCYTES NFR BLD AUTO: 0.5 % — SIGNIFICANT CHANGE UP (ref 0–0.9)
LACTATE SERPL-SCNC: 1.4 MMOL/L — SIGNIFICANT CHANGE UP (ref 0.7–2)
LYMPHOCYTES # BLD AUTO: 0.81 K/UL — LOW (ref 1–3.3)
LYMPHOCYTES # BLD AUTO: 10 % — LOW (ref 13–44)
MAGNESIUM SERPL-MCNC: 2.3 MG/DL — SIGNIFICANT CHANGE UP (ref 1.6–2.6)
MCHC RBC-ENTMCNC: 30.5 GM/DL — LOW (ref 32–36)
MCHC RBC-ENTMCNC: 31.5 PG — SIGNIFICANT CHANGE UP (ref 27–34)
MCV RBC AUTO: 103.5 FL — HIGH (ref 80–100)
MONOCYTES # BLD AUTO: 0.57 K/UL — SIGNIFICANT CHANGE UP (ref 0–0.9)
MONOCYTES NFR BLD AUTO: 7 % — SIGNIFICANT CHANGE UP (ref 2–14)
NEUTROPHILS # BLD AUTO: 6.54 K/UL — SIGNIFICANT CHANGE UP (ref 1.8–7.4)
NEUTROPHILS NFR BLD AUTO: 80.3 % — HIGH (ref 43–77)
NRBC # BLD: 0 /100 WBCS — SIGNIFICANT CHANGE UP (ref 0–0)
PHOSPHATE SERPL-MCNC: 4.1 MG/DL — SIGNIFICANT CHANGE UP (ref 2.5–4.5)
PLATELET # BLD AUTO: 161 K/UL — SIGNIFICANT CHANGE UP (ref 150–400)
POTASSIUM SERPL-MCNC: 4.3 MMOL/L — SIGNIFICANT CHANGE UP (ref 3.5–5.3)
POTASSIUM SERPL-SCNC: 4.3 MMOL/L — SIGNIFICANT CHANGE UP (ref 3.5–5.3)
PROT SERPL-MCNC: 8 G/DL — SIGNIFICANT CHANGE UP (ref 6–8.3)
RBC # BLD: 3.14 M/UL — LOW (ref 4.2–5.8)
RBC # FLD: 18.8 % — HIGH (ref 10.3–14.5)
SODIUM SERPL-SCNC: 149 MMOL/L — HIGH (ref 135–145)
WBC # BLD: 8.14 K/UL — SIGNIFICANT CHANGE UP (ref 3.8–10.5)
WBC # FLD AUTO: 8.14 K/UL — SIGNIFICANT CHANGE UP (ref 3.8–10.5)

## 2023-06-13 PROCEDURE — 99232 SBSQ HOSP IP/OBS MODERATE 35: CPT

## 2023-06-13 PROCEDURE — 71045 X-RAY EXAM CHEST 1 VIEW: CPT | Mod: 26

## 2023-06-13 RX ORDER — SODIUM CHLORIDE 9 MG/ML
1000 INJECTION, SOLUTION INTRAVENOUS
Refills: 0 | Status: DISCONTINUED | OUTPATIENT
Start: 2023-06-13 | End: 2023-06-14

## 2023-06-13 RX ORDER — SODIUM CHLORIDE 9 MG/ML
1000 INJECTION, SOLUTION INTRAVENOUS
Refills: 0 | Status: DISCONTINUED | OUTPATIENT
Start: 2023-06-13 | End: 2023-06-13

## 2023-06-13 RX ORDER — ALBUMIN HUMAN 25 %
50 VIAL (ML) INTRAVENOUS ONCE
Refills: 0 | Status: COMPLETED | OUTPATIENT
Start: 2023-06-13 | End: 2023-06-13

## 2023-06-13 RX ADMIN — CHLORHEXIDINE GLUCONATE 1 APPLICATION(S): 213 SOLUTION TOPICAL at 12:33

## 2023-06-13 RX ADMIN — Medication 2: at 17:26

## 2023-06-13 RX ADMIN — Medication 3: at 00:15

## 2023-06-13 RX ADMIN — Medication 25 MILLIGRAM(S): at 06:42

## 2023-06-13 RX ADMIN — MODAFINIL 150 MILLIGRAM(S): 200 TABLET ORAL at 12:31

## 2023-06-13 RX ADMIN — Medication 15 MILLILITER(S): at 12:31

## 2023-06-13 RX ADMIN — Medication 50 MILLILITER(S): at 09:29

## 2023-06-13 RX ADMIN — Medication 25 MILLIGRAM(S): at 17:28

## 2023-06-13 RX ADMIN — Medication 10 MILLIGRAM(S): at 17:27

## 2023-06-13 RX ADMIN — ISOSORBIDE DINITRATE 20 MILLIGRAM(S): 5 TABLET ORAL at 12:31

## 2023-06-13 RX ADMIN — SODIUM CHLORIDE 75 MILLILITER(S): 9 INJECTION, SOLUTION INTRAVENOUS at 08:25

## 2023-06-13 RX ADMIN — Medication 1: at 12:32

## 2023-06-13 RX ADMIN — ISOSORBIDE DINITRATE 20 MILLIGRAM(S): 5 TABLET ORAL at 17:27

## 2023-06-13 RX ADMIN — Medication 10 MILLIGRAM(S): at 06:42

## 2023-06-13 RX ADMIN — Medication 1: at 23:46

## 2023-06-13 RX ADMIN — Medication 25 MILLIGRAM(S): at 12:31

## 2023-06-13 RX ADMIN — Medication 25 MILLIGRAM(S): at 23:45

## 2023-06-13 RX ADMIN — Medication 1 APPLICATION(S): at 12:32

## 2023-06-13 RX ADMIN — ISOSORBIDE DINITRATE 20 MILLIGRAM(S): 5 TABLET ORAL at 06:42

## 2023-06-13 RX ADMIN — APIXABAN 2.5 MILLIGRAM(S): 2.5 TABLET, FILM COATED ORAL at 06:42

## 2023-06-13 RX ADMIN — PANTOPRAZOLE SODIUM 40 MILLIGRAM(S): 20 TABLET, DELAYED RELEASE ORAL at 12:31

## 2023-06-13 RX ADMIN — BRIVARACETAM 25 MILLIGRAM(S): 25 TABLET, FILM COATED ORAL at 17:34

## 2023-06-13 RX ADMIN — Medication 2: at 06:43

## 2023-06-13 RX ADMIN — APIXABAN 2.5 MILLIGRAM(S): 2.5 TABLET, FILM COATED ORAL at 17:28

## 2023-06-13 RX ADMIN — Medication 25 MILLIGRAM(S): at 00:13

## 2023-06-13 RX ADMIN — BRIVARACETAM 25 MILLIGRAM(S): 25 TABLET, FILM COATED ORAL at 06:43

## 2023-06-13 NOTE — PROGRESS NOTE ADULT - SUBJECTIVE AND OBJECTIVE BOX
Follow-up Critical Care Progress Note  Chief Complaint : Atrial fibrillation      patient alert  purposeful movements  non verbal today  appears comfortable despite tachypneic       Allergies :sulfa drugs (Unknown)      PAST MEDICAL & SURGICAL HISTORY:  HTN (hypertension)  HLD (hyperlipidemia)  Diabetes  CAD (coronary artery disease)  History of cholecystectomy        Medications:  MEDICATIONS  (STANDING):  apixaban 2.5 milliGRAM(s) Oral every 12 hours  brivaracetam Oral Solution 25 milliGRAM(s) Oral two times a day  chlorhexidine 2% Cloths 1 Application(s) Topical daily  Dakins Solution - 1/2 Strength 1 Application(s) Topical daily  dextrose 5%. 1000 milliLiter(s) (100 mL/Hr) IV Continuous <Continuous>  dextrose 5%. 1000 milliLiter(s) (50 mL/Hr) IV Continuous <Continuous>  dextrose 50% Injectable 25 Gram(s) IV Push once  dextrose 50% Injectable 12.5 Gram(s) IV Push once  dextrose 50% Injectable 25 Gram(s) IV Push once  epoetin priyanka-epbx (RETACRIT) Injectable 00102 Unit(s) SubCutaneous every 7 days  hydrALAZINE 10 milliGRAM(s) Oral every 12 hours  insulin lispro (ADMELOG) corrective regimen sliding scale   SubCutaneous every 6 hours  isosorbide   dinitrate Tablet (ISORDIL) 20 milliGRAM(s) Oral three times a day  metoprolol tartrate 25 milliGRAM(s) Oral every 6 hours  modafinil 150 milliGRAM(s) Oral every 24 hours  multivitamin/minerals/iron Oral Solution (CENTRUM) 15 milliLiter(s) Oral daily  pantoprazole   Suspension 40 milliGRAM(s) Oral daily  sodium chloride 0.45%. 1000 milliLiter(s) (75 mL/Hr) IV Continuous <Continuous>    MEDICATIONS  (PRN):      Antibiotics History  ceFAZolin   IVPB 2000 milliGRAM(s) IV Intermittent once, 06-12-23 @ 07:00, Stop order after: 1 Doses  cefepime   IVPB    , 05-24-23 @ 17:36  cefepime   IVPB 500 milliGRAM(s) IV Intermittent once, 05-24-23 @ 16:39  cefepime   IVPB 500 milliGRAM(s) IV Intermittent daily, 05-25-23 @ 12:00  vancomycin  IVPB 1000 milliGRAM(s) IV Intermittent every 24 hours, 06-04-23 @ 12:32, Stop order after: 42 Days  vancomycin  IVPB 1000 milliGRAM(s) IV Intermittent once, 06-03-23 @ 12:32, Stop order after: 1 Doses  vancomycin  IVPB    , 06-03-23 @ 12:32      Heme Medications   apixaban 2.5 milliGRAM(s) Oral every 12 hours, 06-12-23 @ 13:15      GI Medications  pantoprazole   Suspension 40 milliGRAM(s) Oral daily, 06-12-23 @ 08:38, Routine      COVID  06-11-23 @ 12:25  COVID -   NotDetec  05-04-23 @ 19:50  COVID -   NotDetec  07-13-22 @ 18:33  COVID -   NotDete      COVID Biomarkers    06-12-23 @ 05:00 ESR --  ---  CRP --  ---  DDimer  --   ---      ---   Ferritin --    05-04-23 @ 19:50 ESR --  ---  CRP --  ---  DDimer  351<H>   ---   LDH --   ---   Ferritin --       Trend BNP  06-12-23 @ 05:00   -  06839<H>  06-11-23 @ 06:15   -  >66460<H>  05-14-23 @ 11:30   -  85061<H>  05-11-23 @ 06:05   -  54803<H>  05-10-23 @ 05:10   -  08030<H>  05-08-23 @ 09:05   -  70466<H>    Procalcitonin Trend  06-05-23 @ 05:30   -   0.24<H>  06-04-23 @ 06:00   -   0.20<H>  05-17-23 @ 23:44   -   0.59<H>  05-16-23 @ 22:16   -   0.74<H>  05-15-23 @ 05:45   -   1.12<H>  05-09-23 @ 13:42   -   0.11<H>    WBC Trend  06-13-23 @ 05:35   -  8.14  06-12-23 @ 12:04   -  8.54  06-11-23 @ 06:15   -  9.23    H/H Trend  06-13-23 @ 05:35   -   9.9<L>/ 32.5<L>  06-12-23 @ 12:04   -   9.1<L>/ 29.9<L>  06-11-23 @ 06:15   -   9.7<L>/ 31.9<L>  06-10-23 @ 06:00   -   8.4<L>/ 27.1<L>  06-09-23 @ 06:10   -   8.9<L>/ 28.9<L>  06-08-23 @ 08:30   -   8.7<L>/ 28.4<L>    Stool Occult Blood  06-02-23 @ 08:20   -   Negative  05-25-23 @ 18:20   -   Negative  05-16-23 @ 17:00   -   Positive<!>    Platelet Trend  06-13-23 @ 05:35   -  161  06-12-23 @ 12:04   -  143<L>  06-11-23 @ 06:15   -  209    Trend Sodium  06-13-23 @ 05:35   -  149<H>  06-12-23 @ 05:00   -  147<H>  06-11-23 @ 06:15   -  145    Trend Potassium  06-13-23 @ 05:35   -  4.3  06-12-23 @ 05:00   -  3.6  06-11-23 @ 06:15   -  3.9    Trend Bun/Cr  06-13-23 @ 05:35  BUN/CR -  110<H> / 3.39<H>  06-12-23 @ 05:00  BUN/CR -  99<H> / 3.12<H>  06-11-23 @ 06:15  BUN/CR -  90<H> / 3.00<H>    Lactic Acid Trend  06-13-23 @ 05:35   -   1.4  06-12-23 @ 05:00   -   1.8    ABG Trend  06-12-23 @ 05:07   - 7.46<H>/37/115<H>/99.3<H>  06-10-23 @ 04:45   - 7.42/41/78<L>/96.6  06-09-23 @ 10:50   - 7.39/40/86/98.9<H>  05-19-23 @ 00:22   - 7.37/43/144<H>/99.8<H>  05-18-23 @ 05:38   - 7.41/37/97/98.4<H>  05-17-23 @ 23:36   - 7.42/37/97/98.3<H>  05-16-23 @ 22:10   - 7.41/41/92/98.5<H>  05-16-23 @ 05:59   - 7.35/47/104/98.8<H>  05-15-23 @ 13:45   - 7.36/47/74<L>/96.6  05-15-23 @ 04:45   - 7.33<L>/48/70<L>/95.7  05-14-23 @ 05:20   - 7.40/46/72<L>/96.5  05-13-23 @ 06:00   - 7.34<L>/46/93/98.7<H>    Trend AST/ALT/ALK Phos/Bili  06-13-23 @ 05:35   16/13/160<H>/0.7  06-12-23 @ 05:00   17/13/158<H>/0.7  06-11-23 @ 06:15   18/13/196<H>/0.8  06-10-23 @ 06:00   16/15/153<H>/0.6  06-09-23 @ 06:10   20/15/177<H>/0.8  06-08-23 @ 10:20   25/22/198<H>/0.8  06-06-23 @ 09:15   29/19/178<H>/0.6  06-06-23 @ 05:30   31/21/168<H>/0.6  06-05-23 @ 05:30   30/20/160<H>/0.6  06-04-23 @ 06:00   32/19/167<H>/0.7  06-03-23 @ 06:20   28/19/152<H>/0.5  06-02-23 @ 06:00   26/14/144<H>/0.4      Ammonia Trend  05-14-23 @ 11:30   -   53  05-14-23 @ 05:00   -   16       Albumin Trend  06-13-23 @ 05:35   -   1.9<L>  06-12-23 @ 05:00   -   1.8<L>  06-11-23 @ 06:15   -   1.9<L>  06-10-23 @ 06:00   -   1.8<L>  06-09-23 @ 06:10   -   1.8<L>  06-08-23 @ 10:20   -   1.8<L>      PTT - PT - INR Trend  06-12-23 @ 05:00   -   28.2 - 15.8<H> - 1.36<H>  05-19-23 @ 00:44   -   26.5<L> - 13.2 - 1.15  05-17-23 @ 23:44   -   31.2 - 13.7<H> - 1.18<H>  05-16-23 @ 22:16   -   31.3 - 13.6<H> - 1.18<H>  05-15-23 @ 13:55   -   32.9 - 14.0<H> - 1.19<H>  05-14-23 @ 13:45   -   33.6 - -- - --    Glucose Trend  06-13-23 @ 06:25   -  -- -- 208<H>  06-13-23 @ 05:35   -  -- 188<H> --  06-13-23 @ 00:12   -  -- -- 294<H>  06-12-23 @ 17:58   -  -- -- 247<H>  06-12-23 @ 11:49   -  -- -- 185<H>  06-12-23 @ 06:31   -  -- -- 180<H>  06-12-23 @ 05:24   -  -- -- 163<H>  06-12-23 @ 05:00   -  -- 160<H> --  06-11-23 @ 23:53   -  -- -- 229<H>  06-11-23 @ 17:03   -  -- -- 242<H>    A1C with Estimated Average Glucose Result: 7.7 % *H* [4.0 - 5.6] (05-05-23 @ 08:00)      LABS:                        9.9    8.14  )-----------( 161      ( 13 Jun 2023 05:35 )             32.5     06-13    149<H>  |  110<H>  |  110<H>  ----------------------------<  188<H>  4.3   |  28  |  3.39<H>    Ca    8.9      13 Jun 2023 05:35  Phos  4.1     06-13  Mg     2.3     06-13    TPro  8.0  /  Alb  1.9<L>  /  TBili  0.7  /  DBili  x   /  AST  16  /  ALT  13  /  AlkPhos  160<H>  06-13    Fluid characteristics  -- 06-11 @ 09:34  pH 7.5    tprot 2.5    Cell count  Appearance Slightly Bloody  Fluid type --  BF lymph 17  color Pink  eosinophil 0  PMN --  Mesothelial 2  Monocyte 26  Other body cells 0     CULTURES: (if applicable)    Culture - Fungal, Body Fluid (collected 06-11-23 @ 09:34)  Source: Pleural Fl Pleural Fluid  Preliminary Report (06-13-23 @ 07:08):    Testing in progress    Culture - Body Fluid with Gram Stain (collected 06-11-23 @ 09:34)  Source: Pleural Fl Pleural Fluid  Gram Stain (06-11-23 @ 23:31):    polymorphonuclear leukocytes seen    No organisms seen    by cytocentrifuge  Preliminary Report (06-12-23 @ 18:01):    No growth    Culture - Sputum (collected 06-04-23 @ 21:20)  Source: .Sputum Sputum  Gram Stain (06-05-23 @ 10:19):    Numerous polymorphonuclear leukocytes per low power field    No Squamous epithelial cells per low power field    Moderate Gram Positive Cocci in Clusters per oil power field  Final Report (06-07-23 @ 10:05):    Numerous Staphylococcus aureus    Normal Respiratory Carrie absent  Organism: Staphylococcus aureus (06-07-23 @ 10:05)  Organism: Staphylococcus aureus (06-07-23 @ 10:05)      Method Type: ALDO      -  Ampicillin/Sulbactam: S <=8/4      -  Cefazolin: S <=4      -  Clindamycin: S <=0.25      -  Erythromycin: S <=0.25      -  Gentamicin: S <=1 Should not be used as monotherapy      -  Oxacillin: S 1 Oxacillin predicts susceptibility for dicloxacillin, methicillin, and nafcillin      -  Penicillin: R >8      -  Rifampin: S <=1 Should not be used as monotherapy      -  Tetracycline: R >8      -  Trimethoprim/Sulfamethoxazole: S <=0.5/9.5      -  Vancomycin: S 2    Culture - Urine (collected 06-03-23 @ 06:20)  Source: Clean Catch Clean Catch (Midstream)  Final Report (06-06-23 @ 22:34):    >100,000 CFU/ml Enterococcus faecium    <10,000 CFU/ml Normal Urogenital carrie present  Organism: Enterococcus faecium (06-06-23 @ 22:34)  Organism: Enterococcus faecium (06-06-23 @ 22:34)      Method Type: ALDO      -  Ampicillin: R >8 Predicts results to ampicillin/sulbactam, amoxacillin-clavulanate and  piperacillin-tazobactam.      -  Ciprofloxacin: R >2      -  Levofloxacin: R >4      -  Nitrofurantoin: S <=32 Should not be used to treat pyelonephritis.      -  Tetracycline: R >8      -  Vancomycin: S 0.5    Culture - Blood (collected 06-02-23 @ 09:45)  Source: .Blood Blood  Final Report (06-07-23 @ 17:01):    No Growth Final    Culture - Blood (collected 06-02-23 @ 09:10)  Source: .Blood Blood  Gram Stain (06-03-23 @ 16:57):    Growth in aerobic bottle: Gram Positive Cocci in Clusters    Growth in anaerobic bottle: Gram Positive Cocci in Clusters  Final Report (06-04-23 @ 17:55):    Growth in aerobic and anaerobic bottles: Staphylococcus epidermidis    Coagulase Negative Staphylococci isolated from a single blood culture set    may represent contamination.    Contact the Microbiology Department at [phone] if susceptibility    testing is clinically indicated.    ***Blood Panel PCR results on this specimen are available    approximately 3 hours after the Gram stain result.***    Gram stain, PCR, and/or culture results may not always    correspond due to difference in methodologies.    ************************************************************    This PCR assay was performed by multiplex PCR. This    Assay tests for 66 bacterial and resistance gene targets.    Please refer to the [facility] "[email or web address]" test directory    at [email or web address] for details.  Organism: Blood Culture PCR (06-04-23 @ 17:55)  Organism: Blood Culture PCR (06-04-23 @ 17:55)      Method Type: PCR      -  Staphylococcus epidermidis, Methicillin resistant: Detec          ABG - ( 12 Jun 2023 05:07 )  pH, Arterial: 7.46  pH, Blood: x     /  pCO2: 37    /  pO2: 115   / HCO3: 26    / Base Excess: 2.5   /  SaO2: 99.3           RADIOLOGY  CXR:  left effusion ngt in place       VITALS:  T(C): 37.1 (06-13-23 @ 06:00), Max: 37.4 (06-12-23 @ 20:00)  T(F): 98.7 (06-13-23 @ 06:00), Max: 99.4 (06-12-23 @ 20:00)  HR: 93 (06-13-23 @ 09:31) (91 - 118)  BP: 164/98 (06-13-23 @ 05:00) (124/82 - 164/98)  BP(mean): 114 (06-13-23 @ 05:00) (94 - 120)  ABP: --  ABP(mean): --  RR: 40 (06-13-23 @ 05:00) (18 - 40)  SpO2: 100% (06-13-23 @ 09:31) (86% - 100%)  CVP(mm Hg): --  CVP(cm H2O): --    Ins and Outs     06-12-23 @ 07:01  -  06-13-23 @ 07:00  --------------------------------------------------------  IN: 1145 mL / OUT: 1125 mL / NET: 20 mL         I&O's Detail    12 Jun 2023 07:01  -  13 Jun 2023 07:00  --------------------------------------------------------  IN:    Enteral Tube Flush: 200 mL    Free Water: 400 mL    Nepro with Carb Steady: 545 mL  Total IN: 1145 mL    OUT:    Chest Tube (mL): 395 mL    Voided (mL): 730 mL  Total OUT: 1125 mL    Total NET: 20 mL

## 2023-06-13 NOTE — PROGRESS NOTE ADULT - SUBJECTIVE AND OBJECTIVE BOX
NEPHROLOGY PROGRESS NOTE    CHIEF COMPLAINT:  ANISH/CKD    HPI:  Renal function slowly worse.   mL.   mL.  Mental status no better.     EXAM:  T(F): 98.6 (06-13-23 @ 10:00)  HR: 101 (06-13-23 @ 10:00)  BP: 156/95 (06-13-23 @ 10:00)  RR: 29 (06-13-23 @ 10:00)  SpO2: 100% (06-13-23 @ 10:00)    Awake intermittently but not interactive  Normal respiratory effort, lungs clear bilaterally  Heart RRR with no murmur, no peripheral edema         LABS                             9.9    8.14  )-----------( 161      ( 13 Jun 2023 05:35 )             32.5          06-13    149<H>  |  110<H>  |  110<H>  ----------------------------<  188<H>  4.3   |  28  |  3.39<H>    Ca    8.9      13 Jun 2023 05:35  Phos  4.1     06-13  Mg     2.3     06-13    TPro  8.0  /  Alb  1.9<L>  /  TBili  0.7  /  DBili  x   /  AST  16  /  ALT  13  /  AlkPhos  160<H>  06-13           RADIOLOGY  Chest X-ray personally reviewed and shows fairly clear left liung;  right lung looks a little better aerated      IMPRESSION  Hemodynamic ATN s/p arrest (Cr 1.6 - 5/9/23, Cr 1.0 - 4/12/23)  ANISH with slow worsening trend, possibly vanco or diuresis related  Mild hypernatremia, worsening  Resp failure, intubated, now extubated, post left chest tube on low FiO2  CM, EF 35 - 40%, dementia    RECOMMEND  Avoid more nephrotoxins  Trial of 1/2 NS and diuretic holiday

## 2023-06-13 NOTE — PROGRESS NOTE ADULT - ASSESSMENT
Physical Examination:  GENERAL:               Eyes open and in mild  distress,    HEENT:                    No JVD, Dry MM  PULM:                     Bilateral air entry, course and diminished  to auscultation bilaterally, + sputum production, + Rales Right > Left, No Rhonchi, No Wheezing  CVS:                         S1, S2,  +Murmur  ABD:                        Soft, nondistended, nontender, normoactive bowel sounds,   EXT:                         no  edema, nontender, No Cyanosis or Clubbing   Vascular:                Warm Extremities,   NEURO:                 alert at time and lethargic at times open eyes to verbal / tactile stimuli   PSYC:                      Calm, No Insight.      Assessment  78 year old male with a PMH of dementia, HTN, cardiomyopathy, HFrEF, CAD s/p CABG, with known current RCA occlusion, and DM type 2 who was admitted to [facility] on 5/4 with hypoxic respiratory failure in setting of CHF exacerbation and ANISH with course complicated by acute hypoxic respiratory failure in setting of choking episode causing cardiac arrest, shock, worsening hypoxemic respiratory failure, and NSTEMI on 5/9. Pt ICU course noted post cardiac arrest anoxic brain injury and myoclonus. Pt was transferred to [facility] for VEEG which required 48 hours of monitoring as he was noted to still be sedated. While there pt noted to have hematochezia and any ac / antiplatelet agents were held at that time. EEG findings consistent with stimulus induced myoclonus and GPDs s/p hypoxic diffuse indicative of severe cerebral dysfunction.    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  Acute renal failure w Uremia post cardiac arrest improving  Hemtochezia episode, h/h stable no further episodes noted  Heart failure with reduced EF  NSTEMI post arrest       Underlying PMH of dementia, HTN, cardiomyopathy, HFrEF, CAD s/p CABG, with known current RCA occlusion, and DM type 2      Plan:    Mental status waxing waning  noted worsening uremia, suspect playing a role in mental status  Noted hypernatremia will start 1/2 NS, and give albumin   Hold diuretics  off albuterol pAfib appears to be stable  Continue hydralazine, Lopressor, Isosorbide  Continue a/c with pAfib  Chest tube to suction  suction as needed   NIV PRN/QHS   NGT for diet , will need peg when more stable      S&S eval to advance diet - but patient too lethargic today to pass today, will call when more awake    benzo as needed   restarted oral brivaracetam  but at lower dose due to worsening renal function  Restarted on Modafinil     monitor blood glucose levels, + ISS coverage  Sacral decub - wound care called continue local wound care off loading and nutrition support  Venodyne for dvt ppx   GOC ongoing discussion with family , but want full code at this time    PMD:				                   Notified(Date):  Family Updated: 	Kuldeep [phone]	                                 Date:  6/12-13/2023  updated     Sedation & Analgesia:	  Diet/Nutrition:		 as above  GI PPx:			Protonix  DVT Ppx:		Venodynes   Activity:		  bedrest  Head of Bed:               35-45 Deg  Glycemic Control:         Insulin  Lines: piv  Restraints may be deemed necessary to prevent removal of life-sustaining devices [ x ] YES   [   ]  NO  Disposition: ICU Care  Goals of Care: Full code

## 2023-06-13 NOTE — PROGRESS NOTE ADULT - SUBJECTIVE AND OBJECTIVE BOX
Progress:  less responsive today , will open eyes , tachypneic at times , non verbal not following commands      Review of Systems: [ Unable to obtain due to poor mentation]    MEDICATIONS  (STANDING):  apixaban 2.5 milliGRAM(s) Oral every 12 hours  brivaracetam Oral Solution 25 milliGRAM(s) Oral two times a day  chlorhexidine 2% Cloths 1 Application(s) Topical daily  Dakins Solution - 1/2 Strength 1 Application(s) Topical daily  dextrose 5%. 1000 milliLiter(s) (50 mL/Hr) IV Continuous <Continuous>  dextrose 5%. 1000 milliLiter(s) (100 mL/Hr) IV Continuous <Continuous>  dextrose 50% Injectable 25 Gram(s) IV Push once  dextrose 50% Injectable 25 Gram(s) IV Push once  dextrose 50% Injectable 12.5 Gram(s) IV Push once  epoetin priyanka-epbx (RETACRIT) Injectable 21158 Unit(s) SubCutaneous every 7 days  hydrALAZINE 10 milliGRAM(s) Oral every 12 hours  insulin lispro (ADMELOG) corrective regimen sliding scale   SubCutaneous every 6 hours  isosorbide   dinitrate Tablet (ISORDIL) 20 milliGRAM(s) Oral three times a day  metoprolol tartrate 25 milliGRAM(s) Oral every 6 hours  modafinil 150 milliGRAM(s) Oral every 24 hours  multivitamin/minerals/iron Oral Solution (CENTRUM) 15 milliLiter(s) Oral daily  pantoprazole   Suspension 40 milliGRAM(s) Oral daily  sodium chloride 0.45%. 1000 milliLiter(s) (75 mL/Hr) IV Continuous <Continuous>    MEDICATIONS  (PRN):      PHYSICAL EXAM:  Vital Signs Last 24 Hrs  T(C): 37.1 (13 Jun 2023 06:00), Max: 37.4 (12 Jun 2023 20:00)  T(F): 98.7 (13 Jun 2023 06:00), Max: 99.4 (12 Jun 2023 20:00)  HR: 93 (13 Jun 2023 09:31) (91 - 118)  BP: 164/98 (13 Jun 2023 05:00) (124/82 - 164/98)  BP(mean): 114 (13 Jun 2023 05:00) (94 - 120)  RR: 40 (13 Jun 2023 05:00) (18 - 40)  SpO2: 100% (13 Jun 2023 09:31) (86% - 100%)    Parameters below as of 13 Jun 2023 06:00  Patient On (Oxygen Delivery Method): AVAPS    O2 Concentration (%): 30  General: opens eyes to stim , non verbal        HEENT: n/c, a/t   , ng   Lungs: coarse  C, Tube  in place   CV: normal  -tachy  GI: abd soft     : cc to santos     Musculoskeletal: weak, can move ue's    Skin: nml, w/d     Neuro: + deficits , mainly lethargic, can open eyes, non verbal not following commands   Oral intake ability:  unable  (will be re  assessed  if more awake)    Diet: NPO, Ng feeds      LABS:                          9.9    8.14  )-----------( 161      ( 13 Jun 2023 05:35 )             32.5     06-13    149<H>  |  110<H>  |  110<H>  ----------------------------<  188<H>  4.3   |  28  |  3.39<H>    Ca    8.9      13 Jun 2023 05:35  Phos  4.1     06-13  Mg     2.3     06-13    TPro  8.0  /  Alb  1.9<L>  /  TBili  0.7  /  DBili  x   /  AST  16  /  ALT  13  /  AlkPhos  160<H>  06-13        RADIOLOGY & ADDITIONAL STUDIES: < from: US Renal (06.11.23 @ 13:23) >  ACC: [id number] EXAM:  US KIDNEY(S)   ORDERED BY: JC CARD     PROCEDURE DATE:  06/11/2023          INTERPRETATION:  CLINICAL INFORMATION: Acute renal insufficiency.    COMPARISON: Abdominal CT dated 07/13/2022 and renal ultrasound dated   05/05/2023.    TECHNIQUE: Sonography of the kidneys and bladder.    FINDINGS:  Right kidney: 12.0 cm. No suspicious renal mass, hydronephrosis or   calculi. There is 7 mm simple appearing cyst in the lower pole. Mild   increased cortical echogenicity.    Left kidney: 10.8 cm. No suspicious renal mass, hydronephrosis or   calculi. There is 6 mm simple cyst in the midpole. Mild increased   cortical echogenicity.    Urinary bladder: Within normal limits.    Incidentally noted is small right pleural effusion and small amount of   ascites.    IMPRESSION:  No hydronephrosis.    Increased renal cortical echogenicity suggestive of underlying medical   renal disease.        ADVANCE DIRECTIVES:  full code   Advanced Care Planning discussion total time spent:

## 2023-06-13 NOTE — PROGRESS NOTE ADULT - ASSESSMENT
A/P 78 year old male with a PMH of dementia, HTN, cardiomyopathy, HFrEF, CAD s/p CABG, with known current RCA occlusion, and DM type 2 who was admitted to [facility] on 5/4 with hypoxic respiratory failure in setting of CHF exacerbation and ANISH with course complicated by acute hypoxic respiratory failure in setting of choking episode causing cardiac arrest, shock, worsening hypoxemic respiratory failure, and NSTEMI on 5/9. Pt ICU course noted post cardiac arrest anoxic brain injury and myoclonus. Pt was transferred to [facility] for VEEG which required 48 hours of monitoring as he was noted to still be sedated. While there pt noted to have hematochezia and any ac / antiplatelet agents were held at that time. EEG findings consistent with stimulus induced myoclonus and GPDs s/p hypoxic diffuse indicative of severe cerebral dysfunction.        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  Acute renal failure  Heart failure with reduced EF  NSTEMI post arrest   Sacral decub     Underlying Hx : early dementia, HTN, cardiomyopathy, HFrEF, CAD s/p CABG, with known current RCA occlusion, and DM type 2      Plans:    Mental status waxing waning- cont to monitor   noted worsening uremia    Cont chest tube to suction  suction as needed   NIV PRN/QHS   NGT for diet , will need peg when more stable    wound care consult for sacral decub   cont present care per ccu   supportive care family      GOC ongoing discussion with family , but presently want full code status      Palliative ;   as follow up, case d/w ccu team Dr Polo on rounds, chart reviewed. Pt seen bedside , less responsive today , can open eyes w//stim  did not follow commands this AM, non verbal .    since palliative wean the family has  rescinded  dnr dni status and wishes full code , remain  hopeful for recovery.  Plan is for peg  if/when  pt able.  Speech  to re- eval po status if/when more awake    will follow  clinical   course w/ ccu team , supportive care family .

## 2023-06-14 ENCOUNTER — RESULT REVIEW (OUTPATIENT)
Age: 78
End: 2023-06-14

## 2023-06-14 LAB
ALBUMIN SERPL ELPH-MCNC: 1.8 G/DL — LOW (ref 3.3–5)
ALP SERPL-CCNC: 134 U/L — HIGH (ref 40–120)
ALT FLD-CCNC: 9 U/L — LOW (ref 10–45)
ANION GAP SERPL CALC-SCNC: 10 MMOL/L — SIGNIFICANT CHANGE UP (ref 5–17)
AST SERPL-CCNC: 12 U/L — SIGNIFICANT CHANGE UP (ref 10–40)
BASOPHILS # BLD AUTO: 0.05 K/UL — SIGNIFICANT CHANGE UP (ref 0–0.2)
BASOPHILS NFR BLD AUTO: 0.5 % — SIGNIFICANT CHANGE UP (ref 0–2)
BILIRUB SERPL-MCNC: 0.6 MG/DL — SIGNIFICANT CHANGE UP (ref 0.2–1.2)
BUN SERPL-MCNC: 114 MG/DL — HIGH (ref 7–23)
CALCIUM SERPL-MCNC: 8.5 MG/DL — SIGNIFICANT CHANGE UP (ref 8.4–10.5)
CHLORIDE SERPL-SCNC: 109 MMOL/L — HIGH (ref 96–108)
CO2 SERPL-SCNC: 28 MMOL/L — SIGNIFICANT CHANGE UP (ref 22–31)
CREAT SERPL-MCNC: 3.42 MG/DL — HIGH (ref 0.5–1.3)
EGFR: 17 ML/MIN/1.73M2 — LOW
EOSINOPHIL # BLD AUTO: 0.17 K/UL — SIGNIFICANT CHANGE UP (ref 0–0.5)
EOSINOPHIL NFR BLD AUTO: 1.8 % — SIGNIFICANT CHANGE UP (ref 0–6)
GLUCOSE BLDC GLUCOMTR-MCNC: 183 MG/DL — HIGH (ref 70–99)
GLUCOSE BLDC GLUCOMTR-MCNC: 185 MG/DL — HIGH (ref 70–99)
GLUCOSE BLDC GLUCOMTR-MCNC: 227 MG/DL — HIGH (ref 70–99)
GLUCOSE BLDC GLUCOMTR-MCNC: 252 MG/DL — HIGH (ref 70–99)
GLUCOSE SERPL-MCNC: 174 MG/DL — HIGH (ref 70–99)
HCT VFR BLD CALC: 28.5 % — LOW (ref 39–50)
HGB BLD-MCNC: 8.8 G/DL — LOW (ref 13–17)
IMM GRANULOCYTES NFR BLD AUTO: 0.2 % — SIGNIFICANT CHANGE UP (ref 0–0.9)
LYMPHOCYTES # BLD AUTO: 0.7 K/UL — LOW (ref 1–3.3)
LYMPHOCYTES # BLD AUTO: 7.4 % — LOW (ref 13–44)
MAGNESIUM SERPL-MCNC: 2.2 MG/DL — SIGNIFICANT CHANGE UP (ref 1.6–2.6)
MCHC RBC-ENTMCNC: 30.9 GM/DL — LOW (ref 32–36)
MCHC RBC-ENTMCNC: 32.2 PG — SIGNIFICANT CHANGE UP (ref 27–34)
MCV RBC AUTO: 104.4 FL — HIGH (ref 80–100)
MONOCYTES # BLD AUTO: 0.51 K/UL — SIGNIFICANT CHANGE UP (ref 0–0.9)
MONOCYTES NFR BLD AUTO: 5.4 % — SIGNIFICANT CHANGE UP (ref 2–14)
NEUTROPHILS # BLD AUTO: 8 K/UL — HIGH (ref 1.8–7.4)
NEUTROPHILS NFR BLD AUTO: 84.7 % — HIGH (ref 43–77)
NRBC # BLD: 0 /100 WBCS — SIGNIFICANT CHANGE UP (ref 0–0)
NT-PROBNP SERPL-SCNC: HIGH PG/ML (ref 0–300)
PHOSPHATE SERPL-MCNC: 4 MG/DL — SIGNIFICANT CHANGE UP (ref 2.5–4.5)
PLATELET # BLD AUTO: 131 K/UL — LOW (ref 150–400)
POTASSIUM SERPL-MCNC: 3.7 MMOL/L — SIGNIFICANT CHANGE UP (ref 3.5–5.3)
POTASSIUM SERPL-SCNC: 3.7 MMOL/L — SIGNIFICANT CHANGE UP (ref 3.5–5.3)
PROT SERPL-MCNC: 7.1 G/DL — SIGNIFICANT CHANGE UP (ref 6–8.3)
RBC # BLD: 2.73 M/UL — LOW (ref 4.2–5.8)
RBC # FLD: 19.1 % — HIGH (ref 10.3–14.5)
SODIUM SERPL-SCNC: 147 MMOL/L — HIGH (ref 135–145)
WBC # BLD: 9.45 K/UL — SIGNIFICANT CHANGE UP (ref 3.8–10.5)
WBC # FLD AUTO: 9.45 K/UL — SIGNIFICANT CHANGE UP (ref 3.8–10.5)

## 2023-06-14 PROCEDURE — 80164 ASSAY DIPROPYLACETIC ACD TOT: CPT

## 2023-06-14 PROCEDURE — 95700 EEG CONT REC W/VID EEG TECH: CPT

## 2023-06-14 PROCEDURE — 84132 ASSAY OF SERUM POTASSIUM: CPT

## 2023-06-14 PROCEDURE — 82330 ASSAY OF CALCIUM: CPT

## 2023-06-14 PROCEDURE — 85018 HEMOGLOBIN: CPT

## 2023-06-14 PROCEDURE — 82435 ASSAY OF BLOOD CHLORIDE: CPT

## 2023-06-14 PROCEDURE — 84100 ASSAY OF PHOSPHORUS: CPT

## 2023-06-14 PROCEDURE — 71045 X-RAY EXAM CHEST 1 VIEW: CPT

## 2023-06-14 PROCEDURE — 95711 VEEG 2-12 HR UNMONITORED: CPT

## 2023-06-14 PROCEDURE — 83605 ASSAY OF LACTIC ACID: CPT

## 2023-06-14 PROCEDURE — 84295 ASSAY OF SERUM SODIUM: CPT

## 2023-06-14 PROCEDURE — 94002 VENT MGMT INPAT INIT DAY: CPT

## 2023-06-14 PROCEDURE — 80053 COMPREHEN METABOLIC PANEL: CPT

## 2023-06-14 PROCEDURE — 88305 TISSUE EXAM BY PATHOLOGIST: CPT

## 2023-06-14 PROCEDURE — 86901 BLOOD TYPING SEROLOGIC RH(D): CPT

## 2023-06-14 PROCEDURE — 85014 HEMATOCRIT: CPT

## 2023-06-14 PROCEDURE — 85730 THROMBOPLASTIN TIME PARTIAL: CPT

## 2023-06-14 PROCEDURE — 82565 ASSAY OF CREATININE: CPT

## 2023-06-14 PROCEDURE — 86850 RBC ANTIBODY SCREEN: CPT

## 2023-06-14 PROCEDURE — 94003 VENT MGMT INPAT SUBQ DAY: CPT

## 2023-06-14 PROCEDURE — 88305 TISSUE EXAM BY PATHOLOGIST: CPT | Mod: 26

## 2023-06-14 PROCEDURE — 86900 BLOOD TYPING SEROLOGIC ABO: CPT

## 2023-06-14 PROCEDURE — 83735 ASSAY OF MAGNESIUM: CPT

## 2023-06-14 PROCEDURE — 36415 COLL VENOUS BLD VENIPUNCTURE: CPT

## 2023-06-14 PROCEDURE — 85025 COMPLETE CBC W/AUTO DIFF WBC: CPT

## 2023-06-14 PROCEDURE — P9045: CPT

## 2023-06-14 PROCEDURE — 84145 PROCALCITONIN (PCT): CPT

## 2023-06-14 PROCEDURE — 82947 ASSAY GLUCOSE BLOOD QUANT: CPT

## 2023-06-14 PROCEDURE — 71045 X-RAY EXAM CHEST 1 VIEW: CPT | Mod: 26

## 2023-06-14 PROCEDURE — 82962 GLUCOSE BLOOD TEST: CPT

## 2023-06-14 PROCEDURE — 82803 BLOOD GASES ANY COMBINATION: CPT

## 2023-06-14 PROCEDURE — 95714 VEEG EA 12-26 HR UNMNTR: CPT

## 2023-06-14 PROCEDURE — 85610 PROTHROMBIN TIME: CPT

## 2023-06-14 PROCEDURE — 88112 CYTOPATH CELL ENHANCE TECH: CPT

## 2023-06-14 PROCEDURE — 88112 CYTOPATH CELL ENHANCE TECH: CPT | Mod: 26

## 2023-06-14 RX ORDER — ALBUMIN HUMAN 25 %
100 VIAL (ML) INTRAVENOUS ONCE
Refills: 0 | Status: COMPLETED | OUTPATIENT
Start: 2023-06-14 | End: 2023-06-14

## 2023-06-14 RX ADMIN — Medication 10 MILLIGRAM(S): at 05:31

## 2023-06-14 RX ADMIN — SODIUM CHLORIDE 100 MILLILITER(S): 9 INJECTION, SOLUTION INTRAVENOUS at 11:18

## 2023-06-14 RX ADMIN — Medication 1: at 06:06

## 2023-06-14 RX ADMIN — Medication 50 MILLILITER(S): at 10:11

## 2023-06-14 RX ADMIN — Medication 25 MILLIGRAM(S): at 05:31

## 2023-06-14 RX ADMIN — MODAFINIL 150 MILLIGRAM(S): 200 TABLET ORAL at 11:18

## 2023-06-14 RX ADMIN — APIXABAN 2.5 MILLIGRAM(S): 2.5 TABLET, FILM COATED ORAL at 17:27

## 2023-06-14 RX ADMIN — Medication 1: at 11:22

## 2023-06-14 RX ADMIN — CHLORHEXIDINE GLUCONATE 1 APPLICATION(S): 213 SOLUTION TOPICAL at 13:08

## 2023-06-14 RX ADMIN — APIXABAN 2.5 MILLIGRAM(S): 2.5 TABLET, FILM COATED ORAL at 05:31

## 2023-06-14 RX ADMIN — ISOSORBIDE DINITRATE 20 MILLIGRAM(S): 5 TABLET ORAL at 16:41

## 2023-06-14 RX ADMIN — BRIVARACETAM 25 MILLIGRAM(S): 25 TABLET, FILM COATED ORAL at 18:15

## 2023-06-14 RX ADMIN — BRIVARACETAM 25 MILLIGRAM(S): 25 TABLET, FILM COATED ORAL at 05:31

## 2023-06-14 RX ADMIN — Medication 25 MILLIGRAM(S): at 11:18

## 2023-06-14 RX ADMIN — Medication 15 MILLILITER(S): at 11:18

## 2023-06-14 RX ADMIN — Medication 3: at 23:41

## 2023-06-14 RX ADMIN — Medication 1 APPLICATION(S): at 11:07

## 2023-06-14 RX ADMIN — PANTOPRAZOLE SODIUM 40 MILLIGRAM(S): 20 TABLET, DELAYED RELEASE ORAL at 11:18

## 2023-06-14 RX ADMIN — ISOSORBIDE DINITRATE 20 MILLIGRAM(S): 5 TABLET ORAL at 05:31

## 2023-06-14 RX ADMIN — Medication 2: at 17:27

## 2023-06-14 RX ADMIN — ISOSORBIDE DINITRATE 20 MILLIGRAM(S): 5 TABLET ORAL at 10:11

## 2023-06-14 NOTE — PROGRESS NOTE ADULT - SUBJECTIVE AND OBJECTIVE BOX
Seen in ICU, + NGT, on NC O2    Vital Signs Last 24 Hrs  T(C): 37.4 (06-14-23 @ 16:00), Max: 37.4 (06-13-23 @ 22:00)  T(F): 99.4 (06-14-23 @ 16:00), Max: 99.4 (06-13-23 @ 22:00)  HR: 89 (06-14-23 @ 20:00) (81 - 106)  BP: 118/77 (06-14-23 @ 20:00) (107/83 - 140/103)  BP(mean): 91 (06-14-23 @ 20:00) (82 - 115)  RR: 26 (06-14-23 @ 20:00) (7 - 41)  SpO2: 99% (06-14-23 @ 20:00) (97% - 100%)    I&O's Detail    13 Jun 2023 07:01  -  14 Jun 2023 07:00  --------------------------------------------------------  IN:    Free Water: 800 mL    IV PiggyBack: 50 mL    Nepro with Carb Steady: 910 mL    sodium chloride 0.45%: 1725 mL  Total IN: 3485 mL    OUT:    Chest Tube (mL): 1160 mL    Voided (mL): 260 mL  Total OUT: 1420 mL    Total NET: 2065 mL    14 Jun 2023 07:01  -  14 Jun 2023 21:05  --------------------------------------------------------  IN:    Enteral Tube Flush: 60 mL    Free Water: 400 mL    IV PiggyBack: 100 mL    Nepro with Carb Steady: 585 mL    sodium chloride 0.45%: 300 mL  Total IN: 1445 mL    OUT:    Blood Loss (mL): 40 mL    Chest Tube (mL): 450 mL  Total OUT: 490 mL    Total NET: 955 mL    s1s2  b/l air entry, L CT  soft, ND  tr edema                                                                                  8.8    9.45  )-----------( 131      ( 14 Jun 2023 06:00 )             28.5     14 Jun 2023 06:00    147    |  109    |  114    ----------------------------<  174    3.7     |  28     |  3.42     Ca    8.5        14 Jun 2023 06:00  Phos  4.0       14 Jun 2023 06:00  Mg     2.2       14 Jun 2023 06:00    TPro  7.1    /  Alb  1.8    /  TBili  0.6    /  DBili  x      /  AST  12     /  ALT  9      /  AlkPhos  134    14 Jun 2023 06:00    LIVER FUNCTIONS - ( 14 Jun 2023 06:00 )  Alb: 1.8 g/dL / Pro: 7.1 g/dL / ALK PHOS: 134 U/L / ALT: 9 U/L / AST: 12 U/L / GGT: x           apixaban 2.5 milliGRAM(s) Oral every 12 hours  brivaracetam Oral Solution 25 milliGRAM(s) Oral two times a day  chlorhexidine 2% Cloths 1 Application(s) Topical daily  Dakins Solution - 1/2 Strength 1 Application(s) Topical daily  dextrose 5%. 1000 milliLiter(s) IV Continuous <Continuous>  dextrose 5%. 1000 milliLiter(s) IV Continuous <Continuous>  dextrose 50% Injectable 25 Gram(s) IV Push once  dextrose 50% Injectable 25 Gram(s) IV Push once  dextrose 50% Injectable 12.5 Gram(s) IV Push once  epoetin priyanka-epbx (RETACRIT) Injectable 34994 Unit(s) SubCutaneous every 7 days  insulin lispro (ADMELOG) corrective regimen sliding scale   SubCutaneous every 6 hours  isosorbide   dinitrate Tablet (ISORDIL) 20 milliGRAM(s) Oral three times a day  metoprolol tartrate 25 milliGRAM(s) Oral every 6 hours  modafinil 150 milliGRAM(s) Oral every 24 hours  multivitamin/minerals/iron Oral Solution (CENTRUM) 15 milliLiter(s) Oral daily  pantoprazole   Suspension 40 milliGRAM(s) Oral daily    A/P:    Hemodynamic ATN s/p arrest (Cr 1.6 - 5/9/23, Cr 1.0 - 4/12/23)  Resp failure, intubated, now extubated  S/p L CT  CM, EF 35 - 40%, dementia  Cr has improved, but now rising again  Avoid nephrotoxins  Lytes are stable  Free water via NGT  Epoetin  F/u CBC, BMP, UO  No urgent indications for RRT at this time  Prognosis is poor w/or w/o RRT  D/w ICU team    [phone]

## 2023-06-14 NOTE — PROGRESS NOTE ADULT - ASSESSMENT
Physical Examination:  GENERAL:               Eyes open and in mild  distress,    HEENT:                    No JVD, Dry MM  PULM:                     Bilateral air entry, course and diminished  to auscultation bilaterally, + sputum production, + Rales Right > Left, No Rhonchi, No Wheezing  CVS:                         S1, S2,  +Murmur  ABD:                        Soft, nondistended, nontender, normoactive bowel sounds,   EXT:                         no  edema, nontender, No Cyanosis or Clubbing   Vascular:                Warm Extremities,   NEURO:                 alert at time and lethargic at times open eyes to verbal / tactile stimuli   PSYC:                      Calm, No Insight.      Assessment  78 year old male with a PMH of dementia, HTN, cardiomyopathy, HFrEF, CAD s/p CABG, with known current RCA occlusion, and DM type 2 who was admitted to [facility] on 5/4 with hypoxic respiratory failure in setting of CHF exacerbation and ANISH with course complicated by acute hypoxic respiratory failure in setting of choking episode causing cardiac arrest, shock, worsening hypoxemic respiratory failure, and NSTEMI on 5/9. Pt ICU course noted post cardiac arrest anoxic brain injury and myoclonus. Pt was transferred to [facility] for VEEG which required 48 hours of monitoring as he was noted to still be sedated. While there pt noted to have hematochezia and any ac / antiplatelet agents were held at that time. EEG findings consistent with stimulus induced myoclonus and GPDs s/p hypoxic diffuse indicative of severe cerebral dysfunction.    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  Acute renal failure w Uremia post cardiac arrest improving  Hemtochezia episode, h/h stable no further episodes noted  Heart failure with reduced EF  NSTEMI post arrest       Underlying PMH of dementia, HTN, cardiomyopathy, HFrEF, CAD s/p CABG, with known current RCA occlusion, and DM type 2      Plan:    Mental status waxing waning    + myoclonus   noted worsening uremia, suspect playing a role in mental status, D/w Renal will follow up   continue IVF for hyponatremia   Hold diuretics     off albuterol pAfib appears to be stable  Continue hydralazine, Lopressor, Isosorbide  Continue a/c with pAfib  Chest tube to suction  suction as needed   NIV PRN/QHS   NGT for diet , will need peg when more stable      S&S eval to advance diet  will need to call when more awake      benzo as needed   restarted oral brivaracetam  but at lower dose due to worsening renal function  Restarted on Modafinil     monitor blood glucose levels, + ISS coverage  Sacral decub - wound care called continue local wound care off loading and nutrition support  Venodyne for dvt ppx   GOC ongoing discussion with family , but want full code at this time    PMD:				                   Notified(Date):  Family Updated: 	Kuldeep [phone]	                                 Date:  6/14/2023  updated     Sedation & Analgesia:	  Diet/Nutrition:		 as above  GI PPx:			Protonix  DVT Ppx:		Venodynes   Activity:		  bedrest  Head of Bed:               35-45 Deg  Glycemic Control:         Insulin  Lines: piv  Restraints may be deemed necessary to prevent removal of life-sustaining devices [ x ] YES   [   ]  NO  Disposition: ICU Care  Goals of Care: Full code

## 2023-06-14 NOTE — PROGRESS NOTE ADULT - SUBJECTIVE AND OBJECTIVE BOX
Follow-up Critical Care Progress Note  Chief Complaint : Atrial fibrillation      patient seen and examined  eyes ope  + myoclonus  used NIV overnight  i did bedside suctioning noted increased secretions       Allergies :sulfa drugs (Unknown)      PAST MEDICAL & SURGICAL HISTORY:  HTN (hypertension)    HLD (hyperlipidemia)    Diabetes    CAD (coronary artery disease)    History of cholecystectomy        Medications:  MEDICATIONS  (STANDING):  apixaban 2.5 milliGRAM(s) Oral every 12 hours  brivaracetam Oral Solution 25 milliGRAM(s) Oral two times a day  chlorhexidine 2% Cloths 1 Application(s) Topical daily  Dakins Solution - 1/2 Strength 1 Application(s) Topical daily  dextrose 5%. 1000 milliLiter(s) (100 mL/Hr) IV Continuous <Continuous>  dextrose 5%. 1000 milliLiter(s) (50 mL/Hr) IV Continuous <Continuous>  dextrose 50% Injectable 25 Gram(s) IV Push once  dextrose 50% Injectable 25 Gram(s) IV Push once  dextrose 50% Injectable 12.5 Gram(s) IV Push once  epoetin priyanka-epbx (RETACRIT) Injectable 78971 Unit(s) SubCutaneous every 7 days  hydrALAZINE 10 milliGRAM(s) Oral every 12 hours  insulin lispro (ADMELOG) corrective regimen sliding scale   SubCutaneous every 6 hours  isosorbide   dinitrate Tablet (ISORDIL) 20 milliGRAM(s) Oral three times a day  metoprolol tartrate 25 milliGRAM(s) Oral every 6 hours  modafinil 150 milliGRAM(s) Oral every 24 hours  multivitamin/minerals/iron Oral Solution (CENTRUM) 15 milliLiter(s) Oral daily  pantoprazole   Suspension 40 milliGRAM(s) Oral daily  sodium chloride 0.45%. 1000 milliLiter(s) (100 mL/Hr) IV Continuous <Continuous>    MEDICATIONS  (PRN):      Antibiotics History  ceFAZolin   IVPB 2000 milliGRAM(s) IV Intermittent once, 06-12-23 @ 07:00, Stop order after: 1 Doses  cefepime   IVPB 500 milliGRAM(s) IV Intermittent daily, 05-25-23 @ 12:00  vancomycin  IVPB 1000 milliGRAM(s) IV Intermittent once, 06-03-23 @ 12:32, Stop order after: 1 Doses  vancomycin  IVPB    , 06-03-23 @ 12:32  vancomycin  IVPB 1000 milliGRAM(s) IV Intermittent every 24 hours, 06-04-23 @ 12:32, Stop order after: 42 Days      Heme Medications   apixaban 2.5 milliGRAM(s) Oral every 12 hours, 06-12-23 @ 13:15      GI Medications  pantoprazole   Suspension 40 milliGRAM(s) Oral daily, 06-12-23 @ 08:38, Routine      COVID  06-11-23 @ 12:25  COVID -   NotDete  05-04-23 @ 19:50  COVID -   NotDetec  07-13-22 @ 18:33  [facility]      COVID Biomarkers    06-12-23 @ 05:00 ESR --  ---  CRP --  ---  DDimer  --   ---      ---   Ferritin --    05-04-23 @ 19:50 ESR --  ---  CRP --  ---  DDimer  351<H>   ---   LDH --   ---   Ferritin --            Trend Cardiac Enzymes    Trend BNP  06-14-23 @ 06:00   -  >31022<H>  06-12-23 @ 05:00   -  36434<H>  06-11-23 @ 06:15   -  >16362<H>  05-14-23 @ 11:30   -  90317<H>  05-11-23 @ 06:05   -  97125<H>  05-10-23 @ 05:10   -  26510<H>    Procalcitonin Trend  06-05-23 @ 05:30   -   0.24<H>  06-04-23 @ 06:00   -   0.20<H>  05-17-23 @ 23:44   -   0.59<H>  05-16-23 @ 22:16   -   0.74<H>  05-15-23 @ 05:45   -   1.12<H>  05-09-23 @ 13:42   -   0.11<H>    WBC Trend  06-14-23 @ 06:00   -  9.45  06-13-23 @ 05:35   -  8.14  06-12-23 @ 12:04   -  8.54    H/H Trend  06-14-23 @ 06:00   -   8.8<L>/ 28.5<L>  06-13-23 @ 05:35   -   9.9<L>/ 32.5<L>  06-12-23 @ 12:04   -   9.1<L>/ 29.9<L>  06-11-23 @ 06:15   -   9.7<L>/ 31.9<L>  06-10-23 @ 06:00   -   8.4<L>/ 27.1<L>  06-09-23 @ 06:10   -   8.9<L>/ 28.9<L>    Stool Occult Blood  06-02-23 @ 08:20   -   Negative  05-25-23 @ 18:20   -   Negative  05-16-23 @ 17:00   -   Positive<!>    Platelet Trend  06-14-23 @ 06:00   -  131<L>  06-13-23 @ 05:35   -  161  06-12-23 @ 12:04   -  143<L>    Trend Sodium  06-14-23 @ 06:00   -  147<H>  06-13-23 @ 05:35   -  149<H>  06-12-23 @ 05:00   -  147<H>    Trend Potassium  06-14-23 @ 06:00   -  3.7  06-13-23 @ 05:35   -  4.3  06-12-23 @ 05:00   -  3.6    Trend Bun/Cr  06-14-23 @ 06:00  BUN/CR -  114<H> / 3.42<H>  06-13-23 @ 05:35  BUN/CR -  110<H> / 3.39<H>  06-12-23 @ 05:00  BUN/CR -  99<H> / 3.12<H>    Lactic Acid Trend  06-13-23 @ 05:35   -   1.4  06-12-23 @ 05:00   -   1.8       Trend AST/ALT/ALK Phos/Bili  06-14-23 @ 06:00   12/9<L>/134<H>/0.6  06-13-23 @ 05:35   16/13/160<H>/0.7  06-12-23 @ 05:00   17/13/158<H>/0.7  06-11-23 @ 06:15   18/13/196<H>/0.8  06-10-23 @ 06:00   16/15/153<H>/0.6  06-09-23 @ 06:10   20/15/177<H>/0.8  06-08-23 @ 10:20   25/22/198<H>/0.8  06-06-23 @ 09:15   29/19/178<H>/0.6  06-06-23 @ 05:30   31/21/168<H>/0.6  06-05-23 @ 05:30   30/20/160<H>/0.6  06-04-23 @ 06:00   32/19/167<H>/0.7  06-03-23 @ 06:20   28/19/152<H>/0.5      Ammonia Trend  05-14-23 @ 11:30   -   53  05-14-23 @ 05:00   -   16      Amylase / Lipase Trend      Albumin Trend  06-14-23 @ 06:00   -   1.8<L>  06-13-23 @ 05:35   -   1.9<L>  06-12-23 @ 05:00   -   1.8<L>  06-11-23 @ 06:15   -   1.9<L>  06-10-23 @ 06:00   -   1.8<L>  06-09-23 @ 06:10   -   1.8<L>      PTT - PT - INR Trend  06-12-23 @ 05:00   -   28.2 - 15.8<H> - 1.36<H>  05-19-23 @ 00:44   -   26.5<L> - 13.2 - 1.15  05-17-23 @ 23:44   -   31.2 - 13.7<H> - 1.18<H>  05-16-23 @ 22:16   -   31.3 - 13.6<H> - 1.18<H>  05-15-23 @ 13:55   -   32.9 - 14.0<H> - 1.19<H>  05-14-23 @ 13:45   -   33.6 - -- - --    Glucose Trend  06-14-23 @ 06:04   -  -- -- 185<H>  06-14-23 @ 06:00   -  -- 174<H> --  06-13-23 @ 23:43   -  -- -- 194<H>  06-13-23 @ 17:14   -  -- -- 216<H>  06-13-23 @ 12:27   -  -- -- 191<H>  06-13-23 @ 06:25   -  -- -- 208<H>  06-13-23 @ 05:35   -  -- 188<H> --  06-13-23 @ 00:12   -  -- -- 294<H>  06-12-23 @ 17:58   -  -- -- 247<H>  06-12-23 @ 11:49   -  -- -- 185<H>    A1C with Estimated Average Glucose Result: 7.7 % *H* [4.0 - 5.6] (05-05-23 @ 08:00)      LABS:                        8.8    9.45  )-----------( 131      ( 14 Jun 2023 06:00 )             28.5     06-14    147<H>  |  109<H>  |  114<H>  ----------------------------<  174<H>  3.7   |  28  |  3.42<H>    Ca    8.5      14 Jun 2023 06:00  Phos  4.0     06-14  Mg     2.2     06-14    TPro  7.1  /  Alb  1.8<L>  /  TBili  0.6  /  DBili  x   /  AST  12  /  ALT  9<L>  /  AlkPhos  134<H>  06-14    Fluid characteristics  -- 06-11 @ 09:34  pH 7.5    tprot 2.5    Cell count  Appearance Slightly Bloody  Fluid type --  BF lymph 17  color Pink  eosinophil 0  PMN --  Mesothelial 2  Monocyte 26  Other body cells 0       CULTURES: (if applicable)    Culture - Fungal, Body Fluid (collected 06-11-23 @ 09:34)  Source: Pleural Fl Pleural Fluid  Preliminary Report (06-13-23 @ 07:08):    Testing in progress    Culture - Body Fluid with Gram Stain (collected 06-11-23 @ 09:34)  Source: Pleural Fl Pleural Fluid  Gram Stain (06-11-23 @ 23:31):    polymorphonuclear leukocytes seen    No organisms seen    by cytocentrifuge  Preliminary Report (06-12-23 @ 18:01):    No growth    Culture - Sputum (collected 06-04-23 @ 21:20)  Source: .Sputum Sputum  Gram Stain (06-05-23 @ 10:19):    Numerous polymorphonuclear leukocytes per low power field    No Squamous epithelial cells per low power field    Moderate Gram Positive Cocci in Clusters per oil power field  Final Report (06-07-23 @ 10:05):    Numerous Staphylococcus aureus    Normal Respiratory Carrie absent  Organism: Staphylococcus aureus (06-07-23 @ 10:05)  Organism: Staphylococcus aureus (06-07-23 @ 10:05)      Method Type: ALDO      -  Ampicillin/Sulbactam: S <=8/4      -  Cefazolin: S <=4      -  Clindamycin: S <=0.25      -  Erythromycin: S <=0.25      -  Gentamicin: S <=1 Should not be used as monotherapy      -  Oxacillin: S 1 Oxacillin predicts susceptibility for dicloxacillin, methicillin, and nafcillin      -  Penicillin: R >8      -  Rifampin: S <=1 Should not be used as monotherapy      -  Tetracycline: R >8      -  Trimethoprim/Sulfamethoxazole: S <=0.5/9.5      -  Vancomycin: S 2    Culture - Urine (collected 06-03-23 @ 06:20)  Source: Clean Catch Clean Catch (Midstream)  Final Report (06-06-23 @ 22:34):    >100,000 CFU/ml Enterococcus faecium    <10,000 CFU/ml Normal Urogenital carrie present  Organism: Enterococcus faecium (06-06-23 @ 22:34)  Organism: Enterococcus faecium (06-06-23 @ 22:34)      Method Type: ALDO      -  Ampicillin: R >8 Predicts results to ampicillin/sulbactam, amoxacillin-clavulanate and  piperacillin-tazobactam.      -  Ciprofloxacin: R >2      -  Levofloxacin: R >4      -  Nitrofurantoin: S <=32 Should not be used to treat pyelonephritis.      -  Tetracycline: R >8      -  Vancomycin: S 0.5    Culture - Blood (collected 06-02-23 @ 09:45)  Source: .Blood Blood  Final Report (06-07-23 @ 17:01):    No Growth Final    Culture - Blood (collected 06-02-23 @ 09:10)  Source: .Blood Blood  Gram Stain (06-03-23 @ 16:57):    Growth in aerobic bottle: Gram Positive Cocci in Clusters    Growth in anaerobic bottle: Gram Positive Cocci in Clusters  Final Report (06-04-23 @ 17:55):    Growth in aerobic and anaerobic bottles: Staphylococcus epidermidis    Coagulase Negative Staphylococci isolated from a single blood culture set    may represent contamination.    Contact the Microbiology Department at [phone] if susceptibility    testing is clinically indicated.    ***Blood Panel PCR results on this specimen are available    approximately 3 hours after the Gram stain result.***    Gram stain, PCR, and/or culture results may not always    correspond due to difference in methodologies.    ************************************************************    This PCR assay was performed by multiplex PCR. This    Assay tests for 66 bacterial and resistance gene targets.    Please refer to the [facility] test directory    at [email or web address] for details.  Organism: Blood Culture PCR (06-04-23 @ 17:55)  Organism: Blood Culture PCR (06-04-23 @ 17:55)      Method Type: PCR      -  Staphylococcus epidermidis, Methicillin resistant: Detec      CXR continued right haziness, tubes in place      VITALS:  T(C): 37.2 (06-14-23 @ 09:00), Max: 37.4 (06-13-23 @ 22:00)  T(F): 98.9 (06-14-23 @ 09:00), Max: 99.4 (06-13-23 @ 22:00)  HR: 100 (06-14-23 @ 10:00) (81 - 120)  BP: 140/103 (06-14-23 @ 10:00) (107/83 - 159/95)  BP(mean): 115 (06-14-23 @ 10:00) (82 - 115)  ABP: --  ABP(mean): --  RR: 30 (06-14-23 @ 10:00) (13 - 41)  SpO2: 100% (06-14-23 @ 10:00) (96% - 100%)  CVP(mm Hg): --  CVP(cm H2O): --    Ins and Outs     06-13-23 @ 07:01  -  06-14-23 @ 07:00  --------------------------------------------------------  IN: 3485 mL / OUT: 1420 mL / NET: 2065 mL    06-14-23 @ 07:01  -  06-14-23 @ 10:43  --------------------------------------------------------  IN: 590 mL / OUT: 40 mL / NET: 550 mL                I&O's Detail    13 Jun 2023 07:01 - 14 Jun 2023 07:00  --------------------------------------------------------  IN:    Free Water: 800 mL    IV PiggyBack: 50 mL    Nepro with Carb Steady: 910 mL    sodium chloride 0.45%: 1725 mL  Total IN: 3485 mL    OUT:    Chest Tube (mL): 1160 mL    Voided (mL): 260 mL  Total OUT: 1420 mL    Total NET: 2065 mL      14 Jun 2023 07:01 - 14 Jun 2023 10:43  --------------------------------------------------------  IN:    Enteral Tube Flush: 60 mL    IV PiggyBack: 100 mL    Nepro with Carb Steady: 130 mL    sodium chloride 0.45%: 300 mL  Total IN: 590 mL    OUT:    Blood Loss (mL): 40 mL  Total OUT: 40 mL    Total NET: 550 mL

## 2023-06-14 NOTE — CHART NOTE - NSCHARTNOTEFT_GEN_A_CORE
Nutrition Follow Up Note  Hospital Course (Per Electronic Medical Record):   Source: Medical Record [X]  Nursing Staff [X]     Diet: Nepro @ 65ml/hr x 16 hrs , Banatrol x 2     Patient tolerating current EN feeding regimen , EN formula changed due to worsening renal status , Renal notes reviewed , 1/2 NS @ 100ml/hr initiated , Current EN feeds are meeting assessed  needs , Current EN feeds are providing  1,872kcals, 84g protein, 760ml H2O. Continue Banatrol BID in setting of loose BM's ,. Pressure injuries noted , Patient failed MBS , currently not stable for PEG placement at present , will follow clinical course.     Current Weight: (6/10) 172.8/78.4kg                           (6/9) 167.7/76.1kg                           (6/7) 167.9/76.2kg     Pertinent Medications: MEDICATIONS  (STANDING):  albumin human 25% IVPB 100 milliLiter(s) IV Intermittent once  apixaban 2.5 milliGRAM(s) Oral every 12 hours  brivaracetam Oral Solution 25 milliGRAM(s) Oral two times a day  chlorhexidine 2% Cloths 1 Application(s) Topical daily  Dakins Solution - 1/2 Strength 1 Application(s) Topical daily  dextrose 5%. 1000 milliLiter(s) (50 mL/Hr) IV Continuous <Continuous>  dextrose 5%. 1000 milliLiter(s) (100 mL/Hr) IV Continuous <Continuous>  dextrose 50% Injectable 12.5 Gram(s) IV Push once  dextrose 50% Injectable 25 Gram(s) IV Push once  dextrose 50% Injectable 25 Gram(s) IV Push once  epoetin priyanka-epbx (RETACRIT) Injectable 83363 Unit(s) SubCutaneous every 7 days  hydrALAZINE 10 milliGRAM(s) Oral every 12 hours  insulin lispro (ADMELOG) corrective regimen sliding scale   SubCutaneous every 6 hours  isosorbide   dinitrate Tablet (ISORDIL) 20 milliGRAM(s) Oral three times a day  metoprolol tartrate 25 milliGRAM(s) Oral every 6 hours  modafinil 150 milliGRAM(s) Oral every 24 hours  multivitamin/minerals/iron Oral Solution (CENTRUM) 15 milliLiter(s) Oral daily  pantoprazole   Suspension 40 milliGRAM(s) Oral daily  sodium chloride 0.45%. 1000 milliLiter(s) (100 mL/Hr) IV Continuous <Continuous>    MEDICATIONS  (PRN):      Pertinent Labs:  06-14 Na147 mmol/L<H> Glu 174 mg/dL<H> K+ 3.7 mmol/L Cr  3.42 mg/dL<H>  mg/dL<H> 06-14 Phos 4.0 mg/dL 06-14 Alb 1.8 g/dL<L>Hgb 8.8g.dl<L> , Hct 28.5% <L>   POCT 185,194,216,191      Skin: sacrum unstageable     Edema: none    Last BM: (6/13)     Estimated Needs:   [X] No Change since Previous Assessment      Previous Nutrition Diagnosis: Severe Protein Calorie Malnutrition & increased nutrient needs     Nutrition Diagnosis is [X] Ongoing & addressed        New Nutrition Diagnosis: [X] Not Applicable      Interventions:   1.continue current EN feeding regimen       Monitoring & Evaluation: will monitor:  [X] Weights   [X]  tolerance to EN feeds  [X] Follow Up (Per Protocol)  [X] Tolerance to Diet Prescription       RD to follow as per Nutrition protocol  Preethi Carnes RDN

## 2023-06-15 LAB
ALBUMIN SERPL ELPH-MCNC: 2 G/DL — LOW (ref 3.3–5)
ALP SERPL-CCNC: 128 U/L — HIGH (ref 40–120)
ALT FLD-CCNC: 7 U/L — LOW (ref 10–45)
ANION GAP SERPL CALC-SCNC: 9 MMOL/L — SIGNIFICANT CHANGE UP (ref 5–17)
AST SERPL-CCNC: 12 U/L — SIGNIFICANT CHANGE UP (ref 10–40)
BILIRUB SERPL-MCNC: 0.6 MG/DL — SIGNIFICANT CHANGE UP (ref 0.2–1.2)
BUN SERPL-MCNC: 113 MG/DL — HIGH (ref 7–23)
CALCIUM SERPL-MCNC: 7.9 MG/DL — LOW (ref 8.4–10.5)
CHLORIDE SERPL-SCNC: 110 MMOL/L — HIGH (ref 96–108)
CO2 SERPL-SCNC: 28 MMOL/L — SIGNIFICANT CHANGE UP (ref 22–31)
CREAT SERPL-MCNC: 3.46 MG/DL — HIGH (ref 0.5–1.3)
EGFR: 17 ML/MIN/1.73M2 — LOW
GLUCOSE BLDC GLUCOMTR-MCNC: 180 MG/DL — HIGH (ref 70–99)
GLUCOSE BLDC GLUCOMTR-MCNC: 254 MG/DL — HIGH (ref 70–99)
GLUCOSE BLDC GLUCOMTR-MCNC: 313 MG/DL — HIGH (ref 70–99)
GLUCOSE BLDC GLUCOMTR-MCNC: 350 MG/DL — HIGH (ref 70–99)
GLUCOSE SERPL-MCNC: 170 MG/DL — HIGH (ref 70–99)
HCT VFR BLD CALC: 29.8 % — LOW (ref 39–50)
HGB BLD-MCNC: 9.2 G/DL — LOW (ref 13–17)
MAGNESIUM SERPL-MCNC: 2.2 MG/DL — SIGNIFICANT CHANGE UP (ref 1.6–2.6)
MCHC RBC-ENTMCNC: 30.9 GM/DL — LOW (ref 32–36)
MCHC RBC-ENTMCNC: 32.1 PG — SIGNIFICANT CHANGE UP (ref 27–34)
MCV RBC AUTO: 103.8 FL — HIGH (ref 80–100)
NRBC # BLD: 0 /100 WBCS — SIGNIFICANT CHANGE UP (ref 0–0)
OB PNL STL: NEGATIVE — SIGNIFICANT CHANGE UP
PHOSPHATE SERPL-MCNC: 4 MG/DL — SIGNIFICANT CHANGE UP (ref 2.5–4.5)
PLATELET # BLD AUTO: 117 K/UL — LOW (ref 150–400)
POTASSIUM SERPL-MCNC: 3.6 MMOL/L — SIGNIFICANT CHANGE UP (ref 3.5–5.3)
POTASSIUM SERPL-SCNC: 3.6 MMOL/L — SIGNIFICANT CHANGE UP (ref 3.5–5.3)
PROT SERPL-MCNC: 7 G/DL — SIGNIFICANT CHANGE UP (ref 6–8.3)
RBC # BLD: 2.87 M/UL — LOW (ref 4.2–5.8)
RBC # FLD: 19.3 % — HIGH (ref 10.3–14.5)
SODIUM SERPL-SCNC: 147 MMOL/L — HIGH (ref 135–145)
WBC # BLD: 9.43 K/UL — SIGNIFICANT CHANGE UP (ref 3.8–10.5)
WBC # FLD AUTO: 9.43 K/UL — SIGNIFICANT CHANGE UP (ref 3.8–10.5)

## 2023-06-15 PROCEDURE — 71045 X-RAY EXAM CHEST 1 VIEW: CPT | Mod: 26

## 2023-06-15 RX ORDER — METOPROLOL TARTRATE 50 MG
25 TABLET ORAL EVERY 8 HOURS
Refills: 0 | Status: DISCONTINUED | OUTPATIENT
Start: 2023-06-15 | End: 2023-06-20

## 2023-06-15 RX ORDER — SODIUM CHLORIDE 9 MG/ML
1000 INJECTION, SOLUTION INTRAVENOUS
Refills: 0 | Status: DISCONTINUED | OUTPATIENT
Start: 2023-06-15 | End: 2023-06-16

## 2023-06-15 RX ADMIN — ISOSORBIDE DINITRATE 20 MILLIGRAM(S): 5 TABLET ORAL at 18:14

## 2023-06-15 RX ADMIN — Medication 1: at 05:31

## 2023-06-15 RX ADMIN — BRIVARACETAM 25 MILLIGRAM(S): 25 TABLET, FILM COATED ORAL at 05:32

## 2023-06-15 RX ADMIN — BRIVARACETAM 25 MILLIGRAM(S): 25 TABLET, FILM COATED ORAL at 18:14

## 2023-06-15 RX ADMIN — Medication 4: at 18:15

## 2023-06-15 RX ADMIN — SODIUM CHLORIDE 75 MILLILITER(S): 9 INJECTION, SOLUTION INTRAVENOUS at 08:12

## 2023-06-15 RX ADMIN — ISOSORBIDE DINITRATE 20 MILLIGRAM(S): 5 TABLET ORAL at 05:31

## 2023-06-15 RX ADMIN — SODIUM CHLORIDE 75 MILLILITER(S): 9 INJECTION, SOLUTION INTRAVENOUS at 20:58

## 2023-06-15 RX ADMIN — Medication 25 MILLIGRAM(S): at 13:46

## 2023-06-15 RX ADMIN — APIXABAN 2.5 MILLIGRAM(S): 2.5 TABLET, FILM COATED ORAL at 18:14

## 2023-06-15 RX ADMIN — APIXABAN 2.5 MILLIGRAM(S): 2.5 TABLET, FILM COATED ORAL at 05:32

## 2023-06-15 RX ADMIN — Medication 25 MILLIGRAM(S): at 21:09

## 2023-06-15 RX ADMIN — Medication 2: at 23:40

## 2023-06-15 RX ADMIN — ERYTHROPOIETIN 10000 UNIT(S): 10000 INJECTION, SOLUTION INTRAVENOUS; SUBCUTANEOUS at 13:47

## 2023-06-15 NOTE — PROGRESS NOTE ADULT - SUBJECTIVE AND OBJECTIVE BOX
MARIO RAMOS  [id number]  Patient is a 78y old  Male who presents with a chief complaint of acute systolic CHF (15 Hermes 2023 18:15)    HPI:  78 year old M w/HTN, Cardiomyopathy, chronic systolic CHF, DM2, Dementia, sent to the ED by PMD because of increased dyspnea, leg edema x past 2 days.  Per ED, daughter states that PMD noted pt to have new afib on EKG.  Pt is confused, demented (baseline) and unable to provide a reliable history.  There was no report of  chest pain, cough, fever chills, vomiting, diarrhea.   1st trop is 131.  EKG showed HR of 86, SR w/ APCs. Cxray c/w CHF (venous congestion, bilat pl effusions)  Pt's last echo was from 07/2022 w/c reported LVEF of  40 to 45%.. Increased LV wall thickness.. Moderately reduced RV systolic function. Moderately enlarged left atrium.. Moderate mitral valve regurgitation.Mild tricuspid regurgitation.     (04 May 2023 21:17)      Surgery/Hospital course:    Today: pt with Bipap use for chf. left pigtail w/ min output  ng tube  feeds in progress    REVIEW OF SYSTEMS:    Gen: No fever  EYES/ENT: No visual changes;  No vertigo or throat pain   NECK: No pain   RES:  ++ shortness of breath   CARD: No chest pain   GI: No abdominal pain  : No dysuria  NEURO: No weakness  SKIN: No itching, rashes     Physical Exam:  Vital Signs Last 24 Hrs  T(C): 37.1 (15 Hermes 2023 16:00), Max: 37.7 (15 Hermes 2023 12:00)  T(F): 98.8 (15 Hermes 2023 16:00), Max: 99.8 (15 Hermes 2023 12:00)  HR: 100 (15 Hermes 2023 20:00) (81 - 104)  BP: 122/82 (15 Hermes 2023 20:00) (110/70 - 135/88)  BP(mean): 94 (15 Hermes 2023 20:00) (82 - 110)  RR: 32 (15 Hermes 2023 20:00) (15 - 36)  SpO2: 100% (15 Hermes 2023 20:00) (96% - 100%)    Parameters below as of 15 Hermes 2023 20:00  Patient On (Oxygen Delivery Method): nasal cannula  O2 Flow (L/min): 2      Gen:  Awake, alert   CNS: non focal 	  Neck: ++ JVD  RES :  dimished josué ,                       CVS: Regular  rhythm. Normal S1/S2  Abd: Soft, non-distended. Bowel sounds present.  Skin: No rash.  Ext:  trace edema    ============================I/O===========================   I&O's Detail    14 Jun 2023 07:01  -  15 Hermes 2023 07:00  --------------------------------------------------------  IN:    Enteral Tube Flush: 60 mL    Free Water: 600 mL    IV PiggyBack: 100 mL    Nepro with Carb Steady: 780 mL    sodium chloride 0.45%: 300 mL  Total IN: 1840 mL    OUT:    Blood Loss (mL): 40 mL    Chest Tube (mL): 680 mL    Voided (mL): 300 mL  Total OUT: 1020 mL    Total NET: 820 mL      15 Hermes 2023 07:01  -  15 Hermes 2023 20:39  --------------------------------------------------------  IN:    dextrose 5%: 900 mL    Free Water: 600 mL    Nepro with Carb Steady: 780 mL  Total IN: 2280 mL    OUT:    Chest Tube (mL): 370 mL    Voided (mL): 375 mL  Total OUT: 745 mL    Total NET: 1535 mL        ============================ LABS =========================                        9.2    9.43  )-----------( 117      ( 15 Hermes 2023 05:00 )             29.8     06-15    147<H>  |  110<H>  |  113<H>  ----------------------------<  170<H>  3.6   |  28  |  3.46<H>    Ca    7.9<L>      15 Hermes 2023 05:00  Phos  4.0     06-15  Mg     2.2     06-15    TPro  7.0  /  Alb  2.0<L>  /  TBili  0.6  /  DBili  x   /  AST  12  /  ALT  7<L>  /  AlkPhos  128<H>  06-15    LIVER FUNCTIONS - ( 15 Hermes 2023 05:00 )  Alb: 2.0 g/dL / Pro: 7.0 g/dL / ALK PHOS: 128 U/L / ALT: 7 U/L / AST: 12 U/L / GGT: x                 ======================Micro/Rad/Cardio=================  Culture: Reviewed   CXR: Reviewed  Echo:Reviewed  ======================================================  PAST MEDICAL & SURGICAL HISTORY:  HTN (hypertension)      HLD (hyperlipidemia)      Diabetes      CAD (coronary artery disease)      History of cholecystectomy        ====================ASSESSMENT ==============    78 year old male with a PMH of dementia, HTN, cardiomyopathy, HFrEF, CAD s/p CABG, with known current RCA occlusion, and DM type 2 who was admitted to [facility] on 5/4 with hypoxic respiratory failure in setting of CHF exacerbation and ANISH with course complicated by acute hypoxic respiratory failure in setting of choking episode causing cardiac arrest, shock, worsening hypoxemic respiratory failure, and NSTEMI on 5/9.    now extubated, with borderline resp status.   chf   renal failure      ====================== NEUROLOGY=====================  brivaracetam Oral Solution 25 milliGRAM(s) Oral two times a day  modafinil 150 milliGRAM(s) Oral every 24 hours    close monitoring with neuro status  benzo as needed     ==================== RESPIRATORY======================    aggressive chest pt, suction   Bipap support   chest tube to suction      ====================CARDIOVASCULAR==================  isosorbide   dinitrate Tablet (ISORDIL) 20 milliGRAM(s) Oral three times a day  metoprolol tartrate 25 milliGRAM(s) Oral every 8 hours    tele stable rate ( afib - )  BP monitoring ,  cont imdur, hydralizine , BB    ===================HEMATOLOGIC/ONC ===================  apixaban 2.5 milliGRAM(s) Oral every 12 hours    ===================== RENAL =========================  Continue monitoring urine output   renal follow up ,   monitor creatine ,   lytes stable     ==================== GASTROINTESTINAL===================  dextrose 5%. 1000 milliLiter(s) (100 mL/Hr) IV Continuous <Continuous>  dextrose 5%. 1000 milliLiter(s) (50 mL/Hr) IV Continuous <Continuous>  dextrose 5%. 1000 milliLiter(s) (75 mL/Hr) IV Continuous <Continuous>  multivitamin/minerals/iron Oral Solution (CENTRUM) 15 milliLiter(s) Oral daily  pantoprazole   Suspension 40 milliGRAM(s) Oral daily    =======================    ENDOCRINE  =====================  dextrose 50% Injectable 25 Gram(s) IV Push once  dextrose 50% Injectable 25 Gram(s) IV Push once  dextrose 50% Injectable 12.5 Gram(s) IV Push once  insulin lispro (ADMELOG) corrective regimen sliding scale   SubCutaneous every 6 hours    ========================INFECTIOUS DISEASE================      Patient requires continuous monitoring with bedside rhythm monitoring, pulse oximetry, ventilator/ bipap  monitoring and intermittent blood gas analysis.    Care plan discussed with ICU care team.    Patient remained critical; required more than usual care; I have spent 35 minutes providing non-routine care, revaluated multiple times during the day.

## 2023-06-15 NOTE — PROGRESS NOTE ADULT - SUBJECTIVE AND OBJECTIVE BOX
( x ) No complaints (  ) Complains of:     T(F): 98.7 (06-15-23 @ 05:00), Max: 99.4 (06-14-23 @ 16:00)  HR: 97 (06-15-23 @ 09:00) (82 - 102)  BP: 135/88 (06-15-23 @ 09:00) (110/70 - 138/88)  RR: 21 (06-15-23 @ 09:00) (7 - 36)  SpO2: 100% (06-15-23 @ 09:00) (96% - 100%)        Wound Location 1:  sacral wound with slough     nasal feeding tube in place                           9.2    9.43  )-----------( 117      ( 15 Hermes 2023 05:00 )             29.8     CBC Full  -  ( 15 Hermes 2023 05:00 )  WBC Count : 9.43 K/uL  RBC Count : 2.87 M/uL  Hemoglobin : 9.2 g/dL  Hematocrit : 29.8 %  Platelet Count - Automated : 117 K/uL  Mean Cell Volume : 103.8 fl  Mean Cell Hemoglobin : 32.1 pg  Mean Cell Hemoglobin Concentration : 30.9 gm/dL  Auto Neutrophil # : x  Auto Lymphocyte # : x  Auto Monocyte # : x  Auto Eosinophil # : x  Auto Basophil # : x  Auto Neutrophil % : x  Auto Lymphocyte % : x  Auto Monocyte % : x  Auto Eosinophil % : x  Auto Basophil % : x    147|110|113<170  3.6|28|3.46  7.9,2.2,4.0  06-15 @ 05:00                  Procedure Performed:  (  )Yes     (x  )No  Name of Procedure:  (  )debridement    (  )I&D    (  )Other:  (  )partial thickness     (  )full thickness     (  )subcutaneous     (  )muscle/tendon     (  )bone  (  )sharp     (  )surgical

## 2023-06-15 NOTE — PROGRESS NOTE ADULT - SUBJECTIVE AND OBJECTIVE BOX
Seen in ICU, + NGT, on NC O2    Vital Signs Last 24 Hrs  T(C): 37.7 (06-15-23 @ 12:00), Max: 37.7 (06-15-23 @ 12:00)  T(F): 99.8 (06-15-23 @ 12:00), Max: 99.8 (06-15-23 @ 12:00)  HR: 85 (06-15-23 @ 16:00) (81 - 104)  BP: 117/80 (06-15-23 @ 16:00) (110/70 - 135/88)  BP(mean): 92 (06-15-23 @ 16:00) (82 - 110)  RR: 28 (06-15-23 @ 16:00) (15 - 36)  SpO2: 100% (06-15-23 @ 16:00) (96% - 100%)    I&O's Detail    14 Jun 2023 07:01  -  15 Hermes 2023 07:00  --------------------------------------------------------  IN:    Enteral Tube Flush: 60 mL    Free Water: 600 mL    IV PiggyBack: 100 mL    Nepro with Carb Steady: 780 mL    sodium chloride 0.45%: 300 mL  Total IN: 1840 mL    OUT:    Blood Loss (mL): 40 mL    Chest Tube (mL): 680 mL    Voided (mL): 300 mL  Total OUT: 1020 mL    Total NET: 820 mL    15 Hermes 2023 07:01  -  15 Hermes 2023 18:16  --------------------------------------------------------  IN:    dextrose 5%: 750 mL    Free Water: 600 mL    Nepro with Carb Steady: 590 mL  Total IN: 1940 mL    OUT:    Voided (mL): 350 mL  Total OUT: 350 mL    Total NET: 1590 mL    s1s2  b/l air entry, L CT  soft, ND  tr edema                                                                                          9.2    9.43  )-----------( 117      ( 15 Hermes 2023 05:00 )             29.8     15 Hermes 2023 05:00    147    |  110    |  113    ----------------------------<  170    3.6     |  28     |  3.46     Ca    7.9        15 Hermes 2023 05:00  Phos  4.0       15 Hermes 2023 05:00  Mg     2.2       15 Hermes 2023 05:00    TPro  7.0    /  Alb  2.0    /  TBili  0.6    /  DBili  x      /  AST  12     /  ALT  7      /  AlkPhos  128    15 Hermes 2023 05:00    LIVER FUNCTIONS - ( 15 Hermes 2023 05:00 )  Alb: 2.0 g/dL / Pro: 7.0 g/dL / ALK PHOS: 128 U/L / ALT: 7 U/L / AST: 12 U/L / GGT: x           apixaban 2.5 milliGRAM(s) Oral every 12 hours  brivaracetam Oral Solution 25 milliGRAM(s) Oral two times a day  chlorhexidine 2% Cloths 1 Application(s) Topical daily  Dakins Solution - 1/2 Strength 1 Application(s) Topical daily  dextrose 5%. 1000 milliLiter(s) IV Continuous <Continuous>  dextrose 5%. 1000 milliLiter(s) IV Continuous <Continuous>  dextrose 5%. 1000 milliLiter(s) IV Continuous <Continuous>  dextrose 50% Injectable 25 Gram(s) IV Push once  dextrose 50% Injectable 25 Gram(s) IV Push once  dextrose 50% Injectable 12.5 Gram(s) IV Push once  epoetin priyanka-epbx (RETACRIT) Injectable 75588 Unit(s) SubCutaneous every 7 days  insulin lispro (ADMELOG) corrective regimen sliding scale   SubCutaneous every 6 hours  isosorbide   dinitrate Tablet (ISORDIL) 20 milliGRAM(s) Oral three times a day  metoprolol tartrate 25 milliGRAM(s) Oral every 8 hours  modafinil 150 milliGRAM(s) Oral every 24 hours  multivitamin/minerals/iron Oral Solution (CENTRUM) 15 milliLiter(s) Oral daily  pantoprazole   Suspension 40 milliGRAM(s) Oral daily    A/P:    Hemodynamic ATN s/p arrest (Cr 1.6 - 5/9/23, Cr 1.0 - 4/12/23)  Resp failure, intubated, now extubated  S/p L CT  CM, EF 35 - 40%, dementia  Cr has improved, but now rising again  Avoid nephrotoxins  Lytes are stable  Free water via NGT  Epoetin  F/u CBC, BMP, UO  No urgent indications for RRT at this time  Prognosis is poor w/or w/o RRT  Will follow   D/w ICU team    [phone]

## 2023-06-15 NOTE — PROGRESS NOTE ADULT - SUBJECTIVE AND OBJECTIVE BOX
Follow-up Critical Care Progress Note  Chief Complaint : Atrial fibrillation    patient seen and examined  alert, confused   non verabl today  looking around  purposeful movements present    Allergies :sulfa drugs (Unknown)      PAST MEDICAL & SURGICAL HISTORY:  HTN (hypertension)    HLD (hyperlipidemia)    Diabetes    CAD (coronary artery disease)    History of cholecystectomy        Medications:  MEDICATIONS  (STANDING):  apixaban 2.5 milliGRAM(s) Oral every 12 hours  brivaracetam Oral Solution 25 milliGRAM(s) Oral two times a day  chlorhexidine 2% Cloths 1 Application(s) Topical daily  Dakins Solution - 1/2 Strength 1 Application(s) Topical daily  dextrose 5%. 1000 milliLiter(s) (75 mL/Hr) IV Continuous <Continuous>  dextrose 5%. 1000 milliLiter(s) (50 mL/Hr) IV Continuous <Continuous>  dextrose 5%. 1000 milliLiter(s) (100 mL/Hr) IV Continuous <Continuous>  dextrose 50% Injectable 25 Gram(s) IV Push once  dextrose 50% Injectable 25 Gram(s) IV Push once  dextrose 50% Injectable 12.5 Gram(s) IV Push once  epoetin priyanka-epbx (RETACRIT) Injectable 14242 Unit(s) SubCutaneous every 7 days  insulin lispro (ADMELOG) corrective regimen sliding scale   SubCutaneous every 6 hours  isosorbide   dinitrate Tablet (ISORDIL) 20 milliGRAM(s) Oral three times a day  metoprolol tartrate 25 milliGRAM(s) Oral every 6 hours  modafinil 150 milliGRAM(s) Oral every 24 hours  multivitamin/minerals/iron Oral Solution (CENTRUM) 15 milliLiter(s) Oral daily  pantoprazole   Suspension 40 milliGRAM(s) Oral daily    MEDICATIONS  (PRN):      Antibiotics History  ceFAZolin   IVPB 2000 milliGRAM(s) IV Intermittent once, 06-12-23 @ 07:00, Stop order after: 1 Doses  vancomycin  IVPB 1000 milliGRAM(s) IV Intermittent every 24 hours, 06-04-23 @ 12:32, Stop order after: 42 Days  vancomycin  IVPB 1000 milliGRAM(s) IV Intermittent once, 06-03-23 @ 12:32, Stop order after: 1 Doses  vancomycin  IVPB    , 06-03-23 @ 12:32      Heme Medications   apixaban 2.5 milliGRAM(s) Oral every 12 hours, 06-12-23 @ 13:15      GI Medications  pantoprazole   Suspension 40 milliGRAM(s) Oral daily, 06-12-23 @ 08:38, Routine      COVID  06-11-23 @ 12:25  COVID -   [facility]  05-04-23 @ 19:50  COVID -   [facility]  07-13-22 @ 18:33  COVID [facility]      COVID Biomarkers    06-12-23 @ 05:00 ESR --  ---  CRP --  ---  DDimer  --   ---      ---   Ferritin --    05-04-23 @ 19:50 ESR --  ---  CRP --  ---  DDimer  351<H>   ---   LDH --   ---   Ferritin --         Trend BNP  06-14-23 @ 06:00   -  >19187<H>  06-12-23 @ 05:00   -  58638<H>  06-11-23 @ 06:15   -  >27808<H>  05-14-23 @ 11:30   -  53537<H>  05-11-23 @ 06:05   -  16943<H>  05-10-23 @ 05:10   -  67766<H>    Procalcitonin Trend  06-05-23 @ 05:30   -   0.24<H>  06-04-23 @ 06:00   -   0.20<H>  05-17-23 @ 23:44   -   0.59<H>  05-16-23 @ 22:16   -   0.74<H>  05-15-23 @ 05:45   -   1.12<H>  05-09-23 @ 13:42   -   0.11<H>    WBC Trend  06-15-23 @ 05:00   -  9.43  06-14-23 @ 06:00   -  9.45  06-13-23 @ 05:35   -  8.14  06-12-23 @ 12:04   -  8.54    H/H Trend  06-15-23 @ 05:00   -   9.2<L>/ 29.8<L>  06-14-23 @ 06:00   -   8.8<L>/ 28.5<L>  06-13-23 @ 05:35   -   9.9<L>/ 32.5<L>  06-12-23 @ 12:04   -   9.1<L>/ 29.9<L>  06-11-23 @ 06:15   -   9.7<L>/ 31.9<L>  06-10-23 @ 06:00   -   8.4<L>/ 27.1<L>    Stool Occult Blood  06-02-23 @ 08:20   -   Negative  05-25-23 @ 18:20   -   Negative  05-16-23 @ 17:00   -   Positive<!>    Platelet Trend  06-15-23 @ 05:00   -  117<L>  06-14-23 @ 06:00   -  131<L>  06-13-23 @ 05:35   -  161  06-12-23 @ 12:04   -  143<L>    Trend Sodium  06-15-23 @ 05:00   -  147<H>  06-14-23 @ 06:00   -  147<H>  06-13-23 @ 05:35   -  149<H>    Trend Potassium  06-15-23 @ 05:00   -  3.6  06-14-23 @ 06:00   -  3.7  06-13-23 @ 05:35   -  4.3    Trend Bun/Cr  06-15-23 @ 05:00  BUN/CR -  113<H> / 3.46<H>  06-14-23 @ 06:00  BUN/CR -  114<H> / 3.42<H>  06-13-23 @ 05:35  BUN/CR -  110<H> / 3.39<H>    Lactic Acid Trend  06-13-23 @ 05:35   -   1.4  06-12-23 @ 05:00   -   1.8    ABG Trend  06-12-23 @ 05:07   - 7.46<H>/37/115<H>/99.3<H>  06-10-23 @ 04:45   - 7.42/41/78<L>/96.6  06-09-23 @ 10:50   - 7.39/40/86/98.9<H>  05-19-23 @ 00:22   - 7.37/43/144<H>/99.8<H>  05-18-23 @ 05:38   - 7.41/37/97/98.4<H>  05-17-23 @ 23:36   - 7.42/37/97/98.3<H>  05-16-23 @ 22:10   - 7.41/41/92/98.5<H>  05-16-23 @ 05:59   - 7.35/47/104/98.8<H>  05-15-23 @ 13:45   - 7.36/47/74<L>/96.6  05-15-23 @ 04:45   - 7.33<L>/48/70<L>/95.7  05-14-23 @ 05:20   - 7.40/46/72<L>/96.5  05-13-23 @ 06:00   - 7.34<L>/46/93/98.7<H>    Trend AST/ALT/ALK Phos/Bili  06-15-23 @ 05:00   12/7<L>/128<H>/0.6  06-14-23 @ 06:00   12/9<L>/134<H>/0.6  06-13-23 @ 05:35   16/13/160<H>/0.7  06-12-23 @ 05:00   17/13/158<H>/0.7  06-11-23 @ 06:15   18/13/196<H>/0.8  06-10-23 @ 06:00   16/15/153<H>/0.6  06-09-23 @ 06:10   20/15/177<H>/0.8  06-08-23 @ 10:20   25/22/198<H>/0.8  06-06-23 @ 09:15   29/19/178<H>/0.6  06-06-23 @ 05:30   31/21/168<H>/0.6  06-05-23 @ 05:30   30/20/160<H>/0.6  06-04-23 @ 06:00   32/19/167<H>/0.7      Ammonia Trend  05-14-23 @ 11:30   -   53  05-14-23 @ 05:00   -   16         Albumin Trend  06-15-23 @ 05:00   -   2.0<L>  06-14-23 @ 06:00   -   1.8<L>  06-13-23 @ 05:35   -   1.9<L>  06-12-23 @ 05:00   -   1.8<L>  06-11-23 @ 06:15   -   1.9<L>  06-10-23 @ 06:00   -   1.8<L>      PTT - PT - INR Trend  06-12-23 @ 05:00   -   28.2 - 15.8<H> - 1.36<H>  05-19-23 @ 00:44   -   26.5<L> - 13.2 - 1.15  05-17-23 @ 23:44   -   31.2 - 13.7<H> - 1.18<H>  05-16-23 @ 22:16   -   31.3 - 13.6<H> - 1.18<H>  05-15-23 @ 13:55   -   32.9 - 14.0<H> - 1.19<H>  05-14-23 @ 13:45   -   33.6 - -- - --    Glucose Trend  06-15-23 @ 05:28   -  -- -- 180<H>  06-15-23 @ 05:00   -  -- 170<H> --  06-14-23 @ 23:36   -  -- -- 252<H>  06-14-23 @ 17:19   -  -- -- 227<H>  06-14-23 @ 11:16   -  -- -- 183<H>  06-14-23 @ 06:04   -  -- -- 185<H>  06-14-23 @ 06:00   -  -- 174<H> --  06-13-23 @ 23:43   -  -- -- 194<H>  06-13-23 @ 17:14   -  -- -- 216<H>  06-13-23 @ 12:27   -  -- -- 191<H>    A1C with Estimated Average Glucose Result: 7.7 % *H* [4.0 - 5.6] (05-05-23 @ 08:00)      LABS:                        9.2    9.43  )-----------( 117      ( 15 Hermes 2023 05:00 )             29.8     06-15    147<H>  |  110<H>  |  113<H>  ----------------------------<  170<H>  3.6   |  28  |  3.46<H>    Ca    7.9<L>      15 Hermes 2023 05:00  Phos  4.0     06-15  Mg     2.2     06-15    TPro  7.0  /  Alb  2.0<L>  /  TBili  0.6  /  DBili  x   /  AST  12  /  ALT  7<L>  /  AlkPhos  128<H>  06-15    Fluid characteristics  -- 06-11 @ 09:34  pH 7.5    tprot 2.5    Cell count  Appearance Slightly Bloody  Fluid type --  BF lymph 17  color Pink  eosinophil 0  PMN --  Mesothelial 2  Monocyte 26  Other body cells 0       CULTURES: (if applicable)    Culture - Fungal, Body Fluid (collected 06-11-23 @ 09:34)  Source: Pleural Fl Pleural Fluid  Preliminary Report (06-14-23 @ 15:03):    Culture is being performed. Fungal cultures are held for 4 weeks.    Culture - Body Fluid with Gram Stain (collected 06-11-23 @ 09:34)  Source: Pleural Fl Pleural Fluid  Gram Stain (06-11-23 @ 23:31):    polymorphonuclear leukocytes seen    No organisms seen    by cytocentrifuge  Preliminary Report (06-12-23 @ 18:01):    No growth    Culture - Sputum (collected 06-04-23 @ 21:20)  Source: .Sputum Sputum  Gram Stain (06-05-23 @ 10:19):    Numerous polymorphonuclear leukocytes per low power field    No Squamous epithelial cells per low power field    Moderate Gram Positive Cocci in Clusters per oil power field  Final Report (06-07-23 @ 10:05):    Numerous Staphylococcus aureus    Normal Respiratory Carrie absent  Organism: Staphylococcus aureus (06-07-23 @ 10:05)  Organism: Staphylococcus aureus (06-07-23 @ 10:05)      Method Type: ALDO      -  Ampicillin/Sulbactam: S <=8/4      -  Cefazolin: S <=4      -  Clindamycin: S <=0.25      -  Erythromycin: S <=0.25      -  Gentamicin: S <=1 Should not be used as monotherapy      -  Oxacillin: S 1 Oxacillin predicts susceptibility for dicloxacillin, methicillin, and nafcillin      -  Penicillin: R >8      -  Rifampin: S <=1 Should not be used as monotherapy      -  Tetracycline: R >8      -  Trimethoprim/Sulfamethoxazole: S <=0.5/9.5      -  Vancomycin: S 2    Culture - Urine (collected 06-03-23 @ 06:20)  Source: Clean Catch Clean Catch (Midstream)  Final Report (06-06-23 @ 22:34):    >100,000 CFU/ml Enterococcus faecium    <10,000 CFU/ml Normal Urogenital carrie present  Organism: Enterococcus faecium (06-06-23 @ 22:34)  Organism: Enterococcus faecium (06-06-23 @ 22:34)      Method Type: ALDO      -  Ampicillin: R >8 Predicts results to ampicillin/sulbactam, amoxacillin-clavulanate and  piperacillin-tazobactam.      -  Ciprofloxacin: R >2      -  Levofloxacin: R >4      -  Nitrofurantoin: S <=32 Should not be used to treat pyelonephritis.      -  Tetracycline: R >8      -  Vancomycin: S 0.5    Culture - Blood (collected 06-02-23 @ 09:45)  Source: .Blood Blood  Final Report (06-07-23 @ 17:01):    No Growth Final    Culture - Blood (collected 06-02-23 @ 09:10)  Source: .Blood Blood  Gram Stain (06-03-23 @ 16:57):    Growth in aerobic bottle: Gram Positive Cocci in Clusters    Growth in anaerobic bottle: Gram Positive Cocci in Clusters  Final Report (06-04-23 @ 17:55):    Growth in aerobic and anaerobic bottles: Staphylococcus epidermidis    Coagulase Negative Staphylococci isolated from a single blood culture set    may represent contamination.    Contact the Microbiology Department at [phone] if susceptibility    testing is clinically indicated.    ***Blood Panel PCR results on this specimen are available    approximately 3 hours after the Gram stain result.***    Gram stain, PCR, and/or culture results may not always    correspond due to difference in methodologies.    ************************************************************    This PCR assay was performed by multiplex PCR. This    Assay tests for 66 bacterial and resistance gene targets.    Please refer to the [facility] test directory    at [email or web address] for details.  Organism: Blood Culture PCR (06-04-23 @ 17:55)  Organism: Blood Culture PCR (06-04-23 @ 17:55)      Method Type: PCR      -  Staphylococcus epidermidis, Methicillin resistant: Detec           RADIOLOGY  CXR:      < from: Xray Chest 1 View- PORTABLE-Routine (Xray Chest 1 View- PORTABLE-Routine in AM.) (06.15.23 @ 08:56) >  IMPRESSION:    Small to moderate right pleural effusion slightly increased. No right   chest tube is visualized.  NG tube in stomach.  Left chest tube in place. No pneumothorax.    --- End of Report ---      < end of copied text >        VITALS:  T(C): 37.1 (06-15-23 @ 05:00), Max: 37.4 (06-14-23 @ 16:00)  T(F): 98.7 (06-15-23 @ 05:00), Max: 99.4 (06-14-23 @ 16:00)  HR: 97 (06-15-23 @ 09:00) (82 - 105)  BP: 135/88 (06-15-23 @ 09:00) (110/70 - 140/103)  BP(mean): 100 (06-15-23 @ 09:00) (82 - 115)  ABP: --  ABP(mean): --  RR: 21 (06-15-23 @ 09:00) (7 - 36)  SpO2: 100% (06-15-23 @ 09:00) (96% - 100%)  CVP(mm Hg): --  CVP(cm H2O): --    Ins and Outs     06-14-23 @ 07:01  -  06-15-23 @ 07:00  --------------------------------------------------------  IN: 1840 mL / OUT: 1020 mL / NET: 820 mL                I&O's Detail    14 Jun 2023 07:01  -  15 Hermes 2023 07:00  --------------------------------------------------------  IN:    Enteral Tube Flush: 60 mL    Free Water: 600 mL    IV PiggyBack: 100 mL    Nepro with Carb Steady: 780 mL    sodium chloride 0.45%: 300 mL  Total IN: 1840 mL    OUT:    Blood Loss (mL): 40 mL    Chest Tube (mL): 680 mL    Voided (mL): 300 mL  Total OUT: 1020 mL    Total NET: 820 mL

## 2023-06-15 NOTE — PROGRESS NOTE ADULT - ASSESSMENT
Physical Examination:  GENERAL:               Eyes open and in no distress,    HEENT:                    No JVD, Dry MM  PULM:                     Bilateral air entry, course and diminished  to auscultation bilaterally, + sputum production, + Rales Right > Left, No Rhonchi, No Wheezing  CVS:                         S1, S2,  +Murmur  ABD:                        Soft, nondistended, nontender, normoactive bowel sounds,   EXT:                         no  edema, nontender, No Cyanosis or Clubbing   Vascular:                Warm Extremities,   NEURO:                 alert at time and lethargic at times open eyes to verbal / tactile stimuli   PSYC:                      Calm, No Insight.      Assessment  78 year old male with a PMH of dementia, HTN, cardiomyopathy, HFrEF, CAD s/p CABG, with known current RCA occlusion, and DM type 2 who was admitted to [facility] on 5/4 with hypoxic respiratory failure in setting of CHF exacerbation and ANISH with course complicated by acute hypoxic respiratory failure in setting of choking episode causing cardiac arrest, shock, worsening hypoxemic respiratory failure, and NSTEMI on 5/9. Pt ICU course noted post cardiac arrest anoxic brain injury and myoclonus. Pt was transferred to [facility] for VEEG which required 48 hours of monitoring as he was noted to still be sedated. While there pt noted to have hematochezia and any ac / antiplatelet agents were held at that time. EEG findings consistent with stimulus induced myoclonus and GPDs s/p hypoxic diffuse indicative of severe cerebral dysfunction.    Patient returned to [facility] ICU on 5/19 and continues tx / supportive care for management of:    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  Acute renal failure w Uremia post cardiac arrest improving  Hemtochezia episode, h/h stable no further episodes noted  Heart failure with reduced EF  NSTEMI post arrest       Underlying PMH of dementia, HTN, cardiomyopathy, HFrEF, CAD s/p CABG, with known current RCA occlusion, and DM type 2      Plan:    Mental status waxing waning    + myoclonus at times    noted worsening uremia, suspect playing a role in mental status, D/w Renal will add d5w,   Hold diuretics     off albuterol pAfib appears to be stable but has episodes of pAF with out it   Continue hydralazine, Lopressor, Isosorbide  Continue a/c with pAfib  Chest tube to suction  NT suction as needed   NIV PRN/QHS   NGT for diet , will need peg when more stable   S&S eval to advance diet  will need to call when more awake  OOB to chair    benzo as needed   restarted oral brivaracetam  but at lower dose due to worsening renal function  Restarted on Modafinil which appears to have helped mental status     monitor blood glucose levels, + ISS coverage  Sacral decub - wound care called continue local wound care off loading and nutrition support  Venodyne for dvt ppx   GOC ongoing discussion with family , but want full code at this time    PMD:				                   Notified(Date):  Family Updated: 	Kuldeep [phone]	                                 Date:  6/15/2023  updated     Sedation & Analgesia:	  Diet/Nutrition:		 as above  GI PPx:			Protonix  DVT Ppx:		Venodynes   Activity:		  bedrest  Head of Bed:               35-45 Deg  Glycemic Control:         Insulin  Lines: piv  Restraints may be deemed necessary to prevent removal of life-sustaining devices [ x ] YES   [   ]  NO  Disposition: ICU Care  Goals of Care: Full code

## 2023-06-16 LAB
ALBUMIN SERPL ELPH-MCNC: 1.9 G/DL — LOW (ref 3.3–5)
ALP SERPL-CCNC: 119 U/L — SIGNIFICANT CHANGE UP (ref 40–120)
ALT FLD-CCNC: 10 U/L — SIGNIFICANT CHANGE UP (ref 10–45)
ANION GAP SERPL CALC-SCNC: 8 MMOL/L — SIGNIFICANT CHANGE UP (ref 5–17)
APPEARANCE UR: ABNORMAL
AST SERPL-CCNC: 21 U/L — SIGNIFICANT CHANGE UP (ref 10–40)
BACTERIA # UR AUTO: ABNORMAL /HPF
BASE EXCESS BLDA CALC-SCNC: 2.9 MMOL/L — SIGNIFICANT CHANGE UP (ref -2–3)
BASOPHILS # BLD AUTO: 0.04 K/UL — SIGNIFICANT CHANGE UP (ref 0–0.2)
BASOPHILS NFR BLD AUTO: 0.5 % — SIGNIFICANT CHANGE UP (ref 0–2)
BILIRUB SERPL-MCNC: 0.6 MG/DL — SIGNIFICANT CHANGE UP (ref 0.2–1.2)
BILIRUB UR-MCNC: NEGATIVE — SIGNIFICANT CHANGE UP
BLOOD GAS COMMENTS ARTERIAL: SIGNIFICANT CHANGE UP
BUN SERPL-MCNC: 114 MG/DL — HIGH (ref 7–23)
CALCIUM SERPL-MCNC: 8.1 MG/DL — LOW (ref 8.4–10.5)
CHLORIDE SERPL-SCNC: 102 MMOL/L — SIGNIFICANT CHANGE UP (ref 96–108)
CO2 BLDA-SCNC: 28 MMOL/L — HIGH (ref 19–24)
CO2 SERPL-SCNC: 28 MMOL/L — SIGNIFICANT CHANGE UP (ref 22–31)
COLOR SPEC: YELLOW — SIGNIFICANT CHANGE UP
CREAT SERPL-MCNC: 3.4 MG/DL — HIGH (ref 0.5–1.3)
CULTURE RESULTS: SIGNIFICANT CHANGE UP
DIFF PNL FLD: ABNORMAL
EGFR: 18 ML/MIN/1.73M2 — LOW
EOSINOPHIL # BLD AUTO: 0.16 K/UL — SIGNIFICANT CHANGE UP (ref 0–0.5)
EOSINOPHIL NFR BLD AUTO: 1.9 % — SIGNIFICANT CHANGE UP (ref 0–6)
EPI CELLS # UR: SIGNIFICANT CHANGE UP
GAS PNL BLDA: SIGNIFICANT CHANGE UP
GLUCOSE BLDC GLUCOMTR-MCNC: 187 MG/DL — HIGH (ref 70–99)
GLUCOSE BLDC GLUCOMTR-MCNC: 197 MG/DL — HIGH (ref 70–99)
GLUCOSE BLDC GLUCOMTR-MCNC: 203 MG/DL — HIGH (ref 70–99)
GLUCOSE BLDC GLUCOMTR-MCNC: 223 MG/DL — HIGH (ref 70–99)
GLUCOSE BLDC GLUCOMTR-MCNC: 229 MG/DL — HIGH (ref 70–99)
GLUCOSE BLDC GLUCOMTR-MCNC: 234 MG/DL — HIGH (ref 70–99)
GLUCOSE BLDC GLUCOMTR-MCNC: 268 MG/DL — HIGH (ref 70–99)
GLUCOSE SERPL-MCNC: 196 MG/DL — HIGH (ref 70–99)
GLUCOSE UR QL: 250 MG/DL
HCO3 BLDA-SCNC: 27 MMOL/L — SIGNIFICANT CHANGE UP (ref 21–28)
HCT VFR BLD CALC: 27.5 % — LOW (ref 39–50)
HGB BLD-MCNC: 8.4 G/DL — LOW (ref 13–17)
HOROWITZ INDEX BLDA+IHG-RTO: 30 — SIGNIFICANT CHANGE UP
IMM GRANULOCYTES NFR BLD AUTO: 0.5 % — SIGNIFICANT CHANGE UP (ref 0–0.9)
KETONES UR-MCNC: NEGATIVE MG/DL — SIGNIFICANT CHANGE UP
LEUKOCYTE ESTERASE UR-ACNC: ABNORMAL
LYMPHOCYTES # BLD AUTO: 0.54 K/UL — LOW (ref 1–3.3)
LYMPHOCYTES # BLD AUTO: 6.3 % — LOW (ref 13–44)
MAGNESIUM SERPL-MCNC: 2.1 MG/DL — SIGNIFICANT CHANGE UP (ref 1.6–2.6)
MCHC RBC-ENTMCNC: 30.5 GM/DL — LOW (ref 32–36)
MCHC RBC-ENTMCNC: 31.3 PG — SIGNIFICANT CHANGE UP (ref 27–34)
MCV RBC AUTO: 102.6 FL — HIGH (ref 80–100)
MONOCYTES # BLD AUTO: 0.5 K/UL — SIGNIFICANT CHANGE UP (ref 0–0.9)
MONOCYTES NFR BLD AUTO: 5.8 % — SIGNIFICANT CHANGE UP (ref 2–14)
NEUTROPHILS # BLD AUTO: 7.32 K/UL — SIGNIFICANT CHANGE UP (ref 1.8–7.4)
NEUTROPHILS NFR BLD AUTO: 85 % — HIGH (ref 43–77)
NITRITE UR-MCNC: NEGATIVE — SIGNIFICANT CHANGE UP
NON-GYNECOLOGICAL CYTOLOGY STUDY: SIGNIFICANT CHANGE UP
NRBC # BLD: 0 /100 WBCS — SIGNIFICANT CHANGE UP (ref 0–0)
OSMOLALITY UR: 365 MOSM/KG — SIGNIFICANT CHANGE UP (ref 50–1200)
PCO2 BLDA: 41 MMHG — SIGNIFICANT CHANGE UP (ref 35–48)
PH BLDA: 7.43 — SIGNIFICANT CHANGE UP (ref 7.35–7.45)
PH UR: 5.5 — SIGNIFICANT CHANGE UP (ref 5–8)
PHOSPHATE SERPL-MCNC: 3.9 MG/DL — SIGNIFICANT CHANGE UP (ref 2.5–4.5)
PLATELET # BLD AUTO: 102 K/UL — LOW (ref 150–400)
PO2 BLDA: 119 MMHG — HIGH (ref 83–108)
POTASSIUM SERPL-MCNC: 3.9 MMOL/L — SIGNIFICANT CHANGE UP (ref 3.5–5.3)
POTASSIUM SERPL-SCNC: 3.9 MMOL/L — SIGNIFICANT CHANGE UP (ref 3.5–5.3)
PROT SERPL-MCNC: 7.1 G/DL — SIGNIFICANT CHANGE UP (ref 6–8.3)
PROT UR-MCNC: 300 MG/DL
RBC # BLD: 2.68 M/UL — LOW (ref 4.2–5.8)
RBC # FLD: 19.1 % — HIGH (ref 10.3–14.5)
RBC CASTS # UR COMP ASSIST: >50 /HPF — SIGNIFICANT CHANGE UP (ref 0–4)
SAO2 % BLDA: 99.4 % — HIGH (ref 94–98)
SODIUM SERPL-SCNC: 138 MMOL/L — SIGNIFICANT CHANGE UP (ref 135–145)
SP GR SPEC: 1.01 — SIGNIFICANT CHANGE UP (ref 1–1.03)
SPECIMEN SOURCE: SIGNIFICANT CHANGE UP
UROBILINOGEN FLD QL: 1 MG/DL — SIGNIFICANT CHANGE UP (ref 0.2–1)
WBC # BLD: 8.6 K/UL — SIGNIFICANT CHANGE UP (ref 3.8–10.5)
WBC # FLD AUTO: 8.6 K/UL — SIGNIFICANT CHANGE UP (ref 3.8–10.5)
WBC UR QL: 15 /HPF — HIGH (ref 0–5)

## 2023-06-16 PROCEDURE — 71045 X-RAY EXAM CHEST 1 VIEW: CPT | Mod: 26

## 2023-06-16 RX ORDER — MODAFINIL 200 MG/1
150 TABLET ORAL EVERY 24 HOURS
Refills: 0 | Status: DISCONTINUED | OUTPATIENT
Start: 2023-06-17 | End: 2023-06-23

## 2023-06-16 RX ADMIN — Medication 1: at 05:46

## 2023-06-16 RX ADMIN — APIXABAN 2.5 MILLIGRAM(S): 2.5 TABLET, FILM COATED ORAL at 05:46

## 2023-06-16 RX ADMIN — PANTOPRAZOLE SODIUM 40 MILLIGRAM(S): 20 TABLET, DELAYED RELEASE ORAL at 11:10

## 2023-06-16 RX ADMIN — Medication 1: at 18:19

## 2023-06-16 RX ADMIN — Medication 3: at 23:41

## 2023-06-16 RX ADMIN — Medication 1: at 11:14

## 2023-06-16 RX ADMIN — CHLORHEXIDINE GLUCONATE 1 APPLICATION(S): 213 SOLUTION TOPICAL at 11:15

## 2023-06-16 RX ADMIN — MODAFINIL 150 MILLIGRAM(S): 200 TABLET ORAL at 11:22

## 2023-06-16 RX ADMIN — Medication 25 MILLIGRAM(S): at 05:46

## 2023-06-16 RX ADMIN — Medication 25 MILLIGRAM(S): at 22:01

## 2023-06-16 RX ADMIN — Medication 1 APPLICATION(S): at 11:04

## 2023-06-16 RX ADMIN — Medication 15 MILLILITER(S): at 11:06

## 2023-06-16 RX ADMIN — BRIVARACETAM 25 MILLIGRAM(S): 25 TABLET, FILM COATED ORAL at 05:45

## 2023-06-16 RX ADMIN — APIXABAN 2.5 MILLIGRAM(S): 2.5 TABLET, FILM COATED ORAL at 18:17

## 2023-06-16 RX ADMIN — ISOSORBIDE DINITRATE 20 MILLIGRAM(S): 5 TABLET ORAL at 05:46

## 2023-06-16 NOTE — PROGRESS NOTE ADULT - SUBJECTIVE AND OBJECTIVE BOX
Seen in ICU, + NGT, NC O2    Vital Signs Last 24 Hrs  T(C): 36.9 (23 @ 16:00), Max: 37.1 (23 @ 05:00)  T(F): 98.4 (23 @ 16:00), Max: 98.8 (23 @ 05:00)  HR: 92 (23 @ 18:00) (70 - 103)  BP: 114/71 (23 @ 18:00) (102/68 - 135/75)  BP(mean): 85 (23 @ 18:00) (79 - 108)  RR: 26 (23 @ 18:00) (10 - 40)  SpO2: 100% (23 @ 18:00) (95% - 100%)    I&O's Detail    15 Hermes 2023 07:01  -  2023 07:00  --------------------------------------------------------  IN:    dextrose 5%: 1725 mL    Free Water: 600 mL    Nepro with Carb Steady: 780 mL  Total IN: 3105 mL    OUT:    Chest Tube (mL): 670 mL    Voided (mL): 605 mL  Total OUT: 1275 mL    Total NET: 1830 mL    2023 07:01  -  2023 19:02  --------------------------------------------------------  IN:    dextrose 5%: 150 mL    Free Water: 600 mL    Nepro with Carb Steady: 650 mL  Total IN: 1400 mL    OUT:    Chest Tube (mL): 250 mL    Voided (mL): 400 mL  Total OUT: 650 mL    Total NET: 750 mL    s1s2  b/l air entry, L CT  soft, ND  tr edema                                                                                                  8.4    8.60  )-----------( 102      ( 2023 05:00 )             27.5     2023 05:00    138    |  102    |  114    ----------------------------<  196    3.9     |  28     |  3.40     Ca    8.1        2023 05:00  Phos  3.9       2023 05:00  Mg     2.1       2023 05:00    TPro  7.1    /  Alb  1.9    /  TBili  0.6    /  DBili  x      /  AST  21     /  ALT  10     /  AlkPhos  119    2023 05:00    LIVER FUNCTIONS - ( 2023 05:00 )  Alb: 1.9 g/dL / Pro: 7.1 g/dL / ALK PHOS: 119 U/L / ALT: 10 U/L / AST: 21 U/L / GGT: x           Urinalysis Basic - ( 2023 02:45 )    Color: Yellow / Appearance: Cloudy / S.013 / pH: x  Gluc: x / Ketone: Negative mg/dL  / Bili: Negative / Urobili: 1.0 mg/dL   Blood: x / Protein: 300 mg/dL / Nitrite: Negative   Leuk Esterase: Moderate / RBC: >50 /HPF / WBC 15 /HPF   Sq Epi: x / Non Sq Epi: x / Bacteria: Few /HPF    apixaban 2.5 milliGRAM(s) Oral every 12 hours  brivaracetam Oral Solution 25 milliGRAM(s) Oral two times a day  chlorhexidine 2% Cloths 1 Application(s) Topical daily  Dakins Solution - 1/2 Strength 1 Application(s) Topical daily  dextrose 5%. 1000 milliLiter(s) IV Continuous <Continuous>  dextrose 5%. 1000 milliLiter(s) IV Continuous <Continuous>  dextrose 50% Injectable 25 Gram(s) IV Push once  dextrose 50% Injectable 12.5 Gram(s) IV Push once  dextrose 50% Injectable 25 Gram(s) IV Push once  epoetin priyanka-epbx (RETACRIT) Injectable 52617 Unit(s) SubCutaneous every 7 days  insulin lispro (ADMELOG) corrective regimen sliding scale   SubCutaneous every 6 hours  isosorbide   dinitrate Tablet (ISORDIL) 20 milliGRAM(s) Oral three times a day  metoprolol tartrate 25 milliGRAM(s) Oral every 8 hours  multivitamin/minerals/iron Oral Solution (CENTRUM) 15 milliLiter(s) Oral daily  pantoprazole   Suspension 40 milliGRAM(s) Oral daily    A/P:    Hemodynamic ATN s/p arrest (Cr 1.6 - 23, Cr 1.0 - 23)  Resp failure, s/p intubated, now extubated  S/p L CT  CM, EF 35 - 40%, dementia  Cr has improved, but now rising again  Avoid nephrotoxins  Lytes are stable  Cr appears to reach plateau   Free water via NGT  Epoetin  F/u CBC, BMP, UO  No urgent indications for RRT at this time  Prognosis is poor w/or w/o RRT  Will follow   D/w ICU team    [phone]

## 2023-06-16 NOTE — PROGRESS NOTE ADULT - ASSESSMENT
Physical Examination:  GENERAL:               Eyes open and in no distress,    HEENT:                    No JVD, Dry MM, + NGT  PULM:                     Bilateral air entry, course and diminished  to auscultation bilaterally, left sided chest tube, No Rhonchi, No Wheezing  CVS:                         S1, S2,  +Murmur  ABD:                        Soft, nondistended, nontender, normoactive bowel sounds,   EXT:                         no  edema, nontender, No Cyanosis or Clubbing   Vascular:                Warm Extremities,   NEURO:                 alert at time and lethargic at times open eyes to verbal / tactile stimuli   PSYC:                      Calm, No Insight.      Assessment  78 year old male with a PMH of dementia, HTN, cardiomyopathy, HFrEF, CAD s/p CABG, with known current RCA occlusion, and DM type 2 who was admitted to [facility] on 5/4 with hypoxic respiratory failure in setting of CHF exacerbation and ANISH with course complicated by acute hypoxic respiratory failure in setting of choking episode causing cardiac arrest, shock, worsening hypoxemic respiratory failure, and NSTEMI on 5/9. Pt ICU course noted post cardiac arrest anoxic brain injury and myoclonus. Pt was transferred to [facility] for VEEG which required 48 hours of monitoring as he was noted to still be sedated. While there pt noted to have hematochezia and any ac / antiplatelet agents were held at that time. EEG findings consistent with stimulus induced myoclonus and GPDs s/p hypoxic diffuse indicative of severe cerebral dysfunction.    Patient returned to [facility] ICU on 5/19 and continues tx / supportive care for management of:    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  Acute renal failure w Uremia post cardiac arrest improving  Hemtochezia episode, h/h stable no further episodes noted  Heart failure with reduced EF  NSTEMI post arrest       Underlying PMH of dementia, HTN, cardiomyopathy, HFrEF, CAD s/p CABG, with known current RCA occlusion, and DM type 2      Plan:    Mental status waxing waning    + myoclonus at times    Creatinine is stable for now ~3.4   Hold IVF for now    Continue hydralazine, Lopressor, Isosorbide  Continue a/c with pAfib  Trend hgb no obvious signs of bleeding reported  Stool occult is negative  Chest tube to water seal  Daily CXR  NT suction as needed   NIV PRN/QHS   NGT for diet , will need peg when more stable - GI follow up requested  S&S follow up   OOB to chair with assistance    benzo as needed   Cont. oral brivaracetam  but at lower dose due to worsening renal function  Cont Modafinil which appears to have helped mental status     monitor blood glucose levels, + ISS coverage  Sacral decub - wound care called continue local wound care off loading and nutrition support  Venodyne for dvt ppx   GOC ongoing discussion with family , but want full code at this time    PMD:				                   Notified(Date):  Family Updated: 	Kuldeep [phone]	                                 Date:  6/16/2023 - updated at bedside  updated     Sedation & Analgesia:	  Diet/Nutrition:		 as above  GI PPx:			Protonix  DVT Ppx:		Venodynes   Activity:		  bedrest  Head of Bed:               35-45 Deg  Glycemic Control:         Insulin  Lines: piv  Restraints may be deemed necessary to prevent removal of life-sustaining devices [ x ] YES   [   ]  NO  Disposition: ICU Care  Goals of Care: Full code

## 2023-06-16 NOTE — PROGRESS NOTE ADULT - SUBJECTIVE AND OBJECTIVE BOX
Follow-up Critical Care Progress Note  Chief Complaint : Atrial fibrillation    Arousable, not in distress. Intermittent jerking.    Allergies :sulfa drugs (Unknown)      PAST MEDICAL & SURGICAL HISTORY:  HTN (hypertension)    HLD (hyperlipidemia)    Diabetes    CAD (coronary artery disease)    History of cholecystectomy        Medications:  MEDICATIONS  (STANDING):  apixaban 2.5 milliGRAM(s) Oral every 12 hours  brivaracetam Oral Solution 25 milliGRAM(s) Oral two times a day  chlorhexidine 2% Cloths 1 Application(s) Topical daily  Dakins Solution - 1/2 Strength 1 Application(s) Topical daily  dextrose 5%. 1000 milliLiter(s) (100 mL/Hr) IV Continuous <Continuous>  dextrose 5%. 1000 milliLiter(s) (50 mL/Hr) IV Continuous <Continuous>  dextrose 50% Injectable 25 Gram(s) IV Push once  dextrose 50% Injectable 12.5 Gram(s) IV Push once  dextrose 50% Injectable 25 Gram(s) IV Push once  epoetin priyanka-epbx (RETACRIT) Injectable 37882 Unit(s) SubCutaneous every 7 days  insulin lispro (ADMELOG) corrective regimen sliding scale   SubCutaneous every 6 hours  isosorbide   dinitrate Tablet (ISORDIL) 20 milliGRAM(s) Oral three times a day  metoprolol tartrate 25 milliGRAM(s) Oral every 8 hours  modafinil 150 milliGRAM(s) Oral every 24 hours  multivitamin/minerals/iron Oral Solution (CENTRUM) 15 milliLiter(s) Oral daily  pantoprazole   Suspension 40 milliGRAM(s) Oral daily    MEDICATIONS  (PRN):      Antibiotics History  ceFAZolin   IVPB 2000 milliGRAM(s) IV Intermittent once, 23 @ 07:00, Stop order after: 1 Doses  vancomycin  IVPB    , 23 @ 12:32  vancomycin  IVPB 1000 milliGRAM(s) IV Intermittent once, 23 @ 12:32, Stop order after: 1 Doses  vancomycin  IVPB 1000 milliGRAM(s) IV Intermittent every 24 hours, 23 @ 12:32, Stop order after: 42 Days      Heme Medications   apixaban 2.5 milliGRAM(s) Oral every 12 hours, 23 @ 13:15      GI Medications  pantoprazole   Suspension 40 milliGRAM(s) Oral daily, 06-12-23 @ 08:38, Routine        LABS:                        8.4    8.60  )-----------( 102      ( 2023 05:00 )             27.5         138  |  102  |  114<H>  ----------------------------<  196<H>  3.9   |  28  |  3.40<H>    Ca    8.1<L>      2023 05:00  Phos  3.9       Mg     2.1         TPro  7.1  /  Alb  1.9<L>  /  TBili  0.6  /  DBili  x   /  AST  21  /  ALT  10  /  AlkPhos  119      Fluid characteristics  --  @ 09:34  pH 7.5    tprot 2.5    Cell count  Appearance Slightly Bloody  Fluid type --  BF lymph 17  color Pink  eosinophil 0  PMN --  Mesothelial 2  Monocyte 26  Other body cells 0            Urinalysis Basic - ( 2023 02:45 )    Color: Yellow / Appearance: Cloudy / S.013 / pH: x  Gluc: x / Ketone: Negative mg/dL  / Bili: Negative / Urobili: 1.0 mg/dL   Blood: x / Protein: 300 mg/dL / Nitrite: Negative   Leuk Esterase: Moderate / RBC: >50 /HPF / WBC 15 /HPF   Sq Epi: x / Non Sq Epi: x / Bacteria: Few /HPF              CULTURES: (if applicable)    Culture - Fungal, Body Fluid (collected 23 @ 09:34)  Source: Pleural Fl Pleural Fluid  Preliminary Report (23 @ 15:03):    Culture is being performed. Fungal cultures are held for 4 weeks.    Culture - Body Fluid with Gram Stain (collected 23 @ 09:34)  Source: Pleural Fl Pleural Fluid  Gram Stain (23 @ 23:31):    polymorphonuclear leukocytes seen    No organisms seen    by cytocentrifuge  Preliminary Report (23 @ 18:01):    No growth    Culture - Sputum (collected 23 @ 21:20)  Source: .Sputum Sputum  Gram Stain (23 @ 10:19):    Numerous polymorphonuclear leukocytes per low power field    No Squamous epithelial cells per low power field    Moderate Gram Positive Cocci in Clusters per oil power field  Final Report (23 @ 10:05):    Numerous Staphylococcus aureus    Normal Respiratory Carrie absent  Organism: Staphylococcus aureus (23 @ 10:05)  Organism: Staphylococcus aureus (23 @ 10:05)      Method Type: ALDO      -  Ampicillin/Sulbactam: S <=8/4      -  Cefazolin: S <=4      -  Clindamycin: S <=0.25      -  Erythromycin: S <=0.25      -  Gentamicin: S <=1 Should not be used as monotherapy      -  Oxacillin: S 1 Oxacillin predicts susceptibility for dicloxacillin, methicillin, and nafcillin      -  Penicillin: R >8      -  Rifampin: S <=1 Should not be used as monotherapy      -  Tetracycline: R >8      -  Trimethoprim/Sulfamethoxazole: S <=0.5/9.5      -  Vancomycin: S 2    Culture - Urine (collected 23 @ 06:20)  Source: Clean Catch Clean Catch (Midstream)  Final Report (23 @ 22:34):    >100,000 CFU/ml Enterococcus faecium    <10,000 CFU/ml Normal Urogenital carrie present  Organism: Enterococcus faecium (23 @ 22:34)  Organism: Enterococcus faecium (23 @ 22:34)      Method Type: ALDO      -  Ampicillin: R >8 Predicts results to ampicillin/sulbactam, amoxacillin-clavulanate and  piperacillin-tazobactam.      -  Ciprofloxacin: R >2      -  Levofloxacin: R >4      -  Nitrofurantoin: S <=32 Should not be used to treat pyelonephritis.      -  Tetracycline: R >8      -  Vancomycin: S 0.5          ABG - ( 2023 05:00 )  pH, Arterial: 7.43  pH, Blood: x     /  pCO2: 41    /  pO2: 119   / HCO3: 27    / Base Excess: 2.9   /  SaO2: 99.4              CAPILLARY BLOOD GLUCOSE      POCT Blood Glucose.: 187 mg/dL (2023 11:08)      RADIOLOGY  CXR:      CT:    ECHO:      VITALS:  T(C): 37.1 (23 @ 05:00), Max: 37.7 (06-15-23 @ 12:00)  T(F): 98.8 (23 @ 05:00), Max: 99.8 (06-15-23 @ 12:00)  HR: 103 (23 @ 11:00) (70 - 104)  BP: 106/87 (23 @ 11:00) (102/68 - 135/75)  BP(mean): 93 (23 @ 11:00) (79 - 108)  ABP: --  ABP(mean): --  RR: 25 (23 @ 11:00) (10 - 40)  SpO2: 100% (23 @ 11:00) (95% - 100%)  CVP(mm Hg): --  CVP(cm H2O): --    Ins and Outs     06-15-23 @ 07:01  -  23 @ 07:00  --------------------------------------------------------  IN: 3105 mL / OUT: 1275 mL / NET: 1830 mL    23 @ 07:01  -  23 @ 11:21  --------------------------------------------------------  IN: 345 mL / OUT: 100 mL / NET: 245 mL                I&O's Detail    15 Hermes 2023 07:  -  2023 07:00  --------------------------------------------------------  IN:    dextrose 5%: 1725 mL    Free Water: 600 mL    Nepro with Carb Steady: 780 mL  Total IN: 3105 mL    OUT:    Chest Tube (mL): 670 mL    Voided (mL): 605 mL  Total OUT: 1275 mL    Total NET: 1830 mL      2023 07:  -  2023 11:21  --------------------------------------------------------  IN:    dextrose 5%: 150 mL    Nepro with Carb Steady: 195 mL  Total IN: 345 mL    OUT:    Voided (mL): 100 mL  Total OUT: 100 mL    Total NET: 245 mL

## 2023-06-17 LAB
ANION GAP SERPL CALC-SCNC: 11 MMOL/L — SIGNIFICANT CHANGE UP (ref 5–17)
BUN SERPL-MCNC: 116 MG/DL — HIGH (ref 7–23)
CALCIUM SERPL-MCNC: 8 MG/DL — LOW (ref 8.4–10.5)
CHLORIDE SERPL-SCNC: 102 MMOL/L — SIGNIFICANT CHANGE UP (ref 96–108)
CO2 SERPL-SCNC: 26 MMOL/L — SIGNIFICANT CHANGE UP (ref 22–31)
CREAT SERPL-MCNC: 3.41 MG/DL — HIGH (ref 0.5–1.3)
EGFR: 17 ML/MIN/1.73M2 — LOW
GLUCOSE BLDC GLUCOMTR-MCNC: 142 MG/DL — HIGH (ref 70–99)
GLUCOSE BLDC GLUCOMTR-MCNC: 178 MG/DL — HIGH (ref 70–99)
GLUCOSE BLDC GLUCOMTR-MCNC: 240 MG/DL — HIGH (ref 70–99)
GLUCOSE BLDC GLUCOMTR-MCNC: 270 MG/DL — HIGH (ref 70–99)
GLUCOSE SERPL-MCNC: 156 MG/DL — HIGH (ref 70–99)
HCT VFR BLD CALC: 30.2 % — LOW (ref 39–50)
HGB BLD-MCNC: 9.4 G/DL — LOW (ref 13–17)
MAGNESIUM SERPL-MCNC: 2 MG/DL — SIGNIFICANT CHANGE UP (ref 1.6–2.6)
MCHC RBC-ENTMCNC: 31.1 GM/DL — LOW (ref 32–36)
MCHC RBC-ENTMCNC: 32 PG — SIGNIFICANT CHANGE UP (ref 27–34)
MCV RBC AUTO: 102.7 FL — HIGH (ref 80–100)
NRBC # BLD: 0 /100 WBCS — SIGNIFICANT CHANGE UP (ref 0–0)
PHOSPHATE SERPL-MCNC: 4.1 MG/DL — SIGNIFICANT CHANGE UP (ref 2.5–4.5)
PLATELET # BLD AUTO: 113 K/UL — LOW (ref 150–400)
POTASSIUM SERPL-MCNC: 4.2 MMOL/L — SIGNIFICANT CHANGE UP (ref 3.5–5.3)
POTASSIUM SERPL-SCNC: 4.2 MMOL/L — SIGNIFICANT CHANGE UP (ref 3.5–5.3)
RBC # BLD: 2.94 M/UL — LOW (ref 4.2–5.8)
RBC # FLD: 19 % — HIGH (ref 10.3–14.5)
SODIUM SERPL-SCNC: 139 MMOL/L — SIGNIFICANT CHANGE UP (ref 135–145)
WBC # BLD: 11.55 K/UL — HIGH (ref 3.8–10.5)
WBC # FLD AUTO: 11.55 K/UL — HIGH (ref 3.8–10.5)

## 2023-06-17 PROCEDURE — 71045 X-RAY EXAM CHEST 1 VIEW: CPT | Mod: 26

## 2023-06-17 RX ORDER — ISOSORBIDE DINITRATE 5 MG/1
10 TABLET ORAL THREE TIMES A DAY
Refills: 0 | Status: DISCONTINUED | OUTPATIENT
Start: 2023-06-17 | End: 2023-06-20

## 2023-06-17 RX ADMIN — CHLORHEXIDINE GLUCONATE 1 APPLICATION(S): 213 SOLUTION TOPICAL at 11:29

## 2023-06-17 RX ADMIN — BRIVARACETAM 25 MILLIGRAM(S): 25 TABLET, FILM COATED ORAL at 17:25

## 2023-06-17 RX ADMIN — Medication 25 MILLIGRAM(S): at 06:07

## 2023-06-17 RX ADMIN — MODAFINIL 150 MILLIGRAM(S): 200 TABLET ORAL at 06:04

## 2023-06-17 RX ADMIN — Medication 3: at 17:41

## 2023-06-17 RX ADMIN — ISOSORBIDE DINITRATE 10 MILLIGRAM(S): 5 TABLET ORAL at 17:25

## 2023-06-17 RX ADMIN — Medication 1: at 06:06

## 2023-06-17 RX ADMIN — APIXABAN 2.5 MILLIGRAM(S): 2.5 TABLET, FILM COATED ORAL at 17:25

## 2023-06-17 RX ADMIN — Medication 25 MILLIGRAM(S): at 21:21

## 2023-06-17 RX ADMIN — BRIVARACETAM 25 MILLIGRAM(S): 25 TABLET, FILM COATED ORAL at 06:05

## 2023-06-17 RX ADMIN — PANTOPRAZOLE SODIUM 40 MILLIGRAM(S): 20 TABLET, DELAYED RELEASE ORAL at 11:28

## 2023-06-17 RX ADMIN — Medication 2: at 23:38

## 2023-06-17 RX ADMIN — APIXABAN 2.5 MILLIGRAM(S): 2.5 TABLET, FILM COATED ORAL at 06:06

## 2023-06-17 RX ADMIN — ISOSORBIDE DINITRATE 20 MILLIGRAM(S): 5 TABLET ORAL at 06:07

## 2023-06-17 RX ADMIN — Medication 1 APPLICATION(S): at 11:28

## 2023-06-17 RX ADMIN — Medication 15 MILLILITER(S): at 11:28

## 2023-06-17 NOTE — PROGRESS NOTE ADULT - ASSESSMENT
Physical Examination:  GENERAL:               Eyes open and in no distress,    HEENT:                    No JVD, Dry MM, + NGT  PULM:                     Bilateral air entry, course and diminished  to auscultation bilaterally, left sided chest tube, No Rhonchi, No Wheezing  CVS:                         S1, S2,  +Murmur  ABD:                        Soft, nondistended, nontender, normoactive bowel sounds,   EXT:                         no  edema, nontender, No Cyanosis or Clubbing   Vascular:                Warm Extremities,   NEURO:                 alert at time and lethargic at times open eyes to verbal / tactile stimuli   PSYC:                      Calm, No Insight.      Assessment  78 year old male with a PMH of dementia, HTN, cardiomyopathy, HFrEF, CAD s/p CABG, with known current RCA occlusion, and DM type 2 who was admitted to [facility] on 5/4 with hypoxic respiratory failure in setting of CHF exacerbation and ANISH with course complicated by acute hypoxic respiratory failure in setting of choking episode causing cardiac arrest, shock, worsening hypoxemic respiratory failure, and NSTEMI on 5/9. Pt ICU course noted post cardiac arrest anoxic brain injury and myoclonus. Pt was transferred to [facility] for VEEG which required 48 hours of monitoring as he was noted to still be sedated. While there pt noted to have hematochezia and any ac / antiplatelet agents were held at that time. EEG findings consistent with stimulus induced myoclonus and GPDs s/p hypoxic diffuse indicative of severe cerebral dysfunction.    Patient returned to 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  Acute renal failure w Uremia post cardiac arrest improving  Hemtochezia episode, h/h stable no further episodes noted  Heart failure with reduced EF  NSTEMI post arrest       Underlying PMH of dementia, HTN, cardiomyopathy, HFrEF, CAD s/p CABG, with known current RCA occlusion, and DM type 2      Plan:    Mental status waxing waning    + myoclonus at times    Creatinine is stable for now ~3.4   Hold IVF for now    Continue hydralazine, Lopressor, Isosorbide  Continue a/c with pAfib  Trend hgb no obvious signs of bleeding reported  Stool occult is negative  Chest tube to water seal - monitor output  Daily CXR  NT suction as needed   NIV PRN/QHS   NGT for diet , will need peg when more stable - GI follow up requested  S&S follow up   OOB to chair with assistance    benzo as needed   Cont. oral brivaracetam  but at lower dose due to worsening renal function  Cont Modafinil which appears to have helped mental status     monitor blood glucose levels, + ISS coverage  Sacral decub - wound care called continue local wound care off loading and nutrition support  Venodyne for dvt ppx   GOC ongoing discussion with family , but want full code at this time    PMD:				                   Notified(Date):  Family Updated: 	Kuldeep [phone]	                                 Date:  6/16/2023 - updated at bedside  updated     Sedation & Analgesia:	  Diet/Nutrition:		 as above  GI PPx:			Protonix  DVT Ppx:		Venodynes   Activity:		  bedrest  Head of Bed:               35-45 Deg  Glycemic Control:         Insulin  Lines: piv  Restraints may be deemed necessary to prevent removal of life-sustaining devices [ x ] YES   [   ]  NO  Disposition: ICU Care  Goals of Care: Full code

## 2023-06-17 NOTE — PROGRESS NOTE ADULT - SUBJECTIVE AND OBJECTIVE BOX
Follow-up Critical Care Progress Note  Chief Complaint : Atrial fibrillation    No new events overnight.  Arousable with intermittent myoclonus     Allergies :sulfa drugs (Unknown)      PAST MEDICAL & SURGICAL HISTORY:  HTN (hypertension)    HLD (hyperlipidemia)    Diabetes    CAD (coronary artery disease)    History of cholecystectomy        Medications:  MEDICATIONS  (STANDING):  apixaban 2.5 milliGRAM(s) Oral every 12 hours  brivaracetam Oral Solution 25 milliGRAM(s) Oral two times a day  chlorhexidine 2% Cloths 1 Application(s) Topical daily  Dakins Solution - 1/2 Strength 1 Application(s) Topical daily  dextrose 5%. 1000 milliLiter(s) (50 mL/Hr) IV Continuous <Continuous>  dextrose 5%. 1000 milliLiter(s) (100 mL/Hr) IV Continuous <Continuous>  dextrose 50% Injectable 25 Gram(s) IV Push once  dextrose 50% Injectable 12.5 Gram(s) IV Push once  dextrose 50% Injectable 25 Gram(s) IV Push once  epoetin priyanka-epbx (RETACRIT) Injectable 95315 Unit(s) SubCutaneous every 7 days  insulin lispro (ADMELOG) corrective regimen sliding scale   SubCutaneous every 6 hours  isosorbide   dinitrate Tablet (ISORDIL) 20 milliGRAM(s) Oral three times a day  metoprolol tartrate 25 milliGRAM(s) Oral every 8 hours  modafinil 150 milliGRAM(s) Oral every 24 hours  multivitamin/minerals/iron Oral Solution (CENTRUM) 15 milliLiter(s) Oral daily  pantoprazole   Suspension 40 milliGRAM(s) Oral daily    MEDICATIONS  (PRN):      Antibiotics History  ceFAZolin   IVPB 2000 milliGRAM(s) IV Intermittent once, 23 @ 07:00, Stop order after: 1 Doses  vancomycin  IVPB    , 23 @ 12:32  vancomycin  IVPB 1000 milliGRAM(s) IV Intermittent once, 23 @ 12:32, Stop order after: 1 Doses  vancomycin  IVPB 1000 milliGRAM(s) IV Intermittent every 24 hours, 23 @ 12:32, Stop order after: 42 Days      Heme Medications   apixaban 2.5 milliGRAM(s) Oral every 12 hours, 23 @ 13:15      GI Medications  pantoprazole   Suspension 40 milliGRAM(s) Oral daily, 23 @ 08:38, Routine        LABS:                        9.4    11.55 )-----------( 113      ( 2023 05:30 )             30.2         139  |  102  |  116<H>  ----------------------------<  156<H>  4.2   |  26  |  3.41<H>    Ca    8.0<L>      2023 05:30  Phos  4.1       Mg     2.0         TPro  7.1  /  Alb  1.9<L>  /  TBili  0.6  /  DBili  x   /  AST  21  /  ALT  10  /  AlkPhos  119      Fluid characteristics  --  @ 09:34  pH 7.5    tprot 2.5    Cell count  Appearance Slightly Bloody  Fluid type --  BF lymph 17  color Pink  eosinophil 0  PMN --  Mesothelial 2  Monocyte 26  Other body cells 0            Urinalysis Basic - ( 2023 02:45 )    Color: Yellow / Appearance: Cloudy / S.013 / pH: x  Gluc: x / Ketone: Negative mg/dL  / Bili: Negative / Urobili: 1.0 mg/dL   Blood: x / Protein: 300 mg/dL / Nitrite: Negative   Leuk Esterase: Moderate / RBC: >50 /HPF / WBC 15 /HPF   Sq Epi: x / Non Sq Epi: x / Bacteria: Few /HPF    CULTURES: (if applicable)    Culture - Body Fluid with Gram Stain (collected 23 @ 09:34)  Source: Pleural Fl Pleural Fluid  Gram Stain (23 @ 23:31):    polymorphonuclear leukocytes seen    No organisms seen    by cytocentrifuge  Final Report (23 @ 15:39):    No growth at 5 days    Culture - Fungal, Body Fluid (collected 23 @ 09:34)  Source: Pleural Fl Pleural Fluid  Preliminary Report (23 @ 15:03):    Culture is being performed. Fungal cultures are held for 4 weeks.    Culture - Sputum (collected 23 @ 21:20)  Source: .Sputum Sputum  Gram Stain (23 @ 10:19):    Numerous polymorphonuclear leukocytes per low power field    No Squamous epithelial cells per low power field    Moderate Gram Positive Cocci in Clusters per oil power field  Final Report (23 @ 10:05):    Numerous Staphylococcus aureus    Normal Respiratory [place] absent  Organism: Staphylococcus aureus (23 @ 10:05)  Organism: Staphylococcus aureus (23 @ 10:05)      -  Clindamycin: S <=0.25      -  Oxacillin: S 1 Oxacillin predicts susceptibility for dicloxacillin, methicillin, and nafcillin      -  Gentamicin: S <=1 Should not be used as monotherapy      -  Cefazolin: S <=4      -  Vancomycin: S 2      -  Tetracycline: R >8      Method Type: ALDO      -  Ampicillin/Sulbactam: S <=8/4      -  Penicillin: R >8      -  Rifampin: S <=1 Should not be used as monotherapy      -  Erythromycin: S <=0.25      -  Trimethoprim/Sulfamethoxazole: S <=0.5/9.5    ABG - ( 2023 05:00 )  pH, Arterial: 7.43  pH, Blood: x     /  pCO2: 41    /  pO2: 119   / HCO3: 27    / Base Excess: 2.9   /  SaO2: 99.4      CAPILLARY BLOOD GLUCOSE    POCT Blood Glucose.: 142 mg/dL (2023 11:21)    VITALS:  T(C): 36.9 (23 @ 09:00), Max: 37.1 (23 @ 05:00)  T(F): 98.4 (23 @ 09:00), Max: 98.8 (23 @ 05:00)  HR: 85 (23 @ 10:00) (79 - 98)  BP: 118/72 (23 @ 10:00) (104/59 - 138/74)  BP(mean): 87 (23 @ 10:00) (72 - 99)  ABP: --  ABP(mean): --  RR: 36 (23 @ 10:00) (10 - 36)  SpO2: 100% (23 @ 10:00) (99% - 100%)  CVP(mm Hg): --  CVP(cm H2O): --    Ins and Outs     23 @ 07:01  -  23 @ 07:00  --------------------------------------------------------  IN: 1795 mL / OUT: 1190 mL / NET: 605 mL    23 @ 07:01  -  23 @ 11:55  --------------------------------------------------------  IN: 195 mL / OUT: 0 mL / NET: 195 mL      I&O's Detail    2023 07:  -  2023 07:00  --------------------------------------------------------  IN:    dextrose 5%: 150 mL    Free Water: 800 mL    Nepro with Carb Steady: 845 mL  Total IN: 1795 mL    OUT:    Chest Tube (mL): 390 mL    Voided (mL): 800 mL  Total OUT: 1190 mL    Total NET: 605 mL      2023 07:  -  2023 11:55  --------------------------------------------------------  IN:    Nepro with Carb Steady: 195 mL  Total IN: 195 mL    OUT:  Total OUT: 0 mL    Total NET: 195 mL

## 2023-06-17 NOTE — PROGRESS NOTE ADULT - SUBJECTIVE AND OBJECTIVE BOX
Seen in ICU, + NGT, NC O2    Vital Signs Last 24 Hrs  T(C): 36.9 (23 @ 09:00), Max: 37.1 (23 @ 05:00)  T(F): 98.4 (23 @ 09:00), Max: 98.8 (23 @ 05:00)  HR: 90 (23 @ 12:00) (79 - 98)  BP: 114/68 (23 @ 12:00) (104/59 - 138/74)  BP(mean): 80 (23 @ 12:00) (72 - 99)  RR: 27 (23 @ 12:00) (10 - 36)  SpO2: 100% (23 @ 12:00) (99% - 100%)    I&O's Detail    2023 07:01  -  2023 07:00  --------------------------------------------------------  IN:    dextrose 5%: 150 mL    Free Water: 800 mL    Nepro with Carb Steady: 845 mL  Total IN: 1795 mL    OUT:    Chest Tube (mL): 390 mL    Voided (mL): 800 mL  Total OUT: 1190 mL    Total NET: 605 mL    2023 07:01  -  2023 14:56  --------------------------------------------------------  IN:    Free Water: 200 mL    Nepro with Carb Steady: 390 mL  Total IN: 590 mL    OUT:    Voided (mL): 200 mL  Total OUT: 200 mL    Total NET: 390 mL    s1s2  b/l air entry  soft, ND  tr edema                                                                                          9.4    11.55 )-----------( 113      ( 2023 05:30 )             30.2     2023 05:30    139    |  102    |  116    ----------------------------<  156    4.2     |  26     |  3.41     Ca    8.0        2023 05:30  Phos  4.1       2023 05:30  Mg     2.0       2023 05:30    TPro  7.1    /  Alb  1.9    /  TBili  0.6    /  DBili  x      /  AST  21     /  ALT  10     /  AlkPhos  119    2023 05:00    LIVER FUNCTIONS - ( 2023 05:00 )  Alb: 1.9 g/dL / Pro: 7.1 g/dL / ALK PHOS: 119 U/L / ALT: 10 U/L / AST: 21 U/L / GGT: x           Urinalysis Basic - ( 2023 02:45 )    Color: Yellow / Appearance: Cloudy / S.013 / pH: x  Gluc: x / Ketone: Negative mg/dL  / Bili: Negative / Urobili: 1.0 mg/dL   Blood: x / Protein: 300 mg/dL / Nitrite: Negative   Leuk Esterase: Moderate / RBC: >50 /HPF / WBC 15 /HPF   Sq Epi: x / Non Sq Epi: x / Bacteria: Few /HPF    apixaban 2.5 milliGRAM(s) Oral every 12 hours  brivaracetam Oral Solution 25 milliGRAM(s) Oral two times a day  chlorhexidine 2% Cloths 1 Application(s) Topical daily  Dakins Solution - 1/2 Strength 1 Application(s) Topical daily  dextrose 5%. 1000 milliLiter(s) IV Continuous <Continuous>  dextrose 5%. 1000 milliLiter(s) IV Continuous <Continuous>  dextrose 50% Injectable 25 Gram(s) IV Push once  dextrose 50% Injectable 12.5 Gram(s) IV Push once  dextrose 50% Injectable 25 Gram(s) IV Push once  epoetin priyanka-epbx (RETACRIT) Injectable 92172 Unit(s) SubCutaneous every 7 days  insulin lispro (ADMELOG) corrective regimen sliding scale   SubCutaneous every 6 hours  isosorbide   dinitrate Tablet (ISORDIL) 20 milliGRAM(s) Oral three times a day  metoprolol tartrate 25 milliGRAM(s) Oral every 8 hours  modafinil 150 milliGRAM(s) Oral every 24 hours  multivitamin/minerals/iron Oral Solution (CENTRUM) 15 milliLiter(s) Oral daily  pantoprazole   Suspension 40 milliGRAM(s) Oral daily    A/P:    Hemodynamic ATN s/p arrest (Cr 1.6 - 23, Cr 1.0 - 23)  Resp failure, s/p intubated, now extubated  S/p L CT  CM, EF 35 - 40%, dementia  Cr has improved, but then got worse again  Avoid nephrotoxins  Lytes are stable  Cr appears to reach plateau   Epoetin  F/u CBC, BMP, UO  No urgent indications for RRT at this time  Prognosis is poor w/or w/o RRT  Will follow   D/w ICU team    [phone]

## 2023-06-18 LAB
ALBUMIN SERPL ELPH-MCNC: 1.9 G/DL — LOW (ref 3.3–5)
ALP SERPL-CCNC: 116 U/L — SIGNIFICANT CHANGE UP (ref 40–120)
ALT FLD-CCNC: 9 U/L — LOW (ref 10–45)
ANION GAP SERPL CALC-SCNC: 12 MMOL/L — SIGNIFICANT CHANGE UP (ref 5–17)
AST SERPL-CCNC: 15 U/L — SIGNIFICANT CHANGE UP (ref 10–40)
BILIRUB SERPL-MCNC: 0.6 MG/DL — SIGNIFICANT CHANGE UP (ref 0.2–1.2)
BUN SERPL-MCNC: 124 MG/DL — HIGH (ref 7–23)
CALCIUM SERPL-MCNC: 8.4 MG/DL — SIGNIFICANT CHANGE UP (ref 8.4–10.5)
CHLORIDE SERPL-SCNC: 101 MMOL/L — SIGNIFICANT CHANGE UP (ref 96–108)
CO2 SERPL-SCNC: 28 MMOL/L — SIGNIFICANT CHANGE UP (ref 22–31)
CREAT SERPL-MCNC: 3.72 MG/DL — HIGH (ref 0.5–1.3)
EGFR: 16 ML/MIN/1.73M2 — LOW
GLUCOSE BLDC GLUCOMTR-MCNC: 191 MG/DL — HIGH (ref 70–99)
GLUCOSE BLDC GLUCOMTR-MCNC: 197 MG/DL — HIGH (ref 70–99)
GLUCOSE BLDC GLUCOMTR-MCNC: 199 MG/DL — HIGH (ref 70–99)
GLUCOSE BLDC GLUCOMTR-MCNC: 219 MG/DL — HIGH (ref 70–99)
GLUCOSE SERPL-MCNC: 171 MG/DL — HIGH (ref 70–99)
HCT VFR BLD CALC: 29.5 % — LOW (ref 39–50)
HGB BLD-MCNC: 9.2 G/DL — LOW (ref 13–17)
MAGNESIUM SERPL-MCNC: 2 MG/DL — SIGNIFICANT CHANGE UP (ref 1.6–2.6)
MCHC RBC-ENTMCNC: 31.2 GM/DL — LOW (ref 32–36)
MCHC RBC-ENTMCNC: 32.3 PG — SIGNIFICANT CHANGE UP (ref 27–34)
MCV RBC AUTO: 103.5 FL — HIGH (ref 80–100)
NRBC # BLD: 0 /100 WBCS — SIGNIFICANT CHANGE UP (ref 0–0)
PHOSPHATE SERPL-MCNC: 4.3 MG/DL — SIGNIFICANT CHANGE UP (ref 2.5–4.5)
PLATELET # BLD AUTO: 134 K/UL — LOW (ref 150–400)
POTASSIUM SERPL-MCNC: 4 MMOL/L — SIGNIFICANT CHANGE UP (ref 3.5–5.3)
POTASSIUM SERPL-SCNC: 4 MMOL/L — SIGNIFICANT CHANGE UP (ref 3.5–5.3)
PROT SERPL-MCNC: 7.1 G/DL — SIGNIFICANT CHANGE UP (ref 6–8.3)
RBC # BLD: 2.85 M/UL — LOW (ref 4.2–5.8)
RBC # FLD: 19.5 % — HIGH (ref 10.3–14.5)
SODIUM SERPL-SCNC: 141 MMOL/L — SIGNIFICANT CHANGE UP (ref 135–145)
WBC # BLD: 11.17 K/UL — HIGH (ref 3.8–10.5)
WBC # FLD AUTO: 11.17 K/UL — HIGH (ref 3.8–10.5)

## 2023-06-18 PROCEDURE — 71045 X-RAY EXAM CHEST 1 VIEW: CPT | Mod: 26

## 2023-06-18 PROCEDURE — 99233 SBSQ HOSP IP/OBS HIGH 50: CPT

## 2023-06-18 RX ADMIN — Medication 25 MILLIGRAM(S): at 21:37

## 2023-06-18 RX ADMIN — ISOSORBIDE DINITRATE 10 MILLIGRAM(S): 5 TABLET ORAL at 11:06

## 2023-06-18 RX ADMIN — Medication 1: at 11:12

## 2023-06-18 RX ADMIN — BRIVARACETAM 25 MILLIGRAM(S): 25 TABLET, FILM COATED ORAL at 17:52

## 2023-06-18 RX ADMIN — Medication 2: at 17:52

## 2023-06-18 RX ADMIN — CHLORHEXIDINE GLUCONATE 1 APPLICATION(S): 213 SOLUTION TOPICAL at 11:07

## 2023-06-18 RX ADMIN — Medication 1 APPLICATION(S): at 11:07

## 2023-06-18 RX ADMIN — APIXABAN 2.5 MILLIGRAM(S): 2.5 TABLET, FILM COATED ORAL at 05:37

## 2023-06-18 RX ADMIN — Medication 1: at 05:37

## 2023-06-18 RX ADMIN — ISOSORBIDE DINITRATE 10 MILLIGRAM(S): 5 TABLET ORAL at 05:37

## 2023-06-18 RX ADMIN — Medication 15 MILLILITER(S): at 11:06

## 2023-06-18 RX ADMIN — ISOSORBIDE DINITRATE 10 MILLIGRAM(S): 5 TABLET ORAL at 17:45

## 2023-06-18 RX ADMIN — Medication 25 MILLIGRAM(S): at 05:37

## 2023-06-18 RX ADMIN — APIXABAN 2.5 MILLIGRAM(S): 2.5 TABLET, FILM COATED ORAL at 17:52

## 2023-06-18 RX ADMIN — MODAFINIL 150 MILLIGRAM(S): 200 TABLET ORAL at 05:38

## 2023-06-18 RX ADMIN — BRIVARACETAM 25 MILLIGRAM(S): 25 TABLET, FILM COATED ORAL at 05:41

## 2023-06-18 RX ADMIN — Medication 25 MILLIGRAM(S): at 14:11

## 2023-06-18 NOTE — PROGRESS NOTE ADULT - ASSESSMENT
78 year old man PMHx dementia, HTN, cardiomyopathy, HFrEF, CAD s/p CABG, with known current RCA occlusion, and DM type 2 who was admitted to [facility] on 5/4 with hypoxic respiratory failure in setting of CHF exacerbation and ANISH with course complicated by acute hypoxic respiratory failure in setting of choking episode causing cardiac arrest, shock, worsening hypoxemic respiratory failure, and NSTEMI on 5/9. Pt ICU course noted post cardiac arrest anoxic brain injury and myoclonus.  EEG findings consistent with stimulus induced myoclonus and GPDs s/p hypoxic diffuse indicative of severe cerebral dysfunction.  Patient returned to [facility] ICU on 5/19 and continues tx.  Reevaluation of PEG requested. Patient currently receiving NG tube feeding.

## 2023-06-18 NOTE — PROGRESS NOTE ADULT - SUBJECTIVE AND OBJECTIVE BOX
Follow-up Critical Care Progress Note  Chief Complaint : Atrial fibrillation    Opens eyes to tactile stimulation, remains with myoclonic movements     Allergies :sulfa drugs (Unknown)      PAST MEDICAL & SURGICAL HISTORY:  HTN (hypertension)    HLD (hyperlipidemia)    Diabetes    CAD (coronary artery disease)    History of cholecystectomy        Medications:  MEDICATIONS  (STANDING):  apixaban 2.5 milliGRAM(s) Oral every 12 hours  brivaracetam Oral Solution 25 milliGRAM(s) Oral two times a day  chlorhexidine 2% Cloths 1 Application(s) Topical daily  Dakins Solution - 1/2 Strength 1 Application(s) Topical daily  dextrose 5%. 1000 milliLiter(s) (50 mL/Hr) IV Continuous <Continuous>  dextrose 5%. 1000 milliLiter(s) (100 mL/Hr) IV Continuous <Continuous>  dextrose 50% Injectable 25 Gram(s) IV Push once  dextrose 50% Injectable 12.5 Gram(s) IV Push once  dextrose 50% Injectable 25 Gram(s) IV Push once  epoetin priyanka-epbx (RETACRIT) Injectable 68499 Unit(s) SubCutaneous every 7 days  insulin lispro (ADMELOG) corrective regimen sliding scale   SubCutaneous every 6 hours  isosorbide   dinitrate Tablet (ISORDIL) 10 milliGRAM(s) Oral three times a day  metoprolol tartrate 25 milliGRAM(s) Oral every 8 hours  modafinil 150 milliGRAM(s) Oral every 24 hours  multivitamin/minerals/iron Oral Solution (CENTRUM) 15 milliLiter(s) Oral daily  pantoprazole   Suspension 40 milliGRAM(s) Oral daily    MEDICATIONS  (PRN):      Antibiotics History  ceFAZolin   IVPB 2000 milliGRAM(s) IV Intermittent once, 06-12-23 @ 07:00, Stop order after: 1 Doses  vancomycin  IVPB    , 06-03-23 @ 12:32  vancomycin  IVPB 1000 milliGRAM(s) IV Intermittent once, 06-03-23 @ 12:32, Stop order after: 1 Doses  vancomycin  IVPB 1000 milliGRAM(s) IV Intermittent every 24 hours, 06-04-23 @ 12:32, Stop order after: 42 Days      Heme Medications   apixaban 2.5 milliGRAM(s) Oral every 12 hours, 06-12-23 @ 13:15      GI Medications  pantoprazole   Suspension 40 milliGRAM(s) Oral daily, 06-12-23 @ 08:38, Routine        LABS:                        9.2    11.17 )-----------( 134      ( 18 Jun 2023 05:30 )             29.5     06-18    141  |  101  |  124<H>  ----------------------------<  171<H>  4.0   |  28  |  3.72<H>    Ca    8.4      18 Jun 2023 05:30  Phos  4.3     06-18  Mg     2.0     06-18    TPro  7.1  /  Alb  1.9<L>  /  TBili  0.6  /  DBili  x   /  AST  15  /  ALT  9<L>  /  AlkPhos  116  06-18    Fluid characteristics  -- 06-11 @ 09:34  pH 7.5    tprot 2.5    Cell count  Appearance Slightly Bloody  Fluid type --  BF lymph 17  color Pink  eosinophil 0  PMN --  Mesothelial 2  Monocyte 26  Other body cells 0                      CULTURES: (if applicable)    Culture - Fungal, Body Fluid (collected 06-11-23 @ 09:34)  Source: Pleural Fl Pleural Fluid  Preliminary Report (06-14-23 @ 15:03):    Culture is being performed. Fungal cultures are held for 4 weeks.    Culture - Body Fluid with Gram Stain (collected 06-11-23 @ 09:34)  Source: Pleural Fl Pleural Fluid  Gram Stain (06-11-23 @ 23:31):    polymorphonuclear leukocytes seen    No organisms seen    by cytocentrifuge  Final Report (06-16-23 @ 15:39):    No growth at 5 days    Culture - Sputum (collected 06-04-23 @ 21:20)  Source: .Sputum Sputum  Gram Stain (06-05-23 @ 10:19):    Numerous polymorphonuclear leukocytes per low power field    No Squamous epithelial cells per low power field    Moderate Gram Positive Cocci in Clusters per oil power field  Final Report (06-07-23 @ 10:05):    Numerous Staphylococcus aureus    Normal Respiratory [place] absent  Organism: Staphylococcus aureus (06-07-23 @ 10:05)  Organism: Staphylococcus aureus (06-07-23 @ 10:05)      Method Type: ALDO      -  Ampicillin/Sulbactam: S <=8/4      -  Cefazolin: S <=4      -  Clindamycin: S <=0.25      -  Erythromycin: S <=0.25      -  Gentamicin: S <=1 Should not be used as monotherapy      -  Oxacillin: S 1 Oxacillin predicts susceptibility for dicloxacillin, methicillin, and nafcillin      -  Penicillin: R >8      -  Rifampin: S <=1 Should not be used as monotherapy      -  Tetracycline: R >8      -  Trimethoprim/Sulfamethoxazole: S <=0.5/9.5      -  Vancomycin: S 2            CAPILLARY BLOOD GLUCOSE      POCT Blood Glucose.: 197 mg/dL (18 Jun 2023 05:35)      VITALS:  T(C): 36.7 (06-18-23 @ 08:00), Max: 37.1 (06-17-23 @ 22:00)  T(F): 98 (06-18-23 @ 08:00), Max: 98.7 (06-17-23 @ 22:00)  HR: 93 (06-18-23 @ 10:00) (78 - 121)  BP: 126/73 (06-18-23 @ 10:00) (101/61 - 143/77)  BP(mean): 91 (06-18-23 @ 10:00) (72 - 98)  ABP: --  ABP(mean): --  RR: 15 (06-18-23 @ 10:00) (13 - 35)  SpO2: 99% (06-18-23 @ 10:00) (96% - 100%)  CVP(mm Hg): --  CVP(cm H2O): --    Ins and Outs     06-17-23 @ 07:01  -  06-18-23 @ 07:00  --------------------------------------------------------  IN: 1840 mL / OUT: 1265 mL / NET: 575 mL    06-18-23 @ 07:01  -  06-18-23 @ 10:04  --------------------------------------------------------  IN: 130 mL / OUT: 0 mL / NET: 130 mL    I&O's Detail    17 Jun 2023 07:01  -  18 Jun 2023 07:00  --------------------------------------------------------  IN:    Free Water: 800 mL    Nepro with Carb Steady: 1040 mL  Total IN: 1840 mL    OUT:    Chest Tube (mL): 565 mL    Voided (mL): 700 mL  Total OUT: 1265 mL    Total NET: 575 mL      18 Jun 2023 07:01  -  18 Jun 2023 10:04  --------------------------------------------------------  IN:    Nepro with Carb Steady: 130 mL  Total IN: 130 mL    OUT:  Total OUT: 0 mL    Total NET: 130 mL

## 2023-06-18 NOTE — PROGRESS NOTE ADULT - ASSESSMENT
stage 4 sacra wound, abnormal movements appear to be worsening  -continue local wound care, off loading, and nutrition support.

## 2023-06-18 NOTE — PROGRESS NOTE ADULT - PROBLEM SELECTOR PLAN 1
Reevaluation for PEG.  Discussed with patient's daughter Kuldeep at bedside and explained that his underlying cardiopulmonary issues place him at higher risk of complication from the procedure but that if long term nutritional support is needed then a PEG is the optimal way of providing this. She verbalized understanding and wishes to proceed with PEG placement.  Recommend holding Eliquis 2 days prior to procedure. Received today's dose so will plan tentatively for Wednesday placement.

## 2023-06-18 NOTE — PROGRESS NOTE ADULT - SUBJECTIVE AND OBJECTIVE BOX
( x ) No complaints (  ) Complains of:   movement disorder  T(F): 97 (06-18-23 @ 05:00), Max: 98.7 (06-17-23 @ 22:00)  HR: 102 (06-18-23 @ 08:00) (78 - 121)  BP: 125/75 (06-18-23 @ 08:00) (101/61 - 143/77)  RR: 16 (06-18-23 @ 08:00) (13 - 36)  SpO2: 100% (06-18-23 @ 08:00) (96% - 100%)        Wound Location 1:  sacral wound slough present, does not appear infected                             9.2    11.17 )-----------( 134      ( 18 Jun 2023 05:30 )             29.5     CBC Full  -  ( 18 Jun 2023 05:30 )  WBC Count : 11.17 K/uL  RBC Count : 2.85 M/uL  Hemoglobin : 9.2 g/dL  Hematocrit : 29.5 %  Platelet Count - Automated : 134 K/uL  Mean Cell Volume : 103.5 fl  Mean Cell Hemoglobin : 32.3 pg  Mean Cell Hemoglobin Concentration : 31.2 gm/dL  Auto Neutrophil # : x  Auto Lymphocyte # : x  Auto Monocyte # : x  Auto Eosinophil # : x  Auto Basophil # : x  Auto Neutrophil % : x  Auto Lymphocyte % : x  Auto Monocyte % : x  Auto Eosinophil % : x  Auto Basophil % : x    141|101|124<171  4.0|28|3.72  8.4,2.0,4.3  06-18 @ 05:30                  Procedure Performed:  (  )Yes     (  c)No  Name of Procedure:  (  )debridement    (  )I&D    (  )Other:  (  )partial thickness     (  )full thickness     (  )subcutaneous     (  )muscle/tendon     (  )bone  (  )sharp     (  )surgical

## 2023-06-18 NOTE — PROGRESS NOTE ADULT - SUBJECTIVE AND OBJECTIVE BOX
Seen in ICU, + NGT, NC O2    Vital Signs Last 24 Hrs  T(C): 36.7 (06-18-23 @ 08:00), Max: 37.1 (06-17-23 @ 22:00)  T(F): 98 (06-18-23 @ 08:00), Max: 98.7 (06-17-23 @ 22:00)  HR: 93 (06-18-23 @ 14:00) (78 - 121)  BP: 120/70 (06-18-23 @ 14:00) (101/61 - 143/77)  BP(mean): 82 (06-18-23 @ 14:00) (72 - 98)  RR: 23 (06-18-23 @ 14:00) (13 - 38)  SpO2: 100% (06-18-23 @ 14:00) (96% - 100%)    I&O's Detail    17 Jun 2023 07:01  -  18 Jun 2023 07:00  --------------------------------------------------------  IN:    Free Water: 800 mL    Nepro with Carb Steady: 1040 mL  Total IN: 1840 mL    OUT:    Chest Tube (mL): 565 mL    Voided (mL): 700 mL  Total OUT: 1265 mL    Total NET: 575 mL    s1s2  b/l air entry  soft, ND  tr edema                                                                                                  9.2    11.17 )-----------( 134      ( 18 Jun 2023 05:30 )             29.5     18 Jun 2023 05:30    141    |  101    |  124    ----------------------------<  171    4.0     |  28     |  3.72     Ca    8.4        18 Jun 2023 05:30  Phos  4.3       18 Jun 2023 05:30  Mg     2.0       18 Jun 2023 05:30    TPro  7.1    /  Alb  1.9    /  TBili  0.6    /  DBili  x      /  AST  15     /  ALT  9      /  AlkPhos  116    18 Jun 2023 05:30    LIVER FUNCTIONS - ( 18 Jun 2023 05:30 )  Alb: 1.9 g/dL / Pro: 7.1 g/dL / ALK PHOS: 116 U/L / ALT: 9 U/L / AST: 15 U/L / GGT: x           apixaban 2.5 milliGRAM(s) Oral every 12 hours  brivaracetam Oral Solution 25 milliGRAM(s) Oral two times a day  chlorhexidine 2% Cloths 1 Application(s) Topical daily  Dakins Solution - 1/2 Strength 1 Application(s) Topical daily  dextrose 5%. 1000 milliLiter(s) IV Continuous <Continuous>  dextrose 5%. 1000 milliLiter(s) IV Continuous <Continuous>  dextrose 50% Injectable 25 Gram(s) IV Push once  dextrose 50% Injectable 12.5 Gram(s) IV Push once  dextrose 50% Injectable 25 Gram(s) IV Push once  epoetin priyanka-epbx (RETACRIT) Injectable 17892 Unit(s) SubCutaneous every 7 days  insulin lispro (ADMELOG) corrective regimen sliding scale   SubCutaneous every 6 hours  isosorbide   dinitrate Tablet (ISORDIL) 10 milliGRAM(s) Oral three times a day  metoprolol tartrate 25 milliGRAM(s) Oral every 8 hours  modafinil 150 milliGRAM(s) Oral every 24 hours  multivitamin/minerals/iron Oral Solution (CENTRUM) 15 milliLiter(s) Oral daily    A/P:    Hemodynamic ATN s/p arrest (Cr 1.6 - 5/9/23, Cr 1.0 - 4/12/23)  Resp failure, s/p intubated, now extubated  S/p L CT  CM, EF 35 - 40%, dementia  Cr has improved (peak Cr 5.4 - 5/19/23, aida Cr 1.95 - 6/1/23)  Recurrent hemodynamic ATN  Avoid nephrotoxins  Lytes are stable  Epoetin  F/u CBC, BMP, UO  No urgent indications for RRT at this time, but may need soon   Prognosis is poor w/or w/o RRT  Will follow closely  D/w ICU team    [phone]

## 2023-06-18 NOTE — CHART NOTE - NSCHARTNOTEFT_GEN_A_CORE
Evaluated by GI today, will be scheduled for PEG placement on Wednesday 6/21, Eliquis must be held for 48 hours prior to procedure.

## 2023-06-18 NOTE — PROGRESS NOTE ADULT - ASSESSMENT
Physical Examination:  GENERAL:               Eyes open and in no distress,    HEENT:                    No JVD, Dry MM, + NGT  PULM:                     Bilateral air entry, course and diminished  to auscultation bilaterally, left sided chest tube, No Rhonchi, No Wheezing  CVS:                         S1, S2,  +Murmur  ABD:                        Soft, nondistended, nontender, normoactive bowel sounds,   EXT:                         no  edema, nontender, No Cyanosis or Clubbing   Vascular:                Warm Extremities,   NEURO:                 alert at time and lethargic at times open eyes to verbal / tactile stimuli   PSYC:                      Calm, No Insight.      Assessment  78 year old male with a PMH of dementia, HTN, cardiomyopathy, HFrEF, CAD s/p CABG, with known current RCA occlusion, and DM type 2 who was admitted to [facility] on 5/4 with hypoxic respiratory failure in setting of CHF exacerbation and ANISH with course complicated by acute hypoxic respiratory failure in setting of choking episode causing cardiac arrest, shock, worsening hypoxemic respiratory failure, and NSTEMI on 5/9. Pt ICU course noted post cardiac arrest anoxic brain injury and myoclonus. Pt was transferred to [facility] for VEEG which required 48 hours of monitoring as he was noted to still be sedated. While there pt noted to have hematochezia and any ac / antiplatelet agents were held at that time. EEG findings consistent with stimulus induced myoclonus and GPDs s/p hypoxic diffuse indicative of severe cerebral dysfunction.    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  Acute renal failure w Uremia post cardiac arrest improving  Hemtochezia episode, h/h stable no further episodes noted  Heart failure with reduced EF  NSTEMI post arrest       Underlying PMH of dementia, HTN, cardiomyopathy, HFrEF, CAD s/p CABG, with known current RCA occlusion, and DM type 2      Plan:    Mental status waxing waning    + myoclonus at times    Creatinine slightly increased to 3.7 this morning     Continue hydralazine, Lopressor, Isosorbide  Continue a/c with pAfib  Hgb stable   Stool occult is negative  Chest tube to water seal - monitor output  Daily CXR  NT suction as needed   NIV PRN/QHS   NGT for diet , will need peg when more stable - GI follow up requested  S&S follow up   OOB to chair with assistance    benzo as needed   Cont. oral brivaracetam  but at lower dose due to worsening renal function  Cont Modafinil which appears to have helped mental status     monitor blood glucose levels, + ISS coverage  Sacral decub - wound care called continue local wound care off loading and nutrition support  Plastics following   Venodyne for dvt ppx   GOC ongoing discussion with family , but want full code at this time    PMD:				                   Notified(Date):  Family Updated: 	Kuldeep [phone]	                                 Date:  6/18/2023 - message left for call back  updated     Sedation & Analgesia:	  Diet/Nutrition:		 as above  GI PPx:			Protonix  DVT Ppx:		Venodynes   Activity:		  bedrest  Head of Bed:               35-45 Deg  Glycemic Control:         Insulin  Lines: piv  Restraints may be deemed necessary to prevent removal of life-sustaining devices [ x ] YES   [   ]  NO  Disposition: ICU Care  Goals of Care: Full code

## 2023-06-18 NOTE — PROGRESS NOTE ADULT - SUBJECTIVE AND OBJECTIVE BOX
GI Follow Up    Follow up visit. Reevaluation requested by Dr. Tian for reconsideration of PEG placement for enteral nutrition.   Receiving nutrition via NGT.   Intermittent jerking movement noted. Otherwise no apparent distress.  Receiving apixaban.    MEDICATIONS  (STANDING):  apixaban 2.5 milliGRAM(s) Oral every 12 hours  brivaracetam Oral Solution 25 milliGRAM(s) Oral two times a day  chlorhexidine 2% Cloths 1 Application(s) Topical daily  Dakins Solution - 1/2 Strength 1 Application(s) Topical daily  dextrose 5%. 1000 milliLiter(s) (50 mL/Hr) IV Continuous <Continuous>  dextrose 5%. 1000 milliLiter(s) (100 mL/Hr) IV Continuous <Continuous>  dextrose 50% Injectable 25 Gram(s) IV Push once  dextrose 50% Injectable 12.5 Gram(s) IV Push once  dextrose 50% Injectable 25 Gram(s) IV Push once  epoetin priyanka-epbx (RETACRIT) Injectable 72368 Unit(s) SubCutaneous every 7 days  insulin lispro (ADMELOG) corrective regimen sliding scale   SubCutaneous every 6 hours  isosorbide   dinitrate Tablet (ISORDIL) 10 milliGRAM(s) Oral three times a day  metoprolol tartrate 25 milliGRAM(s) Oral every 8 hours  modafinil 150 milliGRAM(s) Oral every 24 hours  multivitamin/minerals/iron Oral Solution (CENTRUM) 15 milliLiter(s) Oral daily    MEDICATIONS  (PRN):      Diet, NPO with Tube Feed:   Tube Feeding Modality: Nasogastric  Nepro with Carb Steady  Total Volume for 24 Hours (mL): 1040  Continuous  Starting Tube Feed Rate mL per Hour: 45  Increase Tube Feed Rate by (mL): 10     Every 4 hours  Until Goal Tube Feed Rate (mL per Hour): 65  Tube Feed Duration (in Hours): 16  Tube Feed Start Time: 08:00  Banatrol TF     Qty per Day:  BID (06-15-23 @ 11:40) [Active]          PHYSICAL EXAM:   Vital Signs:  Vital Signs Last 24 Hrs  T(C): 36.7 (2023 08:00), Max: 37.1 (2023 22:00)  T(F): 98 (2023 08:00), Max: 98.7 (2023 22:00)  HR: 98 (2023 12:00) (78 - 121)  BP: 111/74 (2023 12:00) (101/61 - 143/77)  BP(mean): 82 (2023 12:00) (72 - 98)  RR: 37 (2023 12:00) (13 - 37)  SpO2: 98% (2023 12:00) (96% - 100%)    Parameters below as of 2023 12:00  Patient On (Oxygen Delivery Method): nasal cannula  O2 Flow (L/min): 4    Daily     Daily Weight in k.5 (2023 05:38)I&O's Summary    2023 07:01  -  2023 07:00  --------------------------------------------------------  IN: 1840 mL / OUT: 1265 mL / NET: 575 mL    2023 07:01  -  2023 13:47  --------------------------------------------------------  IN: 725 mL / OUT: 0 mL / NET: 725 mL        GENERAL: resting  HEENT:  NC/AT,  anicteric sclera. +NG tube  CHEST:  Clear. +chest tube on L  HEART:  S1S2+  ABDOMEN:  Soft, non-tender, non-distended, +BS,  no masses palpable  EXTR:  no edema  SKIN:  No rash or jaundice  NEURO:  nonverbal, not agitated. +intermittent jerking movement    LABS:                        9.2    11.17 )-----------( 134      ( 2023 05:30 )             29.5       06-18    141  |  101  |  124<H>  ----------------------------<  171<H>  4.0   |  28  |  3.72<H>    Ca    8.4      2023 05:30  Phos  4.3     06-18  Mg     2.0     -18    TPro  7.1  /  Alb  1.9<L>  /  TBili  0.6  /  DBili  x   /  AST  15  /  ALT  9<L>  /  AlkPhos  116          ACC: [id number] EXAM:  XR CHEST PORTABLE ROUTINE 1V   ORDERED BY: KOLBY BOND     ACC: [id number] EXAM:  XR CHEST PORTABLE ROUTINE 1V   ORDERED BY: KOLBY BOND     ACC: [id number] EXAM:  XR CHEST PORTABLE ROUTINE 1V   ORDERED BY: JC CARD     PROCEDURE DATE:  2023          INTERPRETATION:  TECHNIQUE: A series of portable chest x-rays was   obtained.    COMPARISON: 2023    CLINICAL INFORMATION: Evaluate pneumonia. Left-sided chest tube. Right   chest tube.    FINDINGS:  2023 8:36 AM:  A feeding tube is seen in the stomach.  A left-sided pigtail catheter is seen at the left lung base. Patient is   status post sternotomy.  The heart is unchanged in size.  There is a small to moderate layering right pleural effusion.  There is no pneumothorax.    2023 9:03 AM:  There is no significant change.    6/15/2023 8:36 AM:  Small to moderate right pleural effusion is slightly increased.    IMPRESSION:    Small to moderate right pleural effusion slightly increased. No right   chest tube is visualized.  NG tube in stomach.  Left chest tube in place. No pneumothorax.    --- End of Report ---            FRANCOIS HARVEY MD; Attending Radiologist  This document has been electronically signed. Hermes 15 2023  9:41AM

## 2023-06-19 LAB
ALBUMIN SERPL ELPH-MCNC: 1.9 G/DL — LOW (ref 3.3–5)
ALP SERPL-CCNC: 120 U/L — SIGNIFICANT CHANGE UP (ref 40–120)
ALT FLD-CCNC: 10 U/L — SIGNIFICANT CHANGE UP (ref 10–45)
ANION GAP SERPL CALC-SCNC: 9 MMOL/L — SIGNIFICANT CHANGE UP (ref 5–17)
AST SERPL-CCNC: 14 U/L — SIGNIFICANT CHANGE UP (ref 10–40)
BILIRUB SERPL-MCNC: 0.6 MG/DL — SIGNIFICANT CHANGE UP (ref 0.2–1.2)
BUN SERPL-MCNC: 129 MG/DL — HIGH (ref 7–23)
CALCIUM SERPL-MCNC: 8.6 MG/DL — SIGNIFICANT CHANGE UP (ref 8.4–10.5)
CHLORIDE SERPL-SCNC: 102 MMOL/L — SIGNIFICANT CHANGE UP (ref 96–108)
CO2 SERPL-SCNC: 28 MMOL/L — SIGNIFICANT CHANGE UP (ref 22–31)
CREAT SERPL-MCNC: 3.87 MG/DL — HIGH (ref 0.5–1.3)
EGFR: 15 ML/MIN/1.73M2 — LOW
GLUCOSE BLDC GLUCOMTR-MCNC: 156 MG/DL — HIGH (ref 70–99)
GLUCOSE BLDC GLUCOMTR-MCNC: 217 MG/DL — HIGH (ref 70–99)
GLUCOSE BLDC GLUCOMTR-MCNC: 219 MG/DL — HIGH (ref 70–99)
GLUCOSE BLDC GLUCOMTR-MCNC: 255 MG/DL — HIGH (ref 70–99)
GLUCOSE SERPL-MCNC: 149 MG/DL — HIGH (ref 70–99)
HCT VFR BLD CALC: 29.2 % — LOW (ref 39–50)
HGB BLD-MCNC: 9.2 G/DL — LOW (ref 13–17)
MAGNESIUM SERPL-MCNC: 2.2 MG/DL — SIGNIFICANT CHANGE UP (ref 1.6–2.6)
MCHC RBC-ENTMCNC: 31.5 GM/DL — LOW (ref 32–36)
MCHC RBC-ENTMCNC: 32.3 PG — SIGNIFICANT CHANGE UP (ref 27–34)
MCV RBC AUTO: 102.5 FL — HIGH (ref 80–100)
NRBC # BLD: 0 /100 WBCS — SIGNIFICANT CHANGE UP (ref 0–0)
PHOSPHATE SERPL-MCNC: 4.6 MG/DL — HIGH (ref 2.5–4.5)
PLATELET # BLD AUTO: 149 K/UL — LOW (ref 150–400)
POTASSIUM SERPL-MCNC: 4 MMOL/L — SIGNIFICANT CHANGE UP (ref 3.5–5.3)
POTASSIUM SERPL-SCNC: 4 MMOL/L — SIGNIFICANT CHANGE UP (ref 3.5–5.3)
PROT SERPL-MCNC: 7.1 G/DL — SIGNIFICANT CHANGE UP (ref 6–8.3)
RBC # BLD: 2.85 M/UL — LOW (ref 4.2–5.8)
RBC # FLD: 19.3 % — HIGH (ref 10.3–14.5)
SODIUM SERPL-SCNC: 139 MMOL/L — SIGNIFICANT CHANGE UP (ref 135–145)
WBC # BLD: 10.72 K/UL — HIGH (ref 3.8–10.5)
WBC # FLD AUTO: 10.72 K/UL — HIGH (ref 3.8–10.5)

## 2023-06-19 PROCEDURE — 71045 X-RAY EXAM CHEST 1 VIEW: CPT | Mod: 26

## 2023-06-19 PROCEDURE — 99233 SBSQ HOSP IP/OBS HIGH 50: CPT

## 2023-06-19 RX ORDER — VALPROIC ACID (AS SODIUM SALT) 250 MG/5ML
250 SOLUTION, ORAL ORAL EVERY 8 HOURS
Refills: 0 | Status: DISCONTINUED | OUTPATIENT
Start: 2023-06-19 | End: 2023-06-21

## 2023-06-19 RX ORDER — ASCORBIC ACID 60 MG
500 TABLET,CHEWABLE ORAL EVERY 12 HOURS
Refills: 0 | Status: DISCONTINUED | OUTPATIENT
Start: 2023-06-19 | End: 2023-06-19

## 2023-06-19 RX ORDER — CLONAZEPAM 1 MG
0.25 TABLET ORAL EVERY 12 HOURS
Refills: 0 | Status: DISCONTINUED | OUTPATIENT
Start: 2023-06-19 | End: 2023-06-21

## 2023-06-19 RX ORDER — ZINC SULFATE TAB 220 MG (50 MG ZINC EQUIVALENT) 220 (50 ZN) MG
220 TAB ORAL DAILY
Refills: 0 | Status: DISCONTINUED | OUTPATIENT
Start: 2023-06-19 | End: 2023-06-19

## 2023-06-19 RX ORDER — PANTOPRAZOLE SODIUM 20 MG/1
40 TABLET, DELAYED RELEASE ORAL EVERY 12 HOURS
Refills: 0 | Status: DISCONTINUED | OUTPATIENT
Start: 2023-06-19 | End: 2023-06-21

## 2023-06-19 RX ORDER — ZINC SULFATE TAB 220 MG (50 MG ZINC EQUIVALENT) 220 (50 ZN) MG
220 TAB ORAL DAILY
Refills: 0 | Status: DISCONTINUED | OUTPATIENT
Start: 2023-06-19 | End: 2023-07-07

## 2023-06-19 RX ADMIN — Medication 1 APPLICATION(S): at 11:45

## 2023-06-19 RX ADMIN — ISOSORBIDE DINITRATE 10 MILLIGRAM(S): 5 TABLET ORAL at 06:08

## 2023-06-19 RX ADMIN — Medication 250 MILLIGRAM(S): at 14:37

## 2023-06-19 RX ADMIN — ZINC SULFATE TAB 220 MG (50 MG ZINC EQUIVALENT) 220 MILLIGRAM(S): 220 (50 ZN) TAB at 11:29

## 2023-06-19 RX ADMIN — Medication 0.25 MILLIGRAM(S): at 11:29

## 2023-06-19 RX ADMIN — Medication 25 MILLIGRAM(S): at 21:17

## 2023-06-19 RX ADMIN — Medication 3: at 17:27

## 2023-06-19 RX ADMIN — ISOSORBIDE DINITRATE 10 MILLIGRAM(S): 5 TABLET ORAL at 13:17

## 2023-06-19 RX ADMIN — CHLORHEXIDINE GLUCONATE 1 APPLICATION(S): 213 SOLUTION TOPICAL at 11:45

## 2023-06-19 RX ADMIN — MODAFINIL 150 MILLIGRAM(S): 200 TABLET ORAL at 05:54

## 2023-06-19 RX ADMIN — Medication 1: at 00:12

## 2023-06-19 RX ADMIN — Medication 2: at 11:30

## 2023-06-19 RX ADMIN — Medication 15 MILLILITER(S): at 11:28

## 2023-06-19 RX ADMIN — Medication 250 MILLIGRAM(S): at 21:17

## 2023-06-19 RX ADMIN — Medication 500 MILLIGRAM(S): at 17:28

## 2023-06-19 RX ADMIN — Medication 25 MILLIGRAM(S): at 05:57

## 2023-06-19 RX ADMIN — Medication 1: at 06:08

## 2023-06-19 RX ADMIN — ISOSORBIDE DINITRATE 10 MILLIGRAM(S): 5 TABLET ORAL at 17:29

## 2023-06-19 RX ADMIN — PANTOPRAZOLE SODIUM 40 MILLIGRAM(S): 20 TABLET, DELAYED RELEASE ORAL at 17:28

## 2023-06-19 RX ADMIN — BRIVARACETAM 25 MILLIGRAM(S): 25 TABLET, FILM COATED ORAL at 05:58

## 2023-06-19 NOTE — CHART NOTE - NSCHARTNOTEFT_GEN_A_CORE
Nutrition Follow Up Note  Hospital Course (Per Electronic Medical Record):   Source: Medical Record [X] Patient [X] Family [X] Nursing Staff [X]     Diet: Nepro @ 65ml/hr x 16 hrs with Banatrol BID ia NGT with 200ml free water q 4 hrs     Patient continues on Nepro @65ml/hr x 16 hrs via NGT , tolerating as per nursing , PEG placement pending for (6/21) . Plastics following for sacral wound ,Present EN feeds are providing 1,872kcals, 84g protein, 760ml H2O. Continue banatrol BID in setting of loose BM's.200ml  free water flushes noted q 4 hrs , Labs reviewed , Renal following , no need for RRT  at present  , however will require as per renal soon. POCT reviewed  , corrective insulin regimen noted Unable to assess weight status due to various documented weights . Continue current nutrition regimen .        Current Weight: (6/19) 149/67.6kg                          (6/18) 166.4/75.5kg                          (6/17) 154.5/70.1kg                   Pertinent Medications: MEDICATIONS  (STANDING):  brivaracetam Oral Solution 25 milliGRAM(s) Oral two times a day  chlorhexidine 2% Cloths 1 Application(s) Topical daily  Dakins Solution - 1/2 Strength 1 Application(s) Topical daily  dextrose 5%. 1000 milliLiter(s) (50 mL/Hr) IV Continuous <Continuous>  dextrose 5%. 1000 milliLiter(s) (100 mL/Hr) IV Continuous <Continuous>  dextrose 50% Injectable 25 Gram(s) IV Push once  dextrose 50% Injectable 12.5 Gram(s) IV Push once  dextrose 50% Injectable 25 Gram(s) IV Push once  epoetin priyanka-epbx (RETACRIT) Injectable 85528 Unit(s) SubCutaneous every 7 days  insulin lispro (ADMELOG) corrective regimen sliding scale   SubCutaneous every 6 hours  isosorbide   dinitrate Tablet (ISORDIL) 10 milliGRAM(s) Oral three times a day  metoprolol tartrate 25 milliGRAM(s) Oral every 8 hours  modafinil 150 milliGRAM(s) Oral every 24 hours  multivitamin/minerals/iron Oral Solution (CENTRUM) 15 milliLiter(s) Oral daily    MEDICATIONS  (PRN):      Pertinent Labs:  06-19 Na139 mmol/L Glu 149 mg/dL<H> K+ 4.0 mmol/L Cr  3.87 mg/dL<H>  mg/dL<H> 06-19 Phos 4.6 mg/dL<H> 06-19 Alb 1.9 g/dL<L>  Hgb9.2g/dl<L> Hct29.2% <L> POCT 156,199,219,191      Skin: Sacrum unstageable , MVI noted , suggest Zinc Sulfate added     Edema: none    Last BM: (6/18)     Estimated Needs:   [X] No Change since Previous Assessment      Previous Nutrition Diagnosis: Severe Protein Calorie Malnutrition & increased nutrient needs     Nutrition Diagnosis is [X] Ongoing & addressed        New Nutrition Diagnosis: [X] Not Applicable      Interventions:   1.continue current EN feeds , suggest addition of Zinc Sulfate to aid with wound healing.   2. await PEG placement     Monitoring & Evaluation: will monitor:  [X] Weights   [X] Follow Up (Per Protocol)  [X] Tolerance to Diet Prescription       RD to follow as per Nutrition protocol  Preethi Carnes RDN

## 2023-06-19 NOTE — PROGRESS NOTE ADULT - SUBJECTIVE AND OBJECTIVE BOX
Seen in ICU, + NGT, NC O2    Vital Signs Last 24 Hrs  T(C): 37.6 (06-19-23 @ 16:00), Max: 37.8 (06-19-23 @ 06:00)  T(F): 99.6 (06-19-23 @ 16:00), Max: 100.1 (06-19-23 @ 06:00)  HR: 102 (06-19-23 @ 18:00) (85 - 107)  BP: 113/70 (06-19-23 @ 18:00) (104/71 - 146/78)  BP(mean): 84 (06-19-23 @ 18:00) (77 - 98)  RR: 23 (06-19-23 @ 18:00) (12 - 40)  SpO2: 100% (06-19-23 @ 18:00) (97% - 100%)    I&O's Detail    18 Jun 2023 07:01  -  19 Jun 2023 07:00  --------------------------------------------------------  IN:    Free Water: 600 mL    Nepro with Carb Steady: 1105 mL  Total IN: 1705 mL    OUT:    Chest Tube (mL): 590 mL    Voided (mL): 638 mL  Total OUT: 1228 mL    Total NET: 477 mL    19 Jun 2023 07:01  -  19 Jun 2023 19:26  --------------------------------------------------------  IN:    Enteral Tube Flush: 210 mL    Free Water: 600 mL    Nepro with Carb Steady: 585 mL  Total IN: 1395 mL    OUT:    Chest Tube (mL): 300 mL    Voided (mL): 150 mL  Total OUT: 450 mL    Total NET: 945 mL    s1s2  b/l air entry  soft, ND  tr edema                                                                                                          9.2    10.72 )-----------( 149      ( 19 Jun 2023 05:20 )             29.2     19 Jun 2023 05:20    139    |  102    |  129    ----------------------------<  149    4.0     |  28     |  3.87     Ca    8.6        19 Jun 2023 05:20  Phos  4.6       19 Jun 2023 05:20  Mg     2.2       19 Jun 2023 05:20    TPro  7.1    /  Alb  1.9    /  TBili  0.6    /  DBili  x      /  AST  14     /  ALT  10     /  AlkPhos  120    19 Jun 2023 05:20    LIVER FUNCTIONS - ( 19 Jun 2023 05:20 )  Alb: 1.9 g/dL / Pro: 7.1 g/dL / ALK PHOS: 120 U/L / ALT: 10 U/L / AST: 14 U/L / GGT: x           ascorbic acid 500 milliGRAM(s) Oral every 12 hours  chlorhexidine 2% Cloths 1 Application(s) Topical daily  clonazePAM  Tablet 0.25 milliGRAM(s) Oral every 12 hours PRN  Dakins Solution - 1/2 Strength 1 Application(s) Topical daily  dextrose 5%. 1000 milliLiter(s) IV Continuous <Continuous>  dextrose 5%. 1000 milliLiter(s) IV Continuous <Continuous>  dextrose 50% Injectable 25 Gram(s) IV Push once  dextrose 50% Injectable 12.5 Gram(s) IV Push once  dextrose 50% Injectable 25 Gram(s) IV Push once  epoetin priyanka-epbx (RETACRIT) Injectable 84469 Unit(s) SubCutaneous every 7 days  insulin lispro (ADMELOG) corrective regimen sliding scale   SubCutaneous every 6 hours  isosorbide   dinitrate Tablet (ISORDIL) 10 milliGRAM(s) Oral three times a day  metoprolol tartrate 25 milliGRAM(s) Oral every 8 hours  modafinil 150 milliGRAM(s) Oral every 24 hours  multivitamin/minerals/iron Oral Solution (CENTRUM) 15 milliLiter(s) Oral daily  pantoprazole   Suspension 40 milliGRAM(s) Oral every 12 hours  valproic  acid Syrup 250 milliGRAM(s) Oral every 8 hours  zinc sulfate 220 milliGRAM(s) Oral daily    A/P:    Hemodynamic ATN s/p arrest (Cr 1.6 - 5/9/23, Cr 1.0 - 4/12/23)  Resp failure, s/p intubated, now extubated  S/p L CT  CM, EF 35 - 40%, dementia  Cr has improved (peak Cr 5.4 - 5/19/23, aida Cr 1.95 - 6/1/23)  Now w/recurrent hemodynamic ATN  Avoid nephrotoxins  Lytes are stable  Epoetin  F/u CBC, BMP, UO  No urgent indications for RRT at this time, but may need soon   Prognosis is poor w/or w/o RRT  Will follow closely  D/w ICU team    [phone]

## 2023-06-19 NOTE — PROGRESS NOTE ADULT - SUBJECTIVE AND OBJECTIVE BOX
INTERVAL HPI/OVERNIGHT EVENTS:  HPI:    77 y/o male seen and examined at bedside.       MEDICATIONS  (STANDING):  ascorbic acid 500 milliGRAM(s) Oral every 12 hours  chlorhexidine 2% Cloths 1 Application(s) Topical daily  Dakins Solution - 1/2 Strength 1 Application(s) Topical daily  dextrose 5%. 1000 milliLiter(s) (50 mL/Hr) IV Continuous <Continuous>  dextrose 5%. 1000 milliLiter(s) (100 mL/Hr) IV Continuous <Continuous>  dextrose 50% Injectable 25 Gram(s) IV Push once  dextrose 50% Injectable 12.5 Gram(s) IV Push once  dextrose 50% Injectable 25 Gram(s) IV Push once  epoetin priyanka-epbx (RETACRIT) Injectable 35671 Unit(s) SubCutaneous every 7 days  insulin lispro (ADMELOG) corrective regimen sliding scale   SubCutaneous every 6 hours  isosorbide   dinitrate Tablet (ISORDIL) 10 milliGRAM(s) Oral three times a day  metoprolol tartrate 25 milliGRAM(s) Oral every 8 hours  modafinil 150 milliGRAM(s) Oral every 24 hours  multivitamin/minerals/iron Oral Solution (CENTRUM) 15 milliLiter(s) Oral daily  pantoprazole   Suspension 40 milliGRAM(s) Oral every 12 hours  valproic  acid Syrup 250 milliGRAM(s) Oral every 8 hours  zinc sulfate 220 milliGRAM(s) Oral daily    MEDICATIONS  (PRN):  clonazePAM  Tablet 0.25 milliGRAM(s) Oral every 12 hours PRN myoclonus      Allergies    sulfa drugs (Unknown)    Intolerances        PAST MEDICAL & SURGICAL HISTORY:  HTN (hypertension)      HLD (hyperlipidemia)      Diabetes      CAD (coronary artery disease)      History of cholecystectomy    PHYSICAL EXAM:   Vital Signs:  Vital Signs Last 24 Hrs  T(C): 37.8 (2023 06:00), Max: 37.8 (2023 06:00)  T(F): 100.1 (2023 06:00), Max: 100.1 (2023 06:00)  HR: 99 (2023 09:00) (85 - 107)  BP: 118/70 (2023 09:00) (104/71 - 146/78)  BP(mean): 85 (2023 09:00) (76 - 96)  RR: 38 (2023 09:00) (11 - 40)  SpO2: 98% (:00) (97% - 100%)    Parameters below as of :  Patient On (Oxygen Delivery Method): nasal cannula  O2 Flow (L/min): 2    Daily     Daily Weight in k.6 (2023 06:00)I&O's Summary    2023 07:01  -  2023 07:00  --------------------------------------------------------  IN: 1705 mL / OUT: 1228 mL / NET: 477 mL    2023 07:01  -  2023 11:32  --------------------------------------------------------  IN: 65 mL / OUT: 0 mL / NET: 65 mL        GENERAL:  Appears stated age,  HEENT:  NC/AT,  conjunctivae clear and pink, NGT in place  CHEST:  Full & symmetric excursion, chest tube intact  HEART:  Regular rhythm, S1, S2, no murmu  ABDOMEN:  Soft, non-tender, non-distended, normoactive bowel sounds,  EXTEREMITIES:  no edema  SKIN:  No rash/warm/dry  NEURO:  Alert,     LABS:                        9.2    10.72 )-----------( 149      ( 2023 05:20 )             29.2     06-19    139  |  102  |  129<H>  ----------------------------<  149<H>  4.0   |  28  |  3.87<H>    Ca    8.6      2023 05:20  Phos  4.6     06-19  Mg     2.2     06-19    TPro  7.1  /  Alb  1.9<L>  /  TBili  0.6  /  DBili  x   /  AST  14  /  ALT  10  /  AlkPhos  120  06-19        amylase   lipase  RADIOLOGY & ADDITIONAL TESTS:

## 2023-06-19 NOTE — PROGRESS NOTE ADULT - ASSESSMENT
Physical Examination:  GENERAL:               Eyes open and in no distress,    HEENT:                    No JVD, Dry MM, + NGT  PULM:                     Bilateral air entry, course and diminished  to auscultation bilaterally, left sided chest tube, No Rhonchi, No Wheezing  CVS:                         S1, S2,  +Murmur  ABD:                        Soft, nondistended, nontender, normoactive bowel sounds,   EXT:                         no  edema, nontender, No Cyanosis or Clubbing   Vascular:                Warm Extremities,   NEURO:                 alert at time and lethargic at times open eyes to verbal / tactile stimuli   PSYC:                      Calm, No Insight.      Assessment  78 year old male with a PMH of dementia, HTN, cardiomyopathy, HFrEF, CAD s/p CABG, with known current RCA occlusion, and DM type 2 who was admitted to [facility] on 5/4 with hypoxic respiratory failure in setting of CHF exacerbation and ANISH with course complicated by acute hypoxic respiratory failure in setting of choking episode causing cardiac arrest, shock, worsening hypoxemic respiratory failure, and NSTEMI on 5/9. Pt ICU course noted post cardiac arrest anoxic brain injury and myoclonus. Pt was transferred to [facility] for VEEG which required 48 hours of monitoring as he was noted to still be sedated. While there pt noted to have hematochezia and any ac / antiplatelet agents were held at that time. EEG findings consistent with stimulus induced myoclonus and GPDs s/p hypoxic diffuse indicative of severe cerebral dysfunction.    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  Acute renal failure w Uremia post cardiac arrest    Hemtochezia episode, h/h stable    Heart failure with reduced EF  NSTEMI post arrest   Sacral decub      Underlying PMH of dementia, HTN, cardiomyopathy, HFrEF, CAD s/p CABG, with known current RCA occlusion, and DM type 2      Plan:    Mental status waxing waning with  myoclonus at times  Bun/Cr continue to increase patient making urine, not improving despite diuretics, and iv antibiotics    d/w renal as renal function worsening may require HD in upcoming days    Continue hydralazine, Lopressor, Isosorbide  hold AC, pending peg/line placment  Stool occult is negative  Chest tube to water seal - monitor output  Daily CXR  NT suction as needed   NIV PRN/QHS   S&S follow up   OOB to chair with assistance    benzo as needed   on  oral brivaracetam with worsening renal function concern of toxic affects.   Cont Modafinil which appears to have helped mental status     monitor blood glucose levels, + ISS coverage  Sacral decub - wound care , local wound care off loading and nutrition support  Plastics following   Venodyne    GOC ongoing discussion with family , but want full code at this time    PMD:				                   Notified(Date):  Family Updated: 	Kuldeep [phone]	                                 Date:  6/19/2023 -    plan to switch briviac to valproic acid - to see if renal function could improve      Sedation & Analgesia:	  Diet/Nutrition:		 as above  GI PPx:			Protonix  DVT Ppx:		Venodynes   Activity:		  bedrest  Head of Bed:               35-45 Deg  Glycemic Control:         Insulin  Lines: piv  Restraints may be deemed necessary to prevent removal of life-sustaining devices [ x ] YES   [   ]  NO  Disposition: ICU Care  Goals of Care: Full code     Physical Examination:  GENERAL:               Eyes open and in no distress,    HEENT:                    No JVD, Dry MM, + NGT  PULM:                     Bilateral air entry, course and diminished  to auscultation bilaterally, left sided chest tube, No Rhonchi, No Wheezing  CVS:                         S1, S2,  +Murmur  ABD:                        Soft, nondistended, nontender, normoactive bowel sounds,   EXT:                         no  edema, nontender, No Cyanosis or Clubbing   Vascular:                Warm Extremities,   NEURO:                 alert at time and lethargic at times open eyes to verbal / tactile stimuli   PSYC:                      Calm, No Insight.      Assessment  78 year old male with a PMH of dementia, HTN, cardiomyopathy, HFrEF, CAD s/p CABG, with known current RCA occlusion, and DM type 2 who was admitted to [facility] on 5/4 with hypoxic respiratory failure in setting of CHF exacerbation and ANISH with course complicated by acute hypoxic respiratory failure in setting of choking episode causing cardiac arrest, shock, worsening hypoxemic respiratory failure, and NSTEMI on 5/9. Pt ICU course noted post cardiac arrest anoxic brain injury and myoclonus. Pt was transferred to [facility] for VEEG which required 48 hours of monitoring as he was noted to still be sedated. While there pt noted to have hematochezia and any ac / antiplatelet agents were held at that time. EEG findings consistent with stimulus induced myoclonus and GPDs s/p hypoxic diffuse indicative of severe cerebral dysfunction.    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  Acute renal failure w Uremia post cardiac arrest    Hemtochezia episode, h/h stable    Heart failure with reduced EF  NSTEMI post arrest   Sacral decub      Underlying PMH of dementia, HTN, cardiomyopathy, HFrEF, CAD s/p CABG, with known current RCA occlusion, and DM type 2      Plan:    Mental status waxing waning with  myoclonus at times  Bun/Cr continue to increase patient making urine, not improving despite diuretics, and iv Fluids    d/w renal as renal function worsening may require HD in upcoming days    Continue hydralazine, Lopressor, Isosorbide  hold AC, pending peg/line placment  Stool occult is negative  Chest tube to water seal - monitor output  Daily CXR  NT suction as needed   NIV PRN/QHS   S&S follow up   OOB to chair with assistance    benzo as needed   on  oral brivaracetam with worsening renal function concern of toxic affects.   Cont Modafinil which appears to have helped mental status     monitor blood glucose levels, + ISS coverage  Sacral decub - wound care , local wound care off loading and nutrition support  Plastics following   Venodyne    GOC ongoing discussion with family , but want full code at this time    PMD:				                   Notified(Date):  Family Updated: 	Kuldeep [phone]	                                 Date:  6/19/2023 -    plan to switch briviac to valproic acid - to see if renal function could improve      Sedation & Analgesia:	  Diet/Nutrition:		 as above  GI PPx:			Protonix  DVT Ppx:		Venodynes   Activity:		  bedrest  Head of Bed:               35-45 Deg  Glycemic Control:         Insulin  Lines: piv  Restraints may be deemed necessary to prevent removal of life-sustaining devices [ x ] YES   [   ]  NO  Disposition: ICU Care  Goals of Care: Full code

## 2023-06-19 NOTE — PROGRESS NOTE ADULT - SUBJECTIVE AND OBJECTIVE BOX
Follow-up Critical Care Progress Note  Chief Complaint : Atrial fibrillation      patient seen and examined  eyes open  myoclonus present  non verbal  unable to provide history or ros  on 2 l n/c      Allergies :sulfa drugs (Unknown)      PAST MEDICAL & SURGICAL HISTORY:  HTN (hypertension)  HLD (hyperlipidemia)  Diabetes  CAD (coronary artery disease)  History of cholecystectomy        Medications:  MEDICATIONS  (STANDING):  brivaracetam Oral Solution 25 milliGRAM(s) Oral two times a day  chlorhexidine 2% Cloths 1 Application(s) Topical daily  Dakins Solution - 1/2 Strength 1 Application(s) Topical daily  dextrose 5%. 1000 milliLiter(s) (50 mL/Hr) IV Continuous <Continuous>  dextrose 5%. 1000 milliLiter(s) (100 mL/Hr) IV Continuous <Continuous>  dextrose 50% Injectable 25 Gram(s) IV Push once  dextrose 50% Injectable 12.5 Gram(s) IV Push once  dextrose 50% Injectable 25 Gram(s) IV Push once  epoetin priyanka-epbx (RETACRIT) Injectable 83356 Unit(s) SubCutaneous every 7 days  insulin lispro (ADMELOG) corrective regimen sliding scale   SubCutaneous every 6 hours  isosorbide   dinitrate Tablet (ISORDIL) 10 milliGRAM(s) Oral three times a day  metoprolol tartrate 25 milliGRAM(s) Oral every 8 hours  modafinil 150 milliGRAM(s) Oral every 24 hours  multivitamin/minerals/iron Oral Solution (CENTRUM) 15 milliLiter(s) Oral daily    MEDICATIONS  (PRN):      Antibiotics History  ceFAZolin   IVPB 2000 milliGRAM(s) IV Intermittent once, 06-12-23 @ 07:00, Stop order after: 1 Doses  vancomycin  IVPB    , 06-03-23 @ 12:32  vancomycin  IVPB 1000 milliGRAM(s) IV Intermittent once, 06-03-23 @ 12:32, Stop order after: 1 Doses  vancomycin  IVPB 1000 milliGRAM(s) IV Intermittent every 24 hours, 06-04-23 @ 12:32, Stop order after: 42 Days       COVID  06-11-23 @ 12:25  COVID -   NotDetec  05-04-23 @ 19:50  COVID -   NotDetec  07-13-22 @ 18:33  COVID -   NotDetec      COVID Biomarkers    06-12-23 @ 05:00 ESR --  ---  CRP --  ---  DDimer  --   ---      ---   Ferritin --    05-04-23 @ 19:50 ESR --  ---  CRP --  ---  DDimer  351<H>   ---   LDH --   ---   Ferritin --           Trend BNP  06-14-23 @ 06:00   -  >61540<H>  06-12-23 @ 05:00   -  16030<H>  06-11-23 @ 06:15   -  >73262<H>  05-14-23 @ 11:30   -  51759<H>  05-11-23 @ 06:05   -  80622<H>  05-10-23 @ 05:10   -  10057<H>    Procalcitonin Trend  06-05-23 @ 05:30   -   0.24<H>  06-04-23 @ 06:00   -   0.20<H>  05-17-23 @ 23:44   -   0.59<H>  05-16-23 @ 22:16   -   0.74<H>  05-15-23 @ 05:45   -   1.12<H>  05-09-23 @ 13:42   -   0.11<H>    WBC Trend  06-19-23 @ 05:20   -  10.72<H>  06-18-23 @ 05:30   -  11.17<H>  06-17-23 @ 05:30   -  11.55<H>    H/H Trend  06-19-23 @ 05:20   -   9.2<L>/ 29.2<L>  06-18-23 @ 05:30   -   9.2<L>/ 29.5<L>  06-17-23 @ 05:30   -   9.4<L>/ 30.2<L>  06-16-23 @ 05:00   -   8.4<L>/ 27.5<L>  06-15-23 @ 05:00   -   9.2<L>/ 29.8<L>  06-14-23 @ 06:00   -   8.8<L>/ 28.5<L>    Stool Occult Blood  06-15-23 @ 13:54   -   Negative  06-02-23 @ 08:20   -   Negative  05-25-23 @ 18:20   -   Negative  05-16-23 @ 17:00   -   Positive<!>    Platelet Trend  06-19-23 @ 05:20   -  149<L>  06-18-23 @ 05:30   -  134<L>  06-17-23 @ 05:30   -  113<L>    Trend Sodium  06-19-23 @ 05:20   -  139  06-18-23 @ 05:30   -  141  06-17-23 @ 05:30   -  139    Trend Potassium  06-19-23 @ 05:20   -  4.0  06-18-23 @ 05:30   -  4.0  06-17-23 @ 05:30   -  4.2    Trend Bun/Cr  06-19-23 @ 05:20  BUN/CR -  129<H> / 3.87<H>  06-18-23 @ 05:30  BUN/CR -  124<H> / 3.72<H>  06-17-23 @ 05:30  BUN/CR -  116<H> / 3.41<H>    Lactic Acid Trend  06-13-23 @ 05:35   -   1.4  06-12-23 @ 05:00   -   1.8    ABG Trend  06-16-23 @ 05:00   - 7.43/41/119<H>/99.4<H>  06-12-23 @ 05:07   - 7.46<H>/37/115<H>/99.3<H>  06-10-23 @ 04:45   - 7.42/41/78<L>/96.6  06-09-23 @ 10:50   - 7.39/40/86/98.9<H>  05-19-23 @ 00:22   - 7.37/43/144<H>/99.8<H>  05-18-23 @ 05:38   - 7.41/37/97/98.4<H>  05-17-23 @ 23:36   - 7.42/37/97/98.3<H>     Trend AST/ALT/ALK Phos/Bili  06-19-23 @ 05:20   14/10/120/0.6  06-18-23 @ 05:30   15/9<L>/116/0.6  06-16-23 @ 05:00   21/10/119/0.6  06-15-23 @ 05:00   12/7<L>/128<H>/0.6  06-14-23 @ 06:00   12/9<L>/134<H>/0.6  06-13-23 @ 05:35   16/13/160<H>/0.7  06-12-23 @ 05:00   17/13/158<H>/0.7  06-11-23 @ 06:15   18/13/196<H>/0.8       PTT - PT - INR Trend  06-12-23 @ 05:00   -   28.2 - 15.8<H> - 1.36<H>  05-19-23 @ 00:44   -   26.5<L> - 13.2 - 1.15  05-17-23 @ 23:44   -   31.2 - 13.7<H> - 1.18<H>  05-16-23 @ 22:16   -   31.3 - 13.6<H> - 1.18<H>  05-15-23 @ 13:55   -   32.9 - 14.0<H> - 1.19<H>  05-14-23 @ 13:45   -   33.6 - -- - --    Glucose Trend  06-19-23 @ 06:02   -  -- -- 156<H>  06-19-23 @ 05:20   -  -- 149<H> --  06-18-23 @ 23:49   -  -- -- 199<H>  06-18-23 @ 17:42   -  -- -- 219<H>  06-18-23 @ 11:10   -  -- -- 191<H>  06-18-23 @ 05:35   -  -- -- 197<H>  06-18-23 @ 05:30   -  -- 171<H> --  06-17-23 @ 23:35   -  -- -- 240<H>  06-17-23 @ 17:35   -  -- -- 270<H>  06-17-23 @ 11:21   -  -- -- 142<H>    A1C with Estimated Average Glucose Result: 7.7 % *H* [4.0 - 5.6] (05-05-23 @ 08:00)      LABS:                        9.2    10.72 )-----------( 149      ( 19 Jun 2023 05:20 )             29.2     06-19    139  |  102  |  129<H>  ----------------------------<  149<H>  4.0   |  28  |  3.87<H>    Ca    8.6      19 Jun 2023 05:20  Phos  4.6     06-19  Mg     2.2     06-19    TPro  7.1  /  Alb  1.9<L>  /  TBili  0.6  /  DBili  x   /  AST  14  /  ALT  10  /  AlkPhos  120  06-19    Fluid characteristics  -- 06-11 @ 09:34  pH 7.5    tprot 2.5    Cell count  Appearance Slightly Bloody  Fluid type --  BF lymph 17  color Pink  eosinophil 0  PMN --  Mesothelial 2  Monocyte 26  Other body cells 0         CULTURES: (if applicable)    Culture - Fungal, Body Fluid (collected 06-11-23 @ 09:34)  Source: Pleural Fl Pleural Fluid  Preliminary Report (06-14-23 @ 15:03):    Culture is being performed. Fungal cultures are held for 4 weeks.    Culture - Body Fluid with Gram Stain (collected 06-11-23 @ 09:34)  Source: Pleural Fl Pleural Fluid  Gram Stain (06-11-23 @ 23:31):    polymorphonuclear leukocytes seen    No organisms seen    by cytocentrifuge  Final Report (06-16-23 @ 15:39):    No growth at 5 days       RADIOLOGY  CXR:  < from: Xray Chest 1 View- PORTABLE-Routine (Xray Chest 1 View- PORTABLE-Routine in AM.) (06.16.23 @ 08:55) >    IMPRESSION:   LEFT lung clear. No pneumothorax.  FOSSA LEFT  RIGHT effusion layering along posterior thoracic wall and/or diffuse   airspace disease...    --- End of Report ---    < end of copied text >    bedside us done on lungs noted small right pl effusion to drain  B lines on right, A lings on right        VITALS:  T(C): 37.8 (06-19-23 @ 06:00), Max: 37.8 (06-19-23 @ 06:00)  T(F): 100.1 (06-19-23 @ 06:00), Max: 100.1 (06-19-23 @ 06:00)  HR: 102 (06-19-23 @ 08:40) (85 - 107)  BP: 107/69 (06-19-23 @ 08:00) (104/71 - 146/78)  BP(mean): 82 (06-19-23 @ 08:00) (76 - 96)  ABP: --  ABP(mean): --  RR: 29 (06-19-23 @ 08:00) (11 - 40)  SpO2: 99% (06-19-23 @ 08:40) (97% - 100%)  CVP(mm Hg): --  CVP(cm H2O): --    Ins and Outs     06-18-23 @ 07:01  -  06-19-23 @ 07:00  --------------------------------------------------------  IN: 1705 mL / OUT: 1228 mL / NET: 477 mL    06-19-23 @ 07:01  -  06-19-23 @ 09:04  --------------------------------------------------------  IN: 65 mL / OUT: 0 mL / NET: 65 mL      I&O's Detail    18 Jun 2023 07:01  -  19 Jun 2023 07:00  --------------------------------------------------------  IN:    Free Water: 600 mL    Nepro with Carb Steady: 1105 mL  Total IN: 1705 mL    OUT:    Chest Tube (mL): 590 mL    Voided (mL): 638 mL  Total OUT: 1228 mL    Total NET: 477 mL      19 Jun 2023 07:01  -  19 Jun 2023 09:04  --------------------------------------------------------  IN:    Nepro with Carb Steady: 65 mL  Total IN: 65 mL    OUT:  Total OUT: 0 mL    Total NET: 65 mL

## 2023-06-19 NOTE — PROGRESS NOTE ADULT - ASSESSMENT
78 year old man PMHx dementia, HTN, cardiomyopathy, HFrEF, CAD s/p CABG, with known current RCA occlusion, and DM type 2 who was admitted to [facility] on 5/4 with hypoxic respiratory failure in setting of CHF exacerbation and ANISH with course complicated by acute hypoxic respiratory failure in setting of choking episode causing cardiac arrest, shock, worsening hypoxemic respiratory failure, and NSTEMI on 5/9. Pt ICU course noted post cardiac arrest anoxic brain injury and myoclonus.  EEG findings consistent with stimulus induced myoclonus and GPDs s/p hypoxic diffuse indicative of severe cerebral dysfunction.  Patient returned to [facility] ICU on 5/19 and continues tx.  Reevaluation of PEG requested. Patient currently receiving NG tube feeding.

## 2023-06-19 NOTE — PROGRESS NOTE ADULT - PROBLEM SELECTOR PLAN 1
Reevaluation for PEG  Attending discussed with patient's daughter Kuldeep at bedside on Sunday and explained that his underlying cardiopulmonary issues place him at higher risk of complication from the procedure but that if long term nutritional support is needed then a PEG is the optimal way of providing this. She verbalized understanding and wishes to proceed with PEG placement.  Recommend holding Eliquis 2 days prior to procedure. Received today's dose so will plan tentatively for Wednesday placement.  Will have anesthesia review case prior to procedure Wednesday

## 2023-06-20 LAB
ALBUMIN SERPL ELPH-MCNC: 1.8 G/DL — LOW (ref 3.3–5)
ALP SERPL-CCNC: 123 U/L — HIGH (ref 40–120)
ALT FLD-CCNC: 6 U/L — LOW (ref 10–45)
ANION GAP SERPL CALC-SCNC: 11 MMOL/L — SIGNIFICANT CHANGE UP (ref 5–17)
AST SERPL-CCNC: 17 U/L — SIGNIFICANT CHANGE UP (ref 10–40)
BILIRUB SERPL-MCNC: 0.4 MG/DL — SIGNIFICANT CHANGE UP (ref 0.2–1.2)
BUN SERPL-MCNC: 143 MG/DL — HIGH (ref 7–23)
CALCIUM SERPL-MCNC: 8.7 MG/DL — SIGNIFICANT CHANGE UP (ref 8.4–10.5)
CHLORIDE SERPL-SCNC: 101 MMOL/L — SIGNIFICANT CHANGE UP (ref 96–108)
CO2 SERPL-SCNC: 28 MMOL/L — SIGNIFICANT CHANGE UP (ref 22–31)
CREAT SERPL-MCNC: 4.39 MG/DL — HIGH (ref 0.5–1.3)
EGFR: 13 ML/MIN/1.73M2 — LOW
GLUCOSE BLDC GLUCOMTR-MCNC: 151 MG/DL — HIGH (ref 70–99)
GLUCOSE BLDC GLUCOMTR-MCNC: 158 MG/DL — HIGH (ref 70–99)
GLUCOSE BLDC GLUCOMTR-MCNC: 174 MG/DL — HIGH (ref 70–99)
GLUCOSE BLDC GLUCOMTR-MCNC: 190 MG/DL — HIGH (ref 70–99)
GLUCOSE SERPL-MCNC: 138 MG/DL — HIGH (ref 70–99)
HCT VFR BLD CALC: 29.1 % — LOW (ref 39–50)
HGB BLD-MCNC: 9 G/DL — LOW (ref 13–17)
MAGNESIUM SERPL-MCNC: 2.2 MG/DL — SIGNIFICANT CHANGE UP (ref 1.6–2.6)
MCHC RBC-ENTMCNC: 30.9 GM/DL — LOW (ref 32–36)
MCHC RBC-ENTMCNC: 32.4 PG — SIGNIFICANT CHANGE UP (ref 27–34)
MCV RBC AUTO: 104.7 FL — HIGH (ref 80–100)
NRBC # BLD: 0 /100 WBCS — SIGNIFICANT CHANGE UP (ref 0–0)
NT-PROBNP SERPL-SCNC: HIGH PG/ML (ref 0–300)
PHOSPHATE SERPL-MCNC: 5.5 MG/DL — HIGH (ref 2.5–4.5)
PLATELET # BLD AUTO: 164 K/UL — SIGNIFICANT CHANGE UP (ref 150–400)
POTASSIUM SERPL-MCNC: 4.3 MMOL/L — SIGNIFICANT CHANGE UP (ref 3.5–5.3)
POTASSIUM SERPL-SCNC: 4.3 MMOL/L — SIGNIFICANT CHANGE UP (ref 3.5–5.3)
PROT SERPL-MCNC: 7.4 G/DL — SIGNIFICANT CHANGE UP (ref 6–8.3)
RBC # BLD: 2.78 M/UL — LOW (ref 4.2–5.8)
RBC # FLD: 19.1 % — HIGH (ref 10.3–14.5)
SODIUM SERPL-SCNC: 140 MMOL/L — SIGNIFICANT CHANGE UP (ref 135–145)
WBC # BLD: 10.26 K/UL — SIGNIFICANT CHANGE UP (ref 3.8–10.5)
WBC # FLD AUTO: 10.26 K/UL — SIGNIFICANT CHANGE UP (ref 3.8–10.5)

## 2023-06-20 PROCEDURE — 99233 SBSQ HOSP IP/OBS HIGH 50: CPT

## 2023-06-20 PROCEDURE — 71045 X-RAY EXAM CHEST 1 VIEW: CPT | Mod: 26

## 2023-06-20 RX ORDER — METOPROLOL TARTRATE 50 MG
12.5 TABLET ORAL EVERY 12 HOURS
Refills: 0 | Status: DISCONTINUED | OUTPATIENT
Start: 2023-06-20 | End: 2023-06-21

## 2023-06-20 RX ORDER — ALBUMIN HUMAN 25 %
100 VIAL (ML) INTRAVENOUS ONCE
Refills: 0 | Status: COMPLETED | OUTPATIENT
Start: 2023-06-20 | End: 2023-06-20

## 2023-06-20 RX ORDER — MIDODRINE HYDROCHLORIDE 2.5 MG/1
10 TABLET ORAL EVERY 8 HOURS
Refills: 0 | Status: DISCONTINUED | OUTPATIENT
Start: 2023-06-20 | End: 2023-06-21

## 2023-06-20 RX ORDER — MIDODRINE HYDROCHLORIDE 2.5 MG/1
10 TABLET ORAL EVERY 8 HOURS
Refills: 0 | Status: DISCONTINUED | OUTPATIENT
Start: 2023-06-20 | End: 2023-06-20

## 2023-06-20 RX ADMIN — Medication 1: at 23:24

## 2023-06-20 RX ADMIN — ISOSORBIDE DINITRATE 10 MILLIGRAM(S): 5 TABLET ORAL at 11:18

## 2023-06-20 RX ADMIN — PANTOPRAZOLE SODIUM 40 MILLIGRAM(S): 20 TABLET, DELAYED RELEASE ORAL at 17:06

## 2023-06-20 RX ADMIN — Medication 0.25 MILLIGRAM(S): at 06:17

## 2023-06-20 RX ADMIN — Medication 250 MILLIGRAM(S): at 14:17

## 2023-06-20 RX ADMIN — Medication 250 MILLIGRAM(S): at 06:16

## 2023-06-20 RX ADMIN — Medication 25 MILLIGRAM(S): at 06:25

## 2023-06-20 RX ADMIN — Medication 250 MILLIGRAM(S): at 21:03

## 2023-06-20 RX ADMIN — MIDODRINE HYDROCHLORIDE 10 MILLIGRAM(S): 2.5 TABLET ORAL at 21:03

## 2023-06-20 RX ADMIN — ZINC SULFATE TAB 220 MG (50 MG ZINC EQUIVALENT) 220 MILLIGRAM(S): 220 (50 ZN) TAB at 11:18

## 2023-06-20 RX ADMIN — MODAFINIL 150 MILLIGRAM(S): 200 TABLET ORAL at 06:16

## 2023-06-20 RX ADMIN — Medication 1: at 11:23

## 2023-06-20 RX ADMIN — Medication 1: at 17:14

## 2023-06-20 RX ADMIN — Medication 15 MILLILITER(S): at 11:18

## 2023-06-20 RX ADMIN — Medication 1: at 06:18

## 2023-06-20 RX ADMIN — CHLORHEXIDINE GLUCONATE 1 APPLICATION(S): 213 SOLUTION TOPICAL at 11:24

## 2023-06-20 RX ADMIN — Medication 2: at 00:00

## 2023-06-20 RX ADMIN — PANTOPRAZOLE SODIUM 40 MILLIGRAM(S): 20 TABLET, DELAYED RELEASE ORAL at 06:17

## 2023-06-20 RX ADMIN — Medication 1 APPLICATION(S): at 11:19

## 2023-06-20 RX ADMIN — Medication 50 MILLILITER(S): at 12:43

## 2023-06-20 NOTE — PROGRESS NOTE ADULT - ASSESSMENT
79 yo m pmhx mild dementia, CHF, CAD s/p CABG with known RCA occlusion, HLD, DM2, recent chf admission c/b choking event --> cardiac arrest cb nstemi, anoxia vs myoclonus briefly transferred to [facility] for continuous eeg, transferred back, ms waxing/waning, ultimately patient not progressing, palliatively extubated 6/7.  Patient's DNR/comfort status rescinded.  Patient remains with waxing waning MS, worsening renal failure, hypotension.    NEURO: Waxing/Waning mental status, keep HOB >30 degrees, aspiration precautions. continue provigil, klonopin and valproic acid  CV: Hypotension, midodrine as adjunctive therapy, metoprolol for rate control   RESP: NC for spo2 >92%, NIPPV qhs  RENAL: ANISH, avoid nephrotoxic meds, renally dose meds, trend urine output, bun/cr and electrolytes.  replace lytes as needed. santos in place  GI: NPO with tf, holding tv while on NIPPV  ENDO: ISS for glycemic control   ID: Monitor off abx therapy   HEME: scd  DISPO: Full code

## 2023-06-20 NOTE — PROGRESS NOTE ADULT - ASSESSMENT
Physical Examination:  GENERAL:               Eyes closed and in no distress,    HEENT:                    No JVD, Dry MM, + NGT  PULM:                     Bilateral air entry, course and diminished  to auscultation bilaterally, left sided chest tube, No Rhonchi, No Wheezing  CVS:                         S1, S2,  +Murmur  ABD:                        Soft, nondistended, nontender, normoactive bowel sounds,   EXT:                         no  edema, nontender, No Cyanosis or Clubbing   Vascular:                Warm Extremities,   NEURO:                 alert at time and lethargic at times open eyes to verbal / tactile stimuli   PSYC:                      Calm, No Insight.      Assessment  78 year old male with a PMH of dementia, HTN, cardiomyopathy, HFrEF, CAD s/p CABG, with known current RCA occlusion, and DM type 2 who was admitted to [facility] on 5/4 with hypoxic respiratory failure in setting of CHF exacerbation and ANISH with course complicated by acute hypoxic respiratory failure in setting of choking episode causing cardiac arrest, shock, worsening hypoxemic respiratory failure, and NSTEMI on 5/9. Pt ICU course noted post cardiac arrest anoxic brain injury and myoclonus. Pt was transferred to [facility] for VEEG which required 48 hours of monitoring as he was noted to still be sedated. While there pt noted to have hematochezia and any ac / antiplatelet agents were held at that time. EEG findings consistent with stimulus induced myoclonus and GPDs s/p hypoxic diffuse indicative of severe cerebral dysfunction.    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    will need to consider HD today based on family discussion first   d/w Renal    Continue hydralazine, Lopressor, Isosorbide  hold AC, pending peg/line placement in am   Stool occult is negative  Chest tube to water seal - monitor output  Daily CXR  NT suction as needed   NIV PRN/QHS   S&S follow up   OOB to chair with assistance    benzo as needed   on  oral brivaracetam with worsening renal function concern of toxic affects, changed to valproic acid    Cont Modafinil which appears to have helped mental status but now with worsening likely due ot uremia    monitor blood glucose levels, + ISS coverage  Sacral decub - wound care , local wound care off loading and nutrition support  Plastics following   Venodyne    GOC ongoing discussion with family , but want full code at this time    PMD:				                   Notified(Date):  Family Updated: 	Kuldeep [phone]	                                 Date:  6/19/2023 -    plan to switch briviac to valproic acid - to see if renal function could improve      Sedation & Analgesia:	  Diet/Nutrition:		 as above  GI PPx:			Protonix  DVT Ppx:		Venodynes   Activity:		  bedrest  Head of Bed:               35-45 Deg  Glycemic Control:         Insulin  Lines: piv  Restraints may be deemed necessary to prevent removal of life-sustaining devices [ x ] YES   [   ]  NO  Disposition: ICU Care  Goals of Care: Full code   Physical Examination:  GENERAL:               Eyes closed and in no distress,    HEENT:                    No JVD, Dry MM, + NGT  PULM:                     Bilateral air entry, course and diminished  to auscultation bilaterally, left sided chest tube, No Rhonchi, No Wheezing  CVS:                         S1, S2,  +Murmur  ABD:                        Soft, nondistended, nontender, normoactive bowel sounds,   EXT:                         no  edema, nontender, No Cyanosis or Clubbing   Vascular:                Warm Extremities,   NEURO:                 alert at time and lethargic at times open eyes to verbal / tactile stimuli   PSYC:                      Calm, No Insight.      Assessment  78 year old male with a PMH of dementia, HTN, cardiomyopathy, HFrEF, CAD s/p CABG, with known current RCA occlusion, and DM type 2 who was admitted to [facility] on 5/4 with hypoxic respiratory failure in setting of CHF exacerbation and ANISH with course complicated by acute hypoxic respiratory failure in setting of choking episode causing cardiac arrest, shock, worsening hypoxemic respiratory failure, and NSTEMI on 5/9. Pt ICU course noted post cardiac arrest anoxic brain injury and myoclonus. Pt was transferred to [facility] for VEEG which required 48 hours of monitoring as he was noted to still be sedated. While there pt noted to have hematochezia and any ac / antiplatelet agents were held at that time. EEG findings consistent with stimulus induced myoclonus and GPDs s/p hypoxic diffuse indicative of severe cerebral dysfunction.    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    will need to consider HD today based on family discussion first   d/w Renal    Continue hydralazine, Lopressor, Isosorbide  hold AC, pending peg/line placement in am   Stool occult is negative  Chest tube to water seal - monitor output  Daily CXR  NT suction as needed   NIV PRN/QHS   S&S follow up   OOB to chair with assistance    benzo as needed   on  oral brivaracetam with worsening renal function concern of toxic affects, changed to valproic acid    Cont Modafinil which appears to have helped mental status but now with worsening likely due ot uremia    monitor blood glucose levels, + ISS coverage  Sacral decub - wound care , local wound care off loading and nutrition support  Plastics following   Venodyne    GOC ongoing discussion with family , but want full code at this time    PMD:				                   Notified(Date):  Family Updated: 	Kuldeep [phone]	                                 Date:  6/20/2023 -    extensive discussion had with kuldeep about mental status, role in hd with uremia, Risk of bleeding infection, pntx  discussed may help egd, volume status and mental status  understands HD may be permanent or temporary but wants to speak       Sedation & Analgesia:	  Diet/Nutrition:		 as above  GI PPx:			Protonix  DVT Ppx:		Venodynes   Activity:		  bedrest  Head of Bed:               35-45 Deg  Glycemic Control:         Insulin  Lines: piv  Restraints may be deemed necessary to prevent removal of life-sustaining devices [ x ] YES   [   ]  NO  Disposition: ICU Care  Goals of Care: Full code

## 2023-06-20 NOTE — PROGRESS NOTE ADULT - SUBJECTIVE AND OBJECTIVE BOX
Seen in ICU, + NGT, NC O2    Vital Signs Last 24 Hrs  T(C): 36.9 (06-20-23 @ 12:00), Max: 37.1 (06-20-23 @ 05:00)  T(F): 98.5 (06-20-23 @ 12:00), Max: 98.8 (06-20-23 @ 08:00)  HR: 92 (06-20-23 @ 16:06) (75 - 104)  BP: 85/42 (06-20-23 @ 15:00) (85/42 - 139/85)  BP(mean): 56 (06-20-23 @ 15:00) (53 - 99)  RR: 17 (06-20-23 @ 15:00) (14 - 36)  SpO2: 100% (06-20-23 @ 16:06) (97% - 100%)    I&O's Detail    19 Jun 2023 07:01  -  20 Jun 2023 07:00  --------------------------------------------------------  IN:    Enteral Tube Flush: 210 mL    Free Water: 800 mL    Nepro with Carb Steady: 780 mL  Total IN: 1790 mL    OUT:    Chest Tube (mL): 700 mL    Voided (mL): 400 mL  Total OUT: 1100 mL    Total NET: 690 mL    20 Jun 2023 07:01  -  20 Jun 2023 16:33  --------------------------------------------------------  IN:    Free Water: 200 mL    IV PiggyBack: 100 mL    Nepro with Carb Steady: 455 mL  Total IN: 755 mL    OUT:    Chest Tube (mL): 170 mL    Voided (mL): 400 mL  Total OUT: 570 mL    Total NET: 185 mL    s1s2  b/l air entry  soft, ND  tr edema                                                                                                                   9.0    10.26 )-----------( 164      ( 20 Jun 2023 06:10 )             29.1     20 Jun 2023 06:10    140    |  101    |  143    ----------------------------<  138    4.3     |  28     |  4.39     Ca    8.7        20 Jun 2023 06:10  Phos  5.5       20 Jun 2023 06:10  Mg     2.2       20 Jun 2023 06:10    TPro  7.4    /  Alb  1.8    /  TBili  0.4    /  DBili  x      /  AST  17     /  ALT  6      /  AlkPhos  123    20 Jun 2023 06:10    LIVER FUNCTIONS - ( 20 Jun 2023 06:10 )  Alb: 1.8 g/dL / Pro: 7.4 g/dL / ALK PHOS: 123 U/L / ALT: 6 U/L / AST: 17 U/L / GGT: x           chlorhexidine 2% Cloths 1 Application(s) Topical daily  clonazePAM  Tablet 0.25 milliGRAM(s) Oral every 12 hours PRN  Dakins Solution - 1/2 Strength 1 Application(s) Topical daily  dextrose 5%. 1000 milliLiter(s) IV Continuous <Continuous>  dextrose 5%. 1000 milliLiter(s) IV Continuous <Continuous>  dextrose 50% Injectable 25 Gram(s) IV Push once  dextrose 50% Injectable 12.5 Gram(s) IV Push once  dextrose 50% Injectable 25 Gram(s) IV Push once  epoetin priyanka-epbx (RETACRIT) Injectable 10367 Unit(s) SubCutaneous every 7 days  insulin lispro (ADMELOG) corrective regimen sliding scale   SubCutaneous every 6 hours  metoprolol tartrate 12.5 milliGRAM(s) Oral every 12 hours  modafinil 150 milliGRAM(s) Oral every 24 hours  multivitamin/minerals/iron Oral Solution (CENTRUM) 15 milliLiter(s) Oral daily  pantoprazole   Suspension 40 milliGRAM(s) Oral every 12 hours  valproic  acid Syrup 250 milliGRAM(s) Oral every 8 hours  zinc sulfate 220 milliGRAM(s) Oral daily    A/P:    Hemodynamic ATN s/p arrest (Cr 1.6 - 5/9/23, Cr 1.0 - 4/12/23)  Resp failure, s/p intubated, now extubated  S/p L CT  CM, EF 35 - 40%, dementia  Cr has improved (peak Cr 5.4 - 5/19/23, aida Cr 1.95 - 6/1/23)  Now w/recurrent hemodynamic ATN, not oliguric  Avoid nephrotoxins  Lytes are stable  Epoetin  F/u CBC, BMP, UO  No urgent indications for RRT, but may need soon   Per d/w ICU team, holding on starting HD today due to hypotension  Pt may not be able to tolerate HD  W/up per ICU team  Will add Midodrine  Prognosis is poor w/or w/o RRT  Will follow closely    [phone]

## 2023-06-20 NOTE — PROGRESS NOTE ADULT - SUBJECTIVE AND OBJECTIVE BOX
Patient is a 78y old  Male who presents with a chief complaint of acute systolic CHF (20 Jun 2023 16:32)      BRIEF HOSPITAL COURSE:   77 yo m pmhx mild dementia, CHF, CAD s/p CABG with known RCA occlusion, HLD, DM2, recent chf admission c/b choking event --> cardiac arrest cb nstemi, anoxia vs myoclonus briefly transferred to [facility] for continuous eeg, transferred back, ms waxing/waning, ultimately patient not progressing, palliatively extubated 6/7.  Patient's DNR/comfort status rescinded.  Patient remains with waxing waning MS, worsening renal failure, hypotension.    Events last 24 hours:   waxing/waning ms, agitated this evening, attempting to pull ngt out.  unrestrained mits placed. planned for NIPPV overnight, tf held.     PAST MEDICAL & SURGICAL HISTORY:  HTN (hypertension)  HLD (hyperlipidemia)  Diabetes  CAD (coronary artery disease)  History of cholecystectomy      Allergies  sulfa drugs (Unknown)      FAMILY HISTORY:  No pertinent family history in first degree relatives      Social History:   from home      Review of Systems:  Unable to obtain 2/2 encephalopathy       Physical Examination:    General: Elderly male, lying in bed    HEENT: temporal wasting, nc in place, ngt in place    PULM: diminished b/l, left ct in place    CVS: rrr    ABD: Soft, nondistended, nontender, +bs    EXT: dependent edema, nontender    SKIN: Warm     NEURO: poor ms, periods of agitation      Medications:  metoprolol tartrate 12.5 milliGRAM(s) Oral every 12 hours  midodrine 10 milliGRAM(s) Oral every 8 hours  clonazePAM  Tablet 0.25 milliGRAM(s) Oral every 12 hours PRN  modafinil 150 milliGRAM(s) Oral every 24 hours  valproic  acid Syrup 250 milliGRAM(s) Oral every 8 hours  pantoprazole   Suspension 40 milliGRAM(s) Oral every 12 hours  dextrose 50% Injectable 25 Gram(s) IV Push once  dextrose 50% Injectable 12.5 Gram(s) IV Push once  dextrose 50% Injectable 25 Gram(s) IV Push once  insulin lispro (ADMELOG) corrective regimen sliding scale   SubCutaneous every 6 hours  dextrose 5%. 1000 milliLiter(s) IV Continuous <Continuous>  dextrose 5%. 1000 milliLiter(s) IV Continuous <Continuous>  multivitamin/minerals/iron Oral Solution (CENTRUM) 15 milliLiter(s) Oral daily  zinc sulfate 220 milliGRAM(s) Oral daily  epoetin priyanka-epbx (RETACRIT) Injectable 76987 Unit(s) SubCutaneous every 7 days  chlorhexidine 2% Cloths 1 Application(s) Topical daily  Dakins Solution - 1/2 Strength 1 Application(s) Topical daily      ICU Vital Signs Last 24 Hrs  T(C): 37.2 (20 Jun 2023 21:00), Max: 37.2 (20 Jun 2023 21:00)  T(F): 98.9 (20 Jun 2023 21:00), Max: 98.9 (20 Jun 2023 21:00)  HR: 85 (20 Jun 2023 23:00) (75 - 99)  BP: 105/70 (20 Jun 2023 23:00) (85/42 - 132/82)  BP(mean): 79 (20 Jun 2023 23:00) (53 - 99)  ABP: --  ABP(mean): --  RR: 21 (20 Jun 2023 23:00) (17 - 36)  SpO2: 100% (20 Jun 2023 23:00) (98% - 100%)    O2 Parameters below as of 20 Jun 2023 19:04  Patient On (Oxygen Delivery Method): nasal cannula, 3L      Vital Signs Last 24 Hrs  T(C): 37.2 (20 Jun 2023 21:00), Max: 37.2 (20 Jun 2023 21:00)  T(F): 98.9 (20 Jun 2023 21:00), Max: 98.9 (20 Jun 2023 21:00)  HR: 85 (20 Jun 2023 23:00) (75 - 99)  BP: 105/70 (20 Jun 2023 23:00) (85/42 - 132/82)  BP(mean): 79 (20 Jun 2023 23:00) (53 - 99)  RR: 21 (20 Jun 2023 23:00) (17 - 36)  SpO2: 100% (20 Jun 2023 23:00) (98% - 100%)    Parameters below as of 20 Jun 2023 19:04  Patient On (Oxygen Delivery Method): nasal cannula, 3L      I&O's Detail    19 Jun 2023 07:01  -  20 Jun 2023 07:00  --------------------------------------------------------  IN:    Enteral Tube Flush: 210 mL    Free Water: 800 mL    Nepro with Carb Steady: 780 mL  Total IN: 1790 mL    OUT:    Chest Tube (mL): 700 mL    Voided (mL): 400 mL  Total OUT: 1100 mL  Total NET: 690 mL      20 Jun 2023 07:01  -  20 Jun 2023 23:22  --------------------------------------------------------  IN:    Free Water: 600 mL    IV PiggyBack: 100 mL    Nepro with Carb Steady: 975 mL  Total IN: 1675 mL    OUT:    Chest Tube (mL): 230 mL    Voided (mL): 500 mL  Total OUT: 730 mL  Total NET: 945 mL      LABS:                        9.0    10.26 )-----------( 164      ( 20 Jun 2023 06:10 )             29.1     06-20    140  |  101  |  143<H>  ----------------------------<  138<H>  4.3   |  28  |  4.39<H>    Ca    8.7      20 Jun 2023 06:10  Phos  5.5     06-20  Mg     2.2     06-20    TPro  7.4  /  Alb  1.8<L>  /  TBili  0.4  /  DBili  x   /  AST  17  /  ALT  6<L>  /  AlkPhos  123<H>  06-20      CAPILLARY BLOOD GLUCOSE  POCT Blood Glucose.: 190 mg/dL (20 Jun 2023 23:20)      CULTURES:      RADIOLOGY:   < from: Xray Chest 1 View- PORTABLE-Routine (Xray Chest 1 View- PORTABLE-Routine in AM.) (06.20.23 @ 07:52) >  ACC: [id number] EXAM:  XR CHEST PORTABLE ROUTINE 1V   ORDERED BY: JC CARD     PROCEDURE DATE:  06/20/2023      INTERPRETATION:  AP erect chest on June 20, 2023 6:27 AM. Patient has   atrial fibrillation.    Heart magnified by technique.Sternotomy, NG tube, and catheter left   chest tube remain. Diffuse mild right spinal curve again noted.    On June 19 no significant infiltrate and effusion particularly in the   right perihilar area. This shows improvement on present study.    No pneumothorax.    IMPRESSION: Improving right chest findings.    --- End of Report ---    DOMINIQUE ZENDEJAS MD; Attending Radiologist  This document has been electronically signed. Jun 20 2023  3:22PM    < end of copied text >

## 2023-06-20 NOTE — PROGRESS NOTE ADULT - SUBJECTIVE AND OBJECTIVE BOX
INTERVAL HPI/OVERNIGHT EVENTS:  HPI:    79 y/o male seen and examined.       MEDICATIONS  (STANDING):  chlorhexidine 2% Cloths 1 Application(s) Topical daily  Dakins Solution - 1/2 Strength 1 Application(s) Topical daily  dextrose 5%. 1000 milliLiter(s) (100 mL/Hr) IV Continuous <Continuous>  dextrose 5%. 1000 milliLiter(s) (50 mL/Hr) IV Continuous <Continuous>  dextrose 50% Injectable 25 Gram(s) IV Push once  dextrose 50% Injectable 25 Gram(s) IV Push once  dextrose 50% Injectable 12.5 Gram(s) IV Push once  epoetin priyanka-epbx (RETACRIT) Injectable 84674 Unit(s) SubCutaneous every 7 days  insulin lispro (ADMELOG) corrective regimen sliding scale   SubCutaneous every 6 hours  isosorbide   dinitrate Tablet (ISORDIL) 10 milliGRAM(s) Oral three times a day  metoprolol tartrate 25 milliGRAM(s) Oral every 8 hours  modafinil 150 milliGRAM(s) Oral every 24 hours  multivitamin/minerals/iron Oral Solution (CENTRUM) 15 milliLiter(s) Oral daily  pantoprazole   Suspension 40 milliGRAM(s) Oral every 12 hours  valproic  acid Syrup 250 milliGRAM(s) Oral every 8 hours  zinc sulfate 220 milliGRAM(s) Oral daily    MEDICATIONS  (PRN):  clonazePAM  Tablet 0.25 milliGRAM(s) Oral every 12 hours PRN myoclonus      Allergies    sulfa drugs (Unknown)    Intolerances        PAST MEDICAL & SURGICAL HISTORY:  HTN (hypertension)      HLD (hyperlipidemia)      Diabetes      CAD (coronary artery disease)      History of cholecystectomy	    PHYSICAL EXAM:   Vital Signs:  Vital Signs Last 24 Hrs  T(C): 37.1 (2023 08:00), Max: 37.6 (2023 16:00)  T(F): 98.8 (2023 08:00), Max: 99.6 (2023 16:00)  HR: 93 (2023 11:00) (82 - 104)  BP: 116/69 (2023 11:00) (89/59 - 139/85)  BP(mean): 80 (2023 11:00) (53 - 99)  RR: 30 (2023 11:00) (12 - 36)  SpO2: 98% (2023 11:00) (97% - 100%)    Parameters below as of 2023 07:00  Patient On (Oxygen Delivery Method): nasal cannula  O2 Flow (L/min): 3    Daily     Daily Weight in k.4 (2023 05:00)I&O's Summary    2023 07:01  -  2023 07:00  --------------------------------------------------------  IN: 1790 mL / OUT: 1100 mL / NET: 690 mL    2023 07:01  -  2023 12:34  --------------------------------------------------------  IN: 460 mL / OUT: 570 mL / NET: -110 mL        GENERAL:  Appears stated age,   HEENT:  NC/AT,  conjunctivae clear and pink, NGT in place  CHEST:  Full & symmetric excursion, no increased effort, breath sounds clear  HEART:  Regular rhythm, S1, S2, no murmur  ABDOMEN:  Soft, non-tender, non-distended, normoactive bowel sounds,    EXTEREMITIES:  no edema  SKIN:  No rash/warm/dry  NEURO:  Alert      LABS:                        9.0    10.26 )-----------( 164      ( 2023 06:10 )             29.1     06-20    140  |  101  |  143<H>  ----------------------------<  138<H>  4.3   |  28  |  4.39<H>    Ca    8.7      2023 06:10  Phos  5.5     06-20  Mg     2.2     06-20    TPro  7.4  /  Alb  1.8<L>  /  TBili  0.4  /  DBili  x   /  AST  17  /  ALT  6<L>  /  AlkPhos  123<H>  06-20        amylase   lipase  RADIOLOGY & ADDITIONAL TESTS:

## 2023-06-20 NOTE — PROGRESS NOTE ADULT - SUBJECTIVE AND OBJECTIVE BOX
Follow-up Critical Care Progress Note  Chief Complaint : Atrial fibrillation    patient seen and examined  lethargic this am  s/p klonopin this am        Allergies :sulfa drugs (Unknown)      PAST MEDICAL & SURGICAL HISTORY:  HTN (hypertension)  HLD (hyperlipidemia)  Diabetes  CAD (coronary artery disease)  History of cholecystectomy        Medications:  MEDICATIONS  (STANDING):  chlorhexidine 2% Cloths 1 Application(s) Topical daily  Dakins Solution - 1/2 Strength 1 Application(s) Topical daily  dextrose 5%. 1000 milliLiter(s) (100 mL/Hr) IV Continuous <Continuous>  dextrose 5%. 1000 milliLiter(s) (50 mL/Hr) IV Continuous <Continuous>  dextrose 50% Injectable 25 Gram(s) IV Push once  dextrose 50% Injectable 12.5 Gram(s) IV Push once  dextrose 50% Injectable 25 Gram(s) IV Push once  epoetin priyanka-epbx (RETACRIT) Injectable 80967 Unit(s) SubCutaneous every 7 days  insulin lispro (ADMELOG) corrective regimen sliding scale   SubCutaneous every 6 hours  isosorbide   dinitrate Tablet (ISORDIL) 10 milliGRAM(s) Oral three times a day  metoprolol tartrate 25 milliGRAM(s) Oral every 8 hours  modafinil 150 milliGRAM(s) Oral every 24 hours  multivitamin/minerals/iron Oral Solution (CENTRUM) 15 milliLiter(s) Oral daily  pantoprazole   Suspension 40 milliGRAM(s) Oral every 12 hours  valproic  acid Syrup 250 milliGRAM(s) Oral every 8 hours  zinc sulfate 220 milliGRAM(s) Oral daily    MEDICATIONS  (PRN):  clonazePAM  Tablet 0.25 milliGRAM(s) Oral every 12 hours PRN myoclonus      Antibiotics History  ceFAZolin   IVPB 2000 milliGRAM(s) IV Intermittent once, 06-12-23 @ 07:00, Stop order after: 1 Doses  vancomycin  IVPB 1000 milliGRAM(s) IV Intermittent every 24 hours, 06-04-23 @ 12:32, Stop order after: 42 Days  vancomycin  IVPB    , 06-03-23 @ 12:32  vancomycin  IVPB 1000 milliGRAM(s) IV Intermittent once, 06-03-23 @ 12:32, Stop order after: 1 Doses       GI Medications  pantoprazole   Suspension 40 milliGRAM(s) Oral every 12 hours, 06-19-23 @ 09:20, Routine      COVID  06-11-23 @ 12:25  COVID -   NotDetec  05-04-23 @ 19:50  COVID -   NotDetec  07-13-22 @ 18:33  COVID -   NotDetec      COVID Biomarkers    06-12-23 @ 05:00 ESR --  ---  CRP --  ---  DDimer  --   ---      ---   Ferritin --    05-04-23 @ 19:50 ESR --  ---  CRP --  ---  DDimer  351<H>   ---   LDH --   ---   Ferritin --        Trend BNP  06-20-23 @ 06:10   -  >79599<H>  06-14-23 @ 06:00   -  >45043<H>  06-12-23 @ 05:00   -  54713<H>  06-11-23 @ 06:15   -  >49634<H>  05-14-23 @ 11:30   -  39071<H>  05-11-23 @ 06:05   -  27545<H>    Procalcitonin Trend  06-05-23 @ 05:30   -   0.24<H>  06-04-23 @ 06:00   -   0.20<H>  05-17-23 @ 23:44   -   0.59<H>  05-16-23 @ 22:16   -   0.74<H>  05-15-23 @ 05:45   -   1.12<H>  05-09-23 @ 13:42   -   0.11<H>    WBC Trend  06-20-23 @ 06:10   -  10.26  06-19-23 @ 05:20   -  10.72<H>  06-18-23 @ 05:30   -  11.17<H>    H/H Trend  06-20-23 @ 06:10   -   9.0<L>/ 29.1<L>  06-19-23 @ 05:20   -   9.2<L>/ 29.2<L>  06-18-23 @ 05:30   -   9.2<L>/ 29.5<L>  06-17-23 @ 05:30   -   9.4<L>/ 30.2<L>  06-16-23 @ 05:00   -   8.4<L>/ 27.5<L>  06-15-23 @ 05:00   -   9.2<L>/ 29.8<L>    Stool Occult Blood  06-15-23 @ 13:54   -   Negative  06-02-23 @ 08:20   -   Negative  05-25-23 @ 18:20   -   Negative  05-16-23 @ 17:00   -   Positive<!>    Platelet Trend  06-20-23 @ 06:10   -  164  06-19-23 @ 05:20   -  149<L>  06-18-23 @ 05:30   -  134<L>    Trend Sodium  06-20-23 @ 06:10   -  140  06-19-23 @ 05:20   -  139  06-18-23 @ 05:30   -  141    Trend Potassium  06-20-23 @ 06:10   -  4.3  06-19-23 @ 05:20   -  4.0  06-18-23 @ 05:30   -  4.0    Trend Bun/Cr  06-20-23 @ 06:10  BUN/CR -  143<H> / 4.39<H>  06-19-23 @ 05:20  BUN/CR -  129<H> / 3.87<H>  06-18-23 @ 05:30  BUN/CR -  124<H> / 3.72<H>    Lactic Acid Trend  06-13-23 @ 05:35   -   1.4  06-12-23 @ 05:00   -   1.8    ABG Trend  06-16-23 @ 05:00   - 7.43/41/119<H>/99.4<H>  06-12-23 @ 05:07   - 7.46<H>/37/115<H>/99.3<H>  06-10-23 @ 04:45   - 7.42/41/78<L>/96.6  06-09-23 @ 10:50   - 7.39/40/86/98.9<H>  05-19-23 @ 00:22   - 7.37/43/144<H>/99.8<H>  05-18-23 @ 05:38   - 7.41/37/97/98.4<H>  05-17-23 @ 23:36   - 7.42/37/97/98.3<H>  05-16-23 @ 22:10   - 7.41/41/92/98.5<H>  05-16-23 @ 05:59   - 7.35/47/104/98.8<H>  05-15-23 @ 13:45   - 7.36/47/74<L>/96.6  05-15-23 @ 04:45   - 7.33<L>/48/70<L>/95.7  05-14-23 @ 05:20   - 7.40/46/72<L>/96.5    Trend AST/ALT/ALK Phos/Bili  06-20-23 @ 06:10   17/6<L>/123<H>/0.4  06-19-23 @ 05:20   14/10/120/0.6  06-18-23 @ 05:30   15/9<L>/116/0.6  06-16-23 @ 05:00   21/10/119/0.6  06-15-23 @ 05:00   12/7<L>/128<H>/0.6  06-14-23 @ 06:00   12/9<L>/134<H>/0.6  06-13-23 @ 05:35   16/13/160<H>/0.7  06-12-23 @ 05:00   17/13/158<H>/0.7  06-11-23 @ 06:15   18/13/196<H>/0.8  06-10-23 @ 06:00   16/15/153<H>/0.6  06-09-23 @ 06:10   20/15/177<H>/0.8  06-08-23 @ 10:20   25/22/198<H>/0.8      Ammonia Trend  05-14-23 @ 11:30   -   53  05-14-23 @ 05:00   -   16     Albumin Trend  06-20-23 @ 06:10   -   1.8<L>  06-19-23 @ 05:20   -   1.9<L>  06-18-23 @ 05:30   -   1.9<L>  06-16-23 @ 05:00   -   1.9<L>  06-15-23 @ 05:00   -   2.0<L>  06-14-23 @ 06:00   -   1.8<L>      PTT - PT - INR Trend  06-12-23 @ 05:00   -   28.2 - 15.8<H> - 1.36<H>  05-19-23 @ 00:44   -   26.5<L> - 13.2 - 1.15  05-17-23 @ 23:44   -   31.2 - 13.7<H> - 1.18<H>  05-16-23 @ 22:16   -   31.3 - 13.6<H> - 1.18<H>  05-15-23 @ 13:55   -   32.9 - 14.0<H> - 1.19<H>  05-14-23 @ 13:45   -   33.6 - -- - --    Glucose Trend  06-20-23 @ 06:10   -  -- 138<H> --  06-20-23 @ 05:57   -  -- -- 151<H>  06-19-23 @ 23:40   -  -- -- 217<H>  06-19-23 @ 17:26   -  -- -- 255<H>  06-19-23 @ 11:26   -  -- -- 219<H>  06-19-23 @ 06:02   -  -- -- 156<H>  06-19-23 @ 05:20   -  -- 149<H> --  06-18-23 @ 23:49   -  -- -- 199<H>  06-18-23 @ 17:42   -  -- -- 219<H>  06-18-23 @ 11:10   -  -- -- 191<H>    A1C with Estimated Average Glucose Result: 7.7 % *H* [4.0 - 5.6] (05-05-23 @ 08:00)      LABS:                        9.0    10.26 )-----------( 164      ( 20 Jun 2023 06:10 )             29.1     06-20    140  |  101  |  143<H>  ----------------------------<  138<H>  4.3   |  28  |  4.39<H>    Ca    8.7      20 Jun 2023 06:10  Phos  5.5     06-20  Mg     2.2     06-20    TPro  7.4  /  Alb  1.8<L>  /  TBili  0.4  /  DBili  x   /  AST  17  /  ALT  6<L>  /  AlkPhos  123<H>  06-20       CULTURES: (if applicable)    Culture - Fungal, Body Fluid (collected 06-11-23 @ 09:34)  Source: Pleural Fl Pleural Fluid  Preliminary Report (06-14-23 @ 15:03):    Culture is being performed. Fungal cultures are held for 4 weeks.    Culture - Body Fluid with Gram Stain (collected 06-11-23 @ 09:34)  Source: Pleural Fl Pleural Fluid  Gram Stain (06-11-23 @ 23:31):    polymorphonuclear leukocytes seen    No organisms seen    by cytocentrifuge  Final Report (06-16-23 @ 15:39):    No growth at 5 days       RADIOLOGY  CXR:   CXR improved vascular congestion.     VITALS:  T(C): 37.1 (06-20-23 @ 05:00), Max: 37.6 (06-19-23 @ 16:00)  T(F): 98.7 (06-20-23 @ 05:00), Max: 99.6 (06-19-23 @ 16:00)  HR: 85 (06-20-23 @ 07:00) (82 - 104)  BP: 107/69 (06-20-23 @ 07:00) (102/69 - 139/85)  BP(mean): 83 (06-20-23 @ 07:00) (76 - 99)  ABP: --  ABP(mean): --  RR: 33 (06-20-23 @ 07:00) (12 - 38)  SpO2: 100% (06-20-23 @ 07:00) (97% - 100%)  CVP(mm Hg): --  CVP(cm H2O): --    Ins and Outs     06-19-23 @ 07:01  -  06-20-23 @ 07:00  --------------------------------------------------------  IN: 1790 mL / OUT: 1100 mL / NET: 690 mL                I&O's Detail    19 Jun 2023 07:01  -  20 Jun 2023 07:00  --------------------------------------------------------  IN:    Enteral Tube Flush: 210 mL    Free Water: 800 mL    Nepro with Carb Steady: 780 mL  Total IN: 1790 mL    OUT:    Chest Tube (mL): 700 mL    Voided (mL): 400 mL  Total OUT: 1100 mL    Total NET: 690 mL          Physical Examination:  GENERAL:               Alert, Oriented, No acute distress.    HEENT:                    Pupils equal, reactive to light.  Symmetric. No JVD, Moist MM  PULM:                     Bilateral air entry, Clear to auscultation bilaterally, no significant sputum production, No Rales, No Rhonchi, No Wheezing  CVS:                         S1, S2,  No Murmur  ABD:                        Soft, nondistended, nontender, normoactive bowel sounds,   EXT:                         No edema, nontender, No Cyanosis or Clubbing   Vascular:                Warm Extremities, Normal Capillary refill, Normal Distal Pulses  SKIN:                       Warm and well perfused, no rashes noted.   NEURO:                  Alert, oriented, interactive, nonfocal, follows commands  PSYC:                      Calm, + Insight.             Follow-up Critical Care Progress Note  Chief Complaint : Atrial fibrillation    patient seen and examined  lethargic this am  s/p klonopin this am        Allergies :sulfa drugs (Unknown)      PAST MEDICAL & SURGICAL HISTORY:  HTN (hypertension)  HLD (hyperlipidemia)  Diabetes  CAD (coronary artery disease)  History of cholecystectomy        Medications:  MEDICATIONS  (STANDING):  chlorhexidine 2% Cloths 1 Application(s) Topical daily  Dakins Solution - 1/2 Strength 1 Application(s) Topical daily  dextrose 5%. 1000 milliLiter(s) (100 mL/Hr) IV Continuous <Continuous>  dextrose 5%. 1000 milliLiter(s) (50 mL/Hr) IV Continuous <Continuous>  dextrose 50% Injectable 25 Gram(s) IV Push once  dextrose 50% Injectable 12.5 Gram(s) IV Push once  dextrose 50% Injectable 25 Gram(s) IV Push once  epoetin priyanka-epbx (RETACRIT) Injectable 71088 Unit(s) SubCutaneous every 7 days  insulin lispro (ADMELOG) corrective regimen sliding scale   SubCutaneous every 6 hours  isosorbide   dinitrate Tablet (ISORDIL) 10 milliGRAM(s) Oral three times a day  metoprolol tartrate 25 milliGRAM(s) Oral every 8 hours  modafinil 150 milliGRAM(s) Oral every 24 hours  multivitamin/minerals/iron Oral Solution (CENTRUM) 15 milliLiter(s) Oral daily  pantoprazole   Suspension 40 milliGRAM(s) Oral every 12 hours  valproic  acid Syrup 250 milliGRAM(s) Oral every 8 hours  zinc sulfate 220 milliGRAM(s) Oral daily    MEDICATIONS  (PRN):  clonazePAM  Tablet 0.25 milliGRAM(s) Oral every 12 hours PRN myoclonus      Antibiotics History  ceFAZolin   IVPB 2000 milliGRAM(s) IV Intermittent once, 06-12-23 @ 07:00, Stop order after: 1 Doses  vancomycin  IVPB 1000 milliGRAM(s) IV Intermittent every 24 hours, 06-04-23 @ 12:32, Stop order after: 42 Days  vancomycin  IVPB    , 06-03-23 @ 12:32  vancomycin  IVPB 1000 milliGRAM(s) IV Intermittent once, 06-03-23 @ 12:32, Stop order after: 1 Doses       GI Medications  pantoprazole   Suspension 40 milliGRAM(s) Oral every 12 hours, 06-19-23 @ 09:20, Routine      COVID  06-11-23 @ 12:25  COVID -   NotDetec  05-04-23 @ 19:50  COVID -   NotDetec  07-13-22 @ 18:33  COVID -   NotDetec      COVID Biomarkers    06-12-23 @ 05:00 ESR --  ---  CRP --  ---  DDimer  --   ---      ---   Ferritin --    05-04-23 @ 19:50 ESR --  ---  CRP --  ---  DDimer  351<H>   ---   LDH --   ---   Ferritin --        Trend BNP  06-20-23 @ 06:10   -  >12007<H>  06-14-23 @ 06:00   -  >51526<H>  06-12-23 @ 05:00   -  19220<H>  06-11-23 @ 06:15   -  >98313<H>  05-14-23 @ 11:30   -  75618<H>  05-11-23 @ 06:05   -  05216<H>    Procalcitonin Trend  06-05-23 @ 05:30   -   0.24<H>  06-04-23 @ 06:00   -   0.20<H>  05-17-23 @ 23:44   -   0.59<H>  05-16-23 @ 22:16   -   0.74<H>  05-15-23 @ 05:45   -   1.12<H>  05-09-23 @ 13:42   -   0.11<H>    WBC Trend  06-20-23 @ 06:10   -  10.26  06-19-23 @ 05:20   -  10.72<H>  06-18-23 @ 05:30   -  11.17<H>    H/H Trend  06-20-23 @ 06:10   -   9.0<L>/ 29.1<L>  06-19-23 @ 05:20   -   9.2<L>/ 29.2<L>  06-18-23 @ 05:30   -   9.2<L>/ 29.5<L>  06-17-23 @ 05:30   -   9.4<L>/ 30.2<L>  06-16-23 @ 05:00   -   8.4<L>/ 27.5<L>  06-15-23 @ 05:00   -   9.2<L>/ 29.8<L>    Stool Occult Blood  06-15-23 @ 13:54   -   Negative  06-02-23 @ 08:20   -   Negative  05-25-23 @ 18:20   -   Negative  05-16-23 @ 17:00   -   Positive<!>    Platelet Trend  06-20-23 @ 06:10   -  164  06-19-23 @ 05:20   -  149<L>  06-18-23 @ 05:30   -  134<L>    Trend Sodium  06-20-23 @ 06:10   -  140  06-19-23 @ 05:20   -  139  06-18-23 @ 05:30   -  141    Trend Potassium  06-20-23 @ 06:10   -  4.3  06-19-23 @ 05:20   -  4.0  06-18-23 @ 05:30   -  4.0    Trend Bun/Cr  06-20-23 @ 06:10  BUN/CR -  143<H> / 4.39<H>  06-19-23 @ 05:20  BUN/CR -  129<H> / 3.87<H>  06-18-23 @ 05:30  BUN/CR -  124<H> / 3.72<H>    Lactic Acid Trend  06-13-23 @ 05:35   -   1.4  06-12-23 @ 05:00   -   1.8    ABG Trend  06-16-23 @ 05:00   - 7.43/41/119<H>/99.4<H>  06-12-23 @ 05:07   - 7.46<H>/37/115<H>/99.3<H>  06-10-23 @ 04:45   - 7.42/41/78<L>/96.6  06-09-23 @ 10:50   - 7.39/40/86/98.9<H>  05-19-23 @ 00:22   - 7.37/43/144<H>/99.8<H>  05-18-23 @ 05:38   - 7.41/37/97/98.4<H>  05-17-23 @ 23:36   - 7.42/37/97/98.3<H>  05-16-23 @ 22:10   - 7.41/41/92/98.5<H>  05-16-23 @ 05:59   - 7.35/47/104/98.8<H>  05-15-23 @ 13:45   - 7.36/47/74<L>/96.6  05-15-23 @ 04:45   - 7.33<L>/48/70<L>/95.7  05-14-23 @ 05:20   - 7.40/46/72<L>/96.5    Trend AST/ALT/ALK Phos/Bili  06-20-23 @ 06:10   17/6<L>/123<H>/0.4  06-19-23 @ 05:20   14/10/120/0.6  06-18-23 @ 05:30   15/9<L>/116/0.6  06-16-23 @ 05:00   21/10/119/0.6  06-15-23 @ 05:00   12/7<L>/128<H>/0.6  06-14-23 @ 06:00   12/9<L>/134<H>/0.6  06-13-23 @ 05:35   16/13/160<H>/0.7  06-12-23 @ 05:00   17/13/158<H>/0.7  06-11-23 @ 06:15   18/13/196<H>/0.8  06-10-23 @ 06:00   16/15/153<H>/0.6  06-09-23 @ 06:10   20/15/177<H>/0.8  06-08-23 @ 10:20   25/22/198<H>/0.8      Ammonia Trend  05-14-23 @ 11:30   -   53  05-14-23 @ 05:00   -   16     Albumin Trend  06-20-23 @ 06:10   -   1.8<L>  06-19-23 @ 05:20   -   1.9<L>  06-18-23 @ 05:30   -   1.9<L>  06-16-23 @ 05:00   -   1.9<L>  06-15-23 @ 05:00   -   2.0<L>  06-14-23 @ 06:00   -   1.8<L>      PTT - PT - INR Trend  06-12-23 @ 05:00   -   28.2 - 15.8<H> - 1.36<H>  05-19-23 @ 00:44   -   26.5<L> - 13.2 - 1.15  05-17-23 @ 23:44   -   31.2 - 13.7<H> - 1.18<H>  05-16-23 @ 22:16   -   31.3 - 13.6<H> - 1.18<H>  05-15-23 @ 13:55   -   32.9 - 14.0<H> - 1.19<H>  05-14-23 @ 13:45   -   33.6 - -- - --    Glucose Trend  06-20-23 @ 06:10   -  -- 138<H> --  06-20-23 @ 05:57   -  -- -- 151<H>  06-19-23 @ 23:40   -  -- -- 217<H>  06-19-23 @ 17:26   -  -- -- 255<H>  06-19-23 @ 11:26   -  -- -- 219<H>  06-19-23 @ 06:02   -  -- -- 156<H>  06-19-23 @ 05:20   -  -- 149<H> --  06-18-23 @ 23:49   -  -- -- 199<H>  06-18-23 @ 17:42   -  -- -- 219<H>  06-18-23 @ 11:10   -  -- -- 191<H>    A1C with Estimated Average Glucose Result: 7.7 % *H* [4.0 - 5.6] (05-05-23 @ 08:00)      LABS:                        9.0    10.26 )-----------( 164      ( 20 Jun 2023 06:10 )             29.1     06-20    140  |  101  |  143<H>  ----------------------------<  138<H>  4.3   |  28  |  4.39<H>    Ca    8.7      20 Jun 2023 06:10  Phos  5.5     06-20  Mg     2.2     06-20    TPro  7.4  /  Alb  1.8<L>  /  TBili  0.4  /  DBili  x   /  AST  17  /  ALT  6<L>  /  AlkPhos  123<H>  06-20       CULTURES: (if applicable)    Culture - Fungal, Body Fluid (collected 06-11-23 @ 09:34)  Source: Pleural Fl Pleural Fluid  Preliminary Report (06-14-23 @ 15:03):    Culture is being performed. Fungal cultures are held for 4 weeks.    Culture - Body Fluid with Gram Stain (collected 06-11-23 @ 09:34)  Source: Pleural Fl Pleural Fluid  Gram Stain (06-11-23 @ 23:31):    polymorphonuclear leukocytes seen    No organisms seen    by cytocentrifuge  Final Report (06-16-23 @ 15:39):    No growth at 5 days       RADIOLOGY  CXR:   CXR improved vascular congestion.     VITALS:  T(C): 37.1 (06-20-23 @ 05:00), Max: 37.6 (06-19-23 @ 16:00)  T(F): 98.7 (06-20-23 @ 05:00), Max: 99.6 (06-19-23 @ 16:00)  HR: 85 (06-20-23 @ 07:00) (82 - 104)  BP: 107/69 (06-20-23 @ 07:00) (102/69 - 139/85)  BP(mean): 83 (06-20-23 @ 07:00) (76 - 99)  ABP: --  ABP(mean): --  RR: 33 (06-20-23 @ 07:00) (12 - 38)  SpO2: 100% (06-20-23 @ 07:00) (97% - 100%)  CVP(mm Hg): --  CVP(cm H2O): --    Ins and Outs     06-19-23 @ 07:01  -  06-20-23 @ 07:00  --------------------------------------------------------  IN: 1790 mL / OUT: 1100 mL / NET: 690 mL                I&O's Detail    19 Jun 2023 07:01  -  20 Jun 2023 07:00  --------------------------------------------------------  IN:    Enteral Tube Flush: 210 mL    Free Water: 800 mL    Nepro with Carb Steady: 780 mL  Total IN: 1790 mL    OUT:    Chest Tube (mL): 700 mL    Voided (mL): 400 mL  Total OUT: 1100 mL    Total NET: 690 mL

## 2023-06-20 NOTE — PROGRESS NOTE ADULT - ASSESSMENT
78 year old man PMHx dementia, HTN, cardiomyopathy, HFrEF, CAD s/p CABG, with known current RCA occlusion, and DM type 2 who was admitted to [facility] on 5/4 with hypoxic respiratory failure in setting of CHF exacerbation and ANISH with course complicated by acute hypoxic respiratory failure in setting of choking episode causing cardiac arrest, shock, worsening hypoxemic respiratory failure, and NSTEMI on 5/9. Pt ICU course noted post cardiac arrest anoxic brain injury and myoclonus.  EEG findings consistent with stimulus induced myoclonus and GPDs s/p hypoxic diffuse indicative of severe cerebral dysfunction.  Patient returned to [facility] ICU on 5/19 and continues tx.  Reevaluation of PEG requested. Patient currently receiving NG tube feeding.

## 2023-06-20 NOTE — PROGRESS NOTE ADULT - PROBLEM SELECTOR PLAN 1
Reevaluation for PEG  Attending discussed with patient's daughter Kuldeep at bedside on Sunday and explained that his underlying cardiopulmonary issues place him at higher risk of complication from the procedure but that if long term nutritional support is needed then a PEG is the optimal way of providing this. She verbalized understanding and wishes to proceed with PEG placement.  Recommend holding Eliquis 2 days prior to procedure.   Tentatively scheduled for THURSDAY peg placement , patient to receive dialysis today and tomorrow for optimization prior to procedure

## 2023-06-21 LAB
ALBUMIN SERPL ELPH-MCNC: 1.9 G/DL — LOW (ref 3.3–5)
ALP SERPL-CCNC: 98 U/L — SIGNIFICANT CHANGE UP (ref 40–120)
ALT FLD-CCNC: 9 U/L — LOW (ref 10–45)
AMMONIA BLD-MCNC: 92 UMOL/L — HIGH (ref 11–55)
ANION GAP SERPL CALC-SCNC: 9 MMOL/L — SIGNIFICANT CHANGE UP (ref 5–17)
AST SERPL-CCNC: 10 U/L — SIGNIFICANT CHANGE UP (ref 10–40)
BASE EXCESS BLDA CALC-SCNC: 2.7 MMOL/L — SIGNIFICANT CHANGE UP (ref -2–3)
BILIRUB SERPL-MCNC: 0.4 MG/DL — SIGNIFICANT CHANGE UP (ref 0.2–1.2)
BUN SERPL-MCNC: 153 MG/DL — HIGH (ref 7–23)
CALCIUM SERPL-MCNC: 8.3 MG/DL — LOW (ref 8.4–10.5)
CHLORIDE SERPL-SCNC: 101 MMOL/L — SIGNIFICANT CHANGE UP (ref 96–108)
CO2 BLDA-SCNC: 29 MMOL/L — HIGH (ref 19–24)
CO2 SERPL-SCNC: 30 MMOL/L — SIGNIFICANT CHANGE UP (ref 22–31)
CREAT SERPL-MCNC: 4.52 MG/DL — HIGH (ref 0.5–1.3)
EGFR: 12 ML/MIN/1.73M2 — LOW
GAS PNL BLDA: SIGNIFICANT CHANGE UP
GLUCOSE BLDC GLUCOMTR-MCNC: 164 MG/DL — HIGH (ref 70–99)
GLUCOSE BLDC GLUCOMTR-MCNC: 176 MG/DL — HIGH (ref 70–99)
GLUCOSE BLDC GLUCOMTR-MCNC: 178 MG/DL — HIGH (ref 70–99)
GLUCOSE BLDC GLUCOMTR-MCNC: 193 MG/DL — HIGH (ref 70–99)
GLUCOSE SERPL-MCNC: 143 MG/DL — HIGH (ref 70–99)
HBV CORE AB SER-ACNC: SIGNIFICANT CHANGE UP
HBV SURFACE AB SER-ACNC: <3 MIU/ML — LOW
HBV SURFACE AG SER-ACNC: SIGNIFICANT CHANGE UP
HCO3 BLDA-SCNC: 28 MMOL/L — SIGNIFICANT CHANGE UP (ref 21–28)
HCT VFR BLD CALC: 27 % — LOW (ref 39–50)
HCV AB S/CO SERPL IA: 0.11 S/CO — SIGNIFICANT CHANGE UP (ref 0–0.99)
HCV AB SERPL-IMP: SIGNIFICANT CHANGE UP
HGB BLD-MCNC: 8.3 G/DL — LOW (ref 13–17)
HOROWITZ INDEX BLDA+IHG-RTO: 30 — SIGNIFICANT CHANGE UP
MAGNESIUM SERPL-MCNC: 2.3 MG/DL — SIGNIFICANT CHANGE UP (ref 1.6–2.6)
MCHC RBC-ENTMCNC: 30.7 GM/DL — LOW (ref 32–36)
MCHC RBC-ENTMCNC: 31.7 PG — SIGNIFICANT CHANGE UP (ref 27–34)
MCV RBC AUTO: 103.1 FL — HIGH (ref 80–100)
NRBC # BLD: 0 /100 WBCS — SIGNIFICANT CHANGE UP (ref 0–0)
PCO2 BLDA: 47 MMHG — SIGNIFICANT CHANGE UP (ref 35–48)
PH BLDA: 7.38 — SIGNIFICANT CHANGE UP (ref 7.35–7.45)
PHOSPHATE SERPL-MCNC: 5.6 MG/DL — HIGH (ref 2.5–4.5)
PLATELET # BLD AUTO: 166 K/UL — SIGNIFICANT CHANGE UP (ref 150–400)
PO2 BLDA: 72 MMHG — LOW (ref 83–108)
POTASSIUM SERPL-MCNC: 3.7 MMOL/L — SIGNIFICANT CHANGE UP (ref 3.5–5.3)
POTASSIUM SERPL-SCNC: 3.7 MMOL/L — SIGNIFICANT CHANGE UP (ref 3.5–5.3)
PROT SERPL-MCNC: 6.5 G/DL — SIGNIFICANT CHANGE UP (ref 6–8.3)
RBC # BLD: 2.62 M/UL — LOW (ref 4.2–5.8)
RBC # FLD: 18.8 % — HIGH (ref 10.3–14.5)
SAO2 % BLDA: 96.4 % — SIGNIFICANT CHANGE UP (ref 94–98)
SODIUM SERPL-SCNC: 140 MMOL/L — SIGNIFICANT CHANGE UP (ref 135–145)
WBC # BLD: 8.01 K/UL — SIGNIFICANT CHANGE UP (ref 3.8–10.5)
WBC # FLD AUTO: 8.01 K/UL — SIGNIFICANT CHANGE UP (ref 3.8–10.5)

## 2023-06-21 PROCEDURE — 99233 SBSQ HOSP IP/OBS HIGH 50: CPT

## 2023-06-21 PROCEDURE — 71045 X-RAY EXAM CHEST 1 VIEW: CPT | Mod: 26,77

## 2023-06-21 PROCEDURE — 71045 X-RAY EXAM CHEST 1 VIEW: CPT | Mod: 26

## 2023-06-21 RX ORDER — SODIUM CHLORIDE 9 MG/ML
10 INJECTION INTRAMUSCULAR; INTRAVENOUS; SUBCUTANEOUS
Refills: 0 | Status: DISCONTINUED | OUTPATIENT
Start: 2023-06-21 | End: 2023-07-07

## 2023-06-21 RX ORDER — DESMOPRESSIN ACETATE 0.1 MG/1
23 TABLET ORAL ONCE
Refills: 0 | Status: COMPLETED | OUTPATIENT
Start: 2023-06-21 | End: 2023-06-21

## 2023-06-21 RX ORDER — DIGOXIN 250 MCG
125 TABLET ORAL ONCE
Refills: 0 | Status: COMPLETED | OUTPATIENT
Start: 2023-06-21 | End: 2023-06-21

## 2023-06-21 RX ORDER — MIDODRINE HYDROCHLORIDE 2.5 MG/1
10 TABLET ORAL EVERY 8 HOURS
Refills: 0 | Status: DISCONTINUED | OUTPATIENT
Start: 2023-06-21 | End: 2023-06-23

## 2023-06-21 RX ORDER — LACTULOSE 10 G/15ML
30 SOLUTION ORAL ONCE
Refills: 0 | Status: COMPLETED | OUTPATIENT
Start: 2023-06-21 | End: 2023-06-21

## 2023-06-21 RX ORDER — PANTOPRAZOLE SODIUM 20 MG/1
40 TABLET, DELAYED RELEASE ORAL DAILY
Refills: 0 | Status: DISCONTINUED | OUTPATIENT
Start: 2023-06-22 | End: 2023-07-07

## 2023-06-21 RX ADMIN — Medication 1: at 23:58

## 2023-06-21 RX ADMIN — Medication 250 MILLIGRAM(S): at 05:21

## 2023-06-21 RX ADMIN — MODAFINIL 150 MILLIGRAM(S): 200 TABLET ORAL at 05:16

## 2023-06-21 RX ADMIN — Medication 1: at 16:42

## 2023-06-21 RX ADMIN — PANTOPRAZOLE SODIUM 40 MILLIGRAM(S): 20 TABLET, DELAYED RELEASE ORAL at 05:14

## 2023-06-21 RX ADMIN — MIDODRINE HYDROCHLORIDE 10 MILLIGRAM(S): 2.5 TABLET ORAL at 21:47

## 2023-06-21 RX ADMIN — Medication 125 MICROGRAM(S): at 11:42

## 2023-06-21 RX ADMIN — Medication 0.25 MILLIGRAM(S): at 00:41

## 2023-06-21 RX ADMIN — Medication 1 APPLICATION(S): at 11:12

## 2023-06-21 RX ADMIN — CHLORHEXIDINE GLUCONATE 1 APPLICATION(S): 213 SOLUTION TOPICAL at 11:05

## 2023-06-21 RX ADMIN — Medication 15 MILLILITER(S): at 11:04

## 2023-06-21 RX ADMIN — Medication 1: at 11:10

## 2023-06-21 RX ADMIN — DESMOPRESSIN ACETATE 223 MICROGRAM(S): 0.1 TABLET ORAL at 13:25

## 2023-06-21 RX ADMIN — LACTULOSE 30 GRAM(S): 10 SOLUTION ORAL at 11:12

## 2023-06-21 RX ADMIN — Medication 1: at 05:13

## 2023-06-21 RX ADMIN — MIDODRINE HYDROCHLORIDE 10 MILLIGRAM(S): 2.5 TABLET ORAL at 14:28

## 2023-06-21 RX ADMIN — MIDODRINE HYDROCHLORIDE 10 MILLIGRAM(S): 2.5 TABLET ORAL at 05:14

## 2023-06-21 RX ADMIN — ZINC SULFATE TAB 220 MG (50 MG ZINC EQUIVALENT) 220 MILLIGRAM(S): 220 (50 ZN) TAB at 11:04

## 2023-06-21 NOTE — PROGRESS NOTE ADULT - SUBJECTIVE AND OBJECTIVE BOX
INTERVAL HPI/OVERNIGHT EVENTS:  HPI:    77 y/o male seen and examined.      MEDICATIONS  (STANDING):  chlorhexidine 2% Cloths 1 Application(s) Topical daily  Dakins Solution - 1/2 Strength 1 Application(s) Topical daily  dextrose 5%. 1000 milliLiter(s) (50 mL/Hr) IV Continuous <Continuous>  dextrose 5%. 1000 milliLiter(s) (100 mL/Hr) IV Continuous <Continuous>  dextrose 50% Injectable 25 Gram(s) IV Push once  dextrose 50% Injectable 25 Gram(s) IV Push once  dextrose 50% Injectable 12.5 Gram(s) IV Push once  epoetin priyanka-epbx (RETACRIT) Injectable 71559 Unit(s) SubCutaneous every 7 days  insulin lispro (ADMELOG) corrective regimen sliding scale   SubCutaneous every 6 hours  lactulose Syrup 30 Gram(s) Enteral Tube once  midodrine 10 milliGRAM(s) Oral every 8 hours  modafinil 150 milliGRAM(s) Oral every 24 hours  multivitamin/minerals/iron Oral Solution (CENTRUM) 15 milliLiter(s) Oral daily  pantoprazole   Suspension 40 milliGRAM(s) Oral every 12 hours  zinc sulfate 220 milliGRAM(s) Oral daily    MEDICATIONS  (PRN):      Allergies    sulfa drugs (Unknown)    Intolerances        PAST MEDICAL & SURGICAL HISTORY:  HTN (hypertension)      HLD (hyperlipidemia)      Diabetes      CAD (coronary artery disease)      History of cholecystectomy    PHYSICAL EXAM:   Vital Signs:  Vital Signs Last 24 Hrs  T(C): 37 (2023 08:00), Max: 37.2 (2023 21:00)  T(F): 98.6 (2023 08:00), Max: 98.9 (2023 21:00)  HR: 94 (2023 10:00) (75 - 95)  BP: 111/79 (2023 10:00) (85/42 - 131/72)  BP(mean): 88 (2023 10:00) (56 - 88)  RR: 31 (2023 10:00) (15 - 36)  SpO2: 100% (2023 10:00) (97% - 100%)    Parameters below as of 2023 07:00  Patient On (Oxygen Delivery Method): nasal cannula w/ humidification  O2 Flow (L/min): 3    Daily     Daily Weight in k.6 (2023 05:00)I&O's Summary    2023 07:01  -  2023 07:00  --------------------------------------------------------  IN: 2140 mL / OUT: 1400 mL / NET: 740 mL    2023 07:01  -  2023 11:02  --------------------------------------------------------  IN: 195 mL / OUT: 100 mL / NET: 95 mL        GENERAL:  Appears stated age  HEENT:  NC/AT,  conjunctivae clear and pink,   CHEST:  Full & symmetric excursion, no increased effort, breath sounds clear  HEART:  Regular rhythm, S1, S2, no murmur  ABDOMEN:  Soft, non-tender, non-distended, normoactive bowel sounds,  EXTREMITIES:  no edema  SKIN:  No rash/warm/dry  NEURO:  Alert,    LABS:                        8.3    8.01  )-----------( 166      ( 2023 05:50 )             27.0     06-21    140  |  101  |  153<H>  ----------------------------<  143<H>  3.7   |  30  |  4.52<H>    Ca    8.3<L>      2023 05:50  Phos  5.6     06-21  Mg     2.3     06-21    TPro  6.5  /  Alb  1.9<L>  /  TBili  0.4  /  DBili  x   /  AST  10  /  ALT  9<L>  /  AlkPhos  98  06-21      Urinalysis Basic - ( 2023 05:50 )    Color: x / Appearance: x / SG: x / pH: x  Gluc: 143 mg/dL / Ketone: x  / Bili: x / Urobili: x   Blood: x / Protein: x / Nitrite: x   Leuk Esterase: x / RBC: x / WBC x   Sq Epi: x / Non Sq Epi: x / Bacteria: x      amylase   lipase  RADIOLOGY & ADDITIONAL TESTS:

## 2023-06-21 NOTE — PROCEDURE NOTE - NSPROCNAME_GEN_A_CORE
Gastric Intubation/Gastric Lavage
Central Line Insertion
Arterial Puncture/Cannulation
Central Line Insertion
Central Line Insertion
Chest Tube

## 2023-06-21 NOTE — CHART NOTE - NSCHARTNOTEFT_GEN_A_CORE
Met with son Franko and daughter Kuldeep at bedside . Discussed current care , need for central lines  and dialysis cath . Children want trail of HD and central line . Daughter reinstate DNR DNI.

## 2023-06-21 NOTE — PROCEDURE NOTE - NSPROCDETAILS_GEN_ALL_CORE
confirmed with cxr/nasogastric/audible air bolus/placement confirmed by auscultation
guidewire recovered/lumen(s) aspirated and flushed/sterile dressing applied/sterile technique, catheter placed/ultrasound guidance with use of sterile gel and probe cove
Ultrasound utilized throughout procedure. First to find adequate vessel and ensure vein was compressible. Second to visualize catheter tip entered vessel. Third to visualize wire entering vessel. Lastly, it was used to confirm that injected saline did not extravasate from vessel/guidewire recovered/lumen(s) aspirated and flushed/sterile dressing applied/sterile technique, catheter placed/ultrasound guidance with use of sterile gel and probe cove
guidewire recovered/lumen(s) aspirated and flushed/sterile dressing applied/ultrasound guidance with use of sterile gel and probe cove
location identified, draped/prepped, sterile technique used, needle inserted/introduced/positive blood return obtained via catheter/connected to a pressurized flush line/sutured in place/hemostasis with direct pressure, dressing applied/Seldinger technique/all materials/supplies accounted for at end of procedure

## 2023-06-21 NOTE — PROGRESS NOTE ADULT - SUBJECTIVE AND OBJECTIVE BOX
INTERVAL HPI/OVERNIGHT EVENTS: pat is laying in bed , do not follow commands       Antimicrobial:    Cardiovascular:  midodrine 10 milliGRAM(s) Oral every 8 hours    Pulmonary:    Hematalogic:    Other:  chlorhexidine 2% Cloths 1 Application(s) Topical daily  clonazePAM  Tablet 0.25 milliGRAM(s) Oral every 12 hours PRN  Dakins Solution - 1/2 Strength 1 Application(s) Topical daily  dextrose 5%. 1000 milliLiter(s) IV Continuous <Continuous>  dextrose 5%. 1000 milliLiter(s) IV Continuous <Continuous>  dextrose 50% Injectable 25 Gram(s) IV Push once  dextrose 50% Injectable 25 Gram(s) IV Push once  dextrose 50% Injectable 12.5 Gram(s) IV Push once  epoetin priyanka-epbx (RETACRIT) Injectable 25422 Unit(s) SubCutaneous every 7 days  insulin lispro (ADMELOG) corrective regimen sliding scale   SubCutaneous every 6 hours  modafinil 150 milliGRAM(s) Oral every 24 hours  multivitamin/minerals/iron Oral Solution (CENTRUM) 15 milliLiter(s) Oral daily  pantoprazole   Suspension 40 milliGRAM(s) Oral every 12 hours  zinc sulfate 220 milliGRAM(s) Oral daily      Drug Dosing Weight  Height (cm): 160 (09 Jun 2023 07:47)  Weight (kg): 77.6 (09 Jun 2023 07:34)  BMI (kg/m2): 30.3 (09 Jun 2023 07:47)  BSA (m2): 1.81 (09 Jun 2023 07:47)    CENTRAL LINE: [ ] YES [ ] NO  LOCATION:   DATE INSERTED:    [place]OOK: [ ] YES [ ] NO    DATE INSERTED:    A-LINE:  [ ] YES [ ] NO  LOCATION:   DATE INSERTED:    [facility]/Social Hx/[facility] Hx -reviewed admission note, no change since admission  PAST MEDICAL & SURGICAL HISTORY:  HTN (hypertension)      HLD (hyperlipidemia)      Diabetes      CAD (coronary artery disease)      History of cholecystectomy          T(C): 37 (06-21-23 @ 08:00), Max: 37.2 (06-20-23 @ 21:00)  HR: 95 (06-21-23 @ 09:10)  BP: 94/44 (06-21-23 @ 09:00)  BP(mean): 59 (06-21-23 @ 09:00)  ABP: --  ABP(mean): --  RR: 22 (06-21-23 @ 09:10)  SpO2: 100% (06-21-23 @ 09:10)  Wt(kg): --    ABG - ( 21 Jun 2023 05:35 )  pH, Arterial: 7.38  pH, Blood: x     /  pCO2: 47    /  pO2: 72    / HCO3: 28    / Base Excess: 2.7   /  SaO2: 96.4                  06-20 @ 07:01  -  06-21 @ 07:00  --------------------------------------------------------  IN: 2140 mL / OUT: 1400 mL / NET: 740 mL            PHYSICAL EXAM:    GENERAL: No signs of distress, comfortable + NGT   HEAD: Atraumatic, Normocephalic  EYES:  PERRLA no discharge   ENMT: No erythema, exudates, or enlargement, Moist mucous membranes  NECK: Supple, normal appearance, No JVD; [  ] central line (if applicable)  CHEST/LUNG: No chest deformity, fair bilateral air entry; No rales, rhonchi, wheezing; crackles  HEART: Regular rate and rhythm; No murmurs, rubs, or gallops;   ABDOMEN: Soft, Nontender, Nondistended; Bowel sounds present  EXTREMITIES:  + Peripheral Pulses, No clubbing, cyanosis, or edema  NERVOUS SYSTEM: do not follow command, non verbal   LYMPH: No lymphadenopathy noted  SKIN: No rashes or lesions; good turgor, warm, dry      LABS:  CBC Full  -  ( 21 Jun 2023 05:50 )  WBC Count : 8.01 K/uL  RBC Count : 2.62 M/uL  Hemoglobin : 8.3 g/dL  Hematocrit : 27.0 %  Platelet Count - Automated : 166 K/uL  Mean Cell Volume : 103.1 fl  Mean Cell Hemoglobin : 31.7 pg  Mean Cell Hemoglobin Concentration : 30.7 gm/dL  Auto Neutrophil # : x  Auto Lymphocyte # : x  Auto Monocyte # : x  Auto Eosinophil # : x  Auto Basophil # : x  Auto Neutrophil % : x  Auto Lymphocyte % : x  Auto Monocyte % : x  Auto Eosinophil % : x  Auto Basophil % : x    06-21    140  |  101  |  153<H>  ----------------------------<  143<H>  3.7   |  30  |  4.52<H>    Ca    8.3<L>      21 Jun 2023 05:50  Phos  5.6     06-21  Mg     2.3     06-21    TPro  6.5  /  Alb  1.9<L>  /  TBili  0.4  /  DBili  x   /  AST  10  /  ALT  9<L>  /  AlkPhos  98  06-21      Urinalysis Basic - ( 21 Jun 2023 05:50 )    Color: x / Appearance: x / SG: x / pH: x  Gluc: 143 mg/dL / Ketone: x  / Bili: x / Urobili: x   Blood: x / Protein: x / Nitrite: x   Leuk Esterase: x / RBC: x / WBC x   Sq Epi: x / Non Sq Epi: x / Bacteria: x          RADIOLOGY & ADDITIONAL STUDIES REVIEWED       < from: Xray Chest 1 View- PORTABLE-Routine (Xray Chest 1 View- PORTABLE-Routine in AM.) (06.20.23 @ 07:52) >    ACC: [id number] EXAM:  XR CHEST PORTABLE ROUTINE 1V   ORDERED BY: JC CARD     PROCEDURE DATE:  06/20/2023          INTERPRETATION:  AP erect chest on June 20, 2023 6:27 AM. Patient has   atrial fibrillation.    Heart magnified by technique.Sternotomy, NG tube, and catheter left   chest tube remain. Diffuse mild right spinal curve again noted.    On June 19 no significant infiltrate and effusion particularly in the   right perihilar area. This shows improvement on present study.    No pneumothorax.    IMPRESSION: Improving right chest findings.    --- End of Report ---            DOMINIQUE ZENDEJAS MD; Attending Radiologist  This document has been electronically signed. Jun 20 2023  3:22PM    < end of copied text >    IMPRESSION:  PAST MEDICAL & SURGICAL HISTORY:  HTN (hypertension)      HLD (hyperlipidemia)      Diabetes      CAD (coronary artery disease)      History of cholecystectomy       p/w         IMP: This is an 78 year old man with   dementia, HTN, cardiomyopathy, HFrEF, CAD s/p CABG, with known current RCA occlusion, and DM type 2 who was admitted to [facility] on 5/4 with hypoxic respiratory failure in setting of CHF exacerbation and ANISH with course complicated by acute hypoxic respiratory failure in setting of choking episode causing cardiac arrest, shock, worsening hypoxemic respiratory failure, and NSTEMI on 5/9. Pt ICU course noted post cardiac arrest s/p CPR x 8 min ,  anoxic brain injury and myoclonus. Pt was transferred to [facility] for VEEG which required 48 hours of monitoring as he was noted to still be sedated. While there pt noted to have hematochezia and any ac / antiplatelet agents were held at that time. EEG findings consistent with stimulus induced myoclonus and GPDs s/p hypoxic diffuse indicative of severe cerebral dysfunction.    Patient returned to [facility] ICU on 5/19 and continues tx / supportive care for management of:  AMS/ Encephalopathy due to hypoxic brain injury with possible uremic encephalopathy and elevated NH4. High ammonia level due to depakote .  BP low today on lopressor .     Sugg;    - Hold klonopin   - No sedation   - D/ C depakote   - Lactulose titrate to BMx 3/ day   - NGT feed  - Hemodynamic monitoring   - Continue midodrine   - Will require IV pressors to maintain BP for HD, daughter refused central line   - D/C lopressors  - Amiodarone for rate control   - Anticoag ( Eliquis)  held for possible PEG placement and HD cath placement   - Renal failure  requiring HD . Daughter initially consented for HD , today she wish to wait for 1 more day for improvement in BP   - No antibx   - Skin care   - Asp precaution       case discussed with icu team on rounds              INTERVAL HPI/OVERNIGHT EVENTS: pat is laying in bed , do not follow commands       Antimicrobial:    Cardiovascular:  midodrine 10 milliGRAM(s) Oral every 8 hours    Pulmonary:    Hematalogic:    Other:  chlorhexidine 2% Cloths 1 Application(s) Topical daily  clonazePAM  Tablet 0.25 milliGRAM(s) Oral every 12 hours PRN  Dakins Solution - 1/2 Strength 1 Application(s) Topical daily  dextrose 5%. 1000 milliLiter(s) IV Continuous <Continuous>  dextrose 5%. 1000 milliLiter(s) IV Continuous <Continuous>  dextrose 50% Injectable 25 Gram(s) IV Push once  dextrose 50% Injectable 25 Gram(s) IV Push once  dextrose 50% Injectable 12.5 Gram(s) IV Push once  epoetin priyanka-epbx (RETACRIT) Injectable 85605 Unit(s) SubCutaneous every 7 days  insulin lispro (ADMELOG) corrective regimen sliding scale   SubCutaneous every 6 hours  modafinil 150 milliGRAM(s) Oral every 24 hours  multivitamin/minerals/iron Oral Solution (CENTRUM) 15 milliLiter(s) Oral daily  pantoprazole   Suspension 40 milliGRAM(s) Oral every 12 hours  zinc sulfate 220 milliGRAM(s) Oral daily      Drug Dosing Weight  Height (cm): 160 (09 Jun 2023 07:47)  Weight (kg): 77.6 (09 Jun 2023 07:34)  BMI (kg/m2): 30.3 (09 Jun 2023 07:47)  BSA (m2): 1.81 (09 Jun 2023 07:47)    CENTRAL LINE: [ ] YES [ ] NO  LOCATION:   DATE INSERTED:    [place]: [ ] YES [ ] NO    DATE INSERTED:    A-LINE:  [ ] YES [ ] NO  LOCATION:   DATE INSERTED:    [facility]/Social Hx/[facility] Hx -reviewed admission note, no change since admission  PAST MEDICAL & SURGICAL HISTORY:  HTN (hypertension)      HLD (hyperlipidemia)      Diabetes      CAD (coronary artery disease)      History of cholecystectomy          T(C): 37 (06-21-23 @ 08:00), Max: 37.2 (06-20-23 @ 21:00)  HR: 95 (06-21-23 @ 09:10)  BP: 94/44 (06-21-23 @ 09:00)  BP(mean): 59 (06-21-23 @ 09:00)  ABP: --  ABP(mean): --  RR: 22 (06-21-23 @ 09:10)  SpO2: 100% (06-21-23 @ 09:10)  Wt(kg): --    ABG - ( 21 Jun 2023 05:35 )  pH, Arterial: 7.38  pH, Blood: x     /  pCO2: 47    /  pO2: 72    / HCO3: 28    / Base Excess: 2.7   /  SaO2: 96.4                  06-20 @ 07:01  -  06-21 @ 07:00  --------------------------------------------------------  IN: 2140 mL / OUT: 1400 mL / NET: 740 mL            PHYSICAL EXAM:    GENERAL: No signs of distress, comfortable + NGT   HEAD: Atraumatic, Normocephalic  EYES:  PERRLA no discharge   ENMT: No erythema, exudates, or enlargement, Moist mucous membranes  NECK: Supple, normal appearance, No JVD; [  ] central line (if applicable)  CHEST/LUNG: No chest deformity, fair bilateral air entry; No rales, rhonchi, wheezing; crackles  HEART: Regular rate and rhythm; No murmurs, rubs, or gallops;   ABDOMEN: Soft, Nontender, Nondistended; Bowel sounds present  EXTREMITIES:  + Peripheral Pulses, No clubbing, cyanosis, or edema  NERVOUS SYSTEM: do not follow command, non verbal   LYMPH: No lymphadenopathy noted  SKIN: No rashes or lesions; good turgor, warm, dry      LABS:  CBC Full  -  ( 21 Jun 2023 05:50 )  WBC Count : 8.01 K/uL  RBC Count : 2.62 M/uL  Hemoglobin : 8.3 g/dL  Hematocrit : 27.0 %  Platelet Count - Automated : 166 K/uL  Mean Cell Volume : 103.1 fl  Mean Cell Hemoglobin : 31.7 pg  Mean Cell Hemoglobin Concentration : 30.7 gm/dL  Auto Neutrophil # : x  Auto Lymphocyte # : x  Auto Monocyte # : x  Auto Eosinophil # : x  Auto Basophil # : x  Auto Neutrophil % : x  Auto Lymphocyte % : x  Auto Monocyte % : x  Auto Eosinophil % : x  Auto Basophil % : x    06-21    140  |  101  |  153<H>  ----------------------------<  143<H>  3.7   |  30  |  4.52<H>    Ca    8.3<L>      21 Jun 2023 05:50  Phos  5.6     06-21  Mg     2.3     06-21    TPro  6.5  /  Alb  1.9<L>  /  TBili  0.4  /  DBili  x   /  AST  10  /  ALT  9<L>  /  AlkPhos  98  06-21      Urinalysis Basic - ( 21 Jun 2023 05:50 )    Color: x / Appearance: x / SG: x / pH: x  Gluc: 143 mg/dL / Ketone: x  / Bili: x / Urobili: x   Blood: x / Protein: x / Nitrite: x   Leuk Esterase: x / RBC: x / WBC x   Sq Epi: x / Non Sq Epi: x / Bacteria: x          RADIOLOGY & ADDITIONAL STUDIES REVIEWED       < from: Xray Chest 1 View- PORTABLE-Routine (Xray Chest 1 View- PORTABLE-Routine in AM.) (06.20.23 @ 07:52) >    ACC: [id number] EXAM:  XR CHEST PORTABLE ROUTINE 1V   ORDERED BY: JC CARD     PROCEDURE DATE:  06/20/2023          INTERPRETATION:  AP erect chest on June 20, 2023 6:27 AM. Patient has   atrial fibrillation.    Heart magnified by technique.Sternotomy, NG tube, and catheter left   chest tube remain. Diffuse mild right spinal curve again noted.    On June 19 no significant infiltrate and effusion particularly in the   right perihilar area. This shows improvement on present study.    No pneumothorax.    IMPRESSION: Improving right chest findings.    --- End of Report ---            DOMINIQUE ZENDEJAS MD; Attending Radiologist  This document has been electronically signed. Jun 20 2023  3:22PM    < end of copied text >    IMPRESSION:  PAST MEDICAL & SURGICAL HISTORY:  HTN (hypertension)      HLD (hyperlipidemia)      Diabetes      CAD (coronary artery disease)      History of cholecystectomy       p/w         IMP: This is an 78 year old man with   dementia, HTN, cardiomyopathy, HFrEF, CAD s/p CABG, with known current RCA occlusion, and DM type 2 who was admitted to [facility] on 5/4 with hypoxic respiratory failure in setting of CHF exacerbation and ANISH with course complicated by acute hypoxic respiratory failure in setting of choking episode causing cardiac arrest, shock, worsening hypoxemic respiratory failure, and NSTEMI on 5/9. Pt ICU course noted post cardiac arrest s/p CPR x 8 min ,  anoxic brain injury and myoclonus. Pt was transferred to [facility] for VEEG which required 48 hours of monitoring as he was noted to still be sedated. While there pt noted to have hematochezia and any ac / antiplatelet agents were held at that time. EEG findings consistent with stimulus induced myoclonus and GPDs s/p hypoxic diffuse indicative of severe cerebral dysfunction.    Patient returned to [facility] ICU on 5/19 and continues tx / supportive care for management of:  AMS/ Encephalopathy due to hypoxic brain injury with possible uremic encephalopathy and elevated NH4. High ammonia level due to depakote .  BP low today on lopressor .         ASSESSMENT   - Cardiac arrest 2/2 choking episode / respiratory failure  - Acute respiratory failure s/p palliative extubation 6/7/23  - Severe hypoxic  Encephalopathy with secondary cortical myoclonus  - AMS / Encephalopathy / Uremia / Elevated NH4   - Acute renal failure w Uremia post cardiac arrest    - Hemtochezia episode, h/h stable    - Heart failure with reduced EF 35-40 %  - Sacral decub            Sugg;    - Hold klonopin   - No sedation   - D/ C depakote   - Lactulose titrate to BMx 3/ day   - O2 supp as needed   - Monitor daily chest tube out put .. 400 ml /24 hrs  - Daily CXR to monitor effusion and chest tube   - NGT feed  - Hemodynamic monitoring   - Continue midodrine   - Will require IV pressors to maintain BP for HD, daughter refused central line   - D/C lopressors  - Amiodarone for rate control   - Anticoag ( Eliquis)  held for possible PEG placement and HD cath placement   - Renal failure  requiring HD . Daughter initially consented for HD , today she wish to wait for 1 more day for improvement in BP   - No antibx   - Skin care   - Asp precaution       case discussed with icu team on rounds              INTERVAL HPI/OVERNIGHT EVENTS: pat is laying in bed , do not follow commands       Antimicrobial:    Cardiovascular:  midodrine 10 milliGRAM(s) Oral every 8 hours    Pulmonary:    Hematalogic:    Other:  chlorhexidine 2% Cloths 1 Application(s) Topical daily  clonazePAM  Tablet 0.25 milliGRAM(s) Oral every 12 hours PRN  Dakins Solution - 1/2 Strength 1 Application(s) Topical daily  dextrose 5%. 1000 milliLiter(s) IV Continuous <Continuous>  dextrose 5%. 1000 milliLiter(s) IV Continuous <Continuous>  dextrose 50% Injectable 25 Gram(s) IV Push once  dextrose 50% Injectable 25 Gram(s) IV Push once  dextrose 50% Injectable 12.5 Gram(s) IV Push once  epoetin priyanka-epbx (RETACRIT) Injectable 25598 Unit(s) SubCutaneous every 7 days  insulin lispro (ADMELOG) corrective regimen sliding scale   SubCutaneous every 6 hours  modafinil 150 milliGRAM(s) Oral every 24 hours  multivitamin/minerals/iron Oral Solution (CENTRUM) 15 milliLiter(s) Oral daily  pantoprazole   Suspension 40 milliGRAM(s) Oral every 12 hours  zinc sulfate 220 milliGRAM(s) Oral daily      Drug Dosing Weight  Height (cm): 160 (09 Jun 2023 07:47)  Weight (kg): 77.6 (09 Jun 2023 07:34)  BMI (kg/m2): 30.3 (09 Jun 2023 07:47)  BSA (m2): 1.81 (09 Jun 2023 07:47)    CENTRAL LINE: [ ] YES [ ] NO  LOCATION:   DATE INSERTED:    [place]: [ ] YES [ ] NO    DATE INSERTED:    A-LINE:  [ ] YES [ ] NO  LOCATION:   DATE INSERTED:    [facility]/Social Hx/[facility] Hx -reviewed admission note, no change since admission  PAST MEDICAL & SURGICAL HISTORY:  HTN (hypertension)      HLD (hyperlipidemia)      Diabetes      CAD (coronary artery disease)      History of cholecystectomy          T(C): 37 (06-21-23 @ 08:00), Max: 37.2 (06-20-23 @ 21:00)  HR: 95 (06-21-23 @ 09:10)  BP: 94/44 (06-21-23 @ 09:00)  BP(mean): 59 (06-21-23 @ 09:00)  ABP: --  ABP(mean): --  RR: 22 (06-21-23 @ 09:10)  SpO2: 100% (06-21-23 @ 09:10)  Wt(kg): --    ABG - ( 21 Jun 2023 05:35 )  pH, Arterial: 7.38  pH, Blood: x     /  pCO2: 47    /  pO2: 72    / HCO3: 28    / Base Excess: 2.7   /  SaO2: 96.4                  06-20 @ 07:01  -  06-21 @ 07:00  --------------------------------------------------------  IN: 2140 mL / OUT: 1400 mL / NET: 740 mL            PHYSICAL EXAM:    GENERAL: No signs of distress, comfortable + NGT   HEAD: Atraumatic, Normocephalic  EYES:  PERRLA no discharge   ENMT: No erythema, exudates, or enlargement, Moist mucous membranes  NECK: Supple, normal appearance, No JVD; [  ] central line (if applicable)  CHEST/LUNG: No chest deformity, fair bilateral air entry; No rales, rhonchi, wheezing; crackles  HEART: Regular rate and rhythm; No murmurs, rubs, or gallops;   ABDOMEN: Soft, Nontender, Nondistended; Bowel sounds present  EXTREMITIES:  + Peripheral Pulses, No clubbing, cyanosis, or edema  NERVOUS SYSTEM: do not follow command, non verbal   LYMPH: No lymphadenopathy noted  SKIN: No rashes or lesions; good turgor, warm, dry      LABS:  CBC Full  -  ( 21 Jun 2023 05:50 )  WBC Count : 8.01 K/uL  RBC Count : 2.62 M/uL  Hemoglobin : 8.3 g/dL  Hematocrit : 27.0 %  Platelet Count - Automated : 166 K/uL  Mean Cell Volume : 103.1 fl  Mean Cell Hemoglobin : 31.7 pg  Mean Cell Hemoglobin Concentration : 30.7 gm/dL  Auto Neutrophil # : x  Auto Lymphocyte # : x  Auto Monocyte # : x  Auto Eosinophil # : x  Auto Basophil # : x  Auto Neutrophil % : x  Auto Lymphocyte % : x  Auto Monocyte % : x  Auto Eosinophil % : x  Auto Basophil % : x    06-21    140  |  101  |  153<H>  ----------------------------<  143<H>  3.7   |  30  |  4.52<H>    Ca    8.3<L>      21 Jun 2023 05:50  Phos  5.6     06-21  Mg     2.3     06-21    TPro  6.5  /  Alb  1.9<L>  /  TBili  0.4  /  DBili  x   /  AST  10  /  ALT  9<L>  /  AlkPhos  98  06-21      Urinalysis Basic - ( 21 Jun 2023 05:50 )    Color: x / Appearance: x / SG: x / pH: x  Gluc: 143 mg/dL / Ketone: x  / Bili: x / Urobili: x   Blood: x / Protein: x / Nitrite: x   Leuk Esterase: x / RBC: x / WBC x   Sq Epi: x / Non Sq Epi: x / Bacteria: x          RADIOLOGY & ADDITIONAL STUDIES REVIEWED       < from: Xray Chest 1 View- PORTABLE-Routine (Xray Chest 1 View- PORTABLE-Routine in AM.) (06.20.23 @ 07:52) >    ACC: [id number] EXAM:  XR CHEST PORTABLE ROUTINE 1V   ORDERED BY: JC CARD     PROCEDURE DATE:  06/20/2023          INTERPRETATION:  AP erect chest on June 20, 2023 6:27 AM. Patient has   atrial fibrillation.    Heart magnified by technique.Sternotomy, NG tube, and catheter left   chest tube remain. Diffuse mild right spinal curve again noted.    On June 19 no significant infiltrate and effusion particularly in the   right perihilar area. This shows improvement on present study.    No pneumothorax.    IMPRESSION: Improving right chest findings.    --- End of Report ---            DOMINIQUE ZENDEJAS MD; Attending Radiologist  This document has been electronically signed. Jun 20 2023  3:22PM    < end of copied text >    IMPRESSION:  PAST MEDICAL & SURGICAL HISTORY:  HTN (hypertension)      HLD (hyperlipidemia)      Diabetes      CAD (coronary artery disease)      History of cholecystectomy       p/w         IMP: This is an 78 year old man with   dementia, HTN, cardiomyopathy, HFrEF, CAD s/p CABG, with known current RCA occlusion, and DM type 2 who was admitted to [facility] on 5/4 with hypoxic respiratory failure in setting of CHF exacerbation and ANISH with course complicated by acute hypoxic respiratory failure in setting of choking episode causing cardiac arrest, shock, worsening hypoxemic respiratory failure, and NSTEMI on 5/9. Pt ICU course noted post cardiac arrest s/p CPR x 8 min ,  anoxic brain injury and myoclonus. Pt was transferred to [facility] for VEEG which required 48 hours of monitoring as he was noted to still be sedated. While there pt noted to have hematochezia and any ac / antiplatelet agents were held at that time. EEG findings consistent with stimulus induced myoclonus and GPDs s/p hypoxic diffuse indicative of severe cerebral dysfunction.    Patient returned to [facility] ICU on 5/19 and continues tx / supportive care for management of:  AMS/ Encephalopathy due to hypoxic brain injury with possible uremic encephalopathy and elevated NH4. High ammonia level due to depakote .  BP low today on lopressor .         ASSESSMENT   - Cardiac arrest 2/2 choking episode / respiratory failure  - Acute respiratory failure s/p palliative extubation 6/7/23  - Severe hypoxic  Encephalopathy with secondary cortical myoclonus  - AMS / Encephalopathy / Uremia / Elevated NH4   - Acute renal failure w Uremia post cardiac arrest    - Hemtochezia episode, h/h stable    - Heart failure with reduced EF 35-40 %  - Sacral decub            Sugg;    - Hold klonopin   - No sedation   - D/ C depakote   - Lactulose titrate to BMx 3/ day   - O2 supp as needed   - Monitor daily chest tube out put .. 400 ml /24 hrs  - Chest tube off suction   - Daily CXR to monitor effusion and chest tube   - NGT feed  - Hemodynamic monitoring   - Continue midodrine   - Will require IV pressors to maintain BP for HD, daughter refused central line   - D/C lopressors  - Amiodarone for rate control   - Anticoag ( Eliquis)  held for possible PEG placement and HD cath placement   - Renal failure  requiring HD . Daughter initially consented for HD , today she wish to wait for 1 more day for improvement in BP   - No antibx   - Skin care   - Asp precaution       case discussed with icu team on rounds

## 2023-06-21 NOTE — CHART NOTE - NSCHARTNOTEFT_GEN_A_CORE
Called daughter Kuldeep [phone] to inform her that dialysis cath and TLC placed. No answer and unable to leave voice message

## 2023-06-21 NOTE — PROCEDURE NOTE - NSPOSTPRCRAD_GEN_A_CORE
post-procedure radiography performed
central line located in the superior vena cava/no pneumothorax
central line located in the superior vena cava/no pneumothorax/post-procedure radiography performed
central line located in the superior vena cava/no pneumothorax/post-procedure radiography performed

## 2023-06-21 NOTE — PROGRESS NOTE ADULT - ASSESSMENT
Necrotic stage 4 sacral wound.    continue local wound care (consider switch to silvadene 1% cream bid instead on santyl collagenase)  cont off loading and nutrition support. Necrotic stage 4 sacral wound.    continue local wound care (  silvadene 1% cream bid instead of santyl collagenase is not an option as pt has sulfa allergy)  cont off loading and nutrition support.

## 2023-06-21 NOTE — PROGRESS NOTE ADULT - ASSESSMENT
78 year old man PMHx dementia, HTN, cardiomyopathy, HFrEF, CAD s/p CABG, with known current RCA occlusion, and DM type 2 who was admitted to [facility] on 5/4 with hypoxic respiratory failure in setting of CHF exacerbation and ANISH with course complicated by acute hypoxic respiratory failure in setting of choking episode causing cardiac arrest, shock, worsening hypoxemic respiratory failure, and NSTEMI on 5/9. Pt ICU course noted post cardiac arrest anoxic brain injury and myoclonus.  EEG findings consistent with stimulus induced myoclonus and GPDs s/p hypoxic diffuse indicative of severe cerebral dysfunction.  Patient returned to [facility] ICU on 5/19 and continues tx.  Reevaluation of PEG requested. Patient currently receiving NG tube feeding.

## 2023-06-21 NOTE — PROCEDURE NOTE - NSINFORMCONSENT_GEN_A_CORE
This was an emergent procedure.
This was an emergent procedure.
Benefits, risks, and possible complications of procedure explained to patient/caregiver who verbalized understanding and gave written consent.
Benefits, risks, and possible complications of procedure explained to patient/caregiver who verbalized understanding and gave verbal consent.
Fom daughter/Benefits, risks, and possible complications of procedure explained to patient/caregiver who verbalized understanding and gave written consent.

## 2023-06-21 NOTE — PROGRESS NOTE ADULT - PROBLEM SELECTOR PLAN 1
Reevaluation for PEG  Attending discussed with patient's daughter Kuldeep at bedside on Sunday and explained that his underlying cardiopulmonary issues place him at higher risk of complication from the procedure but that if long term nutritional support is needed then a PEG is the optimal way of providing this. She verbalized understanding and wishes to proceed with PEG placement.  Recommend holding Eliquis 2 days prior to procedure.   Tentatively scheduled for FRIDAY peg placement , given patient was unable to receive dialysis yesterday, per discussion with nephrology, patient would be better optimized with 2 days of dialysis prior to procedure

## 2023-06-21 NOTE — PROGRESS NOTE ADULT - SUBJECTIVE AND OBJECTIVE BOX
Seen in ICU, + NGT, NC O2    Vital Signs Last 24 Hrs  T(C): 37 (06-21-23 @ 15:00), Max: 37.2 (06-20-23 @ 21:00)  T(F): 98.6 (06-21-23 @ 15:00), Max: 98.9 (06-20-23 @ 21:00)  HR: 89 (06-21-23 @ 18:00) (77 - 95)  BP: 126/75 (06-21-23 @ 18:00) (89/75 - 138/70)  BP(mean): 92 (06-21-23 @ 18:00) (59 - 92)  RR: 19 (06-21-23 @ 18:00) (15 - 36)  SpO2: 100% (06-21-23 @ 18:00) (97% - 100%)    I&O's Detail    20 Jun 2023 07:01  -  21 Jun 2023 07:00  --------------------------------------------------------  IN:    Free Water: 1000 mL    IV PiggyBack: 100 mL    Nepro with Carb Steady: 1040 mL  Total IN: 2140 mL    OUT:    Chest Tube (mL): 400 mL    Voided (mL): 1000 mL  Total OUT: 1400 mL    Total NET: 740 mL    21 Jun 2023 07:01  -  21 Jun 2023 18:14  --------------------------------------------------------  IN:    Free Water: 400 mL    IV PiggyBack: 50 mL    Nepro with Carb Steady: 650 mL  Total IN: 1100 mL    OUT:    Voided (mL): 300 mL  Total OUT: 300 mL    Total NET: 800 mL    s1s2  b/l air entry  soft, ND  tr edema                                                                                                                            8.3    8.01  )-----------( 166      ( 21 Jun 2023 05:50 )             27.0     21 Jun 2023 05:50    140    |  101    |  153    ----------------------------<  143    3.7     |  30     |  4.52     Ca    8.3        21 Jun 2023 05:50  Phos  5.6       21 Jun 2023 05:50  Mg     2.3       21 Jun 2023 05:50    TPro  6.5    /  Alb  1.9    /  TBili  0.4    /  DBili  x      /  AST  10     /  ALT  9      /  AlkPhos  98     21 Jun 2023 05:50    LIVER FUNCTIONS - ( 21 Jun 2023 05:50 )  Alb: 1.9 g/dL / Pro: 6.5 g/dL / ALK PHOS: 98 U/L / ALT: 9 U/L / AST: 10 U/L / GGT: x           Urinalysis Basic - ( 21 Jun 2023 05:50 )    Color: x / Appearance: x / SG: x / pH: x  Gluc: 143 mg/dL / Ketone: x  / Bili: x / Urobili: x   Blood: x / Protein: x / Nitrite: x   Leuk Esterase: x / RBC: x / WBC x   Sq Epi: x / Non Sq Epi: x / Bacteria: x    chlorhexidine 2% Cloths 1 Application(s) Topical daily  Dakins Solution - 1/2 Strength 1 Application(s) Topical daily  dextrose 5%. 1000 milliLiter(s) IV Continuous <Continuous>  dextrose 5%. 1000 milliLiter(s) IV Continuous <Continuous>  dextrose 50% Injectable 25 Gram(s) IV Push once  dextrose 50% Injectable 12.5 Gram(s) IV Push once  dextrose 50% Injectable 25 Gram(s) IV Push once  epoetin priyanka-epbx (RETACRIT) Injectable 52449 Unit(s) SubCutaneous every 7 days  insulin lispro (ADMELOG) corrective regimen sliding scale   SubCutaneous every 6 hours  midodrine 10 milliGRAM(s) Oral every 8 hours  modafinil 150 milliGRAM(s) Oral every 24 hours  multivitamin/minerals/iron Oral Solution (CENTRUM) 15 milliLiter(s) Oral daily  pantoprazole   Suspension 40 milliGRAM(s) Oral daily  sodium chloride 0.9% lock flush 10 milliLiter(s) IV Push every 1 hour PRN  zinc sulfate 220 milliGRAM(s) Oral daily    A/P:    Hemodynamic ATN s/p arrest (Cr 1.6 - 5/9/23, Cr 1.0 - 4/12/23)  Resp failure, s/p intubated, now extubated  S/p L CT  CM, EF 35 - 40%, dementia  Cr has improved (peak Cr 5.4 - 5/19/23, aida Cr 1.95 - 6/1/23)  Now w/recurrent hemodynamic ATN, not oliguric  Avoid nephrotoxins  Epoetin  F/u CBC, BMP, UO  Per d/w ICU team and daughter, Kuldeep Avila, will start HD today   Consent for HD given by daughter over the phone  Continue Midodrine  Prognosis is guarded/poor w/or w/o RRT  Will follow closely    [phone]

## 2023-06-21 NOTE — PROGRESS NOTE ADULT - SUBJECTIVE AND OBJECTIVE BOX
(  x) No complaints (  ) Complains of:   nonverbal, no interaction  T(F): 98.6 (06-21-23 @ 15:00), Max: 98.9 (06-20-23 @ 21:00)  HR: 89 (06-21-23 @ 18:00) (77 - 95)  BP: 126/75 (06-21-23 @ 18:00) (89/75 - 138/70)  RR: 19 (06-21-23 @ 18:00) (15 - 36)  SpO2: 100% (06-21-23 @ 18:00) (97% - 100%)        Wound Location 1:  sacral wound eschar forming again                                8.3    8.01  )-----------( 166      ( 21 Jun 2023 05:50 )             27.0     CBC Full  -  ( 21 Jun 2023 05:50 )  WBC Count : 8.01 K/uL  RBC Count : 2.62 M/uL  Hemoglobin : 8.3 g/dL  Hematocrit : 27.0 %  Platelet Count - Automated : 166 K/uL  Mean Cell Volume : 103.1 fl  Mean Cell Hemoglobin : 31.7 pg  Mean Cell Hemoglobin Concentration : 30.7 gm/dL  Auto Neutrophil # : x  Auto Lymphocyte # : x  Auto Monocyte # : x  Auto Eosinophil # : x  Auto Basophil # : x  Auto Neutrophil % : x  Auto Lymphocyte % : x  Auto Monocyte % : x  Auto Eosinophil % : x  Auto Basophil % : x    140|101|153<143  3.7|30|4.52  8.3,2.3,5.6  06-21 @ 05:50      Urinalysis Basic - ( 21 Jun 2023 05:50 )    Color: x / Appearance: x / SG: x / pH: x  Gluc: 143 mg/dL / Ketone: x  / Bili: x / Urobili: x   Blood: x / Protein: x / Nitrite: x   Leuk Esterase: x / RBC: x / WBC x   Sq Epi: x / Non Sq Epi: x / Bacteria: x                Procedure Performed:  (  )Yes     ( x )No  Name of Procedure:  (  )debridement    (  )I&D    (  )Other:  (  )partial thickness     (  )full thickness     (  )subcutaneous     (  )muscle/tendon     (  )bone  (  )sharp     (  )surgical

## 2023-06-21 NOTE — PROCEDURE NOTE - NSPOSTCAREGUIDE_GEN_A_CORE
per ICU protocol care
Care for catheter as per unit/ICU protocols
Verbal/written post procedure instructions were given to patient/caregiver/Instructed patient/caregiver to follow-up with primary care physician
Care for catheter as per unit/ICU protocols
Care for catheter as per unit/ICU protocols

## 2023-06-21 NOTE — CHART NOTE - NSCHARTNOTEFT_GEN_A_CORE
Called daughter Kuldeep [phone]. Update given . We discussed dialysis with low BP . I explained for HD , pat will need higher BP and will need central line for pressors . Temporally meds can be given via peripheral line to maintain BP for HD. I explained that this is not ideal scenario.  Kuldeep refused central line .   She wish to discuss current situation with her brother and is coming to hosp in a few hours to talk to me .

## 2023-06-22 LAB
ALBUMIN SERPL ELPH-MCNC: 1.9 G/DL — LOW (ref 3.3–5)
ALP SERPL-CCNC: 104 U/L — SIGNIFICANT CHANGE UP (ref 40–120)
ALT FLD-CCNC: 11 U/L — SIGNIFICANT CHANGE UP (ref 10–45)
ANION GAP SERPL CALC-SCNC: 8 MMOL/L — SIGNIFICANT CHANGE UP (ref 5–17)
AST SERPL-CCNC: 12 U/L — SIGNIFICANT CHANGE UP (ref 10–40)
BILIRUB SERPL-MCNC: 0.4 MG/DL — SIGNIFICANT CHANGE UP (ref 0.2–1.2)
BUN SERPL-MCNC: 107 MG/DL — HIGH (ref 7–23)
CALCIUM SERPL-MCNC: 8.3 MG/DL — LOW (ref 8.4–10.5)
CHLORIDE SERPL-SCNC: 98 MMOL/L — SIGNIFICANT CHANGE UP (ref 96–108)
CO2 SERPL-SCNC: 31 MMOL/L — SIGNIFICANT CHANGE UP (ref 22–31)
CREAT SERPL-MCNC: 3.54 MG/DL — HIGH (ref 0.5–1.3)
DIGOXIN SERPL-MCNC: 0.3 NG/ML — LOW (ref 0.8–2)
EGFR: 17 ML/MIN/1.73M2 — LOW
GLUCOSE BLDC GLUCOMTR-MCNC: 125 MG/DL — HIGH (ref 70–99)
GLUCOSE BLDC GLUCOMTR-MCNC: 140 MG/DL — HIGH (ref 70–99)
GLUCOSE BLDC GLUCOMTR-MCNC: 227 MG/DL — HIGH (ref 70–99)
GLUCOSE SERPL-MCNC: 128 MG/DL — HIGH (ref 70–99)
HCT VFR BLD CALC: 26.9 % — LOW (ref 39–50)
HGB BLD-MCNC: 8.3 G/DL — LOW (ref 13–17)
MAGNESIUM SERPL-MCNC: 2 MG/DL — SIGNIFICANT CHANGE UP (ref 1.6–2.6)
MCHC RBC-ENTMCNC: 30.9 GM/DL — LOW (ref 32–36)
MCHC RBC-ENTMCNC: 31.9 PG — SIGNIFICANT CHANGE UP (ref 27–34)
MCV RBC AUTO: 103.5 FL — HIGH (ref 80–100)
NRBC # BLD: 0 /100 WBCS — SIGNIFICANT CHANGE UP (ref 0–0)
PHOSPHATE SERPL-MCNC: 3.7 MG/DL — SIGNIFICANT CHANGE UP (ref 2.5–4.5)
PLATELET # BLD AUTO: 172 K/UL — SIGNIFICANT CHANGE UP (ref 150–400)
POTASSIUM SERPL-MCNC: 3.4 MMOL/L — LOW (ref 3.5–5.3)
POTASSIUM SERPL-SCNC: 3.4 MMOL/L — LOW (ref 3.5–5.3)
PROT SERPL-MCNC: 6.9 G/DL — SIGNIFICANT CHANGE UP (ref 6–8.3)
RBC # BLD: 2.6 M/UL — LOW (ref 4.2–5.8)
RBC # FLD: 18.7 % — HIGH (ref 10.3–14.5)
SODIUM SERPL-SCNC: 137 MMOL/L — SIGNIFICANT CHANGE UP (ref 135–145)
VALPROATE SERPL-MCNC: 13 UG/ML — LOW (ref 50–100)
WBC # BLD: 9.04 K/UL — SIGNIFICANT CHANGE UP (ref 3.8–10.5)
WBC # FLD AUTO: 9.04 K/UL — SIGNIFICANT CHANGE UP (ref 3.8–10.5)

## 2023-06-22 PROCEDURE — 99233 SBSQ HOSP IP/OBS HIGH 50: CPT

## 2023-06-22 RX ORDER — VALPROIC ACID (AS SODIUM SALT) 250 MG/5ML
250 SOLUTION, ORAL ORAL EVERY 12 HOURS
Refills: 0 | Status: DISCONTINUED | OUTPATIENT
Start: 2023-06-22 | End: 2023-06-25

## 2023-06-22 RX ORDER — POTASSIUM CHLORIDE 20 MEQ
40 PACKET (EA) ORAL ONCE
Refills: 0 | Status: COMPLETED | OUTPATIENT
Start: 2023-06-22 | End: 2023-06-22

## 2023-06-22 RX ADMIN — Medication 250 MILLIGRAM(S): at 17:41

## 2023-06-22 RX ADMIN — CHLORHEXIDINE GLUCONATE 1 APPLICATION(S): 213 SOLUTION TOPICAL at 11:45

## 2023-06-22 RX ADMIN — ERYTHROPOIETIN 10000 UNIT(S): 10000 INJECTION, SOLUTION INTRAVENOUS; SUBCUTANEOUS at 11:23

## 2023-06-22 RX ADMIN — PANTOPRAZOLE SODIUM 40 MILLIGRAM(S): 20 TABLET, DELAYED RELEASE ORAL at 11:45

## 2023-06-22 RX ADMIN — Medication 15 MILLILITER(S): at 11:45

## 2023-06-22 RX ADMIN — Medication 40 MILLIEQUIVALENT(S): at 10:32

## 2023-06-22 RX ADMIN — MIDODRINE HYDROCHLORIDE 10 MILLIGRAM(S): 2.5 TABLET ORAL at 05:50

## 2023-06-22 RX ADMIN — MODAFINIL 150 MILLIGRAM(S): 200 TABLET ORAL at 05:50

## 2023-06-22 RX ADMIN — Medication 3: at 17:35

## 2023-06-22 RX ADMIN — Medication 1 APPLICATION(S): at 11:46

## 2023-06-22 RX ADMIN — Medication 2: at 23:38

## 2023-06-22 RX ADMIN — ZINC SULFATE TAB 220 MG (50 MG ZINC EQUIVALENT) 220 MILLIGRAM(S): 220 (50 ZN) TAB at 11:45

## 2023-06-22 NOTE — PROGRESS NOTE ADULT - SUBJECTIVE AND OBJECTIVE BOX
Seen in ICU, + NGT, NC O2, s/p 2nd HD today    Vital Signs Last 24 Hrs  T(C): 37.3 (06-22-23 @ 15:00), Max: 37.3 (06-22-23 @ 05:00)  T(F): 99.2 (06-22-23 @ 15:00), Max: 99.2 (06-22-23 @ 15:00)  HR: 94 (06-22-23 @ 19:00) (83 - 108)  BP: 120/71 (06-22-23 @ 19:00) (115/98 - 147/87)  BP(mean): 87 (06-22-23 @ 19:00) (83 - 121)  RR: 32 (06-22-23 @ 19:00) (11 - 42)  SpO2: 95% (06-22-23 @ 19:00) (94% - 100%)    I&O's Detail    21 Jun 2023 07:01  -  22 Jun 2023 07:00  --------------------------------------------------------  IN:    Free Water: 400 mL    IV PiggyBack: 50 mL    Nepro with Carb Steady: 910 mL    Other (mL): 700 mL  Total IN: 2060 mL    OUT:    Chest Tube (mL): 350 mL    Other (mL): 700 mL    Voided (mL): 500 mL  Total OUT: 1550 mL    Total NET: 510 mL    22 Jun 2023 07:01  -  22 Jun 2023 20:23  --------------------------------------------------------  IN:    Enteral Tube Flush: 270 mL    Free Water: 600 mL    Nepro with Carb Steady: 650 mL    Other (mL): 800 mL  Total IN: 2320 mL    OUT:    Chest Tube (mL): 150 mL    Other (mL): 800 mL    Voided (mL): 120 mL  Total OUT: 1070 mL    Total NET: 1250 mL    s1s2  b/l air entry  soft, ND  tr edema                                                                                                                                    8.3    9.04  )-----------( 172      ( 22 Jun 2023 05:00 )             26.9     22 Jun 2023 05:00    137    |  98     |  107    ----------------------------<  128    3.4     |  31     |  3.54     Ca    8.3        22 Jun 2023 05:00  Phos  3.7       22 Jun 2023 05:00  Mg     2.0       22 Jun 2023 05:00    TPro  6.9    /  Alb  1.9    /  TBili  0.4    /  DBili  x      /  AST  12     /  ALT  11     /  AlkPhos  104    22 Jun 2023 05:00    LIVER FUNCTIONS - ( 22 Jun 2023 05:00 )  Alb: 1.9 g/dL / Pro: 6.9 g/dL / ALK PHOS: 104 U/L / ALT: 11 U/L / AST: 12 U/L / GGT: x           Urinalysis Basic - ( 22 Jun 2023 05:00 )    chlorhexidine 2% Cloths 1 Application(s) Topical daily  Dakins Solution - 1/2 Strength 1 Application(s) Topical daily  dextrose 5%. 1000 milliLiter(s) IV Continuous <Continuous>  dextrose 5%. 1000 milliLiter(s) IV Continuous <Continuous>  dextrose 50% Injectable 12.5 Gram(s) IV Push once  dextrose 50% Injectable 25 Gram(s) IV Push once  dextrose 50% Injectable 25 Gram(s) IV Push once  epoetin priyanka-epbx (RETACRIT) Injectable 97334 Unit(s) SubCutaneous every 7 days  insulin lispro (ADMELOG) corrective regimen sliding scale   SubCutaneous every 6 hours  midodrine 10 milliGRAM(s) Oral every 8 hours  modafinil 150 milliGRAM(s) Oral every 24 hours  multivitamin/minerals/iron Oral Solution (CENTRUM) 15 milliLiter(s) Oral daily  pantoprazole   Suspension 40 milliGRAM(s) Oral daily  sodium chloride 0.9% lock flush 10 milliLiter(s) IV Push every 1 hour PRN  valproic  acid Syrup 250 milliGRAM(s) Oral every 12 hours  zinc sulfate 220 milliGRAM(s) Oral daily    A/P:    Hemodynamic ATN s/p arrest (Cr 1.6 - 5/9/23, Cr 1.0 - 4/12/23)  Resp failure, s/p intubated, now extubated  S/p L CT  CM, EF 35 - 40%, dementia  Cr has improved (peak Cr 5.4 - 5/19/23, aida Cr 1.95 - 6/1/23)  Now w/recurrent hemodynamic ATN, not oliguric  Avoid nephrotoxins  Epoetin  F/u CBC, BMP, UO  Per d/w ICU team and daughter, Kuldeep Avila, started HD yesterday  2nd HD today  Continue Midodrine  For PEG tomorrow  Next HD on Saturday   D/w ICU team    [phone]

## 2023-06-22 NOTE — PROGRESS NOTE ADULT - SUBJECTIVE AND OBJECTIVE BOX
INTERVAL HPI/OVERNIGHT EVENTS:  HPI:    79 y/o male seen and examined.     MEDICATIONS  (STANDING):  chlorhexidine 2% Cloths 1 Application(s) Topical daily  Dakins Solution - 1/2 Strength 1 Application(s) Topical daily  dextrose 5%. 1000 milliLiter(s) (100 mL/Hr) IV Continuous <Continuous>  dextrose 5%. 1000 milliLiter(s) (50 mL/Hr) IV Continuous <Continuous>  dextrose 50% Injectable 25 Gram(s) IV Push once  dextrose 50% Injectable 12.5 Gram(s) IV Push once  dextrose 50% Injectable 25 Gram(s) IV Push once  epoetin priyanka-epbx (RETACRIT) Injectable 92110 Unit(s) SubCutaneous every 7 days  insulin lispro (ADMELOG) corrective regimen sliding scale   SubCutaneous every 6 hours  midodrine 10 milliGRAM(s) Oral every 8 hours  modafinil 150 milliGRAM(s) Oral every 24 hours  multivitamin/minerals/iron Oral Solution (CENTRUM) 15 milliLiter(s) Oral daily  pantoprazole   Suspension 40 milliGRAM(s) Oral daily  zinc sulfate 220 milliGRAM(s) Oral daily    MEDICATIONS  (PRN):  sodium chloride 0.9% lock flush 10 milliLiter(s) IV Push every 1 hour PRN Pre/post blood products, medications, blood draw, and to maintain line patency      Allergies    sulfa drugs (Unknown)    Intolerances        PAST MEDICAL & SURGICAL HISTORY:  HTN (hypertension)      HLD (hyperlipidemia)      Diabetes      CAD (coronary artery disease)      History of cholecystectomy      PHYSICAL EXAM:   Vital Signs:  Vital Signs Last 24 Hrs  T(C): 36.6 (2023 11:00), Max: 37.3 (2023 05:00)  T(F): 97.8 (2023 11:00), Max: 99.1 (2023 05:00)  HR: 98 (2023 12:00) (82 - 100)  BP: 133/75 (2023 11:00) (102/56 - 145/81)  BP(mean): 91 (2023 11:00) (63 - 121)  RR: 22 (2023 12:00) (11 - 42)  SpO2: 100% (2023 12:00) (97% - 100%)    Parameters below as of 2023 12:00  Patient On (Oxygen Delivery Method): nasal cannula  O2 Flow (L/min): 3    Daily     Daily Weight in k.8 (2023 10:30)I&O's Summary    2023 07:01  -  2023 07:00  --------------------------------------------------------  IN: 2060 mL / OUT: 1550 mL / NET: 510 mL    2023 07:01  -  2023 13:00  --------------------------------------------------------  IN: 670 mL / OUT: 170 mL / NET: 500 mL        GENERAL:  Appears stated age,   HEENT:  NC/AT,  NGT intact  CHEST:  Full & symmetric excursion  HEART:  Regular rhythm, S1, S2, no murmur  ABDOMEN:  Soft, non-tender, non-distended, normoactive bowel sounds,    EXTEREMITIES:  no edema  SKIN:  No rash/warm/dry  NEURO:  Alert,       LABS:                        8.3    9.04  )-----------( 172      ( 2023 05:00 )             26.9     06-22    137  |  98  |  107<H>  ----------------------------<  128<H>  3.4<L>   |  31  |  3.54<H>    Ca    8.3<L>      2023 05:00  Phos  3.7     06-22  Mg     2.0     06-22    TPro  6.9  /  Alb  1.9<L>  /  TBili  0.4  /  DBili  x   /  AST  12  /  ALT  11  /  AlkPhos  104  06-22      Urinalysis Basic - ( 2023 05:00 )    Color: x / Appearance: x / SG: x / pH: x  Gluc: 128 mg/dL / Ketone: x  / Bili: x / Urobili: x   Blood: x / Protein: x / Nitrite: x   Leuk Esterase: x / RBC: x / WBC x   Sq Epi: x / Non Sq Epi: x / Bacteria: x      amylase   lipase  RADIOLOGY & ADDITIONAL TESTS:

## 2023-06-22 NOTE — PROGRESS NOTE ADULT - SUBJECTIVE AND OBJECTIVE BOX
INTERVAL HPI/OVERNIGHT EVENTS: pat is having myoclonus movements       Antimicrobial:    Cardiovascular:  midodrine 10 milliGRAM(s) Oral every 8 hours    Pulmonary:    Hematalogic:    Other:  chlorhexidine 2% Cloths 1 Application(s) Topical daily  Dakins Solution - 1/2 Strength 1 Application(s) Topical daily  dextrose 5%. 1000 milliLiter(s) IV Continuous <Continuous>  dextrose 5%. 1000 milliLiter(s) IV Continuous <Continuous>  dextrose 50% Injectable 25 Gram(s) IV Push once  dextrose 50% Injectable 25 Gram(s) IV Push once  dextrose 50% Injectable 12.5 Gram(s) IV Push once  epoetin priyanka-epbx (RETACRIT) Injectable 33872 Unit(s) SubCutaneous every 7 days  insulin lispro (ADMELOG) corrective regimen sliding scale   SubCutaneous every 6 hours  modafinil 150 milliGRAM(s) Oral every 24 hours  multivitamin/minerals/iron Oral Solution (CENTRUM) 15 milliLiter(s) Oral daily  pantoprazole   Suspension 40 milliGRAM(s) Oral daily  sodium chloride 0.9% lock flush 10 milliLiter(s) IV Push every 1 hour PRN  zinc sulfate 220 milliGRAM(s) Oral daily      Drug Dosing Weight  Height (cm): 160 (09 Jun 2023 07:47)  Weight (kg): 77.6 (09 Jun 2023 07:34)  BMI (kg/m2): 30.3 (09 Jun 2023 07:47)  BSA (m2): 1.81 (09 Jun 2023 07:47)    CENTRAL LINE: [x ] YES [ ] NO  LOCATION:   DATE INSERTED: RIJ dialysis cath [facility] TLC both placed 6/21    [place]: [x ] YES [ ] NO    DATE INSERTED:    A-LINE:  [ ] YES [ ] NO  LOCATION:   DATE INSERTED:    PMH/Social Hx/Fam Hx -reviewed admission note, no change since admission  PAST MEDICAL & SURGICAL HISTORY:  HTN (hypertension)      HLD (hyperlipidemia)      Diabetes      CAD (coronary artery disease)      History of cholecystectomy          T(C): 36.6 (06-22-23 @ 11:00), Max: 37.3 (06-22-23 @ 05:00)  HR: 98 (06-22-23 @ 12:00)  BP: 133/75 (06-22-23 @ 11:00)  BP(mean): 91 (06-22-23 @ 11:00)  ABP: --  ABP(mean): --  RR: 22 (06-22-23 @ 12:00)  SpO2: 100% (06-22-23 @ 12:00)  Wt(kg): --    ABG - ( 21 Jun 2023 05:35 )  pH, Arterial: 7.38  pH, Blood: x     /  pCO2: 47    /  pO2: 72    / HCO3: 28    / Base Excess: 2.7   /  SaO2: 96.4                  06-21 @ 07:01  -  06-22 @ 07:00  --------------------------------------------------------  IN: 2060 mL / OUT: 1550 mL / NET: 510 mL            PHYSICAL EXAM:    GENERAL: No signs of distress, comfortable + ETT  HEAD: Atraumatic, Normocephalic  EYES: EOMI, PERRLA  ENMT: No erythema, exudates, or enlargement, Moist mucous membranes   NECK: Supple, normal appearance, R IJ dialysis cath / [facility] TLC   CHEST/LUNG: No chest deformity, fair bilateral air entry; No rales, rhonchi, wheezing; crackles  HEART: Regular rate and rhythm; No murmurs, rubs, or gallops;   ABDOMEN: Soft, Nontender, Nondistended; Bowel sounds present  EXTREMITIES:  + Peripheral Pulses, No clubbing, cyanosis, or edema  NERVOUS SYSTEM: eyes open  tract to voice   LYMPH: No lymphadenopathy noted  SKIN: No rashes or lesions; good turgor, warm, dry      LABS:  CBC Full  -  ( 22 Jun 2023 05:00 )  WBC Count : 9.04 K/uL  RBC Count : 2.60 M/uL  Hemoglobin : 8.3 g/dL  Hematocrit : 26.9 %  Platelet Count - Automated : 172 K/uL  Mean Cell Volume : 103.5 fl  Mean Cell Hemoglobin : 31.9 pg  Mean Cell Hemoglobin Concentration : 30.9 gm/dL  Auto Neutrophil # : x  Auto Lymphocyte # : x  Auto Monocyte # : x  Auto Eosinophil # : x  Auto Basophil # : x  Auto Neutrophil % : x  Auto Lymphocyte % : x  Auto Monocyte % : x  Auto Eosinophil % : x  Auto Basophil % : x    06-22    137  |  98  |  107<H>  ----------------------------<  128<H>  3.4<L>   |  31  |  3.54<H>    Ca    8.3<L>      22 Jun 2023 05:00  Phos  3.7     06-22  Mg     2.0     06-22    TPro  6.9  /  Alb  1.9<L>  /  TBili  0.4  /  DBili  x   /  AST  12  /  ALT  11  /  AlkPhos  104  06-22      Urinalysis Basic - ( 22 Jun 2023 05:00 )    Color: x / Appearance: x / SG: x / pH: x  Gluc: 128 mg/dL / Ketone: x  / Bili: x / Urobili: x   Blood: x / Protein: x / Nitrite: x   Leuk Esterase: x / RBC: x / WBC x   Sq Epi: x / Non Sq Epi: x / Bacteria: x          RADIOLOGY & ADDITIONAL STUDIES REVIEWED         IMPRESSION:  PAST MEDICAL & SURGICAL HISTORY:  HTN (hypertension)      HLD (hyperlipidemia)      Diabetes      CAD (coronary artery disease)      History of cholecystectomy       p/w             IMP: This is an 78 year old man with   dementia, HTN, cardiomyopathy, HFrEF, CAD s/p CABG, with known current RCA occlusion, and DM type 2 who was admitted to [facility] on 5/4 with hypoxic respiratory failure in setting of CHF exacerbation and ANISH with course complicated by acute hypoxic respiratory failure in setting of choking episode causing cardiac arrest, shock, worsening hypoxemic respiratory failure, and NSTEMI on 5/9. Pt ICU course noted post cardiac arrest s/p CPR x 8 min ,  anoxic brain injury and myoclonus. Pt was transferred to [facility] for VEEG which required 48 hours of monitoring as he was noted to still be sedated. While there pt noted to have hematochezia and any ac / antiplatelet agents were held at that time. EEG findings consistent with stimulus induced myoclonus and GPDs s/p hypoxic diffuse indicative of severe cerebral dysfunction.    Patient returned to [facility] ICU on 5/19 and continues tx / supportive care for management of:  AMS/ Encephalopathy due to hypoxic brain injury with possible uremic encephalopathy and elevated NH4. High ammonia level due to depakote .  BP low today on lopressor .         ASSESSMENT   - Cardiac arrest 2/2 choking episode / respiratory failure  - Acute respiratory failure s/p palliative extubation 6/7/23  - Severe hypoxic  Encephalopathy with secondary cortical myoclonus  - AMS / Encephalopathy / Uremia / Elevated NH4   - Acute renal failure w Uremia post cardiac arrest    - Hemtochezia episode, h/h stable    - Heart failure with reduced EF 35-40 %  - Sacral decub            Sugg;    - Hold klonopin   - No sedation   - Resume  depakote for myoclonus movement    - O2 supp as needed   - Monitor daily chest tube out put .. 200 ml /24 hrs  - Chest tube off suction   - Daily CXR to monitor effusion and chest tube   - NGT feed  - Hemodynamic monitoring   - Continue midodrine   - Dialysis cath and TLC placed   - Started on HD 6/21  - HD as per Neph   - Dig for rate control   - Anticoag ( Eliquis)  held for possible PEG 6/23  - No antibx   - Skin care   - Asp precaution       case discussed with daughter at bedside and  icu team on rounds

## 2023-06-22 NOTE — PROGRESS NOTE ADULT - PROBLEM SELECTOR PLAN 1
Reevaluation for PEG  Attending discussed with patient's daughter Kuldeep at bedside on Sunday and explained that his underlying cardiopulmonary issues place him at higher risk of complication from the procedure but that if long term nutritional support is needed then a PEG is the optimal way of providing this. She verbalized understanding and wishes to proceed with PEG placement.  Recommend holding Eliquis 2 days prior to procedure.   Tentatively scheduled for FRIDAY peg placement , per discussion with nephrology, patient would be better optimized with 2 days of dialysis prior to procedure

## 2023-06-23 LAB
ALBUMIN SERPL ELPH-MCNC: 2.1 G/DL — LOW (ref 3.3–5)
ALP SERPL-CCNC: 125 U/L — HIGH (ref 40–120)
ALT FLD-CCNC: 12 U/L — SIGNIFICANT CHANGE UP (ref 10–45)
ANION GAP SERPL CALC-SCNC: 6 MMOL/L — SIGNIFICANT CHANGE UP (ref 5–17)
AST SERPL-CCNC: 19 U/L — SIGNIFICANT CHANGE UP (ref 10–40)
BILIRUB SERPL-MCNC: 0.7 MG/DL — SIGNIFICANT CHANGE UP (ref 0.2–1.2)
BUN SERPL-MCNC: 67 MG/DL — HIGH (ref 7–23)
CALCIUM SERPL-MCNC: 8.5 MG/DL — SIGNIFICANT CHANGE UP (ref 8.4–10.5)
CHLORIDE SERPL-SCNC: 98 MMOL/L — SIGNIFICANT CHANGE UP (ref 96–108)
CO2 SERPL-SCNC: 31 MMOL/L — SIGNIFICANT CHANGE UP (ref 22–31)
CREAT SERPL-MCNC: 2.77 MG/DL — HIGH (ref 0.5–1.3)
EGFR: 22 ML/MIN/1.73M2 — LOW
GLUCOSE BLDC GLUCOMTR-MCNC: 131 MG/DL — HIGH (ref 70–99)
GLUCOSE BLDC GLUCOMTR-MCNC: 179 MG/DL — HIGH (ref 70–99)
GLUCOSE BLDC GLUCOMTR-MCNC: 188 MG/DL — HIGH (ref 70–99)
GLUCOSE BLDC GLUCOMTR-MCNC: 231 MG/DL — HIGH (ref 70–99)
GLUCOSE BLDC GLUCOMTR-MCNC: 268 MG/DL — HIGH (ref 70–99)
GLUCOSE SERPL-MCNC: 123 MG/DL — HIGH (ref 70–99)
HCT VFR BLD CALC: 28.3 % — LOW (ref 39–50)
HGB BLD-MCNC: 8.6 G/DL — LOW (ref 13–17)
MAGNESIUM SERPL-MCNC: 1.9 MG/DL — SIGNIFICANT CHANGE UP (ref 1.6–2.6)
MCHC RBC-ENTMCNC: 30.4 GM/DL — LOW (ref 32–36)
MCHC RBC-ENTMCNC: 31.4 PG — SIGNIFICANT CHANGE UP (ref 27–34)
MCV RBC AUTO: 103.3 FL — HIGH (ref 80–100)
NRBC # BLD: 0 /100 WBCS — SIGNIFICANT CHANGE UP (ref 0–0)
PHOSPHATE SERPL-MCNC: 2.5 MG/DL — SIGNIFICANT CHANGE UP (ref 2.5–4.5)
PLATELET # BLD AUTO: 191 K/UL — SIGNIFICANT CHANGE UP (ref 150–400)
POTASSIUM SERPL-MCNC: 4.5 MMOL/L — SIGNIFICANT CHANGE UP (ref 3.5–5.3)
POTASSIUM SERPL-SCNC: 4.5 MMOL/L — SIGNIFICANT CHANGE UP (ref 3.5–5.3)
PROT SERPL-MCNC: 7.4 G/DL — SIGNIFICANT CHANGE UP (ref 6–8.3)
RBC # BLD: 2.74 M/UL — LOW (ref 4.2–5.8)
RBC # FLD: 18.7 % — HIGH (ref 10.3–14.5)
SODIUM SERPL-SCNC: 135 MMOL/L — SIGNIFICANT CHANGE UP (ref 135–145)
WBC # BLD: 10.66 K/UL — HIGH (ref 3.8–10.5)
WBC # FLD AUTO: 10.66 K/UL — HIGH (ref 3.8–10.5)

## 2023-06-23 PROCEDURE — 99497 ADVNCD CARE PLAN 30 MIN: CPT

## 2023-06-23 PROCEDURE — 99233 SBSQ HOSP IP/OBS HIGH 50: CPT

## 2023-06-23 PROCEDURE — 71045 X-RAY EXAM CHEST 1 VIEW: CPT | Mod: 26,77

## 2023-06-23 PROCEDURE — 71045 X-RAY EXAM CHEST 1 VIEW: CPT | Mod: 26

## 2023-06-23 RX ORDER — MEROPENEM 1 G/30ML
500 INJECTION INTRAVENOUS EVERY 24 HOURS
Refills: 0 | Status: DISCONTINUED | OUTPATIENT
Start: 2023-06-23 | End: 2023-06-23

## 2023-06-23 RX ORDER — ACETAMINOPHEN 500 MG
650 TABLET ORAL EVERY 6 HOURS
Refills: 0 | Status: DISCONTINUED | OUTPATIENT
Start: 2023-06-23 | End: 2023-07-07

## 2023-06-23 RX ORDER — MEROPENEM 1 G/30ML
500 INJECTION INTRAVENOUS EVERY 12 HOURS
Refills: 0 | Status: DISCONTINUED | OUTPATIENT
Start: 2023-06-23 | End: 2023-06-25

## 2023-06-23 RX ORDER — APIXABAN 2.5 MG/1
2.5 TABLET, FILM COATED ORAL EVERY 12 HOURS
Refills: 0 | Status: DISCONTINUED | OUTPATIENT
Start: 2023-06-23 | End: 2023-06-26

## 2023-06-23 RX ORDER — MODAFINIL 200 MG/1
150 TABLET ORAL EVERY 24 HOURS
Refills: 0 | Status: DISCONTINUED | OUTPATIENT
Start: 2023-06-24 | End: 2023-06-29

## 2023-06-23 RX ADMIN — ZINC SULFATE TAB 220 MG (50 MG ZINC EQUIVALENT) 220 MILLIGRAM(S): 220 (50 ZN) TAB at 11:42

## 2023-06-23 RX ADMIN — MODAFINIL 150 MILLIGRAM(S): 200 TABLET ORAL at 06:16

## 2023-06-23 RX ADMIN — APIXABAN 2.5 MILLIGRAM(S): 2.5 TABLET, FILM COATED ORAL at 18:00

## 2023-06-23 RX ADMIN — Medication 650 MILLIGRAM(S): at 22:37

## 2023-06-23 RX ADMIN — Medication 650 MILLIGRAM(S): at 10:25

## 2023-06-23 RX ADMIN — Medication 650 MILLIGRAM(S): at 09:55

## 2023-06-23 RX ADMIN — Medication 2: at 23:36

## 2023-06-23 RX ADMIN — Medication 15 MILLILITER(S): at 11:41

## 2023-06-23 RX ADMIN — Medication 250 MILLIGRAM(S): at 06:16

## 2023-06-23 RX ADMIN — Medication 1: at 11:41

## 2023-06-23 RX ADMIN — Medication 650 MILLIGRAM(S): at 21:27

## 2023-06-23 RX ADMIN — PANTOPRAZOLE SODIUM 40 MILLIGRAM(S): 20 TABLET, DELAYED RELEASE ORAL at 11:41

## 2023-06-23 RX ADMIN — MIDODRINE HYDROCHLORIDE 10 MILLIGRAM(S): 2.5 TABLET ORAL at 06:16

## 2023-06-23 RX ADMIN — MEROPENEM 100 MILLIGRAM(S): 1 INJECTION INTRAVENOUS at 18:00

## 2023-06-23 RX ADMIN — Medication 1: at 18:42

## 2023-06-23 RX ADMIN — Medication 250 MILLIGRAM(S): at 18:00

## 2023-06-23 RX ADMIN — CHLORHEXIDINE GLUCONATE 1 APPLICATION(S): 213 SOLUTION TOPICAL at 11:41

## 2023-06-23 RX ADMIN — Medication 1 APPLICATION(S): at 11:42

## 2023-06-23 NOTE — PROGRESS NOTE ADULT - SUBJECTIVE AND OBJECTIVE BOX
Seen in ICU, + NGT, NC O2, MS remains poor    Vital Signs Last 24 Hrs  T(C): 37.8 (06-23-23 @ 18:00), Max: 38.3 (06-23-23 @ 08:00)  T(F): 100 (06-23-23 @ 18:00), Max: 100.9 (06-23-23 @ 08:00)  HR: 114 (06-23-23 @ 19:00) (100 - 125)  BP: 128/83 (06-23-23 @ 19:00) (118/82 - 158/88)  BP(mean): 93 (06-23-23 @ 19:00) (91 - 117)  RR: 24 (06-23-23 @ 19:00) (16 - 44)  SpO2: 100% (06-23-23 @ 19:00) (89% - 100%)    I&O's Detail    22 Jun 2023 07:01  -  23 Jun 2023 07:00  --------------------------------------------------------  IN:    Enteral Tube Flush: 270 mL    Free Water: 600 mL    Nepro with Carb Steady: 845 mL    Other (mL): 800 mL  Total IN: 2515 mL    OUT:    Chest Tube (mL): 450 mL    Other (mL): 800 mL    Voided (mL): 260 mL  Total OUT: 1510 mL    Total NET: 1005 mL    23 Jun 2023 07:01  -  23 Jun 2023 21:52  --------------------------------------------------------  IN:    Enteral Tube Flush: 250 mL    Free Water: 200 mL    IV PiggyBack: 50 mL    Nepro with Carb Steady: 455 mL  Total IN: 955 mL    OUT:    Chest Tube (mL): 150 mL  Total OUT: 150 mL    Total NET: 805 mL    s1s2  b/l air entry  soft, ND  tr edema                                                                                                                                    8.6    10.66 )-----------( 191      ( 23 Jun 2023 06:00 )             28.3     23 Jun 2023 06:00    135    |  98     |  67     ----------------------------<  123    4.5     |  31     |  2.77     Ca    8.5        23 Jun 2023 06:00  Phos  2.5       23 Jun 2023 06:00  Mg     1.9       23 Jun 2023 06:00    TPro  7.4    /  Alb  2.1    /  TBili  0.7    /  DBili  x      /  AST  19     /  ALT  12     /  AlkPhos  125    23 Jun 2023 06:00    LIVER FUNCTIONS - ( 23 Jun 2023 06:00 )  Alb: 2.1 g/dL / Pro: 7.4 g/dL / ALK PHOS: 125 U/L / ALT: 12 U/L / AST: 19 U/L / GGT: x           acetaminophen   Oral Liquid .. 650 milliGRAM(s) Oral every 6 hours PRN  apixaban 2.5 milliGRAM(s) Oral every 12 hours  chlorhexidine 2% Cloths 1 Application(s) Topical daily  Dakins Solution - 1/2 Strength 1 Application(s) Topical daily  dextrose 5%. 1000 milliLiter(s) IV Continuous <Continuous>  dextrose 5%. 1000 milliLiter(s) IV Continuous <Continuous>  dextrose 50% Injectable 25 Gram(s) IV Push once  dextrose 50% Injectable 12.5 Gram(s) IV Push once  dextrose 50% Injectable 25 Gram(s) IV Push once  epoetin priyanka-epbx (RETACRIT) Injectable 26133 Unit(s) SubCutaneous every 7 days  insulin lispro (ADMELOG) corrective regimen sliding scale   SubCutaneous every 6 hours  meropenem  IVPB 500 milliGRAM(s) IV Intermittent every 12 hours  modafinil 150 milliGRAM(s) Oral every 24 hours  multivitamin/minerals/iron Oral Solution (CENTRUM) 15 milliLiter(s) Oral daily  pantoprazole   Suspension 40 milliGRAM(s) Oral daily  sodium chloride 0.9% lock flush 10 milliLiter(s) IV Push every 1 hour PRN  valproic  acid Syrup 250 milliGRAM(s) Oral every 12 hours  zinc sulfate 220 milliGRAM(s) Oral daily    A/P:    Hemodynamic ATN s/p arrest (Cr 1.6 - 5/9/23, Cr 1.0 - 4/12/23)  Resp failure, s/p intubated, now extubated  S/p L CT  CM, EF 35 - 40%, dementia  Cr has initially improved (peak Cr 5.4 - 5/19/23, aida Cr 1.95 - 6/1/23)  Now w/recurrent hemodynamic ATN  Avoid nephrotoxins  Epoetin  F/u CBC, BMP, UO  Per d/w ICU team and daughter, Kuldeep Avila, started HD 6/21  2nd HD yesterday  Next HD tomorrow as ordered  Fever: w/up, Abx per ID, pan-cx  Overall prognosis is poor   D/w ICU team    [phone]       Seen in ICU, + NGT, NC O2, MS remains poor    Vital Signs Last 24 Hrs  T(C): 37.8 (06-23-23 @ 18:00), Max: 38.3 (06-23-23 @ 08:00)  T(F): 100 (06-23-23 @ 18:00), Max: 100.9 (06-23-23 @ 08:00)  HR: 114 (06-23-23 @ 19:00) (100 - 125)  BP: 128/83 (06-23-23 @ 19:00) (118/82 - 158/88)  BP(mean): 93 (06-23-23 @ 19:00) (91 - 117)  RR: 24 (06-23-23 @ 19:00) (16 - 44)  SpO2: 100% (06-23-23 @ 19:00) (89% - 100%)    I&O's Detail    22 Jun 2023 07:01  -  23 Jun 2023 07:00  --------------------------------------------------------  IN:    Enteral Tube Flush: 270 mL    Free Water: 600 mL    Nepro with Carb Steady: 845 mL    Other (mL): 800 mL  Total IN: 2515 mL    OUT:    Chest Tube (mL): 450 mL    Other (mL): 800 mL    Voided (mL): 260 mL  Total OUT: 1510 mL    Total NET: 1005 mL    23 Jun 2023 07:01  -  23 Jun 2023 21:52  --------------------------------------------------------  IN:    Enteral Tube Flush: 250 mL    Free Water: 200 mL    IV PiggyBack: 50 mL    Nepro with Carb Steady: 455 mL  Total IN: 955 mL    OUT:    Chest Tube (mL): 150 mL  Total OUT: 150 mL    Total NET: 805 mL    s1s2  b/l air entry  soft, ND  tr edema                                                                                                                                    8.6    10.66 )-----------( 191      ( 23 Jun 2023 06:00 )             28.3     23 Jun 2023 06:00    135    |  98     |  67     ----------------------------<  123    4.5     |  31     |  2.77     Ca    8.5        23 Jun 2023 06:00  Phos  2.5       23 Jun 2023 06:00  Mg     1.9       23 Jun 2023 06:00    TPro  7.4    /  Alb  2.1    /  TBili  0.7    /  DBili  x      /  AST  19     /  ALT  12     /  AlkPhos  125    23 Jun 2023 06:00    LIVER FUNCTIONS - ( 23 Jun 2023 06:00 )  Alb: 2.1 g/dL / Pro: 7.4 g/dL / ALK PHOS: 125 U/L / ALT: 12 U/L / AST: 19 U/L / GGT: x           acetaminophen   Oral Liquid .. 650 milliGRAM(s) Oral every 6 hours PRN  apixaban 2.5 milliGRAM(s) Oral every 12 hours  chlorhexidine 2% Cloths 1 Application(s) Topical daily  Dakins Solution - 1/2 Strength 1 Application(s) Topical daily  dextrose 5%. 1000 milliLiter(s) IV Continuous <Continuous>  dextrose 5%. 1000 milliLiter(s) IV Continuous <Continuous>  dextrose 50% Injectable 25 Gram(s) IV Push once  dextrose 50% Injectable 12.5 Gram(s) IV Push once  dextrose 50% Injectable 25 Gram(s) IV Push once  epoetin priyanka-epbx (RETACRIT) Injectable 96286 Unit(s) SubCutaneous every 7 days  insulin lispro (ADMELOG) corrective regimen sliding scale   SubCutaneous every 6 hours  meropenem  IVPB 500 milliGRAM(s) IV Intermittent every 12 hours  modafinil 150 milliGRAM(s) Oral every 24 hours  multivitamin/minerals/iron Oral Solution (CENTRUM) 15 milliLiter(s) Oral daily  pantoprazole   Suspension 40 milliGRAM(s) Oral daily  sodium chloride 0.9% lock flush 10 milliLiter(s) IV Push every 1 hour PRN  valproic  acid Syrup 250 milliGRAM(s) Oral every 12 hours  zinc sulfate 220 milliGRAM(s) Oral daily    A/P:    Hemodynamic ATN s/p arrest (Cr 1.6 - 5/9/23, Cr 1.0 - 4/12/23)  Resp failure, s/p intubated, now extubated  S/p L CT  CM, EF 35 - 40%, dementia  Cr has initially improved (peak Cr 5.4 - 5/19/23, aida Cr 1.95 - 6/1/23)  Now w/recurrent hemodynamic ATN  Avoid nephrotoxins  Epoetin  F/u CBC, BMP, UO  Per d/w ICU team and daughter started on HD 6/21  2nd HD yesterday  Next HD tomorrow as ordered  Fever: w/up, Abx per ID, pan-cx  Overall prognosis is poor   D/w ICU team    [phone]

## 2023-06-23 NOTE — GOALS OF CARE CONVERSATION - ADVANCED CARE PLANNING - NSPROPTRIGHTCAREGIVER_GEN_A_NUR
no
yes
no
no
PAST SURGICAL HISTORY:  History of open heart surgery     Other postprocedural status Right Shoulder    Other postprocedural status S/P right knee arthroscopy

## 2023-06-23 NOTE — PHARMACOTHERAPY INTERVENTION NOTE - INTERVENTION TYPE RECOOMEND
IV to PO
IV to PO
Timing/Frequency of Administration Recommended
Therapy Recommended - Contraindication

## 2023-06-23 NOTE — GOALS OF CARE CONVERSATION - ADVANCED CARE PLANNING - CONVERSATION/DISCUSSION
Diagnosis/Prognosis/Treatment Options
Diagnosis/Prognosis/MOLST Discussed/Palliative Care Referral
Diagnosis/Prognosis/MOLST Discussed/Treatment Options/Chaplaincy Referral
Diagnosis/Prognosis/Treatment Options

## 2023-06-23 NOTE — PROGRESS NOTE ADULT - PROBLEM SELECTOR PLAN 1
Reevaluation for PEG  Attending discussed with patient's daughter Kuldeep at bedside on Sunday and explained that his underlying cardiopulmonary issues place him at higher risk of complication from the procedure but that if long term nutritional support is needed then a PEG is the optimal way of providing this. She verbalized understanding and wishes to proceed with PEG placement.  Patient febrile today, more tachypneic and now with new infiltrates  Will hold off on elective peg placement given instability and already high risk Reevaluation for PEG  Attending discussed with patient's daughter Kuldeep at bedside on Sunday and explained that his underlying cardiopulmonary issues place him at higher risk of complication from the procedure but that if long term nutritional support is needed then a PEG is the optimal way of providing this. She verbalized understanding and wishes to proceed with PEG placement.  Patient febrile today, more tachypneic and now with new infiltrates  Will hold off on elective peg placement given instability and already high risk  Attempted to call  Kuldeep to inform peg placement canceled, line busy  Will sign off, please reconsult when patient stable

## 2023-06-23 NOTE — GOALS OF CARE CONVERSATION - ADVANCED CARE PLANNING - CONVERSATION DETAILS
Spoke wit daughter Kuldeep today as a follow up for on going  GOC . Discussed with her that pt has not made any significant improvements. Discussed that pt had the repeat tests done CT head  and EEG . These were reviewed w/ neuro . We talked about what the neuro team said to her yesterday , that recovery is not likely and that pt has brain injury .  daughter becomes very easily upset  and is still extremely  frustrated about events leading up to pts current state. daughter says she understood what the neurologist  explained to her . However daughter says she does see some movement and facial responses when she calls his name. She has decided that they will likely not place a  trach , but because she   has to make this difficult  decision , she wants more time to see if his responses will improve at all.  She is asking for 7 more days to see how he does . Reviewed this w/ ccu team today.
Had family meeting with kids  discussed how mental status improving to a level that was not present prior  he is tracking and his myoclonus is worse  discussed risks and benefits trach and how can allow improvement / management of airway and allow time to see mental status to improve  however likelihood of progressive vegetative state is high and unsure where new baseline will occur  discussed patient is having active myoclonus and appears uncomfortable need sedatives at this madeleine   discussed role in palliative wean vs trach and     their decision is to have palliative wean either Wednesday vs Thursday state patient is suffering and that continued ventilation will cause harm to patient. they state need to get family/work matters in order first prior to palliative wean.
Met at bedside with pts daughter Kuldeep, dr Polo , then son Franko , nurse Sendy joined  later . We discussed w/ Kuldeep pts present condition, pt does open eyes with stimulation only, , w/ myoclonus at times, better w/ medication. He does not follow commands, he has been unable to tolerate c-pap trials, Explained that means pt unable to breathe on his own without the ventilator, daughter stated understanding. daughter feels that if the machine is keeping him alive then  we are prolonging things, daughter then said  that they , she and her brother , would not want to put a trach in him and have to live in facility for rest of his life. Discussed that we can withdraw the breathing tube, use medication to prevent any distress or suffering, knowing there is high likelihood that he will pass. daughter understood and in agreement  and says she and her brother would like to remove the tubes today . Daughter expressed her frustration with being in this position and we allowed her to vent and supported her. We asked about importance of  visit and she agreed, we had  come for [facility] and prayer . We discussed pt now dnr/ dni , full comfort measures , withdrawal of breathing tube, and completed a Molst today to this effect, family gave verbal consent for Molst orders.
called and spoke to daughter Kuldeep today as a follow up discussion , she was at work and unable to be here today .   I reviewed with her pts current condition and recent set backs . Pt now on dialysis , having less UOP , another pna , asp likely,  and low grade fever .Peg placement put on hold today due to fever . daughter was aware , and reports  when she saw him last night he was very awake, and trying  to speak to her. But she also admits that things are not going as they had hoped, and that she is seeing that one thing get "fixed" and then something else goes wrong.  I asked her what their long term plans might be and if HD would be a long term treatment. daughter says not likely , but she wants to see how he does this weekend, and he is going to have another round of HD tomorrow. Says she has spoken w/ nephrology team and they plan to l re- evaluate things/numbers  next week, Monday. She was appreciative for the call/support .

## 2023-06-23 NOTE — PROGRESS NOTE ADULT - ASSESSMENT
78 year old man PMHx dementia, HTN, cardiomyopathy, HFrEF, CAD s/p CABG, with known current RCA occlusion, and DM type 2 who was admitted to [facility] on 5/4 with hypoxic respiratory failure in setting of CHF exacerbation and ANISH with course complicated by acute hypoxic respiratory failure in setting of choking episode causing cardiac arrest, shock, worsening hypoxemic respiratory failure, and NSTEMI on 5/9. Pt ICU course noted post cardiac arrest anoxic brain injury and myoclonus.  EEG findings consistent with stimulus induced myoclonus and GPDs s/p hypoxic diffuse indicative of severe cerebral dysfunction.  Patient returned to [facility] ICU on 5/19 and continues tx.  Reevaluation of PEG requested. Patient currently receiving NG tube feeding.

## 2023-06-23 NOTE — CHART NOTE - NSCHARTNOTEFT_GEN_A_CORE
Nutrition Follow Up Note  Hospital Course (Per Electronic Medical Record):   Source: Medical Record [X] Nursing Staff [X]     Diet: Nepro @ 65ml/hr x 16 hr with Prosource 30ml BID     Patient remains tolerating current EN feeds , HD initiated due to worsening renal status ,patient was scheduled for PEG placement today however canceled due to patient with fever , Labs reviewed , patient noted for additional HD sessions as per renal recommendations , Dx of Severe malnutrition noted , difficult to assess true weight status , however due to increased physiological demands from HD suggest change EN feeding regimen to Nepro @ 75mlx 18 hrs which will provide 2430kcals, 109gms protein [id number], (37kcals& 1.7gms /kg current body weight (65.7kg ) POCT reviewed , Icorrective insulin regimen noted    Current Weight:(6/23) 143.7/65.7kg                         ( 6/22) 160.4/72.8kg                              Pertinent Medications: MEDICATIONS  (STANDING):  apixaban 2.5 milliGRAM(s) Oral every 12 hours  chlorhexidine 2% Cloths 1 Application(s) Topical daily  Dakins Solution - 1/2 Strength 1 Application(s) Topical daily  dextrose 5%. 1000 milliLiter(s) (100 mL/Hr) IV Continuous <Continuous>  dextrose 5%. 1000 milliLiter(s) (50 mL/Hr) IV Continuous <Continuous>  dextrose 50% Injectable 25 Gram(s) IV Push once  dextrose 50% Injectable 12.5 Gram(s) IV Push once  dextrose 50% Injectable 25 Gram(s) IV Push once  epoetin priyanka-epbx (RETACRIT) Injectable 39552 Unit(s) SubCutaneous every 7 days  insulin lispro (ADMELOG) corrective regimen sliding scale   SubCutaneous every 6 hours  meropenem  IVPB 500 milliGRAM(s) IV Intermittent every 12 hours  modafinil 150 milliGRAM(s) Oral every 24 hours  multivitamin/minerals/iron Oral Solution (CENTRUM) 15 milliLiter(s) Oral daily  pantoprazole   Suspension 40 milliGRAM(s) Oral daily  valproic  acid Syrup 250 milliGRAM(s) Oral every 12 hours  zinc sulfate 220 milliGRAM(s) Oral daily    MEDICATIONS  (PRN):  acetaminophen   Oral Liquid .. 650 milliGRAM(s) Oral every 6 hours PRN Temp greater or equal to 38C (100.4F)  sodium chloride 0.9% lock flush 10 milliLiter(s) IV Push every 1 hour PRN Pre/post blood products, medications, blood draw, and to maintain line patency      Pertinent Labs:  06-23 Na135 mmol/L Glu 123 mg/dL<H> K+ 4.5 mmol/L Cr  2.77 mg/dL<H> BUN 67 mg/dL<H> 06-23 Phos 2.5 mg/dL 06-23 Alb 2.1 g/dL<L>Hgb8.6g/dl<L> Hct28,3%<L>   POCT 131,227,140,125      Skin: DTI sacrum     Edema:none    Last BM: (6/23)     Estimated Needs:   [X] No Change since Previous Assessment      Previous Nutrition Diagnosis: Severe Protein Calorie Malnutrition & increased nutrient needs     Nutrition Diagnosis is [X] Ongoing           Interventions:   1. Recommend increase rate to 75ml/hr x 18 hrs       Monitoring & Evaluation: will monitor:  [X] Weights   [X] tolerance to EN  feeds   [X] Follow Up (Per Protocol)  [X] Tolerance to Diet Prescription       RD to follow as per Nutrition protocol  Preethi Carnes RDN

## 2023-06-23 NOTE — PROGRESS NOTE ADULT - SUBJECTIVE AND OBJECTIVE BOX
INTERVAL HPI/OVERNIGHT EVENTS:  HPI:    79 y/o male admitted to CCU, patient seen and examined, Febrile today.       MEDICATIONS  (STANDING):  chlorhexidine 2% Cloths 1 Application(s) Topical daily  Dakins Solution - 1/2 Strength 1 Application(s) Topical daily  dextrose 5%. 1000 milliLiter(s) (50 mL/Hr) IV Continuous <Continuous>  dextrose 5%. 1000 milliLiter(s) (100 mL/Hr) IV Continuous <Continuous>  dextrose 50% Injectable 25 Gram(s) IV Push once  dextrose 50% Injectable 12.5 Gram(s) IV Push once  dextrose 50% Injectable 25 Gram(s) IV Push once  epoetin priyanka-epbx (RETACRIT) Injectable 80857 Unit(s) SubCutaneous every 7 days  insulin lispro (ADMELOG) corrective regimen sliding scale   SubCutaneous every 6 hours  modafinil 150 milliGRAM(s) Oral every 24 hours  multivitamin/minerals/iron Oral Solution (CENTRUM) 15 milliLiter(s) Oral daily  pantoprazole   Suspension 40 milliGRAM(s) Oral daily  valproic  acid Syrup 250 milliGRAM(s) Oral every 12 hours  zinc sulfate 220 milliGRAM(s) Oral daily    MEDICATIONS  (PRN):  acetaminophen   Oral Liquid .. 650 milliGRAM(s) Oral every 6 hours PRN Temp greater or equal to 38C (100.4F)  sodium chloride 0.9% lock flush 10 milliLiter(s) IV Push every 1 hour PRN Pre/post blood products, medications, blood draw, and to maintain line patency      Allergies    sulfa drugs (Unknown)    Intolerances        PAST MEDICAL & SURGICAL HISTORY:  HTN (hypertension)      HLD (hyperlipidemia)      Diabetes      CAD (coronary artery disease)      History of cholecystectomy  	    PHYSICAL EXAM:   Vital Signs:  Vital Signs Last 24 Hrs  T(C): 38.3 (2023 08:00), Max: 38.3 (2023 08:00)  T(F): 100.9 (2023 08:00), Max: 100.9 (2023 08:00)  HR: 120 (2023 09:00) (91 - 127)  BP: 146/76 (2023 09:00) (115/98 - 156/96)  BP(mean): 94 (2023 09:00) (85 - 117)  RR: 39 (2023 09:00) (16 - 41)  SpO2: 95% (2023 09:00) (89% - 100%)    Parameters below as of 2023 09:00  Patient On (Oxygen Delivery Method): nasal cannula  O2 Flow (L/min): 3    Daily Height in cm: 160 (2023 07:45)    Daily Weight in k.2 (2023 05:00)I&O's Summary    2023 07:01  -  2023 07:00  --------------------------------------------------------  IN: 2515 mL / OUT: 1510 mL / NET: 1005 mL        GENERAL:  Appears stated age,   HEENT:  NC/AT,  conjunctivae clear and pink, NGT intact  CHEST:  Full & symmetric excursion, chest tube in place  HEART:  Regular rhythm, S1, S2, no murmur  ABDOMEN:  Soft, non-tender, non-distended, normoactive bowel sounds,   EXTEREMITIES:  no edema  SKIN:  No rash/warm/dry  NEURO:  Alert, y      LABS:                        8.6    10.66 )-----------( 191      ( 2023 06:00 )             28.3     06-23    135  |  98  |  67<H>  ----------------------------<  123<H>  4.5   |  31  |  2.77<H>    Ca    8.5      2023 06:00  Phos  2.5     06-  Mg     1.9     06    TPro  7.4  /  Alb  2.1<L>  /  TBili  0.7  /  DBili  x   /  AST  19  /  ALT  12  /  AlkPhos  125<H>  23      Urinalysis Basic - ( 2023 06:00 )    Color: x / Appearance: x / SG: x / pH: x  Gluc: 123 mg/dL / Ketone: x  / Bili: x / Urobili: x   Blood: x / Protein: x / Nitrite: x   Leuk Esterase: x / RBC: x / WBC x   Sq Epi: x / Non Sq Epi: x / Bacteria: x      amylase   lipase  RADIOLOGY & ADDITIONAL TESTS:

## 2023-06-23 NOTE — PROGRESS NOTE ADULT - ASSESSMENT
77 yo male with HfrEF,dementia, CAD,HTN,T2DM, recent lengthy [facility] admission, admitted 5/4 with acute hypoxic respiratory failure/choking/ with resultant cardiac arrest.  He  has survived, was sent to [facility] for a short time for evaluation for sizures/myoclonic jerks and has been back at [facility] for past month.He is s/p NSTEMI during earlier part of adnission  He has required left chest tube to drain effusion, removed today.  He has limited renal function, a temp HD catheter has been placed and he has received HD.  He is fed via NG tube, is minimally interactive, and his DNR/DNI was reversed and he is receiving aggressive medical care.  His [facility] blood cultures have been negative, prior sputums have grown mostly MSSA and one grew strep pneumo.  His pleural fluid culture is negative.He has received limited courses of vanco, zosyn, cefazolin, and cefepime during his 7 week hospital admission.  He is followed by Plastics for a sacral  wound.He is felt to have a hypoxic brain  injury.He has required vent but has been extubated.  He is on midodrine, was started on HD this week, next session planned for AM.A G tube is planned  Suggest:  1. Meropenem per ICU, would like to try to narrow if possible  2.Sputum culture if he can produce  3. Favor blood cultures if fevers continue  4.HD per renal;  5. [place] guarded for meaningful recovery.Will assist as best we can from an ID viewpoint, antibiotics unlikely to alter outcome.   79 yo male with HfrEF,dementia, CAD,HTN,T2DM, recent lengthy [facility] admission, admitted 5/4 with acute hypoxic respiratory failure/choking/ with resultant cardiac arrest.  He  has survived, was sent to [facility] for a short time for evaluation for seizures/myoclonic jerks and has been back at [facility] for past month.He is s/p NSTEMI during earlier part of adnission  He has required left chest tube to drain effusion, removed today.  He has limited renal function, a temp HD catheter has been placed and he has received HD.  He is fed via NG tube, is minimally interactive, and his DNR/DNI was reversed and he is receiving aggressive medical care.  His [facility] blood cultures have been negative, prior sputums have grown mostly MSSA and one grew strep pneumo.  His pleural fluid culture is negative.He has received limited courses of vanco, zosyn, cefazolin, and cefepime during his 7 week hospital admission.  He is followed by Plastics for a sacral  wound.He is felt to have a hypoxic brain  injury.He has required vent but has been extubated.  He is on midodrine, was started on HD this week, next session planned for AM.A G tube is planned  Suggest:  1. Meropenem per ICU, would like to try to narrow if possible  2.Sputum culture if he can produce  3. Favor blood cultures if fevers continue  4.HD per renal;  5. [place] guarded for meaningful recovery.Will assist as best we can from an ID viewpoint, antibiotics unlikely to alter outcome.

## 2023-06-23 NOTE — PROGRESS NOTE ADULT - SUBJECTIVE AND OBJECTIVE BOX
CC: f/u for new pneumonia    Patient reports: he is poorly responsive. New RLL infiltrate and started on meropenem today.He also has low grade fever    REVIEW OF SYSTEMS:  All other review of systems negative (Comprehensive ROS): he has been receiving HD    Antimicrobials Day #  :day 1  meropenem  IVPB 500 milliGRAM(s) IV Intermittent every 12 hours    Other Medications Reviewed  MEDICATIONS  (STANDING):  apixaban 2.5 milliGRAM(s) Oral every 12 hours  chlorhexidine 2% Cloths 1 Application(s) Topical daily  Dakins Solution - 1/2 Strength 1 Application(s) Topical daily  dextrose 5%. 1000 milliLiter(s) (100 mL/Hr) IV Continuous <Continuous>  dextrose 5%. 1000 milliLiter(s) (50 mL/Hr) IV Continuous <Continuous>  dextrose 50% Injectable 25 Gram(s) IV Push once  dextrose 50% Injectable 12.5 Gram(s) IV Push once  dextrose 50% Injectable 25 Gram(s) IV Push once  epoetin priyanka-epbx (RETACRIT) Injectable 32904 Unit(s) SubCutaneous every 7 days  insulin lispro (ADMELOG) corrective regimen sliding scale   SubCutaneous every 6 hours  meropenem  IVPB 500 milliGRAM(s) IV Intermittent every 12 hours  modafinil 150 milliGRAM(s) Oral every 24 hours  multivitamin/minerals/iron Oral Solution (CENTRUM) 15 milliLiter(s) Oral daily  pantoprazole   Suspension 40 milliGRAM(s) Oral daily  valproic  acid Syrup 250 milliGRAM(s) Oral every 12 hours  zinc sulfate 220 milliGRAM(s) Oral daily    T(F): 100.4 (06-23-23 @ 11:00), Max: 100.9 (06-23-23 @ 08:00)  HR: 114 (06-23-23 @ 14:00)  BP: 138/105 (06-23-23 @ 14:00)  RR: 34 (06-23-23 @ 14:00)  SpO2: 100% (06-23-23 @ 14:00)  Wt(kg): --    PHYSICAL EXAM:  General: poorly interactive, on supplemental O2, NG tube in place  Eyes:  anicteric, no conjunctival injection, no discharge  Oropharynx: no lesions or injection 	  Neck: supple, without adenopathy  Lungs: decreased at bases  Heart: regular rate and rhythm; no murmur, rubs or gallops  Abdomen: soft, nondistended, nontender, without mass or organomegaly  Skin: no rash  Extremities: no clubbing, cyanosis, or edema  Neurologic: poorly interactive  Myoclonic jerks    LAB RESULTS:                        8.6    10.66 )-----------( 191      ( 23 Jun 2023 06:00 )             28.3     06-23    135  |  98  |  67<H>  ----------------------------<  123<H>  4.5   |  31  |  2.77<H>    Ca    8.5      23 Jun 2023 06:00  Phos  2.5     06-23  Mg     1.9     06-23    TPro  7.4  /  Alb  2.1<L>  /  TBili  0.7  /  DBili  x   /  AST  19  /  ALT  12  /  AlkPhos  125<H>  06-23    LIVER FUNCTIONS - ( 23 Jun 2023 06:00 )  Alb: 2.1 g/dL / Pro: 7.4 g/dL / ALK PHOS: 125 U/L / ALT: 12 U/L / AST: 19 U/L / GGT: x           Urinalysis Basic - ( 23 Jun 2023 06:00 )    Color: x / Appearance: x / SG: x / pH: x  Gluc: 123 mg/dL / Ketone: x  / Bili: x / Urobili: x   Blood: x / Protein: x / Nitrite: x   Leuk Esterase: x / RBC: x / WBC x   Sq Epi: x / Non Sq Epi: x / Bacteria: x      MICROBIOLOGY:  RECENT CULTURES:      RADIOLOGY REVIEWED:  < from: Xray Chest 1 View- PORTABLE-Urgent (Xray Chest 1 View- PORTABLE-Urgent .) (06.23.23 @ 09:27) >  ACC: [id number] EXAM:  XR CHEST PORTABLE URGENT 1V   ORDERED BY: SAAD OLSEN     PROCEDURE DATE:  06/23/2023          INTERPRETATION:  Fever.    AP chest. Prior 6/21/2023.    IMPRESSION: Nasogastric tube left in right internal jugular lines  left   chest tube reidentified in position. Feeding tube in position. No change   mild vascular congestion trace bilateral pleural effusions. Elevation of   the right hemidiaphragm similar to prior.  no definite focal infiltrate.   No significant pneumothorax or other new abnormality.      --- End of Report ---    < end of copied text >

## 2023-06-24 LAB
ALBUMIN SERPL ELPH-MCNC: 2 G/DL — LOW (ref 3.3–5)
ALP SERPL-CCNC: 119 U/L — SIGNIFICANT CHANGE UP (ref 40–120)
ALT FLD-CCNC: 14 U/L — SIGNIFICANT CHANGE UP (ref 10–45)
ANION GAP SERPL CALC-SCNC: 8 MMOL/L — SIGNIFICANT CHANGE UP (ref 5–17)
AST SERPL-CCNC: 22 U/L — SIGNIFICANT CHANGE UP (ref 10–40)
BILIRUB SERPL-MCNC: 0.7 MG/DL — SIGNIFICANT CHANGE UP (ref 0.2–1.2)
BUN SERPL-MCNC: 85 MG/DL — HIGH (ref 7–23)
CALCIUM SERPL-MCNC: 9.1 MG/DL — SIGNIFICANT CHANGE UP (ref 8.4–10.5)
CHLORIDE SERPL-SCNC: 98 MMOL/L — SIGNIFICANT CHANGE UP (ref 96–108)
CO2 SERPL-SCNC: 30 MMOL/L — SIGNIFICANT CHANGE UP (ref 22–31)
CREAT SERPL-MCNC: 3.67 MG/DL — HIGH (ref 0.5–1.3)
EGFR: 16 ML/MIN/1.73M2 — LOW
GLUCOSE BLDC GLUCOMTR-MCNC: 123 MG/DL — HIGH (ref 70–99)
GLUCOSE BLDC GLUCOMTR-MCNC: 159 MG/DL — HIGH (ref 70–99)
GLUCOSE BLDC GLUCOMTR-MCNC: 165 MG/DL — HIGH (ref 70–99)
GLUCOSE SERPL-MCNC: 149 MG/DL — HIGH (ref 70–99)
HCT VFR BLD CALC: 28.1 % — LOW (ref 39–50)
HGB BLD-MCNC: 8.5 G/DL — LOW (ref 13–17)
MAGNESIUM SERPL-MCNC: 2.2 MG/DL — SIGNIFICANT CHANGE UP (ref 1.6–2.6)
MCHC RBC-ENTMCNC: 30.2 GM/DL — LOW (ref 32–36)
MCHC RBC-ENTMCNC: 31.4 PG — SIGNIFICANT CHANGE UP (ref 27–34)
MCV RBC AUTO: 103.7 FL — HIGH (ref 80–100)
NRBC # BLD: 0 /100 WBCS — SIGNIFICANT CHANGE UP (ref 0–0)
PHOSPHATE SERPL-MCNC: 3.4 MG/DL — SIGNIFICANT CHANGE UP (ref 2.5–4.5)
PLATELET # BLD AUTO: 199 K/UL — SIGNIFICANT CHANGE UP (ref 150–400)
POTASSIUM SERPL-MCNC: 4.7 MMOL/L — SIGNIFICANT CHANGE UP (ref 3.5–5.3)
POTASSIUM SERPL-SCNC: 4.7 MMOL/L — SIGNIFICANT CHANGE UP (ref 3.5–5.3)
PROT SERPL-MCNC: 7.1 G/DL — SIGNIFICANT CHANGE UP (ref 6–8.3)
RBC # BLD: 2.71 M/UL — LOW (ref 4.2–5.8)
RBC # FLD: 18.5 % — HIGH (ref 10.3–14.5)
SODIUM SERPL-SCNC: 136 MMOL/L — SIGNIFICANT CHANGE UP (ref 135–145)
WBC # BLD: 10.63 K/UL — HIGH (ref 3.8–10.5)
WBC # FLD AUTO: 10.63 K/UL — HIGH (ref 3.8–10.5)

## 2023-06-24 PROCEDURE — 71045 X-RAY EXAM CHEST 1 VIEW: CPT | Mod: 26

## 2023-06-24 RX ORDER — ASPIRIN/CALCIUM CARB/MAGNESIUM 324 MG
81 TABLET ORAL DAILY
Refills: 0 | Status: DISCONTINUED | OUTPATIENT
Start: 2023-06-24 | End: 2023-07-07

## 2023-06-24 RX ADMIN — Medication 1: at 13:10

## 2023-06-24 RX ADMIN — APIXABAN 2.5 MILLIGRAM(S): 2.5 TABLET, FILM COATED ORAL at 05:32

## 2023-06-24 RX ADMIN — PANTOPRAZOLE SODIUM 40 MILLIGRAM(S): 20 TABLET, DELAYED RELEASE ORAL at 13:10

## 2023-06-24 RX ADMIN — ZINC SULFATE TAB 220 MG (50 MG ZINC EQUIVALENT) 220 MILLIGRAM(S): 220 (50 ZN) TAB at 13:10

## 2023-06-24 RX ADMIN — CHLORHEXIDINE GLUCONATE 1 APPLICATION(S): 213 SOLUTION TOPICAL at 13:12

## 2023-06-24 RX ADMIN — Medication 250 MILLIGRAM(S): at 17:51

## 2023-06-24 RX ADMIN — MEROPENEM 100 MILLIGRAM(S): 1 INJECTION INTRAVENOUS at 17:52

## 2023-06-24 RX ADMIN — Medication 650 MILLIGRAM(S): at 16:34

## 2023-06-24 RX ADMIN — MEROPENEM 100 MILLIGRAM(S): 1 INJECTION INTRAVENOUS at 05:37

## 2023-06-24 RX ADMIN — Medication 1: at 05:33

## 2023-06-24 RX ADMIN — Medication 15 MILLILITER(S): at 13:11

## 2023-06-24 RX ADMIN — Medication 1 APPLICATION(S): at 13:12

## 2023-06-24 RX ADMIN — MODAFINIL 150 MILLIGRAM(S): 200 TABLET ORAL at 05:32

## 2023-06-24 RX ADMIN — APIXABAN 2.5 MILLIGRAM(S): 2.5 TABLET, FILM COATED ORAL at 17:52

## 2023-06-24 RX ADMIN — Medication 250 MILLIGRAM(S): at 05:33

## 2023-06-24 RX ADMIN — Medication 650 MILLIGRAM(S): at 17:48

## 2023-06-24 RX ADMIN — Medication 81 MILLIGRAM(S): at 13:11

## 2023-06-24 NOTE — PROGRESS NOTE ADULT - ASSESSMENT
78 year old man with dementia, HTN, cardiomyopathy and HFrEF (35-40%), CAD s/p CABG, RCA occlusion, and DM type 2 who was admitted to [facility] on 5/4 with hypoxic respiratory failure in the setting of CHF exacerbation and ANISH with course complicated by acute hypoxic respiratory failure in setting of choking episode causing cardiac arrest, shock, worsening hypoxemic respiratory failure, and NSTEMI on 5/9, ROSC obtained after ~8 minutes.   In the ICU, pt with noted myoclonus, was transferred to [facility] for continuous vEEG, EEG with stimulus induced myoclonus and GPDs consistent with diffuse hypoxic injury and severe cerebral dysfunction. Now transferred back to .  Course c/b hematochezia, now resolved.        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  - AMS / Encephalopathy / Uremia / Elevated NH4   - Acute renal failure w Uremia post cardiac arrest, on HD  - Hematochezia episode, h/h stable    - Heart failure with reduced EF 35-40 %  - Sacral decub  - Left pleural effusion        PLAN    - Holding klonopin as per daughter request   - No sedation   - Depakote for myoclonus movement    - AVAPs HS and supplemental NC during day as needed  - Chest tube d/c'd 6/23  - Continue meropenem, day 2  - Tylenol PRN fever  - NGT feeds, tolerating  - Aspirations precautions  - Hemodynamic monitoring   - Off midodrine  - Started on HD 6/21, HD per nephrology, scheduled for session today  - Digoxin for rate control   - PEG cancelled due to fever, will f/u with GI with stable  - Skin care   - Eliquis  - Prognosis poor       DNR/DNI

## 2023-06-24 NOTE — PROGRESS NOTE ADULT - NS ATTEND AMEND GEN_ALL_CORE FT
78 year old man with dementia, HTN, cardiomyopathy and HFrEF (35-40%), CAD s/p CABG, RCA occlusion, and DM type 2 who was admitted to [facility] on 5/4 with hypoxic respiratory failure in the setting of CHF exacerbation and ANISH with course complicated by acute hypoxic respiratory failure in setting of choking episode causing cardiac arrest, shock, worsening hypoxemic respiratory failure, and NSTEMI on 5/9, ROSC obtained after ~8 minutes.   In the ICU, pt with noted myoclonus, was transferred to [facility] for continuous vEEG, EEG with stimulus induced myoclonus and GPDs consistent with diffuse hypoxic injury and severe cerebral dysfunction. Now transferred back to .  Course c/b hematochezia, now resolved.        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  - AMS / Encephalopathy / Uremia / Elevated NH4   - Acute renal failure w Uremia post cardiac arrest, on HD  - Hematochezia episode, h/h stable    - Heart failure with reduced EF 35-40 %  - Sacral decub  - Left pleural effusion        PLAN    - Holding klonopin as per daughter request   - No sedation   - Depakote for myoclonus movement    - AVAPs HS and supplemental NC during day as needed  - Chest tube d/c'd 6/23  - Post removal of chest tube CXR neg   - Continue meropenem, as per ID   - Fever in past 24 hrs   - Tylenol PRN fever  - NGT feeds, tolerating  - Aspirations precautions  - Hemodynamic monitoring   - Off midodrine  - HD as per Neph   - Digoxin for rate control   - PEG cancelled due to fever, will f/u with GI with stable  - Skin care   - Eliquis  - Prognosis poor     DNR/DNI

## 2023-06-24 NOTE — PROGRESS NOTE ADULT - SUBJECTIVE AND OBJECTIVE BOX
CC: f/u for fever and end of life ID issues.    Patient reports: he is poorly interactive, not able to follow commands. He is being dialyzed and receiving enteral feeds via NG tube    REVIEW OF SYSTEMS:  All other review of systems negative (Comprehensive ROS): limited by condition. Ongoing myoclonic jerks    Antimicrobials Day #  :day 2  meropenem  IVPB 500 milliGRAM(s) IV Intermittent every 12 hours    Other Medications Reviewed  MEDICATIONS  (STANDING):  apixaban 2.5 milliGRAM(s) Oral every 12 hours  aspirin  chewable 81 milliGRAM(s) Oral daily  chlorhexidine 2% Cloths 1 Application(s) Topical daily  Dakins Solution - 1/2 Strength 1 Application(s) Topical daily  dextrose 5%. 1000 milliLiter(s) (100 mL/Hr) IV Continuous <Continuous>  dextrose 5%. 1000 milliLiter(s) (50 mL/Hr) IV Continuous <Continuous>  dextrose 50% Injectable 25 Gram(s) IV Push once  dextrose 50% Injectable 12.5 Gram(s) IV Push once  dextrose 50% Injectable 25 Gram(s) IV Push once  epoetin priyanka-epbx (RETACRIT) Injectable 15951 Unit(s) SubCutaneous every 7 days  insulin lispro (ADMELOG) corrective regimen sliding scale   SubCutaneous every 6 hours  meropenem  IVPB 500 milliGRAM(s) IV Intermittent every 12 hours  modafinil 150 milliGRAM(s) Oral every 24 hours  multivitamin/minerals/iron Oral Solution (CENTRUM) 15 milliLiter(s) Oral daily  pantoprazole   Suspension 40 milliGRAM(s) Oral daily  valproic  acid Syrup 250 milliGRAM(s) Oral every 12 hours  zinc sulfate 220 milliGRAM(s) Oral daily    T(F): 99.4 (06-24-23 @ 09:30), Max: 100.8 (06-23-23 @ 21:00)  HR: 121 (06-24-23 @ 10:00)  BP: 125/68 (06-24-23 @ 10:00)  RR: 25 (06-24-23 @ 10:00)  SpO2: 100% (06-24-23 @ 10:00)  Wt(kg): --    PHYSICAL EXAM:  General: poorly interactive, jerking body movements,NG tube and RT neck HD catheter in place  Eyes:  anicteric, no conjunctival injection, no discharge  Oropharynx: no lesions or injection 	  Neck: supple, without adenopathy  Lungs: scattered ronchi  Heart: regular rate and rhythm; no murmur, rubs or gallops  Abdomen: soft, nondistended, nontender, without mass or organomegaly  Skin: no rashes, scattered ecchymosis  Extremities: no clubbing, cyanosis, or edema  Neurologic: poorly interactive    LAB RESULTS:                        8.5    10.63 )-----------( 199      ( 24 Jun 2023 05:30 )             28.1     06-24    136  |  98  |  85<H>  ----------------------------<  149<H>  4.7   |  30  |  3.67<H>    Ca    9.1      24 Jun 2023 05:30  Phos  3.4     06-24  Mg     2.2     06-24    TPro  7.1  /  Alb  2.0<L>  /  TBili  0.7  /  DBili  x   /  AST  22  /  ALT  14  /  AlkPhos  119  06-24    LIVER FUNCTIONS - ( 24 Jun 2023 05:30 )  Alb: 2.0 g/dL / Pro: 7.1 g/dL / ALK PHOS: 119 U/L / ALT: 14 U/L / AST: 22 U/L / GGT: x           Urinalysis Basic - ( 24 Jun 2023 05:30 )    Color: x / Appearance: x / SG: x / pH: x  Gluc: 149 mg/dL / Ketone: x  / Bili: x / Urobili: x   Blood: x / Protein: x / Nitrite: x   Leuk Esterase: x / RBC: x / WBC x   Sq Epi: x / Non Sq Epi: x / Bacteria: x      MICROBIOLOGY:  RECENT CULTURES:      RADIOLOGY REVIEWED:    < from: Xray Chest 1 View-PORTABLE IMMEDIATE (Xray Chest 1 View-PORTABLE IMMEDIATE .) (06.23.23 @ 14:57) >  Impression:    Left sided chest tube removed. Trace left apical pneumothorax    No other changes in the cardiopulmonary findings.    --- End of Report --    < end of copied text >

## 2023-06-24 NOTE — PROGRESS NOTE ADULT - ASSESSMENT
Hemodynamic ATN s/p arrest (Cr 1.6 - 5/9/23, Cr 1.0 - 4/12/23)  Resp failure, s/p intubated, now extubated  S/p L CT  CM, EF 35 - 40%, dementia  Cr has initially improved (peak Cr 5.4 - 5/19/23, aida Cr 1.95 - 6/1/23)  Now w/recurrent hemodynamic ATN  Avoid nephrotoxins  Epoetin  HD today. To continue current meds. Next HD monday.  Hemodynamic ATN s/p arrest (Cr 1.6 - 5/9/23, Cr 1.0 - 4/12/23)  Resp failure, s/p intubated, now extubated  S/p L CT  CM, EF 35 - 40%, dementia  Cr has initially improved (peak Cr 5.4 - 5/19/23, aida Cr 1.95 - 6/1/23)  Now w/recurrent hemodynamic ATN  Avoid nephrotoxins  Epoetin  HD today. To continue current meds. Next HD tuesday.

## 2023-06-24 NOTE — PROGRESS NOTE ADULT - ASSESSMENT
· Assessment	  79 yo male with HfrEF,dementia, CAD,HTN,T2DM, recent lengthy [facility] admission, admitted 5/4 with acute hypoxic respiratory failure/choking/ with resultant cardiac arrest.  He  has survived, was sent to [facility] for a short time for evaluation for seizures/myoclonic jerks and has been back at [facility] for past month.He is s/p NSTEMI during earlier part of adnission  He has required left chest tube to drain effusion, removed today.  He has limited renal function, a temp HD catheter has been placed and he has received HD.  He is fed via NG tube, is minimally interactive, and his DNR/DNI was reversed and he is receiving aggressive medical care.  His [facility] blood cultures have been negative, prior sputums have grown mostly MSSA and one grew strep pneumo.  His pleural fluid culture is negative.He has received limited courses of vanco, zosyn, cefazolin, and cefepime during his 7 week hospital admission.  He is followed by Plastics for a sacral  wound.He is felt to have a hypoxic brain  injury.He has required vent but has been extubated.  He is on midodrine, was started on HD this week, next session planned for AM.A G tube is planned  He was started on meropenem on 6/23, not felt to be a candidate for blod cultures.  Fever prompted meropenem, he remains febrile  Suggest:  1. Meropenem per ICU, would like to try to narrow or limit course, if no demonstrated benefit it will be stopped on 6/25  2.Sputum culture if he can produce  3. Favor blood cultures if fevers continue- will defer to hospital/ICU protocol on whether he is a candidate  4.HD per renal;  5. [place] guarded for meaningful recovery.Will assist as best we can from an ID viewpoint, antibiotics unlikely to alter outcome.We may be approaching medical futility.

## 2023-06-24 NOTE — PROGRESS NOTE ADULT - SUBJECTIVE AND OBJECTIVE BOX
Patient is a 78y old  Male who presents with a chief complaint of acute systolic CHF (24 Jun 2023 12:29)    Patient seen in follow up for renal failure on hemodialysis.        PAST MEDICAL HISTORY:  HTN (hypertension)    HLD (hyperlipidemia)    Diabetes    CAD (coronary artery disease)      MEDICATIONS  (STANDING):  apixaban 2.5 milliGRAM(s) Oral every 12 hours  aspirin  chewable 81 milliGRAM(s) Oral daily  chlorhexidine 2% Cloths 1 Application(s) Topical daily  Dakins Solution - 1/2 Strength 1 Application(s) Topical daily  dextrose 5%. 1000 milliLiter(s) (100 mL/Hr) IV Continuous <Continuous>  dextrose 5%. 1000 milliLiter(s) (50 mL/Hr) IV Continuous <Continuous>  dextrose 50% Injectable 12.5 Gram(s) IV Push once  dextrose 50% Injectable 25 Gram(s) IV Push once  dextrose 50% Injectable 25 Gram(s) IV Push once  epoetin priyanka-epbx (RETACRIT) Injectable 60151 Unit(s) SubCutaneous every 7 days  insulin lispro (ADMELOG) corrective regimen sliding scale   SubCutaneous every 6 hours  meropenem  IVPB 500 milliGRAM(s) IV Intermittent every 12 hours  modafinil 150 milliGRAM(s) Oral every 24 hours  multivitamin/minerals/iron Oral Solution (CENTRUM) 15 milliLiter(s) Oral daily  pantoprazole   Suspension 40 milliGRAM(s) Oral daily  valproic  acid Syrup 250 milliGRAM(s) Oral every 12 hours  zinc sulfate 220 milliGRAM(s) Oral daily    MEDICATIONS  (PRN):  acetaminophen   Oral Liquid .. 650 milliGRAM(s) Oral every 6 hours PRN Temp greater or equal to 38C (100.4F)  sodium chloride 0.9% lock flush 10 milliLiter(s) IV Push every 1 hour PRN Pre/post blood products, medications, blood draw, and to maintain line patency    T(C): 36.9 (06-24-23 @ 12:30), Max: 38.3 (06-23-23 @ 08:00)  HR: 124 (06-24-23 @ 14:00) (97 - 125)  BP: 146/84 (06-24-23 @ 14:00) (115/76 - 158/88)  RR: 21 (06-24-23 @ 14:00) (16 - 44)  SpO2: 99% (06-24-23 @ 14:00) (94% - 100%)  Wt(kg): --  I&O's Detail    23 Jun 2023 07:01  -  24 Jun 2023 07:00  --------------------------------------------------------  IN:    Enteral Tube Flush: 250 mL    Free Water: 400 mL    IV PiggyBack: 100 mL    Nepro with Carb Steady: 715 mL  Total IN: 1465 mL    OUT:    Chest Tube (mL): 150 mL  Total OUT: 150 mL    Total NET: 1315 mL      24 Jun 2023 07:01  -  24 Jun 2023 14:54  --------------------------------------------------------  IN:    Enteral Tube Flush: 30 mL    Free Water: 400 mL    Nepro with Carb Steady: 455 mL  Total IN: 885 mL    OUT:    Other (mL): 500 mL  Total OUT: 500 mL    Total NET: 385 mL          PHYSICAL EXAM:  General: no distress  Respiratory: b/l air entry  Cardiovascular: S1 S2  Gastrointestinal: soft  Extremities:  edema                              8.5    10.63 )-----------( 199      ( 24 Jun 2023 05:30 )             28.1     06-24    136  |  98  |  85<H>  ----------------------------<  149<H>  4.7   |  30  |  3.67<H>    Ca    9.1      24 Jun 2023 05:30  Phos  3.4     06-24  Mg     2.2     06-24    TPro  7.1  /  Alb  2.0<L>  /  TBili  0.7  /  DBili  x   /  AST  22  /  ALT  14  /  AlkPhos  119  06-24        LIVER FUNCTIONS - ( 24 Jun 2023 05:30 )  Alb: 2.0 g/dL / Pro: 7.1 g/dL / ALK PHOS: 119 U/L / ALT: 14 U/L / AST: 22 U/L / GGT: x           Urinalysis Basic - ( 24 Jun 2023 05:30 )    Color: x / Appearance: x / SG: x / pH: x  Gluc: 149 mg/dL / Ketone: x  / Bili: x / Urobili: x   Blood: x / Protein: x / Nitrite: x   Leuk Esterase: x / RBC: x / WBC x   Sq Epi: x / Non Sq Epi: x / Bacteria: x        Sodium, Serum: 136 (06-24 @ 05:30)  Sodium, Serum: 135 (06-23 @ 06:00)  Sodium, Serum: 137 (06-22 @ 05:00)  Sodium, Serum: 140 (06-21 @ 05:50)    Creatinine, Serum: 3.67 (06-24 @ 05:30)  Creatinine, Serum: 2.77 (06-23 @ 06:00)  Creatinine, Serum: 3.54 (06-22 @ 05:00)  Creatinine, Serum: 4.52 (06-21 @ 05:50)    Potassium, Serum: 4.7 (06-24 @ 05:30)  Potassium, Serum: 4.5 (06-23 @ 06:00)  Potassium, Serum: 3.4 (06-22 @ 05:00)  Potassium, Serum: 3.7 (06-21 @ 05:50)    Hemoglobin: 8.5 (06-24 @ 05:30)  Hemoglobin: 8.6 (06-23 @ 06:00)  Hemoglobin: 8.3 (06-22 @ 05:00)  Hemoglobin: 8.3 (06-21 @ 05:50)

## 2023-06-24 NOTE — PROGRESS NOTE ADULT - MENTAL STATUS
Awake and alert, not following simple commands, refusing to participate in full examiantion
Not responsive to verbal stimuli or painful stimuli when examined with propofol off
nonverbal, does not follow commands  noted myoclonus type movements of arms  withdraws to pain

## 2023-06-24 NOTE — PROGRESS NOTE ADULT - SUBJECTIVE AND OBJECTIVE BOX
INTERVAL HPI/OVERNIGHT EVENTS:   febrile overnight, Tmax 100.8  myoclonus noted  wore AVAPs overnight      GLOBAL ISSUE/BEST PRACTICE:  Analgesia:   Sedation:  HOB elevation: yes  Stress ulcer prophylaxis: pantoprazole   Suspension        VTE prophylaxis: HSQ  Oral Care: Chlorhexidine  Glycemic control:   Nutrition:Diet, NPO with Tube Feed:   Tube Feeding Modality: Nasogastric  Nepro with Carb Steady  Total Volume for 24 Hours (mL): 1350  Continuous  Starting Tube Feed Rate mL per Hour: 65  Increase Tube Feed Rate by (mL): 10     Every 2 hours  Until Goal Tube Feed Rate (mL per Hour): 75  Tube Feed Duration (in Hours): 18  Tube Feed Start Time: 10:00  Tube Feed Stop Time: 04:00 (06-23-23 @ 14:28) [Pending Verification By Attending]  Diet, NPO with Tube Feed:   Tube Feeding Modality: Nasogastric  Nepro with Carb Steady  Total Volume for 24 Hours (mL): 1040  Continuous  Starting Tube Feed Rate mL per Hour: 45  Increase Tube Feed Rate by (mL): 10     Every 4 hours  Until Goal Tube Feed Rate (mL per Hour): 65  Tube Feed Duration (in Hours): 16  Tube Feed Start Time: 08:00  Banatrol TF     Qty per Day:  BID (06-15-23 @ 11:40) [Active]          ICU Vital Signs Last 24 Hrs  T(C): 37.4 (24 Jun 2023 08:00), Max: 38.2 (23 Jun 2023 21:00)  T(F): 99.4 (24 Jun 2023 08:00), Max: 100.8 (23 Jun 2023 21:00)  HR: 100 (24 Jun 2023 08:00) (97 - 124)  BP: 124/64 (24 Jun 2023 08:00) (115/85 - 158/88)  BP(mean): 81 (24 Jun 2023 08:00) (81 - 115)  ABP: --  ABP(mean): --  RR: 18 (24 Jun 2023 08:00) (18 - 40)  SpO2: 100% (24 Jun 2023 08:00) (94% - 100%)    O2 Parameters below as of 24 Jun 2023 07:00  Patient On (Oxygen Delivery Method): AVAPS    O2 Concentration (%): 30      I&O's Detail    23 Jun 2023 07:01  -  24 Jun 2023 07:00  --------------------------------------------------------  IN:    Enteral Tube Flush: 250 mL    Free Water: 400 mL    IV PiggyBack: 100 mL    Nepro with Carb Steady: 715 mL  Total IN: 1465 mL    OUT:    Chest Tube (mL): 150 mL  Total OUT: 150 mL    Total NET: 1315 mL        MEDICATIONS  NEURO  Meds: acetaminophen   Oral Liquid .. 650 milliGRAM(s) Oral every 6 hours PRN Temp greater or equal to 38C (100.4F)  modafinil 150 milliGRAM(s) Oral every 24 hours  valproic  acid Syrup 250 milliGRAM(s) Oral every 12 hours    RESPIRATORY    Meds:   CARDIOVASCULAR  Meds:   GI/NUTRITION  Meds: pantoprazole   Suspension 40 milliGRAM(s) Oral daily    GENITOURINARY  Meds: dextrose 5%. 1000 milliLiter(s) IV Continuous <Continuous>  dextrose 5%. 1000 milliLiter(s) IV Continuous <Continuous>  multivitamin/minerals/iron Oral Solution (CENTRUM) 15 milliLiter(s) Oral daily  sodium chloride 0.9% lock flush 10 milliLiter(s) IV Push every 1 hour PRN Pre/post blood products, medications, blood draw, and to maintain line patency  zinc sulfate 220 milliGRAM(s) Oral daily    HEMATOLOGIC  Meds: apixaban 2.5 milliGRAM(s) Oral every 12 hours    [x] VTE Prophylaxis  INFECTIOUS DISEASES  Meds: epoetin priyanka-epbx (RETACRIT) Injectable 53732 Unit(s) SubCutaneous every 7 days  meropenem  IVPB 500 milliGRAM(s) IV Intermittent every 12 hours    ENDOCRINE  CAPILLARY BLOOD GLUCOSE      POCT Blood Glucose.: 159 mg/dL (24 Jun 2023 05:31)  POCT Blood Glucose.: 231 mg/dL (23 Jun 2023 23:32)  POCT Blood Glucose.: 188 mg/dL (23 Jun 2023 18:41)  POCT Blood Glucose.: 179 mg/dL (23 Jun 2023 11:39)    Meds: insulin lispro (ADMELOG) corrective regimen sliding scale   SubCutaneous every 6 hours    OTHER MEDICATIONS:  chlorhexidine 2% Cloths 1 Application(s) Topical daily  Dakins Solution - 1/2 Strength 1 Application(s) Topical daily  :    LABS:                        8.5    10.63 )-----------( 199      ( 24 Jun 2023 05:30 )             28.1      06-24    136  |  98  |  85<H>  ----------------------------<  149<H>  4.7   |  30  |  3.67<H>    Ca    9.1      24 Jun 2023 05:30  Phos  3.4     06-24  Mg     2.2     06-24    TPro  7.1  /  Alb  2.0<L>  /  TBili  0.7  /  DBili  x   /  AST  22  /  ALT  14  /  AlkPhos  119  06-24      Urinalysis Basic - ( 24 Jun 2023 05:30 )    Color: x / Appearance: x / SG: x / pH: x  Gluc: 149 mg/dL / Ketone: x  / Bili: x / Urobili: x   Blood: x / Protein: x / Nitrite: x   Leuk Esterase: x / RBC: x / WBC x   Sq Epi: x / Non Sq Epi: x / Bacteria: x      RADIOLOGY & ADDITIONAL STUDIES REVIEWED     CXR 6/23 : increase right lung infiltrate

## 2023-06-25 LAB
AMMONIA BLD-MCNC: 48 UMOL/L — SIGNIFICANT CHANGE UP (ref 11–55)
ANION GAP SERPL CALC-SCNC: 7 MMOL/L — SIGNIFICANT CHANGE UP (ref 5–17)
BUN SERPL-MCNC: 48 MG/DL — HIGH (ref 7–23)
CALCIUM SERPL-MCNC: 8.7 MG/DL — SIGNIFICANT CHANGE UP (ref 8.4–10.5)
CHLORIDE SERPL-SCNC: 100 MMOL/L — SIGNIFICANT CHANGE UP (ref 96–108)
CO2 SERPL-SCNC: 31 MMOL/L — SIGNIFICANT CHANGE UP (ref 22–31)
CREAT SERPL-MCNC: 2.44 MG/DL — HIGH (ref 0.5–1.3)
EGFR: 26 ML/MIN/1.73M2 — LOW
GLUCOSE BLDC GLUCOMTR-MCNC: 145 MG/DL — HIGH (ref 70–99)
GLUCOSE BLDC GLUCOMTR-MCNC: 199 MG/DL — HIGH (ref 70–99)
GLUCOSE BLDC GLUCOMTR-MCNC: 203 MG/DL — HIGH (ref 70–99)
GLUCOSE BLDC GLUCOMTR-MCNC: 237 MG/DL — HIGH (ref 70–99)
GLUCOSE BLDC GLUCOMTR-MCNC: 262 MG/DL — HIGH (ref 70–99)
GLUCOSE SERPL-MCNC: 119 MG/DL — HIGH (ref 70–99)
HCT VFR BLD CALC: 27.4 % — LOW (ref 39–50)
HGB BLD-MCNC: 8.4 G/DL — LOW (ref 13–17)
MAGNESIUM SERPL-MCNC: 2 MG/DL — SIGNIFICANT CHANGE UP (ref 1.6–2.6)
MCHC RBC-ENTMCNC: 30.7 GM/DL — LOW (ref 32–36)
MCHC RBC-ENTMCNC: 31.7 PG — SIGNIFICANT CHANGE UP (ref 27–34)
MCV RBC AUTO: 103.4 FL — HIGH (ref 80–100)
NRBC # BLD: 0 /100 WBCS — SIGNIFICANT CHANGE UP (ref 0–0)
PHOSPHATE SERPL-MCNC: 2.2 MG/DL — LOW (ref 2.5–4.5)
PLATELET # BLD AUTO: 195 K/UL — SIGNIFICANT CHANGE UP (ref 150–400)
POTASSIUM SERPL-MCNC: 3.6 MMOL/L — SIGNIFICANT CHANGE UP (ref 3.5–5.3)
POTASSIUM SERPL-SCNC: 3.6 MMOL/L — SIGNIFICANT CHANGE UP (ref 3.5–5.3)
RBC # BLD: 2.65 M/UL — LOW (ref 4.2–5.8)
RBC # FLD: 18.3 % — HIGH (ref 10.3–14.5)
SODIUM SERPL-SCNC: 138 MMOL/L — SIGNIFICANT CHANGE UP (ref 135–145)
VALPROATE SERPL-MCNC: 6 UG/ML — LOW (ref 50–100)
WBC # BLD: 10.63 K/UL — HIGH (ref 3.8–10.5)
WBC # FLD AUTO: 10.63 K/UL — HIGH (ref 3.8–10.5)

## 2023-06-25 RX ORDER — METOPROLOL TARTRATE 50 MG
12.5 TABLET ORAL EVERY 12 HOURS
Refills: 0 | Status: DISCONTINUED | OUTPATIENT
Start: 2023-06-25 | End: 2023-07-07

## 2023-06-25 RX ORDER — LEVETIRACETAM 250 MG/1
250 TABLET, FILM COATED ORAL
Refills: 0 | Status: DISCONTINUED | OUTPATIENT
Start: 2023-06-25 | End: 2023-06-28

## 2023-06-25 RX ADMIN — Medication 3: at 11:39

## 2023-06-25 RX ADMIN — Medication 81 MILLIGRAM(S): at 11:39

## 2023-06-25 RX ADMIN — APIXABAN 2.5 MILLIGRAM(S): 2.5 TABLET, FILM COATED ORAL at 17:48

## 2023-06-25 RX ADMIN — Medication 2: at 23:47

## 2023-06-25 RX ADMIN — Medication 1: at 17:49

## 2023-06-25 RX ADMIN — Medication 250 MILLIGRAM(S): at 06:32

## 2023-06-25 RX ADMIN — LEVETIRACETAM 250 MILLIGRAM(S): 250 TABLET, FILM COATED ORAL at 17:48

## 2023-06-25 RX ADMIN — Medication 1 APPLICATION(S): at 11:40

## 2023-06-25 RX ADMIN — ZINC SULFATE TAB 220 MG (50 MG ZINC EQUIVALENT) 220 MILLIGRAM(S): 220 (50 ZN) TAB at 11:38

## 2023-06-25 RX ADMIN — PANTOPRAZOLE SODIUM 40 MILLIGRAM(S): 20 TABLET, DELAYED RELEASE ORAL at 11:38

## 2023-06-25 RX ADMIN — MODAFINIL 150 MILLIGRAM(S): 200 TABLET ORAL at 06:32

## 2023-06-25 RX ADMIN — Medication 15 MILLILITER(S): at 11:39

## 2023-06-25 RX ADMIN — CHLORHEXIDINE GLUCONATE 1 APPLICATION(S): 213 SOLUTION TOPICAL at 11:40

## 2023-06-25 RX ADMIN — Medication 12.5 MILLIGRAM(S): at 17:48

## 2023-06-25 RX ADMIN — MEROPENEM 100 MILLIGRAM(S): 1 INJECTION INTRAVENOUS at 06:32

## 2023-06-25 RX ADMIN — APIXABAN 2.5 MILLIGRAM(S): 2.5 TABLET, FILM COATED ORAL at 06:32

## 2023-06-25 RX ADMIN — Medication 2: at 00:45

## 2023-06-25 NOTE — PROGRESS NOTE ADULT - SUBJECTIVE AND OBJECTIVE BOX
Follow-up Critical Care Progress Note  Chief Complaint : Atrial fibrillation    patient eye open at times  non verbal  was on NIV QHS, placed on n/c   suctioned minimal secretions  continued myoclonus       Allergies :sulfa drugs (Unknown)      PAST MEDICAL & SURGICAL HISTORY:  HTN (hypertension)    HLD (hyperlipidemia)    Diabetes    CAD (coronary artery disease)    History of cholecystectomy        Medications:  MEDICATIONS  (STANDING):  apixaban 2.5 milliGRAM(s) Oral every 12 hours  aspirin  chewable 81 milliGRAM(s) Oral daily  chlorhexidine 2% Cloths 1 Application(s) Topical daily  Dakins Solution - 1/2 Strength 1 Application(s) Topical daily  dextrose 5%. 1000 milliLiter(s) (100 mL/Hr) IV Continuous <Continuous>  dextrose 5%. 1000 milliLiter(s) (50 mL/Hr) IV Continuous <Continuous>  dextrose 50% Injectable 25 Gram(s) IV Push once  dextrose 50% Injectable 12.5 Gram(s) IV Push once  dextrose 50% Injectable 25 Gram(s) IV Push once  epoetin priyanka-epbx (RETACRIT) Injectable 95991 Unit(s) SubCutaneous every 7 days  insulin lispro (ADMELOG) corrective regimen sliding scale   SubCutaneous every 6 hours  meropenem  IVPB 500 milliGRAM(s) IV Intermittent every 12 hours  modafinil 150 milliGRAM(s) Oral every 24 hours  multivitamin/minerals/iron Oral Solution (CENTRUM) 15 milliLiter(s) Oral daily  pantoprazole   Suspension 40 milliGRAM(s) Oral daily  valproic  acid Syrup 250 milliGRAM(s) Oral every 12 hours  zinc sulfate 220 milliGRAM(s) Oral daily    MEDICATIONS  (PRN):  acetaminophen   Oral Liquid .. 650 milliGRAM(s) Oral every 6 hours PRN Temp greater or equal to 38C (100.4F)  sodium chloride 0.9% lock flush 10 milliLiter(s) IV Push every 1 hour PRN Pre/post blood products, medications, blood draw, and to maintain line patency      Antibiotics History  ceFAZolin   IVPB 2000 milliGRAM(s) IV Intermittent once, 06-12-23 @ 07:00, Stop order after: 1 Doses  meropenem  IVPB 500 milliGRAM(s) IV Intermittent every 12 hours, 06-23-23 @ 11:09, Stop order after: 7 Days  meropenem  IVPB 500 milliGRAM(s) IV Intermittent every 24 hours, 06-23-23 @ 10:31, Stop order after: 7 Days      Heme Medications   apixaban 2.5 milliGRAM(s) Oral every 12 hours, 06-23-23 @ 13:29  aspirin  chewable 81 milliGRAM(s) Oral daily, 06-24-23 @ 10:36      GI Medications  pantoprazole   Suspension 40 milliGRAM(s) Oral daily, 06-22-23 @ 00:00,       COVID  06-11-23 @ 12:25  COVID -   [facility]  05-04-23 @ 19:50  [facility]  07-13-22 @ 18:33  COVID [facility]      COVID Biomarkers    06-12-23 @ 05:00 ESR --  ---  CRP --  ---  DDimer  --   ---      ---   Ferritin --    05-04-23 @ 19:50 ESR --  ---  CRP --  ---  DDimer  351<H>   ---   LDH --   ---   Ferritin --         Trend BNP  06-20-23 @ 06:10   -  >75481<H>  06-14-23 @ 06:00   -  >85049<H>  06-12-23 @ 05:00   -  57397<H>  06-11-23 @ 06:15   -  >09497<H>  05-14-23 @ 11:30   -  26565<H>  05-11-23 @ 06:05   -  01184<H>    Procalcitonin Trend  06-05-23 @ 05:30   -   0.24<H>  06-04-23 @ 06:00   -   0.20<H>  05-17-23 @ 23:44   -   0.59<H>  05-16-23 @ 22:16   -   0.74<H>  05-15-23 @ 05:45   -   1.12<H>  05-09-23 @ 13:42   -   0.11<H>    WBC Trend  06-25-23 @ 06:15   -  10.63<H>  06-24-23 @ 05:30   -  10.63<H>  06-23-23 @ 06:00   -  10.66<H>    H/H Trend  06-25-23 @ 06:15   -   8.4<L>/ 27.4<L>  06-24-23 @ 05:30   -   8.5<L>/ 28.1<L>  06-23-23 @ 06:00   -   8.6<L>/ 28.3<L>  06-22-23 @ 05:00   -   8.3<L>/ 26.9<L>  06-21-23 @ 05:50   -   8.3<L>/ 27.0<L>  06-20-23 @ 06:10   -   9.0<L>/ 29.1<L>    Stool Occult Blood  06-15-23 @ 13:54   -   Negative  06-02-23 @ 08:20   -   Negative  05-25-23 @ 18:20   -   Negative  05-16-23 @ 17:00   -   Positive<!>    Platelet Trend  06-25-23 @ 06:15   -  195  06-24-23 @ 05:30   -  199  06-23-23 @ 06:00   -  191    Trend Sodium  06-25-23 @ 06:15   -  138  06-24-23 @ 05:30   -  136  06-23-23 @ 06:00   -  135    Trend Potassium  06-25-23 @ 06:15   -  3.6  06-24-23 @ 05:30   -  4.7  06-23-23 @ 06:00   -  4.5    Trend Bun/Cr  06-25-23 @ 06:15  BUN/CR -  48<H> / 2.44<H>  06-24-23 @ 05:30  BUN/CR -  85<H> / 3.67<H>  06-23-23 @ 06:00  BUN/CR -  67<H> / 2.77<H>  06-22-23 @ 05:00  BUN/CR -  107<H> / 3.54<H>  06-21-23 @ 05:50  BUN/CR -  153<H> / 4.52<H>  06-20-23 @ 06:10  BUN/CR -  143<H> / 4.39<H>  06-19-23 @ 05:20  BUN/CR -  129<H> / 3.87<H>  06-18-23 @ 05:30  BUN/CR -  124<H> / 3.72<H>  06-17-23 @ 05:30  BUN/CR -  116<H> / 3.41<H>  06-16-23 @ 05:00  BUN/CR -  114<H> / 3.40<H>  06-15-23 @ 05:00  BUN/CR -  113<H> / 3.46<H>  06-14-23 @ 06:00  BUN/CR -  114<H> / 3.42<H>      ABG Trend  06-21-23 @ 05:35   - 7.38/47/72<L>/96.4  06-16-23 @ 05:00   - 7.43/41/119<H>/99.4<H>  06-12-23 @ 05:07   - 7.46<H>/37/115<H>/99.3<H>  06-10-23 @ 04:45   - 7.42/41/78<L>/96.6  06-09-23 @ 10:50   - 7.39/40/86/98.9<H>  05-19-23 @ 00:22   - 7.37/43/144<H>/99.8<H>  05-18-23 @ 05:38   - 7.41/37/97/98.4<H>  05-17-23 @ 23:36   - 7.42/37/97/98.3<H>  05-16-23 @ 22:10   - 7.41/41/92/98.5<H>  05-16-23 @ 05:59   - 7.35/47/104/98.8<H>  05-15-23 @ 13:45   - 7.36/47/74<L>/96.6  05-15-23 @ 04:45   - 7.33<L>/48/70<L>/95.7    Trend AST/ALT/ALK Phos/Bili  06-24-23 @ 05:30   22/14/119/0.7  06-23-23 @ 06:00   19/12/125<H>/0.7  06-22-23 @ 05:00   12/11/104/0.4  06-21-23 @ 05:50   10/9<L>/98/0.4  06-20-23 @ 06:10   17/6<L>/123<H>/0.4  06-19-23 @ 05:20   14/10/120/0.6  06-18-23 @ 05:30   15/9<L>/116/0.6  06-16-23 @ 05:00   21/10/119/0.6  06-15-23 @ 05:00   12/7<L>/128<H>/0.6  06-14-23 @ 06:00   12/9<L>/134<H>/0.6  06-13-23 @ 05:35   16/13/160<H>/0.7  06-12-23 @ 05:00   17/13/158<H>/0.7      Ammonia Trend  06-21-23 @ 05:50   -   92<H>  05-14-23 @ 11:30   -   53  05-14-23 @ 05:00   -   16       Albumin Trend  06-24-23 @ 05:30   -   2.0<L>  06-23-23 @ 06:00   -   2.1<L>  06-22-23 @ 05:00   -   1.9<L>  06-21-23 @ 05:50   -   1.9<L>  06-20-23 @ 06:10   -   1.8<L>  06-19-23 @ 05:20   -   1.9<L>      PTT - PT - INR Trend  06-12-23 @ 05:00   -   28.2 - 15.8<H> - 1.36<H>  05-19-23 @ 00:44   -   26.5<L> - 13.2 - 1.15  05-17-23 @ 23:44   -   31.2 - 13.7<H> - 1.18<H>  05-16-23 @ 22:16   -   31.3 - 13.6<H> - 1.18<H>  05-15-23 @ 13:55   -   32.9 - 14.0<H> - 1.19<H>  05-14-23 @ 13:45   -   33.6 - -- - --    Glucose Trend  06-25-23 @ 06:26   -  -- -- 145<H>  06-25-23 @ 06:15   -  119<H> -- --  06-25-23 @ 00:40   -  -- -- 203<H>  06-24-23 @ 17:44   -  -- -- 123<H>  06-24-23 @ 13:01   -  -- -- 165<H>  06-24-23 @ 05:31   -  -- -- 159<H>  06-24-23 @ 05:30   -  149<H> -- --  06-23-23 @ 23:32   -  -- -- 231<H>  06-23-23 @ 18:41   -  -- -- 188<H>  06-23-23 @ 11:39   -  -- -- 179<H>    A1C with Estimated Average Glucose Result: 7.7 % *H* [4.0 - 5.6] (05-05-23 @ 08:00)    Ammonia Trend  06-21-23 @ 05:50   -   92<H>  05-14-23 @ 11:30   -   53  05-14-23 @ 05:00   -   16      LABS:                        8.4    10.63 )-----------( 195      ( 25 Jun 2023 06:15 )             27.4     06-25    138  |  100  |  48<H>  ----------------------------<  119<H>  3.6   |  31  |  2.44<H>    Ca    8.7      25 Jun 2023 06:15  Phos  2.2     06-25  Mg     2.0     06-25    TPro  7.1  /  Alb  2.0<L>  /  TBili  0.7  /  DBili  x   /  AST  22  /  ALT  14  /  AlkPhos  119  06-24       Color: x / Appearance: x / SG: x / pH: x  Gluc: 119 mg/dL / Ketone: x  / Bili: x / Urobili: x   Blood: x / Protein: x / Nitrite: x   Leuk Esterase: x / RBC: x / WBC x   Sq Epi: x / Non Sq Epi: x / Bacteria: x       RADIOLOGY  CXR:    < from: Xray Chest 1 View-PORTABLE IMMEDIATE (Xray Chest 1 View-PORTABLE IMMEDIATE .) (06.23.23 @ 14:57) >    Impression:    Left sided chest tube removed. Trace left apical pneumothorax    No other changes in the cardiopulmonary findings.    --- End of Report ---    < end of copied text >         VITALS:  T(C): 37.1 (06-25-23 @ 09:00), Max: 38 (06-24-23 @ 16:00)  T(F): 98.8 (06-25-23 @ 09:00), Max: 100.4 (06-24-23 @ 16:00)  HR: 110 (06-25-23 @ 09:00) (86 - 124)  BP: 118/74 (06-25-23 @ 09:00) (108/71 - 153/83)  BP(mean): 98 (06-25-23 @ 08:00) (77 - 113)  ABP: --  ABP(mean): --  RR: 38 (06-25-23 @ 09:00) (18 - 42)  SpO2: 93% (06-25-23 @ 09:00) (93% - 100%)  CVP(mm Hg): --  CVP(cm H2O): --    Ins and Outs     06-24-23 @ 07:01  -  06-25-23 @ 07:00  --------------------------------------------------------  IN: 2230 mL / OUT: 550 mL / NET: 1680 mL        Height (cm): 160 (06-23-23 @ 08:05)  Weight (kg): 77.6 (06-23-23 @ 08:05)  BMI (kg/m2): 30.3 (06-23-23 @ 08:05)        I&O's Detail    24 Jun 2023 07:01  -  25 Jun 2023 07:00  --------------------------------------------------------  IN:    Enteral Tube Flush: 90 mL    Free Water: 1000 mL    IV PiggyBack: 100 mL    Nepro with Carb Steady: 1040 mL  Total IN: 2230 mL    OUT:    Other (mL): 500 mL    Voided (mL): 50 mL  Total OUT: 550 mL    Total NET: 1680 mL

## 2023-06-25 NOTE — PROGRESS NOTE ADULT - SUBJECTIVE AND OBJECTIVE BOX
CC: f/u for low grade fever    Patient reports: he appears more attentive, unclear what his cognitive status is.    REVIEW OF SYSTEMS:  All other review of systems negative (Comprehensive ROS): limited by condition    Antimicrobials Day #  :day 3  meropenem  IVPB 500 milliGRAM(s) IV Intermittent every 12 hours    Other Medications Reviewed  MEDICATIONS  (STANDING):  apixaban 2.5 milliGRAM(s) Oral every 12 hours  aspirin  chewable 81 milliGRAM(s) Oral daily  chlorhexidine 2% Cloths 1 Application(s) Topical daily  Dakins Solution - 1/2 Strength 1 Application(s) Topical daily  dextrose 5%. 1000 milliLiter(s) (100 mL/Hr) IV Continuous <Continuous>  dextrose 5%. 1000 milliLiter(s) (50 mL/Hr) IV Continuous <Continuous>  dextrose 50% Injectable 12.5 Gram(s) IV Push once  dextrose 50% Injectable 25 Gram(s) IV Push once  dextrose 50% Injectable 25 Gram(s) IV Push once  epoetin priyanka-epbx (RETACRIT) Injectable 10528 Unit(s) SubCutaneous every 7 days  insulin lispro (ADMELOG) corrective regimen sliding scale   SubCutaneous every 6 hours  levETIRAcetam 250 milliGRAM(s) Oral two times a day  meropenem  IVPB 500 milliGRAM(s) IV Intermittent every 12 hours  metoprolol tartrate 12.5 milliGRAM(s) Oral every 12 hours  modafinil 150 milliGRAM(s) Oral every 24 hours  multivitamin/minerals/iron Oral Solution (CENTRUM) 15 milliLiter(s) Oral daily  pantoprazole   Suspension 40 milliGRAM(s) Oral daily  zinc sulfate 220 milliGRAM(s) Oral daily    T(F): 99.6 (06-25-23 @ 12:00), Max: 100.4 (06-24-23 @ 16:00)  HR: 108 (06-25-23 @ 13:00)  BP: 121/75 (06-25-23 @ 13:00)  RR: 39 (06-25-23 @ 13:00)  SpO2: 94% (06-25-23 @ 13:00)  Wt(kg): --    PHYSICAL EXAM:  General: awake, no acute distress, multiple myoclonic jerks, NG tube for feeds  Eyes:  anicteric, no conjunctival injection, no discharge  Oropharynx: no lesions or injection 	  Neck: supple, without adenopathy  Lungs: clear to auscultation, decreased at bases  Heart: regular rate and rhythm; no murmur, rubs or gallops  Abdomen: soft, nondistended, nontender, without mass or organomegaly  Skin: no rash  Extremities: no clubbing, cyanosis, or edema  Neurologic:poorly interactive  Neck temp. HD catheter    LAB RESULTS:                        8.4    10.63 )-----------( 195      ( 25 Jun 2023 06:15 )             27.4     06-25    138  |  100  |  48<H>  ----------------------------<  119<H>  3.6   |  31  |  2.44<H>    Ca    8.7      25 Jun 2023 06:15  Phos  2.2     06-25  Mg     2.0     06-25    TPro  7.1  /  Alb  2.0<L>  /  TBili  0.7  /  DBili  x   /  AST  22  /  ALT  14  /  AlkPhos  119  06-24    LIVER FUNCTIONS - ( 24 Jun 2023 05:30 )  Alb: 2.0 g/dL / Pro: 7.1 g/dL / ALK PHOS: 119 U/L / ALT: 14 U/L / AST: 22 U/L / GGT: x           Urinalysis Basic - ( 25 Jun 2023 06:15 )    Color: x / Appearance: x / SG: x / pH: x  Gluc: 119 mg/dL / Ketone: x  / Bili: x / Urobili: x   Blood: x / Protein: x / Nitrite: x   Leuk Esterase: x / RBC: x / WBC x   Sq Epi: x / Non Sq Epi: x / Bacteria: x      MICROBIOLOGY:  RECENT CULTURES:      RADIOLOGY REVIEWED:  < from: Xray Chest 1 View-PORTABLE IMMEDIATE (Xray Chest 1 View-PORTABLE IMMEDIATE .) (06.23.23 @ 14:57) >  Impression:    Left sided chest tube removed. Trace left apical pneumothorax    No other changes in the cardiopulmonary findings.    --- End of Report ---    < end of copied text >

## 2023-06-25 NOTE — PROGRESS NOTE ADULT - CONVERSATION/DISCUSSION
Diagnosis/Prognosis/Palliative Care Referral
Diagnosis/Prognosis/MOLST Discussed/Palliative Care Referral
Diagnosis/Prognosis/MOLST Discussed
Diagnosis/Prognosis/Treatment Options
Diagnosis/Prognosis
Diagnosis/Prognosis/Treatment Options

## 2023-06-25 NOTE — PROGRESS NOTE ADULT - ASSESSMENT
Assessment	  77 yo male with HfrEF,dementia, CAD,HTN,T2DM, recent lengthy [facility] admission, admitted 5/4 with acute hypoxic respiratory failure/choking/ with resultant cardiac arrest.  He  has survived, was sent to [facility] for a short time for evaluation for seizures/myoclonic jerks and has been back at [facility] for past month.He is s/p NSTEMI during earlier part of adnission  He has required left chest tube to drain effusion, removed today.  He has limited renal function, a temp HD catheter has been placed and he has received HD.  He is fed via NG tube, is minimally interactive, and his DNR/DNI was reversed and he is receiving aggressive medical care.  His [facility] blood cultures have been negative, prior sputums have grown mostly MSSA and one grew strep pneumo.  His pleural fluid culture is negative.He has received limited courses of vanco, zosyn, cefazolin, and cefepime during his 7 week hospital admission.  He is followed by Plastics for a sacral  wound.He is felt to have a hypoxic brain  injury.He has required vent but has been extubated.  He is on midodrine, was started on HD this week, next session planned for AM.A G tube is planned  He was started on meropenem on 6/23, not felt to be a candidate for blod cultures.  Fever prompted meropenem, he remains febrile, no objective "response", will d/c  Suggest:  1.will stop meropenem  2.Follow temps conservatively  3.If fever persists will need to consider blood/sputum cultures if can be obtained  4.Supportive care per renal/ICU  5.HD per nephrology

## 2023-06-25 NOTE — PROGRESS NOTE ADULT - CONVERSATION DETAILS
Updated about current clinical condition. Patient is not a candidate for elective weaning off the ventilator due to severe hypoxic neurologic injury. At this time family to decide about pursuing trach/peg placement vs. withdrawal of care. Daughter expressed patient would not want to live in his current condition being supported by machines. She also expressed difficulty with removing ventilator because of emotional reasons. Understands that tracheostomy and peg placement would not fix his neurologic condition either and would be a temporizing measure to give family more time. We also discussed about a DNR code status, since a cardiac arrest in current condition will likely contribute to further hypoxia and organ dysfunction. Daughter is requesting a meeting with palliative care and ICU attending in the coming days to discuss further treatment options.
as bellow
Extensive discussion had about current status  both family members upset about hospital course and feel they need more time for patient to be more awkae and renal function to improve before making decisions  understand overall poor prognosis, and need miraculous recovery and poor neuro out come.   would ask for repeat EEG/CT head prior to making any decisions  discussed DNR if heart does stop but do not want to make any decisions at this time other than giving another week with further testing next week.
confirmed dnr/i  new molst filled today
discussed with both patient alert, verbal, respiratory status stable  and if patint has event would they want intubation/cpr and they state as improving would want to do it again and be full code
Full code  see bellow
d/w DTR adali bedside  yesterday request list of vent facilities, a list was provided to Adali  discussed current stauts and that patient will need trach vs palliative wean  discussed palliative wean process with patint and palliative care  States that will want palliative wean today and wants   and currently waiting for brother to come in.    wants patient to be given meds if develop distress after extubation
Met and spoke with pts family, children Kuldeep and Franko today with Dr Polo for a family meeting.  Family fully updated by ccu team today and aware of pt current condition which we again reviewed w. them. Pt s/p cardiac arrest and remains intubated . Pt's MS not improving and therefore, unable to remove vent after  2 weeks  time.  Pt w/ anoxic brain injury and now w/ myoclonus. Pt has had multiple tests, including CT head, MRI and 2 EEG's. Pt seen /eval by neurology , concur anoxic brain injury . Discussed w/ family today about next best steps for pt would be if they wish is trach/peg or palliative wean. When we came to this point, pts son became visibly frustrated at the situation and how pt may have arrived at this point.  daughter also visibly upset and therefore both are having a hard time accepting the pts clinical situation ,  and making decisions about next steps. daughter saying " he did not even need to be here !" and " I was about to bring him home" That being said they do agree that they do not want him to suffer or to have any pain if possible. Daughter still remains with some degree of hope of recovery despite being informed of pt anoxic injury . They have said they would not want to do a trach, but they are still asking for more time to see IF pt may respond or wake up, before removing the vent.  They are asking until next week, Tuesday. We also discussed cpr in the event something happens, both children say they will discuss this at home together and let us know.

## 2023-06-25 NOTE — PROGRESS NOTE ADULT - ASSESSMENT
Physical Examination:  GENERAL:               Eyes open adn closed and in no distress,    HEENT:                    No JVD, Dry MM  PULM:                     Bilateral air entry, course and diminished  to auscultation bilaterally, No  sputum production, trace rales , No Rhonchi, No Wheezing  CVS:                         S1, S2,  +Murmur  ABD:                        Soft, nondistended, nontender, normoactive bowel sounds,   EXT:                         no  edema, nontender, No Cyanosis or Clubbing   Vascular:                Warm Extremities,   NEURO:                lethargic, non verbal, not following commands + Myoclonus   PSYC:                      Calm, No Insight.      Assessment  78 year old male with a PMH of dementia, HTN, cardiomyopathy, HFrEF, CAD s/p CABG, with known current RCA occlusion, and DM type 2 who was admitted to [facility] on 5/4 with hypoxic respiratory failure in setting of CHF exacerbation and ANISH with course complicated by acute hypoxic respiratory failure in setting of choking episode causing cardiac arrest, shock, worsening hypoxemic respiratory failure, and NSTEMI on 5/9. Pt ICU course noted post cardiac arrest anoxic brain injury and myoclonus. Pt was transferred to [facility] for VEEG which required 48 hours of monitoring as he was noted to still be sedated. While there pt noted to have hematochezia and any ac / antiplatelet agents were held at that time. EEG findings consistent with stimulus induced myoclonus and GPDs s/p hypoxic diffuse indicative of severe cerebral dysfunction.    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   off klonpin  back on eliquis, noted tachycardia  will restart lopressor  Repeat CXR in am f/u effusion     NIV PRN/QHS   NGT for diet , will need peg when more stable   S&S eval to advance diet  will need to call when more awake  OOB to chair    monitor blood glucose levels, + ISS coverage  Sacral decub - wound care called continue local wound care off loading and nutrition support  Venodyne for dvt ppx   GOC ongoing discussion with family , but want full code at this time    PMD:				                   Notified(Date):  Family Updated: 	Kuldeep [phone]	                                 Date:  6/25/2023  updated     Sedation & Analgesia:	  Diet/Nutrition:		 as above  GI PPx:			Protonix  DVT Ppx:		Venodynes   Activity:		  bedrest  Head of Bed:               35-45 Deg  Glycemic Control:         Insulin  Lines: piv  Restraints may be deemed necessary to prevent removal of life-sustaining devices [ x ] YES   [   ]  NO  Disposition: ICU Care  Goals of Care: Full code

## 2023-06-26 LAB
ANION GAP SERPL CALC-SCNC: 5 MMOL/L — SIGNIFICANT CHANGE UP (ref 5–17)
BUN SERPL-MCNC: 63 MG/DL — HIGH (ref 7–23)
CALCIUM SERPL-MCNC: 8.7 MG/DL — SIGNIFICANT CHANGE UP (ref 8.4–10.5)
CHLORIDE SERPL-SCNC: 100 MMOL/L — SIGNIFICANT CHANGE UP (ref 96–108)
CO2 SERPL-SCNC: 33 MMOL/L — HIGH (ref 22–31)
CREAT SERPL-MCNC: 2.98 MG/DL — HIGH (ref 0.5–1.3)
EGFR: 20 ML/MIN/1.73M2 — LOW
GLUCOSE BLDC GLUCOMTR-MCNC: 127 MG/DL — HIGH (ref 70–99)
GLUCOSE BLDC GLUCOMTR-MCNC: 175 MG/DL — HIGH (ref 70–99)
GLUCOSE BLDC GLUCOMTR-MCNC: 187 MG/DL — HIGH (ref 70–99)
GLUCOSE BLDC GLUCOMTR-MCNC: 242 MG/DL — HIGH (ref 70–99)
GLUCOSE SERPL-MCNC: 130 MG/DL — HIGH (ref 70–99)
HCT VFR BLD CALC: 27.4 % — LOW (ref 39–50)
HGB BLD-MCNC: 8.4 G/DL — LOW (ref 13–17)
MAGNESIUM SERPL-MCNC: 2 MG/DL — SIGNIFICANT CHANGE UP (ref 1.6–2.6)
MCHC RBC-ENTMCNC: 30.7 GM/DL — LOW (ref 32–36)
MCHC RBC-ENTMCNC: 31.6 PG — SIGNIFICANT CHANGE UP (ref 27–34)
MCV RBC AUTO: 103 FL — HIGH (ref 80–100)
NRBC # BLD: 0 /100 WBCS — SIGNIFICANT CHANGE UP (ref 0–0)
PHOSPHATE SERPL-MCNC: 1.9 MG/DL — LOW (ref 2.5–4.5)
PLATELET # BLD AUTO: 214 K/UL — SIGNIFICANT CHANGE UP (ref 150–400)
POTASSIUM SERPL-MCNC: 3.3 MMOL/L — LOW (ref 3.5–5.3)
POTASSIUM SERPL-SCNC: 3.3 MMOL/L — LOW (ref 3.5–5.3)
RBC # BLD: 2.66 M/UL — LOW (ref 4.2–5.8)
RBC # FLD: 18.3 % — HIGH (ref 10.3–14.5)
SODIUM SERPL-SCNC: 138 MMOL/L — SIGNIFICANT CHANGE UP (ref 135–145)
VALPROATE SERPL-MCNC: 4 UG/ML — LOW (ref 50–100)
WBC # BLD: 10.35 K/UL — SIGNIFICANT CHANGE UP (ref 3.8–10.5)
WBC # FLD AUTO: 10.35 K/UL — SIGNIFICANT CHANGE UP (ref 3.8–10.5)

## 2023-06-26 RX ADMIN — LEVETIRACETAM 250 MILLIGRAM(S): 250 TABLET, FILM COATED ORAL at 17:28

## 2023-06-26 RX ADMIN — PANTOPRAZOLE SODIUM 40 MILLIGRAM(S): 20 TABLET, DELAYED RELEASE ORAL at 11:32

## 2023-06-26 RX ADMIN — APIXABAN 2.5 MILLIGRAM(S): 2.5 TABLET, FILM COATED ORAL at 05:45

## 2023-06-26 RX ADMIN — Medication 1: at 23:41

## 2023-06-26 RX ADMIN — Medication 12.5 MILLIGRAM(S): at 17:28

## 2023-06-26 RX ADMIN — Medication 1: at 11:31

## 2023-06-26 RX ADMIN — Medication 1 APPLICATION(S): at 11:32

## 2023-06-26 RX ADMIN — Medication 12.5 MILLIGRAM(S): at 05:45

## 2023-06-26 RX ADMIN — Medication 81 MILLIGRAM(S): at 11:32

## 2023-06-26 RX ADMIN — Medication 15 MILLILITER(S): at 11:31

## 2023-06-26 RX ADMIN — LEVETIRACETAM 250 MILLIGRAM(S): 250 TABLET, FILM COATED ORAL at 05:45

## 2023-06-26 RX ADMIN — ZINC SULFATE TAB 220 MG (50 MG ZINC EQUIVALENT) 220 MILLIGRAM(S): 220 (50 ZN) TAB at 11:31

## 2023-06-26 RX ADMIN — MODAFINIL 150 MILLIGRAM(S): 200 TABLET ORAL at 07:05

## 2023-06-26 RX ADMIN — Medication 2: at 17:28

## 2023-06-26 NOTE — PROGRESS NOTE ADULT - SUBJECTIVE AND OBJECTIVE BOX
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                              8.4    10.35 )-----------( 214      ( 26 Jun 2023 05:00 )             27.4     CBC Full  -  ( 26 Jun 2023 05:00 )  WBC Count : 10.35 K/uL  RBC Count : 2.66 M/uL  Hemoglobin : 8.4 g/dL  Hematocrit : 27.4 %  Platelet Count - Automated : 214 K/uL  Mean Cell Volume : 103.0 fl  Mean Cell Hemoglobin : 31.6 pg  Mean Cell Hemoglobin Concentration : 30.7 gm/dL  Auto Neutrophil # : x  Auto Lymphocyte # : x  Auto Monocyte # : x  Auto Eosinophil # : x  Auto Basophil # : x  Auto Neutrophil % : x  Auto Lymphocyte % : x  Auto Monocyte % : x  Auto Eosinophil % : x  Auto Basophil % : x    138|100|63<130  3.3|33|2.98  8.7,2.0,1.9  06-26 @ 05:00      Urinalysis Basic - ( 26 Jun 2023 05:00 )    Color: x / Appearance: x / SG: x / pH: x  Gluc: 130 mg/dL / Ketone: x  / Bili: x / Urobili: x   Blood: x / Protein: x / Nitrite: x   Leuk Esterase: x / RBC: x / WBC x   Sq Epi: x / Non Sq Epi: x / Bacteria: x                Procedure Performed:  (  )Yes     (x )No  Name of Procedure:  (  )debridement    (  )I&D    (  )Other:  (  )partial thickness     (  )full thickness     (  )subcutaneous     (  )muscle/tendon     (  )bone  (  )sharp     (  )surgical

## 2023-06-26 NOTE — PROGRESS NOTE ADULT - ASSESSMENT
Assessment	  79 yo male with HfrEF,dementia, CAD,HTN,T2DM, recent lengthy [facility] admission, admitted 5/4 with acute hypoxic respiratory failure/choking/ with resultant cardiac arrest.  He  has survived, was sent to [facility] for a short time for evaluation for seizures/myoclonic jerks and has been back at [facility] for past month.He is s/p NSTEMI during earlier part of adnission  He has required left chest tube to drain effusion, removed today.  He has limited renal function, a temp HD catheter has been placed and he has received HD.  He is fed via NG tube, is minimally interactive, and his DNR/DNI was reversed and he is receiving aggressive medical care.  His [facility] blood cultures have been negative, prior sputums have grown mostly MSSA and one grew strep pneumo.  His pleural fluid culture is negative.He has received limited courses of vanco, zosyn, cefazolin, and cefepime during his 7 week hospital admission.  He is followed by Plastics for a sacral  wound.He is felt to have a hypoxic brain  injury.He has required vent but has been extubated.  He is on midodrine, was started on HD this week, next session planned for AM.A G tube is planned  He was started on meropenem on 6/23, not felt to be a candidate for blood cultures.  He received 3 days of meropenem, no objective improvement, antibiotic stopped on 6/25  Suggest:  1. Monitor off antibiotics  2.supportive care  3 Goals of care being addressed  4. antibiotics unlikely to alter outcome

## 2023-06-26 NOTE — PROGRESS NOTE ADULT - SUBJECTIVE AND OBJECTIVE BOX
CC: f/u for fever    Patient reports: he is non verbal    REVIEW OF SYSTEMS:  All other review of systems negative (Comprehensive ROS): limited, fed via NG tube    Antimicrobials Day #  :    Other Medications Reviewed  MEDICATIONS  (STANDING):  aspirin  chewable 81 milliGRAM(s) Oral daily  chlorhexidine 2% Cloths 1 Application(s) Topical daily  Dakins Solution - 1/2 Strength 1 Application(s) Topical daily  dextrose 5%. 1000 milliLiter(s) (100 mL/Hr) IV Continuous <Continuous>  dextrose 5%. 1000 milliLiter(s) (50 mL/Hr) IV Continuous <Continuous>  dextrose 50% Injectable 25 Gram(s) IV Push once  dextrose 50% Injectable 12.5 Gram(s) IV Push once  dextrose 50% Injectable 25 Gram(s) IV Push once  epoetin priyanka-epbx (RETACRIT) Injectable 27805 Unit(s) SubCutaneous every 7 days  insulin lispro (ADMELOG) corrective regimen sliding scale   SubCutaneous every 6 hours  levETIRAcetam 250 milliGRAM(s) Oral two times a day  metoprolol tartrate 12.5 milliGRAM(s) Oral every 12 hours  modafinil 150 milliGRAM(s) Oral every 24 hours  multivitamin/minerals/iron Oral Solution (CENTRUM) 15 milliLiter(s) Oral daily  pantoprazole   Suspension 40 milliGRAM(s) Oral daily  zinc sulfate 220 milliGRAM(s) Oral daily    T(F): 99.2 (06-26-23 @ 13:00), Max: 99.3 (06-26-23 @ 09:00)  HR: 95 (06-26-23 @ 16:33)  BP: 110/68 (06-26-23 @ 15:00)  RR: 32 (06-26-23 @ 15:00)  SpO2: 97% (06-26-23 @ 16:33)  Wt(kg): --    PHYSICAL EXAM:  General: lethargic, no acute distress, NG tube in place  Eyes:  anicteric, no conjunctival injection, no discharge  Oropharynx: no lesions or injection 	  Neck: supple, without adenopathy  Lungs: scattered ronchi  Heart: regular rate and rhythm; no murmur, rubs or gallops  Abdomen: soft, nondistended, nontender, without mass or organomegaly  Skin: no lesions  Extremities: no clubbing, cyanosis, or edema  Neurologic: poorly interactive    LAB RESULTS:                        8.4    10.35 )-----------( 214      ( 26 Jun 2023 05:00 )             27.4     06-26    138  |  100  |  63<H>  ----------------------------<  130<H>  3.3<L>   |  33<H>  |  2.98<H>    Ca    8.7      26 Jun 2023 05:00  Phos  1.9     06-26  Mg     2.0     06-26        Urinalysis Basic - ( 26 Jun 2023 05:00 )    Color: x / Appearance: x / SG: x / pH: x  Gluc: 130 mg/dL / Ketone: x  / Bili: x / Urobili: x   Blood: x / Protein: x / Nitrite: x   Leuk Esterase: x / RBC: x / WBC x   Sq Epi: x / Non Sq Epi: x / Bacteria: x      MICROBIOLOGY:  RECENT CULTURES:      RADIOLOGY REVIEWED:  < from: Xray Chest 1 View- PORTABLE-Routine (06.24.23 @ 08:44) >  IMPRESSION:   Decreased RIGHT lower zone airspace disease and/or effusion.  . Catheters and tubes in place.  Cardiomegaly.    --- End of Report ---    < end of copied text >

## 2023-06-26 NOTE — CHART NOTE - NSCHARTNOTEFT_GEN_A_CORE
Nutrition Follow Up Note  Hospital Course (Per Electronic Medical Record):   Source: Medical Record [X] MD/PA [X] Nursing Staff [X]     Diet: Nepro @ 75ml/hr x 18 hrs with 200ml free water flush q 4 hrs     Patient as per RN is tolerating current NGT feeds , EN feeds increased due to increased nutrient needs due to patient having HD , Labs reviewed , Chest tube removed , HD to continue as per renal , PEG placement deferred due to patient presently unstable to have procedure . (+) BM(6/25) , sacral DTI noted , MVI , Zinc Sulfate provided . Patient noted for HD today , Reviewed weights unable to trend  due to renal status/ HD . & ? weighing method , will speak with RN ,     Current Weight: 6/26) 156.7/71,2kg                          (6/25) 149.2/67.7kg                          (6/240 153/69.4kg       Pertinent Medications: MEDICATIONS  (STANDING):  aspirin  chewable 81 milliGRAM(s) Oral daily  chlorhexidine 2% Cloths 1 Application(s) Topical daily  Dakins Solution - 1/2 Strength 1 Application(s) Topical daily  dextrose 5%. 1000 milliLiter(s) (100 mL/Hr) IV Continuous <Continuous>  dextrose 5%. 1000 milliLiter(s) (50 mL/Hr) IV Continuous <Continuous>  dextrose 50% Injectable 25 Gram(s) IV Push once  dextrose 50% Injectable 12.5 Gram(s) IV Push once  dextrose 50% Injectable 25 Gram(s) IV Push once  epoetin priyanka-epbx (RETACRIT) Injectable 74679 Unit(s) SubCutaneous every 7 days  insulin lispro (ADMELOG) corrective regimen sliding scale   SubCutaneous every 6 hours  levETIRAcetam 250 milliGRAM(s) Oral two times a day  metoprolol tartrate 12.5 milliGRAM(s) Oral every 12 hours  modafinil 150 milliGRAM(s) Oral every 24 hours  multivitamin/minerals/iron Oral Solution (CENTRUM) 15 milliLiter(s) Oral daily  pantoprazole   Suspension 40 milliGRAM(s) Oral daily  zinc sulfate 220 milliGRAM(s) Oral daily    MEDICATIONS  (PRN):  acetaminophen   Oral Liquid .. 650 milliGRAM(s) Oral every 6 hours PRN Temp greater or equal to 38C (100.4F)  sodium chloride 0.9% lock flush 10 milliLiter(s) IV Push every 1 hour PRN Pre/post blood products, medications, blood draw, and to maintain line patency      Pertinent Labs:  06-26 Na138 mmol/L Glu 130 mg/dL<H> K+ 3.3 mmol/L<L> Cr  2.98 mg/dL<H> BUN 63 mg/dL<H> 06-26 Phos 1.9 mg/dL<L> 06-24 Alb 2.0 g/dL<L>  Hgb 8.4g/dl<L> , Hct 27.4% <L>   POCT 127,237,199,262      Skin: sacral DTI     Edema: none    Last BM: (6/25)     Estimated Needs:   [X] No Change since Previous Assessment      Previous Nutrition Diagnosis: Severe Protein Calorie Malnutrition & increased nutrient needs     Nutrition Diagnosis is [X] Ongoing & addressed        New Nutrition Diagnosis: [X] Not Applicable      Interventions:   1. continue current EN feeds ,       Monitoring & Evaluation: will monitor:  [X] Weights   [X] Follow Up (Per Protocol)  [X] Tolerance to Diet Prescription       RD to follow as per Nutrition protocol  Preethi Carnes RDN

## 2023-06-26 NOTE — PROGRESS NOTE ADULT - ASSESSMENT
Physical Examination:  GENERAL:               Eyes open no distress  HEENT:                    No JVD, Dry MM  PULM:                     Bilateral air entry, course and diminished  to auscultation bilaterally, No  sputum production, + rales , No Rhonchi, No Wheezing  CVS:                         S1, S2,  +Murmur  ABD:                        Soft, nondistended, nontender, normoactive bowel sounds,   EXT:                         no  edema, nontender, No Cyanosis or Clubbing   Vascular:                Warm Extremities,   NEURO:                alert , non verbal, not following commands mild + Myoclonus   PSYC:                      Calm, No Insight.      Assessment  78 year old male with a PMH of dementia, HTN, cardiomyopathy, HFrEF, CAD s/p CABG, with known current RCA occlusion, and DM type 2 who was admitted to [facility] on 5/4 with hypoxic respiratory failure in setting of CHF exacerbation and ANISH with course complicated by acute hypoxic respiratory failure in setting of choking episode causing cardiac arrest, shock, worsening hypoxemic respiratory failure, and NSTEMI on 5/9. Pt ICU course noted post cardiac arrest anoxic brain injury and myoclonus. Pt was transferred to [facility] for VEEG which required 48 hours of monitoring as he was noted to still be sedated. While there pt noted to have hematochezia and any ac / antiplatelet agents were held at that time. EEG findings consistent with stimulus induced myoclonus and GPDs s/p hypoxic diffuse indicative of severe cerebral dysfunction.    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  Acute renal failure now on HD, baseline CKD    Heart failure with reduced EF  NSTEMI post arrest   b/l Pl effusion, s/p left chest tube now s/p removal     Underlying PMH of dementia, HTN, cardiomyopathy, HFrEF, CAD s/p CABG, with known current RCA occlusion, and DM type 2      Plan:    monitor neuro status  Continue Modafanil  HD as per renal  continue Keppra  Eliquis held for evaluate PEG     NIV PRN/QHS   NGT for diet , will need peg when more stable   S&S eval to advance diet  will need to call when more awake  OOB to chair    monitor blood glucose levels, + ISS coverage  Sacral decub - wound care called continue local wound care off loading and nutrition support  Venodyne for dvt ppx   GO ongoing discussion with family ,now DNR/I     PMD:				                   Notified(Date):  Family Updated: 	Kuldeep [phone]	                                 Date:  6/26/2023  willing for peg  unsure about long term hd, but suspect will want long term HD     Sedation & Analgesia:	  Diet/Nutrition:		 as above  GI PPx:			Protonix  DVT Ppx:		Venodynes   Activity:		  bedrest  Head of Bed:               35-45 Deg  Glycemic Control:         Insulin  Lines: piv  Restraints may be deemed necessary to prevent removal of life-sustaining devices [ x ] YES   [   ]  NO  Disposition: ICU Care  Goals of Care: Full code

## 2023-06-26 NOTE — PROGRESS NOTE ADULT - SUBJECTIVE AND OBJECTIVE BOX
NEPHROLOGY PROGRESS NOTE    CHIEF COMPLAINT:  ANISH    HPI:  Vitals stable.  Oxygenating well on 3 liters.  Urine 200 mL.  Last HD was Thurs & Sat.     EXAM:  T(F): 99.3 (06-26-23 @ 09:00)  HR: 89 (06-26-23 @ 13:00)  BP: 117/77 (06-26-23 @ 13:00)  RR: 30 (06-26-23 @ 13:00)  SpO2: 100% (06-26-23 @ 13:00)    Somnolent  Normal respiratory effort, lungs clear bilaterally  Heart RRR with no murmur, no peripheral edema         LABS                             8.4    10.35 )-----------( 214      ( 26 Jun 2023 05:00 )             27.4          06-26    138  |  100  |  63<H>  ----------------------------<  130<H>  3.3<L>   |  33<H>  |  2.98<H>    Ca    8.7      26 Jun 2023 05:00  Phos  1.9     06-26  Mg     2.0     06-26      Urinalysis Basic - ( 26 Jun 2023 05:00 )  Color: x / Appearance: x / SG: x / pH: x  Gluc: 130 mg/dL / Ketone: x  / Bili: x / Urobili: x   Blood: x / Protein: x / Nitrite: x   Leuk Esterase: x / RBC: x / WBC x   Sq Epi: x / Non Sq Epi: x / Bacteria: x    Radiology:  CXR from 6/24 shows decreased RLL air space disease      Impression:  1.  Ischemic ATN w/o recovery on hemodialysis w/o clear benefit  2.  Chronic respiratory failure  3.  Chronic systolic CHF  4.  Dementia, failure to thrive, protein calorie malnourished    Recommend:  Resume dialysis tomorrow  Continue goals of care discussion as dialysis appears to have more burden than benefit

## 2023-06-26 NOTE — PROGRESS NOTE ADULT - SUBJECTIVE AND OBJECTIVE BOX
Follow-up Critical Care Progress Note  Chief Complaint : Atrial fibrillation    patient seen and examinied  myoclonus appears lesss   on agitation, patient verbalized to me "leave me alone"      Allergies :sulfa drugs (Unknown)      PAST MEDICAL & SURGICAL HISTORY:  HTN (hypertension)  HLD (hyperlipidemia)  Diabetes  CAD (coronary artery disease)  History of cholecystectomy        Medications:  MEDICATIONS  (STANDING):  aspirin  chewable 81 milliGRAM(s) Oral daily  chlorhexidine 2% Cloths 1 Application(s) Topical daily  Dakins Solution - 1/2 Strength 1 Application(s) Topical daily  dextrose 5%. 1000 milliLiter(s) (100 mL/Hr) IV Continuous <Continuous>  dextrose 5%. 1000 milliLiter(s) (50 mL/Hr) IV Continuous <Continuous>  dextrose 50% Injectable 25 Gram(s) IV Push once  dextrose 50% Injectable 12.5 Gram(s) IV Push once  dextrose 50% Injectable 25 Gram(s) IV Push once  epoetin priyanka-epbx (RETACRIT) Injectable 38020 Unit(s) SubCutaneous every 7 days  insulin lispro (ADMELOG) corrective regimen sliding scale   SubCutaneous every 6 hours  levETIRAcetam 250 milliGRAM(s) Oral two times a day  metoprolol tartrate 12.5 milliGRAM(s) Oral every 12 hours  modafinil 150 milliGRAM(s) Oral every 24 hours  multivitamin/minerals/iron Oral Solution (CENTRUM) 15 milliLiter(s) Oral daily  pantoprazole   Suspension 40 milliGRAM(s) Oral daily  zinc sulfate 220 milliGRAM(s) Oral daily    MEDICATIONS  (PRN):  acetaminophen   Oral Liquid .. 650 milliGRAM(s) Oral every 6 hours PRN Temp greater or equal to 38C (100.4F)  sodium chloride 0.9% lock flush 10 milliLiter(s) IV Push every 1 hour PRN Pre/post blood products, medications, blood draw, and to maintain line patency      Antibiotics History  ceFAZolin   IVPB 2000 milliGRAM(s) IV Intermittent once, 06-12-23 @ 07:00, Stop order after: 1 Doses  meropenem  IVPB 500 milliGRAM(s) IV Intermittent every 24 hours, 06-23-23 @ 10:31, Stop order after: 7 Days  meropenem  IVPB 500 milliGRAM(s) IV Intermittent every 12 hours, 06-23-23 @ 11:09, Stop order after: 7 Days      Heme Medications   aspirin  chewable 81 milliGRAM(s) Oral daily, 06-24-23 @ 10:36      GI Medications  pantoprazole   Suspension 40 milliGRAM(s) Oral daily, 06-22-23 @ 00:00,       COVID  06-11-23 @ 12:25  COVID    [facility]  05-04-23 @ 19:50  COVID    Not[facility]e  07-13-22 @ 18:33  [facility]      COVID Biomarkers    06-12-23 @ 05:00 ESR --  ---  CRP --  ---  DDimer  --   ---      ---   Ferritin --    05-04-23 @ 19:50 ESR --  ---  CRP --  ---  DDimer  351<H>   ---   LDH --   ---   Ferritin --       Trend BNP  06-20-23 @ 06:10   -  >47314<H>  06-14-23 @ 06:00   -  >73181<H>  06-12-23 @ 05:00   -  04175<H>  06-11-23 @ 06:15   -  >21548<H>  05-14-23 @ 11:30   -  44384<H>  05-11-23 @ 06:05   -  48806<H>    Procalcitonin Trend  06-05-23 @ 05:30   -   0.24<H>  06-04-23 @ 06:00   -   0.20<H>  05-17-23 @ 23:44   -   0.59<H>  05-16-23 @ 22:16   -   0.74<H>  05-15-23 @ 05:45   -   1.12<H>  05-09-23 @ 13:42   -   0.11<H>    WBC Trend  06-26-23 @ 05:00   -  10.35  06-25-23 @ 06:15   -  10.63<H>  06-24-23 @ 05:30   -  10.63<H>    H/H Trend  06-26-23 @ 05:00   -   8.4<L>/ 27.4<L>  06-25-23 @ 06:15   -   8.4<L>/ 27.4<L>  06-24-23 @ 05:30   -   8.5<L>/ 28.1<L>  06-23-23 @ 06:00   -   8.6<L>/ 28.3<L>  06-22-23 @ 05:00   -   8.3<L>/ 26.9<L>  06-21-23 @ 05:50   -   8.3<L>/ 27.0<L>    Stool Occult Blood  06-15-23 @ 13:54   -   Negative  06-02-23 @ 08:20   -   Negative  05-25-23 @ 18:20   -   Negative  05-16-23 @ 17:00   -   Positive<!>    Platelet Trend  06-26-23 @ 05:00   -  214  06-25-23 @ 06:15   -  195  06-24-23 @ 05:30   -  199    Trend Sodium  06-26-23 @ 05:00   -  138  06-25-23 @ 06:15   -  138  06-24-23 @ 05:30   -  136    Trend Potassium  06-26-23 @ 05:00   -  3.3<L>  06-25-23 @ 06:15   -  3.6  06-24-23 @ 05:30   -  4.7    Trend Bun/Cr  06-26-23 @ 05:00  BUN/CR -  63<H> / 2.98<H>  06-25-23 @ 06:15  BUN/CR -  48<H> / 2.44<H>  06-24-23 @ 05:30  BUN/CR -  85<H> / 3.67<H>     ABG Trend  06-21-23 @ 05:35   - 7.38/47/72<L>/96.4  06-16-23 @ 05:00   - 7.43/41/119<H>/99.4<H>  06-12-23 @ 05:07   - 7.46<H>/37/115<H>/99.3<H>  06-10-23 @ 04:45   - 7.42/41/78<L>/96.6       Trend AST/ALT/ALK Phos/Bili  06-24-23 @ 05:30   22/14/119/0.7  06-23-23 @ 06:00   19/12/125<H>/0.7  06-22-23 @ 05:00   12/11/104/0.4  06-21-23 @ 05:50   10/9<L>/98/0.4  06-20-23 @ 06:10   17/6<L>/123<H>/0.4  06-19-23 @ 05:20   14/10/120/0.6  06-18-23 @ 05:30   15/9<L>/116/0.6  06-16-23 @ 05:00   21/10/119/0.6  06-15-23 @ 05:00   12/7<L>/128<H>/0.6  06-14-23 @ 06:00   12/9<L>/134<H>/0.6  06-13-23 @ 05:35   16/13/160<H>/0.7  06-12-23 @ 05:00   17/13/158<H>/0.7      Ammonia Trend  06-25-23 @ 12:10   -   48  06-21-23 @ 05:50   -   92<H>  05-14-23 @ 11:30   -   53  05-14-23 @ 05:00   -   16      Amylase / Lipase Trend      Albumin Trend  06-24-23 @ 05:30   -   2.0<L>  06-23-23 @ 06:00   -   2.1<L>  06-22-23 @ 05:00   -   1.9<L>  06-21-23 @ 05:50   -   1.9<L>  06-20-23 @ 06:10   -   1.8<L>  06-19-23 @ 05:20   -   1.9<L>      PTT - PT - INR Trend  06-12-23 @ 05:00   -   28.2 - 15.8<H> - 1.36<H>  05-19-23 @ 00:44   -   26.5<L> - 13.2 - 1.15  05-17-23 @ 23:44   -   31.2 - 13.7<H> - 1.18<H>  05-16-23 @ 22:16   -   31.3 - 13.6<H> - 1.18<H>  05-15-23 @ 13:55   -   32.9 - 14.0<H> - 1.19<H>  05-14-23 @ 13:45   -   33.6 - -- - --    Glucose Trend  06-26-23 @ 05:03   -  -- -- 127<H>  06-26-23 @ 05:00   -  130<H> -- --  06-25-23 @ 23:41   -  -- -- 237<H>  06-25-23 @ 17:46   -  -- -- 199<H>  06-25-23 @ 11:35   -  -- -- 262<H>  06-25-23 @ 06:26   -  -- -- 145<H>  06-25-23 @ 06:15   -  119<H> -- --  06-25-23 @ 00:40   -  -- -- 203<H>  06-24-23 @ 17:44   -  -- -- 123<H>  06-24-23 @ 13:01   -  -- -- 165<H>    A1C with Estimated Average Glucose Result: 7.7 % *H* [4.0 - 5.6] (05-05-23 @ 08:00)    Ammonia Trend  06-25-23 @ 12:10   -   48  06-21-23 @ 05:50   -   92<H>  05-14-23 @ 11:30   -   53  05-14-23 @ 05:00   -   16    LABS:                        8.4    10.35 )-----------( 214      ( 26 Jun 2023 05:00 )             27.4     06-26    138  |  100  |  63<H>  ----------------------------<  130<H>  3.3<L>   |  33<H>  |  2.98<H>    Ca    8.7      26 Jun 2023 05:00  Phos  1.9     06-26  Mg     2.0     06-26     RADIOLOGY  CXR:  mild vascular congestion      VITALS:  T(C): 37.2 (06-26-23 @ 05:00), Max: 37.6 (06-25-23 @ 12:00)  T(F): 98.9 (06-26-23 @ 05:00), Max: 99.6 (06-25-23 @ 12:00)  HR: 114 (06-26-23 @ 10:00) (87 - 121)  BP: 106/56 (06-26-23 @ 10:00) (106/56 - 146/79)  BP(mean): 70 (06-26-23 @ 10:00) (70 - 100)  ABP: --  ABP(mean): --  RR: 38 (06-26-23 @ 10:00) (15 - 43)  SpO2: 95% (06-26-23 @ 10:00) (92% - 100%)  CVP(mm Hg): --  CVP(cm H2O): --    Ins and Outs     06-25-23 @ 07:01  -  06-26-23 @ 07:00  --------------------------------------------------------  IN: 1840 mL / OUT: 200 mL / NET: 1640 mL                I&O's Detail    25 Jun 2023 07:01  -  26 Jun 2023 07:00  --------------------------------------------------------  IN:    Free Water: 800 mL    Nepro with Carb Steady: 1040 mL  Total IN: 1840 mL    OUT:    Voided (mL): 200 mL  Total OUT: 200 mL    Total NET: 1640 mL

## 2023-06-26 NOTE — PROGRESS NOTE ADULT - ASSESSMENT
necrotic stage 4 sacral wound.  Pt not ideal for OR debridement, bedside debridement already performed, wound necrotic once more  -suggest Veraflow VAC with cleansing foam to debride wound, will discuss with daughter  -continue local wound care, off loading and nutrition support.

## 2023-06-27 ENCOUNTER — TRANSCRIPTION ENCOUNTER (OUTPATIENT)
Age: 78
End: 2023-06-27

## 2023-06-27 LAB
ALBUMIN SERPL ELPH-MCNC: 1.7 G/DL — LOW (ref 3.3–5)
ALP SERPL-CCNC: 115 U/L — SIGNIFICANT CHANGE UP (ref 40–120)
ALT FLD-CCNC: 9 U/L — LOW (ref 10–45)
ANION GAP SERPL CALC-SCNC: 5 MMOL/L — SIGNIFICANT CHANGE UP (ref 5–17)
AST SERPL-CCNC: 12 U/L — SIGNIFICANT CHANGE UP (ref 10–40)
BILIRUB SERPL-MCNC: 0.4 MG/DL — SIGNIFICANT CHANGE UP (ref 0.2–1.2)
BUN SERPL-MCNC: 79 MG/DL — HIGH (ref 7–23)
CALCIUM SERPL-MCNC: 8.5 MG/DL — SIGNIFICANT CHANGE UP (ref 8.4–10.5)
CHLORIDE SERPL-SCNC: 101 MMOL/L — SIGNIFICANT CHANGE UP (ref 96–108)
CO2 SERPL-SCNC: 33 MMOL/L — HIGH (ref 22–31)
CREAT SERPL-MCNC: 3.24 MG/DL — HIGH (ref 0.5–1.3)
EGFR: 19 ML/MIN/1.73M2 — LOW
GLUCOSE BLDC GLUCOMTR-MCNC: 158 MG/DL — HIGH (ref 70–99)
GLUCOSE BLDC GLUCOMTR-MCNC: 165 MG/DL — HIGH (ref 70–99)
GLUCOSE BLDC GLUCOMTR-MCNC: 299 MG/DL — HIGH (ref 70–99)
GLUCOSE BLDC GLUCOMTR-MCNC: 94 MG/DL — SIGNIFICANT CHANGE UP (ref 70–99)
GLUCOSE SERPL-MCNC: 143 MG/DL — HIGH (ref 70–99)
HCT VFR BLD CALC: 27.6 % — LOW (ref 39–50)
HGB BLD-MCNC: 8.4 G/DL — LOW (ref 13–17)
MAGNESIUM SERPL-MCNC: 2.1 MG/DL — SIGNIFICANT CHANGE UP (ref 1.6–2.6)
MCHC RBC-ENTMCNC: 30.4 GM/DL — LOW (ref 32–36)
MCHC RBC-ENTMCNC: 32.2 PG — SIGNIFICANT CHANGE UP (ref 27–34)
MCV RBC AUTO: 105.7 FL — HIGH (ref 80–100)
NRBC # BLD: 0 /100 WBCS — SIGNIFICANT CHANGE UP (ref 0–0)
NT-PROBNP SERPL-SCNC: HIGH PG/ML (ref 0–300)
PHOSPHATE SERPL-MCNC: 2.4 MG/DL — LOW (ref 2.5–4.5)
PLATELET # BLD AUTO: 210 K/UL — SIGNIFICANT CHANGE UP (ref 150–400)
POTASSIUM SERPL-MCNC: 3.6 MMOL/L — SIGNIFICANT CHANGE UP (ref 3.5–5.3)
POTASSIUM SERPL-SCNC: 3.6 MMOL/L — SIGNIFICANT CHANGE UP (ref 3.5–5.3)
PROT SERPL-MCNC: 6.5 G/DL — SIGNIFICANT CHANGE UP (ref 6–8.3)
RBC # BLD: 2.61 M/UL — LOW (ref 4.2–5.8)
RBC # FLD: 18.5 % — HIGH (ref 10.3–14.5)
SODIUM SERPL-SCNC: 139 MMOL/L — SIGNIFICANT CHANGE UP (ref 135–145)
WBC # BLD: 8.45 K/UL — SIGNIFICANT CHANGE UP (ref 3.8–10.5)
WBC # FLD AUTO: 8.45 K/UL — SIGNIFICANT CHANGE UP (ref 3.8–10.5)

## 2023-06-27 PROCEDURE — 71045 X-RAY EXAM CHEST 1 VIEW: CPT | Mod: 26

## 2023-06-27 RX ADMIN — LEVETIRACETAM 250 MILLIGRAM(S): 250 TABLET, FILM COATED ORAL at 17:37

## 2023-06-27 RX ADMIN — Medication 81 MILLIGRAM(S): at 13:56

## 2023-06-27 RX ADMIN — Medication 15 MILLILITER(S): at 12:50

## 2023-06-27 RX ADMIN — Medication 12.5 MILLIGRAM(S): at 17:37

## 2023-06-27 RX ADMIN — MODAFINIL 150 MILLIGRAM(S): 200 TABLET ORAL at 05:21

## 2023-06-27 RX ADMIN — ZINC SULFATE TAB 220 MG (50 MG ZINC EQUIVALENT) 220 MILLIGRAM(S): 220 (50 ZN) TAB at 12:50

## 2023-06-27 RX ADMIN — Medication 1: at 05:23

## 2023-06-27 RX ADMIN — Medication 12.5 MILLIGRAM(S): at 05:22

## 2023-06-27 RX ADMIN — Medication 3: at 23:53

## 2023-06-27 RX ADMIN — Medication 1: at 17:35

## 2023-06-27 RX ADMIN — LEVETIRACETAM 250 MILLIGRAM(S): 250 TABLET, FILM COATED ORAL at 05:22

## 2023-06-27 RX ADMIN — Medication 1 APPLICATION(S): at 12:50

## 2023-06-27 RX ADMIN — PANTOPRAZOLE SODIUM 40 MILLIGRAM(S): 20 TABLET, DELAYED RELEASE ORAL at 12:50

## 2023-06-27 NOTE — PROGRESS NOTE ADULT - SUBJECTIVE AND OBJECTIVE BOX
CC: f/u for  pneumonia, decubitus  Patient reports  nothing   REVIEW OF SYSTEMS:  All other review of systems negative (Comprehensive ROS)  cannot get  Antimicrobials Day #  :    Other Medications Reviewed    T(F): 98.5 (06-27-23 @ 11:15), Max: 99.3 (06-26-23 @ 18:00)  HR: 89 (06-27-23 @ 14:00)  BP: 114/68 (06-27-23 @ 14:00)  RR: 36 (06-27-23 @ 14:00)  SpO2: 99% (06-27-23 @ 14:00)  Wt(kg): --    PHYSICAL EXAM:  General: poorly responsive no acute distress  Eyes:  anicteric, no conjunctival injection, no discharge  Oropharynx: no lesions or injection 	  Neck: supple, without adenopathy  Lungs: poor effort  to auscultation  Heart: regular rate and rhythm; no murmur, rubs or gallops  Abdomen: soft, nondistended, nontender, without mass or organomegaly  Skin: no lesions  Extremities: no clubbing, cyanosis, or edema  Neurologic: jerks     LAB RESULTS:                        8.4    8.45  )-----------( 210      ( 27 Jun 2023 05:00 )             27.6     06-27    139  |  101  |  79<H>  ----------------------------<  143<H>  3.6   |  33<H>  |  3.24<H>    Ca    8.5      27 Jun 2023 05:00  Phos  2.4     06-27  Mg     2.1     06-27    TPro  6.5  /  Alb  1.7<L>  /  TBili  0.4  /  DBili  x   /  AST  12  /  ALT  9<L>  /  AlkPhos  115  06-27    LIVER FUNCTIONS - ( 27 Jun 2023 05:00 )  Alb: 1.7 g/dL / Pro: 6.5 g/dL / ALK PHOS: 115 U/L / ALT: 9 U/L / AST: 12 U/L / GGT: x           Urinalysis Basic - ( 27 Jun 2023 05:00 )    Color: x / Appearance: x / SG: x / pH: x  Gluc: 143 mg/dL / Ketone: x  / Bili: x / Urobili: x   Blood: x / Protein: x / Nitrite: x   Leuk Esterase: x / RBC: x / WBC x   Sq Epi: x / Non Sq Epi: x / Bacteria: x      MICROBIOLOGY:  RECENT CULTURES:      RADIOLOGY REVIEWED:    < from: Xray Chest 1 View- PORTABLE-Routine (Xray Chest 1 View- PORTABLE-Routine .) (06.27.23 @ 09:33) >    ACC: [id number] EXAM:  XR CHEST PORTABLE ROUTINE 1V   ORDERED BY:  HALI CALI     PROCEDURE DATE:  06/27/2023          INTERPRETATION:  AP chest on 2/27/2023 at 9:18 AM. Concern is NG tube   position.    Patient has atrial fibrillation.    Right abdomen excluded from the study.    Heart magnified by technique.    Sternotomy and bilateral jugular lines again noted.    Mild bilateral perihilar infiltrates are again seen similar to June 24.    On June 24 NG tube is noted with tip in the antrum.    The NG tube has partially withdrawn but the tip is still the stomach.    IMPRESSION: NG tube partially withdrawn but tip still in the stomach.   Persistent bilateral infiltrates.    --- End of Report ---      < end of copied text >            Assessment:  Patient admitted almost 2 months ago, chf, hypoxic event with choking, has anoxic brain, needed intubation now remains extubated, having myoclonic jerks, has an ngt, has stage 4 decube but only bedside debridement. Had a low grade fever and new infiltrate a few days ago so had a short course of meropenem. Limiting factor is irreversible brain injury and mosf.   Plan:  monitor off antibiotics  no active infection apparent, we will no longer follow

## 2023-06-27 NOTE — PROGRESS NOTE ADULT - ASSESSMENT
Physical Examination:  GENERAL:               Eyes open no distress  HEENT:                    No JVD, Dry MM  PULM:                     Bilateral air entry, course and diminished  to auscultation bilaterally, No  sputum production, + rales , No Rhonchi, No Wheezing  CVS:                         S1, S2,  +Murmur  ABD:                        Soft, nondistended, nontender, normoactive bowel sounds,   EXT:                         no  edema, nontender, No Cyanosis or Clubbing   Vascular:                Warm Extremities,   NEURO:                alert , non verbal, not following commands mild + Myoclonus   PSYC:                      Calm, No Insight.      Assessment  78 year old male with a PMH of dementia, HTN, cardiomyopathy, HFrEF, CAD s/p CABG, with known current RCA occlusion, and DM type 2 who was admitted to [facility] on 5/4 with hypoxic respiratory failure in setting of CHF exacerbation and ANISH with course complicated by acute hypoxic respiratory failure in setting of choking episode causing cardiac arrest, shock, worsening hypoxemic respiratory failure, and NSTEMI on 5/9. Pt ICU course noted post cardiac arrest anoxic brain injury and myoclonus. Pt was transferred to [facility] for VEEG which required 48 hours of monitoring as he was noted to still be sedated. While there pt noted to have hematochezia and any ac / antiplatelet agents were held at that time. EEG findings consistent with stimulus induced myoclonus and GPDs s/p hypoxic diffuse indicative of severe cerebral dysfunction.    Patient returned to [facility] ICU on 5/19 and continues tx / supportive care for management of:    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  Acute renal failure now on HD, baseline CKD    Heart failure with reduced EF  NSTEMI post arrest   b/l Pl effusion, s/p left chest tube now s/p removal     Underlying PMH of dementia, HTN, cardiomyopathy, HFrEF, CAD s/p CABG, with known current RCA occlusion, and DM type 2      Plan:    monitor neuro status  Continue Modafanil  HD as per renal- suspect plan for HD Today  continue Keppra  Eliquis held for PEG  Plan for peg in am        NIV PRN/QHS   NGT for die  S&S eval to advance diet  will need to call when more awake  OOB to chair    monitor blood glucose levels, + ISS coverage  Sacral decub - wound care called continue local wound care off loading and nutrition support  Venodyne for dvt ppx   GOC ongoing discussion with family ,now DNR/I   at this time there is no pulmonary contraindication for PEG   PMD:				                   Notified(Date):  Family Updated: 	Kuldeep [phone]	                                 Date:  6/27/2023  discussed plan for PEG in am, necessary procedure with peg     Sedation & Analgesia:	  Diet/Nutrition:		 as above  GI PPx:			Protonix  DVT Ppx:		Venodynes   Activity:		  bedrest  Head of Bed:               35-45 Deg  Glycemic Control:         Insulin  Lines: piv  Restraints may be deemed necessary to prevent removal of life-sustaining devices [ x ] YES   [   ]  NO  Disposition: ICU Care  Goals of Care: Full code

## 2023-06-27 NOTE — PROGRESS NOTE ADULT - SUBJECTIVE AND OBJECTIVE BOX
Follow-up Critical Care Progress Note  Chief Complaint : Atrial fibrillation      patient seen and examined  pt pulled out ngt, new one placed  cxr in place  eyes open, tracking, non verbal to me today        Allergies :sulfa drugs (Unknown)      PAST MEDICAL & SURGICAL HISTORY:  HTN (hypertension)    HLD (hyperlipidemia)    Diabetes    CAD (coronary artery disease)    History of cholecystectomy        Medications:  MEDICATIONS  (STANDING):  aspirin  chewable 81 milliGRAM(s) Oral daily  chlorhexidine 2% Cloths 1 Application(s) Topical daily  Dakins Solution - 1/2 Strength 1 Application(s) Topical daily  dextrose 5%. 1000 milliLiter(s) (100 mL/Hr) IV Continuous <Continuous>  dextrose 5%. 1000 milliLiter(s) (50 mL/Hr) IV Continuous <Continuous>  dextrose 50% Injectable 25 Gram(s) IV Push once  dextrose 50% Injectable 12.5 Gram(s) IV Push once  dextrose 50% Injectable 25 Gram(s) IV Push once  epoetin priyanka-epbx (RETACRIT) Injectable 41763 Unit(s) SubCutaneous every 7 days  insulin lispro (ADMELOG) corrective regimen sliding scale   SubCutaneous every 6 hours  levETIRAcetam 250 milliGRAM(s) Oral two times a day  metoprolol tartrate 12.5 milliGRAM(s) Oral every 12 hours  modafinil 150 milliGRAM(s) Oral every 24 hours  multivitamin/minerals/iron Oral Solution (CENTRUM) 15 milliLiter(s) Oral daily  pantoprazole   Suspension 40 milliGRAM(s) Oral daily  zinc sulfate 220 milliGRAM(s) Oral daily    MEDICATIONS  (PRN):  acetaminophen   Oral Liquid .. 650 milliGRAM(s) Oral every 6 hours PRN Temp greater or equal to 38C (100.4F)  sodium chloride 0.9% lock flush 10 milliLiter(s) IV Push every 1 hour PRN Pre/post blood products, medications, blood draw, and to maintain line patency      Antibiotics History  ceFAZolin   IVPB 2000 milliGRAM(s) IV Intermittent once, 06-12-23 @ 07:00, Stop order after: 1 Doses  meropenem  IVPB 500 milliGRAM(s) IV Intermittent every 12 hours, 06-23-23 @ 11:09, Stop order after: 7 Days  meropenem  IVPB 500 milliGRAM(s) IV Intermittent every 24 hours, 06-23-23 @ 10:31, Stop order after: 7 Days      Heme Medications   aspirin  chewable 81 milliGRAM(s) Oral daily, 06-24-23 @ 10:36      GI Medications  pantoprazole   Suspension 40 milliGRAM(s) Oral daily, 06-22-23 @ 00:00,       COVID  06-11-23 @ 12:25  COVID -   NotDete  05-04-23 @ 19:50  COVID -   NotDetec  07-13-22 @ 18:33  [facility]      COVID Biomarkers    06-12-23 @ 05:00 ESR --  ---  CRP --  ---  DDimer  --   ---      ---   Ferritin --    05-04-23 @ 19:50 ESR --  ---  CRP --  ---  DDimer  351<H>   ---   LDH --   ---   Ferritin --       Trend BNP  06-20-23 @ 06:10   -  >93567<H>  06-14-23 @ 06:00   -  >85714<H>  06-12-23 @ 05:00   -  42899<H>  06-11-23 @ 06:15   -  >20550<H>  05-14-23 @ 11:30   -  57029<H>  05-11-23 @ 06:05   -  43453<H>    Procalcitonin Trend  06-05-23 @ 05:30   -   0.24<H>  06-04-23 @ 06:00   -   0.20<H>  05-17-23 @ 23:44   -   0.59<H>  05-16-23 @ 22:16   -   0.74<H>  05-15-23 @ 05:45   -   1.12<H>  05-09-23 @ 13:42   -   0.11<H>    WBC Trend  06-27-23 @ 05:00   -  8.45  06-26-23 @ 05:00   -  10.35  06-25-23 @ 06:15   -  10.63<H>    H/H Trend  06-27-23 @ 05:00   -   8.4<L>/ 27.6<L>  06-26-23 @ 05:00   -   8.4<L>/ 27.4<L>  06-25-23 @ 06:15   -   8.4<L>/ 27.4<L>  06-24-23 @ 05:30   -   8.5<L>/ 28.1<L>  06-23-23 @ 06:00   -   8.6<L>/ 28.3<L>  06-22-23 @ 05:00   -   8.3<L>/ 26.9<L>    Stool Occult Blood  06-15-23 @ 13:54   -   Negative  06-02-23 @ 08:20   -   Negative  05-25-23 @ 18:20   -   Negative  05-16-23 @ 17:00   -   Positive<!>    Platelet Trend  06-27-23 @ 05:00   -  210  06-26-23 @ 05:00   -  214  06-25-23 @ 06:15   -  195    Trend Sodium  06-27-23 @ 05:00   -  139  06-26-23 @ 05:00   -  138  06-25-23 @ 06:15   -  138    Trend Potassium  06-27-23 @ 05:00   -  3.6  06-26-23 @ 05:00   -  3.3<L>  06-25-23 @ 06:15   -  3.6    Trend Bun/Cr  06-27-23 @ 05:00  BUN/CR -  79<H> / 3.24<H>  06-26-23 @ 05:00  BUN/CR -  63<H> / 2.98<H>  06-25-23 @ 06:15  BUN/CR -  48<H> / 2.44<H>    Lactic Acid Trend    ABG Trend  06-21-23 @ 05:35   - 7.38/47/72<L>/96.4  06-16-23 @ 05:00   - 7.43/41/119<H>/99.4<H>  06-12-23 @ 05:07   - 7.46<H>/37/115<H>/99.3<H>  06-10-23 @ 04:45   - 7.42/41/78<L>/96.6  06-09-23 @ 10:50   - 7.39/40/86/98.9<H>  05-19-23 @ 00:22   - 7.37/43/144<H>/99.8<H>  05-18-23 @ 05:38   - 7.41/37/97/98.4<H>  05-17-23 @ 23:36   - 7.42/37/97/98.3<H>  05-16-23 @ 22:10   - 7.41/41/92/98.5<H>  05-16-23 @ 05:59   - 7.35/47/104/98.8<H>  05-15-23 @ 13:45   - 7.36/47/74<L>/96.6  05-15-23 @ 04:45   - 7.33<L>/48/70<L>/95.7    Trend AST/ALT/ALK Phos/Bili  06-27-23 @ 05:00   12/9<L>/115/0.4  06-24-23 @ 05:30   22/14/119/0.7  06-23-23 @ 06:00   19/12/125<H>/0.7  06-22-23 @ 05:00   12/11/104/0.4  06-21-23 @ 05:50   10/9<L>/98/0.4  06-20-23 @ 06:10   17/6<L>/123<H>/0.4  06-19-23 @ 05:20   14/10/120/0.6  06-18-23 @ 05:30   15/9<L>/116/0.6  06-16-23 @ 05:00   21/10/119/0.6  06-15-23 @ 05:00   12/7<L>/128<H>/0.6  06-14-23 @ 06:00   12/9<L>/134<H>/0.6  06-13-23 @ 05:35   16/13/160<H>/0.7      Ammonia Trend  06-25-23 @ 12:10   -   48  06-21-23 @ 05:50   -   92<H>  05-14-23 @ 11:30   -   53  05-14-23 @ 05:00   -   16      Amylase / Lipase Trend      Albumin Trend  06-27-23 @ 05:00   -   1.7<L>  06-24-23 @ 05:30   -   2.0<L>  06-23-23 @ 06:00   -   2.1<L>  06-22-23 @ 05:00   -   1.9<L>  06-21-23 @ 05:50   -   1.9<L>  06-20-23 @ 06:10   -   1.8<L>      PTT - PT - INR Trend  06-12-23 @ 05:00   -   28.2 - 15.8<H> - 1.36<H>  05-19-23 @ 00:44   -   26.5<L> - 13.2 - 1.15  05-17-23 @ 23:44   -   31.2 - 13.7<H> - 1.18<H>  05-16-23 @ 22:16   -   31.3 - 13.6<H> - 1.18<H>  05-15-23 @ 13:55   -   32.9 - 14.0<H> - 1.19<H>  05-14-23 @ 13:45   -   33.6 - -- - --    Glucose Trend  06-27-23 @ 05:17   -  -- -- 158<H>  06-27-23 @ 05:00   -  143<H> -- --  06-26-23 @ 23:38   -  -- -- 187<H>  06-26-23 @ 17:26   -  -- -- 242<H>  06-26-23 @ 11:28   -  -- -- 175<H>  06-26-23 @ 05:03   -  -- -- 127<H>  06-26-23 @ 05:00   -  130<H> -- --  06-25-23 @ 23:41   -  -- -- 237<H>  06-25-23 @ 17:46   -  -- -- 199<H>  06-25-23 @ 11:35   -  -- -- 262<H>    A1C with Estimated Average Glucose Result: 7.7 % *H* [4.0 - 5.6] (05-05-23 @ 08:00)      LABS:                        8.4    8.45  )-----------( 210      ( 27 Jun 2023 05:00 )             27.6     06-27    139  |  101  |  79<H>  ----------------------------<  143<H>  3.6   |  33<H>  |  3.24<H>    Ca    8.5      27 Jun 2023 05:00  Phos  2.4     06-27  Mg     2.1     06-27    TPro  6.5  /  Alb  1.7<L>  /  TBili  0.4  /  DBili  x   /  AST  12  /  ALT  9<L>  /  AlkPhos  115  06-27         RADIOLOGY  CXR:  NGT in place  :      VITALS:  T(C): 36.9 (06-27-23 @ 08:00), Max: 37.4 (06-26-23 @ 18:00)  T(F): 98.5 (06-27-23 @ 08:00), Max: 99.3 (06-26-23 @ 18:00)  HR: 77 (06-27-23 @ 07:00) (77 - 103)  BP: 111/69 (06-27-23 @ 07:00) (100/60 - 149/68)  BP(mean): 83 (06-27-23 @ 07:00) (70 - 93)  ABP: --  ABP(mean): --  RR: 26 (06-27-23 @ 07:00) (18 - 42)  SpO2: 100% (06-27-23 @ 07:00) (92% - 100%)  CVP(mm Hg): --  CVP(cm H2O): --    Ins and Outs     06-26-23 @ 07:01  -  06-27-23 @ 07:00  --------------------------------------------------------  IN: 1300 mL / OUT: 400 mL / NET: 900 mL             I&O's Detail    26 Jun 2023 07:01  -  27 Jun 2023 07:00  --------------------------------------------------------  IN:    Free Water: 400 mL    Nepro with Carb Steady: 900 mL  Total IN: 1300 mL    OUT:    Voided (mL): 400 mL  Total OUT: 400 mL    Total NET: 900 mL

## 2023-06-27 NOTE — PROGRESS NOTE ADULT - SUBJECTIVE AND OBJECTIVE BOX
Seen in ICU, + NGT, NC O2    Vital Signs Last 24 Hrs  T(C): 37.2 (06-27-23 @ 16:00), Max: 37.4 (06-26-23 @ 18:00)  T(F): 99 (06-27-23 @ 16:00), Max: 99.3 (06-26-23 @ 18:00)  HR: 94 (06-27-23 @ 17:00) (77 - 97)  BP: 122/52 (06-27-23 @ 17:00) (94/68 - 149/68)  BP(mean): 73 (06-27-23 @ 17:00) (68 - 92)  RR: 34 (06-27-23 @ 17:00) (19 - 42)  SpO2: 96% (06-27-23 @ 17:00) (96% - 100%)    I&O's Detail    26 Jun 2023 07:01  -  27 Jun 2023 07:00  --------------------------------------------------------  IN:    Free Water: 400 mL    Nepro with Carb Steady: 900 mL  Total IN: 1300 mL    OUT:    Voided (mL): 400 mL  Total OUT: 400 mL    27 Jun 2023 07:01  -  27 Jun 2023 17:45  --------------------------------------------------------  IN:    Enteral Tube Flush: 60 mL    Free Water: 200 mL    Nepro with Carb Steady: 525 mL    Other (mL): 800 mL  Total IN: 1585 mL    OUT:    Other (mL): 1800 mL    Voided (mL): 100 mL  Total OUT: 1900 mL    s1s2  b/l air entry  soft, ND  tr edema                                                                                                                                             8.4    8.45  )-----------( 210      ( 27 Jun 2023 05:00 )             27.6     27 Jun 2023 05:00    139    |  101    |  79     ----------------------------<  143    3.6     |  33     |  3.24     Ca    8.5        27 Jun 2023 05:00  Phos  2.4       27 Jun 2023 05:00  Mg     2.1       27 Jun 2023 05:00    TPro  6.5    /  Alb  1.7    /  TBili  0.4    /  DBili  x      /  AST  12     /  ALT  9      /  AlkPhos  115    27 Jun 2023 05:00    LIVER FUNCTIONS - ( 27 Jun 2023 05:00 )  Alb: 1.7 g/dL / Pro: 6.5 g/dL / ALK PHOS: 115 U/L / ALT: 9 U/L / AST: 12 U/L / GGT: x           acetaminophen   Oral Liquid .. 650 milliGRAM(s) Oral every 6 hours PRN  aspirin  chewable 81 milliGRAM(s) Oral daily  chlorhexidine 2% Cloths 1 Application(s) Topical daily  Dakins Solution - 1/2 Strength 1 Application(s) Topical daily  dextrose 5%. 1000 milliLiter(s) IV Continuous <Continuous>  dextrose 5%. 1000 milliLiter(s) IV Continuous <Continuous>  dextrose 50% Injectable 25 Gram(s) IV Push once  dextrose 50% Injectable 12.5 Gram(s) IV Push once  dextrose 50% Injectable 25 Gram(s) IV Push once  epoetin priyanka-epbx (RETACRIT) Injectable 96219 Unit(s) SubCutaneous every 7 days  insulin lispro (ADMELOG) corrective regimen sliding scale   SubCutaneous every 6 hours  levETIRAcetam 250 milliGRAM(s) Oral two times a day  metoprolol tartrate 12.5 milliGRAM(s) Oral every 12 hours  modafinil 150 milliGRAM(s) Oral every 24 hours  multivitamin/minerals/iron Oral Solution (CENTRUM) 15 milliLiter(s) Oral daily  pantoprazole   Suspension 40 milliGRAM(s) Oral daily  sodium chloride 0.9% lock flush 10 milliLiter(s) IV Push every 1 hour PRN  zinc sulfate 220 milliGRAM(s) Oral daily    A/P:    Hemodynamic ATN s/p arrest (Cr 1.6 - 5/9/23, Cr 1.0 - 4/12/23)  Resp failure, s/p intubated, now extubated  S/p L CT  CM, EF 35 - 40%, dementia  Cr has initially improved (peak Cr 5.4 - 5/19/23, aida Cr 1.95 - 6/1/23)  Now w/recurrent hemodynamic ATN  Avoid nephrotoxins  Epoetin  F/u CBC, BMP, UO  Per d/w ICU team and daughter started on HD 6/21  HD TTS, TMP as tolerates  For PEG tomorrow   Overall prognosis is poor     [phone]

## 2023-06-28 ENCOUNTER — TRANSCRIPTION ENCOUNTER (OUTPATIENT)
Age: 78
End: 2023-06-28

## 2023-06-28 LAB
ALBUMIN SERPL ELPH-MCNC: 1.7 G/DL — LOW (ref 3.3–5)
ALP SERPL-CCNC: 109 U/L — SIGNIFICANT CHANGE UP (ref 40–120)
ALT FLD-CCNC: 11 U/L — SIGNIFICANT CHANGE UP (ref 10–45)
ANION GAP SERPL CALC-SCNC: 4 MMOL/L — LOW (ref 5–17)
AST SERPL-CCNC: 17 U/L — SIGNIFICANT CHANGE UP (ref 10–40)
BILIRUB SERPL-MCNC: 0.4 MG/DL — SIGNIFICANT CHANGE UP (ref 0.2–1.2)
BUN SERPL-MCNC: 49 MG/DL — HIGH (ref 7–23)
CALCIUM SERPL-MCNC: 8.2 MG/DL — LOW (ref 8.4–10.5)
CHLORIDE SERPL-SCNC: 101 MMOL/L — SIGNIFICANT CHANGE UP (ref 96–108)
CO2 SERPL-SCNC: 32 MMOL/L — HIGH (ref 22–31)
CREAT SERPL-MCNC: 2.23 MG/DL — HIGH (ref 0.5–1.3)
EGFR: 29 ML/MIN/1.73M2 — LOW
GLUCOSE BLDC GLUCOMTR-MCNC: 116 MG/DL — HIGH (ref 70–99)
GLUCOSE BLDC GLUCOMTR-MCNC: 151 MG/DL — HIGH (ref 70–99)
GLUCOSE BLDC GLUCOMTR-MCNC: 158 MG/DL — HIGH (ref 70–99)
GLUCOSE BLDC GLUCOMTR-MCNC: 179 MG/DL — HIGH (ref 70–99)
GLUCOSE SERPL-MCNC: 168 MG/DL — HIGH (ref 70–99)
HCT VFR BLD CALC: 27.1 % — LOW (ref 39–50)
HGB BLD-MCNC: 8.3 G/DL — LOW (ref 13–17)
MAGNESIUM SERPL-MCNC: 1.9 MG/DL — SIGNIFICANT CHANGE UP (ref 1.6–2.6)
MCHC RBC-ENTMCNC: 30.6 GM/DL — LOW (ref 32–36)
MCHC RBC-ENTMCNC: 31.8 PG — SIGNIFICANT CHANGE UP (ref 27–34)
MCV RBC AUTO: 103.8 FL — HIGH (ref 80–100)
NRBC # BLD: 0 /100 WBCS — SIGNIFICANT CHANGE UP (ref 0–0)
NT-PROBNP SERPL-SCNC: HIGH PG/ML (ref 0–300)
PHOSPHATE SERPL-MCNC: 2 MG/DL — LOW (ref 2.5–4.5)
PLATELET # BLD AUTO: 228 K/UL — SIGNIFICANT CHANGE UP (ref 150–400)
POTASSIUM SERPL-MCNC: 3.7 MMOL/L — SIGNIFICANT CHANGE UP (ref 3.5–5.3)
POTASSIUM SERPL-SCNC: 3.7 MMOL/L — SIGNIFICANT CHANGE UP (ref 3.5–5.3)
PROT SERPL-MCNC: 6.6 G/DL — SIGNIFICANT CHANGE UP (ref 6–8.3)
RBC # BLD: 2.61 M/UL — LOW (ref 4.2–5.8)
RBC # FLD: 18.1 % — HIGH (ref 10.3–14.5)
SODIUM SERPL-SCNC: 137 MMOL/L — SIGNIFICANT CHANGE UP (ref 135–145)
WBC # BLD: 8.47 K/UL — SIGNIFICANT CHANGE UP (ref 3.8–10.5)
WBC # FLD AUTO: 8.47 K/UL — SIGNIFICANT CHANGE UP (ref 3.8–10.5)

## 2023-06-28 PROCEDURE — 99233 SBSQ HOSP IP/OBS HIGH 50: CPT | Mod: 25

## 2023-06-28 PROCEDURE — 43246 EGD PLACE GASTROSTOMY TUBE: CPT | Mod: 59

## 2023-06-28 PROCEDURE — 43246 EGD PLACE GASTROSTOMY TUBE: CPT | Mod: 80,59

## 2023-06-28 DEVICE — PEG KT SAFETY 20FR: Type: IMPLANTABLE DEVICE | Status: FUNCTIONAL

## 2023-06-28 RX ORDER — CEFAZOLIN SODIUM 1 G
2000 VIAL (EA) INJECTION ONCE
Refills: 0 | Status: COMPLETED | OUTPATIENT
Start: 2023-06-28 | End: 2023-06-28

## 2023-06-28 RX ORDER — BRIVARACETAM 25 MG/1
25 TABLET, FILM COATED ORAL
Refills: 0 | Status: DISCONTINUED | OUTPATIENT
Start: 2023-06-28 | End: 2023-07-01

## 2023-06-28 RX ADMIN — Medication 1: at 11:20

## 2023-06-28 RX ADMIN — Medication 12.5 MILLIGRAM(S): at 22:05

## 2023-06-28 RX ADMIN — Medication 100 MILLIGRAM(S): at 14:44

## 2023-06-28 RX ADMIN — BRIVARACETAM 25 MILLIGRAM(S): 25 TABLET, FILM COATED ORAL at 22:10

## 2023-06-28 RX ADMIN — Medication 1 APPLICATION(S): at 11:20

## 2023-06-28 RX ADMIN — MODAFINIL 150 MILLIGRAM(S): 200 TABLET ORAL at 05:25

## 2023-06-28 RX ADMIN — Medication 1: at 05:20

## 2023-06-28 RX ADMIN — Medication 12.5 MILLIGRAM(S): at 05:25

## 2023-06-28 RX ADMIN — CHLORHEXIDINE GLUCONATE 1 APPLICATION(S): 213 SOLUTION TOPICAL at 11:20

## 2023-06-28 RX ADMIN — Medication 1: at 17:47

## 2023-06-28 RX ADMIN — LEVETIRACETAM 250 MILLIGRAM(S): 250 TABLET, FILM COATED ORAL at 05:25

## 2023-06-28 RX ADMIN — Medication 81 MILLIGRAM(S): at 22:02

## 2023-06-28 NOTE — PROGRESS NOTE ADULT - SUBJECTIVE AND OBJECTIVE BOX
Follow-up Critical Care Progress Note  Chief Complaint : Atrial fibrillation    Patient lethargic today  non verbal today  s/p hd yesterday      Allergies :sulfa drugs (Unknown)      PAST MEDICAL & SURGICAL HISTORY:  HTN (hypertension)    HLD (hyperlipidemia)    Diabetes    CAD (coronary artery disease)    History of cholecystectomy        Medications:  MEDICATIONS  (STANDING):  aspirin  chewable 81 milliGRAM(s) Oral daily  chlorhexidine 2% Cloths 1 Application(s) Topical daily  Dakins Solution - 1/2 Strength 1 Application(s) Topical daily  dextrose 5%. 1000 milliLiter(s) (50 mL/Hr) IV Continuous <Continuous>  dextrose 5%. 1000 milliLiter(s) (100 mL/Hr) IV Continuous <Continuous>  dextrose 50% Injectable 25 Gram(s) IV Push once  dextrose 50% Injectable 12.5 Gram(s) IV Push once  dextrose 50% Injectable 25 Gram(s) IV Push once  epoetin priyanka-epbx (RETACRIT) Injectable 37339 Unit(s) SubCutaneous every 7 days  insulin lispro (ADMELOG) corrective regimen sliding scale   SubCutaneous every 6 hours  levETIRAcetam 250 milliGRAM(s) Oral two times a day  metoprolol tartrate 12.5 milliGRAM(s) Oral every 12 hours  modafinil 150 milliGRAM(s) Oral every 24 hours  multivitamin/minerals/iron Oral Solution (CENTRUM) 15 milliLiter(s) Oral daily  pantoprazole   Suspension 40 milliGRAM(s) Oral daily  zinc sulfate 220 milliGRAM(s) Oral daily    MEDICATIONS  (PRN):  acetaminophen   Oral Liquid .. 650 milliGRAM(s) Oral every 6 hours PRN Temp greater or equal to 38C (100.4F)  sodium chloride 0.9% lock flush 10 milliLiter(s) IV Push every 1 hour PRN Pre/post blood products, medications, blood draw, and to maintain line patency      Antibiotics History  ceFAZolin   IVPB 2000 milliGRAM(s) IV Intermittent once, 06-12-23 @ 07:00, Stop order after: 1 Doses  meropenem  IVPB 500 milliGRAM(s) IV Intermittent every 24 hours, 06-23-23 @ 10:31, Stop order after: 7 Days  meropenem  IVPB 500 milliGRAM(s) IV Intermittent every 12 hours, 06-23-23 @ 11:09, Stop order after: 7 Days      Heme Medications   aspirin  chewable 81 milliGRAM(s) Oral daily, 06-24-23 @ 10:36      GI Medications  pantoprazole   Suspension 40 milliGRAM(s) Oral daily, 06-22-23 @ 00:00,       COVID  06-11-23 @ 12:25  COVID -   NotDetec  05-04-23 @ 19:50  COVID -   NotDetec  07-13-22 @ 18:33  COVID [facility]      COVID Biomarkers    06-12-23 @ 05:00 ESR --  ---  CRP --  ---  DDimer  --   ---      ---   Ferritin --    05-04-23 @ 19:50 ESR --  ---  CRP --  ---  DDimer  351<H>   ---   LDH --   ---   Ferritin --          Trend BNP  06-28-23 @ 06:00   -  >27044<H>  06-27-23 @ 05:00   -  >33127<H>  06-20-23 @ 06:10   -  >81723<H>  06-14-23 @ 06:00   -  >50407<H>  06-12-23 @ 05:00   -  29942<H>  06-11-23 @ 06:15   -  >00961<H>    Procalcitonin Trend  06-05-23 @ 05:30   -   0.24<H>  06-04-23 @ 06:00   -   0.20<H>  05-17-23 @ 23:44   -   0.59<H>  05-16-23 @ 22:16   -   0.74<H>  05-15-23 @ 05:45   -   1.12<H>  05-09-23 @ 13:42   -   0.11<H>    WBC Trend  06-28-23 @ 06:00   -  8.47  06-27-23 @ 05:00   -  8.45  06-26-23 @ 05:00   -  10.35    H/H Trend  06-28-23 @ 06:00   -   8.3<L>/ 27.1<L>  06-27-23 @ 05:00   -   8.4<L>/ 27.6<L>  06-26-23 @ 05:00   -   8.4<L>/ 27.4<L>  06-25-23 @ 06:15   -   8.4<L>/ 27.4<L>  06-24-23 @ 05:30   -   8.5<L>/ 28.1<L>  06-23-23 @ 06:00   -   8.6<L>/ 28.3<L>    Stool Occult Blood  06-15-23 @ 13:54   -   Negative  06-02-23 @ 08:20   -   Negative  05-25-23 @ 18:20   -   Negative  05-16-23 @ 17:00   -   Positive<!>    Platelet Trend  06-28-23 @ 06:00   -  228  06-27-23 @ 05:00   -  210  06-26-23 @ 05:00   -  214    Trend Sodium  06-28-23 @ 06:00   -  137  06-27-23 @ 05:00   -  139  06-26-23 @ 05:00   -  138    Trend Potassium  06-28-23 @ 06:00   -  3.7  06-27-23 @ 05:00   -  3.6  06-26-23 @ 05:00   -  3.3<L>    Trend Bun/Cr  06-28-23 @ 06:00  BUN/CR -  49<H> / 2.23<H>  06-27-23 @ 05:00  BUN/CR -  79<H> / 3.24<H>  06-26-23 @ 05:00  BUN/CR -  63<H> / 2.98<H>    Lactic Acid Trend    ABG Trend  06-21-23 @ 05:35   - 7.38/47/72<L>/96.4  06-16-23 @ 05:00   - 7.43/41/119<H>/99.4<H>  06-12-23 @ 05:07   - 7.46<H>/37/115<H>/99.3<H>  06-10-23 @ 04:45   - 7.42/41/78<L>/96.6  06-09-23 @ 10:50   - 7.39/40/86/98.9<H>  05-19-23 @ 00:22   - 7.37/43/144<H>/99.8<H>  05-18-23 @ 05:38   - 7.41/37/97/98.4<H>  05-17-23 @ 23:36   - 7.42/37/97/98.3<H>  05-16-23 @ 22:10   - 7.41/41/92/98.5<H>  05-16-23 @ 05:59   - 7.35/47/104/98.8<H>  05-15-23 @ 13:45   - 7.36/47/74<L>/96.6  05-15-23 @ 04:45   - 7.33<L>/48/70<L>/95.7    Trend AST/ALT/ALK Phos/Bili  06-28-23 @ 06:00   17/11/109/0.4  06-27-23 @ 05:00   12/9<L>/115/0.4  06-24-23 @ 05:30   22/14/119/0.7  06-23-23 @ 06:00   19/12/125<H>/0.7       Ammonia Trend  06-25-23 @ 12:10   -   48  06-21-23 @ 05:50   -   92<H>  05-14-23 @ 11:30   -   53  05-14-23 @ 05:00   -   16      Amylase / Lipase Trend      Albumin Trend  06-28-23 @ 06:00   -   1.7<L>  06-27-23 @ 05:00   -   1.7<L>  06-24-23 @ 05:30   -   2.0<L>  06-23-23 @ 06:00   -   2.1<L>  06-22-23 @ 05:00   -   1.9<L>  06-21-23 @ 05:50   -   1.9<L>      PTT - PT - INR Trend  06-12-23 @ 05:00   -   28.2 - 15.8<H> - 1.36<H>  05-19-23 @ 00:44   -   26.5<L> - 13.2 - 1.15  05-17-23 @ 23:44   -   31.2 - 13.7<H> - 1.18<H>  05-16-23 @ 22:16   -   31.3 - 13.6<H> - 1.18<H>  05-15-23 @ 13:55   -   32.9 - 14.0<H> - 1.19<H>  05-14-23 @ 13:45   -   33.6 - -- - --    Glucose Trend  06-28-23 @ 06:00   -  168<H> -- --  06-28-23 @ 05:05   -  -- -- 179<H>  06-27-23 @ 23:41   -  -- -- 299<H>  06-27-23 @ 17:27   -  -- -- 165<H>  06-27-23 @ 12:45   -  -- -- 94  06-27-23 @ 05:17   -  -- -- 158<H>  06-27-23 @ 05:00   -  143<H> -- --  06-26-23 @ 23:38   -  -- -- 187<H>  06-26-23 @ 17:26   -  -- -- 242<H>  06-26-23 @ 11:28   -  -- -- 175<H>    A1C with Estimated Average Glucose Result: 7.7 % *H* [4.0 - 5.6] (05-05-23 @ 08:00)      LABS:                        8.3    8.47  )-----------( 228      ( 28 Jun 2023 06:00 )             27.1     06-28    137  |  101  |  49<H>  ----------------------------<  168<H>  3.7   |  32<H>  |  2.23<H>    Ca    8.2<L>      28 Jun 2023 06:00  Phos  2.0     06-28  Mg     1.9     06-28    TPro  6.6  /  Alb  1.7<L>  /  TBili  0.4  /  DBili  x   /  AST  17  /  ALT  11  /  AlkPhos  109  06-28          RADIOLOGY  CXR:  < from: Xray Chest 1 View- PORTABLE-Routine (Xray Chest 1 View- PORTABLE-Routine .) (06.27.23 @ 09:33) >  INTERPRETATION:  AP chest on 2/27/2023 at 9:18 AM. Concern is NG tube   position.    Patient has atrial fibrillation.    Right abdomen excluded from the study.    Heart magnified by technique.    Sternotomy and bilateral jugular lines again noted.    Mild bilateral perihilar infiltrates are again seen similar to June 24.    On June 24 NG tube is noted with tip in the antrum.    The NG tube has partially withdrawn but the tip is still the stomach.    IMPRESSION: NG tube partially withdrawn but tip still in the stomach.   Persistent bilateral infiltrates.    --- End of Report ---    < end of copied text >           VITALS:  T(C): 37.6 (06-28-23 @ 05:00), Max: 37.8 (06-28-23 @ 00:00)  T(F): 99.6 (06-28-23 @ 05:00), Max: 100 (06-28-23 @ 00:00)  HR: 88 (06-28-23 @ 07:00) (82 - 105)  BP: 127/66 (06-28-23 @ 07:00) (91/61 - 136/58)  BP(mean): 85 (06-28-23 @ 07:00) (71 - 92)  ABP: --  ABP(mean): --  RR: 39 (06-28-23 @ 07:00) (19 - 39)  SpO2: 99% (06-28-23 @ 07:00) (95% - 100%)  CVP(mm Hg): --  CVP(cm H2O): --    Ins and Outs     06-27-23 @ 07:01  -  06-28-23 @ 07:00  --------------------------------------------------------  IN: 2160 mL / OUT: 2000 mL / NET: 160 mL                I&O's Detail    27 Jun 2023 07:01  -  28 Jun 2023 07:00  --------------------------------------------------------  IN:    Enteral Tube Flush: 60 mL    Free Water: 400 mL    Nepro with Carb Steady: 900 mL    Other (mL): 800 mL  Total IN: 2160 mL    OUT:    Other (mL): 1800 mL    Voided (mL): 200 mL  Total OUT: 2000 mL    Total NET: 160 mL

## 2023-06-28 NOTE — CHART NOTE - NSCHARTNOTEFT_GEN_A_CORE
Left IJ TLC dressing removed under clean conditions. Sutures cut and removed x 2. Line was d/c without resistance with tip intact. Hemostasis achieved with manual pressure. Site soft, No Oozing. Pt tolerated procedure well.

## 2023-06-28 NOTE — PROGRESS NOTE ADULT - ASSESSMENT
78 year old man PMHx dementia, HTN, cardiomyopathy, HFrEF, CAD s/p CABG, with known current RCA occlusion, and DM type 2 who was admitted to [facility] on 5/4 with hypoxic respiratory failure in setting of CHF exacerbation and ANISH with course complicated by acute hypoxic respiratory failure in setting of choking episode causing cardiac arrest, shock, worsening hypoxemic respiratory failure, and NSTEMI on 5/9. Pt ICU course noted post cardiac arrest anoxic brain injury and myoclonus.  EEG findings consistent with stimulus induced myoclonus and GPDs s/p hypoxic diffuse indicative of severe cerebral dysfunction.  Patient returned to [facility] ICU on 5/19 and continues tx.  Reevaluation of PEG requested. Patient currently receiving NG tube feeding.

## 2023-06-28 NOTE — PROGRESS NOTE ADULT - SUBJECTIVE AND OBJECTIVE BOX
INTERVAL HPI/ OVERNIGHT EVENTS:  Patient seen and examined at bed side. As per RN no event over night. He is nonverbal daughter Kuldeep at bed side. He has NGT feed held for EGD PEG placement planned for today. Daughter agree for the PEG placement.      (04 May 2023 21:17)    MEDICATIONS  (STANDING):  aspirin  chewable 81 milliGRAM(s) Oral daily  brivaracetam Oral Solution 25 milliGRAM(s) Oral two times a day  ceFAZolin   IVPB 2000 milliGRAM(s) IV Intermittent once  chlorhexidine 2% Cloths 1 Application(s) Topical daily  Dakins Solution - 1/2 Strength 1 Application(s) Topical daily  dextrose 5%. 1000 milliLiter(s) (50 mL/Hr) IV Continuous <Continuous>  dextrose 5%. 1000 milliLiter(s) (100 mL/Hr) IV Continuous <Continuous>  dextrose 50% Injectable 25 Gram(s) IV Push once  dextrose 50% Injectable 12.5 Gram(s) IV Push once  dextrose 50% Injectable 25 Gram(s) IV Push once  epoetin priyanka-epbx (RETACRIT) Injectable 84465 Unit(s) SubCutaneous every 7 days  insulin lispro (ADMELOG) corrective regimen sliding scale   SubCutaneous every 6 hours  metoprolol tartrate 12.5 milliGRAM(s) Oral every 12 hours  modafinil 150 milliGRAM(s) Oral every 24 hours  multivitamin/minerals/iron Oral Solution (CENTRUM) 15 milliLiter(s) Oral daily  pantoprazole   Suspension 40 milliGRAM(s) Oral daily  zinc sulfate 220 milliGRAM(s) Oral daily    MEDICATIONS  (PRN):  acetaminophen   Oral Liquid .. 650 milliGRAM(s) Oral every 6 hours PRN Temp greater or equal to 38C (100.4F)  sodium chloride 0.9% lock flush 10 milliLiter(s) IV Push every 1 hour PRN Pre/post blood products, medications, blood draw, and to maintain line patency      Allergies    sulfa drugs (Unknown)    Intolerances        PAST MEDICAL & SURGICAL HISTORY:  HTN (hypertension)      HLD (hyperlipidemia)      Diabetes      CAD (coronary artery disease)      History of cholecystectomy          	    PHYSICAL EXAM:   Vital Signs:  Vital Signs Last 24 Hrs  T(C): 36.7 (2023 12:00), Max: 37.8 (2023 00:00)  T(F): 98 (2023 12:00), Max: 100 (2023 00:00)  HR: 88 (2023 13:00) (82 - 105)  BP: 116/83 (2023 13:00) (91/61 - 136/58)  BP(mean): 94 (2023 13:00) (71 - 94)  RR: 21 (:) (19 - 39)  SpO2: 100% (:) (93% - 100%)    Parameters below as of 2023 13:00  Patient On (Oxygen Delivery Method): nasal cannula  O2 Flow (L/min): 2    Daily Height in cm: 160 (2023 11:22)    Daily Weight in k.2 (2023 05:00)I&O's Summary    2023 07:01  -  2023 07:00  --------------------------------------------------------  IN: 2160 mL / OUT: 2000 mL / NET: 160 mL    2023 07:01  -  2023 13:11  --------------------------------------------------------  IN: 0 mL / OUT: 10 mL / NET: -10 mL        GENERAL:  Appears stated age  HEENT:  NC/AT,  conjunctivae clear and pink  CHEST:  Full & symmetric excursion  HEART:  Regular rhythm, S1, S2  ABDOMEN:  Soft, non-tender, non-distended, normoactive bowel sounds, NGT   EXTEREMITIES:  no cyanosis ,clubbing or edema  SKIN:  No rash/warm/dry  NEURO:  Alert      LABS:                        8.3    8.47  )-----------( 228      ( 2023 06:00 )             27.1     06-28    137  |  101  |  49<H>  ----------------------------<  168<H>  3.7   |  32<H>  |  2.23<H>    Ca    8.2<L>      2023 06:00  Phos  2.0       Mg     1.9         TPro  6.6  /  Alb  1.7<L>  /  TBili  0.4  /  DBili  x   /  AST  17  /  ALT  11  /  AlkPhos  109        Urinalysis Basic - ( 2023 06:00 )    Color: x / Appearance: x / SG: x / pH: x  Gluc: 168 mg/dL / Ketone: x  / Bili: x / Urobili: x   Blood: x / Protein: x / Nitrite: x   Leuk Esterase: x / RBC: x / WBC x   Sq Epi: x / Non Sq Epi: x / Bacteria: x      amylase   lipase  RADIOLOGY & ADDITIONAL TESTS:

## 2023-06-28 NOTE — PROGRESS NOTE ADULT - SUBJECTIVE AND OBJECTIVE BOX
Subjective: lethargic. NGT maintained. Protective mittens are on.       MEDICATIONS  (STANDING):  aspirin  chewable 81 milliGRAM(s) Oral daily  brivaracetam Oral Solution 25 milliGRAM(s) Oral two times a day  ceFAZolin   IVPB 2000 milliGRAM(s) IV Intermittent once  chlorhexidine 2% Cloths 1 Application(s) Topical daily  Dakins Solution - 1/2 Strength 1 Application(s) Topical daily  dextrose 5%. 1000 milliLiter(s) (50 mL/Hr) IV Continuous <Continuous>  dextrose 5%. 1000 milliLiter(s) (100 mL/Hr) IV Continuous <Continuous>  dextrose 50% Injectable 25 Gram(s) IV Push once  dextrose 50% Injectable 12.5 Gram(s) IV Push once  dextrose 50% Injectable 25 Gram(s) IV Push once  epoetin priyanka-epbx (RETACRIT) Injectable 04707 Unit(s) SubCutaneous every 7 days  insulin lispro (ADMELOG) corrective regimen sliding scale   SubCutaneous every 6 hours  metoprolol tartrate 12.5 milliGRAM(s) Oral every 12 hours  modafinil 150 milliGRAM(s) Oral every 24 hours  multivitamin/minerals/iron Oral Solution (CENTRUM) 15 milliLiter(s) Oral daily  pantoprazole   Suspension 40 milliGRAM(s) Oral daily  zinc sulfate 220 milliGRAM(s) Oral daily    MEDICATIONS  (PRN):  acetaminophen   Oral Liquid .. 650 milliGRAM(s) Oral every 6 hours PRN Temp greater or equal to 38C (100.4F)  sodium chloride 0.9% lock flush 10 milliLiter(s) IV Push every 1 hour PRN Pre/post blood products, medications, blood draw, and to maintain line patency          T(C): 36.7 (06-28-23 @ 11:22), Max: 37.8 (06-28-23 @ 00:00)  HR: 99 (06-28-23 @ 11:22) (82 - 105)  BP: 115/58 (06-28-23 @ 11:22) (91/61 - 136/58)  RR: 27 (06-28-23 @ 11:22) (19 - 39)  SpO2: 100% (06-28-23 @ 11:22) (93% - 100%)  Wt(kg): --        I&O's Detail    27 Jun 2023 07:01  -  28 Jun 2023 07:00  --------------------------------------------------------  IN:    Enteral Tube Flush: 60 mL    Free Water: 400 mL    Nepro with Carb Steady: 900 mL    Other (mL): 800 mL  Total IN: 2160 mL    OUT:    Other (mL): 1800 mL    Voided (mL): 200 mL  Total OUT: 2000 mL    Total NET: 160 mL      28 Jun 2023 07:01  -  28 Jun 2023 12:46  --------------------------------------------------------  IN:  Total IN: 0 mL    OUT:    Nepro with Carb Steady: 0 mL    Voided (mL): 10 mL  Total OUT: 10 mL    Total NET: -10 mL               PHYSICAL EXAM:    GENERAL: lethargic.   NECK: Supple, no inc in JVP  CHEST/LUNG: Clear  HEART: S1S2  ABDOMEN: Soft  EXTREMITIES: no ankles edema  [facility]      LABS:  CBC Full  -  ( 28 Jun 2023 06:00 )  WBC Count : 8.47 K/uL  RBC Count : 2.61 M/uL  Hemoglobin : 8.3 g/dL  Hematocrit : 27.1 %  Platelet Count - Automated : 228 K/uL  Mean Cell Volume : 103.8 fl  Mean Cell Hemoglobin : 31.8 pg  Mean Cell Hemoglobin Concentration : 30.6 gm/dL  Auto Neutrophil # : x  Auto Lymphocyte # : x  Auto Monocyte # : x  Auto Eosinophil # : x  Auto Basophil # : x  Auto Neutrophil % : x  Auto Lymphocyte % : x  Auto Monocyte % : x  Auto Eosinophil % : x  Auto Basophil % : x    06-28    137  |  101  |  49<H>  ----------------------------<  168<H>  3.7   |  32<H>  |  2.23<H>    Ca    8.2<L>      28 Jun 2023 06:00  Phos  2.0     06-28  Mg     1.9     06-28    TPro  6.6  /  Alb  1.7<L>  /  TBili  0.4  /  DBili  x   /  AST  17  /  ALT  11  /  AlkPhos  109  06-28        Impression:  1.  Ischemic ATN w/o recovery on hemodialysis.   2.  Chronic respiratory failure  3.  Chronic systolic CHF  4.  Dementia, failure to thrive, protein calorie malnourished    Recommend:  Next HD scheduled for tomorrow.   Continue goals of care discussion as dialysis appears to have more burden than benefit

## 2023-06-28 NOTE — PROGRESS NOTE ADULT - ASSESSMENT
Physical Examination:  GENERAL:               Eyes open no distress  HEENT:                    No JVD, Dry MM  PULM:                     Bilateral air entry, course and diminished  to auscultation bilaterally, No  sputum production, + rales , No Rhonchi, No Wheezing  CVS:                         S1, S2,  +Murmur  ABD:                        Soft, nondistended, nontender, normoactive bowel sounds,   EXT:                         no  edema, nontender, No Cyanosis or Clubbing   Vascular:                Warm Extremities,   NEURO:                alert , non verbal, not following commands mild + Myoclonus   PSYC:                      Calm, No Insight.      Assessment  78 year old male with a PMH of dementia, HTN, cardiomyopathy, HFrEF, CAD s/p CABG, with known current RCA occlusion, and DM type 2 who was admitted to [facility] on 5/4 with hypoxic respiratory failure in setting of CHF exacerbation and ANISH with course complicated by acute hypoxic respiratory failure in setting of choking episode causing cardiac arrest, shock, worsening hypoxemic respiratory failure, and NSTEMI on 5/9. Pt ICU course noted post cardiac arrest anoxic brain injury and myoclonus. Pt was transferred to [facility] for VEEG which required 48 hours of monitoring as he was noted to still be sedated. While there pt noted to have hematochezia and any ac / antiplatelet agents were held at that time. EEG findings consistent with stimulus induced myoclonus and GPDs s/p hypoxic diffuse indicative of severe cerebral dysfunction.    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  Acute renal failure now on HD, baseline CKD    Heart failure with reduced EF  NSTEMI post arrest   b/l Pl effusion, s/p left chest tube now s/p removal     Underlying PMH of dementia, HTN, cardiomyopathy, HFrEF, CAD s/p CABG, with known current RCA occlusion, and DM type 2      Plan:    neuro status worse today, will change back keppra to briviac today  Continue Modafanil  plan for peg today    HD as per renal-       Eliquis held for PEG placement, restart after peg       NIV PRN/QHS   NGT for diet  S&S eval to advance diet  will need to call when more awake  OOB to chair when bale     monitor blood glucose levels, + ISS coverage  Sacral decub - wound care called continue local wound care off loading and nutrition support  Venodyne for dvt ppx   GOC ongoing discussion with family ,now DNR/I   at this time there is no pulmonary contraindication for PEG     PMD:				                   Notified(Date):  Family Updated: 	Kuldeep [phone]	                                 Date:  6/27/2023  discussed plan for PEG in am, necessary procedure with peg     Sedation & Analgesia:	  Diet/Nutrition:		 as above  GI PPx:			Protonix  DVT Ppx:		Venodynes   Activity:		  bedrest  Head of Bed:               35-45 Deg  Glycemic Control:         Insulin  Lines: piv  Restraints may be deemed necessary to prevent removal of life-sustaining devices [ x ] YES   [   ]  NO  Disposition: ICU Care  Goals of Care: Full code   Physical Examination:  GENERAL:               Eyes open no distress  HEENT:                    No JVD, Dry MM  PULM:                     Bilateral air entry, course and diminished  to auscultation bilaterally, No  sputum production, + rales , No Rhonchi, No Wheezing  CVS:                         S1, S2,  +Murmur  ABD:                        Soft, nondistended, nontender, normoactive bowel sounds,   EXT:                         no  edema, nontender, No Cyanosis or Clubbing   Vascular:                Warm Extremities,   NEURO:                alert , non verbal, not following commands mild + Myoclonus   PSYC:                      Calm, No Insight.      Assessment  78 year old male with a PMH of dementia, HTN, cardiomyopathy, HFrEF, CAD s/p CABG, with known current RCA occlusion, and DM type 2 who was admitted to [facility] on 5/4 with hypoxic respiratory failure in setting of CHF exacerbation and ANISH with course complicated by acute hypoxic respiratory failure in setting of choking episode causing cardiac arrest, shock, worsening hypoxemic respiratory failure, and NSTEMI on 5/9. Pt ICU course noted post cardiac arrest anoxic brain injury and myoclonus. Pt was transferred to [facility] for VEEG which required 48 hours of monitoring as he was noted to still be sedated. While there pt noted to have hematochezia and any ac / antiplatelet agents were held at that time. EEG findings consistent with stimulus induced myoclonus and GPDs s/p hypoxic diffuse indicative of severe cerebral dysfunction.    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  Acute renal failure now on HD, baseline CKD    Heart failure with reduced EF  NSTEMI post arrest   b/l Pl effusion, s/p left chest tube now s/p removal     Underlying PMH of dementia, HTN, cardiomyopathy, HFrEF, CAD s/p CABG, with known current RCA occlusion, and DM type 2      Plan:    neuro status worse today, will change back keppra to briviac today  Continue Modafanil  plan for peg today    HD as per renal-       Eliquis held for PEG placement, restart after peg       NIV PRN/QHS   NGT for diet  S&S eval to advance diet  will need to call when more awake  OOB to chair when bale     monitor blood glucose levels, + ISS coverage  Sacral decub - wound care called continue local wound care off loading and nutrition support  Venodyne for dvt ppx   GOC ongoing discussion with family ,now DNR/I   at this time there is no pulmonary contraindication for PEG     PMD:				                   Notified(Date):  Family Updated: 	Kuldeep [phone]	                                 Date:  6/27/2023  discussed plan for PEG in am, necessary procedure with peg     Sedation & Analgesia:	  Diet/Nutrition:		 as above  GI PPx:			Protonix  DVT Ppx:		Venodynes   Activity:		  bedrest  Head of Bed:               35-45 Deg  Glycemic Control:         Insulin  Lines: piv  Restraints may be deemed necessary to prevent removal of life-sustaining devices [ x ] YES   [   ]  NO  Disposition: ICU Care  Goals of Care: DNR/I

## 2023-06-28 NOTE — PROGRESS NOTE ADULT - PROBLEM SELECTOR PLAN 1
Reevaluation for PEG  Discussed with patient's daughter Kuldeep at bedside and explained that his underlying cardiopulmonary issues place him at higher risk of complication from the procedure but that if long term nutritional support is needed then a PEG is the optimal way of providing this. She verbalized understanding and wishes to proceed with PEG placement.  He is scheduled for EGD with peg placement today.

## 2023-06-29 LAB
ALBUMIN SERPL ELPH-MCNC: 1.7 G/DL — LOW (ref 3.3–5)
ALP SERPL-CCNC: 108 U/L — SIGNIFICANT CHANGE UP (ref 40–120)
ALT FLD-CCNC: 6 U/L — LOW (ref 10–45)
ANION GAP SERPL CALC-SCNC: 9 MMOL/L — SIGNIFICANT CHANGE UP (ref 5–17)
APTT BLD: 27.6 SEC — SIGNIFICANT CHANGE UP (ref 27.5–35.5)
AST SERPL-CCNC: 19 U/L — SIGNIFICANT CHANGE UP (ref 10–40)
BILIRUB SERPL-MCNC: 0.4 MG/DL — SIGNIFICANT CHANGE UP (ref 0.2–1.2)
BUN SERPL-MCNC: 56 MG/DL — HIGH (ref 7–23)
CALCIUM SERPL-MCNC: 8.3 MG/DL — LOW (ref 8.4–10.5)
CHLORIDE SERPL-SCNC: 101 MMOL/L — SIGNIFICANT CHANGE UP (ref 96–108)
CO2 SERPL-SCNC: 28 MMOL/L — SIGNIFICANT CHANGE UP (ref 22–31)
CREAT SERPL-MCNC: 2.83 MG/DL — HIGH (ref 0.5–1.3)
EGFR: 22 ML/MIN/1.73M2 — LOW
GLUCOSE BLDC GLUCOMTR-MCNC: 101 MG/DL — HIGH (ref 70–99)
GLUCOSE BLDC GLUCOMTR-MCNC: 101 MG/DL — HIGH (ref 70–99)
GLUCOSE BLDC GLUCOMTR-MCNC: 106 MG/DL — HIGH (ref 70–99)
GLUCOSE SERPL-MCNC: 98 MG/DL — SIGNIFICANT CHANGE UP (ref 70–99)
HCT VFR BLD CALC: 29 % — LOW (ref 39–50)
HGB BLD-MCNC: 8.7 G/DL — LOW (ref 13–17)
INR BLD: 1.22 RATIO — HIGH (ref 0.88–1.16)
MAGNESIUM SERPL-MCNC: 2 MG/DL — SIGNIFICANT CHANGE UP (ref 1.6–2.6)
MCHC RBC-ENTMCNC: 30 GM/DL — LOW (ref 32–36)
MCHC RBC-ENTMCNC: 31.8 PG — SIGNIFICANT CHANGE UP (ref 27–34)
MCV RBC AUTO: 105.8 FL — HIGH (ref 80–100)
NRBC # BLD: 0 /100 WBCS — SIGNIFICANT CHANGE UP (ref 0–0)
PHOSPHATE SERPL-MCNC: 3.5 MG/DL — SIGNIFICANT CHANGE UP (ref 2.5–4.5)
PLATELET # BLD AUTO: 219 K/UL — SIGNIFICANT CHANGE UP (ref 150–400)
POTASSIUM SERPL-MCNC: 4 MMOL/L — SIGNIFICANT CHANGE UP (ref 3.5–5.3)
POTASSIUM SERPL-SCNC: 4 MMOL/L — SIGNIFICANT CHANGE UP (ref 3.5–5.3)
PROT SERPL-MCNC: 7 G/DL — SIGNIFICANT CHANGE UP (ref 6–8.3)
PROTHROM AB SERPL-ACNC: 14.2 SEC — HIGH (ref 10.5–13.4)
RBC # BLD: 2.74 M/UL — LOW (ref 4.2–5.8)
RBC # FLD: 18.1 % — HIGH (ref 10.3–14.5)
SODIUM SERPL-SCNC: 138 MMOL/L — SIGNIFICANT CHANGE UP (ref 135–145)
WBC # BLD: 7.49 K/UL — SIGNIFICANT CHANGE UP (ref 3.8–10.5)
WBC # FLD AUTO: 7.49 K/UL — SIGNIFICANT CHANGE UP (ref 3.8–10.5)

## 2023-06-29 PROCEDURE — 99233 SBSQ HOSP IP/OBS HIGH 50: CPT

## 2023-06-29 RX ORDER — MODAFINIL 200 MG/1
150 TABLET ORAL EVERY 24 HOURS
Refills: 0 | Status: DISCONTINUED | OUTPATIENT
Start: 2023-06-29 | End: 2023-07-06

## 2023-06-29 RX ADMIN — CHLORHEXIDINE GLUCONATE 1 APPLICATION(S): 213 SOLUTION TOPICAL at 12:22

## 2023-06-29 RX ADMIN — BRIVARACETAM 25 MILLIGRAM(S): 25 TABLET, FILM COATED ORAL at 05:44

## 2023-06-29 RX ADMIN — PANTOPRAZOLE SODIUM 40 MILLIGRAM(S): 20 TABLET, DELAYED RELEASE ORAL at 12:04

## 2023-06-29 RX ADMIN — MODAFINIL 150 MILLIGRAM(S): 200 TABLET ORAL at 05:41

## 2023-06-29 RX ADMIN — Medication 81 MILLIGRAM(S): at 12:04

## 2023-06-29 RX ADMIN — ZINC SULFATE TAB 220 MG (50 MG ZINC EQUIVALENT) 220 MILLIGRAM(S): 220 (50 ZN) TAB at 12:04

## 2023-06-29 RX ADMIN — Medication 15 MILLILITER(S): at 12:03

## 2023-06-29 RX ADMIN — MODAFINIL 150 MILLIGRAM(S): 200 TABLET ORAL at 12:21

## 2023-06-29 RX ADMIN — ERYTHROPOIETIN 10000 UNIT(S): 10000 INJECTION, SOLUTION INTRAVENOUS; SUBCUTANEOUS at 12:00

## 2023-06-29 RX ADMIN — BRIVARACETAM 25 MILLIGRAM(S): 25 TABLET, FILM COATED ORAL at 17:19

## 2023-06-29 RX ADMIN — Medication 12.5 MILLIGRAM(S): at 17:16

## 2023-06-29 RX ADMIN — Medication 12.5 MILLIGRAM(S): at 05:39

## 2023-06-29 RX ADMIN — Medication 1 APPLICATION(S): at 12:22

## 2023-06-29 NOTE — PROGRESS NOTE ADULT - PROBLEM SELECTOR PLAN 1
S/P PEG placement with EGD   PEG site intact  Keep HOB elevated during feeding.   Place abdominal binder to avoid Pulling peg tube accidently   Start feed via PEG  PEG site care daily avoid gauze in-between the peg hub and skin

## 2023-06-29 NOTE — PROGRESS NOTE ADULT - SUBJECTIVE AND OBJECTIVE BOX
Follow-up Critical Care Progress Note  Chief Complaint : Atrial fibrillation      patient alert non verbal today  mild myoclonus  s/p peg yesterday       Allergies :sulfa drugs (Unknown)      PAST MEDICAL & SURGICAL HISTORY:  HTN (hypertension)    HLD (hyperlipidemia)    Diabetes    CAD (coronary artery disease)    History of cholecystectomy        Medications:  MEDICATIONS  (STANDING):  aspirin  chewable 81 milliGRAM(s) Oral daily  brivaracetam Oral Solution 25 milliGRAM(s) Oral two times a day  chlorhexidine 2% Cloths 1 Application(s) Topical daily  Dakins Solution - 1/2 Strength 1 Application(s) Topical daily  dextrose 5%. 1000 milliLiter(s) (50 mL/Hr) IV Continuous <Continuous>  dextrose 5%. 1000 milliLiter(s) (100 mL/Hr) IV Continuous <Continuous>  dextrose 50% Injectable 25 Gram(s) IV Push once  dextrose 50% Injectable 12.5 Gram(s) IV Push once  dextrose 50% Injectable 25 Gram(s) IV Push once  epoetin priyanka-epbx (RETACRIT) Injectable 13820 Unit(s) SubCutaneous every 7 days  insulin lispro (ADMELOG) corrective regimen sliding scale   SubCutaneous every 6 hours  metoprolol tartrate 12.5 milliGRAM(s) Oral every 12 hours  modafinil 150 milliGRAM(s) Oral every 24 hours  multivitamin/minerals/iron Oral Solution (CENTRUM) 15 milliLiter(s) Oral daily  pantoprazole   Suspension 40 milliGRAM(s) Oral daily  zinc sulfate 220 milliGRAM(s) Oral daily    MEDICATIONS  (PRN):  acetaminophen   Oral Liquid .. 650 milliGRAM(s) Oral every 6 hours PRN Temp greater or equal to 38C (100.4F)  sodium chloride 0.9% lock flush 10 milliLiter(s) IV Push every 1 hour PRN Pre/post blood products, medications, blood draw, and to maintain line patency      Antibiotics History  ceFAZolin   IVPB 2000 milliGRAM(s) IV Intermittent once, 06-12-23 @ 07:00, Stop order after: 1 Doses  ceFAZolin   IVPB 2000 milliGRAM(s) IV Intermittent once, 06-28-23 @ 09:54, Stop order after: 1 Doses  meropenem  IVPB 500 milliGRAM(s) IV Intermittent every 24 hours, 06-23-23 @ 10:31, Stop order after: 7 Days  meropenem  IVPB 500 milliGRAM(s) IV Intermittent every 12 hours, 06-23-23 @ 11:09, Stop order after: 7 Days      Heme Medications   aspirin  chewable 81 milliGRAM(s) Oral daily, 06-24-23 @ 10:36      GI Medications  pantoprazole   Suspension 40 milliGRAM(s) Oral daily, 06-22-23 @ 00:00,       COVID  06-11-23 @ 12:25  COVID -   NotDete  05-04-23 @ 19:50  COVID -   NotDetec  07-13-22 @ 18:33  COVID    NotDete      COVID Biomarkers    06-12-23 @ 05:00 ESR --  ---  CRP --  ---  DDimer  --   ---      ---   Ferritin --    05-04-23 @ 19:50 ESR --  ---  CRP --  ---  DDimer  351<H>   ---   LDH --   ---   Ferritin --       Trend BNP  06-28-23 @ 06:00   -  >41330<H>  06-27-23 @ 05:00   -  >91957<H>  06-20-23 @ 06:10   -  >73252<H>  06-14-23 @ 06:00   -  >69816<H>  06-12-23 @ 05:00   -  29469<H>  06-11-23 @ 06:15   -  >09069<H>    Procalcitonin Trend  06-05-23 @ 05:30   -   0.24<H>  06-04-23 @ 06:00   -   0.20<H>  05-17-23 @ 23:44   -   0.59<H>  05-16-23 @ 22:16   -   0.74<H>  05-15-23 @ 05:45   -   1.12<H>  05-09-23 @ 13:42   -   0.11<H>    WBC Trend  06-29-23 @ 06:00   -  7.49  06-28-23 @ 06:00   -  8.47  06-27-23 @ 05:00   -  8.45    H/H Trend  06-29-23 @ 06:00   -   8.7<L>/ 29.0<L>  06-28-23 @ 06:00   -   8.3<L>/ 27.1<L>  06-27-23 @ 05:00   -   8.4<L>/ 27.6<L>  06-26-23 @ 05:00   -   8.4<L>/ 27.4<L>  06-25-23 @ 06:15   -   8.4<L>/ 27.4<L>  06-24-23 @ 05:30   -   8.5<L>/ 28.1<L>    Stool Occult Blood  06-15-23 @ 13:54   -   Negative  06-02-23 @ 08:20   -   Negative  05-25-23 @ 18:20   -   Negative  05-16-23 @ 17:00   -   Positive<!>    Platelet Trend  06-29-23 @ 06:00   -  219  06-28-23 @ 06:00   -  228  06-27-23 @ 05:00   -  210    Trend Sodium  06-29-23 @ 06:00   -  138  06-28-23 @ 06:00   -  137  06-27-23 @ 05:00   -  139    Trend Potassium  06-29-23 @ 06:00   -  4.0  06-28-23 @ 06:00   -  3.7  06-27-23 @ 05:00   -  3.6    Trend Bun/Cr  06-29-23 @ 06:00  BUN/CR -  56<H> / 2.83<H>  06-28-23 @ 06:00  BUN/CR -  49<H> / 2.23<H>  06-27-23 @ 05:00  BUN/CR -  79<H> / 3.24<H>    Lactic Acid Trend    ABG Trend  06-21-23 @ 05:35   - 7.38/47/72<L>/96.4  06-16-23 @ 05:00   - 7.43/41/119<H>/99.4<H>  06-12-23 @ 05:07   - 7.46<H>/37/115<H>/99.3<H>  06-10-23 @ 04:45   - 7.42/41/78<L>/96.6  06-09-23 @ 10:50   - 7.39/40/86/98.9<H>  05-19-23 @ 00:22   - 7.37/43/144<H>/99.8<H>  05-18-23 @ 05:38   - 7.41/37/97/98.4<H>     Trend AST/ALT/ALK Phos/Bili  06-29-23 @ 06:00   19/6<L>/108/0.4  06-28-23 @ 06:00   17/11/109/0.4  06-27-23 @ 05:00   12/9<L>/115/0.4  06-24-23 @ 05:30   22/14/119/0.7        Ammonia Trend  06-25-23 @ 12:10   -   48  06-21-23 @ 05:50   -   92<H>  05-14-23 @ 11:30   -   53  05-14-23 @ 05:00   -   16     Albumin Trend  06-29-23 @ 06:00   -   1.7<L>  06-28-23 @ 06:00   -   1.7<L>  06-27-23 @ 05:00   -   1.7<L>  06-24-23 @ 05:30   -   2.0<L>  06-23-23 @ 06:00   -   2.1<L>  06-22-23 @ 05:00   -   1.9<L>      PTT - PT - INR Trend  06-12-23 @ 05:00   -   28.2 - 15.8<H> - 1.36<H>  05-19-23 @ 00:44   -   26.5<L> - 13.2 - 1.15  05-17-23 @ 23:44   -   31.2 - 13.7<H> - 1.18<H>  05-16-23 @ 22:16   -   31.3 - 13.6<H> - 1.18<H>  05-15-23 @ 13:55   -   32.9 - 14.0<H> - 1.19<H>  05-14-23 @ 13:45   -   33.6 - -- - --    Glucose Trend  06-29-23 @ 06:00   -  98 -- --  06-29-23 @ 05:48   -  -- -- 101<H>  06-28-23 @ 23:49   -  -- -- 116<H>  06-28-23 @ 17:41   -  -- -- 158<H>  06-28-23 @ 11:11   -  -- -- 151<H>  06-28-23 @ 06:00   -  168<H> -- --  06-28-23 @ 05:05   -  -- -- 179<H>  06-27-23 @ 23:41   -  -- -- 299<H>  06-27-23 @ 17:27   -  -- -- 165<H>  06-27-23 @ 12:45   -  -- -- 94    A1C with Estimated Average Glucose Result: 7.7 % *H* [4.0 - 5.6] (05-05-23 @ 08:00)      LABS:                        8.7    7.49  )-----------( 219      ( 29 Jun 2023 06:00 )             29.0     06-29    138  |  101  |  56<H>  ----------------------------<  98  4.0   |  28  |  2.83<H>    Ca    8.3<L>      29 Jun 2023 06:00  Phos  3.5     06-29  Mg     2.0     06-29    TPro  7.0  /  Alb  1.7<L>  /  TBili  0.4  /  DBili  x   /  AST  19  /  ALT  6<L>  /  AlkPhos  108  06-29            VITALS:  T(C): 37 (06-29-23 @ 05:00), Max: 37 (06-28-23 @ 21:00)  T(F): 98.6 (06-29-23 @ 05:00), Max: 98.6 (06-28-23 @ 21:00)  HR: 83 (06-29-23 @ 08:00) (82 - 99)  BP: 118/55 (06-29-23 @ 08:00) (109/77 - 139/79)  BP(mean): 75 (06-29-23 @ 08:00) (75 - 97)  ABP: --  ABP(mean): --  RR: 29 (06-29-23 @ 08:00) (21 - 39)  SpO2: 99% (06-29-23 @ 08:00) (93% - 100%)  CVP(mm Hg): --  CVP(cm H2O): --    Ins and Outs     06-28-23 @ 07:01  -  06-29-23 @ 07:00  --------------------------------------------------------  IN: 100 mL / OUT: 135 mL / NET: -35 mL        Height (cm): 160 (06-28-23 @ 11:22)  Weight (kg): 72.2 (06-28-23 @ 11:22)  BMI (kg/m2): 28.2 (06-28-23 @ 11:22)        I&O's Detail    28 Jun 2023 07:01  -  29 Jun 2023 07:00  --------------------------------------------------------  IN:    Enteral Tube Flush: 100 mL  Total IN: 100 mL    OUT:    Free Water: 0 mL    Nepro with Carb Steady: 0 mL    Voided (mL): 135 mL  Total OUT: 135 mL    Total NET: -35 mL

## 2023-06-29 NOTE — PROGRESS NOTE ADULT - ASSESSMENT
Physical Examination:  GENERAL:               Eyes open no distress  HEENT:                    No JVD, Dry MM  PULM:                     Bilateral air entry, course and diminished  to auscultation bilaterally, No  sputum production, + rales , No Rhonchi, No Wheezing  CVS:                         S1, S2,  +Murmur  ABD:                        Soft, nondistended, nontender, normoactive bowel sounds,   EXT:                         no  edema, nontender, No Cyanosis or Clubbing   Vascular:                Warm Extremities,   NEURO:                alert , non verbal, not following commands mild + Myoclonus   PSYC:                      Calm, No Insight.      Assessment  78 year old male with a PMH of dementia, HTN, cardiomyopathy, HFrEF, CAD s/p CABG, with known current RCA occlusion, and DM type 2 who was admitted to [facility] on 5/4 with hypoxic respiratory failure in setting of CHF exacerbation and ANISH with course complicated by acute hypoxic respiratory failure in setting of choking episode causing cardiac arrest, shock, worsening hypoxemic respiratory failure, and NSTEMI on 5/9. Pt ICU course noted post cardiac arrest anoxic brain injury and myoclonus. Pt was transferred to [facility] for VEEG which required 48 hours of monitoring as he was noted to still be sedated. While there pt noted to have hematochezia and any ac / antiplatelet agents were held at that time. EEG findings consistent with stimulus induced myoclonus and GPDs s/p hypoxic diffuse indicative of severe cerebral dysfunction.    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  Acute renal failure now on HD, baseline CKD    Heart failure with reduced EF  NSTEMI post arrest   b/l Pl effusion, s/p left chest tube now s/p removal   Dysphagia s/p PEG 6/28/23  Underlying PMH of dementia, HTN, cardiomyopathy, HFrEF, CAD s/p CABG, with known current RCA occlusion, and DM type 2      Plan:    hold bipap QHS  Stable neuro status  family wants prolonged HD will need shiley  CVP line removed yesterday  Continue Modafinil  HD as per renal-    Eliquis held for shiley placement       NIV PRN/QHS   NGT for diet  S&S eval to advance diet  will need to call when more awake  OOB to chair when bale     monitor blood glucose levels, + ISS coverage  Sacral decub - wound care called continue local wound care off loading and nutrition support  Venodyne for dvt ppx   GOC ongoing discussion with family ,now DNR/I   PT/OT for acute rehab eval     at this time there is no pulmonary contraindication for Shiley placement     PMD:				                   Notified(Date):  Family Updated: 	Kuldeep [phone]	                                 Date:  6/27/2023  discussed plan for PEG in am, necessary procedure with peg     Sedation & Analgesia:	  Diet/Nutrition:		 as above  GI PPx:			Protonix  DVT Ppx:		Venodynes   Activity:		  OOB to chair  Head of Bed:               35-45 Deg  Glycemic Control:         Insulin  Lines: piv  Restraints may be deemed necessary to prevent removal of life-sustaining devices [ x ] YES   [   ]  NO  Disposition: ICU Care  Goals of Care: Full code     Physical Examination:  GENERAL:               Eyes open no distress  HEENT:                    No JVD, Dry MM  PULM:                     Bilateral air entry, course and diminished  to auscultation bilaterally, No  sputum production, + rales , No Rhonchi, No Wheezing  CVS:                         S1, S2,  +Murmur  ABD:                        Soft, nondistended, nontender, normoactive bowel sounds,   EXT:                         no  edema, nontender, No Cyanosis or Clubbing   Vascular:                Warm Extremities,   NEURO:                alert , non verbal, not following commands mild + Myoclonus   PSYC:                      Calm, No Insight.      Assessment  78 year old male with a PMH of dementia, HTN, cardiomyopathy, HFrEF, CAD s/p CABG, with known current RCA occlusion, and DM type 2 who was admitted to [facility] on 5/4 with hypoxic respiratory failure in setting of CHF exacerbation and ANISH with course complicated by acute hypoxic respiratory failure in setting of choking episode causing cardiac arrest, shock, worsening hypoxemic respiratory failure, and NSTEMI on 5/9. Pt ICU course noted post cardiac arrest anoxic brain injury and myoclonus. Pt was transferred to [facility] for VEEG which required 48 hours of monitoring as he was noted to still be sedated. While there pt noted to have hematochezia and any ac / antiplatelet agents were held at that time. EEG findings consistent with stimulus induced myoclonus and GPDs s/p hypoxic diffuse indicative of severe cerebral dysfunction.    Patient returned to [facility] ICU on 5/19 and continues tx / supportive care for management of:    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  Acute renal failure now on HD, baseline CKD    Heart failure with reduced EF  NSTEMI post arrest   b/l Pl effusion, s/p left chest tube now s/p removal   Dysphagia s/p PEG 6/28/23  Underlying PMH of dementia, HTN, cardiomyopathy, HFrEF, CAD s/p CABG, with known current RCA occlusion, and DM type 2      Plan:    hold bipap QHS  Stable neuro status  family wants prolonged HD will need shiley  CVP line removed yesterday  Continue Modafinil  HD as per renal-    Eliquis held for shiley placement       NIV PRN/QHS   NGT for diet  S&S eval to advance diet  will need to call when more awake  OOB to chair when bale     monitor blood glucose levels, + ISS coverage  Sacral decub - wound care called continue local wound care off loading and nutrition support  Venodyne for dvt ppx   GOC ongoing discussion with family ,now DNR/I   PT/OT for acute rehab eval     at this time there is no pulmonary contraindication for Shiley placement     PMD:				                   Notified(Date):  Family Updated: 	Kuldeep [phone]	                                 Date:  6/27/2023  discussed plan for PEG in am, necessary procedure with peg     Sedation & Analgesia:	  Diet/Nutrition:		 as above  GI PPx:			Protonix  DVT Ppx:		Venodynes   Activity:		  OOB to chair  Head of Bed:               35-45 Deg  Glycemic Control:         Insulin  Lines: piv  Restraints may be deemed necessary to prevent removal of life-sustaining devices [ x ] YES   [   ]  NO  Disposition: ICU Care  Goals of Care: DNR/I

## 2023-06-29 NOTE — PROGRESS NOTE ADULT - SUBJECTIVE AND OBJECTIVE BOX
Subjective: seen on HD. Oriented to person only.       MEDICATIONS  (STANDING):  aspirin  chewable 81 milliGRAM(s) Oral daily  brivaracetam Oral Solution 25 milliGRAM(s) Oral two times a day  chlorhexidine 2% Cloths 1 Application(s) Topical daily  Dakins Solution - 1/2 Strength 1 Application(s) Topical daily  dextrose 5%. 1000 milliLiter(s) (50 mL/Hr) IV Continuous <Continuous>  dextrose 5%. 1000 milliLiter(s) (100 mL/Hr) IV Continuous <Continuous>  dextrose 50% Injectable 25 Gram(s) IV Push once  dextrose 50% Injectable 12.5 Gram(s) IV Push once  dextrose 50% Injectable 25 Gram(s) IV Push once  epoetin priyanka-epbx (RETACRIT) Injectable 76806 Unit(s) SubCutaneous every 7 days  insulin lispro (ADMELOG) corrective regimen sliding scale   SubCutaneous every 6 hours  metoprolol tartrate 12.5 milliGRAM(s) Oral every 12 hours  modafinil 150 milliGRAM(s) Oral every 24 hours  multivitamin/minerals/iron Oral Solution (CENTRUM) 15 milliLiter(s) Oral daily  pantoprazole   Suspension 40 milliGRAM(s) Oral daily  zinc sulfate 220 milliGRAM(s) Oral daily    MEDICATIONS  (PRN):  acetaminophen   Oral Liquid .. 650 milliGRAM(s) Oral every 6 hours PRN Temp greater or equal to 38C (100.4F)  sodium chloride 0.9% lock flush 10 milliLiter(s) IV Push every 1 hour PRN Pre/post blood products, medications, blood draw, and to maintain line patency          T(C): 36.3 (06-29-23 @ 10:25), Max: 37 (06-28-23 @ 21:00)  HR: 105 (06-29-23 @ 12:00) (82 - 105)  BP: 127/89 (06-29-23 @ 12:00) (107/77 - 139/79)  RR: 33 (06-29-23 @ 12:00) (21 - 39)  SpO2: 96% (06-29-23 @ 12:00) (94% - 100%)  Wt(kg): --        I&O's Detail    28 Jun 2023 07:01  -  29 Jun 2023 07:00  --------------------------------------------------------  IN:    Enteral Tube Flush: 100 mL  Total IN: 100 mL    OUT:    Free Water: 0 mL    Nepro with Carb Steady: 0 mL    Voided (mL): 135 mL  Total OUT: 135 mL    Total NET: -35 mL               PHYSICAL EXAM:    CHEST/LUNG: coarse BS  HEART: S1S2  ABDOMEN: Soft  EXTREMITIES:  no edema.   R [facility]      LABS:  CBC Full  -  ( 29 Jun 2023 06:00 )  WBC Count : 7.49 K/uL  RBC Count : 2.74 M/uL  Hemoglobin : 8.7 g/dL  Hematocrit : 29.0 %  Platelet Count - Automated : 219 K/uL  Mean Cell Volume : 105.8 fl  Mean Cell Hemoglobin : 31.8 pg  Mean Cell Hemoglobin Concentration : 30.0 gm/dL  Auto Neutrophil # : x  Auto Lymphocyte # : x  Auto Monocyte # : x  Auto Eosinophil # : x  Auto Basophil # : x  Auto Neutrophil % : x  Auto Lymphocyte % : x  Auto Monocyte % : x  Auto Eosinophil % : x  Auto Basophil % : x    06-29    138  |  101  |  56<H>  ----------------------------<  98  4.0   |  28  |  2.83<H>    Ca    8.3<L>      29 Jun 2023 06:00  Phos  3.5     06-29  Mg     2.0     06-29    TPro  7.0  /  Alb  1.7<L>  /  TBili  0.4  /  DBili  x   /  AST  19  /  ALT  6<L>  /  AlkPhos  108  06-29        Impression:  1.  Ischemic ATN w/o recovery. HD dependent.   2.  Chronic respiratory failure  3.  Chronic systolic CHF  4.  Dementia, failure to thrive, protein calorie malnourished    Recommend:  Complete dialysis today as Rxed.   Burdens/benefits of HD discussed with patient's daughter. Realistic expectations set.   She wishes to continue regular hemodialysis.   Therefore may proceed with tunneled cath placement.

## 2023-06-29 NOTE — PROGRESS NOTE ADULT - SUBJECTIVE AND OBJECTIVE BOX
INTERVAL HPI/OVERNIGHT EVENTS:  Patient seen and examined at bed side. As per RN no event over night. S/P EGD with PEG placement yesterday.      (04 May 2023 21:17)    MEDICATIONS  (STANDING):  aspirin  chewable 81 milliGRAM(s) Oral daily  brivaracetam Oral Solution 25 milliGRAM(s) Oral two times a day  chlorhexidine 2% Cloths 1 Application(s) Topical daily  Dakins Solution - 1/2 Strength 1 Application(s) Topical daily  dextrose 5%. 1000 milliLiter(s) (50 mL/Hr) IV Continuous <Continuous>  dextrose 5%. 1000 milliLiter(s) (100 mL/Hr) IV Continuous <Continuous>  dextrose 50% Injectable 25 Gram(s) IV Push once  dextrose 50% Injectable 12.5 Gram(s) IV Push once  dextrose 50% Injectable 25 Gram(s) IV Push once  epoetin priyanka-epbx (RETACRIT) Injectable 30265 Unit(s) SubCutaneous every 7 days  insulin lispro (ADMELOG) corrective regimen sliding scale   SubCutaneous every 6 hours  metoprolol tartrate 12.5 milliGRAM(s) Oral every 12 hours  modafinil 150 milliGRAM(s) Oral every 24 hours  multivitamin/minerals/iron Oral Solution (CENTRUM) 15 milliLiter(s) Oral daily  pantoprazole   Suspension 40 milliGRAM(s) Oral daily  zinc sulfate 220 milliGRAM(s) Oral daily    MEDICATIONS  (PRN):  acetaminophen   Oral Liquid .. 650 milliGRAM(s) Oral every 6 hours PRN Temp greater or equal to 38C (100.4F)  sodium chloride 0.9% lock flush 10 milliLiter(s) IV Push every 1 hour PRN Pre/post blood products, medications, blood draw, and to maintain line patency      Allergies    sulfa drugs (Unknown)    Intolerances        PAST MEDICAL & SURGICAL HISTORY:  HTN (hypertension)      HLD (hyperlipidemia)      Diabetes      CAD (coronary artery disease)      PHYSICAL EXAM:   Vital Signs:  Vital Signs Last 24 Hrs  T(C): 36.3 (2023 10:25), Max: 37 (2023 21:00)  T(F): 97.4 (2023 10:25), Max: 98.6 (2023 21:00)  HR: 105 (2023 12:00) (82 - 105)  BP: 127/89 (2023 12:00) (107/77 - 139/79)  BP(mean): 100 (2023 12:00) (75 - 100)  RR: 33 (2023 12:00) (22 - 39)  SpO2: 96% (2023 12:00) (94% - 100%)    Parameters below as of 2023 12:00  Patient On (Oxygen Delivery Method): nasal cannula  O2 Flow (L/min): 2    Daily     Daily Weight in k (2023 10:25)I&O's Summary    2023 07:01  -  2023 07:00  --------------------------------------------------------  IN: 100 mL / OUT: 135 mL / NET: -35 mL        GENERAL:  Appears stated age  HEENT:  NC/AT,  conjunctivae clear and pink  CHEST:  Full & symmetric excursion  HEART:  Regular rhythm, S1, S2  ABDOMEN:  Soft, non-tender, non-distended, normoactive bowel sounds, PEG tube   EXTREMITIES  no cyanosis, clubbing or edema  SKIN:  No rash/warm/dry  NEURO:  Alert  LABS:                        8.7    7.49  )-----------( 219      ( 2023 06:00 )             29.0     06-29    138  |  101  |  56<H>  ----------------------------<  98  4.0   |  28  |  2.83<H>    Ca    8.3<L>      2023 06:00  Phos  3.5     06-29  Mg     2.0     06-29    TPro  7.0  /  Alb  1.7<L>  /  TBili  0.4  /  DBili  x   /  AST  19  /  ALT  6<L>  /  AlkPhos  108  06-29    PT/INR - ( 2023 12:30 )   PT: 14.2 sec;   INR: 1.22 ratio         PTT - ( 2023 12:30 )  PTT:27.6 sec  Urinalysis Basic - ( 2023 06:00 )    Color: x / Appearance: x / SG: x / pH: x  Gluc: 98 mg/dL / Ketone: x  / Bili: x / Urobili: x   Blood: x / Protein: x / Nitrite: x   Leuk Esterase: x / RBC: x / WBC x   Sq Epi: x / Non Sq Epi: x / Bacteria: x      amylase   lipase  RADIOLOGY & ADDITIONAL TESTS:

## 2023-06-29 NOTE — PROGRESS NOTE ADULT - ASSESSMENT
78 year old man PMHx dementia, HTN, cardiomyopathy, HFrEF, CAD s/p CABG, with known current RCA occlusion, and DM type 2 who was admitted to [facility] on 5/4 with hypoxic respiratory failure in setting of CHF exacerbation and ANISH with course complicated by acute hypoxic respiratory failure in setting of choking episode causing cardiac arrest, shock, worsening hypoxemic respiratory failure, and NSTEMI on 5/9. Pt ICU course noted post cardiac arrest anoxic brain injury and myoclonus.  EEG findings consistent with stimulus induced myoclonus and GPDs s/p hypoxic diffuse indicative of severe cerebral dysfunction.  Patient returned to [facility] ICU on 5/19 and continues tx.  S/P PEG placement 6/28/23 .

## 2023-06-29 NOTE — CHART NOTE - NSCHARTNOTEFT_GEN_A_CORE
Nutrition Follow Up Note  Hospital Course (Per Electronic Medical Record):   Source: Medical Record [X]MD [X]  Nursing Staff [X]     Diet: NPO    Patient presently NPO s/p PEG placement, Patient was noted tolerating Nepro EN feeds @ 75ml/hr x 18 hrs . Patient receiving HD as per renal , Labs /POCT reviewed corrective insulin noted. ,Weight fluctuating due to HD / renal status , Suggest restating EN feeds @ 50 ml/hr advance 75ml/hr x 18 hrs which is provide 2430kcals, 109gms protein [id number] 800ml free water .     Current Weight:(6/29) 158.7/72kg                          (6/28) 159.1/72.2kg                          (6/27) 160.7/72.9kg     Pertinent Medications: MEDICATIONS  (STANDING):  aspirin  chewable 81 milliGRAM(s) Oral daily  brivaracetam Oral Solution 25 milliGRAM(s) Oral two times a day  chlorhexidine 2% Cloths 1 Application(s) Topical daily  Dakins Solution - 1/2 Strength 1 Application(s) Topical daily  dextrose 5%. 1000 milliLiter(s) (50 mL/Hr) IV Continuous <Continuous>  dextrose 5%. 1000 milliLiter(s) (100 mL/Hr) IV Continuous <Continuous>  dextrose 50% Injectable 25 Gram(s) IV Push once  dextrose 50% Injectable 12.5 Gram(s) IV Push once  dextrose 50% Injectable 25 Gram(s) IV Push once  epoetin priyanka-epbx (RETACRIT) Injectable 93637 Unit(s) SubCutaneous every 7 days  insulin lispro (ADMELOG) corrective regimen sliding scale   SubCutaneous every 6 hours  metoprolol tartrate 12.5 milliGRAM(s) Oral every 12 hours  modafinil 150 milliGRAM(s) Oral every 24 hours  multivitamin/minerals/iron Oral Solution (CENTRUM) 15 milliLiter(s) Oral daily  pantoprazole   Suspension 40 milliGRAM(s) Oral daily  zinc sulfate 220 milliGRAM(s) Oral daily    MEDICATIONS  (PRN):  acetaminophen   Oral Liquid .. 650 milliGRAM(s) Oral every 6 hours PRN Temp greater or equal to 38C (100.4F)  sodium chloride 0.9% lock flush 10 milliLiter(s) IV Push every 1 hour PRN Pre/post blood products, medications, blood draw, and to maintain line patency      Pertinent Labs:  06-29 Na138 mmol/L Glu 98 mg/dL K+ 4.0 mmol/L Cr  2.83 mg/dL<H> BUN 56 mg/dL<H> 06-29 Phos 3.5 mg/dL 06-29 Alb 1.7 g/dL<L>Hgb8.7g.dl<L> , Hct 29.0% <L>   POCT 101,116.158,151      Skin: sacral DTI    Edema: none    Last BM: (6/27)     Estimated Needs:   [X] No Change since Previous Assessment      Previous Nutrition Diagnosis: Severe Protein Calorie Malnutrition     Nutrition Diagnosis is [X] Ongoing       New Nutrition Diagnosis: [X] Not Applicable      Interventions:   1. suggest restart EN feeds @ 50ml/hr advance to 75ml/hr x 18 hrs (10a-4p)       Monitoring & Evaluation: will monitor:  [X] Weights   [X] Follow Up (Per Protocol)  [X] Tolerance to Diet Prescription       RD to follow as per Nutrition protocol  Preethi Carnes RDN Nutrition Follow Up Note  Hospital Course (Per Electronic Medical Record):   Source: Medical Record [X]MD [X]  Nursing Staff [X]     Diet: NPO    Patient presently NPO s/p PEG placement, Patient was noted tolerating Nepro EN feeds @ 75ml/hr x 18 hrs . Patient receiving HD as per renal , Labs /POCT reviewed corrective insulin noted. ,Weight fluctuating due to HD / renal status , Suggest restating EN feeds @ 50 ml/hr advance 75ml/hr x 18 hrs which is provide 2430kcals, 109gms protein [id number] 800ml free water . Unstageable on sacrum ,MVI noted suggest change to Nephro-Bell     Current Weight:(6/29) 158.7/72kg                          (6/28) 159.1/72.2kg                          (6/27) 160.7/72.9kg     Pertinent Medications: MEDICATIONS  (STANDING):  aspirin  chewable 81 milliGRAM(s) Oral daily  brivaracetam Oral Solution 25 milliGRAM(s) Oral two times a day  chlorhexidine 2% Cloths 1 Application(s) Topical daily  Dakins Solution - 1/2 Strength 1 Application(s) Topical daily  dextrose 5%. 1000 milliLiter(s) (50 mL/Hr) IV Continuous <Continuous>  dextrose 5%. 1000 milliLiter(s) (100 mL/Hr) IV Continuous <Continuous>  dextrose 50% Injectable 25 Gram(s) IV Push once  dextrose 50% Injectable 12.5 Gram(s) IV Push once  dextrose 50% Injectable 25 Gram(s) IV Push once  epoetin priyanka-epbx (RETACRIT) Injectable 44312 Unit(s) SubCutaneous every 7 days  insulin lispro (ADMELOG) corrective regimen sliding scale   SubCutaneous every 6 hours  metoprolol tartrate 12.5 milliGRAM(s) Oral every 12 hours  modafinil 150 milliGRAM(s) Oral every 24 hours  multivitamin/minerals/iron Oral Solution (CENTRUM) 15 milliLiter(s) Oral daily  pantoprazole   Suspension 40 milliGRAM(s) Oral daily  zinc sulfate 220 milliGRAM(s) Oral daily    MEDICATIONS  (PRN):  acetaminophen   Oral Liquid .. 650 milliGRAM(s) Oral every 6 hours PRN Temp greater or equal to 38C (100.4F)  sodium chloride 0.9% lock flush 10 milliLiter(s) IV Push every 1 hour PRN Pre/post blood products, medications, blood draw, and to maintain line patency      Pertinent Labs:  06-29 Na138 mmol/L Glu 98 mg/dL K+ 4.0 mmol/L Cr  2.83 mg/dL<H> BUN 56 mg/dL<H> 06-29 Phos 3.5 mg/dL 06-29 Alb 1.7 g/dL<L>Hgb8.7g.dl<L> , Hct 29.0% <L>   POCT 101,116.158,151      Skin: sacral DTI- MVI , Zinc Sulfate provided     Edema: none    Last BM: (6/27)     Estimated Needs:   [X] No Change since Previous Assessment      Previous Nutrition Diagnosis: Severe Protein Calorie Malnutrition     Nutrition Diagnosis is [X] Ongoing       New Nutrition Diagnosis: [X] Not Applicable      Interventions:   1. suggest restart EN feeds @ 50ml/hr advance to 75ml/hr x 18 hrs (10a-4p)   2. change to Nephro-Bell     Monitoring & Evaluation: will monitor:  [X] Weights   [X] Follow Up (Per Protocol)  [X] Tolerance to Diet Prescription       RD to follow as per Nutrition protocol  Preethi Carnes RDN

## 2023-06-30 LAB
ALBUMIN SERPL ELPH-MCNC: 1.7 G/DL — LOW (ref 3.3–5)
ALP SERPL-CCNC: 112 U/L — SIGNIFICANT CHANGE UP (ref 40–120)
ALT FLD-CCNC: 8 U/L — LOW (ref 10–45)
ANION GAP SERPL CALC-SCNC: 8 MMOL/L — SIGNIFICANT CHANGE UP (ref 5–17)
AST SERPL-CCNC: 20 U/L — SIGNIFICANT CHANGE UP (ref 10–40)
BILIRUB SERPL-MCNC: 0.4 MG/DL — SIGNIFICANT CHANGE UP (ref 0.2–1.2)
BUN SERPL-MCNC: 36 MG/DL — HIGH (ref 7–23)
CALCIUM SERPL-MCNC: 8.1 MG/DL — LOW (ref 8.4–10.5)
CHLORIDE SERPL-SCNC: 100 MMOL/L — SIGNIFICANT CHANGE UP (ref 96–108)
CO2 SERPL-SCNC: 29 MMOL/L — SIGNIFICANT CHANGE UP (ref 22–31)
CREAT SERPL-MCNC: 2.13 MG/DL — HIGH (ref 0.5–1.3)
EGFR: 31 ML/MIN/1.73M2 — LOW
GLUCOSE BLDC GLUCOMTR-MCNC: 106 MG/DL — HIGH (ref 70–99)
GLUCOSE BLDC GLUCOMTR-MCNC: 124 MG/DL — HIGH (ref 70–99)
GLUCOSE BLDC GLUCOMTR-MCNC: 133 MG/DL — HIGH (ref 70–99)
GLUCOSE BLDC GLUCOMTR-MCNC: 136 MG/DL — HIGH (ref 70–99)
GLUCOSE BLDC GLUCOMTR-MCNC: 255 MG/DL — HIGH (ref 70–99)
GLUCOSE SERPL-MCNC: 121 MG/DL — HIGH (ref 70–99)
HCT VFR BLD CALC: 28.8 % — LOW (ref 39–50)
HGB BLD-MCNC: 8.8 G/DL — LOW (ref 13–17)
MAGNESIUM SERPL-MCNC: 2 MG/DL — SIGNIFICANT CHANGE UP (ref 1.6–2.6)
MCHC RBC-ENTMCNC: 30.6 GM/DL — LOW (ref 32–36)
MCHC RBC-ENTMCNC: 31.5 PG — SIGNIFICANT CHANGE UP (ref 27–34)
MCV RBC AUTO: 103.2 FL — HIGH (ref 80–100)
NRBC # BLD: 0 /100 WBCS — SIGNIFICANT CHANGE UP (ref 0–0)
PHOSPHATE SERPL-MCNC: 3.6 MG/DL — SIGNIFICANT CHANGE UP (ref 2.5–4.5)
PLATELET # BLD AUTO: 233 K/UL — SIGNIFICANT CHANGE UP (ref 150–400)
POTASSIUM SERPL-MCNC: 3.9 MMOL/L — SIGNIFICANT CHANGE UP (ref 3.5–5.3)
POTASSIUM SERPL-SCNC: 3.9 MMOL/L — SIGNIFICANT CHANGE UP (ref 3.5–5.3)
PROT SERPL-MCNC: 7.1 G/DL — SIGNIFICANT CHANGE UP (ref 6–8.3)
RBC # BLD: 2.79 M/UL — LOW (ref 4.2–5.8)
RBC # FLD: 17.4 % — HIGH (ref 10.3–14.5)
SODIUM SERPL-SCNC: 137 MMOL/L — SIGNIFICANT CHANGE UP (ref 135–145)
WBC # BLD: 7.63 K/UL — SIGNIFICANT CHANGE UP (ref 3.8–10.5)
WBC # FLD AUTO: 7.63 K/UL — SIGNIFICANT CHANGE UP (ref 3.8–10.5)

## 2023-06-30 PROCEDURE — 99233 SBSQ HOSP IP/OBS HIGH 50: CPT

## 2023-06-30 RX ADMIN — Medication 3: at 18:21

## 2023-06-30 RX ADMIN — Medication 15 MILLILITER(S): at 13:58

## 2023-06-30 RX ADMIN — Medication 1 APPLICATION(S): at 14:15

## 2023-06-30 RX ADMIN — Medication 12.5 MILLIGRAM(S): at 18:22

## 2023-06-30 RX ADMIN — CHLORHEXIDINE GLUCONATE 1 APPLICATION(S): 213 SOLUTION TOPICAL at 11:58

## 2023-06-30 RX ADMIN — Medication 81 MILLIGRAM(S): at 13:59

## 2023-06-30 RX ADMIN — PANTOPRAZOLE SODIUM 40 MILLIGRAM(S): 20 TABLET, DELAYED RELEASE ORAL at 13:58

## 2023-06-30 RX ADMIN — BRIVARACETAM 25 MILLIGRAM(S): 25 TABLET, FILM COATED ORAL at 18:23

## 2023-06-30 RX ADMIN — ZINC SULFATE TAB 220 MG (50 MG ZINC EQUIVALENT) 220 MILLIGRAM(S): 220 (50 ZN) TAB at 13:58

## 2023-06-30 RX ADMIN — MODAFINIL 150 MILLIGRAM(S): 200 TABLET ORAL at 14:09

## 2023-06-30 NOTE — CONSULT NOTE ADULT - ASSESSMENT
Interventional Radiology    Evaluate for Procedure: tunneled hd catheter    HPI: 78M PMH dementia, HTN, cardiomyopathy, CHF, CAD s/p CABG with history of RCA occlusion, DM2, presented initially to [facility] for Acute hypoxic respiratory failure in setting of choking, s/p cardiac arrest, anoxic brain injury and myoclonus, subsequently developed renal failure as a result.  Patient now post ICU and medically stable although requiring supplemental O2 2 L NC and BIPAP at night for support. Patient on dialysis.      Allergies: sulfa drugs (Unknown)    Medications (Abx/Cardiac/Anticoagulation/Blood Products)    aspirin  chewable: 81 milliGRAM(s) Oral (06-29 @ 12:04)  ceFAZolin   IVPB: 100 mL/Hr IV Intermittent (06-28 @ 14:44)  metoprolol tartrate: 12.5 milliGRAM(s) Oral (06-29 @ 17:16)    Data:    T(C): 36.8  HR: 89  BP: 121/70  RR: 20  SpO2: 98%    -WBC 7.63 / HgB 8.8 / Hct 28.8 / Plt 233  -Na 137 / Cl 100 / BUN 36 / Glucose 121  -K 3.9 / CO2 29 / Cr 2.13  -ALT 8 / Alk Phos 112 / T.Bili 0.4  -INR 1.22 / PTT 27.6      Radiology: reviewed    Assessment/Plan: 78M PMH dementia, HTN, cardiomyopathy, CHF, CAD s/p CABG with history of RCA occlusion, DM2, presented initially to [facility] for Acute hypoxic respiratory failure in setting of choking, s/p cardiac arrest, anoxic brain injury and myoclonus, subsequently developed renal failure as a result.  Patient now post ICU and medically stable although requiring supplemental O2 2 L NC and BIPAP at night for support. Patient on dialysis. IR at [facility] contacted by transfer center for tunneled HD catheter placement.    -- If arrangements not made by 4 pm today, availability for tunneled HD catheter placement at [facility] likely could be scheduled 7/5/23-7/7/23    --  Maria C Irwin MD Assessment/Plan:  78M PMH dementia, HTN, cardiomyopathy, CHF, CAD s/p CABG with history of RCA occlusion, DM2, presented initially to [facility] for Acute hypoxic respiratory failure in setting of choking, s/p cardiac arrest, anoxic brain injury and myoclonus, subsequently developed renal failure as a result.  Patient now post ICU and medically stable although requiring supplemental O2 2 L NC and BIPAP at night for support. Patient on dialysis. IR at [facility] contacted by transfer center for tunneled HD catheter placement.    -- Technically feasible to perform tunneled HD catheter at [facility] as "shuttle" transfer.  -- Recommend placement of temporary HD catheter at  for urgent HD needs, if none present.  -- If arrangements made by 4 pm today, potential availability for tunneled HD catheter placement at [facility] on Monday 7/3/2023.  -- Otherwise likely could be scheduled 7/5/23-7/7/23    --  Maria C Irwin MD   Assessment/Plan:  78M PMH dementia, HTN, cardiomyopathy, CHF, CAD s/p CABG with history of RCA occlusion, DM2, presented initially to [facility] for Acute hypoxic respiratory failure in setting of choking, s/p cardiac arrest, anoxic brain injury and myoclonus, subsequently developed renal failure as a result.  Patient now post ICU and medically stable although requiring supplemental O2 2 L NC and BIPAP at night for support. Patient on dialysis. IR at [facility] contacted by transfer center for tunneled HD catheter placement.    -- Technically feasible to perform tunneled HD catheter at [facility] as "shuttle" transfer.  -- Recommend placement of temporary HD catheter at  for urgent HD needs, if none present.  -- If arrangements made by 4 pm today, potential availability for tunneled HD catheter placement at [facility] on Monday 7/3/2023.  -- Otherwise likely could be scheduled 7/5/23-7/7/23    MD Madhu Jenkins MD  Interventional Radiology    -Available on [employer] TEAMS for all non-urgent questions  -Emergent issues: [facility]-p.[phone]; [facility]-p.[phone] ([phone])  -Non-emergent consults: Please place a [place] order "IR Consult" with an appropriate callback number  -Scheduling questions: [facility]: [phone]; [facility]: [phone]  -Clinic/Outpatient booking: [facility]: [phone]; [facility]: [phone] Assessment/Plan:  78M PMH dementia, HTN, cardiomyopathy, CHF, CAD s/p CABG with history of RCA occlusion, DM2, presented initially to [facility] for Acute hypoxic respiratory failure in setting of choking, s/p cardiac arrest, anoxic brain injury and myoclonus, subsequently developed renal failure as a result.  Patient now post ICU and medically stable although requiring supplemental O2 2 L NC and BIPAP at night for support. Patient on dialysis. IR at [facility] contacted by transfer center for tunneled HD catheter placement.    -- Technically feasible to perform tunneled HD catheter at [facility] as "shuttle" transfer.  -- As non-tunneled HD catheter in place, no need for "urgent/emergent" HD access.  -- If arrangements made by 4 pm today, potential availability for tunneled HD catheter placement at [facility] on Monday 7/3/2023.  -- Otherwise likely could be scheduled 7/5/23-7/7/23    MD Madhu Jenkins MD  Interventional Radiology    -Available on [employer] TEAMS for all non-urgent questions  -Emergent issues: [facility]-p.[phone]; [facility]-p.[phone] ([phone])  -Non-emergent consults: Please place a [place] order "IR Consult" with an appropriate callback number  -Scheduling questions: [facility]: [phone]; [facility]: [phone]  -Clinic/Outpatient booking: [facility]: [phone]; [facility]: [phone]

## 2023-06-30 NOTE — PROGRESS NOTE ADULT - ASSESSMENT
Physical Examination:  GENERAL:               Eyes open no distress  HEENT:                    No JVD, Dry MM  PULM:                     Bilateral air entry, course and diminished  to auscultation bilaterally, mild sputum production, trace  rales , No Rhonchi, No Wheezing  CVS:                         S1, S2,  +Murmur  ABD:                        Soft, nondistended, nontender, normoactive bowel sounds,   EXT:                         no  edema, nontender, No Cyanosis or Clubbing   Vascular:                Warm Extremities,   NEURO:                alert , mildy verbal, not following commands no  Myoclonus   PSYC:                      Calm, No Insight.      Assessment  78 year old male with a PMH of dementia, HTN, cardiomyopathy, HFrEF, CAD s/p CABG, with known current RCA occlusion, and DM type 2 who was admitted to [facility] on 5/4 with hypoxic respiratory failure in setting of CHF exacerbation and ANISH with course complicated by acute hypoxic respiratory failure in setting of choking episode causing cardiac arrest, shock, worsening hypoxemic respiratory failure, and NSTEMI on 5/9. Pt ICU course noted post cardiac arrest anoxic brain injury and myoclonus. Pt was transferred to [facility] for VEEG which required 48 hours of monitoring as he was noted to still be sedated. While there pt noted to have hematochezia and any ac / antiplatelet agents were held at that time. EEG findings consistent with stimulus induced myoclonus and GPDs s/p hypoxic diffuse indicative of severe cerebral dysfunction.    Patient returned to [facility] ICU on 5/19 and continues tx / supportive care for management of:    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  Acute renal failure now on HD, baseline CKD    Heart failure with reduced EF  NSTEMI post arrest   b/l Pl effusion, s/p left chest tube now s/p removal   Dysphagia s/p PEG 6/28/23  Underlying PMH of dementia, HTN, cardiomyopathy, HFrEF, CAD s/p CABG, with known current RCA occlusion, and DM type 2      Plan:    monitor off NIV  N/C O2 to maintain sat,   neuro status appears to be new baseline  family wants prolonged HD will need shiley  Continue Modafinil  HD as per renal-    Eliquis held for eventual shiley placement    S&S eval to advance diet  will need to call when more awake  OOB to chair when bale   monitor blood glucose levels, + ISS coverage  Sacral decub - wound care   Venodyne for dvt ppx     GOC ongoing discussion with family ,now DNR/I   PT/OT for acute rehab eval     at this time there is no pulmonary/critical care for contraindication for transfer for Shiley placement   D/w Floor team   PMD:				                   Notified(Date):  Family Updated: 	Kuldeep [phone]	                                 Date:  6/29/2023  discussed plan for PEG in am, necessary procedure with peg     Sedation & Analgesia:	  Diet/Nutrition:		 as above  GI PPx:			Protonix  DVT Ppx:		Venodynes   Activity:		  OOB to chair  Head of Bed:               35-45 Deg  Glycemic Control:         Insulin  Lines: piv  Restraints may be deemed necessary to prevent removal of life-sustaining devices [ x ] YES   [   ]  NO  Disposition: ICU Care  Goals of Care: DNR/I  Physical Examination:  GENERAL:               Eyes open no distress  HEENT:                    No JVD, Dry MM  PULM:                     Bilateral air entry, course and diminished  to auscultation bilaterally, mild sputum production, trace  rales , No Rhonchi, No Wheezing  CVS:                         S1, S2,  +Murmur  ABD:                        Soft, nondistended, nontender, normoactive bowel sounds,   EXT:                         no  edema, nontender, No Cyanosis or Clubbing   Vascular:                Warm Extremities,   NEURO:                alert , mildy verbal, not following commands no  Myoclonus   PSYC:                      Calm, No Insight.      Assessment  78 year old male with a PMH of dementia, HTN, cardiomyopathy, HFrEF, CAD s/p CABG, with known current RCA occlusion, and DM type 2 who was admitted to [facility] on 5/4 with hypoxic respiratory failure in setting of CHF exacerbation and ANISH with course complicated by acute hypoxic respiratory failure in setting of choking episode causing cardiac arrest, shock, worsening hypoxemic respiratory failure, and NSTEMI on 5/9. Pt ICU course noted post cardiac arrest anoxic brain injury and myoclonus. Pt was transferred to [facility] for VEEG which required 48 hours of monitoring as he was noted to still be sedated. While there pt noted to have hematochezia and any ac / antiplatelet agents were held at that time. EEG findings consistent with stimulus induced myoclonus and GPDs s/p hypoxic diffuse indicative of severe cerebral dysfunction.    Patient returned to [facility] ICU on 5/19 and continues tx / supportive care for management of:    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  Acute renal failure now on HD, baseline CKD    Heart failure with reduced EF  NSTEMI post arrest   b/l Pl effusion, s/p left chest tube now s/p removal   Dysphagia s/p PEG 6/28/23  Underlying PMH of dementia, HTN, cardiomyopathy, HFrEF, CAD s/p CABG, with known current RCA occlusion, and DM type 2      Plan:    monitor off NIV  N/C O2 to maintain sat,   neuro status appears to be new baseline  family wants prolonged HD will need shiley  Continue Modafinil  HD as per renal-    Eliquis held for eventual shiley placement    S&S eval to advance diet  will need to call when more awake  OOB to chair when bale   monitor blood glucose levels, + ISS coverage  Sacral decub - wound care   Venodyne for dvt ppx     GOC ongoing discussion with family ,now DNR/I   PT/OT for acute rehab eval     at this time there is no pulmonary/critical care for contraindication for transfer for Shiley placement   D/w Floor team   PMD:				                   Notified(Date):  Family Updated: 	Kuldeep [phone]	                                 Date:  6/29/2023  discussed plan for PEG in am, necessary procedure with peg     Sedation & Analgesia:	  Diet/Nutrition:		 as above  GI PPx:			Protonix  DVT Ppx:		Venodynes   Activity:		  OOB to chair  Head of Bed:               35-45 Deg  Glycemic Control:         Insulin  Lines: piv  Restraints may be deemed necessary to prevent removal of life-sustaining devices [ x ] YES   [   ]  NO  Disposition: continue care on medical floor   Goals of Care: DNR/I

## 2023-06-30 NOTE — CONSULT NOTE ADULT - CONSULT REASON
ANISH
demetria/jacqueline olivarez
sacral wounds
2/4 staph epi bacteremia
Acute encephalopathy and seizures
sacral wound
s/p choking event and subsequent respiratory / cardiac arrest
Acute hypoxic resp failure / Pulmonary edema
CHF, ? atrial fibrillation, history of CAD
tunneled hd catheter
Dysphagia
anoxic encephalopathy

## 2023-06-30 NOTE — CONSULT NOTE ADULT - REASON FOR ADMISSION
acute systolic CHF

## 2023-06-30 NOTE — PROGRESS NOTE ADULT - SUBJECTIVE AND OBJECTIVE BOX
Patient is a 78y old  Male who presents with a chief complaint of acute systolic CHF (29 Jun 2023 13:26)      Patient seen and examined at bedside. No overnight events reported.     ALLERGIES:  sulfa drugs (Unknown)    MEDICATIONS  (STANDING):  aspirin  chewable 81 milliGRAM(s) Oral daily  brivaracetam Oral Solution 25 milliGRAM(s) Oral two times a day  chlorhexidine 2% Cloths 1 Application(s) Topical daily  Dakins Solution - 1/2 Strength 1 Application(s) Topical daily  dextrose 5%. 1000 milliLiter(s) (50 mL/Hr) IV Continuous <Continuous>  dextrose 5%. 1000 milliLiter(s) (100 mL/Hr) IV Continuous <Continuous>  dextrose 50% Injectable 25 Gram(s) IV Push once  dextrose 50% Injectable 12.5 Gram(s) IV Push once  dextrose 50% Injectable 25 Gram(s) IV Push once  epoetin priyanka-epbx (RETACRIT) Injectable 01816 Unit(s) SubCutaneous every 7 days  insulin lispro (ADMELOG) corrective regimen sliding scale   SubCutaneous every 6 hours  metoprolol tartrate 12.5 milliGRAM(s) Oral every 12 hours  modafinil 150 milliGRAM(s) Oral every 24 hours  multivitamin/minerals/iron Oral Solution (CENTRUM) 15 milliLiter(s) Oral daily  pantoprazole   Suspension 40 milliGRAM(s) Oral daily  zinc sulfate 220 milliGRAM(s) Oral daily    MEDICATIONS  (PRN):  acetaminophen   Oral Liquid .. 650 milliGRAM(s) Oral every 6 hours PRN Temp greater or equal to 38C (100.4F)  sodium chloride 0.9% lock flush 10 milliLiter(s) IV Push every 1 hour PRN Pre/post blood products, medications, blood draw, and to maintain line patency    Vital Signs Last 24 Hrs  T(F): 98.2 (30 Jun 2023 05:00), Max: 99 (29 Jun 2023 22:30)  HR: 89 (30 Jun 2023 05:00) (75 - 122)  BP: 121/70 (30 Jun 2023 05:00) (107/75 - 193/156)  RR: 20 (30 Jun 2023 05:00) (18 - 35)  SpO2: 98% (30 Jun 2023 05:00) (91% - 100%)  I&O's Summary    29 Jun 2023 07:01  -  30 Jun 2023 07:00  --------------------------------------------------------  IN: 280 mL / OUT: 1500 mL / NET: -1220 mL      PHYSICAL EXAM:  General: NAD, appears chronically ill, confused and awake but conversant, does not follow commands  ENT: No gross hearing impairment, Moist mucous membranes, no thrush  Neck: Supple, No JVD  Lungs: crackles at bases, good air entry, non-labored breathing  Cardio: RRR, S1/S2, No murmur  Abdomen: Soft, Nontender, Nondistended; Bowel sounds present  Extremities: No calf tenderness, No cyanosis, No pitting edema  Psych: confused    LABS:             8.8    7.63  )-----------( 233      ( 30 Jun 2023 06:40 )             28.8     06-30  137  |  100  |  36  ----------------------------<  121  3.9   |  29  |  2.13    Ca    8.1      30 Jun 2023 06:40  Phos  3.6     06-30  Mg     2.0     06-30    TPro  7.1  /  Alb  1.7  /  TBili  0.4  /  DBili  x   /  AST  20  /  ALT  8   /  AlkPhos  112  06-30    PT/INR - ( 29 Jun 2023 12:30 )   PT: 14.2 sec;   INR: 1.22 ratio       PTT - ( 29 Jun 2023 12:30 )  PTT:27.6 sec    05-07 Chol 172 mg/dL LDL -- HDL 39 mg/dL Trig 87 mg/dL    POCT Blood Glucose.: 124 mg/dL (30 Jun 2023 06:17)  POCT Blood Glucose.: 136 mg/dL (30 Jun 2023 00:26)  POCT Blood Glucose.: 106 mg/dL (29 Jun 2023 17:14)  POCT Blood Glucose.: 101 mg/dL (29 Jun 2023 12:17)    Urinalysis Basic - ( 30 Jun 2023 06:40 )    Color: x / Appearance: x / SG: x / pH: x  Gluc: 121 mg/dL / Ketone: x  / Bili: x / Urobili: x   Blood: x / Protein: x / Nitrite: x   Leuk Esterase: x / RBC: x / WBC x   Sq Epi: x / Non Sq Epi: x / Bacteria: x    COVID-19 PCR: NotDetec (06-11-23 @ 12:25)    RADIOLOGY & ADDITIONAL TESTS:    Care Discussed with Consultants/Other Providers:

## 2023-06-30 NOTE — PROGRESS NOTE ADULT - SUBJECTIVE AND OBJECTIVE BOX
(x  ) No complaints (  ) Complains of:     T(F): 97.9 (06-30-23 @ 11:26), Max: 99 (06-29-23 @ 22:30)  HR: 90 (06-30-23 @ 11:26) (86 - 122)  BP: 123/63 (06-30-23 @ 11:26) (120/71 - 193/156)  RR: 16 (06-30-23 @ 11:26) (16 - 35)  SpO2: 96% (06-30-23 @ 11:26) (96% - 100%)        Wound Location 1:  sacral wound with recurrence of necrosis                   8.8    7.63  )-----------( 233      ( 30 Jun 2023 06:40 )             28.8     CBC Full  -  ( 30 Jun 2023 06:40 )  WBC Count : 7.63 K/uL  RBC Count : 2.79 M/uL  Hemoglobin : 8.8 g/dL  Hematocrit : 28.8 %  Platelet Count - Automated : 233 K/uL  Mean Cell Volume : 103.2 fl  Mean Cell Hemoglobin : 31.5 pg  Mean Cell Hemoglobin Concentration : 30.6 gm/dL  Auto Neutrophil # : x  Auto Lymphocyte # : x  Auto Monocyte # : x  Auto Eosinophil # : x  Auto Basophil # : x  Auto Neutrophil % : x  Auto Lymphocyte % : x  Auto Monocyte % : x  Auto Eosinophil % : x  Auto Basophil % : x    137|100|36<121  3.9|29|2.13  8.1,2.0,3.6  06-30 @ 06:40    PT/INR - ( 29 Jun 2023 12:30 )   PT: 14.2 sec;   INR: 1.22 ratio         PTT - ( 29 Jun 2023 12:30 )  PTT:27.6 sec  Urinalysis Basic - ( 30 Jun 2023 06:40 )    Color: x / Appearance: x / SG: x / pH: x  Gluc: 121 mg/dL / Ketone: x  / Bili: x / Urobili: x   Blood: x / Protein: x / Nitrite: x   Leuk Esterase: x / RBC: x / WBC x   Sq Epi: x / Non Sq Epi: x / Bacteria: x                Procedure Performed:  (  )Yes     (x  )No  Name of Procedure:  (  )debridement    (  )I&D    (  )Other:  (  )partial thickness     (  )full thickness     (  )subcutaneous     (  )muscle/tendon     (  )bone  (  )sharp     (  )surgical

## 2023-06-30 NOTE — PROGRESS NOTE ADULT - SUBJECTIVE AND OBJECTIVE BOX
Follow-up Critical Care Progress Note  Chief Complaint : Atrial fibrillation      patient transfered to medical floor overnight  this am comfortable  eyes open and tracking  noted purpuseful movements where trying to remove mittens   after verbal and tachtile stimuli patient verbal   no myoclonus noted        Allergies :sulfa drugs (Unknown)      PAST MEDICAL & SURGICAL HISTORY:  HTN (hypertension)    HLD (hyperlipidemia)    Diabetes    CAD (coronary artery disease)    History of cholecystectomy        Medications:  MEDICATIONS  (STANDING):  aspirin  chewable 81 milliGRAM(s) Oral daily  brivaracetam Oral Solution 25 milliGRAM(s) Oral two times a day  chlorhexidine 2% Cloths 1 Application(s) Topical daily  Dakins Solution - 1/2 Strength 1 Application(s) Topical daily  dextrose 5%. 1000 milliLiter(s) (50 mL/Hr) IV Continuous <Continuous>  dextrose 5%. 1000 milliLiter(s) (100 mL/Hr) IV Continuous <Continuous>  dextrose 50% Injectable 25 Gram(s) IV Push once  dextrose 50% Injectable 12.5 Gram(s) IV Push once  dextrose 50% Injectable 25 Gram(s) IV Push once  epoetin priyanka-epbx (RETACRIT) Injectable 23876 Unit(s) SubCutaneous every 7 days  insulin lispro (ADMELOG) corrective regimen sliding scale   SubCutaneous every 6 hours  metoprolol tartrate 12.5 milliGRAM(s) Oral every 12 hours  modafinil 150 milliGRAM(s) Oral every 24 hours  multivitamin/minerals/iron Oral Solution (CENTRUM) 15 milliLiter(s) Oral daily  pantoprazole   Suspension 40 milliGRAM(s) Oral daily  zinc sulfate 220 milliGRAM(s) Oral daily    MEDICATIONS  (PRN):  acetaminophen   Oral Liquid .. 650 milliGRAM(s) Oral every 6 hours PRN Temp greater or equal to 38C (100.4F)  sodium chloride 0.9% lock flush 10 milliLiter(s) IV Push every 1 hour PRN Pre/post blood products, medications, blood draw, and to maintain line patency      Antibiotics History  ceFAZolin   IVPB 2000 milliGRAM(s) IV Intermittent once, 06-12-23 @ 07:00, Stop order after: 1 Doses  ceFAZolin   IVPB 2000 milliGRAM(s) IV Intermittent once, 06-28-23 @ 09:54, Stop order after: 1 Doses  meropenem  IVPB 500 milliGRAM(s) IV Intermittent every 24 hours, 06-23-23 @ 10:31, Stop order after: 7 Days  meropenem  IVPB 500 milliGRAM(s) IV Intermittent every 12 hours, 06-23-23 @ 11:09, Stop order after: 7 Days      Heme Medications   aspirin  chewable 81 milliGRAM(s) Oral daily, 06-24-23 @ 10:36      GI Medications  pantoprazole   Suspension 40 milliGRAM(s) Oral daily, 06-22-23 @ 00:00,       COVID  06-11-23 @ 12:25  COVID -   NotDete  05-04-23 @ 19:50  COVID -   NotDetec  07-13-22 @ 18:33  COVID    [facility]      COVID Biomarkers    06-12-23 @ 05:00 ESR --  ---  CRP --  ---  DDimer  --   ---      ---   Ferritin --    05-04-23 @ 19:50 ESR --  ---  CRP --  ---  DDimer  351<H>   ---   LDH --   ---   Ferritin --         Trend BNP  06-28-23 @ 06:00   -  >48825<H>  06-27-23 @ 05:00   -  >21890<H>  06-20-23 @ 06:10   -  >84816<H>  06-14-23 @ 06:00   -  >44464<H>  06-12-23 @ 05:00   -  96836<H>  06-11-23 @ 06:15   -  >10640<H>    Procalcitonin Trend  06-05-23 @ 05:30   -   0.24<H>  06-04-23 @ 06:00   -   0.20<H>  05-17-23 @ 23:44   -   0.59<H>  05-16-23 @ 22:16   -   0.74<H>  05-15-23 @ 05:45   -   1.12<H>  05-09-23 @ 13:42   -   0.11<H>    WBC Trend  06-30-23 @ 06:40   -  7.63  06-29-23 @ 06:00   -  7.49  06-28-23 @ 06:00   -  8.47    H/H Trend  06-30-23 @ 06:40   -   8.8<L>/ 28.8<L>  06-29-23 @ 06:00   -   8.7<L>/ 29.0<L>  06-28-23 @ 06:00   -   8.3<L>/ 27.1<L>  06-27-23 @ 05:00   -   8.4<L>/ 27.6<L>  06-26-23 @ 05:00   -   8.4<L>/ 27.4<L>  06-25-23 @ 06:15   -   8.4<L>/ 27.4<L>    Stool Occult Blood  06-15-23 @ 13:54   -   Negative  06-02-23 @ 08:20   -   Negative  05-25-23 @ 18:20   -   Negative  05-16-23 @ 17:00   -   Positive<!>    Platelet Trend  06-30-23 @ 06:40   -  233  06-29-23 @ 06:00   -  219  06-28-23 @ 06:00   -  228    Trend Sodium  06-30-23 @ 06:40   -  137  06-29-23 @ 06:00   -  138  06-28-23 @ 06:00   -  137    Trend Potassium  06-30-23 @ 06:40   -  3.9  06-29-23 @ 06:00   -  4.0  06-28-23 @ 06:00   -  3.7    Trend Bun/Cr  06-30-23 @ 06:40  BUN/CR -  36<H> / 2.13<H>  06-29-23 @ 06:00  BUN/CR -  56<H> / 2.83<H>  06-28-23 @ 06:00  BUN/CR -  49<H> / 2.23<H>    Lactic Acid Trend    ABG Trend  06-21-23 @ 05:35   - 7.38/47/72<L>/96.4  06-16-23 @ 05:00   - 7.43/41/119<H>/99.4<H>  06-12-23 @ 05:07   - 7.46<H>/37/115<H>/99.3<H>  06-10-23 @ 04:45   - 7.42/41/78<L>/96.6  06-09-23 @ 10:50   - 7.39/40/86/98.9<H>  05-19-23 @ 00:22   - 7.37/43/144<H>/99.8<H>  05-18-23 @ 05:38   - 7.41/37/97/98.4<H>  05-17-23 @ 23:36   - 7.42/37/97/98.3<H>  05-16-23 @ 22:10   - 7.41/41/92/98.5<H>  05-16-23 @ 05:59   - 7.35/47/104/98.8<H>  05-15-23 @ 13:45   - 7.36/47/74<L>/96.6  05-15-23 @ 04:45   - 7.33<L>/48/70<L>/95.7    Trend AST/ALT/ALK Phos/Bili  06-30-23 @ 06:40   20/8<L>/112/0.4  06-29-23 @ 06:00   19/6<L>/108/0.4  06-28-23 @ 06:00   17/11/109/0.4  06-27-23 @ 05:00   12/9<L>/115/0.4  06-24-23 @ 05:30   22/14/119/0.7  06-23-23 @ 06:00   19/12/125<H>/0.7  06-22-23 @ 05:00   12/11/104/0.4  06-21-23 @ 05:50   10/9<L>/98/0.4  06-20-23 @ 06:10   17/6<L>/123<H>/0.4  06-19-23 @ 05:20   14/10/120/0.6  06-18-23 @ 05:30   15/9<L>/116/0.6  06-16-23 @ 05:00   21/10/119/0.6      Ammonia Trend  06-25-23 @ 12:10   -   48  06-21-23 @ 05:50   -   92<H>  05-14-23 @ 11:30   -   53  05-14-23 @ 05:00   -   16      Amylase / Lipase Trend      Albumin Trend  06-30-23 @ 06:40   -   1.7<L>  06-29-23 @ 06:00   -   1.7<L>  06-28-23 @ 06:00   -   1.7<L>  06-27-23 @ 05:00   -   1.7<L>  06-24-23 @ 05:30   -   2.0<L>  06-23-23 @ 06:00   -   2.1<L>      PTT - PT - INR Trend  06-29-23 @ 12:30   -   27.6 - 14.2<H> - 1.22<H>  06-12-23 @ 05:00   -   28.2 - 15.8<H> - 1.36<H>  05-19-23 @ 00:44   -   26.5<L> - 13.2 - 1.15  05-17-23 @ 23:44   -   31.2 - 13.7<H> - 1.18<H>  05-16-23 @ 22:16   -   31.3 - 13.6<H> - 1.18<H>  05-15-23 @ 13:55   -   32.9 - 14.0<H> - 1.19<H>    Glucose Trend  06-30-23 @ 06:40   -  121<H> -- --  06-30-23 @ 06:17   -  -- -- 124<H>  06-30-23 @ 00:26   -  -- -- 136<H>  06-29-23 @ 17:14   -  -- -- 106<H>  06-29-23 @ 12:17   -  -- -- 101<H>  06-29-23 @ 06:00   -  98 -- --  06-29-23 @ 05:48   -  -- -- 101<H>  06-28-23 @ 23:49   -  -- -- 116<H>  06-28-23 @ 17:41   -  -- -- 158<H>  06-28-23 @ 11:11   -  -- -- 151<H>    A1C with Estimated Average Glucose Result: 7.7 % *H* [4.0 - 5.6] (05-05-23 @ 08:00)      LABS:                        8.8    7.63  )-----------( 233      ( 30 Jun 2023 06:40 )             28.8     06-30    137  |  100  |  36<H>  ----------------------------<  121<H>  3.9   |  29  |  2.13<H>    Ca    8.1<L>      30 Jun 2023 06:40  Phos  3.6     06-30  Mg     2.0     06-30    TPro  7.1  /  Alb  1.7<L>  /  TBili  0.4  /  DBili  x   /  AST  20  /  ALT  8<L>  /  AlkPhos  112  06-30           VITALS:  T(C): 36.8 (06-30-23 @ 05:00), Max: 37.2 (06-29-23 @ 22:30)  T(F): 98.2 (06-30-23 @ 05:00), Max: 99 (06-29-23 @ 22:30)  HR: 89 (06-30-23 @ 05:00) (75 - 122)  BP: 121/70 (06-30-23 @ 05:00) (107/75 - 193/156)  BP(mean): 86 (06-29-23 @ 22:30) (83 - 168)  ABP: --  ABP(mean): --  RR: 20 (06-30-23 @ 05:00) (18 - 35)  SpO2: 98% (06-30-23 @ 05:00) (91% - 100%)  CVP(mm Hg): --  CVP(cm H2O): --    Ins and Outs     06-29-23 @ 07:01  -  06-30-23 @ 07:00  --------------------------------------------------------  IN: 280 mL / OUT: 1500 mL / NET: -1220 mL        Height (cm): 160 (06-28-23 @ 11:22)  Weight (kg): 72.2 (06-28-23 @ 11:22)  BMI (kg/m2): 28.2 (06-28-23 @ 11:22)        I&O's Detail    29 Jun 2023 07:01  -  30 Jun 2023 07:00  --------------------------------------------------------  IN:    Nepro with Carb Steady: 280 mL  Total IN: 280 mL    OUT:    Other (mL): 1000 mL    Voided (mL): 500 mL  Total OUT: 1500 mL    Total NET: -1220 mL

## 2023-06-30 NOTE — PROGRESS NOTE ADULT - SUBJECTIVE AND OBJECTIVE BOX
Subjective: somnolent. Oriented to person only. [place] cath on; scant UO reported.       MEDICATIONS  (STANDING):  aspirin  chewable 81 milliGRAM(s) Oral daily  brivaracetam Oral Solution 25 milliGRAM(s) Oral two times a day  chlorhexidine 2% Cloths 1 Application(s) Topical daily  Dakins Solution - 1/2 Strength 1 Application(s) Topical daily  dextrose 5%. 1000 milliLiter(s) (50 mL/Hr) IV Continuous <Continuous>  dextrose 5%. 1000 milliLiter(s) (100 mL/Hr) IV Continuous <Continuous>  dextrose 50% Injectable 25 Gram(s) IV Push once  dextrose 50% Injectable 12.5 Gram(s) IV Push once  dextrose 50% Injectable 25 Gram(s) IV Push once  epoetin priyanka-epbx (RETACRIT) Injectable 17346 Unit(s) SubCutaneous every 7 days  insulin lispro (ADMELOG) corrective regimen sliding scale   SubCutaneous every 6 hours  metoprolol tartrate 12.5 milliGRAM(s) Oral every 12 hours  modafinil 150 milliGRAM(s) Oral every 24 hours  multivitamin/minerals/iron Oral Solution (CENTRUM) 15 milliLiter(s) Oral daily  pantoprazole   Suspension 40 milliGRAM(s) Oral daily  zinc sulfate 220 milliGRAM(s) Oral daily    MEDICATIONS  (PRN):  acetaminophen   Oral Liquid .. 650 milliGRAM(s) Oral every 6 hours PRN Temp greater or equal to 38C (100.4F)  sodium chloride 0.9% lock flush 10 milliLiter(s) IV Push every 1 hour PRN Pre/post blood products, medications, blood draw, and to maintain line patency          T(C): 36.8 (06-30-23 @ 05:00), Max: 37.2 (06-29-23 @ 22:30)  HR: 90 (06-30-23 @ 11:26) (75 - 122)  BP: 121/70 (06-30-23 @ 05:00) (107/75 - 193/156)  RR: 16 (06-30-23 @ 11:26) (16 - 35)  SpO2: 96% (06-30-23 @ 11:26) (91% - 100%)  Wt(kg): --        I&O's Detail    29 Jun 2023 07:01  -  30 Jun 2023 07:00  --------------------------------------------------------  IN:    Nepro with Carb Steady: 280 mL  Total IN: 280 mL    OUT:    Other (mL): 1000 mL    Voided (mL): 500 mL  Total OUT: 1500 mL    Total NET: -1220 mL               PHYSICAL EXAM:  Oriented to person only  CHEST/LUNG: Clear  HEART: S1S2  ABDOMEN: Soft  EXTREMITIES:  no edema  R IJ [facility]      LABS:  CBC Full  -  ( 30 Jun 2023 06:40 )  WBC Count : 7.63 K/uL  RBC Count : 2.79 M/uL  Hemoglobin : 8.8 g/dL  Hematocrit : 28.8 %  Platelet Count - Automated : 233 K/uL  Mean Cell Volume : 103.2 fl  Mean Cell Hemoglobin : 31.5 pg  Mean Cell Hemoglobin Concentration : 30.6 gm/dL  Auto Neutrophil # : x  Auto Lymphocyte # : x  Auto Monocyte # : x  Auto Eosinophil # : x  Auto Basophil # : x  Auto Neutrophil % : x  Auto Lymphocyte % : x  Auto Monocyte % : x  Auto Eosinophil % : x  Auto Basophil % : x    06-30    137  |  100  |  36<H>  ----------------------------<  121<H>  3.9   |  29  |  2.13<H>    Ca    8.1<L>      30 Jun 2023 06:40  Phos  3.6     06-30  Mg     2.0     06-30    TPro  7.1  /  Alb  1.7<L>  /  TBili  0.4  /  DBili  x   /  AST  20  /  ALT  8<L>  /  AlkPhos  112  06-30    PT/INR - ( 29 Jun 2023 12:30 )   PT: 14.2 sec;   INR: 1.22 ratio         PTT - ( 29 Jun 2023 12:30 )  PTT:27.6 sec  Urinalysis Basic - ( 30 Jun 2023 06:40 )    Color: x / Appearance: x / SG: x / pH: x  Gluc: 121 mg/dL / Ketone: x  / Bili: x / Urobili: x   Blood: x / Protein: x / Nitrite: x   Leuk Esterase: x / RBC: x / WBC x   Sq Epi: x / Non Sq Epi: x / Bacteria: x        Impression:  1.  Ischemic ATN w/o recovery. HD dependent. Relatively low Cr is likely reflective of low muscle mass rather than renal recovery  2.  Chronic respiratory failure  3.  Chronic systolic CHF  4.  Dementia, failure to thrive, protein calorie malnourished    Recommend:  Burdens/benefits of HD discussed with patient's daughter. Realistic expectations set.   She wishes to continue regular hemodialysis.   Therefore may proceed with tunneled cath placement.   If it is not feasible to arrange for TDC soon, consider NTDC exchange  Check bladder scan

## 2023-06-30 NOTE — CONSULT NOTE ADULT - CONSULT REQUESTED DATE/TIME
01-Jun-2023 11:40
30-May-2023 16:36
09-Jun-2023 13:58
10-May-2023 09:22
23-May-2023 15:01
04-Jun-2023 12:37
05-May-2023 16:17
11-May-2023 10:52
30-Jun-2023 11:13
05-May-2023 10:21
09-May-2023 09:29
29-May-2023 14:46

## 2023-06-30 NOTE — CONSULT NOTE ADULT - PROVIDER SPECIALTY LIST ADULT
Gastroenterology
Cardiology
Critical Care
Neurology
Intervent Radiology
Infectious Disease
Nephrology
Neurology
Plastic Surgery
Pulmonology
Palliative Care
Plastic Surgery

## 2023-06-30 NOTE — PROGRESS NOTE ADULT - ASSESSMENT
Necrotic stage 4 sacral wound.  Attempted to reach daughter to get clearance for trial veraflow vac with cleanse choice foam to assisst with debridement of wound  -cont local wound care with collagenase and ca++ alginate  -off load and nutrition support

## 2023-06-30 NOTE — CONSULT NOTE ADULT - SUBJECTIVE AND OBJECTIVE BOX
Interventional Radiology    Evaluate for Procedure: tunneled hd catheter    HPI: 78M PMH dementia, HTN, cardiomyopathy, CHF, CAD s/p CABG with history of RCA occlusion, DM2, presented initially to [facility] for Acute hypoxic respiratory failure in setting of choking, s/p cardiac arrest, anoxic brain injury and myoclonus, subsequently developed renal failure as a result.  Patient now post ICU and medically stable although requiring supplemental O2 2 L NC and BIPAP at night for support. Patient on dialysis.      Allergies: sulfa drugs (Unknown)    Medications (Abx/Cardiac/Anticoagulation/Blood Products)    aspirin  chewable: 81 milliGRAM(s) Oral (06-29 @ 12:04)  ceFAZolin   IVPB: 100 mL/Hr IV Intermittent (06-28 @ 14:44)  metoprolol tartrate: 12.5 milliGRAM(s) Oral (06-29 @ 17:16)    Data:    T(C): 36.8  HR: 89  BP: 121/70  RR: 20  SpO2: 98%    -WBC 7.63 / HgB 8.8 / Hct 28.8 / Plt 233  -Na 137 / Cl 100 / BUN 36 / Glucose 121  -K 3.9 / CO2 29 / Cr 2.13  -ALT 8 / Alk Phos 112 / T.Bili 0.4  -INR 1.22 / PTT 27.6      Radiology: reviewed Interventional Radiology    Evaluate for Procedure: tunneled hd catheter    HPI: 78M PMH dementia, HTN, cardiomyopathy, CHF, CAD s/p CABG with history of RCA occlusion, DM2, presented initially to [facility] for Acute hypoxic respiratory failure in setting of choking, s/p cardiac arrest, anoxic brain injury and myoclonus, subsequently developed renal failure as a result.  Patient now post ICU and medically stable although requiring supplemental O2 2 L NC and BIPAP at night for support. Patient on dialysis.      Allergies: sulfa drugs (Unknown)    Medications (Abx/Cardiac/Anticoagulation/Blood Products)    aspirin  chewable: 81 milliGRAM(s) Oral (06-29 @ 12:04)  ceFAZolin   IVPB: 100 mL/Hr IV Intermittent (06-28 @ 14:44)  metoprolol tartrate: 12.5 milliGRAM(s) Oral (06-29 @ 17:16)    Data:    T(C): 36.8  HR: 89  BP: 121/70  RR: 20  SpO2: 98%    -WBC 7.63 / HgB 8.8 / Hct 28.8 / Plt 233  -Na 137 / Cl 100 / BUN 36 / Glucose 121  -K 3.9 / CO2 29 / Cr 2.13  -ALT 8 / Alk Phos 112 / T.Bili 0.4  -INR 1.22 / PTT 27.6      Radiology: Right IJ non-tunneled HD catheter in place

## 2023-06-30 NOTE — PROGRESS NOTE ADULT - ASSESSMENT
78M PMH dementia, HTN, cardiomyopathy, CHF, CAD s/p CABG with history of RCA occlusion, DM2, presented initially to [facility] for Acute hypoxic respiratory failure in setting of choking, s/p cardiac arrest, anoxic brain injury and myoclonus, subsequently developed renal failure as a result.      Patient now post ICU and medically stable although requiring supplemental O2 2 L NC and BIPAP at night for support.    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

## 2023-07-01 DIAGNOSIS — Z91.89 OTHER SPECIFIED PERSONAL RISK FACTORS, NOT ELSEWHERE CLASSIFIED: ICD-10-CM

## 2023-07-01 LAB
ALBUMIN SERPL ELPH-MCNC: 1.7 G/DL — LOW (ref 3.3–5)
ALP SERPL-CCNC: 113 U/L — SIGNIFICANT CHANGE UP (ref 40–120)
ALT FLD-CCNC: <6 U/L — LOW (ref 10–45)
ANION GAP SERPL CALC-SCNC: 6 MMOL/L — SIGNIFICANT CHANGE UP (ref 5–17)
APTT BLD: 27.7 SEC — SIGNIFICANT CHANGE UP (ref 27.5–35.5)
APTT BLD: 52.2 SEC — HIGH (ref 27.5–35.5)
AST SERPL-CCNC: 15 U/L — SIGNIFICANT CHANGE UP (ref 10–40)
BASOPHILS # BLD AUTO: 0.05 K/UL — SIGNIFICANT CHANGE UP (ref 0–0.2)
BASOPHILS NFR BLD AUTO: 0.7 % — SIGNIFICANT CHANGE UP (ref 0–2)
BILIRUB SERPL-MCNC: 0.4 MG/DL — SIGNIFICANT CHANGE UP (ref 0.2–1.2)
BUN SERPL-MCNC: 52 MG/DL — HIGH (ref 7–23)
CALCIUM SERPL-MCNC: 8.1 MG/DL — LOW (ref 8.4–10.5)
CHLORIDE SERPL-SCNC: 99 MMOL/L — SIGNIFICANT CHANGE UP (ref 96–108)
CO2 SERPL-SCNC: 31 MMOL/L — SIGNIFICANT CHANGE UP (ref 22–31)
CREAT SERPL-MCNC: 2.75 MG/DL — HIGH (ref 0.5–1.3)
EGFR: 23 ML/MIN/1.73M2 — LOW
EOSINOPHIL # BLD AUTO: 0.1 K/UL — SIGNIFICANT CHANGE UP (ref 0–0.5)
EOSINOPHIL NFR BLD AUTO: 1.3 % — SIGNIFICANT CHANGE UP (ref 0–6)
GLUCOSE BLDC GLUCOMTR-MCNC: 137 MG/DL — HIGH (ref 70–99)
GLUCOSE BLDC GLUCOMTR-MCNC: 181 MG/DL — HIGH (ref 70–99)
GLUCOSE BLDC GLUCOMTR-MCNC: 230 MG/DL — HIGH (ref 70–99)
GLUCOSE BLDC GLUCOMTR-MCNC: 98 MG/DL — SIGNIFICANT CHANGE UP (ref 70–99)
GLUCOSE SERPL-MCNC: 121 MG/DL — HIGH (ref 70–99)
HCT VFR BLD CALC: 27.1 % — LOW (ref 39–50)
HCT VFR BLD CALC: 28.5 % — LOW (ref 39–50)
HGB BLD-MCNC: 8.5 G/DL — LOW (ref 13–17)
HGB BLD-MCNC: 8.7 G/DL — LOW (ref 13–17)
IMM GRANULOCYTES NFR BLD AUTO: 0.4 % — SIGNIFICANT CHANGE UP (ref 0–0.9)
INR BLD: 1.27 RATIO — HIGH (ref 0.88–1.16)
LYMPHOCYTES # BLD AUTO: 0.55 K/UL — LOW (ref 1–3.3)
LYMPHOCYTES # BLD AUTO: 7.4 % — LOW (ref 13–44)
MCHC RBC-ENTMCNC: 30.5 GM/DL — LOW (ref 32–36)
MCHC RBC-ENTMCNC: 31.4 GM/DL — LOW (ref 32–36)
MCHC RBC-ENTMCNC: 31.8 PG — SIGNIFICANT CHANGE UP (ref 27–34)
MCHC RBC-ENTMCNC: 32.1 PG — SIGNIFICANT CHANGE UP (ref 27–34)
MCV RBC AUTO: 102.3 FL — HIGH (ref 80–100)
MCV RBC AUTO: 104 FL — HIGH (ref 80–100)
MONOCYTES # BLD AUTO: 1.15 K/UL — HIGH (ref 0–0.9)
MONOCYTES NFR BLD AUTO: 15.4 % — HIGH (ref 2–14)
NEUTROPHILS # BLD AUTO: 5.6 K/UL — SIGNIFICANT CHANGE UP (ref 1.8–7.4)
NEUTROPHILS NFR BLD AUTO: 74.8 % — SIGNIFICANT CHANGE UP (ref 43–77)
NRBC # BLD: 0 /100 WBCS — SIGNIFICANT CHANGE UP (ref 0–0)
NRBC # BLD: 0 /100 WBCS — SIGNIFICANT CHANGE UP (ref 0–0)
PLATELET # BLD AUTO: 228 K/UL — SIGNIFICANT CHANGE UP (ref 150–400)
PLATELET # BLD AUTO: 230 K/UL — SIGNIFICANT CHANGE UP (ref 150–400)
POTASSIUM SERPL-MCNC: 3.7 MMOL/L — SIGNIFICANT CHANGE UP (ref 3.5–5.3)
POTASSIUM SERPL-SCNC: 3.7 MMOL/L — SIGNIFICANT CHANGE UP (ref 3.5–5.3)
PROT SERPL-MCNC: 6.8 G/DL — SIGNIFICANT CHANGE UP (ref 6–8.3)
PROTHROM AB SERPL-ACNC: 14.8 SEC — HIGH (ref 10.5–13.4)
RBC # BLD: 2.65 M/UL — LOW (ref 4.2–5.8)
RBC # BLD: 2.74 M/UL — LOW (ref 4.2–5.8)
RBC # FLD: 17.2 % — HIGH (ref 10.3–14.5)
RBC # FLD: 17.6 % — HIGH (ref 10.3–14.5)
SODIUM SERPL-SCNC: 136 MMOL/L — SIGNIFICANT CHANGE UP (ref 135–145)
WBC # BLD: 6.28 K/UL — SIGNIFICANT CHANGE UP (ref 3.8–10.5)
WBC # BLD: 7.48 K/UL — SIGNIFICANT CHANGE UP (ref 3.8–10.5)
WBC # FLD AUTO: 6.28 K/UL — SIGNIFICANT CHANGE UP (ref 3.8–10.5)
WBC # FLD AUTO: 7.48 K/UL — SIGNIFICANT CHANGE UP (ref 3.8–10.5)

## 2023-07-01 PROCEDURE — 99233 SBSQ HOSP IP/OBS HIGH 50: CPT

## 2023-07-01 RX ORDER — HEPARIN SODIUM 5000 [USP'U]/ML
3000 INJECTION INTRAVENOUS; SUBCUTANEOUS EVERY 6 HOURS
Refills: 0 | Status: DISCONTINUED | OUTPATIENT
Start: 2023-07-01 | End: 2023-07-02

## 2023-07-01 RX ORDER — HEPARIN SODIUM 5000 [USP'U]/ML
6000 INJECTION INTRAVENOUS; SUBCUTANEOUS ONCE
Refills: 0 | Status: COMPLETED | OUTPATIENT
Start: 2023-07-01 | End: 2023-07-01

## 2023-07-01 RX ORDER — BRIVARACETAM 25 MG/1
25 TABLET, FILM COATED ORAL
Refills: 0 | Status: DISCONTINUED | OUTPATIENT
Start: 2023-07-01 | End: 2023-07-07

## 2023-07-01 RX ORDER — HEPARIN SODIUM 5000 [USP'U]/ML
INJECTION INTRAVENOUS; SUBCUTANEOUS
Qty: 25000 | Refills: 0 | Status: DISCONTINUED | OUTPATIENT
Start: 2023-07-01 | End: 2023-07-02

## 2023-07-01 RX ORDER — HEPARIN SODIUM 5000 [USP'U]/ML
6000 INJECTION INTRAVENOUS; SUBCUTANEOUS EVERY 6 HOURS
Refills: 0 | Status: DISCONTINUED | OUTPATIENT
Start: 2023-07-01 | End: 2023-07-02

## 2023-07-01 RX ADMIN — Medication 12.5 MILLIGRAM(S): at 17:38

## 2023-07-01 RX ADMIN — Medication 15 MILLILITER(S): at 12:57

## 2023-07-01 RX ADMIN — Medication 1 APPLICATION(S): at 12:57

## 2023-07-01 RX ADMIN — Medication 1: at 17:38

## 2023-07-01 RX ADMIN — Medication 81 MILLIGRAM(S): at 12:58

## 2023-07-01 RX ADMIN — HEPARIN SODIUM 3000 UNIT(S): 5000 INJECTION INTRAVENOUS; SUBCUTANEOUS at 19:30

## 2023-07-01 RX ADMIN — HEPARIN SODIUM 6000 UNIT(S): 5000 INJECTION INTRAVENOUS; SUBCUTANEOUS at 12:20

## 2023-07-01 RX ADMIN — HEPARIN SODIUM 1300 UNIT(S)/HR: 5000 INJECTION INTRAVENOUS; SUBCUTANEOUS at 12:21

## 2023-07-01 RX ADMIN — BRIVARACETAM 25 MILLIGRAM(S): 25 TABLET, FILM COATED ORAL at 05:30

## 2023-07-01 RX ADMIN — CHLORHEXIDINE GLUCONATE 1 APPLICATION(S): 213 SOLUTION TOPICAL at 12:57

## 2023-07-01 RX ADMIN — HEPARIN SODIUM 1500 UNIT(S)/HR: 5000 INJECTION INTRAVENOUS; SUBCUTANEOUS at 19:27

## 2023-07-01 RX ADMIN — BRIVARACETAM 25 MILLIGRAM(S): 25 TABLET, FILM COATED ORAL at 17:38

## 2023-07-01 RX ADMIN — Medication 2: at 23:45

## 2023-07-01 RX ADMIN — MODAFINIL 150 MILLIGRAM(S): 200 TABLET ORAL at 13:15

## 2023-07-01 RX ADMIN — HEPARIN SODIUM 1500 UNIT(S)/HR: 5000 INJECTION INTRAVENOUS; SUBCUTANEOUS at 19:36

## 2023-07-01 RX ADMIN — Medication 12.5 MILLIGRAM(S): at 05:30

## 2023-07-01 RX ADMIN — ZINC SULFATE TAB 220 MG (50 MG ZINC EQUIVALENT) 220 MILLIGRAM(S): 220 (50 ZN) TAB at 12:58

## 2023-07-01 RX ADMIN — PANTOPRAZOLE SODIUM 40 MILLIGRAM(S): 20 TABLET, DELAYED RELEASE ORAL at 12:58

## 2023-07-01 NOTE — PROGRESS NOTE ADULT - SUBJECTIVE AND OBJECTIVE BOX
Patient is a 78y old  Male who presents with a chief complaint of acute systolic CHF (01 Jul 2023 10:42)      Patient seen and examined at bedside. No events overnight. Patient seen in dialysis room with ICU team. He is able to track but does not talk much, appears comfortable. Unable to obtain ROS due to mental status. Tele monitor reviewed- afib 80-90    ALLERGIES:  sulfa drugs (Unknown)    MEDICATIONS  (STANDING):  aspirin  chewable 81 milliGRAM(s) Oral daily  brivaracetam Oral Solution 25 milliGRAM(s) Oral two times a day  chlorhexidine 2% Cloths 1 Application(s) Topical daily  Dakins Solution - 1/2 Strength 1 Application(s) Topical daily  dextrose 5%. 1000 milliLiter(s) (50 mL/Hr) IV Continuous <Continuous>  dextrose 5%. 1000 milliLiter(s) (100 mL/Hr) IV Continuous <Continuous>  dextrose 50% Injectable 25 Gram(s) IV Push once  dextrose 50% Injectable 12.5 Gram(s) IV Push once  dextrose 50% Injectable 25 Gram(s) IV Push once  epoetin priyanka-epbx (RETACRIT) Injectable 27722 Unit(s) SubCutaneous every 7 days  heparin   Injectable 6000 Unit(s) IV Push once  heparin  Infusion.  Unit(s)/Hr (13 mL/Hr) IV Continuous <Continuous>  insulin lispro (ADMELOG) corrective regimen sliding scale   SubCutaneous every 6 hours  metoprolol tartrate 12.5 milliGRAM(s) Oral every 12 hours  modafinil 150 milliGRAM(s) Oral every 24 hours  multivitamin/minerals/iron Oral Solution (CENTRUM) 15 milliLiter(s) Oral daily  pantoprazole   Suspension 40 milliGRAM(s) Oral daily  zinc sulfate 220 milliGRAM(s) Oral daily    MEDICATIONS  (PRN):  acetaminophen   Oral Liquid .. 650 milliGRAM(s) Oral every 6 hours PRN Temp greater or equal to 38C (100.4F)  heparin   Injectable 6000 Unit(s) IV Push every 6 hours PRN For aPTT less than 40  heparin   Injectable 3000 Unit(s) IV Push every 6 hours PRN For aPTT between 40 - 57  sodium chloride 0.9% lock flush 10 milliLiter(s) IV Push every 1 hour PRN Pre/post blood products, medications, blood draw, and to maintain line patency    Vital Signs Last 24 Hrs  T(F): 98 (01 Jul 2023 08:05), Max: 98.5 (01 Jul 2023 05:00)  HR: 61 (01 Jul 2023 08:05) (61 - 102)  BP: 122/70 (01 Jul 2023 08:05) (108/66 - 123/63)  RR: 16 (01 Jul 2023 08:05) (14 - 18)  SpO2: 100% (01 Jul 2023 08:05) (96% - 100%)  I&O's Summary    30 Jun 2023 07:01  -  01 Jul 2023 07:00  --------------------------------------------------------  IN: 0 mL / OUT: 350 mL / NET: -350 mL        PHYSICAL EXAM:  GENERAL: NAD, laying in bed, +santos with some urine output, on 1L NC  HEAD:  Atraumatic, Normocephalic  EYES: PEERL, conjunctiva and sclera clear  ENMT: Moist mucous membranes, temp cath + R side  CHEST/LUNG: Clear to auscultation bilaterally, good air entry, non-labored breathing  HEART: irregular rate and rhythm; S1/S2, No murmur  ABDOMEN: Soft, Nontender, Nondistended; Bowel sounds present, + PEG  EXTREMITIES: No calf tenderness, No cyanosis, No edema  SKIN: Warm, perfused    LABS:                        8.5    7.48  )-----------( 230      ( 01 Jul 2023 08:30 )             27.1     07-01    136  |  99  |  52  ----------------------------<  121  3.7   |  31  |  2.75    Ca    8.1      01 Jul 2023 08:30  Phos  3.6     06-30  Mg     2.0     06-30    TPro  6.8  /  Alb  1.7  /  TBili  0.4  /  DBili  x   /  AST  15  /  ALT  <6  /  AlkPhos  113  07-01      PT/INR - ( 01 Jul 2023 09:45 )   PT: 14.8 sec;   INR: 1.27 ratio         PTT - ( 01 Jul 2023 09:45 )  PTT:27.7 sec        05-07 Chol 172 mg/dL LDL -- HDL 39 mg/dL Trig 87 mg/dL              POCT Blood Glucose.: 98 mg/dL (01 Jul 2023 05:11)  POCT Blood Glucose.: 106 mg/dL (30 Jun 2023 23:52)  POCT Blood Glucose.: 255 mg/dL (30 Jun 2023 17:17)  POCT Blood Glucose.: 133 mg/dL (30 Jun 2023 11:52)      Urinalysis Basic - ( 01 Jul 2023 08:30 )    Color: x / Appearance: x / SG: x / pH: x  Gluc: 121 mg/dL / Ketone: x  / Bili: x / Urobili: x   Blood: x / Protein: x / Nitrite: x   Leuk Esterase: x / RBC: x / WBC x   Sq Epi: x / Non Sq Epi: x / Bacteria: x        COVID-19 PCR: NotDetec (06-11-23 @ 12:25)      RADIOLOGY & ADDITIONAL TESTS:    Care Discussed with Consultants/Other Providers:

## 2023-07-01 NOTE — PROGRESS NOTE ADULT - ASSESSMENT
78 year old man PMHx dementia, HTN, cardiomyopathy, HFrEF, CAD s/p CABG, with known current RCA occlusion, and DM type 2 who was admitted to [facility] on 5/4 with hypoxic respiratory failure in setting of CHF exacerbation and ANISH with course complicated by acute hypoxic respiratory failure in setting of choking episode causing cardiac arrest, shock, worsening hypoxemic respiratory failure, and NSTEMI on 5/9. Pt ICU course noted post cardiac arrest anoxic brain injury and myoclonus.  EEG findings consistent with stimulus induced myoclonus and GPDs s/p hypoxic diffuse indicative of severe cerebral dysfunction.  Patient returned to [facility] ICU on 5/19 and continues tx.  S/P PEG placement 6/28/23, GI called regarding resumption of AC for AFIB.

## 2023-07-01 NOTE — PROGRESS NOTE ADULT - SUBJECTIVE AND OBJECTIVE BOX
Follow-up Critical Care Progress Note  Chief Complaint : Atrial fibrillation      patient seen and examined  alert, non verbal        Allergies :sulfa drugs (Unknown)      PAST MEDICAL & SURGICAL HISTORY:  HTN (hypertension)    HLD (hyperlipidemia)    Diabetes    CAD (coronary artery disease)    History of cholecystectomy        Medications:  MEDICATIONS  (STANDING):  aspirin  chewable 81 milliGRAM(s) Oral daily  brivaracetam Oral Solution 25 milliGRAM(s) Oral two times a day  chlorhexidine 2% Cloths 1 Application(s) Topical daily  Dakins Solution - 1/2 Strength 1 Application(s) Topical daily  dextrose 5%. 1000 milliLiter(s) (50 mL/Hr) IV Continuous <Continuous>  dextrose 5%. 1000 milliLiter(s) (100 mL/Hr) IV Continuous <Continuous>  dextrose 50% Injectable 25 Gram(s) IV Push once  dextrose 50% Injectable 12.5 Gram(s) IV Push once  dextrose 50% Injectable 25 Gram(s) IV Push once  epoetin priyanka-epbx (RETACRIT) Injectable 59556 Unit(s) SubCutaneous every 7 days  heparin   Injectable 6000 Unit(s) IV Push once  heparin  Infusion.  Unit(s)/Hr (13 mL/Hr) IV Continuous <Continuous>  insulin lispro (ADMELOG) corrective regimen sliding scale   SubCutaneous every 6 hours  metoprolol tartrate 12.5 milliGRAM(s) Oral every 12 hours  modafinil 150 milliGRAM(s) Oral every 24 hours  multivitamin/minerals/iron Oral Solution (CENTRUM) 15 milliLiter(s) Oral daily  pantoprazole   Suspension 40 milliGRAM(s) Oral daily  zinc sulfate 220 milliGRAM(s) Oral daily    MEDICATIONS  (PRN):  acetaminophen   Oral Liquid .. 650 milliGRAM(s) Oral every 6 hours PRN Temp greater or equal to 38C (100.4F)  heparin   Injectable 6000 Unit(s) IV Push every 6 hours PRN For aPTT less than 40  heparin   Injectable 3000 Unit(s) IV Push every 6 hours PRN For aPTT between 40 - 57  sodium chloride 0.9% lock flush 10 milliLiter(s) IV Push every 1 hour PRN Pre/post blood products, medications, blood draw, and to maintain line patency      Antibiotics History  ceFAZolin   IVPB 2000 milliGRAM(s) IV Intermittent once, 06-12-23 @ 07:00, Stop order after: 1 Doses  ceFAZolin   IVPB 2000 milliGRAM(s) IV Intermittent once, 06-28-23 @ 09:54, Stop order after: 1 Doses  meropenem  IVPB 500 milliGRAM(s) IV Intermittent every 24 hours, 06-23-23 @ 10:31, Stop order after: 7 Days  meropenem  IVPB 500 milliGRAM(s) IV Intermittent every 12 hours, 06-23-23 @ 11:09, Stop order after: 7 Days      Heme Medications   aspirin  chewable 81 milliGRAM(s) Oral daily, 06-24-23 @ 10:36  heparin   Injectable 6000 Unit(s) IV Push once, 07-01-23 @ 10:15  heparin   Injectable 6000 Unit(s) IV Push every 6 hours, 07-01-23 @ 10:15 PRN  heparin   Injectable 3000 Unit(s) IV Push every 6 hours, 07-01-23 @ 10:15 PRN  heparin  Infusion.  Unit(s)/Hr IV Continuous <Continuous>, 07-01-23 @ 10:15      GI Medications  pantoprazole   Suspension 40 milliGRAM(s) Oral daily, 06-22-23 @ 00:00,       COVID  06-11-23 @ 12:25  COVID -   NotDetec  05-04-23 @ 19:50  COVID -   NotDetec  07-13-22 @ 18:33  COVID -   NotDete      COVID Biomarkers    06-12-23 @ 05:00 ESR --  ---  CRP --  ---  DDimer  --   ---      ---   Ferritin --    05-04-23 @ 19:50 ESR --  ---  CRP --  ---  DDimer  351<H>   ---   LDH --   ---   Ferritin --         Trend BNP  06-28-23 @ 06:00   -  >82304<H>  06-27-23 @ 05:00   -  >00555<H>  06-20-23 @ 06:10   -  >44817<H>  06-14-23 @ 06:00   -  >93998<H>  06-12-23 @ 05:00   -  70386<H>  06-11-23 @ 06:15   -  >49465<H>    Procalcitonin Trend  06-05-23 @ 05:30   -   0.24<H>  06-04-23 @ 06:00   -   0.20<H>  05-17-23 @ 23:44   -   0.59<H>  05-16-23 @ 22:16   -   0.74<H>  05-15-23 @ 05:45   -   1.12<H>  05-09-23 @ 13:42   -   0.11<H>    WBC Trend  07-01-23 @ 08:30   -  7.48  06-30-23 @ 06:40   -  7.63  06-29-23 @ 06:00   -  7.49    H/H Trend  07-01-23 @ 08:30   -   8.5<L>/ 27.1<L>  06-30-23 @ 06:40   -   8.8<L>/ 28.8<L>  06-29-23 @ 06:00   -   8.7<L>/ 29.0<L>  06-28-23 @ 06:00   -   8.3<L>/ 27.1<L>  06-27-23 @ 05:00   -   8.4<L>/ 27.6<L>  06-26-23 @ 05:00   -   8.4<L>/ 27.4<L>    Stool Occult Blood  06-15-23 @ 13:54   -   Negative  06-02-23 @ 08:20   -   Negative  05-25-23 @ 18:20   -   Negative  05-16-23 @ 17:00   -   Positive<!>    Platelet Trend  07-01-23 @ 08:30   -  230  06-30-23 @ 06:40   -  233  06-29-23 @ 06:00   -  219    Trend Sodium  07-01-23 @ 08:30   -  136  06-30-23 @ 06:40   -  137  06-29-23 @ 06:00   -  138    Trend Potassium  07-01-23 @ 08:30   -  3.7  06-30-23 @ 06:40   -  3.9  06-29-23 @ 06:00   -  4.0    Trend Bun/Cr  07-01-23 @ 08:30  BUN/CR -  52<H> / 2.75<H>  06-30-23 @ 06:40  BUN/CR -  36<H> / 2.13<H>  06-29-23 @ 06:00  BUN/CR -  56<H> / 2.83<H>     ABG Trend  06-21-23 @ 05:35   - 7.38/47/72<L>/96.4  06-16-23 @ 05:00   - 7.43/41/119<H>/99.4<H>  06-12-23 @ 05:07   - 7.46<H>/37/115<H>/99.3<H>  06-10-23 @ 04:45   - 7.42/41/78<L>/96.6  06-09-23 @ 10:50   - 7.39/40/86/98.9<H>     Trend AST/ALT/ALK Phos/Bili  07-01-23 @ 08:30   15/<6<L>/113/0.4  06-30-23 @ 06:40   20/8<L>/112/0.4  06-29-23 @ 06:00   19/6<L>/108/0.4  06-28-23 @ 06:00   17/11/109/0.4  06-27-23 @ 05:00   12/9<L>/115/0.4  06-24-23 @ 05:30   22/14/119/0.7  06-23-23 @ 06:00   19/12/125<H>/0.7  06-22-23 @ 05:00   12/11/104/0.4  06-21-23 @ 05:50   10/9<L>/98/0.4  06-20-23 @ 06:10   17/6<L>/123<H>/0.4  06-19-23 @ 05:20   14/10/120/0.6  06-18-23 @ 05:30   15/9<L>/116/0.6      Ammonia Trend  06-25-23 @ 12:10   -   48  06-21-23 @ 05:50   -   92<H>  05-14-23 @ 11:30   -   53  05-14-23 @ 05:00   -   16         Albumin Trend  07-01-23 @ 08:30   -   1.7<L>  06-30-23 @ 06:40   -   1.7<L>  06-29-23 @ 06:00   -   1.7<L>  06-28-23 @ 06:00   -   1.7<L>  06-27-23 @ 05:00   -   1.7<L>  06-24-23 @ 05:30   -   2.0<L>      PTT - PT - INR Trend  07-01-23 @ 09:45   -   27.7 - 14.8<H> - 1.27<H>  06-29-23 @ 12:30   -   27.6 - 14.2<H> - 1.22<H>  06-12-23 @ 05:00   -   28.2 - 15.8<H> - 1.36<H>  05-19-23 @ 00:44   -   26.5<L> - 13.2 - 1.15  05-17-23 @ 23:44   -   31.2 - 13.7<H> - 1.18<H>  05-16-23 @ 22:16   -   31.3 - 13.6<H> - 1.18<H>    Glucose Trend  07-01-23 @ 08:30   -  121<H> -- --  07-01-23 @ 05:11   -  -- -- 98  06-30-23 @ 23:52   -  -- -- 106<H>  06-30-23 @ 17:17   -  -- -- 255<H>  06-30-23 @ 11:52   -  -- -- 133<H>  06-30-23 @ 06:40   -  121<H> -- --  06-30-23 @ 06:17   -  -- -- 124<H>  06-30-23 @ 00:26   -  -- -- 136<H>  06-29-23 @ 17:14   -  -- -- 106<H>  06-29-23 @ 12:17   -  -- -- 101<H>    A1C with Estimated Average Glucose Result: 7.7 % *H* [4.0 - 5.6] (05-05-23 @ 08:00)      LABS:                        8.5    7.48  )-----------( 230      ( 01 Jul 2023 08:30 )             27.1     07-01    136  |  99  |  52<H>  ----------------------------<  121<H>  3.7   |  31  |  2.75<H>    Ca    8.1<L>      01 Jul 2023 08:30  Phos  3.6     06-30  Mg     2.0     06-30    TPro  6.8  /  Alb  1.7<L>  /  TBili  0.4  /  DBili  x   /  AST  15  /  ALT  <6<L>  /  AlkPhos  113  07-01            PT/INR - ( 01 Jul 2023 09:45 )   PT: 14.8 sec;   INR: 1.27 ratio         PTT - ( 01 Jul 2023 09:45 )  PTT:27.7 sec  Urinalysis Basic - ( 01 Jul 2023 08:30 )    Color: x / Appearance: x / SG: x / pH: x  Gluc: 121 mg/dL / Ketone: x  / Bili: x / Urobili: x   Blood: x / Protein: x / Nitrite: x   Leuk Esterase: x / RBC: x / WBC x   Sq Epi: x / Non Sq Epi: x / Bacteria: x           VITALS:  T(C): 36.7 (07-01-23 @ 08:05), Max: 36.9 (07-01-23 @ 05:00)  T(F): 98 (07-01-23 @ 08:05), Max: 98.5 (07-01-23 @ 05:00)  HR: 61 (07-01-23 @ 08:05) (61 - 102)  BP: 122/70 (07-01-23 @ 08:05) (108/66 - 123/63)  BP(mean): --  ABP: --  ABP(mean): --  RR: 16 (07-01-23 @ 08:05) (14 - 18)  SpO2: 100% (07-01-23 @ 08:05) (96% - 100%)  CVP(mm Hg): --  CVP(cm H2O): --    Ins and Outs     06-30-23 @ 07:01  -  07-01-23 @ 07:00  --------------------------------------------------------  IN: 0 mL / OUT: 350 mL / NET: -350 mL        Height (cm): 160 (06-28-23 @ 11:22)  Weight (kg): 72.2 (06-28-23 @ 11:22)  BMI (kg/m2): 28.2 (06-28-23 @ 11:22)        I&O's Detail    30 Jun 2023 07:01  -  01 Jul 2023 07:00  --------------------------------------------------------  IN:  Total IN: 0 mL    OUT:    Voided (mL): 350 mL  Total OUT: 350 mL    Total NET: -350 mL

## 2023-07-01 NOTE — PROGRESS NOTE ADULT - SUBJECTIVE AND OBJECTIVE BOX
INTERVAL HPI/OVERNIGHT EVENTS:  Patient seen and examined at bed side. As per RN no event over night. S/P EGD with PEG placement. HD today.     MEDICATIONS  (STANDING):  aspirin  chewable 81 milliGRAM(s) Oral daily  brivaracetam Oral Solution 25 milliGRAM(s) Oral two times a day  chlorhexidine 2% Cloths 1 Application(s) Topical daily  Dakins Solution - 1/2 Strength 1 Application(s) Topical daily  epoetin priyanka-epbx (RETACRIT) Injectable 29238 Unit(s) SubCutaneous every 7 days  insulin lispro (ADMELOG) corrective regimen sliding scale   SubCutaneous every 6 hours  metoprolol tartrate 12.5 milliGRAM(s) Oral every 12 hours  modafinil 150 milliGRAM(s) Oral every 24 hours  multivitamin/minerals/iron Oral Solution (CENTRUM) 15 milliLiter(s) Oral daily  pantoprazole   Suspension 40 milliGRAM(s) Oral daily  zinc sulfate 220 milliGRAM(s) Oral daily    MEDICATIONS  (PRN):  acetaminophen   Oral Liquid .. 650 milliGRAM(s) Oral every 6 hours PRN Temp greater or equal to 38C (100.4F)  sodium chloride 0.9% lock flush 10 milliLiter(s) IV Push every 1 hour PRN Pre/post blood products, medications, blood draw, and to maintain line patency      Allergies sulfa drugs (Unknown)      PAST MEDICAL & SURGICAL HISTORY:  HTN (hypertension)  HLD (hyperlipidemia)  Diabetes  CAD (coronary artery disease)      PHYSICAL EXAM:   Vital Signs Last 24 Hrs  T(C): 36.7 (01 Jul 2023 08:05), Max: 36.9 (01 Jul 2023 05:00)  T(F): 98 (01 Jul 2023 08:05), Max: 98.5 (01 Jul 2023 05:00)  HR: 61 (01 Jul 2023 08:05) (61 - 102)  BP: 122/70 (01 Jul 2023 08:05) (108/66 - 123/63)  RR: 16 (01 Jul 2023 08:05) (14 - 18)  SpO2: 100% (01 Jul 2023 08:05) (96% - 100%)    Parameters below as of 01 Jul 2023 08:05  Patient On (Oxygen Delivery Method): nasal cannula  O2 Flow (L/min): 1        GENERAL:  Appears stated age  HEENT:  NC/AT,  conjunctivae clear and pink  CHEST:  Full & symmetric excursion  HEART:  Regular rhythm, S1, S2  ABDOMEN:  Soft, non-tender, non-distended, normoactive bowel sounds, PEG tube intact, site without erythema, drng  EXTREMITIES  no cyanosis, clubbing or edema  SKIN:  No rash/warm/dry  NEURO:  Alert    LABS:  Hemoglobin: 8.5 g/dL (07-01 @ 08:30)  Hemoglobin: 8.8 g/dL (06-30 @ 06:40)  Hemoglobin: 8.7 g/dL (06-29 @ 06:00)  Hemoglobin: 8.3 g/dL (06-28 @ 06:00)  Hemoglobin: 8.4 g/dL (06-27 @ 05:00)                           8.5    7.48  )-----------( 230      ( 01 Jul 2023 08:30 )             27.1       07-01    136  |  99  |  52<H>  ----------------------------<  121<H>  3.7   |  31  |  2.75<H>    Ca    8.1<L>      01 Jul 2023 08:30  Phos  3.6     06-30  Mg     2.0     06-30    TPro  6.8  /  Alb  1.7<L>  /  TBili  0.4  /  DBili  x   /  AST  15  /  ALT  <6<L>  /  AlkPhos  113  07-01   INTERVAL HPI/OVERNIGHT EVENTS:  Patient seen and examined at bed side. As per RN no event over night.   S/P EGD with PEG placement.   HD today.     MEDICATIONS  (STANDING):  aspirin  chewable 81 milliGRAM(s) Oral daily  brivaracetam Oral Solution 25 milliGRAM(s) Oral two times a day  chlorhexidine 2% Cloths 1 Application(s) Topical daily  Dakins Solution - 1/2 Strength 1 Application(s) Topical daily  epoetin priyanka-epbx (RETACRIT) Injectable 26399 Unit(s) SubCutaneous every 7 days  insulin lispro (ADMELOG) corrective regimen sliding scale   SubCutaneous every 6 hours  metoprolol tartrate 12.5 milliGRAM(s) Oral every 12 hours  modafinil 150 milliGRAM(s) Oral every 24 hours  multivitamin/minerals/iron Oral Solution (CENTRUM) 15 milliLiter(s) Oral daily  pantoprazole   Suspension 40 milliGRAM(s) Oral daily  zinc sulfate 220 milliGRAM(s) Oral daily    MEDICATIONS  (PRN):  acetaminophen   Oral Liquid .. 650 milliGRAM(s) Oral every 6 hours PRN Temp greater or equal to 38C (100.4F)  sodium chloride 0.9% lock flush 10 milliLiter(s) IV Push every 1 hour PRN Pre/post blood products, medications, blood draw, and to maintain line patency      Allergies sulfa drugs (Unknown)      PAST MEDICAL & SURGICAL HISTORY:  HTN (hypertension)  HLD (hyperlipidemia)  Diabetes  CAD (coronary artery disease)      PHYSICAL EXAM:   Vital Signs Last 24 Hrs  T(C): 36.7 (01 Jul 2023 08:05), Max: 36.9 (01 Jul 2023 05:00)  T(F): 98 (01 Jul 2023 08:05), Max: 98.5 (01 Jul 2023 05:00)  HR: 61 (01 Jul 2023 08:05) (61 - 102)  BP: 122/70 (01 Jul 2023 08:05) (108/66 - 123/63)  RR: 16 (01 Jul 2023 08:05) (14 - 18)  SpO2: 100% (01 Jul 2023 08:05) (96% - 100%)    Parameters below as of 01 Jul 2023 08:05  Patient On (Oxygen Delivery Method): nasal cannula  O2 Flow (L/min): 1        GENERAL:  Appears stated age  HEENT:  NC/AT,  conjunctivae clear and pink  CHEST:  Full & symmetric excursion  HEART:  Regular rhythm, S1, S2  ABDOMEN:  Soft, non-tender, non-distended, normoactive bowel sounds, PEG tube intact, site without erythema or drainage  EXTREMITIES  no cyanosis, clubbing or edema  SKIN:  No rash/warm/dry  NEURO:  Alert    LABS:  Hemoglobin: 8.5 g/dL (07-01 @ 08:30)  Hemoglobin: 8.8 g/dL (06-30 @ 06:40)  Hemoglobin: 8.7 g/dL (06-29 @ 06:00)  Hemoglobin: 8.3 g/dL (06-28 @ 06:00)  Hemoglobin: 8.4 g/dL (06-27 @ 05:00)                           8.5    7.48  )-----------( 230      ( 01 Jul 2023 08:30 )             27.1       07-01    136  |  99  |  52<H>  ----------------------------<  121<H>  3.7   |  31  |  2.75<H>    Ca    8.1<L>      01 Jul 2023 08:30  Phos  3.6     06-30  Mg     2.0     06-30    TPro  6.8  /  Alb  1.7<L>  /  TBili  0.4  /  DBili  x   /  AST  15  /  ALT  <6<L>  /  AlkPhos  113  07-01

## 2023-07-01 NOTE — PROGRESS NOTE ADULT - ASSESSMENT
78M PMH dementia, HTN, cardiomyopathy, CHF, CAD s/p CABG with history of RCA occlusion, DM2, presented initially to [facility] for Acute hypoxic respiratory failure in setting of choking, s/p cardiac arrest, anoxic brain injury and myoclonus, subsequently developed renal failure as a result.      Patient now post ICU and medically stable although requiring supplemental O2 1L NC and BIPAP at night for support.    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  - appears awake but not conversant, pulling at lines and with bilateral mittens  - cont briviact    #dysphagia  - s/p PEG placement without complications  - abdominal binder for safety  - continue tube feeds  - cleared from GI to restart AC    #afib  - on Eliquis at home, stopped AC for PEG  - arranged for permacath placement on Monday  - will start on heparin gtt for now pending permacath, stop tomorrow  - continue lopressor 12.5mg BID    #ESRD on HD  - cont dialysis as per nephro  - plan for permacath placement on Monday at [facility]    #Heart failure with reduced ejection fraction-   - consider starting coreg/entresto when able to  - cont aspirin    #Sacral wound  - daily dressing changes  - dakins topical and wound care  - plastics recs appreciated    #DVT ppx  - on heparin gtt    spoke to daughter Kuldeep Ordonez [phone] at bedside, updated on patient's clinical status

## 2023-07-01 NOTE — PROGRESS NOTE ADULT - SUBJECTIVE AND OBJECTIVE BOX
[facility] NEPHROLOGY SERVICES, [facility]  NEPHROLOGY AND HYPERTENSION  [address]  [address]  [place] [address]  [phone]    MD ALEX NORMAN MD YELENA ROSENBERG, MD BINNY KOSHY, MD CHRISTOPHER CAPUTO, MD EDWARD BOVER, MD          Patient events noted    MEDICATIONS  (STANDING):  aspirin  chewable 81 milliGRAM(s) Oral daily  brivaracetam Oral Solution 25 milliGRAM(s) Oral two times a day  chlorhexidine 2% Cloths 1 Application(s) Topical daily  Dakins Solution - 1/2 Strength 1 Application(s) Topical daily  dextrose 5%. 1000 milliLiter(s) (50 mL/Hr) IV Continuous <Continuous>  dextrose 5%. 1000 milliLiter(s) (100 mL/Hr) IV Continuous <Continuous>  dextrose 50% Injectable 25 Gram(s) IV Push once  dextrose 50% Injectable 12.5 Gram(s) IV Push once  dextrose 50% Injectable 25 Gram(s) IV Push once  epoetin priyanka-epbx (RETACRIT) Injectable 92044 Unit(s) SubCutaneous every 7 days  heparin  Infusion.  Unit(s)/Hr (13 mL/Hr) IV Continuous <Continuous>  insulin lispro (ADMELOG) corrective regimen sliding scale   SubCutaneous every 6 hours  metoprolol tartrate 12.5 milliGRAM(s) Oral every 12 hours  modafinil 150 milliGRAM(s) Oral every 24 hours  multivitamin/minerals/iron Oral Solution (CENTRUM) 15 milliLiter(s) Oral daily  pantoprazole   Suspension 40 milliGRAM(s) Oral daily  zinc sulfate 220 milliGRAM(s) Oral daily    MEDICATIONS  (PRN):  acetaminophen   Oral Liquid .. 650 milliGRAM(s) Oral every 6 hours PRN Temp greater or equal to 38C (100.4F)  heparin   Injectable 6000 Unit(s) IV Push every 6 hours PRN For aPTT less than 40  heparin   Injectable 3000 Unit(s) IV Push every 6 hours PRN For aPTT between 40 - 57  sodium chloride 0.9% lock flush 10 milliLiter(s) IV Push every 1 hour PRN Pre/post blood products, medications, blood draw, and to maintain line patency      06-30-23 @ 07:01  -  07-01-23 @ 07:00  --------------------------------------------------------  IN: 0 mL / OUT: 350 mL / NET: -350 mL    07-01-23 @ 07:01  -  07-01-23 @ 19:33  --------------------------------------------------------  IN: 0 mL / OUT: 1000 mL / NET: -1000 mL      PHYSICAL EXAM:      T(C): 36.7 (07-01-23 @ 12:00), Max: 36.9 (07-01-23 @ 05:00)  HR: 84 (07-01-23 @ 17:29) (61 - 102)  BP: 122/66 (07-01-23 @ 17:29) (108/66 - 126/69)  RR: 19 (07-01-23 @ 12:00) (16 - 20)  SpO2: 99% (07-01-23 @ 12:00) (99% - 100%)  Wt(kg): --  Lungs clear  Heart S1S2  Abd soft NT ND  Extremities:   tr edema                                    8.7    6.28  )-----------( 228      ( 01 Jul 2023 18:35 )             28.5     07-01    136  |  99  |  52<H>  ----------------------------<  121<H>  3.7   |  31  |  2.75<H>    Ca    8.1<L>      01 Jul 2023 08:30  Phos  3.6     06-30  Mg     2.0     06-30    TPro  6.8  /  Alb  1.7<L>  /  TBili  0.4  /  DBili  x   /  AST  15  /  ALT  <6<L>  /  AlkPhos  113  07-01      LIVER FUNCTIONS - ( 01 Jul 2023 08:30 )  Alb: 1.7 g/dL / Pro: 6.8 g/dL / ALK PHOS: 113 U/L / ALT: <6 U/L / AST: 15 U/L / GGT: x           Creatinine Trend: 2.75<--, 2.13<--, 2.83<--, 2.23<--, 3.24<--, 2.98<--      Assessment   ESRD; maintnance    Plan:  Hd for today  UF as tolerated   Will follow     Avila Ross MD

## 2023-07-01 NOTE — PROGRESS NOTE ADULT - PROBLEM SELECTOR PLAN 2
--No GI contraindication to resumption of AC for AFIB  --patient requires HD access and is awaiting permacath tentatively planned for transfer Monday  favor heparin while awaiting procedure  -[facility] site as per protocol

## 2023-07-01 NOTE — PROGRESS NOTE ADULT - ASSESSMENT
Physical Examination:  GENERAL:               Eyes open no distress  HEENT:                    No JVD, Dry MM  PULM:                     Bilateral air entry, course and diminished  to auscultation bilaterally, mild sputum production, trace  rales , No Rhonchi, No Wheezing  CVS:                         S1, S2,  +Murmur  ABD:                        Soft, nondistended, nontender, normoactive bowel sounds,   EXT:                         no  edema, nontender, No Cyanosis or Clubbing   NEURO:                alert , mildy verbal, not following commands no  Myoclonus   PSYC:                      Calm, No Insight.      Assessment  78 year old male with a PMH of dementia, HTN, cardiomyopathy, HFrEF, CAD s/p CABG, with known current RCA occlusion, and DM type 2 who was admitted to [facility] on 5/4 with hypoxic respiratory failure in setting of CHF exacerbation and ANISH with course complicated by acute hypoxic respiratory failure in setting of choking episode causing cardiac arrest, shock, worsening hypoxemic respiratory failure, and NSTEMI on 5/9. Pt ICU course noted post cardiac arrest anoxic brain injury and myoclonus. Pt was transferred to [facility] for VEEG which required 48 hours of monitoring as he was noted to still be sedated. While there pt noted to have hematochezia and any ac / antiplatelet agents were held at that time. EEG findings consistent with stimulus induced myoclonus and GPDs s/p hypoxic diffuse indicative of severe cerebral dysfunction.    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  Acute renal failure now on HD, baseline CKD    Heart failure with reduced EF  NSTEMI post arrest   b/l Pl effusion, s/p left chest tube now s/p removal   Dysphagia s/p PEG 6/28/23  Underlying PMH of dementia, HTN, cardiomyopathy, HFrEF, CAD s/p CABG, with known current RCA occlusion, and DM type 2      Plan:    monitor off NIV  N/C O2 to maintain sat,   monitor pulse ox  neuro status appears to be new baseline  family wants prolonged HD will need permcath placement  Continue Modafinil  HD as per renal-    Eliquis held for eventual permcath placement, may need to start heparin pending procedure monday  OOB to chair when bale   monitor blood glucose levels, + ISS coverage  Sacral decub - wound care   Venodyne for dvt ppx     GOC ongoing discussion with family ,now DNR/I   PT/OT for acute rehab eval     at this time there is no pulmonary/critical care for contraindication for transfer for [facility] placement   D/w Floor team   PMD:				                   Notified(Date):  Family Updated: 	Kuldeep [phone]	                                 Date:  6/29/2023  discussed plan for PEG in am, necessary procedure with peg     Restraints may be deemed necessary to prevent removal of life-sustaining devices [ x ] YES   [   ]  NO  Disposition Continue care on medical floor  Goals of Care: DNR/I     D/w Dr. Teran

## 2023-07-01 NOTE — PROGRESS NOTE ADULT - PROBLEM SELECTOR PLAN 1
S/P PEG placement with EGD   Keep HOB elevated during feeding.   Place abdominal binder to avoid Pulling peg tube accidently   PEG site care daily avoid gauze in-between the peg hub and skin

## 2023-07-02 LAB
ANION GAP SERPL CALC-SCNC: 8 MMOL/L — SIGNIFICANT CHANGE UP (ref 5–17)
APTT BLD: 58.9 SEC — HIGH (ref 27.5–35.5)
APTT BLD: 62.9 SEC — HIGH (ref 27.5–35.5)
BASOPHILS # BLD AUTO: 0.06 K/UL — SIGNIFICANT CHANGE UP (ref 0–0.2)
BASOPHILS NFR BLD AUTO: 1 % — SIGNIFICANT CHANGE UP (ref 0–2)
BUN SERPL-MCNC: 39 MG/DL — HIGH (ref 7–23)
CALCIUM SERPL-MCNC: 8 MG/DL — LOW (ref 8.4–10.5)
CHLORIDE SERPL-SCNC: 98 MMOL/L — SIGNIFICANT CHANGE UP (ref 96–108)
CO2 SERPL-SCNC: 29 MMOL/L — SIGNIFICANT CHANGE UP (ref 22–31)
CREAT SERPL-MCNC: 2.3 MG/DL — HIGH (ref 0.5–1.3)
EGFR: 28 ML/MIN/1.73M2 — LOW
EOSINOPHIL # BLD AUTO: 0.08 K/UL — SIGNIFICANT CHANGE UP (ref 0–0.5)
EOSINOPHIL NFR BLD AUTO: 1.3 % — SIGNIFICANT CHANGE UP (ref 0–6)
GLUCOSE BLDC GLUCOMTR-MCNC: 115 MG/DL — HIGH (ref 70–99)
GLUCOSE BLDC GLUCOMTR-MCNC: 143 MG/DL — HIGH (ref 70–99)
GLUCOSE BLDC GLUCOMTR-MCNC: 182 MG/DL — HIGH (ref 70–99)
GLUCOSE BLDC GLUCOMTR-MCNC: 185 MG/DL — HIGH (ref 70–99)
GLUCOSE SERPL-MCNC: 153 MG/DL — HIGH (ref 70–99)
HCT VFR BLD CALC: 26 % — LOW (ref 39–50)
HGB BLD-MCNC: 8 G/DL — LOW (ref 13–17)
IMM GRANULOCYTES NFR BLD AUTO: 0.5 % — SIGNIFICANT CHANGE UP (ref 0–0.9)
LYMPHOCYTES # BLD AUTO: 0.68 K/UL — LOW (ref 1–3.3)
LYMPHOCYTES # BLD AUTO: 11 % — LOW (ref 13–44)
MCHC RBC-ENTMCNC: 30.8 GM/DL — LOW (ref 32–36)
MCHC RBC-ENTMCNC: 31.9 PG — SIGNIFICANT CHANGE UP (ref 27–34)
MCV RBC AUTO: 103.6 FL — HIGH (ref 80–100)
MONOCYTES # BLD AUTO: 0.93 K/UL — HIGH (ref 0–0.9)
MONOCYTES NFR BLD AUTO: 15 % — HIGH (ref 2–14)
NEUTROPHILS # BLD AUTO: 4.41 K/UL — SIGNIFICANT CHANGE UP (ref 1.8–7.4)
NEUTROPHILS NFR BLD AUTO: 71.2 % — SIGNIFICANT CHANGE UP (ref 43–77)
NRBC # BLD: 0 /100 WBCS — SIGNIFICANT CHANGE UP (ref 0–0)
PLATELET # BLD AUTO: 212 K/UL — SIGNIFICANT CHANGE UP (ref 150–400)
POTASSIUM SERPL-MCNC: 3.8 MMOL/L — SIGNIFICANT CHANGE UP (ref 3.5–5.3)
POTASSIUM SERPL-SCNC: 3.8 MMOL/L — SIGNIFICANT CHANGE UP (ref 3.5–5.3)
RBC # BLD: 2.51 M/UL — LOW (ref 4.2–5.8)
RBC # FLD: 17.2 % — HIGH (ref 10.3–14.5)
SODIUM SERPL-SCNC: 135 MMOL/L — SIGNIFICANT CHANGE UP (ref 135–145)
WBC # BLD: 6.19 K/UL — SIGNIFICANT CHANGE UP (ref 3.8–10.5)
WBC # FLD AUTO: 6.19 K/UL — SIGNIFICANT CHANGE UP (ref 3.8–10.5)

## 2023-07-02 PROCEDURE — 99233 SBSQ HOSP IP/OBS HIGH 50: CPT

## 2023-07-02 RX ADMIN — Medication 81 MILLIGRAM(S): at 12:09

## 2023-07-02 RX ADMIN — MODAFINIL 150 MILLIGRAM(S): 200 TABLET ORAL at 12:16

## 2023-07-02 RX ADMIN — HEPARIN SODIUM 1500 UNIT(S)/HR: 5000 INJECTION INTRAVENOUS; SUBCUTANEOUS at 02:04

## 2023-07-02 RX ADMIN — Medication 12.5 MILLIGRAM(S): at 05:08

## 2023-07-02 RX ADMIN — HEPARIN SODIUM 1500 UNIT(S)/HR: 5000 INJECTION INTRAVENOUS; SUBCUTANEOUS at 07:26

## 2023-07-02 RX ADMIN — PANTOPRAZOLE SODIUM 40 MILLIGRAM(S): 20 TABLET, DELAYED RELEASE ORAL at 12:11

## 2023-07-02 RX ADMIN — Medication 15 MILLILITER(S): at 12:10

## 2023-07-02 RX ADMIN — BRIVARACETAM 25 MILLIGRAM(S): 25 TABLET, FILM COATED ORAL at 05:08

## 2023-07-02 RX ADMIN — CHLORHEXIDINE GLUCONATE 1 APPLICATION(S): 213 SOLUTION TOPICAL at 12:09

## 2023-07-02 RX ADMIN — ZINC SULFATE TAB 220 MG (50 MG ZINC EQUIVALENT) 220 MILLIGRAM(S): 220 (50 ZN) TAB at 12:11

## 2023-07-02 RX ADMIN — HEPARIN SODIUM 1500 UNIT(S)/HR: 5000 INJECTION INTRAVENOUS; SUBCUTANEOUS at 12:06

## 2023-07-02 RX ADMIN — Medication 1: at 12:09

## 2023-07-02 RX ADMIN — HEPARIN SODIUM 1500 UNIT(S)/HR: 5000 INJECTION INTRAVENOUS; SUBCUTANEOUS at 03:50

## 2023-07-02 RX ADMIN — Medication 1 APPLICATION(S): at 12:08

## 2023-07-02 RX ADMIN — Medication 1: at 17:50

## 2023-07-02 RX ADMIN — HEPARIN SODIUM 1500 UNIT(S)/HR: 5000 INJECTION INTRAVENOUS; SUBCUTANEOUS at 22:29

## 2023-07-02 RX ADMIN — BRIVARACETAM 25 MILLIGRAM(S): 25 TABLET, FILM COATED ORAL at 17:52

## 2023-07-02 NOTE — PROGRESS NOTE ADULT - ASSESSMENT
Physical Examination:  GENERAL:               Eyes open no distress  HEENT:                    No JVD, Dry MM  PULM:                     Bilateral air entry, course and diminished  to auscultation bilaterally, mild sputum production, trace  rales , No Rhonchi, No Wheezing  CVS:                         S1, S2,  +Murmur  ABD:                        Soft, nondistended, nontender, normoactive bowel sounds,   EXT:                         no  edema, nontender, No Cyanosis or Clubbing   NEURO:                alert , mildy verbal, not following commands mild Myoclonus   PSYC:                      Calm, No Insight.      Assessment  78 year old male with a PMH of dementia, HTN, cardiomyopathy, HFrEF, CAD s/p CABG, with known current RCA occlusion, and DM type 2 who was admitted to [facility] on 5/4 with hypoxic respiratory failure in setting of CHF exacerbation and ANISH with course complicated by acute hypoxic respiratory failure in setting of choking episode causing cardiac arrest, shock, worsening hypoxemic respiratory failure, and NSTEMI on 5/9. Pt ICU course noted post cardiac arrest anoxic brain injury and myoclonus. Pt was transferred to [facility] for VEEG which required 48 hours of monitoring as he was noted to still be sedated. While there pt noted to have hematochezia and any ac / antiplatelet agents were held at that time. EEG findings consistent with stimulus induced myoclonus and GPDs s/p hypoxic diffuse indicative of severe cerebral dysfunction.    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   monitor pulse ox  neuro status appears to be new baseline  family wants prolonged HD will need permcath placement, scheduled in am   Continue Modafinil  HD as per renal-    Eliquis held for eventual permcath placement, on  heparin pending procedure Yimi.  OOB to chair when bale   monitor blood glucose levels, + ISS coverage  Sacral decub - wound care   Venodyne for dvt ppx     GOC ongoing discussion with family ,now DNR/I   PT/OT for acute rehab eval     at this time there is no pulmonary/critical care for contraindication for transfer for [facility] placement   D/w Floor team   PMD:				                   Notified(Date):  Family Updated: 	Kuldeep [phone]	                                 Date:  7/2/2023  discussed plan for PEG in am, necessary procedure with peg      Disposition Continue care on medical floor  Goals of Care: DNR/I     D/w Dr. Teran Physical Examination:  GENERAL:               Eyes open no distress  HEENT:                    No JVD, Dry MM  PULM:                     Bilateral air entry, course and diminished  to auscultation bilaterally, mild sputum production, trace  rales , No Rhonchi, No Wheezing  CVS:                         S1, S2,  +Murmur  ABD:                        Soft, nondistended, nontender, normoactive bowel sounds,   EXT:                         no  edema, nontender, No Cyanosis or Clubbing   NEURO:                alert , mildy verbal, not following commands mild Myoclonus   PSYC:                      Calm, No Insight.      Assessment  78 year old male with a PMH of dementia, HTN, cardiomyopathy, HFrEF, CAD s/p CABG, with known current RCA occlusion, and DM type 2 who was admitted to [facility] on 5/4 with hypoxic respiratory failure in setting of CHF exacerbation and ANISH with course complicated by acute hypoxic respiratory failure in setting of choking episode causing cardiac arrest, shock, worsening hypoxemic respiratory failure, and NSTEMI on 5/9. Pt ICU course noted post cardiac arrest anoxic brain injury and myoclonus. Pt was transferred to [facility] for VEEG which required 48 hours of monitoring as he was noted to still be sedated. While there pt noted to have hematochezia and any ac / antiplatelet agents were held at that time. EEG findings consistent with stimulus induced myoclonus and GPDs s/p hypoxic diffuse indicative of severe cerebral dysfunction.    Patient returned to 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   monitor pulse ox  neuro status appears to be new baseline  family wants prolonged HD will need permcath placement, scheduled in am   Continue Modafinil  HD as per renal-    Eliquis held for eventual permcath placement, on  heparin pending procedure Yimi.  OOB to chair when bale   monitor blood glucose levels, + ISS coverage  Sacral decub - wound care   Venodyne for dvt ppx     GOC ongoing discussion with family ,now DNR/I   PT/OT for acute rehab eval     at this time there is no pulmonary/critical care for contraindication for transfer for [facility] placement   D/w Floor team   PMD:				                   Notified(Date):  Family Updated: 	Kuldeep [phone]	                                 Date:  7/2/2023     Disposition Continue care on medical floor  Goals of Care: DNR/I     D/w Dr. Teran

## 2023-07-02 NOTE — PROGRESS NOTE ADULT - ASSESSMENT
78M PMH dementia, HTN, cardiomyopathy, CHF, CAD s/p CABG with history of RCA occlusion, DM2, presented initially to [facility] for Acute hypoxic respiratory failure in setting of choking, s/p cardiac arrest, anoxic brain injury and myoclonus, subsequently developed renal failure as a result.      Patient now post ICU and medically stable although requiring supplemental O2 1L NC and BIPAP at night for support.    #Post cardiac arrest 2/2 choking   #Acute hypoxic respiratory failure - improving  - cont to wean off O2 as tolerated  - no longer requiring BiPAP qhs    #Severe anoxic brain injury secondary to cortical myoclonus  - appears awake but not conversant, pulling at lines and with bilateral mittens  - cont briviact    #dysphagia  - s/p PEG placement without complications  - abdominal binder for safety  - continue tube feeds  - cleared from GI to restart AC    #afib  - on Eliquis at home, stopped AC for PEG  - arranged for permacath placement on Monday  - continue heparin gtt for now pending permacath, then place back on Eliquis when cleared by IR  - stop heparin gtt at midnight  - continue lopressor 12.5mg BID    #ESRD on HD  - cont dialysis as per nephro  - plan for permacath placement on Monday at [facility]  - hold heparin gtt at midnight, NPO after midnight    #Heart failure with reduced ejection fraction-   - consider starting coreg/entresto when able to  - cont aspirin    #Sacral wound  - daily dressing changes  - dakins topical and wound care  - plastics recs appreciated    #DVT ppx  - on heparin gtt    spoke to brother Arturo Avila at bedside, updated on patient's clinical status and all questions answered

## 2023-07-02 NOTE — PROGRESS NOTE ADULT - ASSESSMENT
Necrotic stage 4 wound, pt now with feeding tube  -reattempt bedside debridement if family agrees, Veraflow VAC debridement ideal for 3 days of therapy to assist with debridement  -continue local wound care off loading and nutrition support

## 2023-07-02 NOTE — PROGRESS NOTE ADULT - SUBJECTIVE AND OBJECTIVE BOX
Follow-up Pulmonary Progress Note  Chief Complaint : Atrial fibrillation    patient seen and examined  on 1 l n/c  eyes open when asked how he feels he responded ok  but otherwise minimally verbal      Allergies :sulfa drugs (Unknown)      PAST MEDICAL & SURGICAL HISTORY:  HTN (hypertension)    HLD (hyperlipidemia)    Diabetes    CAD (coronary artery disease)    History of cholecystectomy        Medications:  MEDICATIONS  (STANDING):  aspirin  chewable 81 milliGRAM(s) Oral daily  brivaracetam Oral Solution 25 milliGRAM(s) Oral two times a day  chlorhexidine 2% Cloths 1 Application(s) Topical daily  Dakins Solution - 1/2 Strength 1 Application(s) Topical daily  dextrose 5%. 1000 milliLiter(s) (50 mL/Hr) IV Continuous <Continuous>  dextrose 5%. 1000 milliLiter(s) (100 mL/Hr) IV Continuous <Continuous>  dextrose 50% Injectable 25 Gram(s) IV Push once  dextrose 50% Injectable 25 Gram(s) IV Push once  dextrose 50% Injectable 12.5 Gram(s) IV Push once  epoetin priyanka-epbx (RETACRIT) Injectable 66600 Unit(s) SubCutaneous every 7 days  heparin  Infusion.  Unit(s)/Hr (13 mL/Hr) IV Continuous <Continuous>  insulin lispro (ADMELOG) corrective regimen sliding scale   SubCutaneous every 6 hours  metoprolol tartrate 12.5 milliGRAM(s) Oral every 12 hours  modafinil 150 milliGRAM(s) Oral every 24 hours  multivitamin/minerals/iron Oral Solution (CENTRUM) 15 milliLiter(s) Oral daily  pantoprazole   Suspension 40 milliGRAM(s) Oral daily  zinc sulfate 220 milliGRAM(s) Oral daily    MEDICATIONS  (PRN):  acetaminophen   Oral Liquid .. 650 milliGRAM(s) Oral every 6 hours PRN Temp greater or equal to 38C (100.4F)  heparin   Injectable 6000 Unit(s) IV Push every 6 hours PRN For aPTT less than 40  heparin   Injectable 3000 Unit(s) IV Push every 6 hours PRN For aPTT between 40 - 57  sodium chloride 0.9% lock flush 10 milliLiter(s) IV Push every 1 hour PRN Pre/post blood products, medications, blood draw, and to maintain line patency      Antibiotics History  ceFAZolin   IVPB 2000 milliGRAM(s) IV Intermittent once, 06-28-23 @ 09:54, Stop order after: 1 Doses  meropenem  IVPB 500 milliGRAM(s) IV Intermittent every 24 hours, 06-23-23 @ 10:31, Stop order after: 7 Days  meropenem  IVPB 500 milliGRAM(s) IV Intermittent every 12 hours, 06-23-23 @ 11:09, Stop order after: 7 Days      Heme Medications   aspirin  chewable 81 milliGRAM(s) Oral daily, 06-24-23 @ 10:36  heparin   Injectable 6000 Unit(s) IV Push every 6 hours, 07-01-23 @ 10:15 PRN  heparin   Injectable 3000 Unit(s) IV Push every 6 hours, 07-01-23 @ 10:15 PRN  heparin  Infusion.  Unit(s)/Hr IV Continuous <Continuous>, 07-01-23 @ 10:15      GI Medications  pantoprazole   Suspension 40 milliGRAM(s) Oral daily, 06-22-23 @ 00:00,         LABS:                        8.0    6.19  )-----------( 212      ( 02 Jul 2023 05:18 )             26.0     07-02    135  |  98  |  39<H>  ----------------------------<  153<H>  3.8   |  29  |  2.30<H>    Ca    8.0<L>      02 Jul 2023 05:18    TPro  6.8  /  Alb  1.7<L>  /  TBili  0.4  /  DBili  x   /  AST  15  /  ALT  <6<L>  /  AlkPhos  113  07-01         VITALS:  T(C): 37.2 (07-02-23 @ 12:10), Max: 37.2 (07-02-23 @ 12:10)  T(F): 98.9 (07-02-23 @ 12:10), Max: 98.9 (07-02-23 @ 12:10)  HR: 86 (07-02-23 @ 12:10) (82 - 98)  BP: 127/71 (07-02-23 @ 12:10) (119/75 - 131/67)  BP(mean): 88 (07-02-23 @ 08:39) (88 - 88)  ABP: --  ABP(mean): --  RR: 14 (07-02-23 @ 12:10) (14 - 19)  SpO2: 100% (07-02-23 @ 12:10) (98% - 100%)  CVP(mm Hg): --  CVP(cm H2O): --    Ins and Outs     07-01-23 @ 07:01  -  07-02-23 @ 07:00  --------------------------------------------------------  IN: 0 mL / OUT: 1050 mL / NET: -1050 mL                I&O's Detail    01 Jul 2023 07:01  -  02 Jul 2023 07:00  --------------------------------------------------------  IN:  Total IN: 0 mL    OUT:    Other (mL): 1000 mL    Voided (mL): 50 mL  Total OUT: 1050 mL    Total NET: -1050 mL

## 2023-07-02 NOTE — CHART NOTE - NSCHARTNOTEFT_GEN_A_CORE
Nutrition Follow Up Note  Source: Medical Record [X] Patient [ ] Family [ ]         Diet: NPO with tube feeding    Enteral/Parenteral Nutrition: Nepro with carbsteady @ 75 ml/hr x 18 hours (provides 2430kcals, 109gms protein [id number] 800ml free water)  Pt tolerating tube feeding per nursing. Noted with 1 episode of diarrhea today; will monitor. Tube feeding to be held for permacath placement on monday per documentation.    Current Weight: 155.4 lbs (7-1)  156.5 lbs (6-29)    Pertinent Medications: MEDICATIONS  (STANDING):  aspirin  chewable 81 milliGRAM(s) Oral daily  brivaracetam Oral Solution 25 milliGRAM(s) Oral two times a day  chlorhexidine 2% Cloths 1 Application(s) Topical daily  Dakins Solution - 1/2 Strength 1 Application(s) Topical daily  dextrose 5%. 1000 milliLiter(s) (100 mL/Hr) IV Continuous <Continuous>  dextrose 5%. 1000 milliLiter(s) (50 mL/Hr) IV Continuous <Continuous>  dextrose 50% Injectable 25 Gram(s) IV Push once  dextrose 50% Injectable 12.5 Gram(s) IV Push once  dextrose 50% Injectable 25 Gram(s) IV Push once  epoetin priyanka-epbx (RETACRIT) Injectable 60683 Unit(s) SubCutaneous every 7 days  heparin  Infusion.  Unit(s)/Hr (13 mL/Hr) IV Continuous <Continuous>  insulin lispro (ADMELOG) corrective regimen sliding scale   SubCutaneous every 6 hours  metoprolol tartrate 12.5 milliGRAM(s) Oral every 12 hours  modafinil 150 milliGRAM(s) Oral every 24 hours  multivitamin/minerals/iron Oral Solution (CENTRUM) 15 milliLiter(s) Oral daily  pantoprazole   Suspension 40 milliGRAM(s) Oral daily  zinc sulfate 220 milliGRAM(s) Oral daily    MEDICATIONS  (PRN):  acetaminophen   Oral Liquid .. 650 milliGRAM(s) Oral every 6 hours PRN Temp greater or equal to 38C (100.4F)  heparin   Injectable 6000 Unit(s) IV Push every 6 hours PRN For aPTT less than 40  heparin   Injectable 3000 Unit(s) IV Push every 6 hours PRN For aPTT between 40 - 57  sodium chloride 0.9% lock flush 10 milliLiter(s) IV Push every 1 hour PRN Pre/post blood products, medications, blood draw, and to maintain line patency      Pertinent Labs:  07-02 Na135 mmol/L Glu 153 mg/dL<H> K+ 3.8 mmol/L Cr  2.30 mg/dL<H> BUN 39 mg/dL<H> 06-30 Phos 3.6 mg/dL 07-01 Alb 1.7 g/dL<L>        Skin: DTI on sacrum Per nursing flowsheets     Edema: 1+ L/R feet Per nursing flowsheets     Last BM: on 7-2 Per nursing flowsheets     Estimated Needs:   [X] No Change since Previous Assessment  [ ] Recalculated:     Previous Nutrition Diagnosis:   Severe malnutrition    Nutrition Diagnosis is [X] Ongoing  - addressed with tube feeding. Continues on zinc for wound healing.     New Nutrition Diagnosis: [X] Not Applicable  [ ] Inadequate Protein Energy Intake   [ ] Inadequate Oral Intake   [ ] Excessive Energy Intake   [ ] Increased Nutrient Needs   [ ] Obesity   [ ] Altered GI Function   [ ] Unintended Weight Loss   [ ] Food & Nutrition Related Knowledge Deficit  [ ] Limited Adherence to nutrition related recommendations   [ ] Malnutrition      Interventions:   1. Recommend continuing with current tube feeding  2. If pt has diarrhea consider adding Banatrol       Monitoring & Evaluation:   [X] Weights   [X] PO Intake   [X] Follow Up (Per Protocol)  [X] Tolerance to Diet Prescription   [X] Other: Labs    RD Remains Available.  Catarina Adams RD

## 2023-07-02 NOTE — CHART NOTE - NSCHARTNOTEFT_GEN_A_CORE
No confirmed, formal arrangements for tunneled catheter placement made with [facility] IR as of 4pm 6/30/2023  No plans for tunneled HD catheter placement by IR at [facility] at this time.  Possible availability later this week.  If tunneled catheter placement by IR at [facility] desired, please contact IR on 7/3/2023.    Madhu Hdez MD  Interventional Radiology    -Available on [employer] TEAMS for all non-urgent questions  -Emergent issues: [facility]-p.[phone]; [facility]-p.[phone] ([phone])  -Non-emergent consults: Please place a [place] order "IR Consult" with an appropriate callback number  -Scheduling questions: [facility]: [phone]; [facility]: [phone]  -Clinic/Outpatient booking: [facility]: [phone]; [facility]: [phone]

## 2023-07-02 NOTE — PROGRESS NOTE ADULT - SUBJECTIVE AND OBJECTIVE BOX
Patient is a 78y old  Male who presents with a chief complaint of acute systolic CHF (01 Jul 2023 19:32)    Patient seen and examined at bedside. No events overnight. Patient able to track but does not talk much, appears comfortable. Unable to obtain ROS due to mental status. Currently saturating well on 1L NC    ALLERGIES:  sulfa drugs (Unknown)    MEDICATIONS  (STANDING):  aspirin  chewable 81 milliGRAM(s) Oral daily  brivaracetam Oral Solution 25 milliGRAM(s) Oral two times a day  chlorhexidine 2% Cloths 1 Application(s) Topical daily  Dakins Solution - 1/2 Strength 1 Application(s) Topical daily  dextrose 5%. 1000 milliLiter(s) (50 mL/Hr) IV Continuous <Continuous>  dextrose 5%. 1000 milliLiter(s) (100 mL/Hr) IV Continuous <Continuous>  dextrose 50% Injectable 25 Gram(s) IV Push once  dextrose 50% Injectable 12.5 Gram(s) IV Push once  dextrose 50% Injectable 25 Gram(s) IV Push once  epoetin priyanka-epbx (RETACRIT) Injectable 26470 Unit(s) SubCutaneous every 7 days  heparin  Infusion.  Unit(s)/Hr (13 mL/Hr) IV Continuous <Continuous>  insulin lispro (ADMELOG) corrective regimen sliding scale   SubCutaneous every 6 hours  metoprolol tartrate 12.5 milliGRAM(s) Oral every 12 hours  modafinil 150 milliGRAM(s) Oral every 24 hours  multivitamin/minerals/iron Oral Solution (CENTRUM) 15 milliLiter(s) Oral daily  pantoprazole   Suspension 40 milliGRAM(s) Oral daily  zinc sulfate 220 milliGRAM(s) Oral daily    MEDICATIONS  (PRN):  acetaminophen   Oral Liquid .. 650 milliGRAM(s) Oral every 6 hours PRN Temp greater or equal to 38C (100.4F)  heparin   Injectable 6000 Unit(s) IV Push every 6 hours PRN For aPTT less than 40  heparin   Injectable 3000 Unit(s) IV Push every 6 hours PRN For aPTT between 40 - 57  sodium chloride 0.9% lock flush 10 milliLiter(s) IV Push every 1 hour PRN Pre/post blood products, medications, blood draw, and to maintain line patency    Vital Signs Last 24 Hrs  T(F): 98.7 (02 Jul 2023 08:39), Max: 98.7 (02 Jul 2023 08:39)  HR: 82 (02 Jul 2023 08:39) (82 - 98)  BP: 131/67 (02 Jul 2023 08:39) (119/75 - 131/67)  RR: 16 (02 Jul 2023 05:00) (16 - 19)  SpO2: 98% (02 Jul 2023 08:39) (98% - 99%)  I&O's Summary    01 Jul 2023 07:01  -  02 Jul 2023 07:00  --------------------------------------------------------  IN: 0 mL / OUT: 1050 mL / NET: -1050 mL      PHYSICAL EXAM:  GENERAL: NAD, laying in bed, +santos with some urine output, on 1L NC  HEAD:  Atraumatic, Normocephalic  EYES: PEERL, conjunctiva and sclera clear  ENMT: Moist mucous membranes, temp cath + R side  CHEST/LUNG: Clear to auscultation bilaterally, good air entry, non-labored breathing  HEART: irregular rate and rhythm; S1/S2, No murmur  ABDOMEN: Soft, Nontender, Nondistended; Bowel sounds present, + PEG  EXTREMITIES: No calf tenderness, No cyanosis, No edema  SKIN: Warm, perfused    LABS:                        8.0    6.19  )-----------( 212      ( 02 Jul 2023 05:18 )             26.0     07-02    135  |  98  |  39  ----------------------------<  153  3.8   |  29  |  2.30    Ca    8.0      02 Jul 2023 05:18  Phos  3.6     06-30  Mg     2.0     06-30    TPro  6.8  /  Alb  1.7  /  TBili  0.4  /  DBili  x   /  AST  15  /  ALT  <6  /  AlkPhos  113  07-01      PT/INR - ( 01 Jul 2023 09:45 )   PT: 14.8 sec;   INR: 1.27 ratio         PTT - ( 02 Jul 2023 08:17 )  PTT:58.9 sec        05-07 Chol 172 mg/dL LDL -- HDL 39 mg/dL Trig 87 mg/dL              POCT Blood Glucose.: 143 mg/dL (02 Jul 2023 05:16)  POCT Blood Glucose.: 230 mg/dL (01 Jul 2023 23:28)  POCT Blood Glucose.: 181 mg/dL (01 Jul 2023 17:22)  POCT Blood Glucose.: 137 mg/dL (01 Jul 2023 12:43)      Urinalysis Basic - ( 02 Jul 2023 05:18 )    Color: x / Appearance: x / SG: x / pH: x  Gluc: 153 mg/dL / Ketone: x  / Bili: x / Urobili: x   Blood: x / Protein: x / Nitrite: x   Leuk Esterase: x / RBC: x / WBC x   Sq Epi: x / Non Sq Epi: x / Bacteria: x        COVID-19 PCR: NotDetec (06-11-23 @ 12:25)      RADIOLOGY & ADDITIONAL TESTS:    Care Discussed with Consultants/Other Providers:

## 2023-07-02 NOTE — PROGRESS NOTE ADULT - SUBJECTIVE AND OBJECTIVE BOX
(x) No complaints (  ) Complains of:     T(F): 98.9 (07-02-23 @ 12:10), Max: 98.9 (07-02-23 @ 12:10)  HR: 86 (07-02-23 @ 12:10) (82 - 92)  BP: 127/71 (07-02-23 @ 12:10) (120/70 - 131/67)  RR: 14 (07-02-23 @ 12:10) (14 - 19)  SpO2: 100% (07-02-23 @ 12:10) (98% - 100%)        Wound Location 1:  sacral wound with necrotic soft tissue, not grossly infected, periwound intact                                  8.0    6.19  )-----------( 212      ( 02 Jul 2023 05:18 )             26.0     CBC Full  -  ( 02 Jul 2023 05:18 )  WBC Count : 6.19 K/uL  RBC Count : 2.51 M/uL  Hemoglobin : 8.0 g/dL  Hematocrit : 26.0 %  Platelet Count - Automated : 212 K/uL  Mean Cell Volume : 103.6 fl  Mean Cell Hemoglobin : 31.9 pg  Mean Cell Hemoglobin Concentration : 30.8 gm/dL  Auto Neutrophil # : 4.41 K/uL  Auto Lymphocyte # : 0.68 K/uL  Auto Monocyte # : 0.93 K/uL  Auto Eosinophil # : 0.08 K/uL  Auto Basophil # : 0.06 K/uL  Auto Neutrophil % : 71.2 %  Auto Lymphocyte % : 11.0 %  Auto Monocyte % : 15.0 %  Auto Eosinophil % : 1.3 %  Auto Basophil % : 1.0 %    135|98|39<153  3.8|29|2.30  8.0,--,--  07-02 @ 05:18    PT/INR - ( 01 Jul 2023 09:45 )   PT: 14.8 sec;   INR: 1.27 ratio         PTT - ( 02 Jul 2023 08:17 )  PTT:58.9 sec  Urinalysis Basic - ( 02 Jul 2023 05:18 )    Color: x / Appearance: x / SG: x / pH: x  Gluc: 153 mg/dL / Ketone: x  / Bili: x / Urobili: x   Blood: x / Protein: x / Nitrite: x   Leuk Esterase: x / RBC: x / WBC x   Sq Epi: x / Non Sq Epi: x / Bacteria: x                Procedure Performed:  (  )Yes     ( x )No  Name of Procedure:  (  )debridement    (  )I&D    (  )Other:  (  )partial thickness     (  )full thickness     (  )subcutaneous     (  )muscle/tendon     (  )bone  (  )sharp     (  )surgical

## 2023-07-03 ENCOUNTER — OUTPATIENT (OUTPATIENT)
Dept: INPATIENT UNIT | Facility: HOSPITAL | Age: 78
LOS: 1 days | Discharge: HOME | End: 2023-07-03
Payer: COMMERCIAL

## 2023-07-03 ENCOUNTER — RESULT REVIEW (OUTPATIENT)
Age: 78
End: 2023-07-03

## 2023-07-03 VITALS
RESPIRATION RATE: 20 BRPM | OXYGEN SATURATION: 100 % | TEMPERATURE: 99 F | HEART RATE: 98 BPM | SYSTOLIC BLOOD PRESSURE: 108 MMHG | DIASTOLIC BLOOD PRESSURE: 57 MMHG

## 2023-07-03 VITALS
DIASTOLIC BLOOD PRESSURE: 60 MMHG | TEMPERATURE: 99 F | HEART RATE: 116 BPM | RESPIRATION RATE: 24 BRPM | SYSTOLIC BLOOD PRESSURE: 119 MMHG

## 2023-07-03 DIAGNOSIS — Z49.31 ENCOUNTER FOR ADEQUACY TESTING FOR HEMODIALYSIS: ICD-10-CM

## 2023-07-03 DIAGNOSIS — Z90.49 ACQUIRED ABSENCE OF OTHER SPECIFIED PARTS OF DIGESTIVE TRACT: Chronic | ICD-10-CM

## 2023-07-03 LAB
ANION GAP SERPL CALC-SCNC: 4 MMOL/L — LOW (ref 5–17)
BUN SERPL-MCNC: 51 MG/DL — HIGH (ref 7–23)
CALCIUM SERPL-MCNC: 8.3 MG/DL — LOW (ref 8.4–10.5)
CHLORIDE SERPL-SCNC: 98 MMOL/L — SIGNIFICANT CHANGE UP (ref 96–108)
CO2 SERPL-SCNC: 33 MMOL/L — HIGH (ref 22–31)
CREAT SERPL-MCNC: 2.8 MG/DL — HIGH (ref 0.5–1.3)
EGFR: 22 ML/MIN/1.73M2 — LOW
GLUCOSE BLDC GLUCOMTR-MCNC: 120 MG/DL — HIGH (ref 70–99)
GLUCOSE BLDC GLUCOMTR-MCNC: 134 MG/DL — HIGH (ref 70–99)
GLUCOSE BLDC GLUCOMTR-MCNC: 198 MG/DL — HIGH (ref 70–99)
GLUCOSE SERPL-MCNC: 112 MG/DL — HIGH (ref 70–99)
HCT VFR BLD CALC: 27.6 % — LOW (ref 39–50)
HGB BLD-MCNC: 8.5 G/DL — LOW (ref 13–17)
MCHC RBC-ENTMCNC: 30.8 GM/DL — LOW (ref 32–36)
MCHC RBC-ENTMCNC: 32.1 PG — SIGNIFICANT CHANGE UP (ref 27–34)
MCV RBC AUTO: 104.2 FL — HIGH (ref 80–100)
NRBC # BLD: 0 /100 WBCS — SIGNIFICANT CHANGE UP (ref 0–0)
PLATELET # BLD AUTO: 227 K/UL — SIGNIFICANT CHANGE UP (ref 150–400)
POTASSIUM SERPL-MCNC: 4 MMOL/L — SIGNIFICANT CHANGE UP (ref 3.5–5.3)
POTASSIUM SERPL-SCNC: 4 MMOL/L — SIGNIFICANT CHANGE UP (ref 3.5–5.3)
RBC # BLD: 2.65 M/UL — LOW (ref 4.2–5.8)
RBC # FLD: 17.5 % — HIGH (ref 10.3–14.5)
SODIUM SERPL-SCNC: 135 MMOL/L — SIGNIFICANT CHANGE UP (ref 135–145)
WBC # BLD: 5.64 K/UL — SIGNIFICANT CHANGE UP (ref 3.8–10.5)
WBC # FLD AUTO: 5.64 K/UL — SIGNIFICANT CHANGE UP (ref 3.8–10.5)

## 2023-07-03 PROCEDURE — 76937 US GUIDE VASCULAR ACCESS: CPT

## 2023-07-03 PROCEDURE — C1887: CPT

## 2023-07-03 PROCEDURE — 77001 FLUOROGUIDE FOR VEIN DEVICE: CPT | Mod: 26

## 2023-07-03 PROCEDURE — 76937 US GUIDE VASCULAR ACCESS: CPT | Mod: 26

## 2023-07-03 PROCEDURE — 99232 SBSQ HOSP IP/OBS MODERATE 35: CPT

## 2023-07-03 PROCEDURE — C1750: CPT

## 2023-07-03 PROCEDURE — 36558 INSERT TUNNELED CV CATH: CPT | Mod: RT

## 2023-07-03 PROCEDURE — C1894: CPT

## 2023-07-03 PROCEDURE — 36558 INSERT TUNNELED CV CATH: CPT

## 2023-07-03 PROCEDURE — C1769: CPT

## 2023-07-03 PROCEDURE — 77001 FLUOROGUIDE FOR VEIN DEVICE: CPT

## 2023-07-03 RX ORDER — APIXABAN 2.5 MG/1
2.5 TABLET, FILM COATED ORAL
Refills: 0 | Status: DISCONTINUED | OUTPATIENT
Start: 2023-07-04 | End: 2023-07-07

## 2023-07-03 RX ORDER — FENTANYL CITRATE 50 UG/ML
25 INJECTION INTRAVENOUS
Refills: 0 | Status: DISCONTINUED | OUTPATIENT
Start: 2023-07-03 | End: 2023-07-04

## 2023-07-03 RX ORDER — ONDANSETRON 8 MG/1
4 TABLET, FILM COATED ORAL ONCE
Refills: 0 | Status: DISCONTINUED | OUTPATIENT
Start: 2023-07-03 | End: 2023-07-04

## 2023-07-03 RX ADMIN — Medication 1 APPLICATION(S): at 18:56

## 2023-07-03 RX ADMIN — Medication 81 MILLIGRAM(S): at 18:57

## 2023-07-03 RX ADMIN — BRIVARACETAM 25 MILLIGRAM(S): 25 TABLET, FILM COATED ORAL at 18:58

## 2023-07-03 RX ADMIN — PANTOPRAZOLE SODIUM 40 MILLIGRAM(S): 20 TABLET, DELAYED RELEASE ORAL at 18:56

## 2023-07-03 RX ADMIN — BRIVARACETAM 25 MILLIGRAM(S): 25 TABLET, FILM COATED ORAL at 05:21

## 2023-07-03 RX ADMIN — MODAFINIL 150 MILLIGRAM(S): 200 TABLET ORAL at 18:56

## 2023-07-03 RX ADMIN — Medication 1: at 23:13

## 2023-07-03 RX ADMIN — CHLORHEXIDINE GLUCONATE 1 APPLICATION(S): 213 SOLUTION TOPICAL at 19:00

## 2023-07-03 RX ADMIN — Medication 12.5 MILLIGRAM(S): at 18:57

## 2023-07-03 RX ADMIN — ZINC SULFATE TAB 220 MG (50 MG ZINC EQUIVALENT) 220 MILLIGRAM(S): 220 (50 ZN) TAB at 18:56

## 2023-07-03 RX ADMIN — Medication 15 MILLILITER(S): at 18:57

## 2023-07-03 NOTE — CHART NOTE - NSCHARTNOTEFT_GEN_A_CORE
F/U Note:    78M PMH dementia, HTN, cardiomyopathy, CHF, CAD s/p CABG with history of RCA occlusion, DM2, presented initially to [facility] for Acute hypoxic respiratory failure in setting of choking, s/p cardiac arrest, anoxic brain injury and myoclonus, subsequently developed renal failure as a result.      Patient now post ICU and medically stable although requiring supplemental O2 1L NC and BIPAP at night for support.    Interval Hx;    At beginning of shift notified by RN that patient seemed to have increased secretions, gurgling while tube feeds on. Patient is supposed to be NPO at midnight as he is getting permacath placed. Told RN that is okay to hold tube feeds before midnight, given patient's risk of aspiration.     Vital Signs Last 24 Hrs  T(C): 36.5 (02 Jul 2023 21:56), Max: 37.2 (02 Jul 2023 12:10)  T(F): 97.7 (02 Jul 2023 21:56), Max: 98.9 (02 Jul 2023 12:10)  HR: 88 (02 Jul 2023 21:56) (82 - 89)  BP: 115/65 (02 Jul 2023 21:56) (104/64 - 131/67)  BP(mean): 77 (02 Jul 2023 17:41) (77 - 88)  RR: 16 (02 Jul 2023 21:56) (14 - 16)  SpO2: 99% (02 Jul 2023 21:56) (98% - 100%)    Parameters below as of 02 Jul 2023 21:56  Patient On (Oxygen Delivery Method): nasal cannula  O2 Flow (L/min): 1                              8.0    6.19  )-----------( 212      ( 02 Jul 2023 05:18 )             26.0         07-02    135  |  98  |  39<H>  ----------------------------<  153<H>  3.8   |  29  |  2.30<H>    Ca    8.0<L>      02 Jul 2023 05:18    TPro  6.8  /  Alb  1.7<L>  /  TBili  0.4  /  DBili  x   /  AST  15  /  ALT  <6<L>  /  AlkPhos  113  07-01      ROS: negative     Physical Exam:  HEENT:  No JVD, Dry MM  PULM: CTA b/l, no respiratory distress satting 99% on 1L NC  CVS:  S1, S2,  +Murmur  ABD: Soft, nondistended, nontender, normoactive bowel sounds,   EXT:no  edema, nontender, No Cyanosis or Clubbing   NEURO: Minimally verbal, will respond to his name and open his eyes

## 2023-07-03 NOTE — PROGRESS NOTE ADULT - ASSESSMENT
78M PMH dementia, HTN, cardiomyopathy, CHF, CAD s/p CABG with history of RCA occlusion, DM2, presented initially to [facility] for Acute hypoxic respiratory failure in setting of choking, s/p cardiac arrest, anoxic brain injury and myoclonus, subsequently developed renal failure as a result.      Patient now post ICU and medically stable although requiring supplemental O2 1L NC and BIPAP at night for support.    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 can resume tube feeds post-procedure    #afib  - previously on eliquis  - arranged for permacath placement today at   - heparin gtt on hold  - continue lopressor 12.5mg BID    #ESRD on HD  - cont dialysis as per nephro  - permacath at     #Heart failure with reduced ejection fraction-   - consider starting coreg/entresto when able to  - cont aspirin    #Sacral wound  - daily dressing changes  - dakins topical and wound care  - plastics recs appreciated    #DVT ppx  - None. heparin gtt on hold     Medically optimized for planned procedure Physical Therapy Evaluation    Visit Count: 1  Plan of Care: Initial: 3/2/2021 Through: 3/2/2021  Insurance Information: physical and speech therapy combined cap of $2040 per calendar year  Referred by: Favian Jerome MD  Medical Diagnosis (from order):   Diagnosis Information      Diagnosis    715.15 (ICD-9-CM) - M16.11 (ICD-10-CM) - Primary osteoarthritis of right hip    996.70, 338.18 (ICD-9-CM) - T85.848A (ICD-10-CM) - Pain from implanted hardware, initial encounter    726.5 (ICD-9-CM) - M70.61 (ICD-10-CM) - Greater trochanteric bursitis of right hip    781.2 (ICD-9-CM) - R26.9 (ICD-10-CM) - Abnormality of gait and mobility    780.99 (ICD-9-CM) - R68.89 (ICD-10-CM) - Activity intolerance              Treatment Diagnosis: hip Symptoms with increased pain/symptoms, impaired strength, impaired range of motion, impaired joint mobility/play, impaired gait, impaired rnage of motion    Anticipated Date of Surgery: 3/25/21; Surgery to be performed:  Right Total Hip Arthroplasty  Diagnosis Precautions: posterior total hip arthroplasty precautions  Chart reviewed at time of initial evaluation (relevant co-morbidities, allergies, tests and medications listed):   Past Medical History:   Diagnosis Date   • Atrial flutter/ Atrial Fibrillation 4/30/2018   • Benign neoplasm of colon    • Cardiomyopathy (CMS/HCC) 5/1/2018   • Diverticulosis of colon (without mention of hemorrhage)    • Hearing loss    • Mixed hyperlipidemia 6/5/2018   • Perforated ear drum     left        SUBJECTIVE   Description of Problem and/or Mechanism of Injury: Pt states \"I have 4 pins in my right hip since I was about 12. Over time my hip has gotten more and more painful\". General wear and tear   Pain:  Intensity (0-10 scale): Current: 3; Pain Range (best-worst): 1-5/10  Location: right hip \"right in the joint\"  Quality/Description: discomfort  Relieving/Alleviating factors: patient unable to state anything that helps to reduce pain, has attempted  heat and repositioning.    Function:  Limitations and exacerbating factors: pain, difficulty, increased time to complete with ambulation/walking, dressing, driving, house/yard work , stair ambulation, squatting/lifting, transfers from low surfaces (couch/toilet), walking quickly as required to cross a street/exit a building rapidly  Prior level: increasing pain and difficulty with function, therefore planning surgery to regain function  Patient Goal: Walk longer distances, used to walk 1-2 miles, 2X/week.    Prior Treatment: injections in the past year for current condition. Hospitalization, home health services or skilled nursing facility in the last 30 days: No, per patient.  Home Environment/Social Support: Patient lives with significant other.   Patient has assistance as needed from family/friends.    Safety/Health:  Do you feel safe at home, work and/or school? yes, per patient  Patient denies 2 or more falls or an unexplained fall with injury in the last year, further testing not required   Cognitive screen: Completion not indicated due to: Patient declines    Anticipated discharge disposition post-operatively: home with caregiver wife    OBJECTIVE   Posture/Observation:   Rounded shoulders and forward head    Gait:   Right hip trendelenberg pattern, decreased knee flexion with toe off and slight circumduction for clearance. Pt states right LE is 1/2 inch shorter than left.    Range of Motion (degrees)   Left Right   Date Initial Initial   Hip Flexion (110-120) 75 (limited by pants) 72   Hip Extension (10-15) Not measured    Hip Abduction (30-50) 22    Hip Adduction (30) Not measured    Hip Internal Rotation (30-40) 14    Hip External Rotation (40-60) 36    Reported in degrees, active range of motion recorded unless noted as AA=active assistive or P=passive; standard testing positions unless otherwise noted, norms included in ( ); *=pain   Uninvolved extremity motion within functional/normal  limits.    Strength: (out of 5)   Left Right   Date Initial Initial   Hip Flexors 5/5 4/5   Hip Extensors Not tested Not tested   Hip Abductors 4+/5 3+/5   Glut Medius 4+/5 3/5   Hip Adductors Not tested Not tested   Hip Internal Rotators 5/5 5/5   Hip External Rotators 5/5 5/5   Knee Flexors 5/5 5/5   Knee Extensors 5/5 5/5   standard testing positions unless otherwise noted; *=pain  Only muscle strength that was assessed are noted.     5 times Sit to Stand: 18 seconds  Norms: 60-69 years: 11.4 seconds  Greater than 16 seconds indicates fall risk in people with Parkinson's    Initial Treatment   Initial evaluation completed.    Therapeutic Exercise:  Sets: 1, Repetitions: 2 of each below, unless otherwise indicated  supine hip abduction  glut sets  quad sets  straight leg raise  heel slides  seated knee extension  ankle pumps  Written handout provided for future reference.*      Therapeutic Activity:  Patient was instructed in the following topics, written handouts provided for future reference:*   1. Weightbearing status: weight bearing as tolerated  2. Precautions: Hip Total Hip Posterior Approach: no flexion past 90 degrees, no adduction past neutral, no internal rotation   3. Assistive Device: 2 wheeled walker  4. Deep breathing: exercises should be done 3-6 times a day starting after surgery follow-up to avoid pneumonia  5. Edema Control: patient instructed to elevate surgical site above level of heart, and educated to use ice often at surgical site to assist with edema  6. Driving restrictions: patient was educated that they will not be able to drive while they are taking narcotics or for approximately 4-6 weeks if they are having surgery performed on their right lower extremity.    Patient instruction in the following activities of daily living while maintaining precautions of Total Hip Posterior Approach: no flexion past 90 degrees, no adduction past neutral, no internal rotation, Weight Bearing As  Tolerated  using 2 wheeled walker as appropriate:  1. Sit to/from Stand Transfers   2. Car Transfer   3. Bed Mobility  4. Home Safety/Preparation: how to safely set up his home in order to optimize his post-operative recovery and prevent falls post-operatively.   5. Precautions: proper positions that can be utilized after surgery to ensure maintenance of his precautions in supine and sitting positions.  6. ADL: Patient education on the role of occupational therapy after surgery and was presented with education and written handout on shower transfer mechanics for tub/shower unit and education on ADL equipment and why it might be necessary post-operatively.    Gait Trainin. Patient instructed in safe ambulation with 2 wheeled walker while maintaining Weight Bearing As Tolerated of Right Lower Extremity   2. Patient instructed in safe negotiation of 1 stairs utilizing step to pattern, ascending forwards, descending forwards with the use of 2 wheeled walker    Skilled input: verbal instruction/cues, tactile instruction/cues, posture correction    Writer verbally educated the patient and received verbal consent from the patient on hand placement, positioning of patient, and techniques to be performed today including clothing adjustments for techniques, therapist position for techniques as described above and how they are pertinent to the patient's plan of care.     ASSESSMENT   63 year old male patient has signs and symptoms consistent with hip pain and deficits that has reported functional limitations listed above. Patient would benefit from skilled inpatient physical and occupational therapy consults post operatively to ensure safety for discharge from hospital. Recommend home program as instructed above before and after surgery..    Patient will benefit from skilled therapy and rehab potential is excellent based on assessment above   Predicted patient presentation: Low (stable) - Patient comorbidities and  complexities, as defined above, will have little effect on progress for prescribed plan of care.    PLAN   Goals:  To be obtained by end of this plan of care:  1. Patient independent with modified and progressed home exercise program. (MET)  2. Demonstrates safe and appropriate use of assistive device/equipment for ambulation, stairs, and transfers. (MET)  3. Verbalizes understanding of precautions. (MET)  4. Verbalizes understanding of how to best modify home for safe discharge. (MET)  5. Demonstrates understanding of how to complete bed mobility while maintaining precautions. (MET)    The following skilled interventions to be implemented to achieve above:  Gait Training (35885)  Therapeutic Activity (98539)  Therapeutic Exercise (24803)    Frequency/Duration: patient seen one time for instruction per above prior to surgery    The plan of care and goals were established with the patient who concurs.  Patient has been given attendance policy at time of initial evaluation.    Patient Education:  Who will be receiving education: patient  Are they ready to learn: yes  Preferred learning style: written, verbal, demonstration  Barriers to learning: no barriers apparent at this time , hard of hearing  Result of initial outlined education: Verbalizes understanding and Demonstrates understanding    THERAPY DAILY BILLING   Insurance: 1. [facility] MEDICARE ADVANTAGE 2. N/A    Evaluation Procedures:  Physical Therapy Evaluation: Low Complexity    Timed Procedures:  Therapeutic Exercise, 8 minutes    Untimed Procedures:  No untimed codes were used on this date of service    Total Treatment Time: 47 minutes        The referring provider's electronic or written signature on the evaluation authorizes the therapy plan of care and certifies the need for these services, furnished under this plan of care while under their care.  Electronically sent for referring provider signature

## 2023-07-03 NOTE — PROGRESS NOTE ADULT - SUBJECTIVE AND OBJECTIVE BOX
Patient is a 78y old  Male who presents with a chief complaint of acute systolic CHF (02 Jul 2023 15:50)    Patient seen and examined at bedside. Feeds and heparin gtt held    ALLERGIES:  sulfa drugs (Unknown)    MEDICATIONS  (STANDING):  aspirin  chewable 81 milliGRAM(s) Oral daily  brivaracetam Oral Solution 25 milliGRAM(s) Oral two times a day  chlorhexidine 2% Cloths 1 Application(s) Topical daily  Dakins Solution - 1/2 Strength 1 Application(s) Topical daily  dextrose 5%. 1000 milliLiter(s) (100 mL/Hr) IV Continuous <Continuous>  dextrose 5%. 1000 milliLiter(s) (50 mL/Hr) IV Continuous <Continuous>  dextrose 50% Injectable 25 Gram(s) IV Push once  dextrose 50% Injectable 12.5 Gram(s) IV Push once  dextrose 50% Injectable 25 Gram(s) IV Push once  epoetin priyanka-epbx (RETACRIT) Injectable 69124 Unit(s) SubCutaneous every 7 days  insulin lispro (ADMELOG) corrective regimen sliding scale   SubCutaneous every 6 hours  metoprolol tartrate 12.5 milliGRAM(s) Oral every 12 hours  modafinil 150 milliGRAM(s) Oral every 24 hours  multivitamin/minerals/iron Oral Solution (CENTRUM) 15 milliLiter(s) Oral daily  pantoprazole   Suspension 40 milliGRAM(s) Oral daily  zinc sulfate 220 milliGRAM(s) Oral daily    MEDICATIONS  (PRN):  acetaminophen   Oral Liquid .. 650 milliGRAM(s) Oral every 6 hours PRN Temp greater or equal to 38C (100.4F)  sodium chloride 0.9% lock flush 10 milliLiter(s) IV Push every 1 hour PRN Pre/post blood products, medications, blood draw, and to maintain line patency    Vital Signs Last 24 Hrs  T(F): 98 (03 Jul 2023 05:14), Max: 98.9 (02 Jul 2023 12:10)  HR: 94 (03 Jul 2023 05:14) (82 - 94)  BP: 106/69 (03 Jul 2023 05:14) (104/64 - 131/67)  RR: 18 (03 Jul 2023 05:14) (14 - 18)  SpO2: 99% (03 Jul 2023 05:14) (98% - 100%)  I&O's Summary    02 Jul 2023 07:01  -  03 Jul 2023 07:00  --------------------------------------------------------  IN: 0 mL / OUT: 175 mL / NET: -175 mL    BMI (kg/m2): 28.2 (06-28-23 @ 11:22)    PHYSICAL EXAM:  General: NAD, +myoclonus   ENT: MMM, no tonsilar exudate  Neck: right sided HD catheter c/d/i  Lungs: good air entry bilaterally. no wheezing   Cardio: IRR, normal rate   Abdomen: Soft, Nontender, Nondistended; Bowel sounds present +PEG  Extremities: No calf tenderness, No pitting edema    LABS:                        8.0    6.19  )-----------( 212      ( 02 Jul 2023 05:18 )             26.0       07-02    135  |  98  |  39  ----------------------------<  153  3.8   |  29  |  2.30    Ca    8.0      02 Jul 2023 05:18    TPro  6.8  /  Alb  1.7  /  TBili  0.4  /  DBili  x   /  AST  15  /  ALT  <6  /  AlkPhos  113  07-01       PT/INR - ( 01 Jul 2023 09:45 )   PT: 14.8 sec;   INR: 1.27 ratio         PTT - ( 02 Jul 2023 08:17 )  PTT:58.9 sec     05-07 Chol 172 mg/dL LDL -- HDL 39 mg/dL Trig 87 mg/dL    POCT Blood Glucose.: 120 mg/dL (03 Jul 2023 05:18)  POCT Blood Glucose.: 115 mg/dL (02 Jul 2023 23:28)  POCT Blood Glucose.: 182 mg/dL (02 Jul 2023 17:45)  POCT Blood Glucose.: 185 mg/dL (02 Jul 2023 11:41)    Urinalysis Basic - ( 02 Jul 2023 05:18 )    Color: x / Appearance: x / SG: x / pH: x  Gluc: 153 mg/dL / Ketone: x  / Bili: x / Urobili: x   Blood: x / Protein: x / Nitrite: x   Leuk Esterase: x / RBC: x / WBC x   Sq Epi: x / Non Sq Epi: x / Bacteria: x    COVID-19 PCR: NotDetec (06-11-23 @ 12:25)    RADIOLOGY & ADDITIONAL TESTS:     Care Discussed with Consultants/Other Providers:

## 2023-07-03 NOTE — PROGRESS NOTE ADULT - SUBJECTIVE AND OBJECTIVE BOX
S/p perm cath today, on NC O2    Vital Signs Last 24 Hrs  T(C): 36.4 (07-03-23 @ 20:11), Max: 37.2 (07-03-23 @ 09:44)  T(F): 97.6 (07-03-23 @ 20:11), Max: 98.9 (07-03-23 @ 09:44)  HR: 75 (07-03-23 @ 20:11) (75 - 116)  BP: 133/76 (07-03-23 @ 20:11) (101/65 - 133/76)  BP(mean): 92 (07-03-23 @ 18:54) (92 - 92)  RR: 18 (07-03-23 @ 20:11) (18 - 24)  SpO2: 100% (07-03-23 @ 20:11) (99% - 100%)    s1s2  b/l air entry  soft, ND  tr edema                                                                                                                                                      8.5    5.64  )-----------( 227      ( 03 Jul 2023 08:23 )             27.6     03 Jul 2023 08:23    135    |  98     |  51     ----------------------------<  112    4.0     |  33     |  2.80     Ca    8.3        03 Jul 2023 08:23    acetaminophen   Oral Liquid .. 650 milliGRAM(s) Oral every 6 hours PRN  aspirin  chewable 81 milliGRAM(s) Oral daily  brivaracetam Oral Solution 25 milliGRAM(s) Oral two times a day  chlorhexidine 2% Cloths 1 Application(s) Topical daily  Dakins Solution - 1/2 Strength 1 Application(s) Topical daily  dextrose 5%. 1000 milliLiter(s) IV Continuous <Continuous>  dextrose 5%. 1000 milliLiter(s) IV Continuous <Continuous>  dextrose 50% Injectable 25 Gram(s) IV Push once  dextrose 50% Injectable 12.5 Gram(s) IV Push once  dextrose 50% Injectable 25 Gram(s) IV Push once  epoetin priyanka-epbx (RETACRIT) Injectable 40270 Unit(s) SubCutaneous every 7 days  insulin lispro (ADMELOG) corrective regimen sliding scale   SubCutaneous every 6 hours  metoprolol tartrate 12.5 milliGRAM(s) Oral every 12 hours  modafinil 150 milliGRAM(s) Oral every 24 hours  multivitamin/minerals/iron Oral Solution (CENTRUM) 15 milliLiter(s) Oral daily  pantoprazole   Suspension 40 milliGRAM(s) Oral daily  sodium chloride 0.9% lock flush 10 milliLiter(s) IV Push every 1 hour PRN  zinc sulfate 220 milliGRAM(s) Oral daily    A/P:    Hemodynamic ATN s/p arrest (Cr 1.6 - 5/9/23, Cr 1.0 - 4/12/23)  Resp failure, s/p intubated, extubated  S/p L CT  CM, EF 35 - 40%, dementia  Cr has initially improved (peak Cr 5.4 - 5/19/23, aida Cr 1.95 - 6/1/23)  Recurrent hemodynamic ANISH w/o recovery  Started on HD 6/21  Epoetin w/HD  S/p PEG  S/p perm cath  Will continue HD TTS  TMP as able    [phone]

## 2023-07-04 LAB
ALBUMIN SERPL ELPH-MCNC: 1.8 G/DL — LOW (ref 3.3–5)
ALP SERPL-CCNC: 119 U/L — SIGNIFICANT CHANGE UP (ref 40–120)
ALT FLD-CCNC: 7 U/L — LOW (ref 10–45)
ANION GAP SERPL CALC-SCNC: 7 MMOL/L — SIGNIFICANT CHANGE UP (ref 5–17)
ANION GAP SERPL CALC-SCNC: 7 MMOL/L — SIGNIFICANT CHANGE UP (ref 5–17)
APTT BLD: 31.9 SEC — SIGNIFICANT CHANGE UP (ref 27.5–35.5)
AST SERPL-CCNC: 16 U/L — SIGNIFICANT CHANGE UP (ref 10–40)
BILIRUB SERPL-MCNC: 0.4 MG/DL — SIGNIFICANT CHANGE UP (ref 0.2–1.2)
BUN SERPL-MCNC: 62 MG/DL — HIGH (ref 7–23)
BUN SERPL-MCNC: 65 MG/DL — HIGH (ref 7–23)
CALCIUM SERPL-MCNC: 8.3 MG/DL — LOW (ref 8.4–10.5)
CALCIUM SERPL-MCNC: 8.4 MG/DL — SIGNIFICANT CHANGE UP (ref 8.4–10.5)
CHLORIDE SERPL-SCNC: 99 MMOL/L — SIGNIFICANT CHANGE UP (ref 96–108)
CHLORIDE SERPL-SCNC: 99 MMOL/L — SIGNIFICANT CHANGE UP (ref 96–108)
CO2 SERPL-SCNC: 30 MMOL/L — SIGNIFICANT CHANGE UP (ref 22–31)
CO2 SERPL-SCNC: 30 MMOL/L — SIGNIFICANT CHANGE UP (ref 22–31)
CREAT SERPL-MCNC: 3.25 MG/DL — HIGH (ref 0.5–1.3)
CREAT SERPL-MCNC: 3.34 MG/DL — HIGH (ref 0.5–1.3)
EGFR: 18 ML/MIN/1.73M2 — LOW
EGFR: 18 ML/MIN/1.73M2 — LOW
GLUCOSE BLDC GLUCOMTR-MCNC: 130 MG/DL — HIGH (ref 70–99)
GLUCOSE BLDC GLUCOMTR-MCNC: 174 MG/DL — HIGH (ref 70–99)
GLUCOSE BLDC GLUCOMTR-MCNC: 177 MG/DL — HIGH (ref 70–99)
GLUCOSE BLDC GLUCOMTR-MCNC: 217 MG/DL — HIGH (ref 70–99)
GLUCOSE BLDC GLUCOMTR-MCNC: 246 MG/DL — HIGH (ref 70–99)
GLUCOSE SERPL-MCNC: 142 MG/DL — HIGH (ref 70–99)
GLUCOSE SERPL-MCNC: 187 MG/DL — HIGH (ref 70–99)
HCT VFR BLD CALC: 25.6 % — LOW (ref 39–50)
HGB BLD-MCNC: 7.8 G/DL — LOW (ref 13–17)
INR BLD: 1.41 RATIO — HIGH (ref 0.88–1.16)
MAGNESIUM SERPL-MCNC: 2 MG/DL — SIGNIFICANT CHANGE UP (ref 1.6–2.6)
MCHC RBC-ENTMCNC: 30.5 GM/DL — LOW (ref 32–36)
MCHC RBC-ENTMCNC: 31.5 PG — SIGNIFICANT CHANGE UP (ref 27–34)
MCV RBC AUTO: 103.2 FL — HIGH (ref 80–100)
NRBC # BLD: 0 /100 WBCS — SIGNIFICANT CHANGE UP (ref 0–0)
PHOSPHATE SERPL-MCNC: 3.8 MG/DL — SIGNIFICANT CHANGE UP (ref 2.5–4.5)
PLATELET # BLD AUTO: 207 K/UL — SIGNIFICANT CHANGE UP (ref 150–400)
POTASSIUM SERPL-MCNC: 3.9 MMOL/L — SIGNIFICANT CHANGE UP (ref 3.5–5.3)
POTASSIUM SERPL-MCNC: 4.1 MMOL/L — SIGNIFICANT CHANGE UP (ref 3.5–5.3)
POTASSIUM SERPL-SCNC: 3.9 MMOL/L — SIGNIFICANT CHANGE UP (ref 3.5–5.3)
POTASSIUM SERPL-SCNC: 4.1 MMOL/L — SIGNIFICANT CHANGE UP (ref 3.5–5.3)
PROT SERPL-MCNC: 7.1 G/DL — SIGNIFICANT CHANGE UP (ref 6–8.3)
PROTHROM AB SERPL-ACNC: 16.4 SEC — HIGH (ref 10.5–13.4)
RBC # BLD: 2.48 M/UL — LOW (ref 4.2–5.8)
RBC # FLD: 16.9 % — HIGH (ref 10.3–14.5)
SODIUM SERPL-SCNC: 136 MMOL/L — SIGNIFICANT CHANGE UP (ref 135–145)
SODIUM SERPL-SCNC: 136 MMOL/L — SIGNIFICANT CHANGE UP (ref 135–145)
WBC # BLD: 5.66 K/UL — SIGNIFICANT CHANGE UP (ref 3.8–10.5)
WBC # FLD AUTO: 5.66 K/UL — SIGNIFICANT CHANGE UP (ref 3.8–10.5)

## 2023-07-04 PROCEDURE — 99232 SBSQ HOSP IP/OBS MODERATE 35: CPT

## 2023-07-04 PROCEDURE — 93010 ELECTROCARDIOGRAM REPORT: CPT

## 2023-07-04 PROCEDURE — 71045 X-RAY EXAM CHEST 1 VIEW: CPT | Mod: 26

## 2023-07-04 RX ORDER — CHLORHEXIDINE GLUCONATE 213 G/1000ML
1 SOLUTION TOPICAL
Refills: 0 | Status: DISCONTINUED | OUTPATIENT
Start: 2023-07-04 | End: 2023-07-07

## 2023-07-04 RX ORDER — ERYTHROPOIETIN 10000 [IU]/ML
10000 INJECTION, SOLUTION INTRAVENOUS; SUBCUTANEOUS
Refills: 0 | Status: DISCONTINUED | OUTPATIENT
Start: 2023-07-04 | End: 2023-07-07

## 2023-07-04 RX ADMIN — APIXABAN 2.5 MILLIGRAM(S): 2.5 TABLET, FILM COATED ORAL at 17:44

## 2023-07-04 RX ADMIN — MODAFINIL 150 MILLIGRAM(S): 200 TABLET ORAL at 13:40

## 2023-07-04 RX ADMIN — Medication 1: at 05:25

## 2023-07-04 RX ADMIN — Medication 1 APPLICATION(S): at 13:42

## 2023-07-04 RX ADMIN — Medication 81 MILLIGRAM(S): at 13:42

## 2023-07-04 RX ADMIN — ERYTHROPOIETIN 10000 UNIT(S): 10000 INJECTION, SOLUTION INTRAVENOUS; SUBCUTANEOUS at 12:09

## 2023-07-04 RX ADMIN — APIXABAN 2.5 MILLIGRAM(S): 2.5 TABLET, FILM COATED ORAL at 05:26

## 2023-07-04 RX ADMIN — PANTOPRAZOLE SODIUM 40 MILLIGRAM(S): 20 TABLET, DELAYED RELEASE ORAL at 13:42

## 2023-07-04 RX ADMIN — Medication 2: at 23:31

## 2023-07-04 RX ADMIN — Medication 2: at 17:52

## 2023-07-04 RX ADMIN — CHLORHEXIDINE GLUCONATE 1 APPLICATION(S): 213 SOLUTION TOPICAL at 14:12

## 2023-07-04 RX ADMIN — BRIVARACETAM 25 MILLIGRAM(S): 25 TABLET, FILM COATED ORAL at 05:26

## 2023-07-04 RX ADMIN — BRIVARACETAM 25 MILLIGRAM(S): 25 TABLET, FILM COATED ORAL at 17:46

## 2023-07-04 RX ADMIN — ZINC SULFATE TAB 220 MG (50 MG ZINC EQUIVALENT) 220 MILLIGRAM(S): 220 (50 ZN) TAB at 13:43

## 2023-07-04 RX ADMIN — Medication 12.5 MILLIGRAM(S): at 17:46

## 2023-07-04 RX ADMIN — Medication 15 MILLILITER(S): at 13:41

## 2023-07-04 NOTE — PROGRESS NOTE ADULT - ASSESSMENT
78M PMH dementia, HTN, cardiomyopathy, CHF, CAD s/p CABG with history of RCA occlusion, DM2, presented initially to [facility] for Acute hypoxic respiratory failure in setting of choking, s/p cardiac arrest, anoxic brain injury and myoclonus, subsequently developed renal failure as a result.      Patient now post ICU and medically stable although requiring supplemental O2 1L NC and BIPAP at night for support.    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 can resume tube feeds post-procedure    #chronic afib w/ episode of bradycardia  - pt has been on lopressor 12.5 mg BID, now on hold due to episode of bradycardia o/n. Cardio consulted.  - restarted home eliquis, now following permaclath placement.    #ESRD on HD  - cont dialysis as per nephro  - permacath at  (7/3)    #Heart failure with reduced ejection fraction-   - consider starting coreg/entresto when able to  - cont aspirin    #Sacral wound  - daily dressing changes  - dakins topical and wound care  - plastics recs appreciated    #DVT ppx  - held for procedure, now restarted on eliquis 7/4    dispo: placement w/ HD  PT recs: 78M PMH dementia, HTN, cardiomyopathy, CHF, CAD s/p CABG with history of RCA occlusion, DM2, presented initially to [facility] for Acute hypoxic respiratory failure in setting of choking, s/p cardiac arrest, anoxic brain injury and myoclonus, subsequently developed renal failure as a result.      Patient now post ICU and medically stable although requiring supplemental O2 1L NC and BIPAP at night for support.    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 Cardio consulted.  - restarted home eliquis, now following permaclath placement.    #ESRD on HD  - cont dialysis as per nephro  - permacath at  (7/3)    #Heart failure with reduced ejection fraction-   - consider starting coreg/entresto when able to  - cont aspirin    #Sacral wound  - daily dressing changes  - dakins topical and wound care  - plastics recs appreciated    #DVT ppx  - held for procedure, now restarted on eliquis 7/4    dispo: will need SNF placement w/ HD  contact: Kuldeep Ordonez

## 2023-07-04 NOTE — PROGRESS NOTE ADULT - SUBJECTIVE AND OBJECTIVE BOX
Patient is a 78y old  Male who presents with a chief complaint of acute systolic CHF (03 Jul 2023 23:23)      24 HOUR EVENTS:  Pt became bradycardic overnight with normal SBP in 120s. Cardio consulted and lopressor held.    SUBJECTIVE:  Patient seen and examined at bedside. Pt is non verbal.     ALLERGIES:  sulfa drugs (Unknown)    MEDICATIONS  (STANDING):  apixaban 2.5 milliGRAM(s) Oral two times a day  aspirin  chewable 81 milliGRAM(s) Oral daily  brivaracetam Oral Solution 25 milliGRAM(s) Oral two times a day  chlorhexidine 2% Cloths 1 Application(s) Topical daily  Dakins Solution - 1/2 Strength 1 Application(s) Topical daily  dextrose 5%. 1000 milliLiter(s) (100 mL/Hr) IV Continuous <Continuous>  dextrose 5%. 1000 milliLiter(s) (50 mL/Hr) IV Continuous <Continuous>  dextrose 50% Injectable 25 Gram(s) IV Push once  dextrose 50% Injectable 25 Gram(s) IV Push once  dextrose 50% Injectable 12.5 Gram(s) IV Push once  epoetin priyanka-epbx (RETACRIT) Injectable 33640 Unit(s) SubCutaneous every 7 days  insulin lispro (ADMELOG) corrective regimen sliding scale   SubCutaneous every 6 hours  metoprolol tartrate 12.5 milliGRAM(s) Oral every 12 hours  modafinil 150 milliGRAM(s) Oral every 24 hours  multivitamin/minerals/iron Oral Solution (CENTRUM) 15 milliLiter(s) Oral daily  pantoprazole   Suspension 40 milliGRAM(s) Oral daily  zinc sulfate 220 milliGRAM(s) Oral daily    MEDICATIONS  (PRN):  acetaminophen   Oral Liquid .. 650 milliGRAM(s) Oral every 6 hours PRN Temp greater or equal to 38C (100.4F)  sodium chloride 0.9% lock flush 10 milliLiter(s) IV Push every 1 hour PRN Pre/post blood products, medications, blood draw, and to maintain line patency    Vital Signs Last 24 Hrs  T(F): 98.7 (04 Jul 2023 04:00), Max: 98.9 (03 Jul 2023 09:44)  HR: 89 (04 Jul 2023 05:00) (61 - 116)  BP: 126/69 (04 Jul 2023 05:00) (101/65 - 140/70)  RR: 16 (04 Jul 2023 05:00) (13 - 24)  SpO2: 100% (04 Jul 2023 05:00) (99% - 100%)  I&O's Summary    03 Jul 2023 07:01  -  04 Jul 2023 07:00  --------------------------------------------------------  IN: 780 mL / OUT: 175 mL / NET: 605 mL      PHYSICAL EXAM:  General: NAD, Awake and alert  ENT: Moist mucous membranes, no thrush  Neck: Supple, No JVD  Lungs: Clear to auscultation bilaterally, good air entry, non-labored breathing  Cardio: RRR, S1/S2, No murmur  Abdomen: Soft, Nontender, Nondistended; Bowel sounds present  Extremities: No calf tenderness, No pitting edema, no contractures.    LABS:                        7.8    5.66  )-----------( 207      ( 04 Jul 2023 06:40 )             25.6     07-04    136  |  99  |  62  ----------------------------<  187  4.1   |  30  |  3.34    Ca    8.3      04 Jul 2023 03:55  Phos  3.8     07-04  Mg     2.0     07-04    TPro  6.8  /  Alb  1.7  /  TBili  0.4  /  DBili  x   /  AST  15  /  ALT  <6  /  AlkPhos  113  07-01          PT/INR - ( 04 Jul 2023 06:40 )   PT: 16.4 sec;   INR: 1.41 ratio         PTT - ( 04 Jul 2023 06:40 )  PTT:31.9 sec          05-07 Chol 172 mg/dL LDL -- HDL 39 mg/dL Trig 87 mg/dL              POCT Blood Glucose.: 174 mg/dL (04 Jul 2023 05:23)  POCT Blood Glucose.: 177 mg/dL (04 Jul 2023 03:14)  POCT Blood Glucose.: 198 mg/dL (03 Jul 2023 23:10)  POCT Blood Glucose.: 134 mg/dL (03 Jul 2023 17:21)      Urinalysis Basic - ( 04 Jul 2023 03:55 )    Color: x / Appearance: x / SG: x / pH: x  Gluc: 187 mg/dL / Ketone: x  / Bili: x / Urobili: x   Blood: x / Protein: x / Nitrite: x   Leuk Esterase: x / RBC: x / WBC x   Sq Epi: x / Non Sq Epi: x / Bacteria: x        COVID-19 PCR: NotDetec (06-11-23 @ 12:25)    RADIOLOGY & ADDITIONAL TESTS:    Care Discussed with Consultants/Other Providers:    Patient is a 78y old  Male who presents with a chief complaint of acute systolic CHF (03 Jul 2023 23:23)      24 HOUR EVENTS:  Pt became bradycardic overnight with normal SBP in 120s. Cardio consulted and lopressor held.    SUBJECTIVE:  Patient seen and examined at bedside. Pt is non verbal.     ALLERGIES:  sulfa drugs (Unknown)    MEDICATIONS  (STANDING):  apixaban 2.5 milliGRAM(s) Oral two times a day  aspirin  chewable 81 milliGRAM(s) Oral daily  brivaracetam Oral Solution 25 milliGRAM(s) Oral two times a day  chlorhexidine 2% Cloths 1 Application(s) Topical daily  Dakins Solution - 1/2 Strength 1 Application(s) Topical daily  dextrose 5%. 1000 milliLiter(s) (100 mL/Hr) IV Continuous <Continuous>  dextrose 5%. 1000 milliLiter(s) (50 mL/Hr) IV Continuous <Continuous>  dextrose 50% Injectable 25 Gram(s) IV Push once  dextrose 50% Injectable 25 Gram(s) IV Push once  dextrose 50% Injectable 12.5 Gram(s) IV Push once  epoetin priyanka-epbx (RETACRIT) Injectable 15694 Unit(s) SubCutaneous every 7 days  insulin lispro (ADMELOG) corrective regimen sliding scale   SubCutaneous every 6 hours  metoprolol tartrate 12.5 milliGRAM(s) Oral every 12 hours  modafinil 150 milliGRAM(s) Oral every 24 hours  multivitamin/minerals/iron Oral Solution (CENTRUM) 15 milliLiter(s) Oral daily  pantoprazole   Suspension 40 milliGRAM(s) Oral daily  zinc sulfate 220 milliGRAM(s) Oral daily    MEDICATIONS  (PRN):  acetaminophen   Oral Liquid .. 650 milliGRAM(s) Oral every 6 hours PRN Temp greater or equal to 38C (100.4F)  sodium chloride 0.9% lock flush 10 milliLiter(s) IV Push every 1 hour PRN Pre/post blood products, medications, blood draw, and to maintain line patency    Vital Signs Last 24 Hrs  T(F): 98.7 (04 Jul 2023 04:00), Max: 98.9 (03 Jul 2023 09:44)  HR: 89 (04 Jul 2023 05:00) (61 - 116)  BP: 126/69 (04 Jul 2023 05:00) (101/65 - 140/70)  RR: 16 (04 Jul 2023 05:00) (13 - 24)  SpO2: 100% (04 Jul 2023 05:00) (99% - 100%)  I&O's Summary    03 Jul 2023 07:01  -  04 Jul 2023 07:00  --------------------------------------------------------  IN: 780 mL / OUT: 175 mL / NET: 605 mL      PHYSICAL EXAM:  General: NAD, sleepy, arousable  ENT: Moist mucous membranes, no thrush  Neck: Supple, No JVD  Lungs: Clear to auscultation bilaterally, good air entry, non-labored breathing  Cardio: RRR, S1/S2, No murmur, permacath in place.  Abdomen: Soft, Nontender, Nondistended; Bowel sounds present  Extremities: No calf tenderness, No pitting edema, no contractures.  skin: ecchymoses on upper extremities.    LABS:                        7.8    5.66  )-----------( 207      ( 04 Jul 2023 06:40 )             25.6     07-04    136  |  99  |  62  ----------------------------<  187  4.1   |  30  |  3.34    Ca    8.3      04 Jul 2023 03:55  Phos  3.8     07-04  Mg     2.0     07-04    TPro  6.8  /  Alb  1.7  /  TBili  0.4  /  DBili  x   /  AST  15  /  ALT  <6  /  AlkPhos  113  07-01          PT/INR - ( 04 Jul 2023 06:40 )   PT: 16.4 sec;   INR: 1.41 ratio         PTT - ( 04 Jul 2023 06:40 )  PTT:31.9 sec          05-07 Chol 172 mg/dL LDL -- HDL 39 mg/dL Trig 87 mg/dL              POCT Blood Glucose.: 174 mg/dL (04 Jul 2023 05:23)  POCT Blood Glucose.: 177 mg/dL (04 Jul 2023 03:14)  POCT Blood Glucose.: 198 mg/dL (03 Jul 2023 23:10)  POCT Blood Glucose.: 134 mg/dL (03 Jul 2023 17:21)      Urinalysis Basic - ( 04 Jul 2023 03:55 )    Color: x / Appearance: x / SG: x / pH: x  Gluc: 187 mg/dL / Ketone: x  / Bili: x / Urobili: x   Blood: x / Protein: x / Nitrite: x   Leuk Esterase: x / RBC: x / WBC x   Sq Epi: x / Non Sq Epi: x / Bacteria: x        COVID-19 PCR: NotDetec (06-11-23 @ 12:25)    RADIOLOGY & ADDITIONAL TESTS:    Care Discussed with Consultants/Other Providers:

## 2023-07-04 NOTE — CHART NOTE - NSCHARTNOTEFT_GEN_A_CORE
called by RN with episodic bradycardia at night with normal SBP in the 120s. Pt nonverbal but arousable and at baseline.   EKG ordered with afib/flutter at 84bpm. no acute St changes.   BMP, mag, phosph ordered --normal.  Hold am lopressor. cardiology consult in am

## 2023-07-04 NOTE — PROGRESS NOTE ADULT - SUBJECTIVE AND OBJECTIVE BOX
Seen on HD, on NC O2    Vital Signs Last 24 Hrs  T(C): 36.9 (07-04-23 @ 20:00), Max: 37.1 (07-04-23 @ 04:00)  T(F): 98.4 (07-04-23 @ 20:00), Max: 98.7 (07-04-23 @ 04:00)  HR: 89 (07-04-23 @ 20:00) (61 - 101)  BP: 133/72 (07-04-23 @ 20:00) (116/67 - 140/70)  RR: 18 (07-04-23 @ 20:00) (13 - 18)  SpO2: 99% (07-04-23 @ 20:00) (99% - 100%)    I&O's Detail    03 Jul 2023 07:01  -  04 Jul 2023 07:00  --------------------------------------------------------  IN:    Free Water: 200 mL    Nepro with Carb Steady: 580 mL  Total IN: 780 mL    OUT:    Voided (mL): 175 mL  Total OUT: 175 mL    Total NET: 605 mL    04 Jul 2023 07:01  -  04 Jul 2023 22:43  --------------------------------------------------------  OUT:    Other (mL): 1000 mL  Total OUT: 1000 mL    s1s2  b/l air entry  soft, ND  tr edema                                                                                                                                                               7.8    5.66  )-----------( 207      ( 04 Jul 2023 06:40 )             25.6     04 Jul 2023 06:40    136    |  99     |  65     ----------------------------<  142    3.9     |  30     |  3.25     Ca    8.4        04 Jul 2023 06:40  Phos  3.8       04 Jul 2023 03:55  Mg     2.0       04 Jul 2023 03:55    TPro  7.1    /  Alb  1.8    /  TBili  0.4    /  DBili  x      /  AST  16     /  ALT  7      /  AlkPhos  119    04 Jul 2023 06:40    LIVER FUNCTIONS - ( 04 Jul 2023 06:40 )  Alb: 1.8 g/dL / Pro: 7.1 g/dL / ALK PHOS: 119 U/L / ALT: 7 U/L / AST: 16 U/L / GGT: x           PT/INR - ( 04 Jul 2023 06:40 )   PT: 16.4 sec;   INR: 1.41 ratio      acetaminophen   Oral Liquid .. 650 milliGRAM(s) Oral every 6 hours PRN  apixaban 2.5 milliGRAM(s) Oral two times a day  aspirin  chewable 81 milliGRAM(s) Oral daily  brivaracetam Oral Solution 25 milliGRAM(s) Oral two times a day  chlorhexidine 2% Cloths 1 Application(s) Topical daily  chlorhexidine 4% Liquid 1 Application(s) Topical <User Schedule>  Dakins Solution - 1/2 Strength 1 Application(s) Topical daily  dextrose 5%. 1000 milliLiter(s) IV Continuous <Continuous>  dextrose 5%. 1000 milliLiter(s) IV Continuous <Continuous>  dextrose 50% Injectable 25 Gram(s) IV Push once  dextrose 50% Injectable 12.5 Gram(s) IV Push once  dextrose 50% Injectable 25 Gram(s) IV Push once  epoetin priyanka-epbx (RETACRIT) Injectable 84496 Unit(s) IV Push <User Schedule>  insulin lispro (ADMELOG) corrective regimen sliding scale   SubCutaneous every 6 hours  metoprolol tartrate 12.5 milliGRAM(s) Oral every 12 hours  modafinil 150 milliGRAM(s) Oral every 24 hours  multivitamin/minerals/iron Oral Solution (CENTRUM) 15 milliLiter(s) Oral daily  pantoprazole   Suspension 40 milliGRAM(s) Oral daily  sodium chloride 0.9% lock flush 10 milliLiter(s) IV Push every 1 hour PRN  zinc sulfate 220 milliGRAM(s) Oral daily    A/P:    Hemodynamic ATN s/p arrest (Cr 1.6 - 5/9/23, Cr 1.0 - 4/12/23)  Resp failure, s/p intubated, extubated  CM, EF 35 - 40%, dementia  Cr has initially improved (peak Cr 5.4 - 5/19/23, aida Cr 1.95 - 6/1/23)  Recurrent hemodynamic ANISH w/o recovery  Started on HD 6/21  Epoetin w/HD 3 x week  S/p PEG  S/p perm cath  Will continue HD TTS  TMP 1kg w/HD today     [phone]

## 2023-07-04 NOTE — PROGRESS NOTE ADULT - ASSESSMENT
Physical Examination:  GENERAL:               Eyes open no distress  HEENT:                    No JVD, Dry MM  PULM:                     Bilateral air entry, course and diminished  to auscultation bilaterally, mild sputum production, trace  rales , No Rhonchi, No Wheezing  CVS:                         S1, S2,  +Murmur  ABD:                        Soft, nondistended, nontender, normoactive bowel sounds,   EXT:                         no  edema, nontender, No Cyanosis or Clubbing   NEURO:                alert , mildy verbal, not following commands mild Myoclonus   PSYC:                      Calm, No Insight.       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  Acute renal failure now on HD, baseline CKD  s/p Permcath placement 7/3/23  Heart failure with reduced EF  NSTEMI post arrest   b/l Pl effusion, s/p left chest tube now s/p removal   Dysphagia s/p PEG 6/28/23  Underlying PMH of dementia, HTN, cardiomyopathy, HFrEF, CAD s/p CABG, with known current RCA occlusion, and DM type 2      Plan:    n/c o2 to maintain sat  No gross secretions noted  neuro status appears to be new baseline  Restart Eliquis  Continue Modafinil  HD as per renal-    OOB to chair when bale   monitor blood glucose levels, + ISS coverage  Sacral decub - wound care   Venodyne for dvt ppx     GOC ongoing discussion with family ,now DNR/I   PT/OT for acute rehab eval     at this time there is no pulmonary/critical care for contraindication for transfer for [facility] placement   D/w Floor team   PMD:				                   Notified(Date):  Family Updated: 	Kuldeep [phone]	                                 Date:  7/2/2023  discussed plan for PEG in am, necessary procedure with peg      Disposition Continue care on medical floor  Goals of Care: DNR/I     D/w Dr. Diez  dispo planning Physical Examination:  GENERAL:               Eyes open no distress  HEENT:                    No JVD, Dry MM  PULM:                     Bilateral air entry, course and diminished  to auscultation bilaterally, mild sputum production, trace  rales , No Rhonchi, No Wheezing  CVS:                         S1, S2,  +Murmur  ABD:                        Soft, nondistended, nontender, normoactive bowel sounds,   EXT:                         no  edema, nontender, No Cyanosis or Clubbing   NEURO:                alert , mildy verbal, not following commands mild Myoclonus   PSYC:                      Calm, No Insight.       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  Acute renal failure now on HD, baseline CKD  s/p Permcath placement 7/3/23  Heart failure with reduced EF  NSTEMI post arrest   b/l Pl effusion, s/p left chest tube now s/p removal   Dysphagia s/p PEG 6/28/23  Underlying PMH of dementia, HTN, cardiomyopathy, HFrEF, CAD s/p CABG, with known current RCA occlusion, and DM type 2      Plan:    n/c o2 to maintain sat  No gross secretions noted  neuro status appears to be new baseline  Restart Eliquis  Continue Modafinil  HD as per renal-    OOB to chair when bale   monitor blood glucose levels, + ISS coverage  Sacral decub - wound care   Venodyne for dvt ppx     GOC ongoing discussion with family ,now DNR/I   PT/OT for acute rehab eval     at this time there is no pulmonary/critical care for contraindication for transfer for [facility] placement   D/w Floor team   PMD:				                   Notified(Date):  Family Updated: 	Kuldeep [phone]	                                 Date:  7/2/2023      Disposition Continue care on medical floor  Goals of Care: DNR/I     D/w Dr. Diez  dispo planning

## 2023-07-04 NOTE — PROGRESS NOTE ADULT - SUBJECTIVE AND OBJECTIVE BOX
Follow-up Pulmonary Progress Note  Chief Complaint : Atrial fibrillation      pt alert, verbal   states feels ok  unable to provide ROS      Allergies :sulfa drugs (Unknown)      PAST MEDICAL & SURGICAL HISTORY:  HTN (hypertension)    HLD (hyperlipidemia)    Diabetes    CAD (coronary artery disease)    History of cholecystectomy        Medications:  MEDICATIONS  (STANDING):  apixaban 2.5 milliGRAM(s) Oral two times a day  aspirin  chewable 81 milliGRAM(s) Oral daily  brivaracetam Oral Solution 25 milliGRAM(s) Oral two times a day  chlorhexidine 2% Cloths 1 Application(s) Topical daily  chlorhexidine 4% Liquid 1 Application(s) Topical <User Schedule>  Dakins Solution - 1/2 Strength 1 Application(s) Topical daily  dextrose 5%. 1000 milliLiter(s) (100 mL/Hr) IV Continuous <Continuous>  dextrose 5%. 1000 milliLiter(s) (50 mL/Hr) IV Continuous <Continuous>  dextrose 50% Injectable 25 Gram(s) IV Push once  dextrose 50% Injectable 12.5 Gram(s) IV Push once  dextrose 50% Injectable 25 Gram(s) IV Push once  epoetin priyanka-epbx (RETACRIT) Injectable 57326 Unit(s) IV Push <User Schedule>  insulin lispro (ADMELOG) corrective regimen sliding scale   SubCutaneous every 6 hours  metoprolol tartrate 12.5 milliGRAM(s) Oral every 12 hours  modafinil 150 milliGRAM(s) Oral every 24 hours  multivitamin/minerals/iron Oral Solution (CENTRUM) 15 milliLiter(s) Oral daily  pantoprazole   Suspension 40 milliGRAM(s) Oral daily  zinc sulfate 220 milliGRAM(s) Oral daily    MEDICATIONS  (PRN):  acetaminophen   Oral Liquid .. 650 milliGRAM(s) Oral every 6 hours PRN Temp greater or equal to 38C (100.4F)  sodium chloride 0.9% lock flush 10 milliLiter(s) IV Push every 1 hour PRN Pre/post blood products, medications, blood draw, and to maintain line patency      Antibiotics History  ceFAZolin   IVPB 2000 milliGRAM(s) IV Intermittent once, 06-28-23 @ 09:54, Stop order after: 1 Doses  meropenem  IVPB 500 milliGRAM(s) IV Intermittent every 24 hours, 06-23-23 @ 10:31, Stop order after: 7 Days  meropenem  IVPB 500 milliGRAM(s) IV Intermittent every 12 hours, 06-23-23 @ 11:09, Stop order after: 7 Days      Heme Medications   apixaban 2.5 milliGRAM(s) Oral two times a day, 07-04-23 @ 00:00  aspirin  chewable 81 milliGRAM(s) Oral daily, 06-24-23 @ 10:36      GI Medications  pantoprazole   Suspension 40 milliGRAM(s) Oral daily, 06-22-23 @ 00:00,         LABS:                        7.8    5.66  )-----------( 207      ( 04 Jul 2023 06:40 )             25.6     07-04    136  |  99  |  65<H>  ----------------------------<  142<H>  3.9   |  30  |  3.25<H>    Ca    8.4      04 Jul 2023 06:40  Phos  3.8     07-04  Mg     2.0     07-04    TPro  7.1  /  Alb  1.8<L>  /  TBili  0.4  /  DBili  x   /  AST  16  /  ALT  7<L>  /  AlkPhos  119  07-04       VITALS:  T(C): 36.7 (07-04-23 @ 09:53), Max: 37.2 (07-03-23 @ 15:30)  T(F): 98 (07-04-23 @ 09:53), Max: 98.9 (07-03-23 @ 15:30)  HR: 68 (07-04-23 @ 09:53) (61 - 102)  BP: 132/74 (07-04-23 @ 09:53) (101/65 - 140/70)  BP(mean): 92 (07-03-23 @ 18:54) (92 - 92)  ABP: --  ABP(mean): --  RR: 16 (07-04-23 @ 09:53) (13 - 20)  SpO2: 100% (07-04-23 @ 09:53) (100% - 100%)  CVP(mm Hg): --  CVP(cm H2O): --    Ins and Outs     07-03-23 @ 07:01  -  07-04-23 @ 07:00  --------------------------------------------------------  IN: 780 mL / OUT: 175 mL / NET: 605 mL    07-04-23 @ 07:01  -  07-04-23 @ 12:58  --------------------------------------------------------  IN: 0 mL / OUT: 1000 mL / NET: -1000 mL        Height (cm): 160 (07-03-23 @ 10:34)  Weight (kg): 77.6 (07-03-23 @ 10:34)  BMI (kg/m2): 30.3 (07-03-23 @ 10:34)        I&O's Detail    03 Jul 2023 07:01  -  04 Jul 2023 07:00  --------------------------------------------------------  IN:    Free Water: 200 mL    Nepro with Carb Steady: 580 mL  Total IN: 780 mL    OUT:    Voided (mL): 175 mL  Total OUT: 175 mL    Total NET: 605 mL      04 Jul 2023 07:01  -  04 Jul 2023 12:58  --------------------------------------------------------  IN:  Total IN: 0 mL    OUT:    Other (mL): 1000 mL  Total OUT: 1000 mL    Total NET: -1000 mL

## 2023-07-05 LAB
ANION GAP SERPL CALC-SCNC: 4 MMOL/L — LOW (ref 5–17)
BUN SERPL-MCNC: 44 MG/DL — HIGH (ref 7–23)
CALCIUM SERPL-MCNC: 8.6 MG/DL — SIGNIFICANT CHANGE UP (ref 8.4–10.5)
CHLORIDE SERPL-SCNC: 99 MMOL/L — SIGNIFICANT CHANGE UP (ref 96–108)
CO2 SERPL-SCNC: 32 MMOL/L — HIGH (ref 22–31)
CREAT SERPL-MCNC: 2.37 MG/DL — HIGH (ref 0.5–1.3)
EGFR: 27 ML/MIN/1.73M2 — LOW
GAMMA INTERFERON BACKGROUND BLD IA-ACNC: 0.03 IU/ML — SIGNIFICANT CHANGE UP
GLUCOSE BLDC GLUCOMTR-MCNC: 163 MG/DL — HIGH (ref 70–99)
GLUCOSE BLDC GLUCOMTR-MCNC: 192 MG/DL — HIGH (ref 70–99)
GLUCOSE BLDC GLUCOMTR-MCNC: 233 MG/DL — HIGH (ref 70–99)
GLUCOSE BLDC GLUCOMTR-MCNC: 253 MG/DL — HIGH (ref 70–99)
GLUCOSE SERPL-MCNC: 188 MG/DL — HIGH (ref 70–99)
HCT VFR BLD CALC: 27.2 % — LOW (ref 39–50)
HGB BLD-MCNC: 8.3 G/DL — LOW (ref 13–17)
M TB IFN-G BLD-IMP: ABNORMAL
M TB IFN-G CD4+ BCKGRND COR BLD-ACNC: 0 IU/ML — SIGNIFICANT CHANGE UP
M TB IFN-G CD4+CD8+ BCKGRND COR BLD-ACNC: -0.01 IU/ML — SIGNIFICANT CHANGE UP
MCHC RBC-ENTMCNC: 30.5 GM/DL — LOW (ref 32–36)
MCHC RBC-ENTMCNC: 31.9 PG — SIGNIFICANT CHANGE UP (ref 27–34)
MCV RBC AUTO: 104.6 FL — HIGH (ref 80–100)
NRBC # BLD: 0 /100 WBCS — SIGNIFICANT CHANGE UP (ref 0–0)
PHOSPHATE SERPL-MCNC: 2.1 MG/DL — LOW (ref 2.5–4.5)
PLATELET # BLD AUTO: 204 K/UL — SIGNIFICANT CHANGE UP (ref 150–400)
POTASSIUM SERPL-MCNC: 4.1 MMOL/L — SIGNIFICANT CHANGE UP (ref 3.5–5.3)
POTASSIUM SERPL-SCNC: 4.1 MMOL/L — SIGNIFICANT CHANGE UP (ref 3.5–5.3)
QUANT TB PLUS MITOGEN MINUS NIL: 0.07 IU/ML — SIGNIFICANT CHANGE UP
RBC # BLD: 2.6 M/UL — LOW (ref 4.2–5.8)
RBC # FLD: 17.1 % — HIGH (ref 10.3–14.5)
SODIUM SERPL-SCNC: 135 MMOL/L — SIGNIFICANT CHANGE UP (ref 135–145)
WBC # BLD: 6.78 K/UL — SIGNIFICANT CHANGE UP (ref 3.8–10.5)
WBC # FLD AUTO: 6.78 K/UL — SIGNIFICANT CHANGE UP (ref 3.8–10.5)

## 2023-07-05 PROCEDURE — 99232 SBSQ HOSP IP/OBS MODERATE 35: CPT

## 2023-07-05 RX ADMIN — Medication 81 MILLIGRAM(S): at 11:18

## 2023-07-05 RX ADMIN — CHLORHEXIDINE GLUCONATE 1 APPLICATION(S): 213 SOLUTION TOPICAL at 11:17

## 2023-07-05 RX ADMIN — MODAFINIL 150 MILLIGRAM(S): 200 TABLET ORAL at 11:24

## 2023-07-05 RX ADMIN — Medication 12.5 MILLIGRAM(S): at 05:28

## 2023-07-05 RX ADMIN — BRIVARACETAM 25 MILLIGRAM(S): 25 TABLET, FILM COATED ORAL at 17:18

## 2023-07-05 RX ADMIN — Medication 3: at 17:12

## 2023-07-05 RX ADMIN — Medication 12.5 MILLIGRAM(S): at 17:11

## 2023-07-05 RX ADMIN — APIXABAN 2.5 MILLIGRAM(S): 2.5 TABLET, FILM COATED ORAL at 05:28

## 2023-07-05 RX ADMIN — APIXABAN 2.5 MILLIGRAM(S): 2.5 TABLET, FILM COATED ORAL at 17:11

## 2023-07-05 RX ADMIN — Medication 1 APPLICATION(S): at 11:19

## 2023-07-05 RX ADMIN — Medication 1: at 05:34

## 2023-07-05 RX ADMIN — Medication 2: at 23:48

## 2023-07-05 RX ADMIN — BRIVARACETAM 25 MILLIGRAM(S): 25 TABLET, FILM COATED ORAL at 05:28

## 2023-07-05 RX ADMIN — ZINC SULFATE TAB 220 MG (50 MG ZINC EQUIVALENT) 220 MILLIGRAM(S): 220 (50 ZN) TAB at 12:28

## 2023-07-05 RX ADMIN — PANTOPRAZOLE SODIUM 40 MILLIGRAM(S): 20 TABLET, DELAYED RELEASE ORAL at 11:17

## 2023-07-05 RX ADMIN — Medication 15 MILLILITER(S): at 11:17

## 2023-07-05 RX ADMIN — Medication 1: at 11:18

## 2023-07-05 NOTE — PROGRESS NOTE ADULT - ASSESSMENT
Physical Examination:  GENERAL:               Eyes open no distress  HEENT:                    No JVD, Dry MM  PULM:                     Bilateral air entry, course and diminished  to auscultation bilaterally, mild sputum production, trace  rales , No Rhonchi, No Wheezing  CVS:                         S1, S2,  +Murmur  ABD:                        Soft, nondistended, nontender, normoactive bowel sounds,   EXT:                         no  edema, nontender, No Cyanosis or Clubbing   NEURO:                alert , non verbal, withdraws  mild Myoclonus   PSYC:                      Calm, No Insight.       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  Acute renal failure now on HD, baseline CKD  s/p Permcath placement 7/3/23  Heart failure with reduced EF  NSTEMI post arrest   b/l Pl effusion, s/p left chest tube now s/p removal   Dysphagia s/p PEG 6/28/23  Underlying PMH of dementia, HTN, cardiomyopathy, HFrEF, CAD s/p CABG, with known current RCA occlusion, and DM type 2      Plan:    n/c o2 to maintain sat  No gross secretions noted  neuro status appears to be new baseline  Continue Eliquis, Modafinil  HD as per renal-    OOB to chair when bale   monitor blood glucose levels, + ISS coverage  Sacral decub - wound care   Venodyne for dvt ppx     GOC ongoing discussion with family ,now DNR/I   Dispo planning      PMD:				                   Notified(Date):  Family Updated: 	Kuldeep [phone]	                                 Date:  7/2/2023  discussed plan for PEG in am, necessary procedure with peg      Disposition Continue care on medical floor  Goals of Care: DNR/I       Physical Examination:  GENERAL:               Eyes open no distress  HEENT:                    No JVD, Dry MM  PULM:                     Bilateral air entry, course and diminished  to auscultation bilaterally, mild sputum production, trace  rales , No Rhonchi, No Wheezing  CVS:                         S1, S2,  +Murmur  ABD:                        Soft, nondistended, nontender, normoactive bowel sounds,   EXT:                         no  edema, nontender, No Cyanosis or Clubbing   NEURO:                alert , non verbal, withdraws  mild Myoclonus   PSYC:                      Calm, No Insight.       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  Acute renal failure now on HD, baseline CKD  s/p Permcath placement 7/3/23  Heart failure with reduced EF  NSTEMI post arrest   b/l Pl effusion, s/p left chest tube now s/p removal   Dysphagia s/p PEG 6/28/23  Underlying PMH of dementia, HTN, cardiomyopathy, HFrEF, CAD s/p CABG, with known current RCA occlusion, and DM type 2      Plan:    n/c o2 to maintain sat  No gross secretions noted  neuro status appears to be new baseline  Continue Eliquis, Modafinil  HD as per renal-    OOB to chair when bale   monitor blood glucose levels, + ISS coverage  Sacral decub - wound care   Venodyne for dvt ppx     GOC ongoing discussion with family ,now DNR/I   Dispo planning      PMD:				                   Notified(Date):  Family Updated: 	Kuldeep [phone]	                                 Date:  7/2/2023        Disposition Continue care on medical floor  Goals of Care: DNR/I

## 2023-07-05 NOTE — PROGRESS NOTE ADULT - SUBJECTIVE AND OBJECTIVE BOX
Follow-up Pulmonary Progress Note  Chief Complaint : Atrial fibrillation        patient seen and examined  alert non verbal   tracks to voice  non verbal        Allergies :sulfa drugs (Unknown)      PAST MEDICAL & SURGICAL HISTORY:  HTN (hypertension)    HLD (hyperlipidemia)    Diabetes    CAD (coronary artery disease)    History of cholecystectomy        Medications:  MEDICATIONS  (STANDING):  apixaban 2.5 milliGRAM(s) Oral two times a day  aspirin  chewable 81 milliGRAM(s) Oral daily  brivaracetam Oral Solution 25 milliGRAM(s) Oral two times a day  chlorhexidine 2% Cloths 1 Application(s) Topical daily  chlorhexidine 4% Liquid 1 Application(s) Topical <User Schedule>  Dakins Solution - 1/2 Strength 1 Application(s) Topical daily  dextrose 5%. 1000 milliLiter(s) (50 mL/Hr) IV Continuous <Continuous>  dextrose 5%. 1000 milliLiter(s) (100 mL/Hr) IV Continuous <Continuous>  dextrose 50% Injectable 25 Gram(s) IV Push once  dextrose 50% Injectable 12.5 Gram(s) IV Push once  dextrose 50% Injectable 25 Gram(s) IV Push once  epoetin priyanka-epbx (RETACRIT) Injectable 19137 Unit(s) IV Push <User Schedule>  insulin lispro (ADMELOG) corrective regimen sliding scale   SubCutaneous every 6 hours  metoprolol tartrate 12.5 milliGRAM(s) Oral every 12 hours  modafinil 150 milliGRAM(s) Oral every 24 hours  multivitamin/minerals/iron Oral Solution (CENTRUM) 15 milliLiter(s) Oral daily  pantoprazole   Suspension 40 milliGRAM(s) Oral daily  zinc sulfate 220 milliGRAM(s) Oral daily    MEDICATIONS  (PRN):  acetaminophen   Oral Liquid .. 650 milliGRAM(s) Oral every 6 hours PRN Temp greater or equal to 38C (100.4F)  sodium chloride 0.9% lock flush 10 milliLiter(s) IV Push every 1 hour PRN Pre/post blood products, medications, blood draw, and to maintain line patency      Antibiotics History  ceFAZolin   IVPB 2000 milliGRAM(s) IV Intermittent once, 06-28-23 @ 09:54, Stop order after: 1 Doses  meropenem  IVPB 500 milliGRAM(s) IV Intermittent every 24 hours, 06-23-23 @ 10:31, Stop order after: 7 Days  meropenem  IVPB 500 milliGRAM(s) IV Intermittent every 12 hours, 06-23-23 @ 11:09, Stop order after: 7 Days      Heme Medications   apixaban 2.5 milliGRAM(s) Oral two times a day, 07-04-23 @ 00:00  aspirin  chewable 81 milliGRAM(s) Oral daily, 06-24-23 @ 10:36      GI Medications  pantoprazole   Suspension 40 milliGRAM(s) Oral daily, 06-22-23 @ 00:00,         LABS:                        8.3    6.78  )-----------( 204      ( 05 Jul 2023 05:43 )             27.2     07-05    135  |  99  |  44<H>  ----------------------------<  188<H>  4.1   |  32<H>  |  2.37<H>    Ca    8.6      05 Jul 2023 05:43  Phos  2.1     07-05  Mg     2.0     07-04    TPro  7.1  /  Alb  1.8<L>  /  TBili  0.4  /  DBili  x   /  AST  16  /  ALT  7<L>  /  AlkPhos  119  07-04            PT/INR - ( 04 Jul 2023 06:40 )   PT: 16.4 sec;   INR: 1.41 ratio         PTT - ( 04 Jul 2023 06:40 )  PTT:31.9 sec  Urinalysis Basic - ( 05 Jul 2023 05:43 )    Color: x / Appearance: x / SG: x / pH: x  Gluc: 188 mg/dL / Ketone: x  / Bili: x / Urobili: x   Blood: x / Protein: x / Nitrite: x   Leuk Esterase: x / RBC: x / WBC x   Sq Epi: x / Non Sq Epi: x / Bacteria: x         VITALS:  T(C): 37.1 (07-05-23 @ 12:00), Max: 37.1 (07-05-23 @ 12:00)  T(F): 98.7 (07-05-23 @ 12:00), Max: 98.7 (07-05-23 @ 12:00)  HR: 92 (07-05-23 @ 12:00) (82 - 101)  BP: 115/62 (07-05-23 @ 12:00) (115/62 - 133/72)  BP(mean): --  ABP: --  ABP(mean): --  RR: 17 (07-05-23 @ 12:00) (14 - 18)  SpO2: 97% (07-05-23 @ 12:00) (97% - 100%)  CVP(mm Hg): --  CVP(cm H2O): --    Ins and Outs     07-04-23 @ 07:01  -  07-05-23 @ 07:00  --------------------------------------------------------  IN: 675 mL / OUT: 1100 mL / NET: -425 mL        Height (cm): 160 (07-03-23 @ 10:34)  Weight (kg): 77.6 (07-03-23 @ 10:34)  BMI (kg/m2): 30.3 (07-03-23 @ 10:34)        I&O's Detail    04 Jul 2023 07:01  -  05 Jul 2023 07:00  --------------------------------------------------------  IN:    Nepro with Carb Steady: 675 mL  Total IN: 675 mL    OUT:    Other (mL): 1000 mL    Voided (mL): 100 mL  Total OUT: 1100 mL    Total NET: -425 mL

## 2023-07-05 NOTE — PROGRESS NOTE ADULT - SUBJECTIVE AND OBJECTIVE BOX
Resting, on NC O2    Vital Signs Last 24 Hrs  T(C): 37.1 (07-05-23 @ 12:00), Max: 37.1 (07-05-23 @ 12:00)  T(F): 98.7 (07-05-23 @ 12:00), Max: 98.7 (07-05-23 @ 12:00)  HR: 92 (07-05-23 @ 12:00) (82 - 92)  BP: 124/71 (07-05-23 @ 15:00) (115/62 - 126/75)  RR: 17 (07-05-23 @ 12:00) (14 - 17)  SpO2: 97% (07-05-23 @ 12:00) (97% - 100%)    I&O's Detail    04 Jul 2023 07:01  -  05 Jul 2023 07:00  --------------------------------------------------------  IN:    Nepro with Carb Steady: 675 mL  Total IN: 675 mL    OUT:    Other (mL): 1000 mL    Voided (mL): 100 mL  Total OUT: 1100 mL    05 Jul 2023 07:01  -  05 Jul 2023 22:33  --------------------------------------------------------  IN:    Nepro with Carb Steady: 600 mL  Total IN: 600 mL    OUT:    Voided (mL): 100 mL  Total OUT: 100 mL    s1s2  b/l air entry  soft, ND  tr edema                                                                                                                                                                        8.3    6.78  )-----------( 204      ( 05 Jul 2023 05:43 )             27.2     05 Jul 2023 05:43    135    |  99     |  44     ----------------------------<  188    4.1     |  32     |  2.37     Ca    8.6        05 Jul 2023 05:43  Phos  2.1       05 Jul 2023 05:43  Mg     2.0       04 Jul 2023 03:55    TPro  7.1    /  Alb  1.8    /  TBili  0.4    /  DBili  x      /  AST  16     /  ALT  7      /  AlkPhos  119    04 Jul 2023 06:40    LIVER FUNCTIONS - ( 04 Jul 2023 06:40 )  Alb: 1.8 g/dL / Pro: 7.1 g/dL / ALK PHOS: 119 U/L / ALT: 7 U/L / AST: 16 U/L / GGT: x           PT/INR - ( 04 Jul 2023 06:40 )   PT: 16.4 sec;   INR: 1.41 ratio      acetaminophen   Oral Liquid .. 650 milliGRAM(s) Oral every 6 hours PRN  apixaban 2.5 milliGRAM(s) Oral two times a day  aspirin  chewable 81 milliGRAM(s) Oral daily  brivaracetam Oral Solution 25 milliGRAM(s) Oral two times a day  chlorhexidine 2% Cloths 1 Application(s) Topical daily  chlorhexidine 4% Liquid 1 Application(s) Topical <User Schedule>  Dakins Solution - 1/2 Strength 1 Application(s) Topical daily  dextrose 5%. 1000 milliLiter(s) IV Continuous <Continuous>  dextrose 5%. 1000 milliLiter(s) IV Continuous <Continuous>  dextrose 50% Injectable 25 Gram(s) IV Push once  dextrose 50% Injectable 25 Gram(s) IV Push once  dextrose 50% Injectable 12.5 Gram(s) IV Push once  epoetin priyanka-epbx (RETACRIT) Injectable 78532 Unit(s) IV Push <User Schedule>  insulin lispro (ADMELOG) corrective regimen sliding scale   SubCutaneous every 6 hours  metoprolol tartrate 12.5 milliGRAM(s) Oral every 12 hours  modafinil 150 milliGRAM(s) Oral every 24 hours  multivitamin/minerals/iron Oral Solution (CENTRUM) 15 milliLiter(s) Oral daily  pantoprazole   Suspension 40 milliGRAM(s) Oral daily  sodium chloride 0.9% lock flush 10 milliLiter(s) IV Push every 1 hour PRN  zinc sulfate 220 milliGRAM(s) Oral daily    A/P:    Hemodynamic ATN s/p arrest (Cr 1.6 - 5/9/23, Cr 1.0 - 4/12/23)  Resp failure, s/p intubated, extubated  CM, EF 35 - 40%, dementia  Cr has initially improved (peak Cr 5.4 - 5/19/23, aida Cr 1.95 - 6/1/23)  Recurrent hemodynamic ANISH w/o recovery  Started on HD 6/21  Epoetin w/HD 3 x week  S/p PEG  S/p perm cath  Will continue HD TTS  D/c planning to [facility] w/HD    [phone]

## 2023-07-05 NOTE — PROGRESS NOTE ADULT - ASSESSMENT
78M PMH dementia, HTN, cardiomyopathy, CHF, CAD s/p CABG with history of RCA occlusion, DM2, presented initially to [facility] for Acute hypoxic respiratory failure in setting of choking, s/p cardiac arrest, anoxic brain injury and myoclonus, subsequently developed renal failure as a result.      Patient now post ICU and medically stable although requiring supplemental O2 1L NC and BIPAP at night for support.    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 Cardio consulted.  - restarted home eliquis, now following permaclath placement.    #ESRD on HD  - cont dialysis as per nephro  - permacath at  (7/3)    #Heart failure with reduced ejection fraction-   - consider starting coreg/entresto when able to  - cont aspirin    #Sacral wound  - daily dressing changes  - dakins topical and wound care  - plastics recs appreciated    #DVT ppx  -  eliquis    dispo: patient medically stable and will need SNF placement w/ HD  contact: Kuldeep Ordonez

## 2023-07-05 NOTE — PROGRESS NOTE ADULT - SUBJECTIVE AND OBJECTIVE BOX
Patient is a 78y old  Male who presents with a chief complaint of acute systolic CHF (04 Jul 2023 22:43)    Nonverbal    Patient seen and examined at bedside. No overnight events reported.     ALLERGIES:  sulfa drugs (Unknown)    MEDICATIONS  (STANDING):  apixaban 2.5 milliGRAM(s) Oral two times a day  aspirin  chewable 81 milliGRAM(s) Oral daily  brivaracetam Oral Solution 25 milliGRAM(s) Oral two times a day  chlorhexidine 2% Cloths 1 Application(s) Topical daily  chlorhexidine 4% Liquid 1 Application(s) Topical <User Schedule>  Dakins Solution - 1/2 Strength 1 Application(s) Topical daily  dextrose 5%. 1000 milliLiter(s) (100 mL/Hr) IV Continuous <Continuous>  dextrose 5%. 1000 milliLiter(s) (50 mL/Hr) IV Continuous <Continuous>  dextrose 50% Injectable 12.5 Gram(s) IV Push once  dextrose 50% Injectable 25 Gram(s) IV Push once  dextrose 50% Injectable 25 Gram(s) IV Push once  epoetin priyanka-epbx (RETACRIT) Injectable 00260 Unit(s) IV Push <User Schedule>  insulin lispro (ADMELOG) corrective regimen sliding scale   SubCutaneous every 6 hours  metoprolol tartrate 12.5 milliGRAM(s) Oral every 12 hours  modafinil 150 milliGRAM(s) Oral every 24 hours  multivitamin/minerals/iron Oral Solution (CENTRUM) 15 milliLiter(s) Oral daily  pantoprazole   Suspension 40 milliGRAM(s) Oral daily  zinc sulfate 220 milliGRAM(s) Oral daily    MEDICATIONS  (PRN):  acetaminophen   Oral Liquid .. 650 milliGRAM(s) Oral every 6 hours PRN Temp greater or equal to 38C (100.4F)  sodium chloride 0.9% lock flush 10 milliLiter(s) IV Push every 1 hour PRN Pre/post blood products, medications, blood draw, and to maintain line patency    Vital Signs Last 24 Hrs  T(F): 98 (05 Jul 2023 07:00), Max: 98.4 (04 Jul 2023 20:00)  HR: 85 (05 Jul 2023 07:00) (68 - 101)  BP: 115/69 (05 Jul 2023 07:00) (115/69 - 133/72)  RR: 14 (05 Jul 2023 07:00) (14 - 18)  SpO2: 100% (05 Jul 2023 07:00) (99% - 100%)  I&O's Summary    04 Jul 2023 07:01  -  05 Jul 2023 07:00  --------------------------------------------------------  IN: 675 mL / OUT: 1100 mL / NET: -425 mL      PHYSICAL EXAM:  General: NAD, appears cachetic and chronically ill   ENT: No gross hearing impairment, Moist mucous membranes, no thrush  Neck: Supple, No JVD  Lungs: Clear to auscultation bilaterally, good air entry, non-labored breathing  Cardio: RRR, S1/S2, No murmur  Abdomen: Soft, Nontender, Nondistended; Bowel sounds present  Extremities: No calf tenderness, No cyanosis, No pitting edema  Psych: Appropriate mood and affect    LABS:                8.3    6.78  )-----------( 204      ( 05 Jul 2023 05:43 )             27.2     07-05  135  |  99  |  44  ----------------------------<  188  4.1   |  32  |  2.37    Ca    8.6      05 Jul 2023 05:43  Phos  2.1     07-05  Mg     2.0     07-04    TPro  7.1  /  Alb  1.8  /  TBili  0.4  /  DBili  x   /  AST  16  /  ALT  7   /  AlkPhos  119  07-04    PT/INR - ( 04 Jul 2023 06:40 )   PT: 16.4 sec;   INR: 1.41 ratio       PTT - ( 04 Jul 2023 06:40 )  PTT:31.9 sec    05-07 Chol 172 mg/dL LDL -- HDL 39 mg/dL Trig 87 mg/dL    POCT Blood Glucose.: 192 mg/dL (05 Jul 2023 05:25)  POCT Blood Glucose.: 217 mg/dL (04 Jul 2023 23:28)  POCT Blood Glucose.: 246 mg/dL (04 Jul 2023 17:51)  POCT Blood Glucose.: 130 mg/dL (04 Jul 2023 13:38)    Urinalysis Basic - ( 05 Jul 2023 05:43 )    Color: x / Appearance: x / SG: x / pH: x  Gluc: 188 mg/dL / Ketone: x  / Bili: x / Urobili: x   Blood: x / Protein: x / Nitrite: x   Leuk Esterase: x / RBC: x / WBC x   Sq Epi: x / Non Sq Epi: x / Bacteria: x    COVID-19 PCR: NotDetec (06-11-23 @ 12:25)    RADIOLOGY & ADDITIONAL TESTS:    Care Discussed with Consultants/Other Providers:

## 2023-07-06 LAB
ANION GAP SERPL CALC-SCNC: 3 MMOL/L — LOW (ref 5–17)
BUN SERPL-MCNC: 67 MG/DL — HIGH (ref 7–23)
CALCIUM SERPL-MCNC: 8.6 MG/DL — SIGNIFICANT CHANGE UP (ref 8.4–10.5)
CHLORIDE SERPL-SCNC: 97 MMOL/L — SIGNIFICANT CHANGE UP (ref 96–108)
CO2 SERPL-SCNC: 31 MMOL/L — SIGNIFICANT CHANGE UP (ref 22–31)
CREAT SERPL-MCNC: 2.99 MG/DL — HIGH (ref 0.5–1.3)
EGFR: 20 ML/MIN/1.73M2 — LOW
GLUCOSE BLDC GLUCOMTR-MCNC: 161 MG/DL — HIGH (ref 70–99)
GLUCOSE BLDC GLUCOMTR-MCNC: 227 MG/DL — HIGH (ref 70–99)
GLUCOSE BLDC GLUCOMTR-MCNC: 229 MG/DL — HIGH (ref 70–99)
GLUCOSE SERPL-MCNC: 215 MG/DL — HIGH (ref 70–99)
HCT VFR BLD CALC: 26.7 % — LOW (ref 39–50)
HGB BLD-MCNC: 8.3 G/DL — LOW (ref 13–17)
MCHC RBC-ENTMCNC: 31.1 GM/DL — LOW (ref 32–36)
MCHC RBC-ENTMCNC: 32.3 PG — SIGNIFICANT CHANGE UP (ref 27–34)
MCV RBC AUTO: 103.9 FL — HIGH (ref 80–100)
NRBC # BLD: 0 /100 WBCS — SIGNIFICANT CHANGE UP (ref 0–0)
PHOSPHATE SERPL-MCNC: 1.6 MG/DL — LOW (ref 2.5–4.5)
PLATELET # BLD AUTO: 183 K/UL — SIGNIFICANT CHANGE UP (ref 150–400)
POTASSIUM SERPL-MCNC: 4 MMOL/L — SIGNIFICANT CHANGE UP (ref 3.5–5.3)
POTASSIUM SERPL-SCNC: 4 MMOL/L — SIGNIFICANT CHANGE UP (ref 3.5–5.3)
RBC # BLD: 2.57 M/UL — LOW (ref 4.2–5.8)
RBC # FLD: 17.1 % — HIGH (ref 10.3–14.5)
SODIUM SERPL-SCNC: 131 MMOL/L — LOW (ref 135–145)
WBC # BLD: 7.84 K/UL — SIGNIFICANT CHANGE UP (ref 3.8–10.5)
WBC # FLD AUTO: 7.84 K/UL — SIGNIFICANT CHANGE UP (ref 3.8–10.5)

## 2023-07-06 PROCEDURE — 99232 SBSQ HOSP IP/OBS MODERATE 35: CPT

## 2023-07-06 RX ADMIN — Medication 2: at 06:43

## 2023-07-06 RX ADMIN — BRIVARACETAM 25 MILLIGRAM(S): 25 TABLET, FILM COATED ORAL at 18:41

## 2023-07-06 RX ADMIN — Medication 1 APPLICATION(S): at 12:15

## 2023-07-06 RX ADMIN — APIXABAN 2.5 MILLIGRAM(S): 2.5 TABLET, FILM COATED ORAL at 06:43

## 2023-07-06 RX ADMIN — APIXABAN 2.5 MILLIGRAM(S): 2.5 TABLET, FILM COATED ORAL at 18:40

## 2023-07-06 RX ADMIN — Medication 1: at 12:13

## 2023-07-06 RX ADMIN — MODAFINIL 150 MILLIGRAM(S): 200 TABLET ORAL at 12:14

## 2023-07-06 RX ADMIN — PANTOPRAZOLE SODIUM 40 MILLIGRAM(S): 20 TABLET, DELAYED RELEASE ORAL at 12:15

## 2023-07-06 RX ADMIN — Medication 81 MILLIGRAM(S): at 12:14

## 2023-07-06 RX ADMIN — Medication 2: at 18:40

## 2023-07-06 RX ADMIN — CHLORHEXIDINE GLUCONATE 1 APPLICATION(S): 213 SOLUTION TOPICAL at 12:16

## 2023-07-06 RX ADMIN — BRIVARACETAM 25 MILLIGRAM(S): 25 TABLET, FILM COATED ORAL at 06:43

## 2023-07-06 RX ADMIN — ERYTHROPOIETIN 10000 UNIT(S): 10000 INJECTION, SOLUTION INTRAVENOUS; SUBCUTANEOUS at 09:47

## 2023-07-06 RX ADMIN — Medication 12.5 MILLIGRAM(S): at 06:43

## 2023-07-06 RX ADMIN — Medication 15 MILLILITER(S): at 12:14

## 2023-07-06 RX ADMIN — ZINC SULFATE TAB 220 MG (50 MG ZINC EQUIVALENT) 220 MILLIGRAM(S): 220 (50 ZN) TAB at 12:16

## 2023-07-06 RX ADMIN — Medication 12.5 MILLIGRAM(S): at 18:40

## 2023-07-06 NOTE — CHART NOTE - NSCHARTNOTESELECT_GEN_ALL_CORE
Event Note
Nutrition Services
PEG Placement/Event Note
family meeting/Event Note
Central Line Removal/Event Note
Central Line Removal/Event Note
Event Note
Interventional Radiology/Event Note
MRI return/Blood Bank
Nutrition Services
call daughter/Event Note
family call/Event Note
family call/Event Note

## 2023-07-06 NOTE — PROGRESS NOTE ADULT - SUBJECTIVE AND OBJECTIVE BOX
(x  ) No complaints (  ) Complains of  pt seen with dialysis nurse    T(F): 97.4 (07-06-23 @ 10:56), Max: 98.9 (07-06-23 @ 06:00)  HR: 77 (07-06-23 @ 10:56) (63 - 98)  BP: 126/87 (07-06-23 @ 10:56) (94/52 - 141/88)  RR: 20 (07-06-23 @ 10:56) (16 - 20)  SpO2: 95% (07-06-23 @ 10:56) (95% - 96%)        Wound Location 1:  sacral wound central fatty tissue slough/necrosis, intact periwound...no redness                             8.3    7.84  )-----------( 183      ( 06 Jul 2023 06:34 )             26.7     CBC Full  -  ( 06 Jul 2023 06:34 )  WBC Count : 7.84 K/uL  RBC Count : 2.57 M/uL  Hemoglobin : 8.3 g/dL  Hematocrit : 26.7 %  Platelet Count - Automated : 183 K/uL  Mean Cell Volume : 103.9 fl  Mean Cell Hemoglobin : 32.3 pg  Mean Cell Hemoglobin Concentration : 31.1 gm/dL  Auto Neutrophil # : x  Auto Lymphocyte # : x  Auto Monocyte # : x  Auto Eosinophil # : x  Auto Basophil # : x  Auto Neutrophil % : x  Auto Lymphocyte % : x  Auto Monocyte % : x  Auto Eosinophil % : x  Auto Basophil % : x    131|97|67<215  4.0|31|2.99  8.6,--,1.6  07-06 @ 06:34      Urinalysis Basic - ( 06 Jul 2023 06:34 )    Color: x / Appearance: x / SG: x / pH: x  Gluc: 215 mg/dL / Ketone: x  / Bili: x / Urobili: x   Blood: x / Protein: x / Nitrite: x   Leuk Esterase: x / RBC: x / WBC x   Sq Epi: x / Non Sq Epi: x / Bacteria: x                Procedure Performed:  (  )Yes     (  )No  Name of Procedure:  (  )debridement    (  )I&D    (  )Other:  (  )partial thickness     (  )full thickness     (  )subcutaneous     (  )muscle/tendon     (  )bone  (  )sharp     (  )surgical

## 2023-07-06 NOTE — PROGRESS NOTE ADULT - SUBJECTIVE AND OBJECTIVE BOX
Resting    Vital Signs Last 24 Hrs  T(C): 36.3 (07-06-23 @ 10:56), Max: 37.2 (07-06-23 @ 06:00)  T(F): 97.4 (07-06-23 @ 10:56), Max: 98.9 (07-06-23 @ 06:00)  HR: 87 (07-06-23 @ 12:00) (63 - 98)  BP: 129/74 (07-06-23 @ 12:00) (94/52 - 141/88)  RR: 20 (07-06-23 @ 12:00) (16 - 20)  SpO2: 94% (07-06-23 @ 12:00) (94% - 96%)    I&O's Detail    05 Jul 2023 07:01  -  06 Jul 2023 07:00  --------------------------------------------------------  IN:    Free Water: 400 mL    Nepro with Carb Steady: 1200 mL  Total IN: 1600 mL    OUT:    Voided (mL): 200 mL  Total OUT: 200 mL    s1s2  b/l air entry  soft, ND  tr edema                                                                                                                                                                        8.3    7.84  )-----------( 183      ( 06 Jul 2023 06:34 )             26.7     06 Jul 2023 06:34    131    |  97     |  67     ----------------------------<  215    4.0     |  31     |  2.99     Ca    8.6        06 Jul 2023 06:34  Phos  1.6       06 Jul 2023 06:34    acetaminophen   Oral Liquid .. 650 milliGRAM(s) Oral every 6 hours PRN  apixaban 2.5 milliGRAM(s) Oral two times a day  aspirin  chewable 81 milliGRAM(s) Oral daily  brivaracetam Oral Solution 25 milliGRAM(s) Oral two times a day  chlorhexidine 2% Cloths 1 Application(s) Topical daily  chlorhexidine 4% Liquid 1 Application(s) Topical <User Schedule>  Dakins Solution - 1/2 Strength 1 Application(s) Topical daily  dextrose 5%. 1000 milliLiter(s) IV Continuous <Continuous>  dextrose 5%. 1000 milliLiter(s) IV Continuous <Continuous>  dextrose 50% Injectable 25 Gram(s) IV Push once  dextrose 50% Injectable 12.5 Gram(s) IV Push once  dextrose 50% Injectable 25 Gram(s) IV Push once  epoetin priyanka-epbx (RETACRIT) Injectable 61766 Unit(s) IV Push <User Schedule>  insulin lispro (ADMELOG) corrective regimen sliding scale   SubCutaneous every 6 hours  metoprolol tartrate 12.5 milliGRAM(s) Oral every 12 hours  multivitamin/minerals/iron Oral Solution (CENTRUM) 15 milliLiter(s) Oral daily  pantoprazole   Suspension 40 milliGRAM(s) Oral daily  sodium chloride 0.9% lock flush 10 milliLiter(s) IV Push every 1 hour PRN  zinc sulfate 220 milliGRAM(s) Oral daily    A/P:    Hemodynamic ATN s/p arrest (Cr 1.6 - 5/9/23, Cr 1.0 - 4/12/23)  Resp failure, s/p intubated, extubated  CM, EF 35 - 40%, dementia  Cr has initially improved (peak Cr 5.4 - 5/19/23, aida Cr 1.95 - 6/1/23)  Recurrent hemodynamic ANISH w/o recovery  Started on HD 6/21  Epoetin w/HD 3 x week  S/p PEG  S/p perm cath  Will continue HD TTS  D/c planning to [facility] w/HD    [phone]

## 2023-07-06 NOTE — PROGRESS NOTE ADULT - SUBJECTIVE AND OBJECTIVE BOX
Follow-up Pulmonary Progress Note  Chief Complaint : Atrial fibrillation        patient seen and examined  post Hd lethargic initially opens eyes to touch has myoclonus         Allergies :sulfa drugs (Unknown)      PAST MEDICAL & SURGICAL HISTORY:  HTN (hypertension)    HLD (hyperlipidemia)    Diabetes    CAD (coronary artery disease)    History of cholecystectomy        Medications:  MEDICATIONS  (STANDING):  apixaban 2.5 milliGRAM(s) Oral two times a day  aspirin  chewable 81 milliGRAM(s) Oral daily  brivaracetam Oral Solution 25 milliGRAM(s) Oral two times a day  chlorhexidine 2% Cloths 1 Application(s) Topical daily  chlorhexidine 4% Liquid 1 Application(s) Topical <User Schedule>  Dakins Solution - 1/2 Strength 1 Application(s) Topical daily  dextrose 5%. 1000 milliLiter(s) (100 mL/Hr) IV Continuous <Continuous>  dextrose 5%. 1000 milliLiter(s) (50 mL/Hr) IV Continuous <Continuous>  dextrose 50% Injectable 25 Gram(s) IV Push once  dextrose 50% Injectable 12.5 Gram(s) IV Push once  dextrose 50% Injectable 25 Gram(s) IV Push once  epoetin priyanka-epbx (RETACRIT) Injectable 60296 Unit(s) IV Push <User Schedule>  insulin lispro (ADMELOG) corrective regimen sliding scale   SubCutaneous every 6 hours  metoprolol tartrate 12.5 milliGRAM(s) Oral every 12 hours  multivitamin/minerals/iron Oral Solution (CENTRUM) 15 milliLiter(s) Oral daily  pantoprazole   Suspension 40 milliGRAM(s) Oral daily  zinc sulfate 220 milliGRAM(s) Oral daily    MEDICATIONS  (PRN):  acetaminophen   Oral Liquid .. 650 milliGRAM(s) Oral every 6 hours PRN Temp greater or equal to 38C (100.4F)  sodium chloride 0.9% lock flush 10 milliLiter(s) IV Push every 1 hour PRN Pre/post blood products, medications, blood draw, and to maintain line patency      Antibiotics History  ceFAZolin   IVPB 2000 milliGRAM(s) IV Intermittent once, 06-28-23 @ 09:54, Stop order after: 1 Doses  meropenem  IVPB 500 milliGRAM(s) IV Intermittent every 24 hours, 06-23-23 @ 10:31, Stop order after: 7 Days  meropenem  IVPB 500 milliGRAM(s) IV Intermittent every 12 hours, 06-23-23 @ 11:09, Stop order after: 7 Days      Heme Medications   apixaban 2.5 milliGRAM(s) Oral two times a day, 07-04-23 @ 00:00  aspirin  chewable 81 milliGRAM(s) Oral daily, 06-24-23 @ 10:36      GI Medications  pantoprazole   Suspension 40 milliGRAM(s) Oral daily, 06-22-23 @ 00:00,         LABS:                        8.3    7.84  )-----------( 183      ( 06 Jul 2023 06:34 )             26.7     07-06    131<L>  |  97  |  67<H>  ----------------------------<  215<H>  4.0   |  31  |  2.99<H>    Ca    8.6      06 Jul 2023 06:34  Phos  1.6     07-06           VITALS:  T(C): 36.3 (07-06-23 @ 10:56), Max: 37.2 (07-06-23 @ 06:00)  T(F): 97.4 (07-06-23 @ 10:56), Max: 98.9 (07-06-23 @ 06:00)  HR: 77 (07-06-23 @ 10:56) (63 - 98)  BP: 126/87 (07-06-23 @ 10:56) (94/52 - 141/88)  BP(mean): --  ABP: --  ABP(mean): --  RR: 20 (07-06-23 @ 10:56) (16 - 20)  SpO2: 95% (07-06-23 @ 10:56) (95% - 96%)  CVP(mm Hg): --  CVP(cm H2O): --    Ins and Outs     07-05-23 @ 07:01  -  07-06-23 @ 07:00  --------------------------------------------------------  IN: 1600 mL / OUT: 200 mL / NET: 1400 mL    07-06-23 @ 07:01  -  07-06-23 @ 14:01  --------------------------------------------------------  IN: 0 mL / OUT: 0 mL / NET: 0 mL                I&O's Detail    05 Jul 2023 07:01  -  06 Jul 2023 07:00  --------------------------------------------------------  IN:    Free Water: 400 mL    Nepro with Carb Steady: 1200 mL  Total IN: 1600 mL    OUT:    Voided (mL): 200 mL  Total OUT: 200 mL    Total NET: 1400 mL      06 Jul 2023 07:01  -  06 Jul 2023 14:01  --------------------------------------------------------  IN:  Total IN: 0 mL    OUT:    Other (mL): 0 mL  Total OUT: 0 mL    Total NET: 0 mL

## 2023-07-06 NOTE — PROGRESS NOTE ADULT - SUBJECTIVE AND OBJECTIVE BOX
Patient is a 78y old  Male who presents with a chief complaint of acute systolic CHF (06 Jul 2023 08:05)    Patient seen and examined at bedside. No overnight events reported.     ALLERGIES:  sulfa drugs (Unknown)    MEDICATIONS  (STANDING):  apixaban 2.5 milliGRAM(s) Oral two times a day  aspirin  chewable 81 milliGRAM(s) Oral daily  brivaracetam Oral Solution 25 milliGRAM(s) Oral two times a day  chlorhexidine 2% Cloths 1 Application(s) Topical daily  chlorhexidine 4% Liquid 1 Application(s) Topical <User Schedule>  Dakins Solution - 1/2 Strength 1 Application(s) Topical daily  dextrose 5%. 1000 milliLiter(s) (100 mL/Hr) IV Continuous <Continuous>  dextrose 5%. 1000 milliLiter(s) (50 mL/Hr) IV Continuous <Continuous>  dextrose 50% Injectable 12.5 Gram(s) IV Push once  dextrose 50% Injectable 25 Gram(s) IV Push once  dextrose 50% Injectable 25 Gram(s) IV Push once  epoetin priyanka-epbx (RETACRIT) Injectable 35964 Unit(s) IV Push <User Schedule>  insulin lispro (ADMELOG) corrective regimen sliding scale   SubCutaneous every 6 hours  metoprolol tartrate 12.5 milliGRAM(s) Oral every 12 hours  modafinil 150 milliGRAM(s) Oral every 24 hours  multivitamin/minerals/iron Oral Solution (CENTRUM) 15 milliLiter(s) Oral daily  pantoprazole   Suspension 40 milliGRAM(s) Oral daily  zinc sulfate 220 milliGRAM(s) Oral daily    MEDICATIONS  (PRN):  acetaminophen   Oral Liquid .. 650 milliGRAM(s) Oral every 6 hours PRN Temp greater or equal to 38C (100.4F)  sodium chloride 0.9% lock flush 10 milliLiter(s) IV Push every 1 hour PRN Pre/post blood products, medications, blood draw, and to maintain line patency    Vital Signs Last 24 Hrs  T(F): 97.7 (06 Jul 2023 08:56), Max: 98.9 (06 Jul 2023 06:00)  HR: 63 (06 Jul 2023 08:56) (63 - 98)  BP: 94/52 (06 Jul 2023 08:56) (94/52 - 141/88)  RR: 20 (06 Jul 2023 08:56) (16 - 20)  SpO2: 95% (06 Jul 2023 08:56) (95% - 97%)    I&O's Summary  05 Jul 2023 07:01  -  06 Jul 2023 07:00  --------------------------------------------------------  IN: 1600 mL / OUT: 200 mL / NET: 1400 mL    PHYSICAL EXAM:  General: NAD, appears cachetic and chronically ill   ENT: No gross hearing impairment, Moist mucous membranes, no thrush  Neck: Supple, No JVD  Lungs: Clear to auscultation bilaterally, good air entry, non-labored breathing  Cardio: RRR, S1/S2, No murmur. Right chest wall permacath   Abdomen: Soft, Nontender, Nondistended; Bowel sounds present  Extremities: No calf tenderness, No cyanosis, No pitting edema  Psych: Calm     LABS:                        8.3    7.84  )-----------( 183      ( 06 Jul 2023 06:34 )             26.7     07-06    131  |  97  |  67  ----------------------------<  215  4.0   |  31  |  2.99    Ca    8.6      06 Jul 2023 06:34  Phos  1.6     07-06  Mg     2.0     07-04    TPro  7.1  /  Alb  1.8  /  TBili  0.4  /  DBili  x   /  AST  16  /  ALT  7   /  AlkPhos  119  07-04          PT/INR - ( 04 Jul 2023 06:40 )   PT: 16.4 sec;   INR: 1.41 ratio         PTT - ( 04 Jul 2023 06:40 )  PTT:31.9 sec          05-07 Chol 172 mg/dL LDL -- HDL 39 mg/dL Trig 87 mg/dL              POCT Blood Glucose.: 229 mg/dL (06 Jul 2023 06:39)  POCT Blood Glucose.: 233 mg/dL (05 Jul 2023 23:41)  POCT Blood Glucose.: 253 mg/dL (05 Jul 2023 17:09)  POCT Blood Glucose.: 163 mg/dL (05 Jul 2023 11:08)      Urinalysis Basic - ( 06 Jul 2023 06:34 )    Color: x / Appearance: x / SG: x / pH: x  Gluc: 215 mg/dL / Ketone: x  / Bili: x / Urobili: x   Blood: x / Protein: x / Nitrite: x   Leuk Esterase: x / RBC: x / WBC x   Sq Epi: x / Non Sq Epi: x / Bacteria: x        COVID-19 PCR: NotDetec (06-11-23 @ 12:25)    RADIOLOGY & ADDITIONAL TESTS:    Care Discussed with Consultants/Other Providers:    Patient is a 78y old  Male who presents with a chief complaint of acute systolic CHF (06 Jul 2023 08:05)    Patient seen and examined at bedside. No overnight events reported.     ALLERGIES:  sulfa drugs (Unknown)    MEDICATIONS  (STANDING):  apixaban 2.5 milliGRAM(s) Oral two times a day  aspirin  chewable 81 milliGRAM(s) Oral daily  brivaracetam Oral Solution 25 milliGRAM(s) Oral two times a day  chlorhexidine 2% Cloths 1 Application(s) Topical daily  chlorhexidine 4% Liquid 1 Application(s) Topical <User Schedule>  Dakins Solution - 1/2 Strength 1 Application(s) Topical daily  dextrose 5%. 1000 milliLiter(s) (100 mL/Hr) IV Continuous <Continuous>  dextrose 5%. 1000 milliLiter(s) (50 mL/Hr) IV Continuous <Continuous>  dextrose 50% Injectable 12.5 Gram(s) IV Push once  dextrose 50% Injectable 25 Gram(s) IV Push once  dextrose 50% Injectable 25 Gram(s) IV Push once  epoetin priyanka-epbx (RETACRIT) Injectable 26770 Unit(s) IV Push <User Schedule>  insulin lispro (ADMELOG) corrective regimen sliding scale   SubCutaneous every 6 hours  metoprolol tartrate 12.5 milliGRAM(s) Oral every 12 hours  modafinil 150 milliGRAM(s) Oral every 24 hours  multivitamin/minerals/iron Oral Solution (CENTRUM) 15 milliLiter(s) Oral daily  pantoprazole   Suspension 40 milliGRAM(s) Oral daily  zinc sulfate 220 milliGRAM(s) Oral daily    MEDICATIONS  (PRN):  acetaminophen   Oral Liquid .. 650 milliGRAM(s) Oral every 6 hours PRN Temp greater or equal to 38C (100.4F)  sodium chloride 0.9% lock flush 10 milliLiter(s) IV Push every 1 hour PRN Pre/post blood products, medications, blood draw, and to maintain line patency    Vital Signs Last 24 Hrs  T(F): 97.7 (06 Jul 2023 08:56), Max: 98.9 (06 Jul 2023 06:00)  HR: 63 (06 Jul 2023 08:56) (63 - 98)  BP: 94/52 (06 Jul 2023 08:56) (94/52 - 141/88)  RR: 20 (06 Jul 2023 08:56) (16 - 20)  SpO2: 95% (06 Jul 2023 08:56) (95% - 97%)    I&O's Summary  05 Jul 2023 07:01  -  06 Jul 2023 07:00  --------------------------------------------------------  IN: 1600 mL / OUT: 200 mL / NET: 1400 mL    PHYSICAL EXAM:  General: NAD, appears cachetic and chronically ill   ENT: No gross hearing impairment, Moist mucous membranes, no thrush  Neck: Supple, No JVD  Lungs: Clear to auscultation bilaterally, good air entry, non-labored breathing  Cardio: RRR, S1/S2, No murmur. Right chest wall permacath   Abdomen: Soft, Nontender, Nondistended; Bowel sounds present. PEG tube   Extremities: No calf tenderness, No cyanosis, No pitting edema  Psych: Calm, lethargic     LABS:                        8.3    7.84  )-----------( 183      ( 06 Jul 2023 06:34 )             26.7     07-06    131  |  97  |  67  ----------------------------<  215  4.0   |  31  |  2.99    Ca    8.6      06 Jul 2023 06:34  Phos  1.6     07-06  Mg     2.0     07-04    TPro  7.1  /  Alb  1.8  /  TBili  0.4  /  DBili  x   /  AST  16  /  ALT  7   /  AlkPhos  119  07-04          PT/INR - ( 04 Jul 2023 06:40 )   PT: 16.4 sec;   INR: 1.41 ratio         PTT - ( 04 Jul 2023 06:40 )  PTT:31.9 sec          05-07 Chol 172 mg/dL LDL -- HDL 39 mg/dL Trig 87 mg/dL              POCT Blood Glucose.: 229 mg/dL (06 Jul 2023 06:39)  POCT Blood Glucose.: 233 mg/dL (05 Jul 2023 23:41)  POCT Blood Glucose.: 253 mg/dL (05 Jul 2023 17:09)  POCT Blood Glucose.: 163 mg/dL (05 Jul 2023 11:08)      Urinalysis Basic - ( 06 Jul 2023 06:34 )    Color: x / Appearance: x / SG: x / pH: x  Gluc: 215 mg/dL / Ketone: x  / Bili: x / Urobili: x   Blood: x / Protein: x / Nitrite: x   Leuk Esterase: x / RBC: x / WBC x   Sq Epi: x / Non Sq Epi: x / Bacteria: x        COVID-19 PCR: NotDetec (06-11-23 @ 12:25)    RADIOLOGY & ADDITIONAL TESTS:    Care Discussed with Consultants/Other Providers:

## 2023-07-06 NOTE — PROGRESS NOTE ADULT - ASSESSMENT
noninfected stage 4 sacral wound, pt at risk for nonhealing of wound  -cont local wound care santyl and alginate  -off load  -nutrition support  -discharge to [facility] pending.

## 2023-07-06 NOTE — PROGRESS NOTE ADULT - ASSESSMENT
Physical Examination:  GENERAL:               Eyes closed no distress  HEENT:                    No JVD, Dry MM  PULM:                     Bilateral air entry, course and diminished  to auscultation bilaterally, mild sputum production, trace  rales , No Rhonchi, No Wheezing  CVS:                         S1, S2,  +Murmur  ABD:                        Soft, nondistended, nontender, normoactive bowel sounds,   EXT:                         no  edema, nontender, No Cyanosis or Clubbing   NEURO:                lethargic , non verbal, withdraws  mild Myoclonus   PSYC:                      Calm, No Insight.       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  Acute renal failure now on HD, baseline CKD  s/p Permcath placement 7/3/23  Heart failure with reduced EF  NSTEMI post arrest   b/l Pl effusion, s/p left chest tube now s/p removal   Dysphagia s/p PEG 6/28/23  Underlying PMH of dementia, HTN, cardiomyopathy, HFrEF, CAD s/p CABG, with known current RCA occlusion, and DM type 2      Plan:    currently on room air sat 94%, n/c o2 prn  No gross secretions noted  more lethargic at this time,  will monitor neuro status, suspect from post HD  Continue Eliquis, Modafinil  HD as per renal-    OOB to chair when bale   monitor blood glucose levels, + ISS coverage  Sacral decub - wound care   Venodyne for dvt ppx     GOC ongoing discussion with family ,now DNR/I   Dispo planning      PMD:				                   Notified(Date):  Family Updated: 	Kuldeep [phone]	                                 Date:  7/6/2023       Disposition Continue care on medical floor  Goals of Care: DNR/I

## 2023-07-06 NOTE — CHART NOTE - NSCHARTNOTEFT_GEN_A_CORE
Nutrition Follow Up Note  Hospital Course (Per Electronic Medical Record):   Source: Medical Record [X] Nursing Staff [X]     Diet: Nepro @ 75ml/hr x18 hrs (10a- 4a) via PEG     Patient remains tolerating EN feeds , POCT noted , insulin regimen noted , Current EN feeds  2430kcals, 109gms protein [id number], (34kcals,1.5gm pro/kg /body weight ) patient receives HD x 3 , Labs reviewed , suggest continue current EN feeds ,will follow clinical course , patient awaiting discharge to nursing facility .      Current Weight: (7/4) 155.6/70.6kg -post HD                          (7/4) 157.8/71.6kg                          (7/1) 155.4/70.5kg - post HD                          (7/1) 157.6/71.5kg                          (6/29) 156.5/71kg -post HD                          (6/29) 158.7/72kg     Pertinent Medications: MEDICATIONS  (STANDING):  apixaban 2.5 milliGRAM(s) Oral two times a day  aspirin  chewable 81 milliGRAM(s) Oral daily  brivaracetam Oral Solution 25 milliGRAM(s) Oral two times a day  chlorhexidine 2% Cloths 1 Application(s) Topical daily  chlorhexidine 4% Liquid 1 Application(s) Topical <User Schedule>  Dakins Solution - 1/2 Strength 1 Application(s) Topical daily  dextrose 5%. 1000 milliLiter(s) (100 mL/Hr) IV Continuous <Continuous>  dextrose 5%. 1000 milliLiter(s) (50 mL/Hr) IV Continuous <Continuous>  dextrose 50% Injectable 25 Gram(s) IV Push once  dextrose 50% Injectable 12.5 Gram(s) IV Push once  dextrose 50% Injectable 25 Gram(s) IV Push once  epoetin priyanka-epbx (RETACRIT) Injectable 62689 Unit(s) IV Push <User Schedule>  insulin lispro (ADMELOG) corrective regimen sliding scale   SubCutaneous every 6 hours  metoprolol tartrate 12.5 milliGRAM(s) Oral every 12 hours  modafinil 150 milliGRAM(s) Oral every 24 hours  multivitamin/minerals/iron Oral Solution (CENTRUM) 15 milliLiter(s) Oral daily  pantoprazole   Suspension 40 milliGRAM(s) Oral daily  zinc sulfate 220 milliGRAM(s) Oral daily    MEDICATIONS  (PRN):  acetaminophen   Oral Liquid .. 650 milliGRAM(s) Oral every 6 hours PRN Temp greater or equal to 38C (100.4F)  sodium chloride 0.9% lock flush 10 milliLiter(s) IV Push every 1 hour PRN Pre/post blood products, medications, blood draw, and to maintain line patency      Pertinent Labs:  07-06 Na131 mmol/L<L> Glu 215 mg/dL<H> K+ 4.0 mmol/L Cr  2.99 mg/dL<H> BUN 67 mg/dL<H> 07-06 Phos 1.6 mg/dL<L> 07-04 Alb 1.8 g/dL<L>Hgb 8.3g/dl<L> , Hct 26.7% <L>   POCT 229,233,253,163      Skin: sacral DTI    Edema: (+1) foot     Last BM: (7/4)     Estimated Needs:   [X] No Change since Previous Assessment      Previous Nutrition Diagnosis: Severe Protein Calorie Malnutrition & increased nutrient needs     Nutrition Diagnosis is [X] Ongoing & addressed          New Nutrition Diagnosis: [X] Not Applicable      Interventions:   1.continue current EN feeds       Monitoring & Evaluation: will monitor:  [X] Weights    [X] Follow Up (Per Protocol)  [X] Tolerance to Diet Prescription       RD to follow as per Nutrition protocol  Preethi Carnes RDN

## 2023-07-07 VITALS
HEART RATE: 88 BPM | DIASTOLIC BLOOD PRESSURE: 74 MMHG | TEMPERATURE: 99 F | OXYGEN SATURATION: 99 % | RESPIRATION RATE: 21 BRPM | SYSTOLIC BLOOD PRESSURE: 128 MMHG

## 2023-07-07 DIAGNOSIS — Z79.84 LONG TERM (CURRENT) USE OF ORAL HYPOGLYCEMIC DRUGS: ICD-10-CM

## 2023-07-07 DIAGNOSIS — F32.A DEPRESSION, UNSPECIFIED: ICD-10-CM

## 2023-07-07 DIAGNOSIS — I50.42 CHRONIC COMBINED SYSTOLIC (CONGESTIVE) AND DIASTOLIC (CONGESTIVE) HEART FAILURE: ICD-10-CM

## 2023-07-07 DIAGNOSIS — N19 UNSPECIFIED KIDNEY FAILURE: ICD-10-CM

## 2023-07-07 DIAGNOSIS — I49.3 VENTRICULAR PREMATURE DEPOLARIZATION: ICD-10-CM

## 2023-07-07 DIAGNOSIS — I13.0 HYPERTENSIVE HEART AND CHRONIC KIDNEY DISEASE WITH HEART FAILURE AND STAGE 1 THROUGH STAGE 4 CHRONIC KIDNEY DISEASE, OR UNSPECIFIED CHRONIC KIDNEY DISEASE: ICD-10-CM

## 2023-07-07 DIAGNOSIS — I34.0 NONRHEUMATIC MITRAL (VALVE) INSUFFICIENCY: ICD-10-CM

## 2023-07-07 DIAGNOSIS — F03.90 UNSPECIFIED DEMENTIA WITHOUT BEHAVIORAL DISTURBANCE: ICD-10-CM

## 2023-07-07 DIAGNOSIS — I25.10 ATHEROSCLEROTIC HEART DISEASE OF NATIVE CORONARY ARTERY WITHOUT ANGINA PECTORIS: ICD-10-CM

## 2023-07-07 DIAGNOSIS — I77.9 DISORDER OF ARTERIES AND ARTERIOLES, UNSPECIFIED: ICD-10-CM

## 2023-07-07 DIAGNOSIS — K80.10 CALCULUS OF GALLBLADDER WITH CHRONIC CHOLECYSTITIS WITHOUT OBSTRUCTION: ICD-10-CM

## 2023-07-07 DIAGNOSIS — F41.9 ANXIETY DISORDER, UNSPECIFIED: ICD-10-CM

## 2023-07-07 DIAGNOSIS — E11.69 TYPE 2 DIABETES MELLITUS WITH OTHER SPECIFIED COMPLICATION: ICD-10-CM

## 2023-07-07 DIAGNOSIS — Z79.82 LONG TERM (CURRENT) USE OF ASPIRIN: ICD-10-CM

## 2023-07-07 LAB
GLUCOSE BLDC GLUCOMTR-MCNC: 237 MG/DL — HIGH (ref 70–99)
GLUCOSE BLDC GLUCOMTR-MCNC: 238 MG/DL — HIGH (ref 70–99)
HCT VFR BLD CALC: 27.1 % — LOW (ref 39–50)
HGB BLD-MCNC: 8.3 G/DL — LOW (ref 13–17)
MCHC RBC-ENTMCNC: 30.6 GM/DL — LOW (ref 32–36)
MCHC RBC-ENTMCNC: 32 PG — SIGNIFICANT CHANGE UP (ref 27–34)
MCV RBC AUTO: 104.6 FL — HIGH (ref 80–100)
NRBC # BLD: 0 /100 WBCS — SIGNIFICANT CHANGE UP (ref 0–0)
PLATELET # BLD AUTO: 185 K/UL — SIGNIFICANT CHANGE UP (ref 150–400)
RBC # BLD: 2.59 M/UL — LOW (ref 4.2–5.8)
RBC # FLD: 17.4 % — HIGH (ref 10.3–14.5)
WBC # BLD: 9.94 K/UL — SIGNIFICANT CHANGE UP (ref 3.8–10.5)
WBC # FLD AUTO: 9.94 K/UL — SIGNIFICANT CHANGE UP (ref 3.8–10.5)

## 2023-07-07 PROCEDURE — 85730 THROMBOPLASTIN TIME PARTIAL: CPT

## 2023-07-07 PROCEDURE — 93005 ELECTROCARDIOGRAM TRACING: CPT

## 2023-07-07 PROCEDURE — 83935 ASSAY OF URINE OSMOLALITY: CPT

## 2023-07-07 PROCEDURE — L8699: CPT

## 2023-07-07 PROCEDURE — 82140 ASSAY OF AMMONIA: CPT

## 2023-07-07 PROCEDURE — 86901 BLOOD TYPING SEROLOGIC RH(D): CPT

## 2023-07-07 PROCEDURE — 97533 SENSORY INTEGRATION: CPT

## 2023-07-07 PROCEDURE — 83605 ASSAY OF LACTIC ACID: CPT

## 2023-07-07 PROCEDURE — 93925 LOWER EXTREMITY STUDY: CPT

## 2023-07-07 PROCEDURE — 81001 URINALYSIS AUTO W/SCOPE: CPT

## 2023-07-07 PROCEDURE — 84145 PROCALCITONIN (PCT): CPT

## 2023-07-07 PROCEDURE — 86704 HEP B CORE ANTIBODY TOTAL: CPT

## 2023-07-07 PROCEDURE — P9040: CPT

## 2023-07-07 PROCEDURE — 95812 EEG 41-60 MINUTES: CPT

## 2023-07-07 PROCEDURE — 82803 BLOOD GASES ANY COMBINATION: CPT

## 2023-07-07 PROCEDURE — 94660 CPAP INITIATION&MGMT: CPT

## 2023-07-07 PROCEDURE — 99261: CPT

## 2023-07-07 PROCEDURE — 87186 SC STD MICRODIL/AGAR DIL: CPT

## 2023-07-07 PROCEDURE — 82272 OCCULT BLD FECES 1-3 TESTS: CPT

## 2023-07-07 PROCEDURE — 87040 BLOOD CULTURE FOR BACTERIA: CPT

## 2023-07-07 PROCEDURE — 86706 HEP B SURFACE ANTIBODY: CPT

## 2023-07-07 PROCEDURE — 71045 X-RAY EXAM CHEST 1 VIEW: CPT

## 2023-07-07 PROCEDURE — 85025 COMPLETE CBC W/AUTO DIFF WBC: CPT

## 2023-07-07 PROCEDURE — 87205 SMEAR GRAM STAIN: CPT

## 2023-07-07 PROCEDURE — 84484 ASSAY OF TROPONIN QUANT: CPT

## 2023-07-07 PROCEDURE — G0103: CPT

## 2023-07-07 PROCEDURE — 87086 URINE CULTURE/COLONY COUNT: CPT

## 2023-07-07 PROCEDURE — P9047: CPT

## 2023-07-07 PROCEDURE — 99239 HOSP IP/OBS DSCHRG MGMT >30: CPT

## 2023-07-07 PROCEDURE — 87077 CULTURE AEROBIC IDENTIFY: CPT

## 2023-07-07 PROCEDURE — 86022 PLATELET ANTIBODIES: CPT

## 2023-07-07 PROCEDURE — 80053 COMPREHEN METABOLIC PANEL: CPT

## 2023-07-07 PROCEDURE — 80061 LIPID PANEL: CPT

## 2023-07-07 PROCEDURE — 86803 HEPATITIS C AB TEST: CPT

## 2023-07-07 PROCEDURE — 97110 THERAPEUTIC EXERCISES: CPT

## 2023-07-07 PROCEDURE — 97535 SELF CARE MNGMENT TRAINING: CPT

## 2023-07-07 PROCEDURE — 93970 EXTREMITY STUDY: CPT

## 2023-07-07 PROCEDURE — 87340 HEPATITIS B SURFACE AG IA: CPT

## 2023-07-07 PROCEDURE — 87075 CULTR BACTERIA EXCEPT BLOOD: CPT

## 2023-07-07 PROCEDURE — 82945 GLUCOSE OTHER FLUID: CPT

## 2023-07-07 PROCEDURE — 36415 COLL VENOUS BLD VENIPUNCTURE: CPT

## 2023-07-07 PROCEDURE — 82962 GLUCOSE BLOOD TEST: CPT

## 2023-07-07 PROCEDURE — 71250 CT THORAX DX C-: CPT

## 2023-07-07 PROCEDURE — 85027 COMPLETE CBC AUTOMATED: CPT

## 2023-07-07 PROCEDURE — 82042 OTHER SOURCE ALBUMIN QUAN EA: CPT

## 2023-07-07 PROCEDURE — 87070 CULTURE OTHR SPECIMN AEROBIC: CPT

## 2023-07-07 PROCEDURE — 0225U NFCT DS DNA&RNA 21 SARSCOV2: CPT

## 2023-07-07 PROCEDURE — 87635 SARS-COV-2 COVID-19 AMP PRB: CPT

## 2023-07-07 PROCEDURE — 80076 HEPATIC FUNCTION PANEL: CPT

## 2023-07-07 PROCEDURE — 84443 ASSAY THYROID STIM HORMONE: CPT

## 2023-07-07 PROCEDURE — 80162 ASSAY OF DIGOXIN TOTAL: CPT

## 2023-07-07 PROCEDURE — 87641 MR-STAPH DNA AMP PROBE: CPT

## 2023-07-07 PROCEDURE — 70450 CT HEAD/BRAIN W/O DYE: CPT

## 2023-07-07 PROCEDURE — 93308 TTE F-UP OR LMTD: CPT

## 2023-07-07 PROCEDURE — 94002 VENT MGMT INPAT INIT DAY: CPT

## 2023-07-07 PROCEDURE — 86923 COMPATIBILITY TEST ELECTRIC: CPT

## 2023-07-07 PROCEDURE — 82553 CREATINE MB FRACTION: CPT

## 2023-07-07 PROCEDURE — 89051 BODY FLUID CELL COUNT: CPT

## 2023-07-07 PROCEDURE — 86850 RBC ANTIBODY SCREEN: CPT

## 2023-07-07 PROCEDURE — 94760 N-INVAS EAR/PLS OXIMETRY 1: CPT

## 2023-07-07 PROCEDURE — 83880 ASSAY OF NATRIURETIC PEPTIDE: CPT

## 2023-07-07 PROCEDURE — 86900 BLOOD TYPING SEROLOGIC ABO: CPT

## 2023-07-07 PROCEDURE — 36430 TRANSFUSION BLD/BLD COMPNT: CPT

## 2023-07-07 PROCEDURE — 87102 FUNGUS ISOLATION CULTURE: CPT

## 2023-07-07 PROCEDURE — 87507 IADNA-DNA/RNA PROBE TQ 12-25: CPT

## 2023-07-07 PROCEDURE — 84100 ASSAY OF PHOSPHORUS: CPT

## 2023-07-07 PROCEDURE — 84157 ASSAY OF PROTEIN OTHER: CPT

## 2023-07-07 PROCEDURE — 83615 LACTATE (LD) (LDH) ENZYME: CPT

## 2023-07-07 PROCEDURE — 80202 ASSAY OF VANCOMYCIN: CPT

## 2023-07-07 PROCEDURE — 99285 EMERGENCY DEPT VISIT HI MDM: CPT

## 2023-07-07 PROCEDURE — 76775 US EXAM ABDO BACK WALL LIM: CPT

## 2023-07-07 PROCEDURE — 86480 TB TEST CELL IMMUN MEASURE: CPT

## 2023-07-07 PROCEDURE — 94003 VENT MGMT INPAT SUBQ DAY: CPT

## 2023-07-07 PROCEDURE — 82550 ASSAY OF CK (CPK): CPT

## 2023-07-07 PROCEDURE — 97162 PT EVAL MOD COMPLEX 30 MIN: CPT

## 2023-07-07 PROCEDURE — 83986 ASSAY PH BODY FLUID NOS: CPT

## 2023-07-07 PROCEDURE — 83036 HEMOGLOBIN GLYCOSYLATED A1C: CPT

## 2023-07-07 PROCEDURE — 87640 STAPH A DNA AMP PROBE: CPT

## 2023-07-07 PROCEDURE — 92610 EVALUATE SWALLOWING FUNCTION: CPT

## 2023-07-07 PROCEDURE — 94640 AIRWAY INHALATION TREATMENT: CPT

## 2023-07-07 PROCEDURE — 85610 PROTHROMBIN TIME: CPT

## 2023-07-07 PROCEDURE — 83735 ASSAY OF MAGNESIUM: CPT

## 2023-07-07 PROCEDURE — 80164 ASSAY DIPROPYLACETIC ACD TOT: CPT

## 2023-07-07 PROCEDURE — 93306 TTE W/DOPPLER COMPLETE: CPT

## 2023-07-07 PROCEDURE — 85379 FIBRIN DEGRADATION QUANT: CPT

## 2023-07-07 PROCEDURE — 36600 WITHDRAWAL OF ARTERIAL BLOOD: CPT

## 2023-07-07 PROCEDURE — 87150 DNA/RNA AMPLIFIED PROBE: CPT

## 2023-07-07 PROCEDURE — 80048 BASIC METABOLIC PNL TOTAL CA: CPT

## 2023-07-07 PROCEDURE — 97167 OT EVAL HIGH COMPLEX 60 MIN: CPT

## 2023-07-07 RX ORDER — ISOSORBIDE MONONITRATE 60 MG/1
1 TABLET, EXTENDED RELEASE ORAL
Refills: 0 | DISCHARGE

## 2023-07-07 RX ORDER — BRIVARACETAM 25 MG/1
25 TABLET, FILM COATED ORAL
Qty: 0 | Refills: 0 | DISCHARGE
Start: 2023-07-07

## 2023-07-07 RX ORDER — FUROSEMIDE 40 MG
1 TABLET ORAL
Refills: 0 | DISCHARGE

## 2023-07-07 RX ORDER — ASPIRIN/CALCIUM CARB/MAGNESIUM 324 MG
1 TABLET ORAL
Qty: 0 | Refills: 0 | DISCHARGE
Start: 2023-07-07

## 2023-07-07 RX ORDER — CHLORHEXIDINE GLUCONATE 213 G/1000ML
1 SOLUTION TOPICAL
Qty: 0 | Refills: 0 | DISCHARGE
Start: 2023-07-07

## 2023-07-07 RX ORDER — SODIUM HYPOCHLORITE 0.125 %
1 SOLUTION, NON-ORAL MISCELLANEOUS
Qty: 0 | Refills: 0 | DISCHARGE
Start: 2023-07-07

## 2023-07-07 RX ORDER — APIXABAN 2.5 MG/1
1 TABLET, FILM COATED ORAL
Qty: 0 | Refills: 0 | DISCHARGE
Start: 2023-07-07

## 2023-07-07 RX ORDER — PANTOPRAZOLE SODIUM 20 MG/1
40 TABLET, DELAYED RELEASE ORAL
Qty: 0 | Refills: 0 | DISCHARGE
Start: 2023-07-07

## 2023-07-07 RX ORDER — SERTRALINE 25 MG/1
1 TABLET, FILM COATED ORAL
Refills: 0 | DISCHARGE

## 2023-07-07 RX ORDER — MIRTAZAPINE 45 MG/1
1 TABLET, ORALLY DISINTEGRATING ORAL
Refills: 0 | DISCHARGE

## 2023-07-07 RX ORDER — HYDROCHLOROTHIAZIDE 25 MG
1 TABLET ORAL
Refills: 0 | DISCHARGE

## 2023-07-07 RX ORDER — METOPROLOL TARTRATE 50 MG
12.5 TABLET ORAL
Qty: 0 | Refills: 0 | DISCHARGE
Start: 2023-07-07

## 2023-07-07 RX ORDER — MULTIVIT-MIN/FERROUS GLUCONATE 9 MG/15 ML
15 LIQUID (ML) ORAL
Qty: 0 | Refills: 0 | DISCHARGE
Start: 2023-07-07

## 2023-07-07 RX ORDER — RAMIPRIL 5 MG
1 CAPSULE ORAL
Refills: 0 | DISCHARGE

## 2023-07-07 RX ORDER — ZINC SULFATE TAB 220 MG (50 MG ZINC EQUIVALENT) 220 (50 ZN) MG
1 TAB ORAL
Qty: 0 | Refills: 0 | DISCHARGE
Start: 2023-07-07

## 2023-07-07 RX ORDER — METOPROLOL TARTRATE 50 MG
1 TABLET ORAL
Refills: 0 | DISCHARGE

## 2023-07-07 RX ADMIN — Medication 12.5 MILLIGRAM(S): at 06:19

## 2023-07-07 RX ADMIN — CHLORHEXIDINE GLUCONATE 1 APPLICATION(S): 213 SOLUTION TOPICAL at 06:23

## 2023-07-07 RX ADMIN — Medication 2: at 06:29

## 2023-07-07 RX ADMIN — BRIVARACETAM 25 MILLIGRAM(S): 25 TABLET, FILM COATED ORAL at 06:19

## 2023-07-07 RX ADMIN — APIXABAN 2.5 MILLIGRAM(S): 2.5 TABLET, FILM COATED ORAL at 06:19

## 2023-07-07 RX ADMIN — Medication 2: at 00:43

## 2023-07-07 NOTE — PROGRESS NOTE ADULT - ASSESSMENT
78M PMH dementia, HTN, cardiomyopathy, CHF, CAD s/p CABG with history of RCA occlusion, DM2, presented initially to [facility] for CHF, then on 5/9/23 developed acute hypoxic respiratory failure in setting of choking, s/p cardiac arrest, anoxic brain injury and myoclonus, subsequently developed renal failure as a result. 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  - Appears awake but not conversant, had been pulling at lines and with bilateral mittens, now patient is calm   - Cont briviact    #Dysphagia  - s/p PEG placement without complications  - Abdominal binder for safety  - Tolerating tube feeds    #Chronic afib  - Continue eliquis, metoprolol    #ESRD on HD  - Cont dialysis as per nephro  - Permacath at  (7/3)    #Heart failure with reduced ejection fraction  - Consider starting coreg/entresto when able to  - Cont aspirin    #Sacral wound  - Daily dressing changes  - Dakins topical and wound care  - Plastics recs appreciated    #DVT ppx  -  Eliquis    Dispo: [facility] to [facility] at 10:30 AM today     contact: Kuldeep Ordonez        78M PMH dementia, HTN, cardiomyopathy, CHF, CAD s/p CABG with history of RCA occlusion, DM2, presented initially to [facility] for CHF, then on 5/9/23 developed acute hypoxic respiratory failure in setting of choking, s/p cardiac arrest, anoxic brain injury and myoclonus, subsequently developed renal failure as a result. 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  - Appears awake at times but not conversant, had been pulling at lines and with bilateral mittens, now patient is calm, lethargic   - Cont briviact    #Dysphagia  - s/p PEG placement without complications  - Abdominal binder for safety  - Tolerating tube feeds    #Chronic afib  - Continue eliquis, metoprolol    #ESRD on HD  - Cont dialysis as per nephro  - Permacath at  (7/3)    #Heart failure with reduced ejection fraction  - Consider starting coreg/entresto when able to  - Cont aspirin    #Sacral wound  - Daily dressing changes  - Dakins topical and wound care  - Plastics recs appreciated    #DVT ppx  -  Eliquis    Dispo: [facility] to [facility] at 10:30 AM today     contact: Kuldeep Ordonez

## 2023-07-07 NOTE — PROGRESS NOTE ADULT - NS ATTEND AMEND GEN_ALL_CORE FT
Subacute rehab placement at [facility] today  Has had episodes of transient hypoxia requiring NC support, occurred today while resting. When awakened, O2 normalized without need for O2 support.

## 2023-07-07 NOTE — PROGRESS NOTE ADULT - NS ATTEND OPT1 GEN_ALL_CORE

## 2023-07-07 NOTE — PROGRESS NOTE ADULT - NUTRITIONAL ASSESSMENT
This patient has been assessed with a concern for Malnutrition and has been determined to have a diagnosis/diagnoses of Severe protein-calorie malnutrition.    This patient is being managed with:   Diet NPO after Midnight-     NPO Start Date: 27-Jun-2023   NPO Start Time: 23:59  Entered: Jun 27 2023 10:02AM    Diet NPO with Tube Feed-  Tube Feeding Modality: Nasogastric  Nepro with Carb Steady  Total Volume for 24 Hours (mL): 1350  Continuous  Starting Tube Feed Rate {mL per Hour}: 65  Increase Tube Feed Rate by (mL): 10     Every 2 hours  Until Goal Tube Feed Rate (mL per Hour): 75  Tube Feed Duration (in Hours): 18  Tube Feed Start Time: 10:00  Tube Feed Stop Time: 04:00  Entered: Jun 23 2023  2:28PM  
This patient has been assessed with a concern for Malnutrition and has been determined to have a diagnosis/diagnoses of Severe protein-calorie malnutrition.    This patient is being managed with:   Diet NPO with Tube Feed-  Tube Feeding Modality: Nasogastric  Nepro with Carb Steady  Total Volume for 24 Hours (mL): 1040  Continuous  Starting Tube Feed Rate {mL per Hour}: 45  Increase Tube Feed Rate by (mL): 10     Every 4 hours  Until Goal Tube Feed Rate (mL per Hour): 65  Tube Feed Duration (in Hours): 16  Tube Feed Start Time: 08:00  Banatrol TF     Qty per Day:  BID  Entered: Hermes 15 2023 11:33AM  
This patient has been assessed with a concern for Malnutrition and has been determined to have a diagnosis/diagnoses of Severe protein-calorie malnutrition.    This patient is being managed with:   Diet NPO with Tube Feed-  Tube Feeding Modality: Nasogastric  Nepro with Carb Steady  Total Volume for 24 Hours (mL): 1040  Continuous  Starting Tube Feed Rate {mL per Hour}: 45  Increase Tube Feed Rate by (mL): 10     Every 4 hours  Until Goal Tube Feed Rate (mL per Hour): 65  Tube Feed Duration (in Hours): 16  Tube Feed Start Time: 08:00  Banatrol TF     Qty per Day:  BID  Entered: Hermes 15 2023 11:33AM  
This patient has been assessed with a concern for Malnutrition and has been determined to have a diagnosis/diagnoses of Severe protein-calorie malnutrition.    This patient is being managed with:   Diet NPO with Tube Feed-  Tube Feeding Modality: Nasogastric  Nepro with Carb Steady  Total Volume for 24 Hours (mL): 1040  Continuous  Starting Tube Feed Rate {mL per Hour}: 45  Increase Tube Feed Rate by (mL): 10     Every 4 hours  Until Goal Tube Feed Rate (mL per Hour): 65  Tube Feed Duration (in Hours): 16  Tube Feed Start Time: 08:00  Banatrol TF     Qty per Day:  BID  Entered: Jun 11 2023 11:20AM  
This patient has been assessed with a concern for Malnutrition and has been determined to have a diagnosis/diagnoses of Severe protein-calorie malnutrition.    This patient is being managed with:   Diet NPO with Tube Feed-  Tube Feeding Modality: Nasogastric  Nepro with Carb Steady  Total Volume for 24 Hours (mL): 1080  Continuous  Starting Tube Feed Rate {mL per Hour}: 35  Increase Tube Feed Rate by (mL): 10     Every 4 hours  Until Goal Tube Feed Rate (mL per Hour): 45  Tube Feed Duration (in Hours): 24  Tube Feed Start Time: 00:00  Entered: May 23 2023 10:21AM  
This patient has been assessed with a concern for Malnutrition and has been determined to have a diagnosis/diagnoses of Severe protein-calorie malnutrition.    This patient is being managed with:   Diet NPO with Tube Feed-  Tube Feeding Modality: Nasogastric  Nepro with Carb Steady  Total Volume for 24 Hours (mL): 720  Continuous  Starting Tube Feed Rate {mL per Hour}: 25  Increase Tube Feed Rate by (mL): 5     Every 6 hours  Until Goal Tube Feed Rate (mL per Hour): 45  Tube Feed Duration (in Hours): 16  Tube Feed Start Time: 08:00  Marco TF     Qty per Day:  2  Entered: Jun 9 2023 12:09PM  
This patient has been assessed with a concern for Malnutrition and has been determined to have a diagnosis/diagnoses of Severe protein-calorie malnutrition.    This patient is being managed with:   Diet NPO with Tube Feed-  Tube Feeding Modality: Nasogastric  Nepro with Carb Steady  Total Volume for 24 Hours (mL): 720  Continuous  Starting Tube Feed Rate {mL per Hour}: 25  Increase Tube Feed Rate by (mL): 5     Every 6 hours  Until Goal Tube Feed Rate (mL per Hour): 45  Tube Feed Duration (in Hours): 16  Tube Feed Start Time: 08:00  Marco TF     Qty per Day:  2  Entered: Jun 9 2023 12:09PM  
This patient has been assessed with a concern for Malnutrition and has been determined to have a diagnosis/diagnoses of Severe protein-calorie malnutrition.    This patient is being managed with:   Diet NPO-  Entered: Jun 7 2023 12:28PM  
This patient has been assessed with a concern for Malnutrition and has been determined to have a diagnosis/diagnoses of Severe protein-calorie malnutrition.    This patient is being managed with:   Diet NPO-  Tube Feeding Modality: Nasogastric  Nepro with Carb Steady  Total Volume for 24 Hours (mL): 720  Continuous  Starting Tube Feed Rate {mL per Hour}: 30  Until Goal Tube Feed Rate (mL per Hour): 30  Tube Feed Duration (in Hours): 24  Tube Feed Start Time: 12:00  Entered: May 11 2023  9:41AM  
This patient has been assessed with a concern for Malnutrition and has been determined to have a diagnosis/diagnoses of Severe protein-calorie malnutrition.    This patient is being managed with:   Diet NPO-  Tube Feeding Modality: Nasogastric  Nepro with Carb Steady  Total Volume for 24 Hours (mL): 720  Continuous  Starting Tube Feed Rate {mL per Hour}: 30  Until Goal Tube Feed Rate (mL per Hour): 30  Tube Feed Duration (in Hours): 24  Tube Feed Start Time: 12:00  Entered: May 11 2023  9:41AM  
This patient has been assessed with a concern for Malnutrition and has been determined to have a diagnosis/diagnoses of Severe protein-calorie malnutrition.    This patient is being managed with:   Diet NPO after Midnight-     NPO Start Date: 02-Jul-2023   NPO Start Time: 23:59  Entered: Jul 2 2023 11:43AM    Diet NPO with Tube Feed-  Tube Feeding Modality: Nasogastric  Nepro with Carb Steady  Total Volume for 24 Hours (mL): 1350  Continuous  Starting Tube Feed Rate {mL per Hour}: 65  Increase Tube Feed Rate by (mL): 10     Every 2 hours  Until Goal Tube Feed Rate (mL per Hour): 75  Tube Feed Duration (in Hours): 18  Tube Feed Start Time: 10:00  Tube Feed Stop Time: 04:00  Entered: Jun 23 2023  2:28PM  
This patient has been assessed with a concern for Malnutrition and has been determined to have a diagnosis/diagnoses of Severe protein-calorie malnutrition.    This patient is being managed with:   Diet NPO after Midnight-     NPO Start Date: 22-Jun-2023   NPO Start Time: 23:59  Entered: Jun 22 2023 12:56PM    Diet NPO with Tube Feed-  Tube Feeding Modality: Nasogastric  Nepro with Carb Steady  Total Volume for 24 Hours (mL): 1040  Continuous  Starting Tube Feed Rate {mL per Hour}: 45  Increase Tube Feed Rate by (mL): 10     Every 4 hours  Until Goal Tube Feed Rate (mL per Hour): 65  Tube Feed Duration (in Hours): 16  Tube Feed Start Time: 08:00  Banatrol TF     Qty per Day:  BID  Entered: Hermes 15 2023 11:33AM  
This patient has been assessed with a concern for Malnutrition and has been determined to have a diagnosis/diagnoses of Severe protein-calorie malnutrition.    This patient is being managed with:   Diet NPO after Midnight-     NPO Start Date: 27-Jun-2023   NPO Start Time: 23:59  Entered: Jun 27 2023 10:02AM    Diet NPO with Tube Feed-  Tube Feeding Modality: Nasogastric  Nepro with Carb Steady  Total Volume for 24 Hours (mL): 1350  Continuous  Starting Tube Feed Rate {mL per Hour}: 65  Increase Tube Feed Rate by (mL): 10     Every 2 hours  Until Goal Tube Feed Rate (mL per Hour): 75  Tube Feed Duration (in Hours): 18  Tube Feed Start Time: 10:00  Tube Feed Stop Time: 04:00  Entered: Jun 23 2023  2:28PM  
This patient has been assessed with a concern for Malnutrition and has been determined to have a diagnosis/diagnoses of Severe protein-calorie malnutrition.    This patient is being managed with:   Diet NPO with Tube Feed-  Tube Feeding Modality: Nasogastric  Nepro with Carb Steady  Total Volume for 24 Hours (mL): 1040  Continuous  Starting Tube Feed Rate {mL per Hour}: 45  Increase Tube Feed Rate by (mL): 10     Every 4 hours  Until Goal Tube Feed Rate (mL per Hour): 65  Tube Feed Duration (in Hours): 16  Tube Feed Start Time: 08:00  Banatrol TF     Qty per Day:  BID  Entered: Jun 11 2023 11:20AM  
This patient has been assessed with a concern for Malnutrition and has been determined to have a diagnosis/diagnoses of Severe protein-calorie malnutrition.    This patient is being managed with:   Diet NPO with Tube Feed-  Tube Feeding Modality: Nasogastric  Nepro with Carb Steady  Total Volume for 24 Hours (mL): 1040  Continuous  Starting Tube Feed Rate {mL per Hour}: 45  Increase Tube Feed Rate by (mL): 10     Every 4 hours  Until Goal Tube Feed Rate (mL per Hour): 65  Tube Feed Duration (in Hours): 16  Tube Feed Start Time: 08:00  Banatrol TF     Qty per Day:  BID  Entered: Jun 11 2023 11:20AM  
This patient has been assessed with a concern for Malnutrition and has been determined to have a diagnosis/diagnoses of Severe protein-calorie malnutrition.    This patient is being managed with:   Diet NPO with Tube Feed-  Tube Feeding Modality: Nasogastric  Nepro with Carb Steady  Total Volume for 24 Hours (mL): 1080  Continuous  Starting Tube Feed Rate {mL per Hour}: 35  Increase Tube Feed Rate by (mL): 10     Every 4 hours  Until Goal Tube Feed Rate (mL per Hour): 45  Tube Feed Duration (in Hours): 24  Tube Feed Start Time: 00:00  Entered: May 23 2023 10:21AM  
This patient has been assessed with a concern for Malnutrition and has been determined to have a diagnosis/diagnoses of Severe protein-calorie malnutrition.    This patient is being managed with:   Diet NPO with Tube Feed-  Tube Feeding Modality: Nasogastric  Nepro with Carb Steady  Total Volume for 24 Hours (mL): 1080  Continuous  Starting Tube Feed Rate {mL per Hour}: 35  Increase Tube Feed Rate by (mL): 10     Every 4 hours  Until Goal Tube Feed Rate (mL per Hour): 45  Tube Feed Duration (in Hours): 24  Tube Feed Start Time: 00:00  Entered: May 23 2023 10:21AM  
This patient has been assessed with a concern for Malnutrition and has been determined to have a diagnosis/diagnoses of Severe protein-calorie malnutrition.    This patient is being managed with:   Diet NPO with Tube Feed-  Tube Feeding Modality: Nasogastric  Nepro with Carb Steady  Total Volume for 24 Hours (mL): 1350  Continuous  Starting Tube Feed Rate {mL per Hour}: 65  Increase Tube Feed Rate by (mL): 10     Every 2 hours  Until Goal Tube Feed Rate (mL per Hour): 75  Tube Feed Duration (in Hours): 18  Tube Feed Start Time: 10:00  Tube Feed Stop Time: 04:00  Entered: Jun 23 2023  2:28PM  
This patient has been assessed with a concern for Malnutrition and has been determined to have a diagnosis/diagnoses of Severe protein-calorie malnutrition.    This patient is being managed with:   Diet NPO with Tube Feed-  Tube Feeding Modality: Nasogastric  Nepro with Carb Steady  Total Volume for 24 Hours (mL): 1350  Continuous  Starting Tube Feed Rate {mL per Hour}: 65  Increase Tube Feed Rate by (mL): 10     Every 2 hours  Until Goal Tube Feed Rate (mL per Hour): 75  Tube Feed Duration (in Hours): 18  Tube Feed Start Time: 10:00  Tube Feed Stop Time: 04:00  Entered: Jun 23 2023  2:28PM  
This patient has been assessed with a concern for Malnutrition and has been determined to have a diagnosis/diagnoses of Severe protein-calorie malnutrition.    This patient is being managed with:   Diet NPO with Tube Feed-  Tube Feeding Modality: Nasogastric  Vital 1.5 Sam  Total Volume for 24 Hours (mL): 1320  Continuous  Starting Tube Feed Rate {mL per Hour}: 45     Every hour  Until Goal Tube Feed Rate (mL per Hour): 55  Tube Feed Duration (in Hours): 24  Tube Feed Start Time: 00:00  Marco LAZCANO     Qty per Day:  2  Entered: Jun 4 2023  9:23AM  
This patient has been assessed with a concern for Malnutrition and has been determined to have a diagnosis/diagnoses of Severe protein-calorie malnutrition.    This patient is being managed with:   Diet NPO with Tube Feed-  Tube Feeding Modality: Nasogastric  Vital 1.5 Sam  Total Volume for 24 Hours (mL): 1320  Continuous  Starting Tube Feed Rate {mL per Hour}: 45     Every hour  Until Goal Tube Feed Rate (mL per Hour): 55  Tube Feed Duration (in Hours): 24  Tube Feed Start Time: 00:00  Marco LAZCANO     Qty per Day:  2  Entered: Jun 4 2023  9:23AM  
This patient has been assessed with a concern for Malnutrition and has been determined to have a diagnosis/diagnoses of Severe protein-calorie malnutrition.    This patient is being managed with:   Diet NPO-  Tube Feeding Modality: Nasogastric  Nepro with Carb Steady  Total Volume for 24 Hours (mL): 720  Continuous  Starting Tube Feed Rate {mL per Hour}: 30  Until Goal Tube Feed Rate (mL per Hour): 30  Tube Feed Duration (in Hours): 24  Tube Feed Start Time: 12:00  Entered: May 11 2023  9:41AM  
This patient has been assessed with a concern for Malnutrition and has been determined to have a diagnosis/diagnoses of Severe protein-calorie malnutrition.    This patient is being managed with:   Diet NPO after Midnight-     NPO Start Date: 02-Jul-2023   NPO Start Time: 23:59  Entered: Jul 2 2023 11:43AM    Diet NPO with Tube Feed-  Tube Feeding Modality: Nasogastric  Nepro with Carb Steady  Total Volume for 24 Hours (mL): 1350  Continuous  Starting Tube Feed Rate {mL per Hour}: 65  Increase Tube Feed Rate by (mL): 10     Every 2 hours  Until Goal Tube Feed Rate (mL per Hour): 75  Tube Feed Duration (in Hours): 18  Tube Feed Start Time: 10:00  Tube Feed Stop Time: 04:00  Entered: Jun 23 2023  2:28PM  
This patient has been assessed with a concern for Malnutrition and has been determined to have a diagnosis/diagnoses of Severe protein-calorie malnutrition.    This patient is being managed with:   Diet NPO after Midnight-     NPO Start Date: 22-Jun-2023   NPO Start Time: 23:59  Entered: Jun 22 2023 12:56PM    Diet NPO with Tube Feed-  Tube Feeding Modality: Nasogastric  Nepro with Carb Steady  Total Volume for 24 Hours (mL): 1040  Continuous  Starting Tube Feed Rate {mL per Hour}: 45  Increase Tube Feed Rate by (mL): 10     Every 4 hours  Until Goal Tube Feed Rate (mL per Hour): 65  Tube Feed Duration (in Hours): 16  Tube Feed Start Time: 08:00  Banatrol TF     Qty per Day:  BID  Entered: Hermes 15 2023 11:33AM  
This patient has been assessed with a concern for Malnutrition and has been determined to have a diagnosis/diagnoses of Severe protein-calorie malnutrition.    This patient is being managed with:   Diet NPO with Tube Feed-  Tube Feeding Modality: Nasogastric  Glucerna 1.5 Sam  Total Volume for 24 Hours (mL): 1320  Continuous  Starting Tube Feed Rate {mL per Hour}: 45  Increase Tube Feed Rate by (mL): 0     Every 4 hours  Until Goal Tube Feed Rate (mL per Hour): 55  Tube Feed Duration (in Hours): 24  Tube Feed Start Time: 00:00  Entered: Jun 1 2023 10:51AM  
This patient has been assessed with a concern for Malnutrition and has been determined to have a diagnosis/diagnoses of Severe protein-calorie malnutrition.    This patient is being managed with:   Diet NPO with Tube Feed-  Tube Feeding Modality: Nasogastric  Nepro with Carb Steady  Total Volume for 24 Hours (mL): 1040  Continuous  Starting Tube Feed Rate {mL per Hour}: 45  Increase Tube Feed Rate by (mL): 10     Every 4 hours  Until Goal Tube Feed Rate (mL per Hour): 65  Tube Feed Duration (in Hours): 16  Tube Feed Start Time: 08:00  Banatrol TF     Qty per Day:  BID  Entered: Hermes 15 2023 11:33AM  
This patient has been assessed with a concern for Malnutrition and has been determined to have a diagnosis/diagnoses of Severe protein-calorie malnutrition.    This patient is being managed with:   Diet NPO with Tube Feed-  Tube Feeding Modality: Nasogastric  Nepro with Carb Steady  Total Volume for 24 Hours (mL): 1040  Continuous  Starting Tube Feed Rate {mL per Hour}: 45  Increase Tube Feed Rate by (mL): 10     Every 4 hours  Until Goal Tube Feed Rate (mL per Hour): 65  Tube Feed Duration (in Hours): 16  Tube Feed Start Time: 08:00  Banatrol TF     Qty per Day:  BID  Entered: Jun 11 2023 11:20AM  
This patient has been assessed with a concern for Malnutrition and has been determined to have a diagnosis/diagnoses of Severe protein-calorie malnutrition.    This patient is being managed with:   Diet NPO with Tube Feed-  Tube Feeding Modality: Nasogastric  Nepro with Carb Steady  Total Volume for 24 Hours (mL): 1080  Continuous  Starting Tube Feed Rate {mL per Hour}: 35  Increase Tube Feed Rate by (mL): 10     Every 4 hours  Until Goal Tube Feed Rate (mL per Hour): 45  Tube Feed Duration (in Hours): 24  Tube Feed Start Time: 00:00  Entered: May 23 2023 10:21AM  
This patient has been assessed with a concern for Malnutrition and has been determined to have a diagnosis/diagnoses of Severe protein-calorie malnutrition.    This patient is being managed with:   Diet NPO with Tube Feed-  Tube Feeding Modality: Nasogastric  Nepro with Carb Steady  Total Volume for 24 Hours (mL): 1350  Continuous  Starting Tube Feed Rate {mL per Hour}: 65  Increase Tube Feed Rate by (mL): 10     Every 2 hours  Until Goal Tube Feed Rate (mL per Hour): 75  Tube Feed Duration (in Hours): 18  Tube Feed Start Time: 10:00  Tube Feed Stop Time: 04:00  Entered: Jun 23 2023  2:28PM  
This patient has been assessed with a concern for Malnutrition and has been determined to have a diagnosis/diagnoses of Severe protein-calorie malnutrition.    This patient is being managed with:   Diet NPO with Tube Feed-  Tube Feeding Modality: Nasogastric  Nepro with Carb Steady  Total Volume for 24 Hours (mL): 1350  Continuous  Starting Tube Feed Rate {mL per Hour}: 65  Increase Tube Feed Rate by (mL): 10     Every 2 hours  Until Goal Tube Feed Rate (mL per Hour): 75  Tube Feed Duration (in Hours): 18  Tube Feed Start Time: 10:00  Tube Feed Stop Time: 04:00  Entered: Jun 23 2023  2:28PM    Diet NPO with Tube Feed-  Tube Feeding Modality: Nasogastric  Nepro with Carb Steady  Total Volume for 24 Hours (mL): 1040  Continuous  Starting Tube Feed Rate {mL per Hour}: 45  Increase Tube Feed Rate by (mL): 10     Every 4 hours  Until Goal Tube Feed Rate (mL per Hour): 65  Tube Feed Duration (in Hours): 16  Tube Feed Start Time: 08:00  Banatrol TF     Qty per Day:  BID  Entered: Hermes 15 2023 11:33AM    The following pending diet order is being considered for treatment of Severe protein-calorie malnutrition:null
This patient has been assessed with a concern for Malnutrition and has been determined to have a diagnosis/diagnoses of Severe protein-calorie malnutrition.    This patient is being managed with:   Diet NPO with Tube Feed-  Tube Feeding Modality: Nasogastric  Nepro with Carb Steady  Total Volume for 24 Hours (mL): 840  Continuous  Starting Tube Feed Rate {mL per Hour}: 10  Increase Tube Feed Rate by (mL): 5     Every 6 hours  Until Goal Tube Feed Rate (mL per Hour): 35  Tube Feed Duration (in Hours): 24  Tube Feed Start Time: 22:00  Entered: May 19 2023  9:22PM  
This patient has been assessed with a concern for Malnutrition and has been determined to have a diagnosis/diagnoses of Severe protein-calorie malnutrition.    This patient is being managed with:   Diet NPO-  Entered: Jun 7 2023 12:28PM  
This patient has been assessed with a concern for Malnutrition and has been determined to have a diagnosis/diagnoses of Severe protein-calorie malnutrition.    This patient is being managed with:   Diet Consistent Carbohydrate/No Snacks-  DASH/TLC {Sodium & Cholesterol Restricted}  Minced and Moist (MINCEDMOIST)  Supplement Feeding Modality:  Oral  Glucerna Shake Cans or Servings Per Day:  1       Frequency:  Two Times a day  Entered: May  8 2023  1:14PM  
This patient has been assessed with a concern for Malnutrition and has been determined to have a diagnosis/diagnoses of Severe protein-calorie malnutrition.    This patient is being managed with:   Diet NPO after Midnight-     NPO Start Date: 29-Jun-2023   NPO Start Time: 23:59  Entered: Jun 29 2023  3:55PM    Diet NPO after Midnight-     NPO Start Date: 27-Jun-2023   NPO Start Time: 23:59  Entered: Jun 27 2023 10:02AM    Diet NPO with Tube Feed-  Tube Feeding Modality: Nasogastric  Nepro with Carb Steady  Total Volume for 24 Hours (mL): 1350  Continuous  Starting Tube Feed Rate {mL per Hour}: 65  Increase Tube Feed Rate by (mL): 10     Every 2 hours  Until Goal Tube Feed Rate (mL per Hour): 75  Tube Feed Duration (in Hours): 18  Tube Feed Start Time: 10:00  Tube Feed Stop Time: 04:00  Entered: Jun 23 2023  2:28PM  
This patient has been assessed with a concern for Malnutrition and has been determined to have a diagnosis/diagnoses of Severe protein-calorie malnutrition.    This patient is being managed with:   Diet NPO with Tube Feed-  Tube Feeding Modality: Nasogastric  Nepro with Carb Steady  Total Volume for 24 Hours (mL): 1040  Continuous  Starting Tube Feed Rate {mL per Hour}: 45  Increase Tube Feed Rate by (mL): 10     Every 4 hours  Until Goal Tube Feed Rate (mL per Hour): 65  Tube Feed Duration (in Hours): 16  Tube Feed Start Time: 08:00  Banatrol TF     Qty per Day:  BID  Entered: Hermes 15 2023 11:33AM  
This patient has been assessed with a concern for Malnutrition and has been determined to have a diagnosis/diagnoses of Severe protein-calorie malnutrition.    This patient is being managed with:   Diet NPO with Tube Feed-  Tube Feeding Modality: Nasogastric  Nepro with Carb Steady  Total Volume for 24 Hours (mL): 1080  Continuous  Starting Tube Feed Rate {mL per Hour}: 35  Increase Tube Feed Rate by (mL): 10     Every 4 hours  Until Goal Tube Feed Rate (mL per Hour): 45  Tube Feed Duration (in Hours): 24  Tube Feed Start Time: 00:00  Entered: May 23 2023 10:21AM  
This patient has been assessed with a concern for Malnutrition and has been determined to have a diagnosis/diagnoses of Severe protein-calorie malnutrition.    This patient is being managed with:   Diet NPO with Tube Feed-  Tube Feeding Modality: Nasogastric  Nepro with Carb Steady  Total Volume for 24 Hours (mL): 1350  Continuous  Starting Tube Feed Rate {mL per Hour}: 65  Increase Tube Feed Rate by (mL): 10     Every 2 hours  Until Goal Tube Feed Rate (mL per Hour): 75  Tube Feed Duration (in Hours): 18  Tube Feed Start Time: 10:00  Tube Feed Stop Time: 04:00  Entered: Jun 23 2023  2:28PM  
This patient has been assessed with a concern for Malnutrition and has been determined to have a diagnosis/diagnoses of Severe protein-calorie malnutrition.    This patient is being managed with:   Diet NPO with Tube Feed-  Tube Feeding Modality: Nasogastric  Nepro with Carb Steady  Total Volume for 24 Hours (mL): 1350  Continuous  Starting Tube Feed Rate {mL per Hour}: 65  Increase Tube Feed Rate by (mL): 10     Every 2 hours  Until Goal Tube Feed Rate (mL per Hour): 75  Tube Feed Duration (in Hours): 18  Tube Feed Start Time: 10:00  Tube Feed Stop Time: 04:00  Entered: Jun 23 2023  2:28PM  
This patient has been assessed with a concern for Malnutrition and has been determined to have a diagnosis/diagnoses of Severe protein-calorie malnutrition.    This patient is being managed with:   Diet NPO-  Entered: Jun 7 2023 12:28PM  
This patient has been assessed with a concern for Malnutrition and has been determined to have a diagnosis/diagnoses of Severe protein-calorie malnutrition.    This patient is being managed with:   Diet NPO-  Except Medications  Entered: May  9 2023  8:27PM  
This patient has been assessed with a concern for Malnutrition and has been determined to have a diagnosis/diagnoses of Severe protein-calorie malnutrition.    This patient is being managed with:   Diet NPO-  Tube Feeding Modality: Nasogastric  Nepro with Carb Steady  Total Volume for 24 Hours (mL): 720  Continuous  Starting Tube Feed Rate {mL per Hour}: 30  Until Goal Tube Feed Rate (mL per Hour): 30  Tube Feed Duration (in Hours): 24  Tube Feed Start Time: 12:00  Entered: May 11 2023  9:41AM  
This patient has been assessed with a concern for Malnutrition and has been determined to have a diagnosis/diagnoses of Severe protein-calorie malnutrition.    This patient is being managed with:   Diet NPO-  Tube Feeding Modality: Nasogastric  Nepro with Carb Steady  Total Volume for 24 Hours (mL): 720  Continuous  Starting Tube Feed Rate {mL per Hour}: 30  Until Goal Tube Feed Rate (mL per Hour): 30  Tube Feed Duration (in Hours): 24  Tube Feed Start Time: 12:00  Entered: May 11 2023  9:41AM  
This patient has been assessed with a concern for Malnutrition and has been determined to have a diagnosis/diagnoses of Severe protein-calorie malnutrition.    This patient is being managed with:   Diet NPO after Midnight-     NPO Start Date: 02-Jul-2023   NPO Start Time: 23:59  Entered: Jul 2 2023 11:43AM    Diet NPO with Tube Feed-  Tube Feeding Modality: Nasogastric  Nepro with Carb Steady  Total Volume for 24 Hours (mL): 1350  Continuous  Starting Tube Feed Rate {mL per Hour}: 65  Increase Tube Feed Rate by (mL): 10     Every 2 hours  Until Goal Tube Feed Rate (mL per Hour): 75  Tube Feed Duration (in Hours): 18  Tube Feed Start Time: 10:00  Tube Feed Stop Time: 04:00  Entered: Jun 23 2023  2:28PM  
This patient has been assessed with a concern for Malnutrition and has been determined to have a diagnosis/diagnoses of Severe protein-calorie malnutrition.    This patient is being managed with:   Diet NPO with Tube Feed-  Tube Feeding Modality: Nasogastric  Glucerna 1.5 Sam  Total Volume for 24 Hours (mL): 1320  Continuous  Starting Tube Feed Rate {mL per Hour}: 45  Increase Tube Feed Rate by (mL): 0     Every 4 hours  Until Goal Tube Feed Rate (mL per Hour): 55  Tube Feed Duration (in Hours): 24  Tube Feed Start Time: 00:00  Entered: Jun 1 2023 10:51AM  
This patient has been assessed with a concern for Malnutrition and has been determined to have a diagnosis/diagnoses of Severe protein-calorie malnutrition.    This patient is being managed with:   Diet NPO with Tube Feed-  Tube Feeding Modality: Nasogastric  Nepro with Carb Steady  Total Volume for 24 Hours (mL): 1040  Continuous  Starting Tube Feed Rate {mL per Hour}: 45  Increase Tube Feed Rate by (mL): 10     Every 4 hours  Until Goal Tube Feed Rate (mL per Hour): 65  Tube Feed Duration (in Hours): 16  Tube Feed Start Time: 08:00  Banatrol TF     Qty per Day:  BID  Entered: Hermes 15 2023 11:33AM  
This patient has been assessed with a concern for Malnutrition and has been determined to have a diagnosis/diagnoses of Severe protein-calorie malnutrition.    This patient is being managed with:   Diet NPO with Tube Feed-  Tube Feeding Modality: Nasogastric  Nepro with Carb Steady  Total Volume for 24 Hours (mL): 1040  Continuous  Starting Tube Feed Rate {mL per Hour}: 45  Increase Tube Feed Rate by (mL): 10     Every 4 hours  Until Goal Tube Feed Rate (mL per Hour): 65  Tube Feed Duration (in Hours): 16  Tube Feed Start Time: 08:00  Banatrol TF     Qty per Day:  BID  Entered: Jun 11 2023 11:20AM  
This patient has been assessed with a concern for Malnutrition and has been determined to have a diagnosis/diagnoses of Severe protein-calorie malnutrition.    This patient is being managed with:   Diet NPO with Tube Feed-  Tube Feeding Modality: Nasogastric  Nepro with Carb Steady  Total Volume for 24 Hours (mL): 1080  Continuous  Starting Tube Feed Rate {mL per Hour}: 35  Increase Tube Feed Rate by (mL): 10     Every 4 hours  Until Goal Tube Feed Rate (mL per Hour): 45  Tube Feed Duration (in Hours): 24  Tube Feed Start Time: 00:00  Entered: May 23 2023 10:21AM  
This patient has been assessed with a concern for Malnutrition and has been determined to have a diagnosis/diagnoses of Severe protein-calorie malnutrition.    This patient is being managed with:   Diet NPO with Tube Feed-  Tube Feeding Modality: Nasogastric  Nepro with Carb Steady  Total Volume for 24 Hours (mL): 1080  Continuous  Starting Tube Feed Rate {mL per Hour}: 35  Increase Tube Feed Rate by (mL): 10     Every 4 hours  Until Goal Tube Feed Rate (mL per Hour): 45  Tube Feed Duration (in Hours): 24  Tube Feed Start Time: 00:00  Entered: May 23 2023 10:21AM  
This patient has been assessed with a concern for Malnutrition and has been determined to have a diagnosis/diagnoses of Severe protein-calorie malnutrition.    This patient is being managed with:   Diet NPO with Tube Feed-  Tube Feeding Modality: Nasogastric  Nepro with Carb Steady  Total Volume for 24 Hours (mL): 1080  Continuous  Starting Tube Feed Rate {mL per Hour}: 35  Increase Tube Feed Rate by (mL): 10     Every 4 hours  Until Goal Tube Feed Rate (mL per Hour): 45  Tube Feed Duration (in Hours): 24  Tube Feed Start Time: 00:00  Entered: May 23 2023 10:21AM    Diet NPO with Tube Feed-  Tube Feeding Modality: Nasogastric  Nepro with Carb Steady  Total Volume for 24 Hours (mL): 840  Continuous  Starting Tube Feed Rate {mL per Hour}: 10  Increase Tube Feed Rate by (mL): 5     Every 6 hours  Until Goal Tube Feed Rate (mL per Hour): 35  Tube Feed Duration (in Hours): 24  Tube Feed Start Time: 22:00  Entered: May 19 2023  9:22PM    The following pending diet order is being considered for treatment of Severe protein-calorie malnutrition:null
This patient has been assessed with a concern for Malnutrition and has been determined to have a diagnosis/diagnoses of Severe protein-calorie malnutrition.    This patient is being managed with:   Diet NPO with Tube Feed-  Tube Feeding Modality: Nasogastric  Nepro with Carb Steady  Total Volume for 24 Hours (mL): 1350  Continuous  Starting Tube Feed Rate {mL per Hour}: 65  Increase Tube Feed Rate by (mL): 10     Every 2 hours  Until Goal Tube Feed Rate (mL per Hour): 75  Tube Feed Duration (in Hours): 18  Tube Feed Start Time: 10:00  Tube Feed Stop Time: 04:00  Entered: Jun 23 2023  2:28PM  
This patient has been assessed with a concern for Malnutrition and has been determined to have a diagnosis/diagnoses of Severe protein-calorie malnutrition.    This patient is being managed with:   Diet NPO with Tube Feed-  Tube Feeding Modality: Nasogastric  Nepro with Carb Steady  Total Volume for 24 Hours (mL): 720  Continuous  Starting Tube Feed Rate {mL per Hour}: 25  Increase Tube Feed Rate by (mL): 5     Every 6 hours  Until Goal Tube Feed Rate (mL per Hour): 45  Tube Feed Duration (in Hours): 16  Tube Feed Start Time: 08:00  Marco TF     Qty per Day:  2  Entered: Jun 9 2023 12:09PM  
This patient has been assessed with a concern for Malnutrition and has been determined to have a diagnosis/diagnoses of Severe protein-calorie malnutrition.    This patient is being managed with:   Diet NPO with Tube Feed-  Tube Feeding Modality: Nasogastric  Nepro with Carb Steady  Total Volume for 24 Hours (mL): 720  Continuous  Starting Tube Feed Rate {mL per Hour}: 25  Increase Tube Feed Rate by (mL): 5     Every 6 hours  Until Goal Tube Feed Rate (mL per Hour): 45  Tube Feed Duration (in Hours): 16  Tube Feed Start Time: 08:00  Marco TF     Qty per Day:  2  Entered: Jun 9 2023 12:09PM  
This patient has been assessed with a concern for Malnutrition and has been determined to have a diagnosis/diagnoses of Severe protein-calorie malnutrition.    This patient is being managed with:   Diet NPO with Tube Feed-  Tube Feeding Modality: Nasogastric  Nepro with Carb Steady  Total Volume for 24 Hours (mL): 840  Continuous  Starting Tube Feed Rate {mL per Hour}: 10  Increase Tube Feed Rate by (mL): 5     Every 6 hours  Until Goal Tube Feed Rate (mL per Hour): 35  Tube Feed Duration (in Hours): 24  Tube Feed Start Time: 22:00  Entered: May 19 2023  9:22PM  
This patient has been assessed with a concern for Malnutrition and has been determined to have a diagnosis/diagnoses of Severe protein-calorie malnutrition.    This patient is being managed with:   Diet NPO with Tube Feed-  Tube Feeding Modality: Nasogastric  Vital 1.5 Sam  Total Volume for 24 Hours (mL): 1320  Continuous  Starting Tube Feed Rate {mL per Hour}: 45     Every hour  Until Goal Tube Feed Rate (mL per Hour): 55  Tube Feed Duration (in Hours): 24  Tube Feed Start Time: 00:00  Marco LAZCANO     Qty per Day:  2  Entered: Jun 4 2023  9:23AM  
This patient has been assessed with a concern for Malnutrition and has been determined to have a diagnosis/diagnoses of Severe protein-calorie malnutrition.    This patient is being managed with:   Diet NPO with Tube Feed-  Tube Feeding Modality: Nasogastric  Nepro with Carb Steady  Total Volume for 24 Hours (mL): 1040  Continuous  Starting Tube Feed Rate {mL per Hour}: 45  Increase Tube Feed Rate by (mL): 10     Every 4 hours  Until Goal Tube Feed Rate (mL per Hour): 65  Tube Feed Duration (in Hours): 16  Tube Feed Start Time: 08:00  Banatrol TF     Qty per Day:  BID  Entered: Hermes 15 2023 11:33AM  
This patient has been assessed with a concern for Malnutrition and has been determined to have a diagnosis/diagnoses of Severe protein-calorie malnutrition.    This patient is being managed with:   Diet NPO-  Entered: Jun 7 2023 12:28PM  
This patient has been assessed with a concern for Malnutrition and has been determined to have a diagnosis/diagnoses of Severe protein-calorie malnutrition.    This patient is being managed with:   Diet NPO-  Tube Feeding Modality: Nasogastric  Nepro with Carb Steady  Total Volume for 24 Hours (mL): 840  Continuous  Starting Tube Feed Rate {mL per Hour}: 35  Until Goal Tube Feed Rate (mL per Hour): 35  Tube Feed Duration (in Hours): 24  Tube Feed Start Time: 12:00  Entered: May 14 2023 10:09AM  
This patient has been assessed with a concern for Malnutrition and has been determined to have a diagnosis/diagnoses of Severe protein-calorie malnutrition.    This patient is being managed with:   Diet NPO-  Tube Feeding Modality: Nasogastric  Nepro with Carb Steady  Total Volume for 24 Hours (mL): 840  Continuous  Starting Tube Feed Rate {mL per Hour}: 35  Until Goal Tube Feed Rate (mL per Hour): 35  Tube Feed Duration (in Hours): 24  Tube Feed Start Time: 12:00  Entered: May 14 2023 10:09AM  
This patient has been assessed with a concern for Malnutrition and has been determined to have a diagnosis/diagnoses of Severe protein-calorie malnutrition.    This patient is being managed with:   Diet NPO after Midnight-     NPO Start Date: 11-Jun-2023   NPO Start Time: 23:59  Entered: Jun 11 2023 11:50AM    Diet NPO with Tube Feed-  Tube Feeding Modality: Nasogastric  Nepro with Carb Steady  Total Volume for 24 Hours (mL): 1040  Continuous  Starting Tube Feed Rate {mL per Hour}: 45  Increase Tube Feed Rate by (mL): 10     Every 4 hours  Until Goal Tube Feed Rate (mL per Hour): 65  Tube Feed Duration (in Hours): 16  Tube Feed Start Time: 08:00  Banatrol TF     Qty per Day:  BID  Entered: Jun 11 2023 11:20AM    Diet NPO with Tube Feed-  Tube Feeding Modality: Nasogastric  Nepro with Carb Steady  Total Volume for 24 Hours (mL): 720  Continuous  Starting Tube Feed Rate {mL per Hour}: 25  Increase Tube Feed Rate by (mL): 5     Every 6 hours  Until Goal Tube Feed Rate (mL per Hour): 45  Tube Feed Duration (in Hours): 16  Tube Feed Start Time: 08:00  Banatrol TF     Qty per Day:  2  Entered: Jun 9 2023 12:09PM    The following pending diet order is being considered for treatment of Severe protein-calorie malnutrition:null
This patient has been assessed with a concern for Malnutrition and has been determined to have a diagnosis/diagnoses of Severe protein-calorie malnutrition.    This patient is being managed with:   Diet NPO after Midnight-     NPO Start Date: 22-Jun-2023   NPO Start Time: 23:59  Entered: Jun 22 2023 12:56PM    Diet NPO with Tube Feed-  Tube Feeding Modality: Nasogastric  Nepro with Carb Steady  Total Volume for 24 Hours (mL): 1040  Continuous  Starting Tube Feed Rate {mL per Hour}: 45  Increase Tube Feed Rate by (mL): 10     Every 4 hours  Until Goal Tube Feed Rate (mL per Hour): 65  Tube Feed Duration (in Hours): 16  Tube Feed Start Time: 08:00  Banatrol TF     Qty per Day:  BID  Entered: Hermes 15 2023 11:33AM  
This patient has been assessed with a concern for Malnutrition and has been determined to have a diagnosis/diagnoses of Severe protein-calorie malnutrition.    This patient is being managed with:   Diet NPO with Tube Feed-  Tube Feeding Modality: Nasogastric  Nepro with Carb Steady  Total Volume for 24 Hours (mL): 840  Continuous  Starting Tube Feed Rate {mL per Hour}: 10  Increase Tube Feed Rate by (mL): 5     Every 6 hours  Until Goal Tube Feed Rate (mL per Hour): 35  Tube Feed Duration (in Hours): 24  Tube Feed Start Time: 22:00  Entered: May 19 2023  9:22PM  
This patient has been assessed with a concern for Malnutrition and has been determined to have a diagnosis/diagnoses of Severe protein-calorie malnutrition.    This patient is being managed with:   Diet NPO with Tube Feed-  Tube Feeding Modality: Nasogastric  Nepro with Carb Steady  Total Volume for 24 Hours (mL): 1080  Continuous  Starting Tube Feed Rate {mL per Hour}: 35  Increase Tube Feed Rate by (mL): 10     Every 4 hours  Until Goal Tube Feed Rate (mL per Hour): 45  Tube Feed Duration (in Hours): 24  Tube Feed Start Time: 00:00  Entered: May 23 2023 10:21AM    This patient has been assessed with a concern for Malnutrition and has been determined to have a diagnosis/diagnoses of Severe protein-calorie malnutrition.    This patient is being managed with:   Diet NPO with Tube Feed-  Tube Feeding Modality: Nasogastric  Nepro with Carb Steady  Total Volume for 24 Hours (mL): 1080  Continuous  Starting Tube Feed Rate {mL per Hour}: 35  Increase Tube Feed Rate by (mL): 10     Every 4 hours  Until Goal Tube Feed Rate (mL per Hour): 45  Tube Feed Duration (in Hours): 24  Tube Feed Start Time: 00:00  Entered: May 23 2023 10:21AM

## 2023-07-07 NOTE — PROGRESS NOTE ADULT - SUBJECTIVE AND OBJECTIVE BOX
Follow-up Pulmonary Progress Note  Chief Complaint : Atrial fibrillation    patient seen and examined comfortable  eyes open   non verbal at this time  NT suctioning done noted mild amount of secretions.   seen on RA and sat mid 90s  no respiratory distress      Allergies :sulfa drugs (Unknown)      PAST MEDICAL & SURGICAL HISTORY:  HTN (hypertension)  HLD (hyperlipidemia)  Diabetes  CAD (coronary artery disease)  History of cholecystectomy        Medications:  MEDICATIONS  (STANDING):  apixaban 2.5 milliGRAM(s) Oral two times a day  aspirin  chewable 81 milliGRAM(s) Oral daily  brivaracetam Oral Solution 25 milliGRAM(s) Oral two times a day  chlorhexidine 2% Cloths 1 Application(s) Topical daily  chlorhexidine 4% Liquid 1 Application(s) Topical <User Schedule>  Dakins Solution - 1/2 Strength 1 Application(s) Topical daily  dextrose 5%. 1000 milliLiter(s) (100 mL/Hr) IV Continuous <Continuous>  dextrose 5%. 1000 milliLiter(s) (50 mL/Hr) IV Continuous <Continuous>  dextrose 50% Injectable 12.5 Gram(s) IV Push once  dextrose 50% Injectable 25 Gram(s) IV Push once  dextrose 50% Injectable 25 Gram(s) IV Push once  epoetin priyanka-epbx (RETACRIT) Injectable 49310 Unit(s) IV Push <User Schedule>  insulin lispro (ADMELOG) corrective regimen sliding scale   SubCutaneous every 6 hours  metoprolol tartrate 12.5 milliGRAM(s) Oral every 12 hours  multivitamin/minerals/iron Oral Solution (CENTRUM) 15 milliLiter(s) Oral daily  pantoprazole   Suspension 40 milliGRAM(s) Oral daily  zinc sulfate 220 milliGRAM(s) Oral daily    MEDICATIONS  (PRN):  acetaminophen   Oral Liquid .. 650 milliGRAM(s) Oral every 6 hours PRN Temp greater or equal to 38C (100.4F)  sodium chloride 0.9% lock flush 10 milliLiter(s) IV Push every 1 hour PRN Pre/post blood products, medications, blood draw, and to maintain line patency      Antibiotics History  ceFAZolin   IVPB 2000 milliGRAM(s) IV Intermittent once, 06-28-23 @ 09:54, Stop order after: 1 Doses  meropenem  IVPB 500 milliGRAM(s) IV Intermittent every 24 hours, 06-23-23 @ 10:31, Stop order after: 7 Days  meropenem  IVPB 500 milliGRAM(s) IV Intermittent every 12 hours, 06-23-23 @ 11:09, Stop order after: 7 Days      Heme Medications   apixaban 2.5 milliGRAM(s) Oral two times a day, 07-04-23 @ 00:00  aspirin  chewable 81 milliGRAM(s) Oral daily, 06-24-23 @ 10:36      GI Medications  pantoprazole   Suspension 40 milliGRAM(s) Oral daily, 06-22-23 @ 00:00,         LABS:                        8.3    9.94  )-----------( 185      ( 07 Jul 2023 07:00 )             27.1     07-06    131<L>  |  97  |  67<H>  ----------------------------<  215<H>  4.0   |  31  |  2.99<H>    Ca    8.6      06 Jul 2023 06:34  Phos  1.6     07-06               VITALS:  T(C): 37.2 (07-07-23 @ 11:00), Max: 37.2 (07-07-23 @ 11:00)  T(F): 98.9 (07-07-23 @ 11:00), Max: 98.9 (07-07-23 @ 11:00)  HR: 88 (07-07-23 @ 11:00) (88 - 99)  BP: 128/74 (07-07-23 @ 11:00) (125/76 - 132/65)  BP(mean): --  ABP: --  ABP(mean): --  RR: 21 (07-07-23 @ 11:00) (17 - 21)  SpO2: 99% (07-07-23 @ 11:00) (95% - 99%)  CVP(mm Hg): --  CVP(cm H2O): --    Ins and Outs     07-06-23 @ 07:01  -  07-07-23 @ 07:00  --------------------------------------------------------  IN: 1025 mL / OUT: 250 mL / NET: 775 mL           I&O's Detail    06 Jul 2023 07:01  -  07 Jul 2023 07:00  --------------------------------------------------------  IN:    Free Water: 200 mL    Nepro with Carb Steady: 825 mL  Total IN: 1025 mL    OUT:    Other (mL): 0 mL    Voided (mL): 250 mL  Total OUT: 250 mL    Total NET: 775 mL

## 2023-07-07 NOTE — PROGRESS NOTE ADULT - NS ATTEND BILL GEN_ALL_CORE
Attending to bill
PA/NP to bill
Attending to bill

## 2023-07-07 NOTE — PROGRESS NOTE ADULT - REASON FOR ADMISSION
acute systolic CHF

## 2023-07-07 NOTE — PROGRESS NOTE ADULT - ASSESSMENT
Physical Examination:  GENERAL:               Eyes open no distress  HEENT:                    No JVD, Dry MM  PULM:                     Bilateral air entry, course and diminished  to auscultation bilaterally, mild sputum production, trace  rales , No Rhonchi, No Wheezing  CVS:                         S1, S2,  +Murmur  ABD:                        Soft, nondistended, nontender, normoactive bowel sounds,   EXT:                         no  edema, nontender, No Cyanosis or Clubbing   NEURO:                arousable , non verbal, withdraws  mild Myoclonus   PSYC:                      Calm, No Insight.       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 Garcia and gave sign out  Recommend to have n/c o2 Prn,  daily Suctioning, PT, ROM, OOB if able, HD TIW, wound care    Continue Eliquis, Modafinil  HD as per renal-    OOB to chair when bale   monitor blood glucose levels, + ISS coverage  Sacral decub - wound care   Venodyne for dvt ppx     GOC ongoing discussion with family ,now DNR/I   Dispo planning        d/w Dr. Garcia

## 2023-07-07 NOTE — PROGRESS NOTE ADULT - SUBJECTIVE AND OBJECTIVE BOX
Patient is a 78y old  Male who presents with a chief complaint of acute systolic CHF (07 Jul 2023 08:08)    Patient seen and examined at bedside. No overnight events reported.     ALLERGIES:  sulfa drugs (Unknown)    MEDICATIONS  (STANDING):  apixaban 2.5 milliGRAM(s) Oral two times a day  aspirin  chewable 81 milliGRAM(s) Oral daily  brivaracetam Oral Solution 25 milliGRAM(s) Oral two times a day  chlorhexidine 2% Cloths 1 Application(s) Topical daily  chlorhexidine 4% Liquid 1 Application(s) Topical <User Schedule>  Dakins Solution - 1/2 Strength 1 Application(s) Topical daily  dextrose 5%. 1000 milliLiter(s) (100 mL/Hr) IV Continuous <Continuous>  dextrose 5%. 1000 milliLiter(s) (50 mL/Hr) IV Continuous <Continuous>  dextrose 50% Injectable 25 Gram(s) IV Push once  dextrose 50% Injectable 12.5 Gram(s) IV Push once  dextrose 50% Injectable 25 Gram(s) IV Push once  epoetin priyanka-epbx (RETACRIT) Injectable 68383 Unit(s) IV Push <User Schedule>  insulin lispro (ADMELOG) corrective regimen sliding scale   SubCutaneous every 6 hours  metoprolol tartrate 12.5 milliGRAM(s) Oral every 12 hours  multivitamin/minerals/iron Oral Solution (CENTRUM) 15 milliLiter(s) Oral daily  pantoprazole   Suspension 40 milliGRAM(s) Oral daily  zinc sulfate 220 milliGRAM(s) Oral daily    MEDICATIONS  (PRN):  acetaminophen   Oral Liquid .. 650 milliGRAM(s) Oral every 6 hours PRN Temp greater or equal to 38C (100.4F)  sodium chloride 0.9% lock flush 10 milliLiter(s) IV Push every 1 hour PRN Pre/post blood products, medications, blood draw, and to maintain line patency    Vital Signs Last 24 Hrs  T(F): 98.4 (07 Jul 2023 07:00), Max: 98.4 (07 Jul 2023 07:00)  HR: 98 (07 Jul 2023 07:00) (77 - 99)  BP: 132/65 (07 Jul 2023 07:00) (125/76 - 132/65)  RR: 17 (07 Jul 2023 07:00) (17 - 20)  SpO2: 95% (07 Jul 2023 07:00) (94% - 95%)    I&O's Summary  06 Jul 2023 07:01  -  07 Jul 2023 07:00  --------------------------------------------------------  IN: 1025 mL / OUT: 250 mL / NET: 775 mL    PHYSICAL EXAM:  General: NAD, appears cachetic and chronically ill   ENT: No gross hearing impairment, Moist mucous membranes, no thrush  Neck: Supple, No JVD  Lungs: Clear to auscultation bilaterally, good air entry, non-labored breathing  Cardio: RRR, S1/S2, No murmur. Right chest wall permacath   Abdomen: Soft, Nontender, Nondistended; Bowel sounds present. PEG tube   Extremities: No calf tenderness, No cyanosis, No pitting edema  Psych: Calm, lethargic     LABS:                        8.3    9.94  )-----------( 185      ( 07 Jul 2023 07:00 )             27.1     07-06    131  |  97  |  67  ----------------------------<  215  4.0   |  31  |  2.99    Ca    8.6      06 Jul 2023 06:34  Phos  1.6     07-06      05-07 Chol 172 mg/dL LDL -- HDL 39 mg/dL Trig 87 mg/dL        POCT Blood Glucose.: 237 mg/dL (07 Jul 2023 06:25)  POCT Blood Glucose.: 238 mg/dL (07 Jul 2023 00:38)  POCT Blood Glucose.: 227 mg/dL (06 Jul 2023 18:36)  POCT Blood Glucose.: 161 mg/dL (06 Jul 2023 12:12)      Urinalysis Basic - ( 06 Jul 2023 06:34 )    Color: x / Appearance: x / SG: x / pH: x  Gluc: 215 mg/dL / Ketone: x  / Bili: x / Urobili: x   Blood: x / Protein: x / Nitrite: x   Leuk Esterase: x / RBC: x / WBC x   Sq Epi: x / Non Sq Epi: x / Bacteria: x        COVID-19 PCR: NotDetec (06-11-23 @ 12:25)    RADIOLOGY & ADDITIONAL TESTS:    Care Discussed with Consultants/Other Providers:

## 2023-07-07 NOTE — PROGRESS NOTE ADULT - PROVIDER SPECIALTY LIST ADULT
Cardiology
Cardiology
Critical Care
Gastroenterology
Hospitalist
Hospitalist
Infectious Disease
Infectious Disease
Nephrology
Neurology
Plastic Surgery
Pulmonology
Cardiology
Critical Care
Gastroenterology
Gastroenterology
Hospitalist
Infectious Disease
Internal Medicine
Nephrology
Neurology
Plastic Surgery
Pulmonology
Critical Care
Gastroenterology
Hospitalist
Infectious Disease
Nephrology
Neurology
Plastic Surgery
Plastic Surgery
Pulmonology
Cardiology
Critical Care
Gastroenterology
Hospitalist
Infectious Disease
Palliative Care
Palliative Care
Plastic Surgery
Pulmonology
Critical Care
Gastroenterology
Neurology
Critical Care
Gastroenterology
Gastroenterology
Hospitalist
Critical Care
Gastroenterology
Gastroenterology
Critical Care
Palliative Care

## 2023-07-10 ENCOUNTER — RX RENEWAL (OUTPATIENT)
Age: 78
End: 2023-07-10

## 2023-07-10 ENCOUNTER — APPOINTMENT (OUTPATIENT)
Dept: NEUROLOGY | Facility: CLINIC | Age: 78
End: 2023-07-10

## 2023-07-12 LAB
CULTURE RESULTS: SIGNIFICANT CHANGE UP
SPECIMEN SOURCE: SIGNIFICANT CHANGE UP

## 2023-10-23 NOTE — SWALLOW BEDSIDE ASSESSMENT ADULT - NS ASR SWALLOW FINDINGS DISCUS
Initiate Treatment: :\\n1. Opzelura twice daily (apply 1st)\\n2. Elidel twice daily (apply 15 minutes later) Continue Regimen: :\\n1. Gentle skin care: lukewarm water for bathing. Dive sensitive skin soap for cleansing. Frequent moisturizing throughout the day with Eucerin, CeraVe, or Cetaphil. \\n2. Clobetasol 0.05% cream once daily x1- 2 weeks on then 2 weeks off, may repeat ONLY as needed for flares (always apply before other medications when using) Otc Regimen: Aquaphor and socks nightly to bilateral feet at bedtime Detail Level: Zone Samples Given: Eucerin cream and lotion. Advised to apply throughout the day Plan: Follow up in 2 months AD. Physician/Nursing/Patient/Family

## 2024-02-07 NOTE — PROGRESS NOTE ADULT - SUBJECTIVE AND OBJECTIVE BOX
"Chief Complaint  Back Pain, Diabetes, and Knee Pain    Subjective        Shey King presents to [facility]  Thyroid Problem  Presents for follow-up (Hypothyroidism) visit. Symptoms include fatigue. The symptoms have been stable.   Diabetes  She presents for her follow-up diabetic visit. She has type 2 diabetes mellitus. Her disease course has been stable. There are no hypoglycemic associated symptoms. There are no diabetic associated symptoms. Associated symptoms include fatigue. There are no hypoglycemic complications. There are no diabetic complications. Current diabetic treatment includes diet, insulin injections and oral agent (dual therapy). She is compliant with treatment all of the time. Her weight is stable. She is following a generally healthy diet. Meal planning includes avoidance of concentrated sweets. She never participates in exercise. Her home blood glucose trend is fluctuating minimally.   Pain  This is a new problem. The current episode started more than 1 month ago. The problem occurs daily. The problem has been gradually worsening. Associated symptoms include arthralgias and fatigue. Associated symptoms comments: Back and bilateral knee pain worsening this year  . Exacerbated by: on arimdiex. She has tried acetaminophen for the symptoms. The treatment provided mild relief.       Objective   Vital Signs:  /62 (BP Location: Right arm, Patient Position: Sitting, Cuff Size: Adult)   Pulse 92   Temp 97.5 °F (36.4 °C) (Temporal)   Resp 16   Ht 157.5 cm (62\")   Wt 84.4 kg (186 lb)   SpO2 97%   BMI 34.02 kg/m²   Estimated body mass index is 34.02 kg/m² as calculated from the following:    Height as of this encounter: 157.5 cm (62\").    Weight as of this encounter: 84.4 kg (186 lb).               Physical Exam  Vitals and nursing note reviewed.   Constitutional:       General: She is not in acute distress.     Appearance: She is well-developed. She is " Follow up for  SUBJ:    returned from dialysis daughter at bedside    PMH  HTN (hypertension)    HLD (hyperlipidemia)    Diabetes    CAD (coronary artery disease)    CVA (cerebrovascular accident)        MEDICATIONS  (STANDING):  apixaban 2.5 milliGRAM(s) Oral two times a day  aspirin  chewable 81 milliGRAM(s) Oral daily  brivaracetam Oral Solution 25 milliGRAM(s) Oral two times a day  chlorhexidine 2% Cloths 1 Application(s) Topical daily  chlorhexidine 4% Liquid 1 Application(s) Topical <User Schedule>  Dakins Solution - 1/2 Strength 1 Application(s) Topical daily  dextrose 5%. 1000 milliLiter(s) (100 mL/Hr) IV Continuous <Continuous>  dextrose 5%. 1000 milliLiter(s) (50 mL/Hr) IV Continuous <Continuous>  dextrose 50% Injectable 12.5 Gram(s) IV Push once  dextrose 50% Injectable 25 Gram(s) IV Push once  dextrose 50% Injectable 25 Gram(s) IV Push once  epoetin priyanka-epbx (RETACRIT) Injectable 94751 Unit(s) IV Push <User Schedule>  insulin lispro (ADMELOG) corrective regimen sliding scale   SubCutaneous every 6 hours  metoprolol tartrate 12.5 milliGRAM(s) Oral every 12 hours  modafinil 150 milliGRAM(s) Oral every 24 hours  multivitamin/minerals/iron Oral Solution (CENTRUM) 15 milliLiter(s) Oral daily  pantoprazole   Suspension 40 milliGRAM(s) Oral daily  zinc sulfate 220 milliGRAM(s) Oral daily    MEDICATIONS  (PRN):  acetaminophen   Oral Liquid .. 650 milliGRAM(s) Oral every 6 hours PRN Temp greater or equal to 38C (100.4F)  sodium chloride 0.9% lock flush 10 milliLiter(s) IV Push every 1 hour PRN Pre/post blood products, medications, blood draw, and to maintain line patency        PHYSICAL EXAM:  Vital Signs Last 24 Hrs  T(C): 36.8 (04 Jul 2023 12:53), Max: 37.1 (04 Jul 2023 04:00)  T(F): 98.2 (04 Jul 2023 12:53), Max: 98.7 (04 Jul 2023 04:00)  HR: 101 (04 Jul 2023 17:43) (61 - 101)  BP: 124/67 (04 Jul 2023 17:43) (116/67 - 140/70)  BP(mean): 92 (03 Jul 2023 18:54) (92 - 92)  RR: 16 (04 Jul 2023 12:53) (13 - 18)  SpO2: 100% (04 Jul 2023 12:53) (100% - 100%)    Parameters below as of 04 Jul 2023 12:53  Patient On (Oxygen Delivery Method): nasal cannula  O2 Flow (L/min): 1      GENERAL: NAD, chronically ill pulling at iv's wearing protective mittens  HEAD:  Atraumatic, Normocephalic  EYES: EOMI, PERRLA, conjunctiva and sclera clear  ENT: Moist mucous membranes,  NECK: Supple, No JVD, no bruits  CHEST/LUNG: Clear to percussion bilaterally; No rales, rhonchi, wheezing, or rubs  HEART: Regular rate and rhythm; No murmurs, rubs, or gallops PMI non displaced.  ABDOMEN: Soft, Nontender, Nondistended; Bowel sounds present  EXTREMITIES:  2+ Peripheral Pulses, No clubbing, cyanosis, or edema  SKIN: No rashes or lesions  NERVOUS SYSTEM:  Cranial Nerves II-XII intact      TELEMETRY: afib 60    ECG:    < from: 12 Lead ECG (07.04.23 @ 03:31) >  Diagnosis Line Atrial flutter with variable AV block with premature ventricular or aberrantly conducted complexes  Low voltage QRS  Low voltage in limb leads.  Inferior infarct (cited on or before 11-MAY-2023)  Abnormal ECG  When compared with ECG of 08-JUN-2023 16:44,  Atrial flutter has replaced Atrial fibrillation    Confirmed by SHELBIE JARA MD ([id number]) on 7/4/2023 9:31:02 AM    < end of copied text >    ECHO:    < from: TTE Echo Limited or F/U (06.11.23 @ 11:49) >    Summary:   1. Left ventricular ejection fraction, by visual estimation, is 35 to   40%.   2. Technically fair study.   3. Moderately decreased global left ventricular systolic function.   4. Spectral Doppler shows restrictive pattern of left ventricular   myocardial filling (Grade III diastolic dysfunction).   5. Moderate mitral valve regurgitation.   6. Moderate thickening and calcificationof the anterior and posterior   mitral valve leaflets.   7. Moderate tricuspid regurgitation.   8. Sclerotic aortic valve with decreased opening.   9. LA volume Index is 33.9 ml/m² ml/m2.    [id number] Michi Dennis MD, FACC , MD, FACC Electronically   signed on 6/11/2023 at 3:03:37 PM            *** Final ***    < end of copied text >      LABS:                        7.8    5.66  )-----------( 207      ( 04 Jul 2023 06:40 )             25.6     07-04    136  |  99  |  65<H>  ----------------------------<  142<H>  3.9   |  30  |  3.25<H>    Ca    8.4      04 Jul 2023 06:40  Phos  3.8     07-04  Mg     2.0     07-04    TPro  7.1  /  Alb  1.8<L>  /  TBili  0.4  /  DBili  x   /  AST  16  /  ALT  7<L>  /  AlkPhos  119  07-04        PT/INR - ( 04 Jul 2023 06:40 )   PT: 16.4 sec;   INR: 1.41 ratio         PTT - ( 04 Jul 2023 06:40 )  PTT:31.9 sec    I&O's Summary    03 Jul 2023 07:01  -  04 Jul 2023 07:00  --------------------------------------------------------  IN: 780 mL / OUT: 175 mL / NET: 605 mL    04 Jul 2023 07:01  -  04 Jul 2023 18:25  --------------------------------------------------------  IN: 0 mL / OUT: 1000 mL / NET: -1000 mL      BNP    RADIOLOGY & ADDITIONAL STUDIES:    ECHO:    impression  rate contolled afib on apixiban and aspirin.       obese. She is not ill-appearing.   HENT:      Head: Normocephalic.   Cardiovascular:      Rate and Rhythm: Normal rate and regular rhythm.      Heart sounds: Normal heart sounds. No murmur heard.  Pulmonary:      Effort: Pulmonary effort is normal.      Breath sounds: Normal breath sounds.   Musculoskeletal:      Lumbar back: Bony tenderness present. Negative right straight leg raise test and negative left straight leg raise test.      Left knee: Swelling and bony tenderness present.   Skin:     General: Skin is warm and dry.   Neurological:      Mental Status: She is alert and oriented to person, place, and time.   Psychiatric:         Behavior: Behavior normal.        Result Review :      During this visit the following were done:  Labs Reviewed [x]    Labs Ordered [x]    Radiology Reports Reviewed []    Radiology Ordered []    PCP Records Reviewed []    Referring Provider Records Reviewed []    ER Records Reviewed []    Hospital Records Reviewed []    History Obtained From Family []    Radiology Images Reviewed []    Other Reviewed []    Records Requested []       Assessment and Plan   Diagnoses and all orders for this visit:    1. Back pain, unspecified back location, unspecified back pain laterality, unspecified chronicity (Primary)  -     POC Urinalysis Dipstick    2. Type 2 diabetes mellitus without complication, without long-term current use of insulin  -     POC Glycosylated Hemoglobin (Hb A1C)    3. Hyperlipidemia, unspecified hyperlipidemia type  -     fenofibrate (TRICOR) 145 MG tablet; Take 1 tablet by mouth Daily.  Dispense: 90 tablet; Refill: 1  -     CBC Auto Differential; Future  -     Comprehensive Metabolic Panel; Future  -     Lipid Panel; Future  -     CBC Auto Differential  -     Comprehensive Metabolic Panel  -     Lipid Panel    4. Adult onset hypothyroidism  -     levothyroxine (Synthroid) 100 MCG tablet; Take 1 tablet by mouth Daily.  Dispense: 90 tablet; Refill: 1  -     TSH; Future  -      TSH    5. Vitamin D deficiency  -     vitamin D (ERGOCALCIFEROL) 1.25 MG (82965 UT) capsule capsule; Take 1 capsule by mouth 1 (One) Time Per Week.  Dispense: 5 capsule; Refill: 2    6. Recurrent UTI  -     Urine Culture - Urine, Urine, Clean Catch; Future  -     CBC Auto Differential; Future  -     Urine Culture - Urine, Urine, Clean Catch  -     CBC Auto Differential    7. Arthralgia of left knee  Tylenol arthritis             Follow Up   Return in about 3 months (around 5/6/2024), or if symptoms worsen or fail to improve, for Recheck labs today .  Patient was given instructions and counseling regarding her condition or for health maintenance advice. Please see specific information pulled into the AVS if appropriate.

## 2024-03-27 NOTE — ED ADULT NURSE NOTE - NSICDXPASTMEDICALHX_GEN_ALL_CORE_FT
27-Mar-2024 17:49
PAST MEDICAL HISTORY:  CAD (coronary artery disease)     Diabetes     HLD (hyperlipidemia)     HTN (hypertension)

## 2024-08-16 NOTE — PROGRESS NOTE ADULT - ASSESSMENT
78M PMH dementia, HTN, cardiomyopathy, CHF, CAD s/p CABG with history of RCA occlusion, DM2, presented initially to [facility] for CHF, then on 5/9/23 developed acute hypoxic respiratory failure in setting of choking, s/p cardiac arrest, anoxic brain injury and myoclonus, subsequently developed renal failure as a result. 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  - Appears awake but not conversant, pulling at lines and with bilateral mittens  - Cont briviact    #Dysphagia  - s/p PEG placement without complications  - Abdominal binder for safety  - Tolerating tube feeds    #Chronic afib  - Continue eliquis, metoprolol    #ESRD on HD  - Cont dialysis as per nephro  - Permacath at  (7/3)    #Heart failure with reduced ejection fraction  - Consider starting coreg/entresto when able to  - Cont aspirin    #Sacral wound  - Daily dressing changes  - Dakins topical and wound care  - Plastics recs appreciated    #DVT ppx  -  Eliquis    Dispo: Patient medically stable and will need SNF placement w/ HD - pending auth for [facility]    contact: Kuldeep Ordonez      Fall Risk; 78M PMH dementia, HTN, cardiomyopathy, CHF, CAD s/p CABG with history of RCA occlusion, DM2, presented initially to [facility] for CHF, then on 5/9/23 developed acute hypoxic respiratory failure in setting of choking, s/p cardiac arrest, anoxic brain injury and myoclonus, subsequently developed renal failure as a result. 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  - Appears awake but not conversant, had been pulling at lines and with bilateral mittens, now patient is calm   - Cont briviact    #Dysphagia  - s/p PEG placement without complications  - Abdominal binder for safety  - Tolerating tube feeds    #Chronic afib  - Continue eliquis, metoprolol    #ESRD on HD  - Cont dialysis as per nephro  - Permacath at  (7/3)    #Heart failure with reduced ejection fraction  - Consider starting coreg/entresto when able to  - Cont aspirin    #Sacral wound  - Daily dressing changes  - Dakins topical and wound care  - Plastics recs appreciated    #DVT ppx  -  Eliquis    Dispo: Patient medically stable and will need SNF placement w/ HD - pending auth for [facility]    contact: Kuldeep Ordonez      Allergy;

## 2024-11-05 NOTE — DIETITIAN INITIAL EVALUATION ADULT - CALCULATED FROM (CAL/KG)
"Chief Complaint  Cough    Subjective          Maximo Kwon presents to [facility]  Cough  This is a new problem. The current episode started in the past 7 days. The cough is Productive of green sputum. Associated symptoms include headaches, nasal congestion, postnasal drip, rhinorrhea, shortness of breath and wheezing. He has tried rest for the symptoms. The treatment provided mild relief. His past medical history is significant for COPD.       Review of Systems   HENT:  Positive for postnasal drip and rhinorrhea.    Respiratory:  Positive for cough, shortness of breath and wheezing.    Neurological:  Positive for headaches.         Objective   Vital Signs:   /70 (BP Location: Right arm, Patient Position: Sitting, Cuff Size: Adult)   Pulse 100   Temp 98.2 °F (36.8 °C) (Temporal)   Ht 182.9 cm (72.01\")   Wt 89.9 kg (198 lb 3.2 oz)   SpO2 96%   BMI 26.87 kg/m²     Physical Exam  Constitutional:       General: He is not in acute distress.     Appearance: Normal appearance. He is well-developed and well-groomed. He is not ill-appearing, toxic-appearing or diaphoretic.   HENT:      Head: Normocephalic.      Right Ear: Tympanic membrane, ear canal and external ear normal.      Left Ear: Tympanic membrane, ear canal and external ear normal.      Nose: Nose normal. No congestion or rhinorrhea.      Mouth/Throat:      Mouth: Mucous membranes are moist.      Pharynx: Oropharynx is clear. Posterior oropharyngeal erythema present. No oropharyngeal exudate.   Eyes:      General: Lids are normal.         Right eye: No discharge.         Left eye: No discharge.      Extraocular Movements: Extraocular movements intact.      Pupils: Pupils are equal, round, and reactive to light.   Neck:      Vascular: No carotid bruit.   Cardiovascular:      Rate and Rhythm: Normal rate and regular rhythm.      Pulses: Normal pulses.      Heart sounds: Normal heart sounds. No murmur heard.     No " friction rub. No gallop.   Pulmonary:      Effort: Pulmonary effort is normal. No respiratory distress.      Breath sounds: No stridor. Wheezing present. No rhonchi or rales.   Chest:      Chest wall: No tenderness.   Abdominal:      General: Bowel sounds are normal. There is no distension.      Palpations: Abdomen is soft. There is no mass.      Tenderness: There is no abdominal tenderness. There is no right CVA tenderness, left CVA tenderness, guarding or rebound.      Hernia: No hernia is present.   Musculoskeletal:         General: No swelling or tenderness. Normal range of motion.      Cervical back: Normal range of motion and neck supple. No rigidity or tenderness.      Right lower leg: No edema.      Left lower leg: No edema.   Lymphadenopathy:      Cervical: No cervical adenopathy.   Skin:     General: Skin is warm.      Capillary Refill: Capillary refill takes less than 2 seconds.      Coloration: Skin is not jaundiced.      Findings: No bruising, erythema or rash.   Neurological:      General: No focal deficit present.      Mental Status: He is alert and oriented to person, place, and time.      Motor: Motor function is intact. No weakness.      Coordination: Coordination is intact.      Gait: Gait is intact. Gait normal.   Psychiatric:         Attention and Perception: Attention normal.         Mood and Affect: Mood normal.         Speech: Speech normal.         Behavior: Behavior normal.         Cognition and Memory: Cognition normal.         Judgment: Judgment normal.        Result Review :                 Assessment and Plan    Diagnoses and all orders for this visit:    1. Cough, unspecified type (Primary)  -     POCT SARS-CoV-2 Antigen TAMARA  -     POCT Flu A&B, Molecular    2. Chronic obstructive pulmonary disease, unspecified COPD type    3. COPD with exacerbation  -     doxycycline (VIBRAMYCIN) 100 MG capsule; Take 1 capsule by mouth 2 (Two) Times a Day.  Dispense: 14 capsule; Refill: 0  -      predniSONE (DELTASONE) 10 MG tablet; Take 4 tabs daily x 3 days, then take 3 tabs daily x 3 days, then take 2 tabs daily x 3 days, then take 1 tab daily x 3 days  Dispense: 31 tablet; Refill: 0  -     cefTRIAXone (ROCEPHIN) injection 1 g  -     dexAMETHasone (DECADRON) injection 4 mg      Patient's Body mass index is 26.87 kg/m². indicating that he is overweight (BMI 25-29.9). Patient's (Body mass index is 26.87 kg/m².) indicates that they are overweight with health conditions that include hypertension, diabetes mellitus, and GERD . Weight is unchanged. BMI is above average; BMI management plan is completed. We discussed low calorie, low carb based diet program, portion control, and increasing exercise. .    Follow Up   Return in about 1 week (around 11/12/2024), or if symptoms worsen or fail to improve, for Medicare Wellness.  Patient was given instructions and counseling regarding his condition or for health maintenance advice. Please see specific information pulled into the AVS if appropriate.     This document has been electronically signed by OSITO Juares  November 5, 2024 11:42 EST        [id number]

## (undated) DEVICE — CATH IV SAFE BC 22G X 1" (BLUE)

## (undated) DEVICE — FOLEY TRAY 16FR LF URINE METER SURESTEP

## (undated) DEVICE — Device

## (undated) DEVICE — BALLOON US ENDO

## (undated) DEVICE — SCOPE WARMER SEAL DISP

## (undated) DEVICE — POSITIONER FOAM HEAD CRADLE

## (undated) DEVICE — NDL COVIDIEN 14G INSUFFLATION NEEDLE

## (undated) DEVICE — DRSG STERISTRIPS 0.5X4"

## (undated) DEVICE — SUT SOFSILK 2-0 30" TIES

## (undated) DEVICE — SUT SOFSILK 0 18" C-16

## (undated) DEVICE — SOLIDIFIER CANN EXPRESS 3K

## (undated) DEVICE — TUBING IV EXTENSION 30"

## (undated) DEVICE — TUBING INSUFLATOR VIDEO TOWER NON HEATED

## (undated) DEVICE — TROCAR COVIDIEN VERSAPORT PLUS 11MM STRAIGHT

## (undated) DEVICE — SPONGE LAP X-RAY DETECTABLE 18X18"

## (undated) DEVICE — SENSOR O2 FINGER ADULT

## (undated) DEVICE — GLV COTTON DEROYAL XL

## (undated) DEVICE — SOL IRR POUR NS 0.9% 500ML

## (undated) DEVICE — DRAPE LIGHT HANDLE COVER FLEX GREEN

## (undated) DEVICE — PREP CHLORAPREP ORANGE 2PCT 26ML

## (undated) DEVICE — CATH IV SAFE BC 20G X 1.16" (PINK)

## (undated) DEVICE — SUT PDS II 1 96" XLH

## (undated) DEVICE — SPONGE PEANUT AUTO COUNT

## (undated) DEVICE — GOWN XL

## (undated) DEVICE — TUBING SUCTION 20FT

## (undated) DEVICE — GLV 6.5 PROTEXIS

## (undated) DEVICE — FOLEY HOLDER STATLOCK 2 WAY ADULT

## (undated) DEVICE — CANISTER SUCTION LID GUARD 3000CC

## (undated) DEVICE — DRSG 4X4

## (undated) DEVICE — SUT VICRYL 3-0 18" TIES UNDYED

## (undated) DEVICE — TUBING IV SET GRAVITY 3Y 100" MACRO

## (undated) DEVICE — BITE BLOCK ADULT 20 X 27MM (GREEN)

## (undated) DEVICE — SUCTION YANKAUER NO CONTROL VENT

## (undated) DEVICE — TUBING SUCTION CONN 6FT STERILE

## (undated) DEVICE — PACK IV START WITH CHG

## (undated) DEVICE — GLV 7.5 PROTEXIS

## (undated) DEVICE — DRSG TRACH DRAINAGE 4X4

## (undated) DEVICE — SUT SOFSILK 0 30" V-20

## (undated) DEVICE — PRESSURE INFUSOR BAG 1000ML

## (undated) DEVICE — SOL IRR POUR H2O 1500ML

## (undated) DEVICE — SUT SOFSILK 2-0 18" C-15

## (undated) DEVICE — SUT POLYSORB 0 30" GS-23

## (undated) DEVICE — DRAIN RESERVOIR FOR JACKSON PRATT 100CC CARDINAL

## (undated) DEVICE — TROCAR COVIDIEN VERSASTEP 5MM

## (undated) DEVICE — SUT POLYSORB 3-0 18" C-13 UNDYED

## (undated) DEVICE — SUT SILK 0 30" TIES

## (undated) DEVICE — TROCAR COVIDIEN VERSASTEP PLUS 11MM

## (undated) DEVICE — TUBING HYDRO-SURG PLUS IRRIGATOR W SMOKEVAC & PROBE

## (undated) DEVICE — BLANKET WARMER UPPER ADULT

## (undated) DEVICE — POSITIONER STRAP ARMBOARD VELCRO TS-30 12

## (undated) DEVICE — DRSG COMBINE 5X9"

## (undated) DEVICE — TUBING W FILTER STERILE FOR INSUFFLATION

## (undated) DEVICE — PACK MINOR

## (undated) DEVICE — BAG DRAINAGE URINARY 2L

## (undated) DEVICE — GLV 8.5 PROTEXIS

## (undated) DEVICE — SYR ALLIANCE II INFLATION 60ML

## (undated) DEVICE — SOL INJ NS 0.9% 500ML 2 PORT

## (undated) DEVICE — ENDOCATCH 10MM SPECIMEN POUCH

## (undated) DEVICE — DRAIN JACKSON PRATT 10MM FLAT FULL NO TROCAR

## (undated) DEVICE — LIGASURE SM JAW 16.5MM 18CM

## (undated) DEVICE — WRAP COMPRESSION CALF MED

## (undated) DEVICE — CONTAINER SPECIMEN PET

## (undated) DEVICE — GLV 8 PROTEXIS

## (undated) DEVICE — DISSECTOR ENDO PEANUT 5MM